# Patient Record
Sex: FEMALE | Race: WHITE | Employment: OTHER | URBAN - METROPOLITAN AREA
[De-identification: names, ages, dates, MRNs, and addresses within clinical notes are randomized per-mention and may not be internally consistent; named-entity substitution may affect disease eponyms.]

---

## 2017-01-01 ENCOUNTER — HOSPITAL ENCOUNTER (OUTPATIENT)
Dept: OTHER | Age: 72
Discharge: HOME | End: 2017-01-31
Attending: FAMILY MEDICINE | Admitting: FAMILY MEDICINE

## 2017-01-02 LAB
POC INR: 3.2 INDEX
PROTHROMBIN TIME, POC: 38 SECONDS (ref 10–14.3)

## 2017-01-16 LAB
POC INR: 2.6 INDEX
PROTHROMBIN TIME, POC: 31.5 SECONDS (ref 10–14.3)

## 2017-01-26 ENCOUNTER — TELEPHONE (OUTPATIENT)
Dept: CARDIOLOGY CLINIC | Age: 72
End: 2017-01-26

## 2017-02-01 ENCOUNTER — HOSPITAL ENCOUNTER (OUTPATIENT)
Dept: OTHER | Age: 72
Discharge: HOME | End: 2017-02-28
Attending: FAMILY MEDICINE | Admitting: FAMILY MEDICINE

## 2017-02-02 ENCOUNTER — OFFICE VISIT (OUTPATIENT)
Dept: CARDIOLOGY CLINIC | Age: 72
End: 2017-02-02

## 2017-02-02 VITALS
HEART RATE: 108 BPM | BODY MASS INDEX: 29.09 KG/M2 | WEIGHT: 181 LBS | HEIGHT: 66 IN | SYSTOLIC BLOOD PRESSURE: 118 MMHG | DIASTOLIC BLOOD PRESSURE: 78 MMHG

## 2017-02-02 DIAGNOSIS — I48.0 PAF (PAROXYSMAL ATRIAL FIBRILLATION) (HCC): Primary | ICD-10-CM

## 2017-02-02 PROCEDURE — 99214 OFFICE O/P EST MOD 30 MIN: CPT | Performed by: INTERNAL MEDICINE

## 2017-02-02 PROCEDURE — 93000 ELECTROCARDIOGRAM COMPLETE: CPT | Performed by: INTERNAL MEDICINE

## 2017-02-07 ENCOUNTER — PROCEDURE VISIT (OUTPATIENT)
Dept: CARDIOLOGY CLINIC | Age: 72
End: 2017-02-07

## 2017-02-07 DIAGNOSIS — R07.89 OTHER CHEST PAIN: Primary | ICD-10-CM

## 2017-02-07 DIAGNOSIS — I48.0 PAF (PAROXYSMAL ATRIAL FIBRILLATION) (HCC): ICD-10-CM

## 2017-02-07 DIAGNOSIS — I48.0 PAF (PAROXYSMAL ATRIAL FIBRILLATION) (HCC): Primary | ICD-10-CM

## 2017-02-07 LAB
LV EF: 58 %
LVEF MODALITY: NORMAL

## 2017-02-07 PROCEDURE — 93018 CV STRESS TEST I&R ONLY: CPT | Performed by: INTERNAL MEDICINE

## 2017-02-07 PROCEDURE — 93016 CV STRESS TEST SUPVJ ONLY: CPT | Performed by: INTERNAL MEDICINE

## 2017-02-07 PROCEDURE — A9500 TC99M SESTAMIBI: HCPCS | Performed by: INTERNAL MEDICINE

## 2017-02-07 PROCEDURE — 78452 HT MUSCLE IMAGE SPECT MULT: CPT | Performed by: INTERNAL MEDICINE

## 2017-02-07 PROCEDURE — 93306 TTE W/DOPPLER COMPLETE: CPT | Performed by: INTERNAL MEDICINE

## 2017-02-07 PROCEDURE — 93017 CV STRESS TEST TRACING ONLY: CPT | Performed by: INTERNAL MEDICINE

## 2017-02-09 ENCOUNTER — TELEPHONE (OUTPATIENT)
Dept: CARDIOLOGY CLINIC | Age: 72
End: 2017-02-09

## 2017-02-09 DIAGNOSIS — M54.30 SCIATICA, UNSPECIFIED LATERALITY: ICD-10-CM

## 2017-02-10 RX ORDER — GABAPENTIN 300 MG/1
CAPSULE ORAL
Qty: 90 CAPSULE | Refills: 0 | Status: SHIPPED | OUTPATIENT
Start: 2017-02-10 | End: 2017-03-13 | Stop reason: SDUPTHER

## 2017-02-16 LAB
POC INR: 2.2 INDEX
PROTHROMBIN TIME, POC: 27 SECONDS (ref 10–14.3)

## 2017-02-20 ENCOUNTER — PROCEDURE VISIT (OUTPATIENT)
Dept: CARDIOLOGY CLINIC | Age: 72
End: 2017-02-20

## 2017-02-20 DIAGNOSIS — Z95.0 CARDIAC PACEMAKER IN SITU: Primary | ICD-10-CM

## 2017-02-20 PROCEDURE — 93296 REM INTERROG EVL PM/IDS: CPT | Performed by: INTERNAL MEDICINE

## 2017-02-20 PROCEDURE — 93294 REM INTERROG EVL PM/LDLS PM: CPT | Performed by: INTERNAL MEDICINE

## 2017-03-01 ENCOUNTER — HOSPITAL ENCOUNTER (OUTPATIENT)
Dept: OTHER | Age: 72
Discharge: HOME | End: 2017-03-31
Attending: FAMILY MEDICINE | Admitting: FAMILY MEDICINE

## 2017-03-03 ENCOUNTER — OFFICE VISIT (OUTPATIENT)
Dept: CARDIOLOGY CLINIC | Age: 72
End: 2017-03-03

## 2017-03-03 VITALS
HEIGHT: 66 IN | WEIGHT: 183 LBS | SYSTOLIC BLOOD PRESSURE: 106 MMHG | HEART RATE: 88 BPM | BODY MASS INDEX: 29.41 KG/M2 | DIASTOLIC BLOOD PRESSURE: 80 MMHG

## 2017-03-03 DIAGNOSIS — I48.0 PAF (PAROXYSMAL ATRIAL FIBRILLATION) (HCC): Primary | ICD-10-CM

## 2017-03-03 PROCEDURE — 99214 OFFICE O/P EST MOD 30 MIN: CPT | Performed by: INTERNAL MEDICINE

## 2017-03-03 RX ORDER — WARFARIN SODIUM 3 MG/1
TABLET ORAL
Qty: 90 TABLET | Refills: 3 | Status: SHIPPED | OUTPATIENT
Start: 2017-03-03 | End: 2017-09-18 | Stop reason: SDUPTHER

## 2017-03-03 RX ORDER — METOPROLOL SUCCINATE 50 MG/1
75 TABLET, EXTENDED RELEASE ORAL DAILY
Qty: 90 TABLET | Refills: 3 | Status: SHIPPED | OUTPATIENT
Start: 2017-03-03 | End: 2017-03-13 | Stop reason: SDUPTHER

## 2017-03-03 RX ORDER — QUINAPRIL 10 MG/1
TABLET ORAL
Qty: 90 TABLET | Refills: 3 | Status: SHIPPED | OUTPATIENT
Start: 2017-03-03 | End: 2017-07-11 | Stop reason: SDUPTHER

## 2017-03-13 ENCOUNTER — OFFICE VISIT (OUTPATIENT)
Dept: FAMILY MEDICINE CLINIC | Age: 72
End: 2017-03-13

## 2017-03-13 VITALS
HEIGHT: 66 IN | OXYGEN SATURATION: 98 % | DIASTOLIC BLOOD PRESSURE: 70 MMHG | HEART RATE: 91 BPM | RESPIRATION RATE: 18 BRPM | BODY MASS INDEX: 28.87 KG/M2 | WEIGHT: 179.6 LBS | SYSTOLIC BLOOD PRESSURE: 126 MMHG

## 2017-03-13 DIAGNOSIS — E03.9 ACQUIRED HYPOTHYROIDISM: ICD-10-CM

## 2017-03-13 DIAGNOSIS — I48.0 PAF (PAROXYSMAL ATRIAL FIBRILLATION) (HCC): ICD-10-CM

## 2017-03-13 DIAGNOSIS — E78.2 MIXED HYPERLIPIDEMIA: ICD-10-CM

## 2017-03-13 DIAGNOSIS — M54.30 SCIATICA, UNSPECIFIED LATERALITY: Primary | ICD-10-CM

## 2017-03-13 PROCEDURE — 99214 OFFICE O/P EST MOD 30 MIN: CPT | Performed by: FAMILY MEDICINE

## 2017-03-13 RX ORDER — SIMVASTATIN 20 MG
20 TABLET ORAL NIGHTLY
Qty: 90 TABLET | Refills: 3 | Status: SHIPPED | OUTPATIENT
Start: 2017-03-13 | End: 2018-03-26 | Stop reason: SDUPTHER

## 2017-03-13 RX ORDER — LEVOTHYROXINE SODIUM 0.05 MG/1
50 TABLET ORAL DAILY
Qty: 90 TABLET | Refills: 3 | Status: SHIPPED | OUTPATIENT
Start: 2017-03-13 | End: 2018-03-08 | Stop reason: SDUPTHER

## 2017-03-13 RX ORDER — METOPROLOL SUCCINATE 50 MG/1
75 TABLET, EXTENDED RELEASE ORAL DAILY
Qty: 135 TABLET | Refills: 3 | Status: SHIPPED | OUTPATIENT
Start: 2017-03-13 | End: 2017-12-12 | Stop reason: SDUPTHER

## 2017-03-13 RX ORDER — GABAPENTIN 300 MG/1
CAPSULE ORAL
Qty: 90 CAPSULE | Refills: 3 | Status: SHIPPED | OUTPATIENT
Start: 2017-03-13 | End: 2017-05-08 | Stop reason: SDUPTHER

## 2017-03-20 LAB
POC INR: 2 INDEX
PROTHROMBIN TIME, POC: 24 SECONDS (ref 10–14.3)

## 2017-03-22 ENCOUNTER — HOSPITAL ENCOUNTER (OUTPATIENT)
Dept: GENERAL RADIOLOGY | Age: 72
Discharge: HOME | End: 2017-03-22
Attending: INTERNAL MEDICINE | Admitting: INTERNAL MEDICINE

## 2017-03-22 DIAGNOSIS — R06.02 BREATH SHORTNESS: ICD-10-CM

## 2017-04-01 ENCOUNTER — HOSPITAL ENCOUNTER (OUTPATIENT)
Dept: OTHER | Age: 72
Discharge: HOME | End: 2017-04-30
Attending: FAMILY MEDICINE | Admitting: FAMILY MEDICINE

## 2017-04-17 LAB
POC INR: 2.6 INDEX
PROTHROMBIN TIME, POC: 31.1 SECONDS (ref 10–14.3)

## 2017-04-29 ENCOUNTER — HOSPITAL ENCOUNTER (OUTPATIENT)
Dept: SLEEP CENTER | Age: 72
Discharge: HOME | End: 2017-04-29
Attending: INTERNAL MEDICINE | Admitting: INTERNAL MEDICINE

## 2017-05-01 ENCOUNTER — HOSPITAL ENCOUNTER (OUTPATIENT)
Dept: OTHER | Age: 72
Discharge: HOME | End: 2017-05-31
Attending: FAMILY MEDICINE | Admitting: FAMILY MEDICINE

## 2017-05-08 DIAGNOSIS — M54.30 SCIATICA, UNSPECIFIED LATERALITY: ICD-10-CM

## 2017-05-09 RX ORDER — GABAPENTIN 300 MG/1
CAPSULE ORAL
Qty: 90 CAPSULE | Refills: 0 | Status: SHIPPED | OUTPATIENT
Start: 2017-05-09 | End: 2017-07-11 | Stop reason: SDUPTHER

## 2017-05-15 LAB
POC INR: 2.8 INDEX
PROTHROMBIN TIME, POC: 34 SECONDS (ref 10–14.3)

## 2017-05-22 ENCOUNTER — PROCEDURE VISIT (OUTPATIENT)
Dept: CARDIOLOGY CLINIC | Age: 72
End: 2017-05-22

## 2017-05-22 ENCOUNTER — TELEPHONE (OUTPATIENT)
Dept: CARDIOLOGY CLINIC | Age: 72
End: 2017-05-22

## 2017-05-22 DIAGNOSIS — Z95.0 CARDIAC PACEMAKER IN SITU: Primary | ICD-10-CM

## 2017-05-22 PROCEDURE — 93296 REM INTERROG EVL PM/IDS: CPT | Performed by: INTERNAL MEDICINE

## 2017-05-22 PROCEDURE — 93294 REM INTERROG EVL PM/LDLS PM: CPT | Performed by: INTERNAL MEDICINE

## 2017-06-01 ENCOUNTER — HOSPITAL ENCOUNTER (OUTPATIENT)
Dept: OTHER | Age: 72
Discharge: HOME | End: 2017-06-30
Attending: FAMILY MEDICINE | Admitting: FAMILY MEDICINE

## 2017-06-12 LAB
POC INR: 2.5 INDEX
PROTHROMBIN TIME, POC: 30 SECONDS (ref 10–14.3)

## 2017-07-01 ENCOUNTER — HOSPITAL ENCOUNTER (OUTPATIENT)
Dept: OTHER | Age: 72
Discharge: HOME | End: 2017-07-31
Attending: FAMILY MEDICINE | Admitting: FAMILY MEDICINE

## 2017-07-10 LAB
POC INR: 2.8 INDEX
PROTHROMBIN TIME, POC: 33.1 SECONDS (ref 10–14.3)

## 2017-07-11 DIAGNOSIS — M54.30 SCIATICA, UNSPECIFIED LATERALITY: ICD-10-CM

## 2017-07-12 RX ORDER — QUINAPRIL 10 MG/1
TABLET ORAL
Qty: 90 TABLET | Refills: 3 | Status: SHIPPED | OUTPATIENT
Start: 2017-07-12 | End: 2018-04-09 | Stop reason: SDUPTHER

## 2017-07-14 RX ORDER — GABAPENTIN 300 MG/1
CAPSULE ORAL
Qty: 90 CAPSULE | Refills: 0 | Status: SHIPPED | OUTPATIENT
Start: 2017-07-14 | End: 2017-08-07 | Stop reason: SDUPTHER

## 2017-08-01 ENCOUNTER — HOSPITAL ENCOUNTER (OUTPATIENT)
Dept: OTHER | Age: 72
Discharge: HOME | End: 2017-08-31
Attending: FAMILY MEDICINE | Admitting: FAMILY MEDICINE

## 2017-08-07 DIAGNOSIS — M54.30 SCIATICA, UNSPECIFIED LATERALITY: ICD-10-CM

## 2017-08-07 LAB
POC INR: 2.2 INDEX
PROTHROMBIN TIME, POC: 26.7 SECONDS (ref 10–14.3)

## 2017-08-09 RX ORDER — GABAPENTIN 300 MG/1
CAPSULE ORAL
Qty: 90 CAPSULE | Refills: 0 | Status: SHIPPED | OUTPATIENT
Start: 2017-08-09 | End: 2017-09-18 | Stop reason: SDUPTHER

## 2017-08-28 ENCOUNTER — PROCEDURE VISIT (OUTPATIENT)
Dept: CARDIOLOGY CLINIC | Age: 72
End: 2017-08-28

## 2017-08-28 DIAGNOSIS — Z95.0 CARDIAC PACEMAKER IN SITU: Primary | ICD-10-CM

## 2017-08-28 PROCEDURE — 93296 REM INTERROG EVL PM/IDS: CPT | Performed by: INTERNAL MEDICINE

## 2017-08-28 PROCEDURE — 93294 REM INTERROG EVL PM/LDLS PM: CPT | Performed by: INTERNAL MEDICINE

## 2017-09-01 ENCOUNTER — HOSPITAL ENCOUNTER (OUTPATIENT)
Dept: OTHER | Age: 72
Discharge: HOME | End: 2017-09-30
Attending: FAMILY MEDICINE | Admitting: FAMILY MEDICINE

## 2017-09-11 ENCOUNTER — OFFICE VISIT (OUTPATIENT)
Dept: CARDIOLOGY CLINIC | Age: 72
End: 2017-09-11

## 2017-09-11 VITALS
SYSTOLIC BLOOD PRESSURE: 104 MMHG | BODY MASS INDEX: 28.45 KG/M2 | HEIGHT: 66 IN | DIASTOLIC BLOOD PRESSURE: 70 MMHG | WEIGHT: 177 LBS | HEART RATE: 80 BPM

## 2017-09-11 DIAGNOSIS — Z95.0 CARDIAC PACEMAKER IN SITU: Primary | ICD-10-CM

## 2017-09-11 DIAGNOSIS — I48.0 PAF (PAROXYSMAL ATRIAL FIBRILLATION) (HCC): ICD-10-CM

## 2017-09-11 LAB
POC INR: 2.4 INDEX
PROTHROMBIN TIME, POC: 28.9 SECONDS (ref 10–14.3)

## 2017-09-11 PROCEDURE — 99213 OFFICE O/P EST LOW 20 MIN: CPT | Performed by: INTERNAL MEDICINE

## 2017-09-11 RX ORDER — OYSTER SHELL CALCIUM WITH VITAMIN D 500; 200 MG/1; [IU]/1
1 TABLET, FILM COATED ORAL DAILY
COMMUNITY
End: 2019-07-26

## 2017-09-13 RX ORDER — WARFARIN SODIUM 3 MG/1
TABLET ORAL
Qty: 90 TABLET | Refills: 3 | OUTPATIENT
Start: 2017-09-13

## 2017-09-13 RX ORDER — DILTIAZEM HYDROCHLORIDE 180 MG/1
180 CAPSULE, COATED, EXTENDED RELEASE ORAL DAILY
Qty: 90 CAPSULE | Refills: 3 | Status: SHIPPED | OUTPATIENT
Start: 2017-09-13 | End: 2018-07-24 | Stop reason: SDUPTHER

## 2017-09-14 ENCOUNTER — TELEPHONE (OUTPATIENT)
Dept: FAMILY MEDICINE CLINIC | Age: 72
End: 2017-09-14

## 2017-09-14 DIAGNOSIS — E03.9 ACQUIRED HYPOTHYROIDISM: Primary | ICD-10-CM

## 2017-09-16 ENCOUNTER — HOSPITAL ENCOUNTER (OUTPATIENT)
Dept: LAB | Age: 72
Discharge: HOME | End: 2017-09-16
Attending: FAMILY MEDICINE | Admitting: FAMILY MEDICINE

## 2017-09-16 LAB
ALBUMIN SERPL-MCNC: 4.2 GM/DL (ref 3.4–5)
ALP BLD-CCNC: 59 IU/L (ref 40–128)
ALT SERPL-CCNC: 17 U/L (ref 10–40)
ANION GAP SERPL CALCULATED.3IONS-SCNC: 11 MMOL/L (ref 4–16)
AST SERPL-CCNC: 22 IU/L (ref 15–37)
BILIRUB SERPL-MCNC: 1.3 MG/DL (ref 0–1)
BUN BLDV-MCNC: 17 MG/DL (ref 6–23)
CALCIUM SERPL-MCNC: 9.2 MG/DL (ref 8.3–10.6)
CHLORIDE BLD-SCNC: 105 MMOL/L (ref 99–110)
CHOLESTEROL: 168 MG/DL
CO2: 27 MMOL/L (ref 21–32)
CREAT SERPL-MCNC: 1 MG/DL (ref 0.6–1.1)
GFR AFRICAN AMERICAN: >60 ML/MIN/1.73M2
GFR NON-AFRICAN AMERICAN: 54 ML/MIN/1.73M2
GLUCOSE FASTING: 91 MG/DL (ref 70–99)
HCT VFR BLD CALC: 47 % (ref 37–47)
HDLC SERPL-MCNC: 50 MG/DL
HEMOGLOBIN: 15.6 GM/DL (ref 12.5–16)
LDL CHOLESTEROL CALCULATED: 94 MG/DL
MCH RBC QN AUTO: 29.9 PG (ref 27–31)
MCHC RBC AUTO-ENTMCNC: 33.2 % (ref 32–36)
MCV RBC AUTO: 90 FL (ref 78–100)
PDW BLD-RTO: 13.1 % (ref 11.7–14.9)
PLATELET # BLD: 137 K/CU MM (ref 140–440)
PMV BLD AUTO: 10.6 FL (ref 7.5–11.1)
POTASSIUM SERPL-SCNC: 4.4 MMOL/L (ref 3.5–5.1)
RBC # BLD: 5.22 M/CU MM (ref 4.2–5.4)
SODIUM BLD-SCNC: 143 MMOL/L (ref 135–145)
TOTAL PROTEIN: 7 GM/DL (ref 6.4–8.2)
TRIGL SERPL-MCNC: 119 MG/DL
TSH HIGH SENSITIVITY: 2.88 UIU/ML (ref 0.27–4.2)
WBC # BLD: 5.8 K/CU MM (ref 4–10.5)

## 2017-09-18 ENCOUNTER — OFFICE VISIT (OUTPATIENT)
Dept: FAMILY MEDICINE CLINIC | Age: 72
End: 2017-09-18

## 2017-09-18 VITALS
DIASTOLIC BLOOD PRESSURE: 76 MMHG | WEIGHT: 175.8 LBS | HEIGHT: 66 IN | RESPIRATION RATE: 16 BRPM | BODY MASS INDEX: 28.25 KG/M2 | OXYGEN SATURATION: 98 % | HEART RATE: 84 BPM | SYSTOLIC BLOOD PRESSURE: 132 MMHG

## 2017-09-18 DIAGNOSIS — Z79.01 LONG TERM CURRENT USE OF ANTICOAGULANT: ICD-10-CM

## 2017-09-18 DIAGNOSIS — I48.0 PAF (PAROXYSMAL ATRIAL FIBRILLATION) (HCC): Primary | ICD-10-CM

## 2017-09-18 DIAGNOSIS — E03.9 ACQUIRED HYPOTHYROIDISM: ICD-10-CM

## 2017-09-18 DIAGNOSIS — Z23 NEEDS FLU SHOT: ICD-10-CM

## 2017-09-18 DIAGNOSIS — M54.30 SCIATICA, UNSPECIFIED LATERALITY: ICD-10-CM

## 2017-09-18 PROCEDURE — 90662 IIV NO PRSV INCREASED AG IM: CPT | Performed by: FAMILY MEDICINE

## 2017-09-18 PROCEDURE — G0008 ADMIN INFLUENZA VIRUS VAC: HCPCS | Performed by: FAMILY MEDICINE

## 2017-09-18 PROCEDURE — 99214 OFFICE O/P EST MOD 30 MIN: CPT | Performed by: FAMILY MEDICINE

## 2017-09-18 PROCEDURE — G8510 SCR DEP NEG, NO PLAN REQD: HCPCS | Performed by: FAMILY MEDICINE

## 2017-09-18 PROCEDURE — 3288F FALL RISK ASSESSMENT DOCD: CPT | Performed by: FAMILY MEDICINE

## 2017-09-18 RX ORDER — WARFARIN SODIUM 3 MG/1
TABLET ORAL
Qty: 90 TABLET | Refills: 3 | Status: SHIPPED | OUTPATIENT
Start: 2017-09-18 | End: 2018-09-11 | Stop reason: SDUPTHER

## 2017-09-18 RX ORDER — GABAPENTIN 300 MG/1
CAPSULE ORAL
Qty: 90 CAPSULE | Refills: 3 | Status: SHIPPED | OUTPATIENT
Start: 2017-09-18 | End: 2017-11-02 | Stop reason: SDUPTHER

## 2017-09-18 ASSESSMENT — PATIENT HEALTH QUESTIONNAIRE - PHQ9
1. LITTLE INTEREST OR PLEASURE IN DOING THINGS: 0
2. FEELING DOWN, DEPRESSED OR HOPELESS: 0
SUM OF ALL RESPONSES TO PHQ9 QUESTIONS 1 & 2: 0
SUM OF ALL RESPONSES TO PHQ QUESTIONS 1-9: 0

## 2017-10-01 ENCOUNTER — HOSPITAL ENCOUNTER (OUTPATIENT)
Dept: OTHER | Age: 72
Discharge: HOME | End: 2017-10-31
Attending: FAMILY MEDICINE | Admitting: FAMILY MEDICINE

## 2017-10-09 LAB
POC INR: 2.6 INDEX
PROTHROMBIN TIME, POC: 31.5 SECONDS (ref 10–14.3)

## 2017-10-26 ENCOUNTER — HOSPITAL ENCOUNTER (OUTPATIENT)
Dept: WOMENS IMAGING | Age: 72
Discharge: HOME | End: 2017-10-26
Attending: OBSTETRICS & GYNECOLOGY | Admitting: OBSTETRICS & GYNECOLOGY

## 2017-10-26 DIAGNOSIS — Z12.39 BREAST CANCER SCREENING: ICD-10-CM

## 2017-11-01 ENCOUNTER — HOSPITAL ENCOUNTER (OUTPATIENT)
Dept: OTHER | Age: 72
Discharge: HOME | End: 2017-11-30
Attending: FAMILY MEDICINE | Admitting: FAMILY MEDICINE

## 2017-11-02 DIAGNOSIS — M54.30 SCIATICA, UNSPECIFIED LATERALITY: ICD-10-CM

## 2017-11-03 RX ORDER — GABAPENTIN 300 MG/1
CAPSULE ORAL
Qty: 90 CAPSULE | Refills: 1 | Status: SHIPPED | OUTPATIENT
Start: 2017-11-03 | End: 2018-03-26 | Stop reason: SDUPTHER

## 2017-11-06 LAB
POC INR: 2.2 INDEX
PROTHROMBIN TIME, POC: 26.9 SECONDS (ref 10–14.3)

## 2017-12-01 ENCOUNTER — HOSPITAL ENCOUNTER (OUTPATIENT)
Dept: OTHER | Age: 72
Discharge: HOME | End: 2017-12-31
Attending: FAMILY MEDICINE | Admitting: FAMILY MEDICINE

## 2017-12-04 LAB
POC INR: 2.6 INDEX
PROTHROMBIN TIME, POC: 30.9 SECONDS (ref 10–14.3)

## 2017-12-05 ENCOUNTER — PROCEDURE VISIT (OUTPATIENT)
Dept: CARDIOLOGY CLINIC | Age: 72
End: 2017-12-05

## 2017-12-05 DIAGNOSIS — Z95.0 CARDIAC PACEMAKER IN SITU: Primary | ICD-10-CM

## 2017-12-05 PROCEDURE — 93296 REM INTERROG EVL PM/IDS: CPT | Performed by: INTERNAL MEDICINE

## 2017-12-05 PROCEDURE — 93294 REM INTERROG EVL PM/LDLS PM: CPT | Performed by: INTERNAL MEDICINE

## 2017-12-12 DIAGNOSIS — I48.0 PAF (PAROXYSMAL ATRIAL FIBRILLATION) (HCC): ICD-10-CM

## 2017-12-12 RX ORDER — METOPROLOL SUCCINATE 50 MG/1
75 TABLET, EXTENDED RELEASE ORAL DAILY
Qty: 135 TABLET | Refills: 3 | Status: SHIPPED | OUTPATIENT
Start: 2017-12-12 | End: 2018-12-10 | Stop reason: SDUPTHER

## 2018-01-01 ENCOUNTER — HOSPITAL ENCOUNTER (OUTPATIENT)
Dept: OTHER | Age: 73
Discharge: HOME | End: 2018-01-31
Attending: FAMILY MEDICINE | Admitting: FAMILY MEDICINE

## 2018-01-08 LAB
POC INR: 2.7 INDEX
PROTHROMBIN TIME, POC: 32.1 SECONDS (ref 10–14.3)

## 2018-02-01 ENCOUNTER — HOSPITAL ENCOUNTER (OUTPATIENT)
Dept: OTHER | Age: 73
Discharge: HOME | End: 2018-02-28
Attending: FAMILY MEDICINE | Admitting: FAMILY MEDICINE

## 2018-02-05 LAB
POC INR: 2.8 INDEX
PROTHROMBIN TIME, POC: 33.3 SECONDS (ref 10–14.3)

## 2018-02-17 ENCOUNTER — HOSPITAL ENCOUNTER (OUTPATIENT)
Dept: GENERAL RADIOLOGY | Age: 73
Discharge: HOME | End: 2018-02-17
Admitting: INTERNAL MEDICINE

## 2018-02-17 ENCOUNTER — HOSPITAL ENCOUNTER (OUTPATIENT)
Dept: GENERAL RADIOLOGY | Age: 73
Discharge: HOME | End: 2018-02-17
Attending: INTERNAL MEDICINE | Admitting: INTERNAL MEDICINE

## 2018-02-17 DIAGNOSIS — J43.9 PULMONARY EMPHYSEMA, UNSPECIFIED EMPHYSEMA TYPE (HCC): ICD-10-CM

## 2018-03-01 ENCOUNTER — HOSPITAL ENCOUNTER (OUTPATIENT)
Dept: OTHER | Age: 73
Discharge: HOME | End: 2018-03-31
Attending: FAMILY MEDICINE | Admitting: FAMILY MEDICINE

## 2018-03-05 LAB
POC INR: 2.5 INDEX
PROTHROMBIN TIME, POC: 30.2 SECONDS (ref 10–14.3)

## 2018-03-12 ENCOUNTER — PROCEDURE VISIT (OUTPATIENT)
Dept: CARDIOLOGY CLINIC | Age: 73
End: 2018-03-12

## 2018-03-12 DIAGNOSIS — Z95.0 CARDIAC PACEMAKER IN SITU: Primary | ICD-10-CM

## 2018-03-12 PROCEDURE — 93296 REM INTERROG EVL PM/IDS: CPT | Performed by: INTERNAL MEDICINE

## 2018-03-12 PROCEDURE — 93294 REM INTERROG EVL PM/LDLS PM: CPT | Performed by: INTERNAL MEDICINE

## 2018-03-27 ENCOUNTER — OFFICE VISIT (OUTPATIENT)
Dept: FAMILY MEDICINE CLINIC | Age: 73
End: 2018-03-27

## 2018-03-27 VITALS
BODY MASS INDEX: 28.44 KG/M2 | OXYGEN SATURATION: 96 % | SYSTOLIC BLOOD PRESSURE: 100 MMHG | HEART RATE: 99 BPM | DIASTOLIC BLOOD PRESSURE: 82 MMHG | WEIGHT: 176.2 LBS

## 2018-03-27 DIAGNOSIS — R51.9 SINUS HEADACHE: Primary | ICD-10-CM

## 2018-03-27 DIAGNOSIS — M54.30 SCIATICA, UNSPECIFIED LATERALITY: ICD-10-CM

## 2018-03-27 DIAGNOSIS — H61.23 HEARING LOSS OF BOTH EARS DUE TO CERUMEN IMPACTION: ICD-10-CM

## 2018-03-27 DIAGNOSIS — E03.9 ACQUIRED HYPOTHYROIDISM: ICD-10-CM

## 2018-03-27 DIAGNOSIS — E78.2 MIXED HYPERLIPIDEMIA: ICD-10-CM

## 2018-03-27 PROCEDURE — 3014F SCREEN MAMMO DOC REV: CPT | Performed by: FAMILY MEDICINE

## 2018-03-27 PROCEDURE — G8427 DOCREV CUR MEDS BY ELIG CLIN: HCPCS | Performed by: FAMILY MEDICINE

## 2018-03-27 PROCEDURE — 1123F ACP DISCUSS/DSCN MKR DOCD: CPT | Performed by: FAMILY MEDICINE

## 2018-03-27 PROCEDURE — 1036F TOBACCO NON-USER: CPT | Performed by: FAMILY MEDICINE

## 2018-03-27 PROCEDURE — 1090F PRES/ABSN URINE INCON ASSESS: CPT | Performed by: FAMILY MEDICINE

## 2018-03-27 PROCEDURE — 99214 OFFICE O/P EST MOD 30 MIN: CPT | Performed by: FAMILY MEDICINE

## 2018-03-27 PROCEDURE — G8419 CALC BMI OUT NRM PARAM NOF/U: HCPCS | Performed by: FAMILY MEDICINE

## 2018-03-27 PROCEDURE — G8482 FLU IMMUNIZE ORDER/ADMIN: HCPCS | Performed by: FAMILY MEDICINE

## 2018-03-27 PROCEDURE — 4040F PNEUMOC VAC/ADMIN/RCVD: CPT | Performed by: FAMILY MEDICINE

## 2018-03-27 PROCEDURE — 3017F COLORECTAL CA SCREEN DOC REV: CPT | Performed by: FAMILY MEDICINE

## 2018-03-27 PROCEDURE — G8399 PT W/DXA RESULTS DOCUMENT: HCPCS | Performed by: FAMILY MEDICINE

## 2018-03-27 RX ORDER — GABAPENTIN 300 MG/1
CAPSULE ORAL
Qty: 90 CAPSULE | Refills: 3 | Status: SHIPPED | OUTPATIENT
Start: 2018-03-27 | End: 2019-03-26 | Stop reason: SDUPTHER

## 2018-03-27 RX ORDER — LEVOTHYROXINE SODIUM 0.05 MG/1
50 TABLET ORAL DAILY
Qty: 90 TABLET | Refills: 3 | Status: SHIPPED | OUTPATIENT
Start: 2018-03-27 | End: 2018-05-01 | Stop reason: SDUPTHER

## 2018-03-27 RX ORDER — FLUTICASONE PROPIONATE 50 MCG
2 SPRAY, SUSPENSION (ML) NASAL DAILY PRN
Qty: 1 BOTTLE | Refills: 11 | Status: SHIPPED | OUTPATIENT
Start: 2018-03-27 | End: 2018-05-01 | Stop reason: SDUPTHER

## 2018-03-27 RX ORDER — FEXOFENADINE HCL 180 MG/1
180 TABLET ORAL DAILY
COMMUNITY
Start: 2018-03-27 | End: 2018-07-24

## 2018-03-27 RX ORDER — SIMVASTATIN 20 MG
20 TABLET ORAL NIGHTLY
Qty: 90 TABLET | Refills: 3 | Status: SHIPPED | OUTPATIENT
Start: 2018-03-27 | End: 2018-07-24

## 2018-03-27 NOTE — PATIENT INSTRUCTIONS
your healthcare professional. [place] [address] any warranty or liability for your use of this information. Patient Education          carbamide peroxide (otic)  Pronunciation:  CATALINA ba mide per [place] tik  Brand:  Auraphene-B, Debrox, Ear Wax, Ear Wax Removal, Mollifene, Murine Ear Drops  What is the most important information I should know about carbamide peroxide? You should not use this medicine if you have a hole in your ear drum (ruptured ear drum), or if you have any signs of ear infection or injury, such as pain, warmth, swelling, drainage, or bleeding. What is carbamide peroxide? Carbamide peroxide otic (for the ears) is used to soften and loosen ear wax, making it easier to remove. Carbamide peroxide may also be used for purposes not listed in this medication guide. What should I discuss with my healthcare provider before using carbamide peroxide? You should not use carbamide peroxide otic if you are allergic to it, or if you have a hole in your ear drum (ruptured ear drum). Ask a doctor or pharmacist if it is safe for you to use this medicine if you have other medical conditions, especially:  · recent ear surgery or injury;  · ear pain, itching, or other irritation;  · drainage, discharge, or bleeding from the ear; or  · warmth or swelling around the ear. Carbamide peroxide otic should not be used on a child younger than 15years old. How should I use carbamide peroxide? Use exactly as directed on the label, or as prescribed by your doctor. Do not use in larger or smaller amounts or for longer than recommended. Carbamide peroxide otic comes with patient instructions for safe and effective use. Follow these directions carefully. Ask your doctor or pharmacist if you have any questions. Wash your hands before and after using this medicine. To use the ear drops:  · Lie down or tilt your head with your ear facing upward.  Open the ear canal by gently pulling your ear back, or pulling downward on the earlobe when giving this medicine to a child. · Hold the dropper upside down over your ear and drop the correct number of drops into the ear. · You may hear a bubbling sound inside your ear. This is caused by the foaming action of carbamide peroxide, which helps break up the wax inside your ear. · Stay lying down or with your head tilted for at least 5 minutes. You may use a small piece of cotton to plug the ear and keep the medicine from draining out. Follow your doctor's instructions about the use of cotton. · Do not touch the dropper tip or place it directly in your ear. It may become contaminated. Wipe the tip with a clean tissue but do not wash with water or soap. Carbamide peroxide may be packaged with a bulb syringe that is used to flush out your ear with water. To use the bulb syringe:  · Fill the syringe with warm water that is body temperature (no warmer than 98 degrees F). Do not use hot or cold water. · Hold your head sideways with your ear over a sink or bowl. Gently pull your ear back to open the ear canal. Place the tip of the bulb syringe at the opening of your ear canal. Do not insert the tip into your ear. · Squeeze the bulb syringe gently to release the water into your ear. Do not squirt the water with any force, or you could damage your ear drum. · Remove the syringe and allow the water to drain from your ear into the sink or bowl. Do not use carbamide peroxide for longer than 4 days in a row. Call your doctor if you still have excessive earwax after using this medicine, or if your symptoms get worse. Clean the bulb syringe by filling it with plain water and emptying it several times. Do not use soap or other cleaning chemicals. Allow the syringe to air dry. Keep the medicine bottle tightly closed and store it in the outer carton at room temperature, away from moisture and heat. What happens if I miss a dose?   Since carbamide peroxide otic is used when

## 2018-03-29 NOTE — PROGRESS NOTES
(DEXILANT) 60 MG CPDR Take 1 tablet by mouth daily. Physical Exam   Nursing note reviewed  /82 (Site: Left Arm, Position: Sitting, Cuff Size: Medium Adult)   Pulse 99   Wt 176 lb 3.2 oz (79.9 kg)   SpO2 96%   BMI 28.44 kg/m²   BP Readings from Last 3 Encounters:   03/27/18 100/82   02/15/18 90/76   09/18/17 132/76     Wt Readings from Last 3 Encounters:   03/28/18 177 lb (80.3 kg)   03/27/18 176 lb 3.2 oz (79.9 kg)   02/15/18 173 lb 8 oz (78.7 kg)     Body mass index is 28.44 kg/m². No results found for this visit on 03/27/18. Constitutional: She is oriented to person, place, and time. She appears well-developed and well-nourished. No distress. HENT: Head: Normocephalic and atraumatic. Right Ear: External ear normal. Left Ear: External ear normal. Nose: Nose normal. Mouth/Throat: Oropharynx is clear and moist.   Eyes: Conjunctivae, EOM and lids are normal. Pupils are equal, round, and reactive to light. Bilateral cerumen impaction with thickened dry wax. Neck: Trachea normal. Neck supple. Carotid bruit is not present. No mass and no thyromegaly present. Cardiovascular: Normal rate, regular rhythm today. S1 normal, S2 normal and normal heart sounds. No murmur heard. pacer palpated in left chest.   Pulmonary/Chest: Breath sounds normal. No accessory muscle usage. No respiratory distress. She  has no decreased breath sounds. She  has no wheezes. Abdominal: Soft. Normal appearance and bowel sounds are normal.  No tenderness. She has no CVA tenderness. Neurological: She  is alert and oriented to person, place, and time. Skin: She  is not diaphoretic. Psychiatric: She  has a normal mood and affect. Musculoskeletal:  Walks without antalgic gait. 1. Sinus headache  fluticasone (FLONASE) 50 MCG/ACT nasal spray    fexofenadine (ALLEGRA ALLERGY) 180 MG tablet   2. Acquired hypothyroidism  levothyroxine (SYNTHROID) 50 MCG tablet   3.  Sciatica, unspecified laterality  gabapentin

## 2018-04-01 ENCOUNTER — HOSPITAL ENCOUNTER (OUTPATIENT)
Dept: OTHER | Age: 73
Discharge: HOME | End: 2018-04-30
Attending: FAMILY MEDICINE | Admitting: FAMILY MEDICINE

## 2018-04-02 DIAGNOSIS — E78.2 MIXED HYPERLIPIDEMIA: ICD-10-CM

## 2018-04-02 RX ORDER — SIMVASTATIN 20 MG
20 TABLET ORAL NIGHTLY
Qty: 90 TABLET | Refills: 2 | Status: SHIPPED | OUTPATIENT
Start: 2018-04-02 | End: 2018-05-01 | Stop reason: SDUPTHER

## 2018-04-05 LAB
POC INR: 2.9 INDEX
PROTHROMBIN TIME, POC: 35 SECONDS (ref 10–14.3)

## 2018-04-09 RX ORDER — QUINAPRIL 10 MG/1
TABLET ORAL
Qty: 90 TABLET | Refills: 3 | Status: SHIPPED | OUTPATIENT
Start: 2018-04-09 | End: 2019-03-26 | Stop reason: SDUPTHER

## 2018-05-01 ENCOUNTER — OFFICE VISIT (OUTPATIENT)
Dept: FAMILY MEDICINE CLINIC | Age: 73
End: 2018-05-01

## 2018-05-01 ENCOUNTER — HOSPITAL ENCOUNTER (OUTPATIENT)
Dept: OTHER | Age: 73
Discharge: HOME | End: 2018-05-31
Attending: FAMILY MEDICINE | Admitting: FAMILY MEDICINE

## 2018-05-01 VITALS
HEART RATE: 71 BPM | RESPIRATION RATE: 14 BRPM | BODY MASS INDEX: 27.12 KG/M2 | HEIGHT: 67 IN | DIASTOLIC BLOOD PRESSURE: 68 MMHG | OXYGEN SATURATION: 99 % | WEIGHT: 172.8 LBS | SYSTOLIC BLOOD PRESSURE: 116 MMHG

## 2018-05-01 DIAGNOSIS — E03.9 ACQUIRED HYPOTHYROIDISM: Primary | ICD-10-CM

## 2018-05-01 DIAGNOSIS — H61.23 HEARING LOSS OF BOTH EARS DUE TO CERUMEN IMPACTION: ICD-10-CM

## 2018-05-01 PROCEDURE — G8417 CALC BMI ABV UP PARAM F/U: HCPCS | Performed by: FAMILY MEDICINE

## 2018-05-01 PROCEDURE — 4040F PNEUMOC VAC/ADMIN/RCVD: CPT | Performed by: FAMILY MEDICINE

## 2018-05-01 PROCEDURE — 69210 REMOVE IMPACTED EAR WAX UNI: CPT | Performed by: FAMILY MEDICINE

## 2018-05-01 PROCEDURE — 1036F TOBACCO NON-USER: CPT | Performed by: FAMILY MEDICINE

## 2018-05-01 PROCEDURE — 3017F COLORECTAL CA SCREEN DOC REV: CPT | Performed by: FAMILY MEDICINE

## 2018-05-01 PROCEDURE — G8427 DOCREV CUR MEDS BY ELIG CLIN: HCPCS | Performed by: FAMILY MEDICINE

## 2018-05-01 PROCEDURE — 1090F PRES/ABSN URINE INCON ASSESS: CPT | Performed by: FAMILY MEDICINE

## 2018-05-01 PROCEDURE — 1123F ACP DISCUSS/DSCN MKR DOCD: CPT | Performed by: FAMILY MEDICINE

## 2018-05-01 PROCEDURE — 99213 OFFICE O/P EST LOW 20 MIN: CPT | Performed by: FAMILY MEDICINE

## 2018-05-01 PROCEDURE — G8399 PT W/DXA RESULTS DOCUMENT: HCPCS | Performed by: FAMILY MEDICINE

## 2018-05-01 RX ORDER — LEVOTHYROXINE SODIUM 0.05 MG/1
50 TABLET ORAL DAILY
Qty: 90 TABLET | Refills: 3 | Status: SHIPPED | OUTPATIENT
Start: 2018-05-01 | End: 2019-03-26 | Stop reason: SDUPTHER

## 2018-05-03 LAB
POC INR: 2.7 INDEX
PROTHROMBIN TIME, POC: 32.7 SECONDS (ref 10–14.3)

## 2018-05-31 LAB
POC INR: 3 INDEX
PROTHROMBIN TIME, POC: 36.1 SECONDS (ref 10–14.3)

## 2018-06-01 ENCOUNTER — HOSPITAL ENCOUNTER (OUTPATIENT)
Dept: OTHER | Age: 73
Discharge: HOME | End: 2018-06-30
Attending: FAMILY MEDICINE | Admitting: FAMILY MEDICINE

## 2018-06-22 ENCOUNTER — PROCEDURE VISIT (OUTPATIENT)
Dept: CARDIOLOGY CLINIC | Age: 73
End: 2018-06-22

## 2018-06-22 DIAGNOSIS — Z95.0 CARDIAC PACEMAKER IN SITU: Primary | ICD-10-CM

## 2018-06-22 PROCEDURE — 93294 REM INTERROG EVL PM/LDLS PM: CPT | Performed by: INTERNAL MEDICINE

## 2018-06-22 PROCEDURE — 93296 REM INTERROG EVL PM/IDS: CPT | Performed by: INTERNAL MEDICINE

## 2018-06-28 LAB
POC INR: 2.1 INDEX
PROTHROMBIN TIME, POC: 24.7 SECONDS (ref 10–14.3)

## 2018-07-01 ENCOUNTER — HOSPITAL ENCOUNTER (OUTPATIENT)
Dept: OTHER | Age: 73
Discharge: HOME | End: 2018-07-31
Attending: FAMILY MEDICINE | Admitting: FAMILY MEDICINE

## 2018-07-24 ENCOUNTER — OFFICE VISIT (OUTPATIENT)
Dept: CARDIOLOGY CLINIC | Age: 73
End: 2018-07-24

## 2018-07-24 VITALS
HEIGHT: 67 IN | DIASTOLIC BLOOD PRESSURE: 70 MMHG | HEART RATE: 80 BPM | SYSTOLIC BLOOD PRESSURE: 112 MMHG | BODY MASS INDEX: 26.37 KG/M2 | WEIGHT: 168 LBS

## 2018-07-24 DIAGNOSIS — I48.0 PAF (PAROXYSMAL ATRIAL FIBRILLATION) (HCC): Primary | ICD-10-CM

## 2018-07-24 PROCEDURE — 1123F ACP DISCUSS/DSCN MKR DOCD: CPT | Performed by: INTERNAL MEDICINE

## 2018-07-24 PROCEDURE — G8399 PT W/DXA RESULTS DOCUMENT: HCPCS | Performed by: INTERNAL MEDICINE

## 2018-07-24 PROCEDURE — 3017F COLORECTAL CA SCREEN DOC REV: CPT | Performed by: INTERNAL MEDICINE

## 2018-07-24 PROCEDURE — G8417 CALC BMI ABV UP PARAM F/U: HCPCS | Performed by: INTERNAL MEDICINE

## 2018-07-24 PROCEDURE — 1101F PT FALLS ASSESS-DOCD LE1/YR: CPT | Performed by: INTERNAL MEDICINE

## 2018-07-24 PROCEDURE — 99214 OFFICE O/P EST MOD 30 MIN: CPT | Performed by: INTERNAL MEDICINE

## 2018-07-24 PROCEDURE — G8427 DOCREV CUR MEDS BY ELIG CLIN: HCPCS | Performed by: INTERNAL MEDICINE

## 2018-07-24 PROCEDURE — 1036F TOBACCO NON-USER: CPT | Performed by: INTERNAL MEDICINE

## 2018-07-24 PROCEDURE — 4040F PNEUMOC VAC/ADMIN/RCVD: CPT | Performed by: INTERNAL MEDICINE

## 2018-07-24 PROCEDURE — 1090F PRES/ABSN URINE INCON ASSESS: CPT | Performed by: INTERNAL MEDICINE

## 2018-07-24 RX ORDER — ATORVASTATIN CALCIUM 10 MG/1
10 TABLET, FILM COATED ORAL DAILY
Qty: 90 TABLET | Refills: 3 | Status: SHIPPED | OUTPATIENT
Start: 2018-07-24 | End: 2019-07-08 | Stop reason: SDUPTHER

## 2018-07-24 RX ORDER — DILTIAZEM HYDROCHLORIDE 180 MG/1
180 CAPSULE, COATED, EXTENDED RELEASE ORAL DAILY
Qty: 90 CAPSULE | Refills: 3 | Status: SHIPPED | OUTPATIENT
Start: 2018-07-24 | End: 2019-09-18 | Stop reason: SDUPTHER

## 2018-07-24 NOTE — PROGRESS NOTES
CARDIOLOGY NOTE      7/24/2018    RE: Daylin Miguel  (1945)                               TO:  Dr. Alejandrina Oliver MD      Thank you for involving me in taking care of your  patient Daylin Miguel, who is a  75y.o. year old      female with past medical  history of  HTN, Hyperlipidimea, PPM, A Fib  is  seen today Patient  during this  visit  Still has SOB, seeing Dr Yobany Malone. Vitals:    07/24/18 1111   BP: 112/70   Pulse: 80       Current Outpatient Prescriptions   Medication Sig Dispense Refill    Fexofenadine HCl (ALLEGRA ALLERGY PO) Take by mouth      levothyroxine (SYNTHROID) 50 MCG tablet Take 1 tablet by mouth daily 90 tablet 3    quinapril (ACCUPRIL) 10 MG tablet TAKE 1 TABLET BY MOUTH ONCE DAILY AT NIGHT 90 tablet 3    gabapentin (NEURONTIN) 300 MG capsule TAKE ONE CAPSULE BY MOUTH AT BEDTIME. 90 capsule 3    simvastatin (ZOCOR) 20 MG tablet Take 1 tablet by mouth nightly 90 tablet 3    albuterol sulfate  (90 Base) MCG/ACT inhaler Inhale 2 puffs into the lungs every 6 hours as needed for Wheezing 1 Inhaler 5    metoprolol succinate (TOPROL XL) 50 MG extended release tablet Take 1.5 tablets by mouth daily 135 tablet 3    warfarin (COUMADIN) 3 MG tablet TAKE 1 TABLET BY MOUTH DAILY. OR AS DIRECTED BY PHYSICIAN. 90 tablet 3    diltiazem (CARDIZEM CD) 180 MG extended release capsule Take 1 capsule by mouth daily 90 capsule 3    calcium-vitamin D (OSCAL-500) 500-200 MG-UNIT per tablet Take 1 tablet by mouth daily      Spacer/Aero-Holding Chambers (AEROCHAMBER) MISC Inhale 1 Device into the lungs every 6 hours 1 each 0    ranitidine (ZANTAC) 150 MG tablet Take 1 tablet by mouth nightly 90 tablet 1    ferrous sulfate 325 (65 FE) MG tablet Take 65 mg by mouth daily (with breakfast)       Dexlansoprazole (DEXILANT) 60 MG CPDR Take 1 tablet by mouth daily. No current facility-administered medications for this visit.       Allergies: Latex; Darvon [propoxyphene hcl]; Demerol; Dilaudid [hydromorphone hcl]; Valium; Celecoxib; Norco [hydrocodone-acetaminophen]; Norvasc [amlodipine]; Oxycodone; Tape [adhesive tape]; Vasotec [enalapril]; and Diazepam  Past Medical History:   Diagnosis Date    Abnormal echocardiogram     EF 60%, mild aortic indsufficiency, mild pulmonary hypertension    Anemia     Aortic insufficiency     mild per echo 8/24/2010    Atrial fibrillation (HCC)     failed propafenone, Multaq and flecainide - 7/2011 plan for AFib ablation - [facility]- Dr Gilma Davila Atrial flutter ([facility] [address]     Bursitis, trochanteric 8/04    s/p injection    Cerumen impaction 4/24/2012    Diverticulosis 2015    Dr. Irene BANKS scope    Diverticulosis of colon 1/2010    Colonoscopy-Dr Reece ACUÑA (degenerative joint disease), cervical     cervical, generalized disc buldge   MRI 3/02    DVT (deep venous thrombosis) ([facility] [address]     Dyslipidemia 2002    Environmental allergies     Family history of colon cancer     mother    Gastric polyp     8/2006, 11/2009 benign- Dr Migel Orosco Gastritis 4/2008    mild superficial per Dr Irene Damon    GERD (gastroesophageal reflux disease) 8/2006    Dr Migel Orosco H/O cardiovascular stress test 07/13, 4/3/13    07/13 EF58%, No scintigraphic evidence of inducible myocardial ischemia 04/13Normal study. No wall motion abnormality seen. EF 65%    H/O echocardiogram 7/18/13 7/13-EF 50-55% Mild AR.      H/O exercise stress test 02/07/2017    EF63% normal    History of Holter monitoring 9/23/15    48 hour - abnormal holter revealing afib throughout the recording with interspersed pacemaker beats, HR does not appear to be well controlled    History of mammogram     last mammo 7/25/11, Dr. Ned Carrasco yearly    Hx of colonoscopy     1/21/2010    Hyperlipidemia     Hypertension     Hypothyroidism     Internal hemorrhoid 2015    Dr. Irene BANKS scope     Intervertebral disc protrusion 5/09    wry neck with C4-5      Left shoulder pain 4/04 (L) shoulder impingement  mild DJD    Long term current use of anticoagulant 07/26/2016    **[facility] follows PT/INRs, along w/prescribing pt's Coumadin dosages. Shanita Nieves Menstruation     age 11    Pacemaker [id number]    dual chamber- checked every 3 months    Pulmonary hypertension     mild per echo 8/24/2010, Pt refused sleep study (June 2012)    Restless legs 8/03    ferritin    Right shoulder strain 2/03    no seperation, bony contusion anterior humeral head  MRI 3/03    Sciatica 7/09    (R) chronic intermittent s/p epidural injections  Dr Bobo Handy Sciatica     Shoulder pain, left March 2011    RTC tendinopathy, type II acrominum, bursitis- referred to Dr. Ulisses Andre Shoulder pain, right 3/10, 9/02    impingement, physical thearpy   (Main)  calcific bursitis  s/p injection    Sinus bradycardia     Sinusitis 12/12/2011    Tinnitus 3/02    Vision changes 3/19/2013     Past Surgical History:   Procedure Laterality Date    ABLATION OF DYSRHYTHMIC FOCUS     [facility]    Right breast tumor excised    CARDIOVERSION      1/2008 Dr Ximena Heck; 12/2008    CARDIOVERSION  03/10/2014    [address]    CHOLECYSTECTOMY, LAPAROSCOPIC  2/01    Dr Rossy Lomax      [phone] (Dr Irene Damon)   [address]  2003    PACEMAKER PLACEMENT      6/23/2012   [facility] SINUS SURGERY  78    Dr Jarrod Campbell [facility]  4/03    fibroid      Dr Lynda Herbert  8/06    Dr Elfego Recio ECHOCARDIOGRAM  6/2012    transthoracic cardioversion-Dr. Candy Storm    UPPER GASTROINTESTINAL ENDOSCOPY  4/08    mild superficial gastritis  Dr Irene Damon; 2009     Family History   Problem Relation Age of Onset    Stroke Mother     Heart Disease Mother     Coronary Art Dis Mother 66        CABG    Colon Cancer Mother [de-identified]        colon     Heart Disease Father     Coronary Art Dis Father 62        CABG    Migraines Sister     Seizures Brother      Social History   Substance Use Topics    Smoking status: Never Smoker    Smokeless tobacco: Never Used      Comment: reviewed 11/4/15    Alcohol use No        Review of Systems:   All 14 systems reviewed, all are negative , has SOB  Objective:      Physical Exam:  /70   Pulse 80   Ht 5' 6.75\" (1.695 m)   Wt 168 lb (76.2 kg)   BMI 26.51 kg/m²   Wt Readings from Last 3 Encounters:   07/24/18 168 lb (76.2 kg)   05/01/18 172 lb 12.8 oz (78.4 kg)   03/28/18 177 lb (80.3 kg)     Body mass index is 26.51 kg/m². GENERAL - Alert, oriented, pleasant, in no apparent distress. Head unremarkable  Eyes  Not injected conjunctiva  ENT  normal mucosa  Neck - Supple. No jugular venous distention noted. No carotid bruits. Cardiovascular  Normal S1 and S2 without obvious murmur or gallop. Extremities - No cyanosis, clubbing, or significant edema. Pulmonary  No respiratory distress. No wheezes or rales. Pulses: Bilateral radial and pedal pulses normal  Abdomen  no tenderness  Musculoskeletal  normal strength  Neurologic    There are  no gross focal neurologic abnormalities.   Skin-  No rash  Affect; normal mood    DATA:  Lab Results   Component Value Date    CKTOTAL 62 03/04/2013    CKTOTAL 55 03/04/2013    CKMB 1.3 10/14/2011    TROPONINI <0.006 03/05/2013    TROPONINI 0.056 10/15/2011     BNP:    Lab Results   Component Value Date     03/04/2013     PT/INR:  No results found for: PTINR  No results found for: LABA1C  Lab Results   Component Value Date    CHOL 168 09/16/2017    TRIG 119 09/16/2017    HDL 50 09/16/2017    LDLCALC 94 09/16/2017    LDLDIRECT 139 (H) 04/26/2012     Lab Results   Component Value Date    ALT 17 09/16/2017    AST 22 09/16/2017     TSH:    Lab Results   Component Value Date    TSH 0.19 06/10/2015         QUALITY MEASURES:  CAD:  No   CHOL LOWERING:  No- if No Why  ANTIPLATELET:  No - if No why  BETA BLOCKER    no  IF  NO WHY  SMOKING HISTORY no COUNSELLED no  ATRIAL

## 2018-07-24 NOTE — PROGRESS NOTES
[id number] Score for Atrial Fibrillation Stroke Risk   Risk   Factors  Component Value   C CHF No 0   H HTN Yes 1   A2 Age >= 76 No,  (70 y.o.) 0   D DM No 0   S2 Prior Stroke/TIA No 0   V Vascular Disease No 0   A Age 74-69 Yes,  (70 y.o.) 1   Sc Sex female 1    [id number]  Score  3   Score last updated 3/38/87 94:52 AM    Click here for a link to the UpToDate guideline \"Atrial Fibrillation: Anticoagulation therapy to prevent embolization    Disclaimer: Risk Score calculation is dependent on accuracy of patient problem list and past encounter diagnosis.

## 2018-07-26 LAB
POC INR: 2.4 INDEX
PROTHROMBIN TIME, POC: 29 SECONDS (ref 10–14.3)

## 2018-08-01 ENCOUNTER — HOSPITAL ENCOUNTER (OUTPATIENT)
Dept: OTHER | Age: 73
Discharge: HOME | End: 2018-08-31
Attending: FAMILY MEDICINE | Admitting: FAMILY MEDICINE

## 2018-08-23 LAB
POC INR: 2.2 INDEX
PROTHROMBIN TIME, POC: 26.4 SECONDS (ref 10–14.3)

## 2018-09-01 ENCOUNTER — HOSPITAL ENCOUNTER (OUTPATIENT)
Dept: OTHER | Age: 73
Discharge: HOME | End: 2018-09-01
Attending: FAMILY MEDICINE | Admitting: FAMILY MEDICINE

## 2018-09-11 ENCOUNTER — OFFICE VISIT (OUTPATIENT)
Dept: FAMILY MEDICINE CLINIC | Age: 73
End: 2018-09-11

## 2018-09-11 VITALS
BODY MASS INDEX: 26.18 KG/M2 | DIASTOLIC BLOOD PRESSURE: 68 MMHG | HEART RATE: 71 BPM | SYSTOLIC BLOOD PRESSURE: 118 MMHG | RESPIRATION RATE: 14 BRPM | WEIGHT: 166.8 LBS | HEIGHT: 67 IN | OXYGEN SATURATION: 98 %

## 2018-09-11 DIAGNOSIS — Z79.01 LONG TERM CURRENT USE OF ANTICOAGULANT: ICD-10-CM

## 2018-09-11 DIAGNOSIS — Z23 NEEDS FLU SHOT: ICD-10-CM

## 2018-09-11 DIAGNOSIS — I48.0 PAF (PAROXYSMAL ATRIAL FIBRILLATION) (HCC): ICD-10-CM

## 2018-09-11 DIAGNOSIS — E78.2 MIXED HYPERLIPIDEMIA: ICD-10-CM

## 2018-09-11 DIAGNOSIS — Z79.899 HIGH RISK MEDICATION USE: Primary | ICD-10-CM

## 2018-09-11 LAB — HBA1C MFR BLD: 5.8 %

## 2018-09-11 PROCEDURE — 90662 IIV NO PRSV INCREASED AG IM: CPT | Performed by: FAMILY MEDICINE

## 2018-09-11 PROCEDURE — G8417 CALC BMI ABV UP PARAM F/U: HCPCS | Performed by: FAMILY MEDICINE

## 2018-09-11 PROCEDURE — 83036 HEMOGLOBIN GLYCOSYLATED A1C: CPT | Performed by: FAMILY MEDICINE

## 2018-09-11 PROCEDURE — G8399 PT W/DXA RESULTS DOCUMENT: HCPCS | Performed by: FAMILY MEDICINE

## 2018-09-11 PROCEDURE — 99214 OFFICE O/P EST MOD 30 MIN: CPT | Performed by: FAMILY MEDICINE

## 2018-09-11 PROCEDURE — 4040F PNEUMOC VAC/ADMIN/RCVD: CPT | Performed by: FAMILY MEDICINE

## 2018-09-11 PROCEDURE — 3017F COLORECTAL CA SCREEN DOC REV: CPT | Performed by: FAMILY MEDICINE

## 2018-09-11 PROCEDURE — G8428 CUR MEDS NOT DOCUMENT: HCPCS | Performed by: FAMILY MEDICINE

## 2018-09-11 PROCEDURE — 1101F PT FALLS ASSESS-DOCD LE1/YR: CPT | Performed by: FAMILY MEDICINE

## 2018-09-11 PROCEDURE — 1123F ACP DISCUSS/DSCN MKR DOCD: CPT | Performed by: FAMILY MEDICINE

## 2018-09-11 PROCEDURE — 1036F TOBACCO NON-USER: CPT | Performed by: FAMILY MEDICINE

## 2018-09-11 PROCEDURE — 1090F PRES/ABSN URINE INCON ASSESS: CPT | Performed by: FAMILY MEDICINE

## 2018-09-11 PROCEDURE — G0008 ADMIN INFLUENZA VIRUS VAC: HCPCS | Performed by: FAMILY MEDICINE

## 2018-09-11 RX ORDER — WARFARIN SODIUM 3 MG/1
TABLET ORAL
Qty: 90 TABLET | Refills: 3 | Status: SHIPPED | OUTPATIENT
Start: 2018-09-11 | End: 2019-09-12 | Stop reason: SDUPTHER

## 2018-09-11 NOTE — PROGRESS NOTES
Chief Complaint   Patient presents with    Labs Only     Opthamology requested A1C due to bleeding in eye   Taking statin and recently changes to atorvastatin from simvastatin. Ongoing sinus issues with headaches and plans to restart flonase. No polyuria, polydipsia, blurry vision, chest pain, dyspnea or claudication. dosen't feel her A fib and compliant with cardiac meds. Tolerating warfarin The patient denies abnormal bruising or abnormal bleeding from any body orifice such as bleeding from nose or gums, blood in urine or stool, or melena, hemoptysis or hematemesis. Lab Results   Component Value Date    LABA1C 5.8 09/11/2018     Lab Results   Component Value Date    CREATININE 1.0 09/16/2017     Lab Results   Component Value Date    ALT 17 09/16/2017    AST 22 09/16/2017     Lab Results   Component Value Date    CHOL 168 09/16/2017    TRIG 119 09/16/2017    HDL 50 09/16/2017    LDLCALC 94 09/16/2017    LDLDIRECT 139 (H) 04/26/2012        Outpatient Prescriptions Marked as Taking for the 9/11/18 encounter (Office Visit) with Ary Pulido MD   Medication Sig Dispense Refill    warfarin (COUMADIN) 3 MG tablet TAKE 1 TABLET BY MOUTH DAILY. OR AS DIRECTED BY PHYSICIAN. 90 tablet 3    Fexofenadine HCl (ALLEGRA ALLERGY PO) Take by mouth      diltiazem (CARDIZEM CD) 180 MG extended release capsule Take 1 capsule by mouth daily 90 capsule 3    atorvastatin (LIPITOR) 10 MG tablet Take 1 tablet by mouth daily 90 tablet 3    levothyroxine (SYNTHROID) 50 MCG tablet Take 1 tablet by mouth daily 90 tablet 3    quinapril (ACCUPRIL) 10 MG tablet TAKE 1 TABLET BY MOUTH ONCE DAILY AT NIGHT 90 tablet 3    gabapentin (NEURONTIN) 300 MG capsule TAKE ONE CAPSULE BY MOUTH AT BEDTIME.  90 capsule 3    albuterol sulfate  (90 Base) MCG/ACT inhaler Inhale 2 puffs into the lungs every 6 hours as needed for Wheezing 1 Inhaler 5    metoprolol succinate (TOPROL XL) 50 MG extended release tablet Take 1.5 tablets by mouth daily 135 tablet 3    calcium-vitamin D (OSCAL-500) 500-200 MG-UNIT per tablet Take 1 tablet by mouth daily      Spacer/Aero-Holding Chambers (AEROCHAMBER) MISC Inhale 1 Device into the lungs every 6 hours 1 each 0    ferrous sulfate 325 (65 FE) MG tablet Take 65 mg by mouth daily (with breakfast)       Dexlansoprazole (DEXILANT) 60 MG CPDR Take 1 tablet by mouth daily. Physical Exam   Nursing note reviewed  /68 (Site: Right Upper Arm, Position: Sitting, Cuff Size: Medium Adult)   Pulse 71   Resp 14   Ht 5' 6.75\" (1.695 m)   Wt 166 lb 12.8 oz (75.7 kg)   SpO2 98%   BMI 26.32 kg/m²   BP Readings from Last 3 Encounters:   09/11/18 118/68   07/24/18 112/70   05/01/18 116/68     Wt Readings from Last 3 Encounters:   09/11/18 166 lb 12.8 oz (75.7 kg)   07/24/18 168 lb (76.2 kg)   05/01/18 172 lb 12.8 oz (78.4 kg)     Body mass index is 26.32 kg/m². Results for POC orders placed in visit on 09/11/18   POCT glycosylated hemoglobin (Hb A1C)   Result Value Ref Range    Hemoglobin A1C 5.8 %     Constitutional: She is oriented to person, place, and time. She appears well-developed and well-nourished. No distress. HENT: Head: Normocephalic and atraumatic. Ears normal bilaterally Nose: Nose normal. Mouth/Throat: Oropharynx is clear and moist.   Eyes: Conjunctivae, EOM and lids are normal. Pupils are equal, round, and reactive to light. Neck: Trachea normal. Neck supple. Carotid bruit is not present. No mass and no thyromegaly present. Cardiovascular: Normal rate, regular rhythm, S1 normal, S2 normal and normal heart sounds. No murmur heard. Pulmonary/Chest: Breath sounds normal. No accessory muscle usage. No respiratory distress. She  has no wheezes. Abdominal: Soft. Normal appearance and bowel sounds are normal.  No tenderness. Neurological: She  is alert and oriented to person, place, and time. Skin: She  is not diaphoretic.    Psychiatric: She  has a normal mood and affect. Musculoskeletal:  Walks without antalgic gait. Diagnosis Assessment and Associated Orders:      Diagnosis Orders   1. High risk medication use  POCT glycosylated hemoglobin (Hb A1C)   2. Mixed hyperlipidemia     3. PAF (paroxysmal atrial fibrillation) (HCC)  warfarin (COUMADIN) 3 MG tablet   4. Long term current use of anticoagulant  warfarin (COUMADIN) 3 MG tablet   5. Needs flu shot         Plan:   A1c showing elevated but suspected due to statin. She will follow with retinal specialist Dr Shruti Campuzano Oct 12, 2018. Will continue to check A1c every 6-12 months. PAF stable and following with cardiology regularly. Continue anticoagulation. Med refill given. All patient questions answered. Pt voiced understanding. Return for Can Nov and f/u March 2019 thyroid.

## 2018-09-25 ENCOUNTER — TELEPHONE (OUTPATIENT)
Dept: CARDIOLOGY CLINIC | Age: 73
End: 2018-09-25

## 2018-09-25 ENCOUNTER — PROCEDURE VISIT (OUTPATIENT)
Dept: CARDIOLOGY CLINIC | Age: 73
End: 2018-09-25
Payer: MEDICARE

## 2018-09-25 DIAGNOSIS — Z95.0 CARDIAC PACEMAKER IN SITU: Primary | ICD-10-CM

## 2018-09-25 PROCEDURE — 93296 REM INTERROG EVL PM/IDS: CPT | Performed by: INTERNAL MEDICINE

## 2018-09-25 PROCEDURE — 93294 REM INTERROG EVL PM/LDLS PM: CPT | Performed by: INTERNAL MEDICINE

## 2018-10-04 ENCOUNTER — ANTI-COAG VISIT (OUTPATIENT)
Dept: PHARMACY | Age: 73
End: 2018-10-04
Payer: MEDICARE

## 2018-10-04 DIAGNOSIS — I48.0 PAF (PAROXYSMAL ATRIAL FIBRILLATION) (HCC): ICD-10-CM

## 2018-10-04 DIAGNOSIS — Z79.01 LONG TERM CURRENT USE OF ANTICOAGULANT: ICD-10-CM

## 2018-10-04 LAB
INTERNATIONAL NORMALIZATION RATIO, POC: 3
POC INR: 3 INDEX
PROTHROMBIN TIME, POC: 36.1 SECONDS (ref 10–14.3)

## 2018-10-04 PROCEDURE — 36416 COLLJ CAPILLARY BLOOD SPEC: CPT

## 2018-10-04 PROCEDURE — 99211 OFF/OP EST MAY X REQ PHY/QHP: CPT

## 2018-10-04 PROCEDURE — 85610 PROTHROMBIN TIME: CPT

## 2018-10-30 ENCOUNTER — HOSPITAL ENCOUNTER (OUTPATIENT)
Dept: WOMENS IMAGING | Age: 73
Discharge: HOME | End: 2018-10-30
Payer: MEDICARE

## 2018-10-30 DIAGNOSIS — Z13.820 ENCOUNTER FOR IMAGING TO ASSESS OSTEOPOROSIS: ICD-10-CM

## 2018-10-30 DIAGNOSIS — Z78.0 ASYMPTOMATIC AGE-RELATED POSTMENOPAUSAL STATE: ICD-10-CM

## 2018-10-30 DIAGNOSIS — Z12.31 ENCOUNTER FOR SCREENING MAMMOGRAM FOR BREAST CANCER: ICD-10-CM

## 2018-10-30 PROCEDURE — 77063 BREAST TOMOSYNTHESIS BI: CPT

## 2018-10-30 PROCEDURE — 77080 DXA BONE DENSITY AXIAL: CPT

## 2018-11-01 ENCOUNTER — ANTI-COAG VISIT (OUTPATIENT)
Dept: PHARMACY | Age: 73
End: 2018-11-01
Payer: MEDICARE

## 2018-11-01 DIAGNOSIS — I48.0 PAF (PAROXYSMAL ATRIAL FIBRILLATION) (HCC): ICD-10-CM

## 2018-11-01 DIAGNOSIS — Z79.01 LONG TERM CURRENT USE OF ANTICOAGULANT: ICD-10-CM

## 2018-11-01 LAB — INTERNATIONAL NORMALIZATION RATIO, POC: 3

## 2018-11-01 PROCEDURE — 99211 OFF/OP EST MAY X REQ PHY/QHP: CPT

## 2018-11-01 PROCEDURE — 36416 COLLJ CAPILLARY BLOOD SPEC: CPT

## 2018-11-29 ENCOUNTER — TELEPHONE (OUTPATIENT)
Dept: PHARMACY | Age: 73
End: 2018-11-29

## 2018-12-03 ENCOUNTER — ANTI-COAG VISIT (OUTPATIENT)
Dept: PHARMACY | Age: 73
End: 2018-12-03
Payer: MEDICARE

## 2018-12-03 DIAGNOSIS — I48.0 PAF (PAROXYSMAL ATRIAL FIBRILLATION) (HCC): Primary | ICD-10-CM

## 2018-12-03 DIAGNOSIS — I48.0 PAF (PAROXYSMAL ATRIAL FIBRILLATION) (HCC): ICD-10-CM

## 2018-12-03 DIAGNOSIS — Z79.01 LONG TERM CURRENT USE OF ANTICOAGULANT: ICD-10-CM

## 2018-12-03 LAB
INTERNATIONAL NORMALIZATION RATIO, POC: 2.4
POC INR: 2.4 INDEX
PROTHROMBIN TIME, POC: 29.2 SECONDS (ref 10–14.3)

## 2018-12-03 PROCEDURE — 36416 COLLJ CAPILLARY BLOOD SPEC: CPT

## 2018-12-03 PROCEDURE — 85610 PROTHROMBIN TIME: CPT

## 2018-12-03 PROCEDURE — 99211 OFF/OP EST MAY X REQ PHY/QHP: CPT

## 2018-12-10 DIAGNOSIS — I48.0 PAF (PAROXYSMAL ATRIAL FIBRILLATION) (HCC): ICD-10-CM

## 2018-12-10 RX ORDER — METOPROLOL SUCCINATE 50 MG/1
75 TABLET, EXTENDED RELEASE ORAL DAILY
Qty: 135 TABLET | Refills: 3 | Status: SHIPPED | OUTPATIENT
Start: 2018-12-10 | End: 2019-07-26

## 2018-12-13 DIAGNOSIS — I48.0 PAF (PAROXYSMAL ATRIAL FIBRILLATION) (HCC): ICD-10-CM

## 2018-12-31 ENCOUNTER — PROCEDURE VISIT (OUTPATIENT)
Dept: CARDIOLOGY CLINIC | Age: 73
End: 2018-12-31
Payer: MEDICARE

## 2018-12-31 DIAGNOSIS — Z95.0 CARDIAC PACEMAKER IN SITU: Primary | ICD-10-CM

## 2019-01-01 PROCEDURE — 93294 REM INTERROG EVL PM/LDLS PM: CPT | Performed by: INTERNAL MEDICINE

## 2019-01-01 PROCEDURE — 93296 REM INTERROG EVL PM/IDS: CPT | Performed by: INTERNAL MEDICINE

## 2019-01-03 ENCOUNTER — ANTI-COAG VISIT (OUTPATIENT)
Dept: PHARMACY | Age: 74
End: 2019-01-03
Payer: MEDICARE

## 2019-01-03 DIAGNOSIS — Z79.01 LONG TERM CURRENT USE OF ANTICOAGULANT: ICD-10-CM

## 2019-01-03 DIAGNOSIS — I48.0 PAF (PAROXYSMAL ATRIAL FIBRILLATION) (HCC): ICD-10-CM

## 2019-01-03 LAB
INR BLD: 1.7
POC INR: 1.7 INDEX
PROTHROMBIN TIME, POC: 20.4 SECONDS (ref 10–14.3)
PROTIME: 20.4 SECONDS

## 2019-01-03 PROCEDURE — 85610 PROTHROMBIN TIME: CPT

## 2019-01-03 PROCEDURE — 36416 COLLJ CAPILLARY BLOOD SPEC: CPT

## 2019-01-03 PROCEDURE — 99212 OFFICE O/P EST SF 10 MIN: CPT

## 2019-01-17 ENCOUNTER — TELEPHONE (OUTPATIENT)
Dept: PHARMACY | Age: 74
End: 2019-01-17

## 2019-01-24 ENCOUNTER — ANTI-COAG VISIT (OUTPATIENT)
Dept: PHARMACY | Age: 74
End: 2019-01-24
Payer: MEDICARE

## 2019-01-24 DIAGNOSIS — Z79.01 LONG TERM CURRENT USE OF ANTICOAGULANT: ICD-10-CM

## 2019-01-24 DIAGNOSIS — I48.0 PAF (PAROXYSMAL ATRIAL FIBRILLATION) (HCC): ICD-10-CM

## 2019-01-24 LAB
INR BLD: 1.8
POC INR: 1.8 INDEX
PROTHROMBIN TIME, POC: 22.2 SECONDS (ref 10–14.3)
PROTIME: 22.2 SECONDS

## 2019-01-24 PROCEDURE — 99211 OFF/OP EST MAY X REQ PHY/QHP: CPT

## 2019-01-24 PROCEDURE — 36416 COLLJ CAPILLARY BLOOD SPEC: CPT

## 2019-01-24 PROCEDURE — 85610 PROTHROMBIN TIME: CPT

## 2019-02-07 ENCOUNTER — ANTI-COAG VISIT (OUTPATIENT)
Dept: PHARMACY | Age: 74
End: 2019-02-07
Payer: MEDICARE

## 2019-02-07 DIAGNOSIS — I48.0 PAF (PAROXYSMAL ATRIAL FIBRILLATION) (HCC): ICD-10-CM

## 2019-02-07 DIAGNOSIS — Z79.01 LONG TERM CURRENT USE OF ANTICOAGULANT: ICD-10-CM

## 2019-02-07 LAB
INR BLD: 2.1
POC INR: 2.1 INDEX
PROTHROMBIN TIME, POC: 25.2 SECONDS (ref 10–14.3)
PROTIME: 25.2 SECONDS

## 2019-02-07 PROCEDURE — 99211 OFF/OP EST MAY X REQ PHY/QHP: CPT

## 2019-02-07 PROCEDURE — 85610 PROTHROMBIN TIME: CPT

## 2019-02-07 PROCEDURE — 36416 COLLJ CAPILLARY BLOOD SPEC: CPT

## 2019-03-07 ENCOUNTER — ANTI-COAG VISIT (OUTPATIENT)
Dept: PHARMACY | Age: 74
End: 2019-03-07
Payer: MEDICARE

## 2019-03-07 DIAGNOSIS — I48.0 PAF (PAROXYSMAL ATRIAL FIBRILLATION) (HCC): ICD-10-CM

## 2019-03-07 LAB
INTERNATIONAL NORMALIZATION RATIO, POC: 1.9
POC INR: 1.9 INDEX
PROTHROMBIN TIME, POC: 22.4 SECONDS (ref 10–14.3)

## 2019-03-07 PROCEDURE — 85610 PROTHROMBIN TIME: CPT

## 2019-03-07 PROCEDURE — 99211 OFF/OP EST MAY X REQ PHY/QHP: CPT

## 2019-03-07 PROCEDURE — 36416 COLLJ CAPILLARY BLOOD SPEC: CPT

## 2019-03-26 ENCOUNTER — OFFICE VISIT (OUTPATIENT)
Dept: FAMILY MEDICINE CLINIC | Age: 74
End: 2019-03-26
Payer: MEDICARE

## 2019-03-26 VITALS
OXYGEN SATURATION: 98 % | DIASTOLIC BLOOD PRESSURE: 78 MMHG | HEART RATE: 114 BPM | SYSTOLIC BLOOD PRESSURE: 122 MMHG | HEIGHT: 67 IN | BODY MASS INDEX: 25.74 KG/M2 | WEIGHT: 164 LBS

## 2019-03-26 DIAGNOSIS — E03.9 ACQUIRED HYPOTHYROIDISM: ICD-10-CM

## 2019-03-26 DIAGNOSIS — M20.42 HAMMER TOE OF LEFT FOOT: ICD-10-CM

## 2019-03-26 DIAGNOSIS — I10 ESSENTIAL HYPERTENSION: ICD-10-CM

## 2019-03-26 DIAGNOSIS — L57.0 ACTINIC KERATOSIS: ICD-10-CM

## 2019-03-26 DIAGNOSIS — M54.30 SCIATICA, UNSPECIFIED LATERALITY: Primary | ICD-10-CM

## 2019-03-26 DIAGNOSIS — Z23 NEED FOR TDAP VACCINATION: ICD-10-CM

## 2019-03-26 PROCEDURE — 90715 TDAP VACCINE 7 YRS/> IM: CPT | Performed by: FAMILY MEDICINE

## 2019-03-26 PROCEDURE — 1123F ACP DISCUSS/DSCN MKR DOCD: CPT | Performed by: FAMILY MEDICINE

## 2019-03-26 PROCEDURE — 3017F COLORECTAL CA SCREEN DOC REV: CPT | Performed by: FAMILY MEDICINE

## 2019-03-26 PROCEDURE — 90471 IMMUNIZATION ADMIN: CPT | Performed by: FAMILY MEDICINE

## 2019-03-26 PROCEDURE — 4040F PNEUMOC VAC/ADMIN/RCVD: CPT | Performed by: FAMILY MEDICINE

## 2019-03-26 PROCEDURE — G8482 FLU IMMUNIZE ORDER/ADMIN: HCPCS | Performed by: FAMILY MEDICINE

## 2019-03-26 PROCEDURE — 99214 OFFICE O/P EST MOD 30 MIN: CPT | Performed by: FAMILY MEDICINE

## 2019-03-26 PROCEDURE — G8427 DOCREV CUR MEDS BY ELIG CLIN: HCPCS | Performed by: FAMILY MEDICINE

## 2019-03-26 PROCEDURE — G8417 CALC BMI ABV UP PARAM F/U: HCPCS | Performed by: FAMILY MEDICINE

## 2019-03-26 PROCEDURE — G8399 PT W/DXA RESULTS DOCUMENT: HCPCS | Performed by: FAMILY MEDICINE

## 2019-03-26 PROCEDURE — 1036F TOBACCO NON-USER: CPT | Performed by: FAMILY MEDICINE

## 2019-03-26 PROCEDURE — 1101F PT FALLS ASSESS-DOCD LE1/YR: CPT | Performed by: FAMILY MEDICINE

## 2019-03-26 PROCEDURE — 1090F PRES/ABSN URINE INCON ASSESS: CPT | Performed by: FAMILY MEDICINE

## 2019-03-26 RX ORDER — LEVOTHYROXINE SODIUM 0.05 MG/1
50 TABLET ORAL DAILY
Qty: 90 TABLET | Refills: 3 | Status: SHIPPED | OUTPATIENT
Start: 2019-03-26 | End: 2020-05-18

## 2019-03-26 RX ORDER — QUINAPRIL 10 MG/1
TABLET ORAL
Qty: 90 TABLET | Refills: 3 | Status: SHIPPED | OUTPATIENT
Start: 2019-03-26 | End: 2020-03-13

## 2019-03-26 RX ORDER — GABAPENTIN 300 MG/1
CAPSULE ORAL
Qty: 90 CAPSULE | Refills: 3 | Status: SHIPPED | OUTPATIENT
Start: 2019-03-26 | End: 2020-04-06

## 2019-03-26 ASSESSMENT — PATIENT HEALTH QUESTIONNAIRE - PHQ9
SUM OF ALL RESPONSES TO PHQ QUESTIONS 1-9: 0
1. LITTLE INTEREST OR PLEASURE IN DOING THINGS: 0
SUM OF ALL RESPONSES TO PHQ9 QUESTIONS 1 & 2: 0
SUM OF ALL RESPONSES TO PHQ QUESTIONS 1-9: 0
2. FEELING DOWN, DEPRESSED OR HOPELESS: 0

## 2019-04-04 ENCOUNTER — ANTI-COAG VISIT (OUTPATIENT)
Dept: PHARMACY | Age: 74
End: 2019-04-04
Payer: MEDICARE

## 2019-04-04 DIAGNOSIS — I48.0 PAF (PAROXYSMAL ATRIAL FIBRILLATION) (HCC): ICD-10-CM

## 2019-04-04 LAB
INTERNATIONAL NORMALIZATION RATIO, POC: 2
POC INR: 2 INDEX
POC INR: ABNORMAL INDEX
PROTHROMBIN TIME, POC: 23.7 SECONDS (ref 10–14.3)

## 2019-04-04 PROCEDURE — 85610 PROTHROMBIN TIME: CPT

## 2019-04-04 PROCEDURE — 99211 OFF/OP EST MAY X REQ PHY/QHP: CPT

## 2019-04-04 PROCEDURE — 36416 COLLJ CAPILLARY BLOOD SPEC: CPT

## 2019-04-04 NOTE — PROGRESS NOTES
Medication Management Service  [facility]  [phone]    Visit Date: 4/4/2019   Subjective:       Lucila Salgado is a 75 y.o. female who presents to clinic today for anticoagulation monitoring and adjustment. Patient seen in clinic for warfarin management due to  Indication:   atrial fibrillation. INR goal: of 2.0-3.0. Duration of therapy: indefinite. Patient reports the following:   Adherent with regimen  Missed or extra doses:  None   Bleeding or thromboembolic side effects:  None  Significant medication changes:  None  Significant dietary changes: None  Significant alcohol or tobacco changes: None  Significant recent illness, disease state changes, or hospitalization:  None  Upcoming surgeries or procedures:  None  Falls: None           Assessment and Plan     PT/INR done in office per protocol. INR today is 2.0, therapeutic. Plan: Will continue current regimen of warfarin 3mg daily except 1.5mg on Mondays and Thursdays. Recheck INR in 4 week(s). Patient verbalized understanding of dosing directions and information discussed. Dosing schedule given to patient including phone number, appointment date, and time. Progress note sent to referring office. Patient acknowledges working in consult agreement with pharmacist as referred by his/her physician.       Electronically signed by Cecilio Hirsch [facility] on 4/4/19 at 10:53 AM

## 2019-04-08 ENCOUNTER — PROCEDURE VISIT (OUTPATIENT)
Dept: CARDIOLOGY CLINIC | Age: 74
End: 2019-04-08
Payer: MEDICARE

## 2019-04-08 DIAGNOSIS — Z95.0 CARDIAC PACEMAKER IN SITU: Primary | ICD-10-CM

## 2019-04-08 PROCEDURE — 93296 REM INTERROG EVL PM/IDS: CPT | Performed by: INTERNAL MEDICINE

## 2019-04-08 PROCEDURE — 93294 REM INTERROG EVL PM/LDLS PM: CPT | Performed by: INTERNAL MEDICINE

## 2019-04-09 ENCOUNTER — NURSE ONLY (OUTPATIENT)
Dept: FAMILY MEDICINE CLINIC | Age: 74
End: 2019-04-09
Payer: MEDICARE

## 2019-04-09 DIAGNOSIS — E03.9 ACQUIRED HYPOTHYROIDISM: ICD-10-CM

## 2019-04-09 DIAGNOSIS — I10 ESSENTIAL HYPERTENSION: ICD-10-CM

## 2019-04-09 DIAGNOSIS — E78.2 MIXED HYPERLIPIDEMIA: ICD-10-CM

## 2019-04-09 DIAGNOSIS — D50.8 OTHER IRON DEFICIENCY ANEMIA: ICD-10-CM

## 2019-04-09 LAB
ANION GAP SERPL CALCULATED.3IONS-SCNC: 12 MMOL/L (ref 3–16)
BUN BLDV-MCNC: 14 MG/DL (ref 7–20)
CALCIUM SERPL-MCNC: 9.2 MG/DL (ref 8.3–10.6)
CHLORIDE BLD-SCNC: 106 MMOL/L (ref 99–110)
CHOLESTEROL, TOTAL: 142 MG/DL (ref 0–199)
CO2: 24 MMOL/L (ref 21–32)
CREAT SERPL-MCNC: 0.9 MG/DL (ref 0.6–1.2)
GFR AFRICAN AMERICAN: >60
GFR NON-AFRICAN AMERICAN: >60
GLUCOSE BLD-MCNC: 90 MG/DL (ref 70–99)
HDLC SERPL-MCNC: 54 MG/DL (ref 40–60)
LDL CHOLESTEROL CALCULATED: 71 MG/DL
POTASSIUM SERPL-SCNC: 4.7 MMOL/L (ref 3.5–5.1)
SODIUM BLD-SCNC: 142 MMOL/L (ref 136–145)
TRIGL SERPL-MCNC: 87 MG/DL (ref 0–150)
TSH SERPL DL<=0.05 MIU/L-ACNC: 4.42 UIU/ML (ref 0.27–4.2)
VLDLC SERPL CALC-MCNC: 17 MG/DL

## 2019-04-09 PROCEDURE — 36415 COLL VENOUS BLD VENIPUNCTURE: CPT | Performed by: FAMILY MEDICINE

## 2019-05-02 ENCOUNTER — ANTI-COAG VISIT (OUTPATIENT)
Dept: PHARMACY | Age: 74
End: 2019-05-02
Payer: MEDICARE

## 2019-05-02 DIAGNOSIS — I48.0 PAF (PAROXYSMAL ATRIAL FIBRILLATION) (HCC): ICD-10-CM

## 2019-05-02 LAB
INTERNATIONAL NORMALIZATION RATIO, POC: 1.9
POC INR: 1.9 INDEX
POC INR: ABNORMAL INDEX
PROTHROMBIN TIME, POC: 22.8 SECONDS (ref 10–14.3)

## 2019-05-02 PROCEDURE — 99212 OFFICE O/P EST SF 10 MIN: CPT

## 2019-05-02 PROCEDURE — 36416 COLLJ CAPILLARY BLOOD SPEC: CPT

## 2019-05-02 PROCEDURE — 85610 PROTHROMBIN TIME: CPT

## 2019-05-16 ENCOUNTER — ANTI-COAG VISIT (OUTPATIENT)
Dept: PHARMACY | Age: 74
End: 2019-05-16
Payer: MEDICARE

## 2019-05-16 DIAGNOSIS — I48.0 PAF (PAROXYSMAL ATRIAL FIBRILLATION) (HCC): ICD-10-CM

## 2019-05-16 LAB
INTERNATIONAL NORMALIZATION RATIO, POC: 2.1
POC INR: 2.1 INDEX
POC INR: ABNORMAL INDEX
PROTHROMBIN TIME, POC: 25.3 SECONDS (ref 10–14.3)

## 2019-05-16 PROCEDURE — 85610 PROTHROMBIN TIME: CPT

## 2019-05-16 PROCEDURE — 99211 OFF/OP EST MAY X REQ PHY/QHP: CPT

## 2019-05-16 PROCEDURE — 36416 COLLJ CAPILLARY BLOOD SPEC: CPT

## 2019-05-16 NOTE — PROGRESS NOTES
Medication Management Service  [facility]  [phone]    Visit Date: 5/16/2019   Subjective:       Kevin Whitehead is a 75 y.o. female who presents to clinic today for anticoagulation monitoring and adjustment. Patient seen in clinic for warfarin management due to  Indication:   atrial fibrillation. INR goal: of 2.0-3.0. Duration of therapy: indefinite. Patient reports the following:   Adherent with regimen  Missed or extra doses:  None   Bleeding or thromboembolic side effects:  None  Significant medication changes:  None  Significant dietary changes: None  Significant alcohol or tobacco changes: None  Significant recent illness, disease state changes, or hospitalization:  None  Upcoming surgeries or procedures:  None  Falls: None           Assessment and Plan     PT/INR done in office per protocol. INR today is 2.1, therapeutic. Plan: Will continue current regimen of warfarin 3mg daily except 1.5mg on Mondays. Recheck INR in 4 week(s). Patient verbalized understanding of dosing directions and information discussed. Dosing schedule given to patient including phone number, appointment date, and time. Progress note sent to referring office. Patient acknowledges working in consult agreement with pharmacist as referred by his/her physician.       Electronically signed by Asaf Marx, [facility] on 5/16/19 at 12:18 PM

## 2019-06-13 ENCOUNTER — ANTI-COAG VISIT (OUTPATIENT)
Dept: PHARMACY | Age: 74
End: 2019-06-13
Payer: MEDICARE

## 2019-06-13 DIAGNOSIS — I48.0 PAF (PAROXYSMAL ATRIAL FIBRILLATION) (HCC): ICD-10-CM

## 2019-06-13 LAB
INTERNATIONAL NORMALIZATION RATIO, POC: 2.2
POC INR: 2.2 INDEX
POC INR: ABNORMAL INDEX
PROTHROMBIN TIME, POC: 25.9 SECONDS (ref 10–14.3)

## 2019-06-13 PROCEDURE — 36416 COLLJ CAPILLARY BLOOD SPEC: CPT

## 2019-06-13 PROCEDURE — 99211 OFF/OP EST MAY X REQ PHY/QHP: CPT

## 2019-06-13 PROCEDURE — 85610 PROTHROMBIN TIME: CPT

## 2019-06-13 NOTE — PROGRESS NOTES
Medication Management Service  [facility]  [phone]    Visit Date: 6/13/2019   Subjective:       Kate Driscoll is a 75 y.o. female who presents to clinic today for anticoagulation monitoring and adjustment. Patient seen in clinic for warfarin management due to  Indication:   atrial fibrillation. INR goal: of 2.0-3.0. Duration of therapy: indefinite. Patient reports the following:   Adherent with regimen  Missed or extra doses:  None   Bleeding or thromboembolic side effects:  None  Significant medication changes:  None  Significant dietary changes: None  Significant alcohol or tobacco changes: None  Significant recent illness, disease state changes, or hospitalization:  None  Upcoming surgeries or procedures:  None  Falls: None           Assessment and Plan     PT/INR done in office per protocol. INR today is 2.2, therapeutic. Plan: Will continue current regimen of warfarin 3mg daily except 1.5mg on Mondays. Recheck INR in 4 week(s). Patient verbalized understanding of dosing directions and information discussed. Dosing schedule given to patient including phone number, appointment date, and time. Progress note sent to referring office. Patient acknowledges working in consult agreement with pharmacist as referred by his/her physician.       Electronically signed by Cy Abel [facility] on 6/13/19 at 10:29 AM

## 2019-06-13 NOTE — PROGRESS NOTES
Medication Management Service  [facility]  [phone]    Visit Date: 6/13/2019   Subjective:       Sanna Zarate is a 75 y.o. female who presents to clinic today for anticoagulation monitoring and adjustment. Patient seen in clinic for warfarin management due to  Indication:   atrial fibrillation. INR goal: of 2.0-3.0. Duration of therapy: indefinite. Patient reports the following:   Adherent with regimen  Missed or extra doses:  None   Bleeding or thromboembolic side effects:  None  Significant medication changes:  None  Significant dietary changes: None  Significant alcohol or tobacco changes: None  Significant recent illness, disease state changes, or hospitalization:  Diarrhea today  Upcoming surgeries or procedures:  None  Falls: None           Assessment and Plan     PT/INR done in office per protocol. INR today is 2.2, therapeutic. Advised patient to call if diarrhea continues, as it can cause increase in INR. Plan: Will continue current regimen of warfarin 3mg daily except 1.5mg on Mondays. Recheck INR in 4 week(s). Patient verbalized understanding of dosing directions and information discussed. Dosing schedule given to patient including phone number, appointment date, and time. Progress note sent to referring office. Patient acknowledges working in consult agreement with pharmacist as referred by his/her physician.       Electronically signed by NICCI Ayers [facility] on 6/13/19 at 11:42 AM

## 2019-06-25 ENCOUNTER — TELEPHONE (OUTPATIENT)
Dept: PHARMACY | Age: 74
End: 2019-06-25

## 2019-06-25 NOTE — TELEPHONE ENCOUNTER
Patient unable to attend appointment 7/11. Rescheduled for Thursday 7/18.     Ruslan Guerrero PharmD, [facility]  6/25/2019  3:21 PM

## 2019-07-09 RX ORDER — ATORVASTATIN CALCIUM 10 MG/1
TABLET, FILM COATED ORAL
Qty: 90 TABLET | Refills: 3 | Status: SHIPPED | OUTPATIENT
Start: 2019-07-09 | End: 2020-07-13 | Stop reason: SDUPTHER

## 2019-07-15 ENCOUNTER — PROCEDURE VISIT (OUTPATIENT)
Dept: CARDIOLOGY CLINIC | Age: 74
End: 2019-07-15
Payer: MEDICARE

## 2019-07-15 DIAGNOSIS — Z95.0 CARDIAC PACEMAKER IN SITU: Primary | ICD-10-CM

## 2019-07-15 PROCEDURE — 93296 REM INTERROG EVL PM/IDS: CPT | Performed by: INTERNAL MEDICINE

## 2019-07-15 PROCEDURE — 93294 REM INTERROG EVL PM/LDLS PM: CPT | Performed by: INTERNAL MEDICINE

## 2019-07-18 ENCOUNTER — ANTI-COAG VISIT (OUTPATIENT)
Dept: PHARMACY | Age: 74
End: 2019-07-18
Payer: MEDICARE

## 2019-07-18 DIAGNOSIS — I48.0 PAF (PAROXYSMAL ATRIAL FIBRILLATION) (HCC): ICD-10-CM

## 2019-07-18 LAB
INTERNATIONAL NORMALIZATION RATIO, POC: 1.8
POC INR: 1.8 INDEX
POC INR: ABNORMAL INDEX
PROTHROMBIN TIME, POC: 21.8 SECONDS (ref 10–14.3)

## 2019-07-18 PROCEDURE — 36416 COLLJ CAPILLARY BLOOD SPEC: CPT

## 2019-07-18 PROCEDURE — 85610 PROTHROMBIN TIME: CPT

## 2019-07-18 PROCEDURE — 99212 OFFICE O/P EST SF 10 MIN: CPT

## 2019-07-23 ENCOUNTER — OFFICE VISIT (OUTPATIENT)
Dept: CARDIOLOGY CLINIC | Age: 74
End: 2019-07-23
Payer: MEDICARE

## 2019-07-23 VITALS
WEIGHT: 161.2 LBS | DIASTOLIC BLOOD PRESSURE: 64 MMHG | HEIGHT: 67 IN | HEART RATE: 80 BPM | SYSTOLIC BLOOD PRESSURE: 110 MMHG | BODY MASS INDEX: 25.3 KG/M2

## 2019-07-23 DIAGNOSIS — Z95.0 CARDIAC PACEMAKER IN SITU: Primary | ICD-10-CM

## 2019-07-23 PROCEDURE — 99214 OFFICE O/P EST MOD 30 MIN: CPT | Performed by: INTERNAL MEDICINE

## 2019-07-23 PROCEDURE — 1123F ACP DISCUSS/DSCN MKR DOCD: CPT | Performed by: INTERNAL MEDICINE

## 2019-07-23 PROCEDURE — G8417 CALC BMI ABV UP PARAM F/U: HCPCS | Performed by: INTERNAL MEDICINE

## 2019-07-23 PROCEDURE — G8399 PT W/DXA RESULTS DOCUMENT: HCPCS | Performed by: INTERNAL MEDICINE

## 2019-07-23 PROCEDURE — 1090F PRES/ABSN URINE INCON ASSESS: CPT | Performed by: INTERNAL MEDICINE

## 2019-07-23 PROCEDURE — 3017F COLORECTAL CA SCREEN DOC REV: CPT | Performed by: INTERNAL MEDICINE

## 2019-07-23 PROCEDURE — G8427 DOCREV CUR MEDS BY ELIG CLIN: HCPCS | Performed by: INTERNAL MEDICINE

## 2019-07-23 PROCEDURE — 1036F TOBACCO NON-USER: CPT | Performed by: INTERNAL MEDICINE

## 2019-07-23 PROCEDURE — 4040F PNEUMOC VAC/ADMIN/RCVD: CPT | Performed by: INTERNAL MEDICINE

## 2019-07-23 NOTE — PROGRESS NOTES
muscular weakness, negative for pain in arm and leg or swelling in foot and leg  Neurological: Negative for dizziness, headaches, memory loss, numbness/tingling, visual changes, syncope  Dermatological: Negative for rash    Objective:  /64 (Site: Left Upper Arm, Position: Sitting, Cuff Size: Medium Adult)   Pulse 80   Ht 5' 6.75\" (1.695 m)   Wt 161 lb 3.2 oz (73.1 kg)   BMI 25.44 kg/m²   Wt Readings from Last 3 Encounters:   07/23/19 161 lb 3.2 oz (73.1 kg)   06/27/19 160 lb 1.6 oz (72.6 kg)   03/26/19 164 lb (74.4 kg)     Body mass index is 25.44 kg/m². GENERAL - Alert, oriented, pleasant, in no apparent distress. EYES: No jaundice, no conjunctival pallor. SKIN: It is warm & dry. No rashes. No Echhymosis    HEENT - No clinically significant abnormalities seen. Neck - Supple. No jugular venous distention noted. No carotid bruits. Cardiovascular - Normal S1 and S2 without obvious murmur or gallop. Extremities - No cyanosis, clubbing, or significant edema. Pulmonary - No respiratory distress. No wheezes or rales. Abdomen - No masses, tenderness, or organomegaly. Musculoskeletal - No significant edema. No joint deformities. No muscle wasting. Neurologic - Cranial nerves II through XII are grossly intact. There were no gross focal neurologic abnormalities.     Lab Review   Lab Results   Component Value Date    CKTOTAL 62 03/04/2013    CKTOTAL 55 03/04/2013    CKMB 1.3 10/14/2011     BNP:    Lab Results   Component Value Date     03/04/2013     PT/INR:    Lab Results   Component Value Date    INR 1.8 07/18/2019     Lab Results   Component Value Date    LABA1C 5.8 09/11/2018     Lab Results   Component Value Date    WBC 5.8 09/16/2017    HCT 47.0 09/16/2017    MCV 90.0 09/16/2017     (L) 09/16/2017     Lab Results   Component Value Date    CHOL 142 04/09/2019    TRIG 87 04/09/2019    HDL 54 04/09/2019    LDLCALC 71 04/09/2019    LDLDIRECT 139 (H) 04/26/2012     Lab Results

## 2019-07-26 ENCOUNTER — OFFICE VISIT (OUTPATIENT)
Dept: FAMILY MEDICINE CLINIC | Age: 74
End: 2019-07-26
Payer: MEDICARE

## 2019-07-26 VITALS
TEMPERATURE: 97.9 F | BODY MASS INDEX: 25.12 KG/M2 | DIASTOLIC BLOOD PRESSURE: 68 MMHG | WEIGHT: 159.2 LBS | OXYGEN SATURATION: 98 % | SYSTOLIC BLOOD PRESSURE: 112 MMHG | HEART RATE: 115 BPM

## 2019-07-26 DIAGNOSIS — L30.9 DERMATITIS: Primary | ICD-10-CM

## 2019-07-26 PROCEDURE — G8417 CALC BMI ABV UP PARAM F/U: HCPCS | Performed by: NURSE PRACTITIONER

## 2019-07-26 PROCEDURE — 1090F PRES/ABSN URINE INCON ASSESS: CPT | Performed by: NURSE PRACTITIONER

## 2019-07-26 PROCEDURE — G8427 DOCREV CUR MEDS BY ELIG CLIN: HCPCS | Performed by: NURSE PRACTITIONER

## 2019-07-26 PROCEDURE — 99213 OFFICE O/P EST LOW 20 MIN: CPT | Performed by: NURSE PRACTITIONER

## 2019-07-26 PROCEDURE — 1123F ACP DISCUSS/DSCN MKR DOCD: CPT | Performed by: NURSE PRACTITIONER

## 2019-07-26 PROCEDURE — G8399 PT W/DXA RESULTS DOCUMENT: HCPCS | Performed by: NURSE PRACTITIONER

## 2019-07-26 PROCEDURE — 4040F PNEUMOC VAC/ADMIN/RCVD: CPT | Performed by: NURSE PRACTITIONER

## 2019-07-26 PROCEDURE — 1036F TOBACCO NON-USER: CPT | Performed by: NURSE PRACTITIONER

## 2019-07-26 PROCEDURE — 3017F COLORECTAL CA SCREEN DOC REV: CPT | Performed by: NURSE PRACTITIONER

## 2019-07-26 RX ORDER — METOPROLOL TARTRATE 75 MG/1
TABLET, FILM COATED ORAL
COMMUNITY
End: 2019-12-09 | Stop reason: CLARIF

## 2019-07-26 RX ORDER — METHYLPREDNISOLONE 4 MG/1
TABLET ORAL
Qty: 1 KIT | Refills: 0 | Status: SHIPPED | OUTPATIENT
Start: 2019-07-26 | End: 2019-08-01

## 2019-07-26 ASSESSMENT — ENCOUNTER SYMPTOMS
SORE THROAT: 0
RHINORRHEA: 0
DIARRHEA: 0
NAUSEA: 0
VOMITING: 0
NAIL CHANGES: 0
RESPIRATORY NEGATIVE: 1
COUGH: 0
SHORTNESS OF BREATH: 0
EYE PAIN: 0

## 2019-07-26 NOTE — PROGRESS NOTES
by mouth. 1 kit 0    atorvastatin (LIPITOR) 10 MG tablet TAKE 1 TABLET BY MOUTH EVERY DAY 90 tablet 3    quinapril (ACCUPRIL) 10 MG tablet TAKE 1 TABLET BY MOUTH ONCE DAILY AT NIGHT 90 tablet 3    gabapentin (NEURONTIN) 300 MG capsule TAKE ONE CAPSULE BY MOUTH AT BEDTIME 90 capsule 3    levothyroxine (SYNTHROID) 50 MCG tablet Take 1 tablet by mouth daily 90 tablet 3    warfarin (COUMADIN) 3 MG tablet TAKE 1 TABLET BY MOUTH DAILY. OR AS DIRECTED BY PHYSICIAN. 90 tablet 3    Fexofenadine HCl (ALLEGRA ALLERGY PO) Take by mouth      ranitidine (ZANTAC) 150 MG tablet Take 1 tablet by mouth nightly 90 tablet 1    ferrous sulfate 325 (65 FE) MG tablet Take 65 mg by mouth daily (with breakfast)       Dexlansoprazole (DEXILANT) 60 MG CPDR Take 1 tablet by mouth daily.  diltiazem (CARDIZEM CD) 180 MG extended release capsule Take 1 capsule by mouth daily 90 capsule 3     No current facility-administered medications for this visit. Past medical, family,surgical history reviewed today. Objective:  /68   Pulse 115   Temp 97.9 °F (36.6 °C) (Oral)   Wt 159 lb 3.2 oz (72.2 kg)   SpO2 98%   BMI 25.12 kg/m²   BP Readings from Last 3 Encounters:   07/26/19 112/68   07/23/19 110/64   06/27/19 112/80     Wt Readings from Last 3 Encounters:   07/26/19 159 lb 3.2 oz (72.2 kg)   07/23/19 161 lb 3.2 oz (73.1 kg)   06/27/19 160 lb 1.6 oz (72.6 kg)         Physical Exam   Constitutional: She is oriented to person, place, and time. She appears well-nourished. HENT:   Head: Normocephalic. Neck: Neck supple. Cardiovascular: Normal rate, regular rhythm and normal heart sounds. Pulmonary/Chest: Effort normal and breath sounds normal.   Neurological: She is alert and oriented to person, place, and time. Skin: Skin is warm and dry. Rash noted. Rash is papular. Psychiatric: She has a normal mood and affect.  Her behavior is normal.       Lab Results   Component Value Date    WBC 5.8 09/16/2017    HGB

## 2019-08-01 ENCOUNTER — ANTI-COAG VISIT (OUTPATIENT)
Dept: PHARMACY | Age: 74
End: 2019-08-01
Payer: MEDICARE

## 2019-08-01 DIAGNOSIS — I48.0 PAF (PAROXYSMAL ATRIAL FIBRILLATION) (HCC): ICD-10-CM

## 2019-08-01 LAB
INTERNATIONAL NORMALIZATION RATIO, POC: 3.4
POC INR: 3.4 INDEX
POC INR: ABNORMAL INDEX
PROTHROMBIN TIME, POC: 41 SECONDS (ref 10–14.3)

## 2019-08-01 PROCEDURE — 85610 PROTHROMBIN TIME: CPT

## 2019-08-01 PROCEDURE — 36416 COLLJ CAPILLARY BLOOD SPEC: CPT

## 2019-08-01 PROCEDURE — 99212 OFFICE O/P EST SF 10 MIN: CPT

## 2019-08-01 NOTE — PROGRESS NOTES
Medication Management Service  [facility]  [phone]    Visit Date: 8/1/2019   Subjective:       Deja Baez is a 75 y.o. female who presents to clinic today for anticoagulation monitoring and adjustment. Patient seen in clinic for warfarin management due to  Indication:   atrial fibrillation. INR goal: of 2.0-3.0. Duration of therapy: indefinite. Patient reports the following:   Adherent with regimen  Missed or extra doses:  None   Bleeding or thromboembolic side effects:  None  Significant medication changes:  Medrol Dose pack ended yesterday  Significant dietary changes: None  Significant alcohol or tobacco changes: None  Significant recent illness, disease state changes, or hospitalization:  None  Upcoming surgeries or procedures:  None  Falls: None           Assessment and Plan     PT/INR done in office per protocol. INR today is 3.4, supratherapeutic. Patient finished Medrol Dose Pack yesterday, which can increase INR. Patient's dose was increased at last appointment due to patient wanting to increase broccoli intake. Patient reports rash/itching is not resolved. Advised patient to call this clinic if she starts any new medications before next appointment here. Patient reports she is overwhelmed easily and struggled to decide on appointment time. Plan:  Take warfarin 1.5mg today then will continue current regimen of warfarin 3mg daily. Recheck INR in 1 week. Patient verbalized understanding of dosing directions and information discussed. Dosing schedule given to patient including phone number, appointment date, and time. Progress note sent to referring office. Patient acknowledges working in consult agreement with pharmacist as referred by his/her physician.       Electronically signed by Juju Doan, [facility] on 8/1/19 at 10:41 AM

## 2019-08-08 ENCOUNTER — ANTI-COAG VISIT (OUTPATIENT)
Dept: PHARMACY | Age: 74
End: 2019-08-08
Payer: MEDICARE

## 2019-08-08 DIAGNOSIS — I48.0 PAF (PAROXYSMAL ATRIAL FIBRILLATION) (HCC): ICD-10-CM

## 2019-08-08 LAB
INTERNATIONAL NORMALIZATION RATIO, POC: 2.8
POC INR: 2.8 INDEX
POC INR: ABNORMAL INDEX
PROTHROMBIN TIME, POC: 34.1 SECONDS (ref 10–14.3)

## 2019-08-08 PROCEDURE — 36416 COLLJ CAPILLARY BLOOD SPEC: CPT

## 2019-08-08 PROCEDURE — 99211 OFF/OP EST MAY X REQ PHY/QHP: CPT

## 2019-08-08 PROCEDURE — 85610 PROTHROMBIN TIME: CPT

## 2019-09-05 ENCOUNTER — ANTI-COAG VISIT (OUTPATIENT)
Dept: PHARMACY | Age: 74
End: 2019-09-05
Payer: MEDICARE

## 2019-09-05 DIAGNOSIS — I48.0 PAF (PAROXYSMAL ATRIAL FIBRILLATION) (HCC): ICD-10-CM

## 2019-09-05 LAB
INTERNATIONAL NORMALIZATION RATIO, POC: 2.5
POC INR: 2.5 INDEX
POC INR: ABNORMAL INDEX
PROTHROMBIN TIME, POC: 30.3 SECONDS (ref 10–14.3)

## 2019-09-05 PROCEDURE — 99211 OFF/OP EST MAY X REQ PHY/QHP: CPT

## 2019-09-05 PROCEDURE — 36416 COLLJ CAPILLARY BLOOD SPEC: CPT

## 2019-09-05 PROCEDURE — 85610 PROTHROMBIN TIME: CPT

## 2019-09-05 NOTE — PROGRESS NOTES
Medication Management Service  [facility]  [phone]    Visit Date: 9/5/2019   Subjective:       Deja Baez is a 75 y.o. female who presents to clinic today for anticoagulation monitoring and adjustment. Patient seen in clinic for warfarin management due to  Indication:   atrial fibrillation. INR goal: of 2.0-3.0. Duration of therapy: indefinite. Patient reports the following:   Adherent with regimen  Missed or extra doses:  None   Bleeding or thromboembolic side effects:  None  Significant medication changes:  None  Significant dietary changes: slightly smaller portion of broccoli this week  Significant alcohol or tobacco changes: None  Significant recent illness, disease state changes, or hospitalization:  None  Upcoming surgeries or procedures:  None  Falls: None           Assessment and Plan     PT/INR done in office per protocol. INR today is 2.5, therapeutic. Plan: Will continue current regimen of warfarin 3mg daily. Recheck INR in 4 week(s). Patient verbalized understanding of dosing directions and information discussed. Dosing schedule given to patient including phone number, appointment date, and time. Progress note sent to referring office. Patient acknowledges working in consult agreement with pharmacist as referred by his/her physician.       Electronically signed by Juju Doan, [address] [place] [address] on 9/5/19 at 10:08 AM

## 2019-09-12 DIAGNOSIS — I48.0 PAF (PAROXYSMAL ATRIAL FIBRILLATION) (HCC): ICD-10-CM

## 2019-09-12 DIAGNOSIS — Z79.01 LONG TERM CURRENT USE OF ANTICOAGULANT: ICD-10-CM

## 2019-09-13 RX ORDER — WARFARIN SODIUM 3 MG/1
TABLET ORAL
Qty: 90 TABLET | Refills: 3 | Status: SHIPPED | OUTPATIENT
Start: 2019-09-13 | End: 2020-08-27 | Stop reason: SDUPTHER

## 2019-09-18 RX ORDER — DILTIAZEM HYDROCHLORIDE 180 MG/1
180 CAPSULE, COATED, EXTENDED RELEASE ORAL DAILY
Qty: 90 CAPSULE | Refills: 3 | Status: SHIPPED | OUTPATIENT
Start: 2019-09-18 | End: 2020-09-22 | Stop reason: SDUPTHER

## 2019-10-01 ENCOUNTER — OFFICE VISIT (OUTPATIENT)
Dept: FAMILY MEDICINE CLINIC | Age: 74
End: 2019-10-01
Payer: MEDICARE

## 2019-10-01 VITALS
HEIGHT: 67 IN | RESPIRATION RATE: 12 BRPM | WEIGHT: 158.6 LBS | HEART RATE: 82 BPM | BODY MASS INDEX: 24.89 KG/M2 | OXYGEN SATURATION: 99 % | DIASTOLIC BLOOD PRESSURE: 70 MMHG | SYSTOLIC BLOOD PRESSURE: 124 MMHG

## 2019-10-01 DIAGNOSIS — Z00.00 ROUTINE GENERAL MEDICAL EXAMINATION AT A HEALTH CARE FACILITY: Primary | ICD-10-CM

## 2019-10-01 DIAGNOSIS — Z23 NEEDS FLU SHOT: ICD-10-CM

## 2019-10-01 DIAGNOSIS — D50.8 OTHER IRON DEFICIENCY ANEMIA: ICD-10-CM

## 2019-10-01 DIAGNOSIS — Z23 NEED FOR SHINGLES VACCINE: ICD-10-CM

## 2019-10-01 PROCEDURE — G0008 ADMIN INFLUENZA VIRUS VAC: HCPCS | Performed by: FAMILY MEDICINE

## 2019-10-01 PROCEDURE — G8482 FLU IMMUNIZE ORDER/ADMIN: HCPCS | Performed by: FAMILY MEDICINE

## 2019-10-01 PROCEDURE — G0438 PPPS, INITIAL VISIT: HCPCS | Performed by: FAMILY MEDICINE

## 2019-10-01 PROCEDURE — 4040F PNEUMOC VAC/ADMIN/RCVD: CPT | Performed by: FAMILY MEDICINE

## 2019-10-01 PROCEDURE — 90653 IIV ADJUVANT VACCINE IM: CPT | Performed by: FAMILY MEDICINE

## 2019-10-01 PROCEDURE — 3017F COLORECTAL CA SCREEN DOC REV: CPT | Performed by: FAMILY MEDICINE

## 2019-10-01 PROCEDURE — 1123F ACP DISCUSS/DSCN MKR DOCD: CPT | Performed by: FAMILY MEDICINE

## 2019-10-01 ASSESSMENT — PATIENT HEALTH QUESTIONNAIRE - PHQ9: SUM OF ALL RESPONSES TO PHQ QUESTIONS 1-9: 2

## 2019-10-01 ASSESSMENT — LIFESTYLE VARIABLES: HOW OFTEN DO YOU HAVE A DRINK CONTAINING ALCOHOL: 0

## 2019-10-03 ENCOUNTER — ANTI-COAG VISIT (OUTPATIENT)
Dept: PHARMACY | Age: 74
End: 2019-10-03
Payer: MEDICARE

## 2019-10-03 DIAGNOSIS — I48.0 PAF (PAROXYSMAL ATRIAL FIBRILLATION) (HCC): ICD-10-CM

## 2019-10-03 LAB
INTERNATIONAL NORMALIZATION RATIO, POC: 2.2
POC INR: 2.2 INDEX
POC INR: ABNORMAL INDEX
PROTHROMBIN TIME, POC: 26.3 SECONDS (ref 10–14.3)

## 2019-10-03 PROCEDURE — 36416 COLLJ CAPILLARY BLOOD SPEC: CPT

## 2019-10-03 PROCEDURE — 99211 OFF/OP EST MAY X REQ PHY/QHP: CPT

## 2019-10-03 PROCEDURE — 85610 PROTHROMBIN TIME: CPT

## 2019-10-21 ENCOUNTER — PROCEDURE VISIT (OUTPATIENT)
Dept: CARDIOLOGY CLINIC | Age: 74
End: 2019-10-21
Payer: MEDICARE

## 2019-10-21 ENCOUNTER — TELEPHONE (OUTPATIENT)
Dept: CARDIOLOGY CLINIC | Age: 74
End: 2019-10-21

## 2019-10-21 DIAGNOSIS — Z95.0 CARDIAC PACEMAKER IN SITU: Primary | ICD-10-CM

## 2019-10-21 PROCEDURE — 93294 REM INTERROG EVL PM/LDLS PM: CPT | Performed by: INTERNAL MEDICINE

## 2019-10-21 PROCEDURE — 93296 REM INTERROG EVL PM/IDS: CPT | Performed by: INTERNAL MEDICINE

## 2019-10-31 ENCOUNTER — ANTI-COAG VISIT (OUTPATIENT)
Dept: PHARMACY | Age: 74
End: 2019-10-31
Payer: MEDICARE

## 2019-10-31 DIAGNOSIS — I48.0 PAF (PAROXYSMAL ATRIAL FIBRILLATION) (HCC): ICD-10-CM

## 2019-10-31 LAB
INTERNATIONAL NORMALIZATION RATIO, POC: 2.4
POC INR: 2.4 INDEX
POC INR: ABNORMAL INDEX
PROTHROMBIN TIME, POC: 28.3 SECONDS (ref 10–14.3)

## 2019-10-31 PROCEDURE — 85610 PROTHROMBIN TIME: CPT

## 2019-10-31 PROCEDURE — 36416 COLLJ CAPILLARY BLOOD SPEC: CPT

## 2019-10-31 PROCEDURE — 99211 OFF/OP EST MAY X REQ PHY/QHP: CPT

## 2019-11-29 ENCOUNTER — HOSPITAL ENCOUNTER (OUTPATIENT)
Dept: WOMENS IMAGING | Age: 74
Discharge: HOME | End: 2019-11-29
Payer: MEDICARE

## 2019-11-29 DIAGNOSIS — Z12.31 VISIT FOR SCREENING MAMMOGRAM: ICD-10-CM

## 2019-11-29 PROCEDURE — 77063 BREAST TOMOSYNTHESIS BI: CPT

## 2019-12-05 ENCOUNTER — ANTI-COAG VISIT (OUTPATIENT)
Dept: PHARMACY | Age: 74
End: 2019-12-05
Payer: MEDICARE

## 2019-12-05 DIAGNOSIS — I48.0 PAF (PAROXYSMAL ATRIAL FIBRILLATION) (HCC): ICD-10-CM

## 2019-12-05 LAB
INTERNATIONAL NORMALIZATION RATIO, POC: 2.4
POC INR: 2.4 INDEX
POC INR: ABNORMAL INDEX
PROTHROMBIN TIME, POC: 28.6 SECONDS (ref 10–14.3)

## 2019-12-05 PROCEDURE — 36416 COLLJ CAPILLARY BLOOD SPEC: CPT

## 2019-12-05 PROCEDURE — 99211 OFF/OP EST MAY X REQ PHY/QHP: CPT

## 2019-12-05 PROCEDURE — 85610 PROTHROMBIN TIME: CPT

## 2019-12-07 DIAGNOSIS — I48.0 PAF (PAROXYSMAL ATRIAL FIBRILLATION) (HCC): ICD-10-CM

## 2019-12-09 RX ORDER — METOPROLOL SUCCINATE 50 MG/1
75 TABLET, EXTENDED RELEASE ORAL DAILY
COMMUNITY
End: 2020-07-30

## 2019-12-10 RX ORDER — METOPROLOL SUCCINATE 50 MG/1
75 TABLET, EXTENDED RELEASE ORAL DAILY
Qty: 135 TABLET | Refills: 3 | Status: SHIPPED | OUTPATIENT
Start: 2019-12-10 | End: 2020-12-08 | Stop reason: SDUPTHER

## 2020-01-16 ENCOUNTER — ANTI-COAG VISIT (OUTPATIENT)
Dept: PHARMACY | Age: 75
End: 2020-01-16
Payer: MEDICARE

## 2020-01-16 LAB
INTERNATIONAL NORMALIZATION RATIO, POC: 2.6
POC INR: 2.6 INDEX
POC INR: ABNORMAL INDEX
PROTHROMBIN TIME, POC: 31.1 SECONDS (ref 10–14.3)

## 2020-01-16 PROCEDURE — 85610 PROTHROMBIN TIME: CPT

## 2020-01-16 PROCEDURE — 99211 OFF/OP EST MAY X REQ PHY/QHP: CPT

## 2020-01-16 PROCEDURE — 36416 COLLJ CAPILLARY BLOOD SPEC: CPT

## 2020-01-16 NOTE — PROGRESS NOTES
Medication Management Service  [facility]  [phone]    Visit Date: 1/16/2020   Subjective:       Shell Quintero is a 75 y.o. female who presents to clinic today for anticoagulation monitoring and adjustment. Patient seen in clinic for warfarin management due to  Indication:   atrial fibrillation. INR goal: of 2.0-3.0. Duration of therapy: indefinite. Patient reports the following:   Adherent with regimen  Missed or extra doses:  None   Bleeding or thromboembolic side effects:  None  Significant medication changes:  None  Significant dietary changes: None  Significant alcohol or tobacco changes: None  Significant recent illness, disease state changes, or hospitalization:  None  Upcoming surgeries or procedures:  None  Falls: None           Assessment and Plan     PT/INR done in office per protocol. INR today is 2.6, therapeutic. Plan: Will continue current regimen of warfarin 3mg daily. Recheck INR in 6 week(s). Patient verbalized understanding of dosing directions and information discussed. Dosing schedule given to patient including phone number, appointment date, and time. Progress note sent to referring office. Patient acknowledges working in consult agreement with pharmacist as referred by his/her physician.       Electronically signed by Jorgito Fisher, [facility] on 1/16/20 at 10:48 AM

## 2020-01-25 ENCOUNTER — HOSPITAL ENCOUNTER (OUTPATIENT)
Age: 75
Discharge: HOME | End: 2020-01-25
Payer: MEDICARE

## 2020-01-25 LAB
FERRITIN: 159 NG/ML (ref 15–150)
HCT VFR BLD CALC: 46 % (ref 37–47)
HEMOGLOBIN: 14.4 GM/DL (ref 12.5–16)
IRON: 87 UG/DL (ref 37–145)
PCT TRANSFERRIN: 27 % (ref 10–44)
TOTAL IRON BINDING CAPACITY: 323 UG/DL (ref 250–450)
UNSATURATED IRON BINDING CAPACITY: 236 UG/DL (ref 110–370)

## 2020-01-25 PROCEDURE — 83550 IRON BINDING TEST: CPT

## 2020-01-25 PROCEDURE — 83540 ASSAY OF IRON: CPT

## 2020-01-25 PROCEDURE — 85018 HEMOGLOBIN: CPT

## 2020-01-25 PROCEDURE — 85014 HEMATOCRIT: CPT

## 2020-01-25 PROCEDURE — 82728 ASSAY OF FERRITIN: CPT

## 2020-01-25 PROCEDURE — 36415 COLL VENOUS BLD VENIPUNCTURE: CPT

## 2020-01-28 ENCOUNTER — TELEPHONE (OUTPATIENT)
Dept: CARDIOLOGY CLINIC | Age: 75
End: 2020-01-28

## 2020-01-29 ENCOUNTER — PROCEDURE VISIT (OUTPATIENT)
Dept: CARDIOLOGY CLINIC | Age: 75
End: 2020-01-29
Payer: MEDICARE

## 2020-01-29 PROCEDURE — 93296 REM INTERROG EVL PM/IDS: CPT | Performed by: INTERNAL MEDICINE

## 2020-01-29 PROCEDURE — 93294 REM INTERROG EVL PM/LDLS PM: CPT | Performed by: INTERNAL MEDICINE

## 2020-02-20 ENCOUNTER — TELEPHONE (OUTPATIENT)
Dept: PHARMACY | Age: 75
End: 2020-02-20

## 2020-02-20 NOTE — TELEPHONE ENCOUNTER
Patient left VM to cancel appointment today. Returned call and confirmed appointment is next Thursday 2/27.

## 2020-03-12 ENCOUNTER — ANTI-COAG VISIT (OUTPATIENT)
Dept: PHARMACY | Age: 75
End: 2020-03-12
Payer: MEDICARE

## 2020-03-12 LAB
INTERNATIONAL NORMALIZATION RATIO, POC: 2.2
POC INR: 2.2 INDEX
POC INR: ABNORMAL INDEX
PROTHROMBIN TIME, POC: 26.9 SECONDS (ref 10–14.3)

## 2020-03-12 PROCEDURE — 85610 PROTHROMBIN TIME: CPT

## 2020-03-12 PROCEDURE — 36416 COLLJ CAPILLARY BLOOD SPEC: CPT

## 2020-03-12 PROCEDURE — 99211 OFF/OP EST MAY X REQ PHY/QHP: CPT

## 2020-03-12 NOTE — PROGRESS NOTES
Medication Management Service  [facility]  [phone]    Visit Date: 3/12/2020   Subjective:       Claudetta Feather is a 75 y.o. female who presents to clinic today for anticoagulation monitoring and adjustment. Patient seen in clinic for warfarin management due to  Indication:   atrial fibrillation. INR goal: of 2.0-3.0. Duration of therapy: indefinite. Patient reports the following:   Adherent with regimen  Missed or extra doses:  None   Bleeding or thromboembolic side effects:  None  Significant medication changes:  None  Significant dietary changes: None  Significant alcohol or tobacco changes: None  Significant recent illness, disease state changes, or hospitalization:  None  Upcoming surgeries or procedures:  None  Falls: None           Assessment and Plan     PT/INR done in office per protocol. INR today is 2.2, therapeutic. Plan: Will continue current regimen of warfarin 3mg daily. Recheck INR in 6 week(s). Patient verbalized understanding of dosing directions and information discussed. Dosing schedule given to patient including phone number, appointment date, and time. Progress note sent to referring office. Patient acknowledges working in consult agreement with pharmacist as referred by his/her physician.       Electronically signed by Blanco Armendariz [facility]8 [facility] on 3/12/20 at 10:21 AM MEKA

## 2020-03-13 RX ORDER — QUINAPRIL 10 MG/1
TABLET ORAL
Qty: 90 TABLET | Refills: 3 | Status: SHIPPED | OUTPATIENT
Start: 2020-03-13 | End: 2021-01-15

## 2020-03-19 ENCOUNTER — TELEPHONE (OUTPATIENT)
Dept: PHARMACY | Age: 75
End: 2020-03-19

## 2020-04-06 RX ORDER — GABAPENTIN 300 MG/1
CAPSULE ORAL
Qty: 90 CAPSULE | Refills: 3 | Status: SHIPPED | OUTPATIENT
Start: 2020-04-06 | End: 2020-08-18 | Stop reason: ALTCHOICE

## 2020-04-08 NOTE — PROGRESS NOTES
Standing order for PT/INR has been placed in preparation to transition to remote INR monitoring given efforts to reduce the spread of COVID-19.     CLINICAL PHARMACY CONSULT: MED RECONCILIATION/REVIEW ADDENDUM    For Pharmacy Admin Tracking Only    PHSO: No  Total # of Interventions Recommended: 0  - Maintenance Safety Lab Monitoring #: 1  Total Interventions Accepted: 0  Time Spent (min): 5    Liana Burnett, PharmD

## 2020-04-20 ENCOUNTER — TELEPHONE (OUTPATIENT)
Dept: PHARMACY | Age: 75
End: 2020-04-20

## 2020-05-05 ENCOUNTER — PROCEDURE VISIT (OUTPATIENT)
Dept: CARDIOLOGY CLINIC | Age: 75
End: 2020-05-05
Payer: MEDICARE

## 2020-05-05 PROCEDURE — 93296 REM INTERROG EVL PM/IDS: CPT | Performed by: INTERNAL MEDICINE

## 2020-05-05 PROCEDURE — 93294 REM INTERROG EVL PM/LDLS PM: CPT | Performed by: INTERNAL MEDICINE

## 2020-05-18 RX ORDER — LEVOTHYROXINE SODIUM 0.05 MG/1
TABLET ORAL
Qty: 90 TABLET | Refills: 3 | Status: SHIPPED | OUTPATIENT
Start: 2020-05-18 | End: 2021-01-14 | Stop reason: SDUPTHER

## 2020-06-03 ENCOUNTER — TELEPHONE (OUTPATIENT)
Dept: PHARMACY | Age: 75
End: 2020-06-03

## 2020-06-03 NOTE — TELEPHONE ENCOUNTER
Called to discuss change to lab draw. Advised patient her sister should not wait inside with her. Advised that wait will be longer than prior appointments with me. Recent Travel Screening and Travel History documentation:     Travel Screening       Question Response     Do you have any of the following symptoms? None of these     In the last month, have you been in contact with someone who was confirmed or suspected to have Coronavirus / COVID-19? No / Unsure     Have you traveled internationally in the last month? No      Travel History   Travel since 05/03/20     No documented travel since 05/03/20         CLINICAL PHARMACY CONSULT: MED RECONCILIATION/REVIEW ADDENDUM    For Pharmacy Admin Tracking Only    PHSO: No  Total # of Interventions Recommended: 0      Recommended intervention potential cost savings:   Accepted intervention potential cost savings:    Total Interventions Accepted: 0  Time Spent (min): 109 [facility] Masood, PharmD  [address]

## 2020-06-04 ENCOUNTER — ANTI-COAG VISIT (OUTPATIENT)
Dept: PHARMACY | Age: 75
End: 2020-06-04
Payer: MEDICARE

## 2020-06-04 ENCOUNTER — HOSPITAL ENCOUNTER (OUTPATIENT)
Age: 75
Discharge: HOME | End: 2020-06-04
Payer: MEDICARE

## 2020-06-04 LAB
INR BLD: 2.09 INDEX
PROTHROMBIN TIME: 25.5 SECONDS (ref 11.7–14.5)

## 2020-06-04 PROCEDURE — 85610 PROTHROMBIN TIME: CPT

## 2020-06-04 PROCEDURE — 99211 OFF/OP EST MAY X REQ PHY/QHP: CPT

## 2020-06-04 PROCEDURE — 36415 COLL VENOUS BLD VENIPUNCTURE: CPT

## 2020-06-27 ENCOUNTER — HOSPITAL ENCOUNTER (OUTPATIENT)
Age: 75
Discharge: HOME | End: 2020-06-27
Payer: MEDICARE

## 2020-06-27 ENCOUNTER — HOSPITAL ENCOUNTER (OUTPATIENT)
Dept: GENERAL RADIOLOGY | Age: 75
Discharge: HOME | End: 2020-06-27
Payer: MEDICARE

## 2020-06-27 PROCEDURE — 71046 X-RAY EXAM CHEST 2 VIEWS: CPT

## 2020-07-14 RX ORDER — ATORVASTATIN CALCIUM 10 MG/1
TABLET, FILM COATED ORAL
Qty: 90 TABLET | Refills: 3 | Status: SHIPPED | OUTPATIENT
Start: 2020-07-14 | End: 2021-07-07 | Stop reason: SDUPTHER

## 2020-07-30 ENCOUNTER — OFFICE VISIT (OUTPATIENT)
Dept: CARDIOLOGY CLINIC | Age: 75
End: 2020-07-30
Payer: MEDICARE

## 2020-07-30 VITALS
HEIGHT: 67 IN | WEIGHT: 160.6 LBS | HEART RATE: 68 BPM | DIASTOLIC BLOOD PRESSURE: 70 MMHG | SYSTOLIC BLOOD PRESSURE: 110 MMHG | BODY MASS INDEX: 25.21 KG/M2

## 2020-07-30 PROCEDURE — G8417 CALC BMI ABV UP PARAM F/U: HCPCS | Performed by: NURSE PRACTITIONER

## 2020-07-30 PROCEDURE — G8399 PT W/DXA RESULTS DOCUMENT: HCPCS | Performed by: NURSE PRACTITIONER

## 2020-07-30 PROCEDURE — 99214 OFFICE O/P EST MOD 30 MIN: CPT | Performed by: NURSE PRACTITIONER

## 2020-07-30 PROCEDURE — 1036F TOBACCO NON-USER: CPT | Performed by: NURSE PRACTITIONER

## 2020-07-30 PROCEDURE — G8428 CUR MEDS NOT DOCUMENT: HCPCS | Performed by: NURSE PRACTITIONER

## 2020-07-30 PROCEDURE — 1123F ACP DISCUSS/DSCN MKR DOCD: CPT | Performed by: NURSE PRACTITIONER

## 2020-07-30 PROCEDURE — 1090F PRES/ABSN URINE INCON ASSESS: CPT | Performed by: NURSE PRACTITIONER

## 2020-07-30 PROCEDURE — 4040F PNEUMOC VAC/ADMIN/RCVD: CPT | Performed by: NURSE PRACTITIONER

## 2020-07-30 ASSESSMENT — ENCOUNTER SYMPTOMS
VOMITING: 0
BLOOD IN STOOL: 0
SINUS PRESSURE: 0
COLOR CHANGE: 0
CHEST TIGHTNESS: 0
ABDOMINAL PAIN: 0
EYE REDNESS: 0
ABDOMINAL DISTENTION: 0
SINUS PAIN: 0
BACK PAIN: 0
CONSTIPATION: 0
DIARRHEA: 0
SHORTNESS OF BREATH: 0

## 2020-07-30 NOTE — PROGRESS NOTES
CARDIOLOGY  NOTE      7/30/2020    RE: Carmen Cope  (1945)                               TO:  Dr. Gertrude Dumont MD  The primary cardiologist is Dr Amy Soliman    CC:  Denies the following  Chest Pain  Palpitations  Shortness of Breath  Edema  Dizziness  Syncope    Here for follow-up on chronic atrial fibrillation, hypertension, hyperlipidemia, status post pacemaker    HPI: Thank you for involving me in taking care of your patient Carmen Cope, who is a  67y.o. year old female with a history of chronic atrial fibrillation, hypertension, hyperlipidemia, status post pacemaker. She is seen today for a follow up on chronic atrial fibrillation, hypertension, hyperlipidemia, status post pacemaker. She during this visit denies chest pain, palpitations, shortness of breath, edema, dizziness or syncope. She reports she has been feeling relatively well since her last office visit. She does follow with Dr. Jg Leach on a regular basis. She does complain today of some left righted neck pain. She states it is hard for her to turn her head to the left side without having stiffness and pain.     Vitals:    07/30/20 1505   BP: 110/70   Pulse: 68       Current Outpatient Medications   Medication Sig Dispense Refill    atorvastatin (LIPITOR) 10 MG tablet TAKE 1 TABLET BY MOUTH EVERY DAY 90 tablet 3    Spacer/Aero-Holding Chambers ABIMAEL 1 Device by Does not apply route daily 1 Device 0    albuterol sulfate HFA (PROVENTIL HFA) 108 (90 Base) MCG/ACT inhaler Inhale 2 puffs into the lungs every 6 hours as needed for Wheezing 1 Inhaler 3    levothyroxine (SYNTHROID) 50 MCG tablet TAKE 1 TABLET BY MOUTH EVERY DAY 90 tablet 3    gabapentin (NEURONTIN) 300 MG capsule TAKE 1 CAPSULE BY MOUTH EVERYDAY AT BEDTIME 90 capsule 3    quinapril (ACCUPRIL) 10 MG tablet TAKE 1 TABLET BY MOUTH ONCE DAILY AT NIGHT 90 tablet 3    metoprolol succinate (TOPROL XL) 50 MG extended release tablet Take 1.5 tablets by mouth daily 135 tablet 3    diltiazem (CARDIZEM CD) 180 MG extended release capsule Take 1 capsule by mouth daily 90 capsule 3    warfarin (COUMADIN) 3 MG tablet TAKE 1 TABLET BY MOUTH DAILY. OR AS DIRECTED BY PHYSICIAN. 90 tablet 3    Fexofenadine HCl (ALLEGRA ALLERGY PO) Take by mouth      Dexlansoprazole (DEXILANT) 60 MG CPDR Take 1 tablet by mouth daily. No current facility-administered medications for this visit. Allergies: Latex; Darvon [propoxyphene hcl]; Demerol; Dilaudid [hydromorphone hcl]; Valium; Celecoxib; Norco [hydrocodone-acetaminophen]; Norvasc [amlodipine]; Oxycodone; Tape [adhesive tape]; Vasotec [enalapril]; and Diazepam  Past Medical History:   Diagnosis Date    Abnormal echocardiogram     EF 60%, mild aortic indsufficiency, mild pulmonary hypertension    Anemia     Aortic insufficiency     mild per echo 8/24/2010    Atrial fibrillation (HCC)     failed propafenone, Multaq and flecainide - 7/2011 plan for AFib ablation - [facility]- Dr Benson Sera Atrial flutter ([facility] [address]     Bursitis, trochanteric 8/04    s/p injection    Cerumen impaction 4/24/2012    Diverticulosis 2015    Dr. Carline Arreguin C scope    Diverticulosis of colon 1/2010    Colonoscopy-Dr Reece ACUÑA (degenerative joint disease), cervical     cervical, generalized disc buldge   MRI 3/02    DVT (deep venous thrombosis) ([facility] [address]     Dyslipidemia 2002    Environmental allergies     Family history of colon cancer     mother    Gastric polyp     8/2006, 11/2009 benign- Dr Alanna Rivers Gastritis 4/2008    mild superficial per Dr Carline Arreguin    GERD (gastroesophageal reflux disease) 8/2006    Dr Alanna Rivers H/O cardiovascular stress test 07/13, 4/3/13    07/13 EF58%, No scintigraphic evidence of inducible myocardial ischemia 04/13Normal study. No wall motion abnormality seen. EF 65%    H/O echocardiogram 7/18/13 7/13-EF 50-55% Mild AR.      H/O exercise stress test 02/07/2017 diaphoretic. HENT:      Head: Normocephalic. Right Ear: External ear normal.      Left Ear: External ear normal.      Nose: No congestion or rhinorrhea. Mouth/Throat:      Mouth: Mucous membranes are moist.      Pharynx: Oropharynx is clear. No oropharyngeal exudate or posterior oropharyngeal erythema. Eyes:      General:         Right eye: No discharge. Left eye: No discharge. Conjunctiva/sclera: Conjunctivae normal.      Pupils: Pupils are equal, round, and reactive to light. Neck:      Musculoskeletal: Neck supple. No muscular tenderness. Vascular: No carotid bruit. Cardiovascular:      Rate and Rhythm: Normal rate and regular rhythm. Pulses: Normal pulses. Heart sounds: Normal heart sounds. No murmur. No friction rub. No gallop. Pulmonary:      Effort: Pulmonary effort is normal.      Breath sounds: Normal breath sounds. No stridor. No wheezing, rhonchi or rales. Abdominal:      General: Abdomen is flat. Bowel sounds are normal. There is no distension. Palpations: Abdomen is soft. Tenderness: There is no abdominal tenderness. Musculoskeletal:      Right lower leg: No edema. Left lower leg: No edema. Skin:     General: Skin is warm and dry. Capillary Refill: Capillary refill takes less than 2 seconds. Neurological:      Mental Status: She is alert and oriented to person, place, and time. Psychiatric:         Mood and Affect: Mood normal.         Behavior: Behavior normal.         Thought Content:  Thought content normal.         Judgment: Judgment normal.         DATA:  Lab Results   Component Value Date    CKTOTAL 62 03/04/2013    CKTOTAL 55 03/04/2013    CKMB 1.3 10/14/2011    TROPONINI <0.006 03/05/2013    TROPONINI 0.056 10/15/2011     BNP:    Lab Results   Component Value Date     03/04/2013       Lab Results   Component Value Date    LABA1C 5.8 09/11/2018     Lab Results   Component Value Date    CHOL 142 04/09/2019    TRIG 87 04/09/2019    HDL 54 04/09/2019    LDLCALC 71 04/09/2019    LDLDIRECT 139 (H) 04/26/2012     Lab Results   Component Value Date    ALT 17 09/16/2017    AST 22 09/16/2017     TSH:    Lab Results   Component Value Date    TSH 4.42 04/09/2019         Assessment/ Plan:    Patient seen, interviewed and examined. Testing was reviewed. chronic atrial fibrillation  Pacemaker interrogate  Atrial fibrillation noted  Rate is controlled  Continue Coumadin    Hypertension  Vitals:    07/30/20 1505   BP: 110/70   Pulse: 68   Blood pressure and heart rate are stable  Continue quinapril 10 mg daily, Toprol XL 75 mg daily and Cardizem  mg daily    hyperlipidemia   Patient to have labs drawn as no recent lipid panel on file  Continue Lipitor 10 mg daily    status post pacemaker  Pacemaker interrogated  Chronic A. Fib  Battery status 6.5 years  Thresholds within normal limits  Patient to continue submissions of pacemaker every 3 months    Patient to follow-up with Dr. Dillan Sellers in 1 year or sooner if needed    Patient is encouraged to exercise even a brisk walk for 30 minutes at least 3 to 4 times a week. Lifestyle and risk factor modificatons discussed. Various goals are discussed and questions answered. Continue current medications. Appropriate prescriptions are addressed. Questions answered and patient verbalizes understanding. Call for any problems, questions, or concerns.

## 2020-07-30 NOTE — LETTER
Desi Lynn  1945  [id number]    Have you had any Chest Pain - No      Have you had any Shortness of Breath - Yes  If Yes - When on exertion    Have you had any dizziness - No      Have you had any palpitations - Yes  If Yes DO EKG - Do you feel your heart racing  How long does it last - minutes     Is the patient on any of the following medications -   If Yes DO EKG    Do you have any edema - no    Do you have a surgery or procedure scheduled in the near future - No      Ask patient [facility] are not already [facility]    Check to see if we have an E-MAIL on file for the patient    Check medication list thoroughly!!!  BE SURE TO ASK PATIENT IF THEY NEED MEDICATION REFILLS

## 2020-08-01 ENCOUNTER — HOSPITAL ENCOUNTER (OUTPATIENT)
Age: 75
Discharge: HOME | End: 2020-08-01
Payer: MEDICARE

## 2020-08-01 LAB
CHOLESTEROL, FASTING: 130 MG/DL
HDLC SERPL-MCNC: 54 MG/DL
LDL CHOLESTEROL DIRECT: 72 MG/DL
TRIGLYCERIDE, FASTING: 63 MG/DL

## 2020-08-01 PROCEDURE — 36415 COLL VENOUS BLD VENIPUNCTURE: CPT

## 2020-08-01 PROCEDURE — 80061 LIPID PANEL: CPT

## 2020-08-11 ENCOUNTER — TELEPHONE (OUTPATIENT)
Dept: FAMILY MEDICINE CLINIC | Age: 75
End: 2020-08-11

## 2020-08-17 ENCOUNTER — TELEPHONE (OUTPATIENT)
Dept: CARDIOLOGY CLINIC | Age: 75
End: 2020-08-17

## 2020-08-17 ENCOUNTER — PROCEDURE VISIT (OUTPATIENT)
Dept: CARDIOLOGY CLINIC | Age: 75
End: 2020-08-17
Payer: MEDICARE

## 2020-08-17 PROCEDURE — 93296 REM INTERROG EVL PM/IDS: CPT | Performed by: INTERNAL MEDICINE

## 2020-08-17 PROCEDURE — 93294 REM INTERROG EVL PM/LDLS PM: CPT | Performed by: INTERNAL MEDICINE

## 2020-08-17 NOTE — LETTER
[facility]  [address] [id number]  Phone: [phone]  Fax: [phone]            August 17, 2020    Emma Smith  [address] [id number]      Dear Jaclyn Campoverde: This is your CARELINK schedule. Please sandra your calendar with these dates. You can do your checks anytime during the scheduled day. Since we do not do reminder calls, it will be your responsibility to perform the checks on the day it is scheduled. If you have any questions or concerns, please call and ask for Kareem Casas at [phone].

## 2020-08-18 ENCOUNTER — VIRTUAL VISIT (OUTPATIENT)
Dept: FAMILY MEDICINE CLINIC | Age: 75
End: 2020-08-18
Payer: MEDICARE

## 2020-08-18 PROCEDURE — 99441 PR PHYS/QHP TELEPHONE EVALUATION 5-10 MIN: CPT | Performed by: FAMILY MEDICINE

## 2020-08-18 RX ORDER — GABAPENTIN 100 MG/1
CAPSULE ORAL
Qty: 42 CAPSULE | Refills: 0 | Status: SHIPPED | OUTPATIENT
Start: 2020-08-18 | End: 2020-09-22 | Stop reason: ALTCHOICE

## 2020-08-18 NOTE — PROGRESS NOTES
Christiano Escobar is a 67 y.o. female evaluated via telephone on 8/18/2020. Consent:  She and/or health care decision maker is aware that that she may receive a bill for this telephone service, depending on her insurance coverage, and has provided verbal consent to proceed: Yes      Documentation:  I communicated with the patient and/or health care decision maker about     1. Patient report neck pain on left side stiffness and pain with moving in  the left side. Doesn't radiate past the left shoulder but pain mostly on left shoulder. Patient reports sleeping okay. + pacemaker on that left side anterior chest. No skin changes at pacer site or swelling to skin. Pain only with movement. Patient on gabapentin 300mg     The patient denies any symptoms of neurological impairment or TIA's; no amaurosis, diplopia, dysphasia, or unilateral disturbance of motor or sensory function. No loss of balance or vertigo. Patient denies any exertional chest pain, dyspnea, palpitations, syncope, orthopnea, edema or paroxysmal nocturnal dyspnea. Details of this discussion including any medical advice provided:   1. Neck pain- suspected MSK origin, OTC muscle cream,   2. Gabapentin wean discussed with patient. All questions answered  Return for Keep upcoming appointment in this office. I affirm this is a Patient Initiated Episode with a Patient who has not had a related appointment within my department in the past 7 days or scheduled within the next 24 hours.     Patient identification was verified at the start of the visit: Yes    Total Time: minutes: 5-10 minutes    Note: not billable if this call serves to triage the patient into an appointment for the relevant concern      Indra Hernandez

## 2020-08-27 ENCOUNTER — TELEPHONE (OUTPATIENT)
Dept: PHARMACY | Age: 75
End: 2020-08-27

## 2020-08-27 ENCOUNTER — ANTI-COAG VISIT (OUTPATIENT)
Dept: PHARMACY | Age: 75
End: 2020-08-27
Payer: MEDICARE

## 2020-08-27 VITALS — TEMPERATURE: 97.1 F

## 2020-08-27 LAB
INTERNATIONAL NORMALIZATION RATIO, POC: 2.5
POC INR: 2.5 INDEX
PROTHROMBIN TIME, POC: 29.4 SECONDS (ref 10–14.3)

## 2020-08-27 PROCEDURE — 36416 COLLJ CAPILLARY BLOOD SPEC: CPT

## 2020-08-27 PROCEDURE — 99212 OFFICE O/P EST SF 10 MIN: CPT

## 2020-08-27 PROCEDURE — 85610 PROTHROMBIN TIME: CPT

## 2020-08-27 RX ORDER — WARFARIN SODIUM 3 MG/1
3 TABLET ORAL DAILY
Qty: 90 TABLET | Refills: 3 | Status: SHIPPED | OUTPATIENT
Start: 2020-08-27 | End: 2020-11-12 | Stop reason: DRUGHIGH

## 2020-08-27 NOTE — PROGRESS NOTES
Medication Management Service  [facility]  [phone]    Visit Date: 8/27/2020   Subjective:       Herman Norwood is a 67 y.o. female who presents to clinic today for anticoagulation monitoring and adjustment. Patient seen in clinic for warfarin management due to  Indication:   atrial fibrillation. INR goal: of 2.0-3.0. Duration of therapy: indefinite. Patient reports the following:   Adherent with regimen  Missed or extra doses:  None   Bleeding or thromboembolic side effects:  None  Significant medication changes:  None  Significant dietary changes: None  Significant alcohol or tobacco changes: None  Significant recent illness, disease state changes, or hospitalization:  None  Upcoming surgeries or procedures:  None  Falls: None           Assessment and Plan     PT/INR done in office per protocol. INR today is 2.5, therapeutic. Refill requested    Plan: Will continue current regimen of warfarin 3mg daily. ERx sent to [facility].   Recheck INR in 9 week(s). Patient verbalized understanding of dosing directions and information discussed. Dosing schedule given to patient including phone number, appointment date, and time. Progress note sent to referring office. Patient acknowledges working in consult agreement with pharmacist as referred by his/her physician.       Electronically signed by Carlito Colin, [facility] on 8/27/20 at 10:36 AM EDT  CLINICAL PHARMACY CONSULT: MED RECONCILIATION/REVIEW Rodrigo  22. Tracking Only    PHSO: No  Total # of Interventions Recommended: 0  - Refills Provided #: 1  - Maintenance Safety Lab Monitoring #: 1     Total Interventions Accepted: 0  Time Spent (min): 2021 [place], [place]

## 2020-09-02 ENCOUNTER — OFFICE VISIT (OUTPATIENT)
Dept: FAMILY MEDICINE CLINIC | Age: 75
End: 2020-09-02
Payer: MEDICARE

## 2020-09-02 VITALS
HEART RATE: 77 BPM | OXYGEN SATURATION: 97 % | WEIGHT: 157.6 LBS | BODY MASS INDEX: 24.74 KG/M2 | SYSTOLIC BLOOD PRESSURE: 126 MMHG | TEMPERATURE: 97.5 F | DIASTOLIC BLOOD PRESSURE: 78 MMHG | HEIGHT: 67 IN

## 2020-09-02 PROCEDURE — 1090F PRES/ABSN URINE INCON ASSESS: CPT | Performed by: NURSE PRACTITIONER

## 2020-09-02 PROCEDURE — 99214 OFFICE O/P EST MOD 30 MIN: CPT | Performed by: NURSE PRACTITIONER

## 2020-09-02 PROCEDURE — G8427 DOCREV CUR MEDS BY ELIG CLIN: HCPCS | Performed by: NURSE PRACTITIONER

## 2020-09-02 PROCEDURE — G8399 PT W/DXA RESULTS DOCUMENT: HCPCS | Performed by: NURSE PRACTITIONER

## 2020-09-02 PROCEDURE — G8420 CALC BMI NORM PARAMETERS: HCPCS | Performed by: NURSE PRACTITIONER

## 2020-09-02 PROCEDURE — 4040F PNEUMOC VAC/ADMIN/RCVD: CPT | Performed by: NURSE PRACTITIONER

## 2020-09-02 PROCEDURE — 1036F TOBACCO NON-USER: CPT | Performed by: NURSE PRACTITIONER

## 2020-09-02 PROCEDURE — 1123F ACP DISCUSS/DSCN MKR DOCD: CPT | Performed by: NURSE PRACTITIONER

## 2020-09-02 RX ORDER — MENTHOL 40 MG/ML
1 GEL TOPICAL 4 TIMES DAILY PRN
Qty: 1 TUBE | Refills: 3 | Status: CANCELLED | OUTPATIENT
Start: 2020-09-02

## 2020-09-02 RX ORDER — MENTHOL 40 MG/ML
1 GEL TOPICAL 4 TIMES DAILY PRN
Qty: 1 TUBE | Refills: 0 | Status: SHIPPED | OUTPATIENT
Start: 2020-09-02 | End: 2021-04-15

## 2020-09-02 RX ORDER — BACLOFEN 10 MG/1
10 TABLET ORAL NIGHTLY PRN
Qty: 10 TABLET | Refills: 0 | Status: SHIPPED | OUTPATIENT
Start: 2020-09-02 | End: 2020-09-12

## 2020-09-02 RX ORDER — GABAPENTIN 100 MG/1
CAPSULE ORAL
Qty: 42 CAPSULE | Refills: 0 | OUTPATIENT
Start: 2020-09-02 | End: 2020-09-30

## 2020-09-02 ASSESSMENT — PATIENT HEALTH QUESTIONNAIRE - PHQ9
1. LITTLE INTEREST OR PLEASURE IN DOING THINGS: 0
SUM OF ALL RESPONSES TO PHQ QUESTIONS 1-9: 0
SUM OF ALL RESPONSES TO PHQ9 QUESTIONS 1 & 2: 0
SUM OF ALL RESPONSES TO PHQ QUESTIONS 1-9: 0
2. FEELING DOWN, DEPRESSED OR HOPELESS: 0

## 2020-09-02 ASSESSMENT — ENCOUNTER SYMPTOMS
VOMITING: 0
BACK PAIN: 1
NAUSEA: 0
EYES NEGATIVE: 1

## 2020-09-02 NOTE — PROGRESS NOTES
2020     Jessica Villegas (:  1945) is a 67 y.o. female, here for evaluation of the following medical concerns:    Presents with complaint of left shoulder muscular pain. She has not tried anything to help. Does not have limited range of motion or numbness, tingling. No other symptoms other than aching left shoulder/ neck  Symptoms have been present for 2 weeks, unchanged. States she thinks she tweaked it doing laundry. Spoke to Dr. Bety Mayberry about it 2 weeks ago at her video visit and was supposed to start using analgesic cream over-the-counter, but never did. Review of Systems   Constitutional: Negative for activity change, appetite change, chills, diaphoresis, fatigue, fever and unexpected weight change. Eyes: Negative. Negative for visual disturbance. Cardiovascular: Negative for chest pain and palpitations. Gastrointestinal: Negative for nausea and vomiting. Endocrine: Negative. Genitourinary: Negative for decreased urine volume. Musculoskeletal: Positive for arthralgias and back pain. Negative for gait problem. Left shoulder -neck pain   Skin: Negative. Neurological: Negative for dizziness, weakness, light-headedness, numbness and headaches. Psychiatric/Behavioral: Negative for dysphoric mood, sleep disturbance and suicidal ideas. The patient is not nervous/anxious. Prior to Visit Medications    Medication Sig Taking? Authorizing Provider   warfarin (COUMADIN) 3 MG tablet Take 1 tablet by mouth daily Yes Gabino Kimball MD   gabapentin (NEURONTIN) 100 MG capsule Take 2 capsules by mouth nightly for 14 days, THEN 1 capsule nightly for 14 days. Then stop the medication after weaning off.  Yes Gabino Kimball MD   atorvastatin (LIPITOR) 10 MG tablet TAKE 1 TABLET BY MOUTH EVERY DAY Yes Jovany Avila MD   Spacer/Aero-Holding Chambers ABIMAEL 1 Device by Does not apply route daily Yes Elizabeth Byrd MD   albuterol sulfate HFA (PROVENTIL HFA) 108 (90 Base) MCG/ACT inhaler Inhale 2 puffs into the lungs every 6 hours as needed for Wheezing Yes Jazmin East MD   levothyroxine (SYNTHROID) 50 MCG tablet TAKE 1 TABLET BY MOUTH EVERY DAY Yes Albino Alicea MD   quinapril (ACCUPRIL) 10 MG tablet TAKE 1 TABLET BY MOUTH ONCE DAILY AT NIGHT Yes Albino Alicea MD   metoprolol succinate (TOPROL XL) 50 MG extended release tablet Take 1.5 tablets by mouth daily Yes Anum Roy MD   diltiazem (CARDIZEM CD) 180 MG extended release capsule Take 1 capsule by mouth daily Yes Anum Roy MD   Fexofenadine HCl (ALLEGRA ALLERGY PO) Take by mouth Yes Historical Provider, MD   Dexlansoprazole (DEXILANT) 60 MG CPDR Take 1 tablet by mouth daily. Yes Historical Provider, MD        Social History     Tobacco Use    Smoking status: Never Smoker    Smokeless tobacco: Never Used    Tobacco comment: reviewed 11/4/15   Substance Use Topics    Alcohol use: No        Vitals:    09/02/20 1417   BP: 126/78   Site: Right Upper Arm   Position: Sitting   Cuff Size: Medium Adult   Pulse: 77   Temp: 97.5 °F (36.4 °C)   SpO2: 97%   Weight: 157 lb 9.6 oz (71.5 kg)   Height: 5' 7\" (1.702 m)     Estimated body mass index is 24.68 kg/m² as calculated from the following:    Height as of this encounter: 5' 7\" (1.702 m). Weight as of this encounter: 157 lb 9.6 oz (71.5 kg). Physical Exam  Vitals signs reviewed. Constitutional:       General: She is not in acute distress. Appearance: Normal appearance. She is normal weight. She is not ill-appearing, toxic-appearing or diaphoretic. HENT:      Head: Normocephalic and atraumatic. Nose: Nose normal.   Eyes:      Extraocular Movements: Extraocular movements intact. Pupils: Pupils are equal, round, and reactive to light. Neck:      Musculoskeletal: Normal range of motion. Pulmonary:      Effort: Pulmonary effort is normal.   Musculoskeletal: Normal range of motion. General: Tenderness (Tenderness with pressure applied to left shoulder) present. No swelling or signs of injury. Left shoulder: She exhibits tenderness. She exhibits normal range of motion, no bony tenderness, no swelling, no effusion, no crepitus, no deformity and normal strength. Arms:    Skin:     Coloration: Skin is not pale. Neurological:      General: No focal deficit present. Mental Status: She is alert and oriented to person, place, and time. Mental status is at baseline. Psychiatric:         Mood and Affect: Mood normal.         Behavior: Behavior normal.         Thought Content: Thought content normal.         Judgment: Judgment normal.         ASSESSMENT/PLAN:  1. Muscle spasm of left shoulder area  Advised heat for 20 minutes 4 times per day as needed. Range of motion exercises and stretching after using heat. Tylenol or ibuprofen for pain as needed. Patient will stop gabapentin for good since she has been weaning down. Start baclofen as needed for muscle spasm. Advised of drowsiness effects. Advised to take precaution for falls. Ordered over-the-counter pain cream, Biofreeze for patient to use as needed. Follow-up if not resolved. Present to the ER for any emergent or acute symptoms not managed at home or in office. An electronic signature was used to authenticate this note.     --ANTHONY Browne NP on 9/2/2020 at 4:15 PM

## 2020-09-02 NOTE — Clinical Note
Will you please call the patient and let her know Dr. Timoteo Jiang said it is okay to stop gabapentin now. I am going to order a muscle relaxer called baclofen for her to take at bedtime as needed for her muscle spasms. I also ordered the Biofreeze gel that she can put on as needed.

## 2020-09-02 NOTE — TELEPHONE ENCOUNTER
----- Message from ANTHONY Babcock NP sent at 9/2/2020  4:42 PM EDT -----  Will you please call the patient and let her know Dr. Jg Leach said it is okay to stop gabapentin now. I am going to order a muscle relaxer called baclofen for her to take at bedtime as needed for her muscle spasms. I also ordered the Biofreeze gel that she can put on as needed.

## 2020-09-02 NOTE — PATIENT INSTRUCTIONS
Use heat to painful area 4 times per day. 20 minutes each. Make sure there is a barrier/ cloth between skin and heat  Stretch neck and arms after using heat. Apply cream as directed for pain/ muscle tension. If not improved after four days, call the office.

## 2020-09-22 ENCOUNTER — VIRTUAL VISIT (OUTPATIENT)
Dept: FAMILY MEDICINE CLINIC | Age: 75
End: 2020-09-22
Payer: MEDICARE

## 2020-09-22 PROCEDURE — 99442 PR PHYS/QHP TELEPHONE EVALUATION 11-20 MIN: CPT | Performed by: FAMILY MEDICINE

## 2020-09-22 RX ORDER — DILTIAZEM HYDROCHLORIDE 180 MG/1
180 CAPSULE, COATED, EXTENDED RELEASE ORAL DAILY
Qty: 90 CAPSULE | Refills: 3 | Status: SHIPPED | OUTPATIENT
Start: 2020-09-22 | End: 2021-09-21

## 2020-09-22 NOTE — PROGRESS NOTES
James Reyes is a 67 y.o. female evaluated via telephone on 9/22/2020. Consent:  She and/or health care decision maker is aware that that she may receive a bill for this telephone service, depending on her insurance coverage, and has provided verbal consent to proceed: Yes      Documentation:  I communicated with the patient and/or health care decision maker about     1. Feeling shaky and having insomnia. Tried to wean off gabapentin   Has been off the gabapentin for the last 7 days ago. Has worsening insomnia and having to get up in the middle of night as she has difficulty \"turning her mind off\"  Denies any suicidial or homicidal ideation or hallucinations. Denies any worsening palpitations, Denies any fever, chills, unintentional weight loss or night sweats. No diarrhea. Compliant with thyroid medication      Had also taken baclofen for muscle spasms. Filled on 9/2/2020 9/20 : /83, HR99  Still taking her other home meds      Details of this discussion including any medical advice provided:     1. Stay off gabapentin for now. No longer taking baclofen  2. bp stable as reported from home  3. Melatonin 1-2mg nightly for next 2 weeks and keep tabs on home bp. Diagnosis Orders   1. Jittery feeling     2. PAF (paroxysmal atrial fibrillation) ([facility] [address]     3. Insomnia, unspecified type           I affirm this is a Patient Initiated Episode with a Patient who has not had a related appointment within my department in the past 7 days or scheduled within the next 24 hours.     Patient identification was verified at the start of the visit: Yes    Total Time: minutes: 11-20 minutes    Note: not billable if this call serves to triage the patient into an appointment for the relevant concern      Agustina Boyer

## 2020-09-24 ENCOUNTER — HOSPITAL ENCOUNTER (EMERGENCY)
Age: 75
Discharge: HOME | End: 2020-09-24
Attending: EMERGENCY MEDICINE
Payer: MEDICARE

## 2020-09-24 ENCOUNTER — APPOINTMENT (OUTPATIENT)
Dept: CT IMAGING | Age: 75
End: 2020-09-24
Payer: MEDICARE

## 2020-09-24 VITALS
SYSTOLIC BLOOD PRESSURE: 132 MMHG | OXYGEN SATURATION: 98 % | HEART RATE: 92 BPM | TEMPERATURE: 98.4 F | BODY MASS INDEX: 24.33 KG/M2 | RESPIRATION RATE: 16 BRPM | DIASTOLIC BLOOD PRESSURE: 87 MMHG | HEIGHT: 67 IN | WEIGHT: 155 LBS

## 2020-09-24 LAB
ANION GAP SERPL CALCULATED.3IONS-SCNC: 13 MMOL/L (ref 4–16)
BACTERIA: NEGATIVE /HPF
BASOPHILS ABSOLUTE: 0 K/CU MM
BASOPHILS RELATIVE PERCENT: 0.5 % (ref 0–1)
BILIRUBIN URINE: NEGATIVE MG/DL
BLOOD, URINE: NEGATIVE
BUN BLDV-MCNC: 18 MG/DL (ref 6–23)
CALCIUM SERPL-MCNC: 9.4 MG/DL (ref 8.3–10.6)
CHLORIDE BLD-SCNC: 101 MMOL/L (ref 99–110)
CLARITY: CLEAR
CO2: 24 MMOL/L (ref 21–32)
COLOR: ABNORMAL
CREAT SERPL-MCNC: 1 MG/DL (ref 0.6–1.1)
DIFFERENTIAL TYPE: ABNORMAL
EOSINOPHILS ABSOLUTE: 0.1 K/CU MM
EOSINOPHILS RELATIVE PERCENT: 1.1 % (ref 0–3)
GFR AFRICAN AMERICAN: >60 ML/MIN/1.73M2
GFR NON-AFRICAN AMERICAN: 54 ML/MIN/1.73M2
GLUCOSE BLD-MCNC: 94 MG/DL (ref 70–99)
GLUCOSE, URINE: NEGATIVE MG/DL
HCT VFR BLD CALC: 45.5 % (ref 37–47)
HEMOGLOBIN: 15.2 GM/DL (ref 12.5–16)
IMMATURE NEUTROPHIL %: 0.2 % (ref 0–0.43)
INR BLD: 2.85 INDEX
KETONES, URINE: NEGATIVE MG/DL
LEUKOCYTE ESTERASE, URINE: ABNORMAL
LYMPHOCYTES ABSOLUTE: 2.5 K/CU MM
LYMPHOCYTES RELATIVE PERCENT: 40.4 % (ref 24–44)
MCH RBC QN AUTO: 30 PG (ref 27–31)
MCHC RBC AUTO-ENTMCNC: 33.4 % (ref 32–36)
MCV RBC AUTO: 89.7 FL (ref 78–100)
MONOCYTES ABSOLUTE: 0.7 K/CU MM
MONOCYTES RELATIVE PERCENT: 11.7 % (ref 0–4)
MUCUS: ABNORMAL HPF
NITRITE URINE, QUANTITATIVE: NEGATIVE
NUCLEATED RBC %: 0 %
PDW BLD-RTO: 13.3 % (ref 11.7–14.9)
PH, URINE: 6 (ref 5–8)
PLATELET # BLD: 135 K/CU MM (ref 140–440)
PMV BLD AUTO: 10.8 FL (ref 7.5–11.1)
POTASSIUM SERPL-SCNC: 3.6 MMOL/L (ref 3.5–5.1)
PROTEIN UA: NEGATIVE MG/DL
PROTHROMBIN TIME: 34.9 SECONDS (ref 11.7–14.5)
RBC # BLD: 5.07 M/CU MM (ref 4.2–5.4)
RBC URINE: <1 /HPF (ref 0–6)
RENAL EPITHELIAL, UA: <1 /HPF
SEGMENTED NEUTROPHILS ABSOLUTE COUNT: 2.9 K/CU MM
SEGMENTED NEUTROPHILS RELATIVE PERCENT: 46.1 % (ref 36–66)
SODIUM BLD-SCNC: 138 MMOL/L (ref 135–145)
SPECIFIC GRAVITY UA: 1 (ref 1–1.03)
SQUAMOUS EPITHELIAL: 1 /HPF
TOTAL IMMATURE NEUTOROPHIL: 0.01 K/CU MM
TOTAL NUCLEATED RBC: 0 K/CU MM
TOTAL RETICULOCYTE COUNT: 0.07 K/CU MM
TRICHOMONAS: ABNORMAL /HPF
UROBILINOGEN, URINE: NORMAL MG/DL (ref 0.2–1)
WBC # BLD: 6.2 K/CU MM (ref 4–10.5)
WBC UA: 9 /HPF (ref 0–5)

## 2020-09-24 PROCEDURE — 85610 PROTHROMBIN TIME: CPT

## 2020-09-24 PROCEDURE — 99284 EMERGENCY DEPT VISIT MOD MDM: CPT

## 2020-09-24 PROCEDURE — 85025 COMPLETE CBC W/AUTO DIFF WBC: CPT

## 2020-09-24 PROCEDURE — 72125 CT NECK SPINE W/O DYE: CPT

## 2020-09-24 PROCEDURE — 70450 CT HEAD/BRAIN W/O DYE: CPT

## 2020-09-24 PROCEDURE — 81001 URINALYSIS AUTO W/SCOPE: CPT

## 2020-09-24 PROCEDURE — 80048 BASIC METABOLIC PNL TOTAL CA: CPT

## 2020-09-24 RX ORDER — ACETAMINOPHEN 325 MG/1
650 TABLET ORAL ONCE
Status: DISCONTINUED | OUTPATIENT
Start: 2020-09-24 | End: 2020-09-24 | Stop reason: HOSPADM

## 2020-09-24 ASSESSMENT — PAIN SCALES - GENERAL: PAINLEVEL_OUTOF10: 0

## 2020-09-24 NOTE — ED NOTES
Bed: ED-15  Expected date:   Expected time:   Means of arrival:   Comments:  Left [address], RN  09/24/20 9736

## 2020-09-24 NOTE — ED PROVIDER NOTES
Emergency Department Encounter    Patient: Lucila Hickman  MRN: [id number]  : 1945  Date of Evaluation: 2020  ED Provider:  Kaci Reed    Triage Chief Complaint:   Fall and Head Injury    Lime:  Lucila Hickman is a 67 y.o. female that presents after fall, head injury. She was trying to take a picture of some flowers outside, was walking backward and stepped off a cement step. Jamir Barer back and struck her head, was having difficulty getting up and calling for help, was able to get her knees underneath her and walk back in the house. Did not pass out. She is on blood thinners, Coumadin. Has a headache at the back of her head, otherwise no pain. No neck pain. Able to move her neck without any pain at all, no back pain. No hip pain. No difficulty breathing. No abdominal pain. No vision changes. No weakness or numbness. Can move all extremities. Walked inside and got her , they came straight here.     ROS - see HPI, below listed is current ROS at time of my eval:  10 systems reviewed and negative except as above    Past Medical History:   Diagnosis Date    Abnormal echocardiogram     EF 60%, mild aortic indsufficiency, mild pulmonary hypertension    Anemia     Aortic insufficiency     mild per echo 2010    Atrial fibrillation (HCC)     failed propafenone, Multaq and flecainide - 2011 plan for AFib ablation - [facility]- Dr Frank Prasad Atrial flutter ([facility] [address]     Bursitis, trochanteric     s/p injection    Cerumen impaction 2012    Diverticulosis     Dr. Sheng BANKS scope    Diverticulosis of colon 2010    Colonoscopy-Dr Reece ACUÑA (degenerative joint disease), cervical     cervical, generalized disc buldge   MRI 3/02    DVT (deep venous thrombosis) ([facility] [address]     Dyslipidemia     Environmental allergies     Family history of colon cancer     mother    Gastric polyp     2006, 2009 benign- Dr Lore Ragsdale Gastritis 2008    mild superficial per  Reece    GERD (gastroesophageal reflux disease) 8/2006    Dr Arelis Lucero H/O cardiovascular stress test 07/13, 4/3/13    07/13 EF58%, No scintigraphic evidence of inducible myocardial ischemia 04/13Normal study. No wall motion abnormality seen. EF 65%    H/O echocardiogram 7/18/13 7/13-EF 50-55% Mild AR.  H/O exercise stress test 02/07/2017    EF63% normal    History of Holter monitoring 9/23/15    48 hour - abnormal holter revealing afib throughout the recording with interspersed pacemaker beats, HR does not appear to be well controlled    History of mammogram     last mammo 7/25/11, Dr. Gomez Gonsales yearly    Hx of colonoscopy     1/21/2010    Hyperlipidemia     Hypertension     Hypothyroidism     Internal hemorrhoid 2015    Dr. Tre BANKS scope     Intervertebral disc protrusion 5/09    wry neck with C4-5      Left shoulder pain 4/04    (L) shoulder impingement  mild DJD    Long term current use of anticoagulant 07/26/2016    **Coumadin Clinic follows PT/INRs, along w/prescribing pt's Coumadin dosages. Electa Puls Menstruation     age 11    Pacemaker 343406    dual chamber- checked every 3 months    Pulmonary hypertension ([address]     mild per echo 8/24/2010, Pt refused sleep study (June 2012)    Restless legs 8/03    ferritin    Right shoulder strain 2/03    no seperation, bony contusion anterior humeral head  MRI 3/03    Sciatica 7/09    (R) chronic intermittent s/p epidural injections  Dr Butch Mccauley Sciatica     Shoulder pain, left March 2011    RTC tendinopathy, type II acrominum, bursitis- referred to Dr. Mac Morel Shoulder pain, right 3/10, 9/02    impingement, physical thearpy   (Main)  calcific bursitis  s/p injection    Sinus bradycardia     Sinusitis 12/12/2011    Tinnitus 3/02    Vision changes 3/19/2013     Past Surgical History:   Procedure Laterality Date    ABLATION OF DYSRHYTHMIC FOCUS      BREAST SURGERY  1985    Right breast tumor excised    CARDIOVERSION      1/2008 Dr Deatrice Oppenheim; 12/2008    CARD[facility]10/2014    [facility]    CHOLECYSTECTOMY, LAPAROSCOPIC  2/01    Dr Trever Garces    COLONOSCOPY      [id number] (Dr Schuster Prim)   [address]    HYSTERECTOMY  2003    PACEMAKER PLACEMENT      6/23/2012   [place] SINUS SURGERY  77    Dr Nails Delay NANCY [facility]  4/03    fibroid      Dr Lilo Jameson  8/06    Dr Radha Pulido ECHOCARDIOGRAM  6/2012    transthoracic cardioversion-Dr. Madison Gonzalez    UPPER GASTROINTESTINAL ENDOSCOPY  4/08    mild superficial gastritis  Dr Mariely Slater; 2009     Family History   Problem Relation Age of Onset    Stroke Mother     Heart Disease Mother     Coronary Art Dis Mother 66        CABG    Colon Cancer Mother [de-identified]        colon     Heart Disease Father     Coronary Art Dis Father 62        CABG    Migraines Sister     Seizures Brother      Social History     Socioeconomic History    Marital status:      Spouse name: Not on file    Number of children: Not on file    Years of education: Not on file    Highest education level: Not on file   Occupational History    Not on file   Social Needs    Financial resource strain: Not on file    Food insecurity     Worry: Not on file     Inability: Not on file    Transportation needs     Medical: Not on file     Non-medical: Not on file   Tobacco Use    Smoking status: Never Smoker    Smokeless tobacco: Never Used    Tobacco comment: reviewed 11/4/15   Substance and Sexual Activity    Alcohol use: No    Drug use: No    Sexual activity: Yes     Partners: Male     Comment:    Lifestyle    Physical activity     Days per week: Not on file     Minutes per session: Not on file    Stress: Not on file   Relationships    Social connections     Talks on phone: Not on file     Gets together: Not on file     Attends Worship service: Not on file     Active member of club or organization: Not on file     Attends meetings of clubs or organizations: Not on file     Relationship status: Not on file    Intimate partner violence     Fear of current or ex partner: Not on file     Emotionally abused: Not on file     Physically abused: Not on file     Forced sexual activity: Not on file   Other Topics Concern    Not on file   Social History Narrative    Not on file     Current Facility-Administered Medications   Medication Dose Route Frequency Provider Last Rate Last Dose    acetaminophen (TYLENOL) tablet 650 mg  650 mg Oral Once Efra Snell MD   Stopped at 09/24/20 7759     Current Outpatient Medications   Medication Sig Dispense Refill    dilTIAZem (CARDIZEM CD) 180 MG extended release capsule Take 1 capsule by mouth daily 90 capsule 3    Menthol, Topical Analgesic, (BIOFREEZE ROLL-ON) 4 % GEL Apply 1 Dose topically 4 times daily as needed (muscle pain) 1 Tube 0    warfarin (COUMADIN) 3 MG tablet Take 1 tablet by mouth daily 90 tablet 3    atorvastatin (LIPITOR) 10 MG tablet TAKE 1 TABLET BY MOUTH EVERY DAY 90 tablet 3    Spacer/Aero-Holding Chambers ABIMAEL 1 Device by Does not apply route daily 1 Device 0    albuterol sulfate HFA (PROVENTIL HFA) 108 (90 Base) MCG/ACT inhaler Inhale 2 puffs into the lungs every 6 hours as needed for Wheezing 1 Inhaler 3    levothyroxine (SYNTHROID) 50 MCG tablet TAKE 1 TABLET BY MOUTH EVERY DAY 90 tablet 3    quinapril (ACCUPRIL) 10 MG tablet TAKE 1 TABLET BY MOUTH ONCE DAILY AT NIGHT 90 tablet 3    metoprolol succinate (TOPROL XL) 50 MG extended release tablet Take 1.5 tablets by mouth daily 135 tablet 3    Fexofenadine HCl (ALLEGRA ALLERGY PO) Take by mouth      Dexlansoprazole (DEXILANT) 60 MG CPDR Take 1 tablet by mouth daily.        Allergies   Allergen Reactions    Latex Hives    Darvon [Propoxyphene Hcl] Shortness Of Breath    Demerol Rash     Breathing problems    Dilaudid [Hydromorphone Hcl] Anaphylaxis    Valium Rash     Breathing problems    Celecoxib Diarrhea    Norco [Hydrocodone-Acetaminophen] 89.7 78 - 100 FL    MCH 30.0 27 - 31 PG    MCHC 33.4 32.0 - 36.0 %    RDW 13.3 11.7 - 14.9 %    Platelets 773 (L) 073 - 440 K/CU MM    MPV 10.8 7.5 - 11.1 FL    Differential Type AUTOMATED DIFFERENTIAL     Segs Relative 46.1 36 - 66 %    Lymphocytes % 40.4 24 - 44 %    Monocytes % 11.7 (H) 0 - 4 %    Eosinophils % 1.1 0 - 3 %    Basophils % 0.5 0 - 1 %    Segs Absolute 2.9 K/CU MM    Lymphocytes Absolute 2.5 K/CU MM    Monocytes Absolute 0.7 K/CU MM    Eosinophils Absolute 0.1 K/CU MM    Basophils Absolute 0.0 K/CU MM    Nucleated RBC % 0.0 %    Total Nucleated RBC 0.0 K/CU MM    TRC 0.0674 K/CU MM    Total Immature Neutrophil 0.01 K/CU MM    Immature Neutrophil % 0.2 0 - 0.43 %   BMP   Result Value Ref Range    Sodium 138 135 - 145 MMOL/L    Potassium 3.6 3.5 - 5.1 MMOL/L    Chloride 101 99 - 110 mMol/L    CO2 24 21 - 32 MMOL/L    Anion Gap 13 4 - 16    BUN 18 6 - 23 MG/DL    CREATININE 1.0 0.6 - 1.1 MG/DL    Glucose 94 70 - 99 MG/DL    Calcium 9.4 8.3 - 10.6 MG/DL    GFR Non- 54 (L) >60 mL/min/1.73m2    GFR African American >60 >60 mL/min/1.73m2   Urinalysis   Result Value Ref Range    Color, UA STRAW (A) YELLOW    Clarity, UA CLEAR CLEAR    Glucose, Urine NEGATIVE NEGATIVE MG/DL    Bilirubin Urine NEGATIVE NEGATIVE MG/DL    Ketones, Urine NEGATIVE NEGATIVE MG/DL    Specific Gravity, UA 1.005 1.001 - 1.035    Blood, Urine NEGATIVE NEGATIVE    pH, Urine 6.0 5.0 - 8.0    Protein, UA NEGATIVE NEGATIVE MG/DL    Urobilinogen, Urine NORMAL 0.2 - 1.0 MG/DL    Nitrite Urine, Quantitative NEGATIVE NEGATIVE    Leukocyte Esterase, Urine MODERATE (A) NEGATIVE    RBC, UA <1 0 - 6 /HPF    WBC, UA 9 (H) 0 - 5 /HPF    Bacteria, UA NEGATIVE NEGATIVE /HPF    Squam Epithel, UA 1 /HPF    Renal Epithelial, UA <1 /HPF    Mucus, UA RARE (A) NEGATIVE HPF    Trichomonas, UA NONE SEEN NONE SEEN /HPF      Radiographs (if obtained):  Radiologist's Report Reviewed:  No results found.     EKG (if obtained): (All EKG's are interpreted by myself in the absence of a cardiologist)      MDM:  65-year-old female presents with concern for fall, struck the back of her head, she is on blood thinners, is in no acute distress and neuro exam is intact. She went for head CT and cervical spine CT which are negative. I did check her INR which is 2.8. White blood cell count is normal, urinalysis negative for bacteria. BMP is unremarkable. She and  are comfortable going home and would like to go home at this time. Given return precautions regarding head injury. She was able to ambulate to the bathroom without issue, did not feel dizzy or lightheaded. She will be discharged in stable condition    Clinical Impression:  1. Fall, initial encounter    2. Closed head injury, initial encounter      Disposition referral (if applicable):  Reva Rosado MD  [address] [facility]  [phone]          Disposition medications (if applicable):  New Prescriptions    No medications on file     ED Provider Disposition Time  DISPOSITION Decision To Discharge 09/24/2020 07:56:36 PM      Comment: Please note this report has been produced using speech recognition software and may contain errors related to that system including errors in grammar, punctuation, and spelling, as well as words and phrases that may be inappropriate. Efforts were made to edit the dictations.         Hosea Leiva MD  09/24/20 1958

## 2020-09-24 NOTE — ED NOTES
Ambulated to bathroom independently, steady gait. Urine specimen collected and sent.      Baljeet Carrillo RN  09/24/20 0960

## 2020-09-24 NOTE — ED NOTES
Pt encouraged to give urine sample. States unable to at this time. Pt states cold, denies pain. Given warm blankets.        Kallie Lea RN  09/24/20 9673

## 2020-10-06 ENCOUNTER — VIRTUAL VISIT (OUTPATIENT)
Dept: FAMILY MEDICINE CLINIC | Age: 75
End: 2020-10-06
Payer: MEDICARE

## 2020-10-06 PROCEDURE — 99442 PR PHYS/QHP TELEPHONE EVALUATION 11-20 MIN: CPT | Performed by: FAMILY MEDICINE

## 2020-10-06 NOTE — PROGRESS NOTES
Kody Hand is a 67 y.o. female evaluated via telephone on 10/6/2020. Consent:  She and/or health care decision maker is aware that that she may receive a bill for this telephone service, depending on her insurance coverage, and has provided verbal consent to proceed: Yes      Documentation:  I communicated with the patient and/or health care decision maker about     Coral Quintana 2 weeks ago with [facility] without LOC. Went to ER Ct head unremarkable. 1. Right leg pain between knee and thigh along quadriceps. No erythema or bruising but has mild swelling. Tylenol did help sleep due to pain rolling on that side while on bed. Reports some broken blood vessles that is mild TTP      2. The patient denies any symptoms of neurological impairment or TIA's; no amaurosis, diplopia, dysphasia, or unilateral disturbance of motor or sensory function. No loss of balance or vertigo. 3. Melatonin 6mg has helped for insomnia    4.  bp. 97/74 and  this morning        Details of this discussion including any medical advice provided:   Diagnosis Orders   1. Right leg pain     2. Low blood pressure reading         1. Patient to stop temporarily quinapril and only take if blood pressure 110-120/70.     2.  Patient to use warm compress on right leg or cool compress with rest  As needed for right leg. Pt to notify if worsen. I affirm this is a Patient Initiated Episode with a Patient who has not had a related appointment within my department in the past 7 days or scheduled within the next 24 hours.     Patient identification was verified at the start of the visit: Yes    Total Time: minutes: 11-20 minutes    Note: not billable if this call serves to triage the patient into an appointment for the relevant concern      [facility]

## 2020-10-29 ENCOUNTER — ANTI-COAG VISIT (OUTPATIENT)
Dept: PHARMACY | Age: 75
End: 2020-10-29
Payer: MEDICARE

## 2020-10-29 VITALS — TEMPERATURE: 96.6 F

## 2020-10-29 LAB
INTERNATIONAL NORMALIZATION RATIO, POC: 3.6
POC INR: 3.6 INDEX
PROTHROMBIN TIME, POC: 43.1 SECONDS (ref 10–14.3)

## 2020-10-29 PROCEDURE — 85610 PROTHROMBIN TIME: CPT

## 2020-10-29 PROCEDURE — 99212 OFFICE O/P EST SF 10 MIN: CPT

## 2020-10-29 PROCEDURE — 36416 COLLJ CAPILLARY BLOOD SPEC: CPT

## 2020-10-29 RX ORDER — LANOLIN ALCOHOL/MO/W.PET/CERES
6 CREAM (GRAM) TOPICAL NIGHTLY PRN
COMMUNITY
End: 2021-04-15

## 2020-10-29 NOTE — PROGRESS NOTES
Medication Management Service  [facility]  [phone]    Visit Date: 10/29/2020   Subjective:       Jefferson Rodrigues is a 67 y.o. female who presents to clinic today for anticoagulation monitoring and adjustment. Patient seen in clinic for warfarin management due to  Indication:   atrial fibrillation. INR goal: of 2.0-3.0. Duration of therapy: indefinite. Patient reports the following:   Adherent with regimen  Missed or extra doses:  None   Bleeding or thromboembolic side effects:  None  Significant medication changes:  melatonin  Significant dietary changes: eating less overall  Significant alcohol or tobacco changes: None  Significant recent illness, disease state changes, or hospitalization:  ER for fall 9/24  Upcoming surgeries or procedures:  None  Falls: 9/24 and went to ER           Assessment and Plan     PT/INR done in office per protocol. INR today is 3.6, supratherapeutic. Patient not walking as much, eating less, and is taking melatonin now, which can increase INR. Patient more confused today than at previous visits. Patient reports she often has trouble finishing her sentences because she can't remember what she was going to say. Advised patient to talk to Dr. Susan Rogers about this. Also offered that patient's daughter may come with her to next appointment here. Plan:  Decrease weekly dose by ~7% to warfarin 3mg daily except 1.5mg on Thursdays. Recheck INR in 2 week(s). Patient verbalized understanding of dosing directions and information discussed. Dosing schedule given to patient including phone number, appointment date, and time. Progress note sent to referring office. Patient acknowledges working in consult agreement with pharmacist as referred by his/her physician.       Electronically signed by Jenae Orr, [facility] on 10/29/20 at 10:22 AM EDT    CLINICAL PHARMACY CONSULT: MED RECONCILIATION/REVIEW [id number] Only    PHSO: No  Total # of Interventions Recommended: 1  - Decreased Dose #: 1  - Maintenance Safety Lab Monitoring #: 1    Total Interventions Accepted: 1  Time Spent (min): 30    Edwina GannD

## 2020-11-12 ENCOUNTER — ANTI-COAG VISIT (OUTPATIENT)
Dept: PHARMACY | Age: 75
End: 2020-11-12
Payer: MEDICARE

## 2020-11-12 VITALS — TEMPERATURE: 96.7 F

## 2020-11-12 LAB
INTERNATIONAL NORMALIZATION RATIO, POC: 2.1
POC INR: 2.1 INDEX
PROTHROMBIN TIME, POC: 25.4 SECONDS (ref 10–14.3)

## 2020-11-12 PROCEDURE — 36416 COLLJ CAPILLARY BLOOD SPEC: CPT

## 2020-11-12 PROCEDURE — 99211 OFF/OP EST MAY X REQ PHY/QHP: CPT

## 2020-11-12 PROCEDURE — 85610 PROTHROMBIN TIME: CPT

## 2020-11-12 RX ORDER — WARFARIN SODIUM 3 MG/1
3 TABLET ORAL DAILY
Qty: 90 TABLET | Refills: 3 | Status: SHIPPED
Start: 2020-11-12 | End: 2021-01-15 | Stop reason: SDUPTHER

## 2020-11-12 NOTE — PATIENT INSTRUCTIONS
1. Call when arrive at next visit [phone]  2.  Call with any medication changes or if walking increases

## 2020-11-12 NOTE — PROGRESS NOTES
Medication Management Service  [facility]  [phone]    Visit Date: 11/12/2020   Subjective:       Yesenia Valdez is a 67 y.o. female who presents to clinic today for anticoagulation monitoring and adjustment. Patient seen in clinic for warfarin management due to  Indication:   atrial fibrillation. INR goal: of 2.0-3.0. Duration of therapy: indefinite. Patient reports the following:   Adherent with regimen  Missed or extra doses:  None   Bleeding or thromboembolic side effects:  None  Significant medication changes:  None  Significant dietary changes: None  Significant alcohol or tobacco changes: None  Significant recent illness, disease state changes, or hospitalization:  None  Upcoming surgeries or procedures:  None  Falls: None           Assessment and Plan     PT/INR done in office per protocol. INR today is 2.1, therapeutic. Plan: Will continue current regimen of warfarin 3mg daily except 1.5mg on Thursdays. Recheck INR in 4 week(s). Patient verbalized understanding of dosing directions and information discussed. Dosing schedule given to patient including phone number, appointment date, and time. Progress note sent to referring office. Patient acknowledges working in consult agreement with pharmacist as referred by his/her physician.       Electronically signed by Eliud Madera, [address] [place] [address] on 11/12/20 at 12:19 PM EST    CLINICAL PHARMACY CONSULT: [facility] Only    PHSO: No  Total # of Interventions Recommended: 0    - Maintenance Safety Lab Monitoring #: 1     Total Interventions Accepted: 0  Time Spent (min): 15    Eliud Madera, PharmD

## 2020-11-23 ENCOUNTER — PROCEDURE VISIT (OUTPATIENT)
Dept: CARDIOLOGY CLINIC | Age: 75
End: 2020-11-23
Payer: MEDICARE

## 2020-11-23 PROCEDURE — 93296 REM INTERROG EVL PM/IDS: CPT | Performed by: INTERNAL MEDICINE

## 2020-11-23 PROCEDURE — 93294 REM INTERROG EVL PM/LDLS PM: CPT | Performed by: INTERNAL MEDICINE

## 2020-11-30 ENCOUNTER — TELEPHONE (OUTPATIENT)
Dept: PHARMACY | Age: 75
End: 2020-11-30

## 2020-11-30 RX ORDER — VITAMIN B COMPLEX
1000 TABLET ORAL DAILY
COMMUNITY

## 2020-11-30 RX ORDER — IBUPROFEN 200 MG
2 CAPSULE ORAL DAILY
COMMUNITY
End: 2021-04-15

## 2020-11-30 NOTE — TELEPHONE ENCOUNTER
Patient left voicemail that she is going to start Calcium 1000 mg daily and vitamin D 1000units daily. Returned call and advised both are ok to take with warfarin.    CLINICAL PHARMACY CONSULT: MED RECONCILIATION/REVIEW ADDENDUM    For Pharmacy Admin Tracking Only    PHSO: No  Total # of Interventions Recommended: 0    Total Interventions Accepted: 0  Time Spent (min): 5    [employer], PharmD

## 2020-12-04 ENCOUNTER — TELEPHONE (OUTPATIENT)
Dept: FAMILY MEDICINE CLINIC | Age: 75
End: 2020-12-04

## 2020-12-08 RX ORDER — METOPROLOL SUCCINATE 50 MG/1
75 TABLET, EXTENDED RELEASE ORAL DAILY
Qty: 135 TABLET | Refills: 3 | Status: SHIPPED | OUTPATIENT
Start: 2020-12-08 | End: 2021-08-10 | Stop reason: SDUPTHER

## 2020-12-10 ENCOUNTER — ANTI-COAG VISIT (OUTPATIENT)
Dept: PHARMACY | Age: 75
End: 2020-12-10
Payer: MEDICARE

## 2020-12-10 VITALS — TEMPERATURE: 96.8 F

## 2020-12-10 LAB
INTERNATIONAL NORMALIZATION RATIO, POC: 2.5
POC INR: 2.5 INDEX
PROTHROMBIN TIME, POC: 29.6 SECONDS (ref 10–14.3)

## 2020-12-10 PROCEDURE — 85610 PROTHROMBIN TIME: CPT

## 2020-12-10 PROCEDURE — 99211 OFF/OP EST MAY X REQ PHY/QHP: CPT

## 2020-12-10 PROCEDURE — 36416 COLLJ CAPILLARY BLOOD SPEC: CPT

## 2020-12-10 NOTE — PROGRESS NOTES
Medication Management Service  [facility]  [phone]    Visit Date: 12/10/2020   Subjective:       Smitha Shen is a 67 y.o. female who presents to clinic today for anticoagulation monitoring and adjustment. Patient seen in clinic for warfarin management due to  Indication:   atrial fibrillation. INR goal: of 2.0-3.0. Duration of therapy: indefinite. Patient reports the following:   Adherent with regimen  Missed or extra doses:  None   Bleeding or thromboembolic side effects:  None  Significant medication changes:  None  Significant dietary changes: None  Significant alcohol or tobacco changes: None  Significant recent illness, disease state changes, or hospitalization:  None  Upcoming surgeries or procedures:  None  Falls: None           Assessment and Plan     PT/INR done in office per protocol. INR today is 2.5, therapeutic. Plan: Will continue current regimen of warfarin 3mg daily except 1.5mg on thursdays. Recheck INR in 4 week(s). Patient verbalized understanding of dosing directions and information discussed. Dosing schedule given to patient including phone number, appointment date, and time. Progress note sent to referring office. Patient acknowledges working in consult agreement with pharmacist as referred by his/her physician.       Electronically signed by Benny Browning, [address] [place] [address] on 12/10/20 at 9:32 AM EST    CLINICAL PHARMACY CONSULT: MED RECONCILIATION/REVIEW Rodrigo  22. Tracking Only    PHSO: No  Total # of Interventions Recommended: 0    - Maintenance Safety Lab Monitoring #: 1     Total Interventions Accepted: 0  Time Spent (min): 15    Benny Browning PharmD

## 2021-01-07 ENCOUNTER — ANTI-COAG VISIT (OUTPATIENT)
Dept: PHARMACY | Age: 76
End: 2021-01-07
Payer: MEDICARE

## 2021-01-07 VITALS — TEMPERATURE: 96.8 F

## 2021-01-07 DIAGNOSIS — I48.0 PAF (PAROXYSMAL ATRIAL FIBRILLATION) (HCC): ICD-10-CM

## 2021-01-07 LAB
INTERNATIONAL NORMALIZATION RATIO, POC: 2.6
POC INR: 2.6 INDEX
PROTHROMBIN TIME, POC: 31.2 SECONDS (ref 10–14.3)

## 2021-01-07 PROCEDURE — 85610 PROTHROMBIN TIME: CPT

## 2021-01-07 PROCEDURE — 99211 OFF/OP EST MAY X REQ PHY/QHP: CPT

## 2021-01-07 PROCEDURE — 36416 COLLJ CAPILLARY BLOOD SPEC: CPT

## 2021-01-07 NOTE — PROGRESS NOTES
Medication Management Service  [facility]  [phone]    Visit Date: 1/7/2021   Subjective:       Brain Cowart is a 67 y.o. female who presents to clinic today for anticoagulation monitoring and adjustment. Patient seen in clinic for warfarin management due to  Indication:   atrial fibrillation. INR goal: of 2.0-3.0. Duration of therapy: indefinite. Patient reports the following:   Adherent with regimen  Missed or extra doses:  None   Bleeding or thromboembolic side effects:  None  Significant medication changes:  None  Significant dietary changes: None  Significant alcohol or tobacco changes: None  Significant recent illness, disease state changes, or hospitalization:  None  Upcoming surgeries or procedures:  None  Falls: None           Assessment and Plan     PT/INR done in office per protocol. INR today is 2.6, therapeutic. Plan: Will continue current regimen of warfarin   3mg daily except 1.5mg on Thursdays. Recheck INR in 4 week(s). Patient verbalized understanding of dosing directions and information discussed. Dosing schedule given to patient including phone number, appointment date, and time. Progress note sent to referring office. Patient acknowledges working in consult agreement with pharmacist as referred by his/her physician.       Electronically signed by [facility] on 1/7/21 at 10:22 AM EST    CLINICAL PHARMACY CONSULT: MED RECONCILIATION/REVIEW Rodrigo  22. Tracking Only    PHSO: No  Total # of Interventions Recommended: 0    - Maintenance Safety Lab Monitoring #: 1    Total Interventions Accepted: 0  Time Spent (min): Jeyson Concepcion, PharmD

## 2021-02-04 ENCOUNTER — HOSPITAL ENCOUNTER (OUTPATIENT)
Dept: WOMENS IMAGING | Age: 76
Discharge: HOME | End: 2021-02-04
Payer: MEDICARE

## 2021-02-04 ENCOUNTER — ANTI-COAG VISIT (OUTPATIENT)
Dept: PHARMACY | Age: 76
End: 2021-02-04
Payer: MEDICARE

## 2021-02-04 VITALS — TEMPERATURE: 96.5 F

## 2021-02-04 DIAGNOSIS — Z13.820 SCREENING FOR OSTEOPOROSIS: ICD-10-CM

## 2021-02-04 DIAGNOSIS — I48.0 PAF (PAROXYSMAL ATRIAL FIBRILLATION) (HCC): ICD-10-CM

## 2021-02-04 DIAGNOSIS — Z12.31 OTHER SCREENING MAMMOGRAM: ICD-10-CM

## 2021-02-04 DIAGNOSIS — Z78.0 MENOPAUSE: ICD-10-CM

## 2021-02-04 LAB
INTERNATIONAL NORMALIZATION RATIO, POC: 2.8
POC INR: 2.8 INDEX
PROTHROMBIN TIME, POC: 33.7 SECONDS (ref 10–14.3)

## 2021-02-04 PROCEDURE — 36416 COLLJ CAPILLARY BLOOD SPEC: CPT

## 2021-02-04 PROCEDURE — 77063 BREAST TOMOSYNTHESIS BI: CPT

## 2021-02-04 PROCEDURE — 77080 DXA BONE DENSITY AXIAL: CPT

## 2021-02-04 PROCEDURE — 85610 PROTHROMBIN TIME: CPT

## 2021-02-04 PROCEDURE — 99211 OFF/OP EST MAY X REQ PHY/QHP: CPT

## 2021-02-09 ENCOUNTER — APPOINTMENT (OUTPATIENT)
Dept: CT IMAGING | Age: 76
End: 2021-02-09
Payer: MEDICARE

## 2021-02-09 ENCOUNTER — HOSPITAL ENCOUNTER (EMERGENCY)
Age: 76
Discharge: HOME | End: 2021-02-09
Attending: EMERGENCY MEDICINE
Payer: MEDICARE

## 2021-02-09 ENCOUNTER — APPOINTMENT (OUTPATIENT)
Dept: GENERAL RADIOLOGY | Age: 76
End: 2021-02-09
Payer: MEDICARE

## 2021-02-09 VITALS
DIASTOLIC BLOOD PRESSURE: 76 MMHG | OXYGEN SATURATION: 98 % | TEMPERATURE: 98.7 F | RESPIRATION RATE: 20 BRPM | HEART RATE: 18 BPM | SYSTOLIC BLOOD PRESSURE: 114 MMHG

## 2021-02-09 DIAGNOSIS — I48.20 CHRONIC ATRIAL FIBRILLATION (HCC): ICD-10-CM

## 2021-02-09 DIAGNOSIS — J38.7 VALLECULAR CYST: Primary | ICD-10-CM

## 2021-02-09 LAB
ALBUMIN SERPL-MCNC: 3.8 GM/DL (ref 3.4–5)
ALP BLD-CCNC: 45 IU/L (ref 40–129)
ALT SERPL-CCNC: 17 U/L (ref 10–40)
ANION GAP SERPL CALCULATED.3IONS-SCNC: 11 MMOL/L (ref 4–16)
AST SERPL-CCNC: 22 IU/L (ref 15–37)
BASOPHILS ABSOLUTE: 0 K/CU MM
BASOPHILS RELATIVE PERCENT: 0.5 % (ref 0–1)
BILIRUB SERPL-MCNC: 1.7 MG/DL (ref 0–1)
BUN BLDV-MCNC: 19 MG/DL (ref 6–23)
CALCIUM SERPL-MCNC: 8.9 MG/DL (ref 8.3–10.6)
CHLORIDE BLD-SCNC: 100 MMOL/L (ref 99–110)
CO2: 24 MMOL/L (ref 21–32)
CREAT SERPL-MCNC: 1 MG/DL (ref 0.6–1.1)
DIFFERENTIAL TYPE: ABNORMAL
EOSINOPHILS ABSOLUTE: 0.1 K/CU MM
EOSINOPHILS RELATIVE PERCENT: 1.1 % (ref 0–3)
GFR AFRICAN AMERICAN: >60 ML/MIN/1.73M2
GFR NON-AFRICAN AMERICAN: 54 ML/MIN/1.73M2
GLUCOSE BLD-MCNC: 85 MG/DL (ref 70–99)
HCT VFR BLD CALC: 46.4 % (ref 37–47)
HEMOGLOBIN: 15.1 GM/DL (ref 12.5–16)
IMMATURE NEUTROPHIL %: 0.2 % (ref 0–0.43)
LYMPHOCYTES ABSOLUTE: 2.7 K/CU MM
LYMPHOCYTES RELATIVE PERCENT: 42.6 % (ref 24–44)
MAGNESIUM: 2 MG/DL (ref 1.8–2.4)
MCH RBC QN AUTO: 29.5 PG (ref 27–31)
MCHC RBC AUTO-ENTMCNC: 32.5 % (ref 32–36)
MCV RBC AUTO: 90.6 FL (ref 78–100)
MONOCYTES ABSOLUTE: 0.7 K/CU MM
MONOCYTES RELATIVE PERCENT: 11.6 % (ref 0–4)
NUCLEATED RBC %: 0 %
PDW BLD-RTO: 13.7 % (ref 11.7–14.9)
PLATELET # BLD: 122 K/CU MM (ref 140–440)
PMV BLD AUTO: 11.3 FL (ref 7.5–11.1)
POTASSIUM SERPL-SCNC: 4 MMOL/L (ref 3.5–5.1)
RBC # BLD: 5.12 M/CU MM (ref 4.2–5.4)
SEGMENTED NEUTROPHILS ABSOLUTE COUNT: 2.7 K/CU MM
SEGMENTED NEUTROPHILS RELATIVE PERCENT: 44 % (ref 36–66)
SODIUM BLD-SCNC: 135 MMOL/L (ref 135–145)
TOTAL IMMATURE NEUTOROPHIL: 0.01 K/CU MM
TOTAL NUCLEATED RBC: 0 K/CU MM
TOTAL PROTEIN: 6.9 GM/DL (ref 6.4–8.2)
TROPONIN T: <0.01 NG/ML
WBC # BLD: 6.2 K/CU MM (ref 4–10.5)

## 2021-02-09 PROCEDURE — 2500000003 HC RX 250 WO HCPCS: Performed by: EMERGENCY MEDICINE

## 2021-02-09 PROCEDURE — 70360 X-RAY EXAM OF NECK: CPT

## 2021-02-09 PROCEDURE — 85025 COMPLETE CBC W/AUTO DIFF WBC: CPT

## 2021-02-09 PROCEDURE — 80053 COMPREHEN METABOLIC PANEL: CPT

## 2021-02-09 PROCEDURE — 84484 ASSAY OF TROPONIN QUANT: CPT

## 2021-02-09 PROCEDURE — 96374 THER/PROPH/DIAG INJ IV PUSH: CPT

## 2021-02-09 PROCEDURE — 83735 ASSAY OF MAGNESIUM: CPT

## 2021-02-09 PROCEDURE — 93005 ELECTROCARDIOGRAM TRACING: CPT | Performed by: EMERGENCY MEDICINE

## 2021-02-09 PROCEDURE — 99284 EMERGENCY DEPT VISIT MOD MDM: CPT

## 2021-02-09 PROCEDURE — 6360000004 HC RX CONTRAST MEDICATION: Performed by: EMERGENCY MEDICINE

## 2021-02-09 PROCEDURE — 70491 CT SOFT TISSUE NECK W/DYE: CPT

## 2021-02-09 RX ORDER — DILTIAZEM HYDROCHLORIDE 5 MG/ML
10 INJECTION INTRAVENOUS ONCE
Status: COMPLETED | OUTPATIENT
Start: 2021-02-09 | End: 2021-02-09

## 2021-02-09 RX ADMIN — IOPAMIDOL 75 ML: 755 INJECTION, SOLUTION INTRAVENOUS at 20:47

## 2021-02-09 RX ADMIN — DILTIAZEM HYDROCHLORIDE 10 MG: 5 INJECTION INTRAVENOUS at 19:51

## 2021-02-09 NOTE — ED PROVIDER NOTES
As physician-in-triage, I performed a telehealth medical screening history and exam on this patient. HISTORY OF PRESENT ILLNESS  Alvy Gilford is a 67 y.o. female presents with feeling like there is something stuck in her throat for the last 3 days. She states it \"will not go down. \"  She has been swallowing and eating just fine. She denies any pain. No shortness of breath or difficulty breathing. She is never had this previously. PHYSICAL EXAM  BP (!) 130/92   Pulse 115   Temp 98.7 °F (37.1 °C) (Oral)   Resp 20   SpO2 98%     On exam, the patient appears in no acute distress. Speech is clear. Breathing is unlabored. Moves all extremities    Comment: Please note this report has been produced using speech recognition software and may contain errors related to that system including errors in grammar, punctuation, and spelling, as well as words and phrases that may be inappropriate. If there are any questions or concerns please feel free to contact the dictating provider for clarification.        Margaret Churchill MD  02/09/21 0055

## 2021-02-09 NOTE — ED TRIAGE NOTES
Pt to ER for possible food bolus in throat x3 days. Denies pain, no issues breathing or swallowing.      Lashawn Null, [phone]

## 2021-02-10 NOTE — ED PROVIDER NOTES
EMERGENCY DEPARTMENT ENCOUNTER    Patient: Jerrica Walker  MRN: [id number]  : 1945  Date of Evaluation: 2021  ED Provider:  Jenise Coyle    CHIEF COMPLAINT  Chief Complaint   Patient presents with    Other     possible food bolus x3 days       HPI  Jerrica Walker is a 67 y.o. female who presents with a feeling as if something is stuck in the back of her throat for the last 3 days. She is not aware of actually getting any food lodged back there but continues to have this sensation. Is mild to moderate in severity. She feels it more when she is laying flat. She still has been able to eat and drink without difficulty. Denies any difficulty breathing or shortness of breath. Denies chest pain. She denies any actual pain in her throat or neck. Denies any other associated symptoms or complaints or concerns.       REVIEW OF SYSTEMS    Constitutional: negative for fever, chills  Neurological: negative for HA, focal weakness, loss of sensation  Ophthalmic: negative for vision change  ENT: negative for congestion, rhinorrhea  Cardiovascular: negative for chest pain  Respiratory: negative for SOB, cough  GI: negative for abdominal pain, nausea, vomiting, diarrhea, constipation  : negative for dysuria, hematuria  Musculoskeletal: negative for myalgias, decreased ROM, joint swelling  Dermatological: negative for rash, wounds  Heme: Negative for bleeding, bruising      PAST MEDICAL HISTORY  Past Medical History:   Diagnosis Date    Abnormal echocardiogram     EF 60%, mild aortic indsufficiency, mild pulmonary hypertension    Anemia     Aortic insufficiency     mild per echo 2010    Atrial fibrillation (HCC)     failed propafenone, Multaq and flecainide - 2011 plan for AFib ablation - [facility]- Dr Rachell Vargas Atrial flutter [facility])     Bursitis, trochanteric     s/p injection    Cerumen impaction 2012    Diverticulosis     Dr. Andrés Amor C scope    Diverticulosis of colon 1/2010    Colonoscopy-Dr Newell    DJD (degenerative joint disease), cervical     cervical, generalized disc buldge   MRI 3/02    DVT (deep venous thrombosis) ([address]     Dyslipidemia 2002    Environmental allergies     Family history of colon cancer     mother    Gastric polyp     8/2006, 11/2009 benign- Dr Jo-Ann Bhatt Gastritis 4/2008    mild superficial per Dr Tano Hill    GERD (gastroesophageal reflux disease) 8/2006    Dr Jo-Ann Bhatt H/O cardiovascular stress test 07/13, 4/3/13    07/13 EF58%, No scintigraphic evidence of inducible myocardial ischemia 04/13Normal study. No wall motion abnormality seen. EF 65%    H/O echocardiogram 7/18/13 7/13-EF 50-55% Mild AR.  H/O exercise stress test 02/07/2017    EF63% normal    History of Holter monitoring 9/23/15    48 hour - abnormal holter revealing afib throughout the recording with interspersed pacemaker beats, HR does not appear to be well controlled    History of mammogram     last mammo 7/25/11, Dr. Snell Drain yearly    Hx of colonoscopy     1/21/2010    Hyperlipidemia     Hypertension     Hypothyroidism     Internal hemorrhoid 2015    Dr. Tano Hill C scope     Intervertebral disc protrusion 5/09    wry neck with C4-5      Left shoulder pain 4/04    (L) shoulder impingement  mild DJD    Long term current use of anticoagulant 07/26/2016    **Coumadin Clinic follows PT/INRs, along w/prescribing pt's Coumadin dosages. Jazzmine Obmargarito Menstruation     age 11    Pacemaker 148646    dual chamber- checked every 3 months    Pulmonary hypertension ([address]     mild per echo 8/24/2010, Pt refused sleep study (June 2012)    Restless legs 8/03    ferritin    Right shoulder strain 2/03    no seperation, bony contusion anterior humeral head  MRI 3/03    Sciatica 7/09    (R) chronic intermittent s/p epidural injections  Dr Sirisha Casanova Sciatica     Shoulder pain, left March 2011    RTC tendinopathy, type II acrominum, bursitis- referred to Dr. Keith Crespo Shoulder pain, right 3/10, 9/02 impingement, physical thearpy   (Main)  calcific bursitis  s/p injection    Sinus bradycardia     Sinusitis 12/12/2011    Tinnitus 3/02    Vision changes 3/19/2013       CURRENT MEDICATIONS  [unfilled]    ALLERGIES  Allergies   Allergen Reactions    Latex Hives    Darvon [Propoxyphene Hcl] Shortness Of Breath    Demerol Rash     Breathing problems    Dilaudid [Hydromorphone Hcl] Anaphylaxis    Valium Rash     Breathing problems    Celecoxib Diarrhea    Norco [Hydrocodone-Acetaminophen] Diarrhea, Nausea Only and Other (See Comments)     A-Fib    Norvasc [Amlodipine] Other (See Comments)     HA, dizziness    Oxycodone Itching    Tape Worthy Caputo Tape] Other (See Comments)     BLISTER    Vasotec [Enalapril] Other (See Comments)     Nausea, vomiting    Diazepam Rash       SURGICAL HISTORY  Past Surgical History:   Procedure Laterality Date    ABLATION OF DYSRHYTHMIC FOCUS      BREAST BIOPSY      BREAST SURGERY  1985    Right breast tumor excised    CARDIOVERSION      1/2008 Dr Mario Alberto Stanton; 12/2008    CARD[facility]  [facility]/10/2014    [facility]    CHOLECYSTECTOMY, LAPAROSCOPIC  2/01    Dr Parish Valentino      [phone] (Dr Juan Antonio Viramontes)   [id number]  [address] [place] [address]      6/23/2012   Dewey Gracia SINUS SURGERY  78    Dr Satish Shine  4/03    fibroid      Dr Luis Enrique Jeffery  8/06    Dr Rodrigo Bergman ECHOCARDIOGRAM  6/2012    transthoracic cardioversion-Dr. Brenner Ends    UPPER GASTROINTESTINAL ENDOSCOPY  4/08    mild superficial gastritis  Dr Juan Antonio Viramontes; 2009       FAMILY HISTORY  Family History   Problem Relation Age of Onset   Dewey Fulton Stroke Mother     Heart Disease Mother     Coronary Art Dis Mother 66        CABG    Colon Cancer Mother [de-identified]        colon     Heart Disease Father     Coronary Art Dis Father 62        CABG    Migraines Sister     Seizures Brother     Breast Cancer Maternal Cousin         under 48 SOCIAL HISTORY  Social History     Socioeconomic History    Marital status:      Spouse name: Not on file    Number of children: Not on file    Years of education: Not on file    Highest education level: Not on file   Occupational History    Not on file   Social Needs    Financial resource strain: Not on file    Food insecurity     Worry: Not on file     Inability: Not on file    Transportation needs     Medical: Not on file     Non-medical: Not on file   Tobacco Use    Smoking status: Never Smoker    Smokeless tobacco: Never Used    Tobacco comment: reviewed 11/4/15   Substance and Sexual Activity    Alcohol use: No    Drug use: No    Sexual activity: Yes     Partners: Male     Comment:    Lifestyle    Physical activity     Days per week: Not on file     Minutes per session: Not on file    Stress: Not on file   Relationships    Social connections     Talks on phone: Not on file     Gets together: Not on file     Attends Church service: Not on file     Active member of club or organization: Not on file     Attends meetings of clubs or organizations: Not on file     Relationship status: Not on file    Intimate partner violence     Fear of current or ex partner: Not on file     Emotionally abused: Not on file     Physically abused: Not on file     Forced sexual activity: Not on file   Other Topics Concern    Not on file   Social History Narrative    Not on file         **Past medical, family and social histories, and nursing notes reviewed and verified by me**      PHYSICAL EXAM  VITAL SIGNS:   ED Triage Vitals [02/09/21 1720]   Enc Vitals Group      BP (!) 130/92      Pulse 115      Resp 20      Temp 98.7 °F (37.1 °C)      Temp Source Oral      SpO2 98 %      Weight       Height       Head Circumference       Peak Flow       Pain Score       Pain Loc       Pain Edu? Excl. in [address]? Vitals during ED course were reviewed and are as charted.     Constitutional: Minimal distress, Non-toxic appearance  Eyes: Conjunctiva normal, No discharge  HENT: Normocephalic, Atraumatic, bilateral external ears normal, posterior oropharynx is nonerythematous and without exudate, uvula is midline, no trismus, no \"hot potato voice\" or dysphonia, oropharynx moist  Neck: Supple, no stridor, no grossly visible or palpable masses  Cardiovascular: Tachycardic with irregularly irregular rhythm, No murmurs, No rubs, No gallops  Pulmonary/Chest: Normal breath sounds, No respiratory distress or accessory muscle use, No wheezing, crackles or rhonchi.   Abdomen: Soft, nondistended and nonrigid, No tenderness or peritoneal signs, No masses  Extremities: No gross deformities, no edema, no tenderness  Skin: Warm, Dry, No erythema, No rash, No cyanosis, No mottling  Lymphatic: No lymphadenopathy in the following location(s): cervical  Psychiatric: Alert and oriented x3, Affect normal          EKG Interpretation    Interpreted by emergency department physician    Rhythm: atrial fibrillation - rapid  Rate: 110-120  Axis: normal  Ectopy: none  Conduction: normal  ST Segments: normal  T Waves: non specific changes  Q Waves: none    Clinical Impression: Atrial fibrillation with rapid ventricular response with nonspecific T wave abnormalities          RADIOLOGY/PROCEDURES/LABS/MEDICATIONS ADMINISTERED:    I have reviewed and interpreted all of the currently available lab results from this visit (if applicable):  Results for orders placed or performed during the hospital encounter of 02/09/21   CBC Auto Differential   Result Value Ref Range    WBC 6.2 4.0 - 10.5 K/CU MM    RBC 5.12 4.2 - 5.4 M/CU MM    Hemoglobin 15.1 12.5 - 16.0 GM/DL    Hematocrit 46.4 37 - 47 %    MCV 90.6 78 - 100 FL    MCH 29.5 27 - 31 PG    MCHC 32.5 32.0 - 36.0 %    RDW 13.7 11.7 - 14.9 %    Platelets 433 (L) 879 - 440 K/CU MM    MPV 11.3 (H) 7.5 - 11.1 FL    Differential Type AUTOMATED DIFFERENTIAL     Segs Relative 44.0 36 - 66 %    Lymphocytes MAGNESIUM   TROPONIN         IMAGING STUDIES ORDERED:  XR NECK SOFT TISSUE  CT SOFT TISSUE NECK W CONTRAST    I have personally viewed the imaging studies. The radiologist interpretation is:   CT SOFT TISSUE NECK W CONTRAST   Preliminary Result   10 mm right vallecular/base of tongue cyst.         XR NECK SOFT TISSUE   Final Result   No acute process identified. No radiopaque foreign body. 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 Is rather small. Low suspicion for abscess. She is not having any pain associated with this. She has still been able to eat and drink without any difficulty. She is also been noted to have slightly elevated heart rate associated with chronic atrial fibrillation. She is on Cardizem for this I did give her a small dose which did improve her heart rate. I will have her follow-up with ENT and cardiology. Additional workup and treatment in the ED as documented above. Patient reassured and will be discharged home. I have explained to the patient in appropriate terminology our work-up in the ED and their diagnosis. I have also given anticipatory guidance and expectant management of their condition as an outpatient as per my custom. The patient was given clear discharge and follow-up instructions including return to the ER immediately for worsening concerns. The patient has been advised to follow-up with their primary care physician and/or referred physician in the next two to three days or sooner if worsening and to return to the ER immediately as above with any concerns.  I provided the patient counseling with regard to my customary list of strict return precautions as well as return precautions specific to the cause for today's emergency department visit. The patient will return under these provided conditions, but should also return for new concerns or further worsening. Pt and/or family understand and agree with plan. Clinical Impression:  1. Vallecular cyst    2. Chronic atrial fibrillation ([address]        Disposition referral (if applicable):  Susan Friedman MD  [address]  [phone]    Schedule an appointment as soon as possible for a visit   For ENT follow-up    [facility] Emergency Department  [facility] [id number]  [phone]    If symptoms worsen    Lou Orozco MD  [address] [address]  [phone]    Schedule an appointment as soon as possible for a visit         Disposition medications (if applicable):  New Prescriptions    No medications on file       ED Provider Disposition Time  DISPOSITION            Electronically signed by: Darylene Massa, M.D., 2/9/2021 9:21 PM      This dictation was created with voice recognition software. While attempts have been made to review the dictation as it is transcribed, on occasion the spoken word can be misinterpreted by the technology leading to omissions or inappropriate words, phrases or sentences.       Elsy Lanier MD  02/09/21 2121

## 2021-02-12 LAB
EKG ATRIAL RATE: 129 BPM
EKG DIAGNOSIS: NORMAL
EKG Q-T INTERVAL: 332 MS
EKG QRS DURATION: 78 MS
EKG QTC CALCULATION (BAZETT): 449 MS
EKG R AXIS: 42 DEGREES
EKG T AXIS: -65 DEGREES
EKG VENTRICULAR RATE: 110 BPM

## 2021-02-22 ENCOUNTER — TELEPHONE (OUTPATIENT)
Dept: PHARMACY | Age: 76
End: 2021-02-22

## 2021-02-22 DIAGNOSIS — I48.0 PAF (PAROXYSMAL ATRIAL FIBRILLATION) (HCC): ICD-10-CM

## 2021-02-22 NOTE — TELEPHONE ENCOUNTER
Patient left voicemail to call her about upcoming surgery. Returned call. Patient to have biopsy of tongue at [facility] tomorrow with ENT Dr. Yecenia Ragsdale. She has been off warfarin since 2/17. Patient questions effects of stopping warfarin and wants to know when to restart. Advised patient cardiac clearance is on file from her cardiologist's office to stop warfarin. Advised to restart warfarin at the direction of the physician performing the procedure. Advised to take 3mg daily once restarting. Rescheduled next appointment for 3/4.      CLINICAL PHARMACY CONSULT: MED RECONCILIATION/REVIEW ADDENDUM    For Pharmacy Admin Tracking Only    PHSO: No  Total # of Interventions Recommended: 0    Total Interventions Accepted: 0  Time Spent (min): 15    Hannah Morris PharmD

## 2021-03-01 ENCOUNTER — PROCEDURE VISIT (OUTPATIENT)
Dept: CARDIOLOGY CLINIC | Age: 76
End: 2021-03-01
Payer: MEDICARE

## 2021-03-01 DIAGNOSIS — Z95.0 CARDIAC PACEMAKER IN SITU: Primary | ICD-10-CM

## 2021-03-01 PROCEDURE — 93294 REM INTERROG EVL PM/LDLS PM: CPT | Performed by: INTERNAL MEDICINE

## 2021-03-01 PROCEDURE — 93296 REM INTERROG EVL PM/IDS: CPT | Performed by: INTERNAL MEDICINE

## 2021-03-04 ENCOUNTER — ANTI-COAG VISIT (OUTPATIENT)
Dept: PHARMACY | Age: 76
End: 2021-03-04
Payer: MEDICARE

## 2021-03-04 VITALS — TEMPERATURE: 96.4 F

## 2021-03-04 DIAGNOSIS — I48.0 PAF (PAROXYSMAL ATRIAL FIBRILLATION) (HCC): ICD-10-CM

## 2021-03-04 LAB
INTERNATIONAL NORMALIZATION RATIO, POC: 1.6
POC INR: 1.6 INDEX
PROTHROMBIN TIME, POC: 18.7 SECONDS (ref 10–14.3)

## 2021-03-04 PROCEDURE — 36416 COLLJ CAPILLARY BLOOD SPEC: CPT

## 2021-03-04 PROCEDURE — 99212 OFFICE O/P EST SF 10 MIN: CPT

## 2021-03-04 PROCEDURE — 85610 PROTHROMBIN TIME: CPT

## 2021-03-04 NOTE — PROGRESS NOTES
Medication Management Service  [facility]  [phone]    Visit Date: 3/4/2021   Subjective:       Carline Gaspar is a 67 y.o. female who presents to clinic today for anticoagulation monitoring and adjustment. Patient seen in clinic for warfarin management due to  Indication:   atrial fibrillation. INR goal: of 2.0-3.0. Duration of therapy: indefinite. Patient reports the following:   Adherent with regimen  Missed or extra doses:  Helld 2/18-2/24 for procedure on tongue  Bleeding or thromboembolic side effects:  None  Significant medication changes:  None  Significant dietary changes: None  Significant alcohol or tobacco changes: None  Significant recent illness, disease state changes, or hospitalization:  Surgery on tongue cyst  Upcoming surgeries or procedures:  None  Falls: None           Assessment and Plan     PT/INR done in office per protocol. INR today is 1.6, subtherapeutic, due to held doses for procedure 2/23. Restarted 2/25. Plan:  Take warfarin 3mg today then will continue current regimen of warfarin 3mg daily except 1.5mg on Thursdays. Recheck INR in 2 week(s). Patient verbalized understanding of dosing directions and information discussed. Dosing schedule given to patient including phone number, appointment date, and time. Progress note sent to referring office. Patient acknowledges working in consult agreement with pharmacist as referred by his/her physician.       Electronically signed by Martha Guallpa, [address] [place] [address] on 3/4/21 at 10:39 AM EST  CLINICAL PHARMACY CONSULT: MED RECONCILIATION/REVIEW ADDENDUM    For Pharmacy Admin Tracking Only    PHSO: No  Total # of Interventions Recommended: 1  - Increased Dose #: 1  - Maintenance Safety Lab Monitoring #: 1  Total Interventions Accepted: 1  Time Spent (min): 15    Martha Guallpa, PharmD

## 2021-03-11 ENCOUNTER — TELEPHONE (OUTPATIENT)
Dept: PHARMACY | Age: 76
End: 2021-03-11

## 2021-03-11 NOTE — TELEPHONE ENCOUNTER
Rescheduled for later in the day 3/18.      CLINICAL PHARMACY CONSULT: MED RECONCILIATION/REVIEW ADDENDUM    For Pharmacy Admin Tracking Only    PHSO: No  Total # of Interventions Recommended: 0    Total Interventions Accepted: 0  Time Spent (min): 5    Edwina LeeD

## 2021-03-18 ENCOUNTER — ANTI-COAG VISIT (OUTPATIENT)
Dept: PHARMACY | Age: 76
End: 2021-03-18
Payer: MEDICARE

## 2021-03-18 DIAGNOSIS — I48.0 PAF (PAROXYSMAL ATRIAL FIBRILLATION) (HCC): ICD-10-CM

## 2021-03-18 PROCEDURE — 36416 COLLJ CAPILLARY BLOOD SPEC: CPT

## 2021-03-18 PROCEDURE — 99211 OFF/OP EST MAY X REQ PHY/QHP: CPT

## 2021-03-18 PROCEDURE — 85610 PROTHROMBIN TIME: CPT

## 2021-03-18 NOTE — PROGRESS NOTES
Medication Management Service  [facility]  [phone]    Visit Date: 3/18/2021   Subjective:       Aquilino Arenas is a 67 y.o. female who presents to clinic today for anticoagulation monitoring and adjustment. Patient seen in clinic for warfarin management due to  Indication:   atrial fibrillation. INR goal: of 2.0-3.0. Duration of therapy: indefinite. Patient reports the following:   Adherent with regimen  Missed or extra doses:  None   Bleeding or thromboembolic side effects:  None  Significant medication changes:  None  Significant dietary changes: None  Significant alcohol or tobacco changes: None  Significant recent illness, disease state changes, or hospitalization:  None  Upcoming surgeries or procedures:  None  Falls: None           Assessment and Plan     PT/INR done in office per protocol. INR today is 2.5, therapeutic. Plan: Will continue current regimen of warfarin 3mg daily except 1.5mg on Thursdays . Recheck INR in 4 week(s). Patient verbalized understanding of dosing directions and information discussed. Dosing schedule given to patient including phone number, appointment date, and time. Progress note sent to referring office. Patient acknowledges working in consult agreement with pharmacist as referred by his/her physician.       Electronically signed by Hernan Medeiros [facility] on 3/18/21 at 3:05 PM EDT    CLINICAL PHARMACY CONSULT: MED RECONCILIATION/REVIEW Rodrigo  22. Tracking Only    PHSO: No  Total # of Interventions Recommended: 0    - Maintenance Safety Lab Monitoring #: 1    Total Interventions Accepted: 0  Time Spent (min): Rao Magana PharmD Yes should be 200 mcg x4 tabs (for a total of 800 mcg) placed vaginally.  THanks for finding this error.    Esther Swanson MD, MPH  [facility]

## 2021-04-15 ENCOUNTER — OFFICE VISIT (OUTPATIENT)
Dept: FAMILY MEDICINE CLINIC | Age: 76
End: 2021-04-15
Payer: MEDICARE

## 2021-04-15 ENCOUNTER — ANTI-COAG VISIT (OUTPATIENT)
Dept: PHARMACY | Age: 76
End: 2021-04-15
Payer: MEDICARE

## 2021-04-15 VITALS
WEIGHT: 145.2 LBS | TEMPERATURE: 96.7 F | HEIGHT: 67 IN | DIASTOLIC BLOOD PRESSURE: 68 MMHG | HEART RATE: 91 BPM | OXYGEN SATURATION: 97 % | BODY MASS INDEX: 22.79 KG/M2 | SYSTOLIC BLOOD PRESSURE: 106 MMHG

## 2021-04-15 DIAGNOSIS — K21.9 GASTROESOPHAGEAL REFLUX DISEASE WITHOUT ESOPHAGITIS: ICD-10-CM

## 2021-04-15 DIAGNOSIS — Z79.01 LONG TERM CURRENT USE OF ANTICOAGULANT: ICD-10-CM

## 2021-04-15 DIAGNOSIS — E03.9 ACQUIRED HYPOTHYROIDISM: ICD-10-CM

## 2021-04-15 DIAGNOSIS — Z95.0 PACEMAKER: ICD-10-CM

## 2021-04-15 DIAGNOSIS — Z13.220 SCREENING FOR LIPID DISORDERS: ICD-10-CM

## 2021-04-15 DIAGNOSIS — I10 ESSENTIAL HYPERTENSION: ICD-10-CM

## 2021-04-15 DIAGNOSIS — E78.2 MIXED HYPERLIPIDEMIA: ICD-10-CM

## 2021-04-15 DIAGNOSIS — I10 ESSENTIAL HYPERTENSION: Primary | ICD-10-CM

## 2021-04-15 DIAGNOSIS — I48.0 PAF (PAROXYSMAL ATRIAL FIBRILLATION) (HCC): ICD-10-CM

## 2021-04-15 DIAGNOSIS — J30.1 SEASONAL ALLERGIC RHINITIS DUE TO POLLEN: ICD-10-CM

## 2021-04-15 LAB
A/G RATIO: 2 (ref 1.1–2.2)
ALBUMIN SERPL-MCNC: 4.5 G/DL (ref 3.4–5)
ALP BLD-CCNC: 55 U/L (ref 40–129)
ALT SERPL-CCNC: 18 U/L (ref 10–40)
ANION GAP SERPL CALCULATED.3IONS-SCNC: 14 MMOL/L (ref 3–16)
AST SERPL-CCNC: 25 U/L (ref 15–37)
BILIRUB SERPL-MCNC: 1.9 MG/DL (ref 0–1)
BUN BLDV-MCNC: 16 MG/DL (ref 7–20)
CALCIUM SERPL-MCNC: 9.2 MG/DL (ref 8.3–10.6)
CHLORIDE BLD-SCNC: 101 MMOL/L (ref 99–110)
CHOLESTEROL, TOTAL: 154 MG/DL (ref 0–199)
CO2: 25 MMOL/L (ref 21–32)
CREAT SERPL-MCNC: 0.8 MG/DL (ref 0.6–1.2)
GFR AFRICAN AMERICAN: >60
GFR NON-AFRICAN AMERICAN: >60
GLOBULIN: 2.3 G/DL
GLUCOSE BLD-MCNC: 77 MG/DL (ref 70–99)
HDLC SERPL-MCNC: 54 MG/DL (ref 40–60)
INTERNATIONAL NORMALIZATION RATIO, POC: 2.5
LDL CHOLESTEROL CALCULATED: 83 MG/DL
POC INR: 2.5 INDEX
POTASSIUM SERPL-SCNC: 4.6 MMOL/L (ref 3.5–5.1)
PROTHROMBIN TIME, POC: 29.8 SECONDS (ref 10–14.3)
SODIUM BLD-SCNC: 140 MMOL/L (ref 136–145)
TOTAL PROTEIN: 6.8 G/DL (ref 6.4–8.2)
TRIGL SERPL-MCNC: 84 MG/DL (ref 0–150)
TSH REFLEX: 3.49 UIU/ML (ref 0.27–4.2)
VLDLC SERPL CALC-MCNC: 17 MG/DL

## 2021-04-15 PROCEDURE — G8420 CALC BMI NORM PARAMETERS: HCPCS | Performed by: NURSE PRACTITIONER

## 2021-04-15 PROCEDURE — 4040F PNEUMOC VAC/ADMIN/RCVD: CPT | Performed by: NURSE PRACTITIONER

## 2021-04-15 PROCEDURE — 85610 PROTHROMBIN TIME: CPT

## 2021-04-15 PROCEDURE — 1090F PRES/ABSN URINE INCON ASSESS: CPT | Performed by: NURSE PRACTITIONER

## 2021-04-15 PROCEDURE — 1036F TOBACCO NON-USER: CPT | Performed by: NURSE PRACTITIONER

## 2021-04-15 PROCEDURE — G8399 PT W/DXA RESULTS DOCUMENT: HCPCS | Performed by: NURSE PRACTITIONER

## 2021-04-15 PROCEDURE — 99214 OFFICE O/P EST MOD 30 MIN: CPT | Performed by: NURSE PRACTITIONER

## 2021-04-15 PROCEDURE — 36416 COLLJ CAPILLARY BLOOD SPEC: CPT

## 2021-04-15 PROCEDURE — G8427 DOCREV CUR MEDS BY ELIG CLIN: HCPCS | Performed by: NURSE PRACTITIONER

## 2021-04-15 PROCEDURE — 1123F ACP DISCUSS/DSCN MKR DOCD: CPT | Performed by: NURSE PRACTITIONER

## 2021-04-15 PROCEDURE — 99211 OFF/OP EST MAY X REQ PHY/QHP: CPT

## 2021-04-15 ASSESSMENT — ENCOUNTER SYMPTOMS
COUGH: 0
BACK PAIN: 0
ABDOMINAL PAIN: 0
BLOOD IN STOOL: 0
VOMITING: 0
NAUSEA: 0
CONSTIPATION: 0
SHORTNESS OF BREATH: 0
DIARRHEA: 0

## 2021-04-15 ASSESSMENT — PATIENT HEALTH QUESTIONNAIRE - PHQ9
SUM OF ALL RESPONSES TO PHQ QUESTIONS 1-9: 0
SUM OF ALL RESPONSES TO PHQ QUESTIONS 1-9: 0

## 2021-04-15 NOTE — PROGRESS NOTES
Medication Management Service  [facility]  [phone]    Visit Date: 4/15/2021   Subjective:       Beto Subramanian is a 67 y.o. female who presents to clinic today for anticoagulation monitoring and adjustment. Patient seen in clinic for warfarin management due to  Indication:   atrial fibrillation. INR goal: of 2.0-3.0. Duration of therapy: indefinite. Patient reports the following:   Adherent with regimen  Missed or extra doses:  None   Bleeding or thromboembolic side effects:  None  Significant medication changes:  None  Significant dietary changes: None  Significant alcohol or tobacco changes: None  Significant recent illness, disease state changes, or hospitalization:  None  Upcoming surgeries or procedures:  None  Falls: None           Assessment and Plan     PT/INR done in office per protocol. INR today is 2.5, therapeutic. Plan: Will continue current regimen of warfarin 3mg daily except 1.5mg on Thursdays. Recheck INR in 4 week(s). Patient verbalized understanding of dosing directions and information discussed. Dosing schedule given to patient including phone number, appointment date, and time. Progress note sent to referring office. Patient acknowledges working in consult agreement with pharmacist as referred by his/her physician.       Electronically signed by NICCI Goldman [facility] on 4/15/21 at 10:34 AM EDT    CLINICAL PHARMACY CONSULT: MED RECONCILIATION/REVIEW Rodrigo  22. Tracking Only    PHSO: No  Total # of Interventions Recommended: 0    - Maintenance Safety Lab Monitoring #: 1  Total Interventions Accepted: 0  Time Spent (min): 15    Priscilla Phillips PharmD

## 2021-04-15 NOTE — PROGRESS NOTES
Zulema Lambert MD   Vitamin D (CHOLECALCIFEROL) 25 MCG (1000 UT) TABS tablet Take 1,000 Units by mouth daily Yes Historical Provider, MD   dilTIAZem (CARDIZEM CD) 180 MG extended release capsule Take 1 capsule by mouth daily Yes Fransisco Ames MD   atorvastatin (LIPITOR) 10 MG tablet TAKE 1 TABLET BY MOUTH EVERY DAY Yes Zulema Lambert MD   Fexofenadine HCl (ALLEGRA ALLERGY PO) Take by mouth Yes Historical Provider, MD   Dexlansoprazole (DEXILANT) 60 MG CPDR Take 1 tablet by mouth daily. Yes Historical Provider, MD        Social History     Tobacco Use    Smoking status: Never Smoker    Smokeless tobacco: Never Used    Tobacco comment: reviewed 11/4/15   Substance Use Topics    Alcohol use: No        Vitals:    04/15/21 1112   BP: 106/68   Site: Left Upper Arm   Position: Sitting   Cuff Size: Medium Adult   Pulse: 91   Temp: 96.7 °F (35.9 °C)   SpO2: 97%   Weight: 145 lb 3.2 oz (65.9 kg)   Height: 5' 7\" (1.702 m)     Estimated body mass index is 22.74 kg/m² as calculated from the following:    Height as of this encounter: 5' 7\" (1.702 m). Weight as of this encounter: 145 lb 3.2 oz (65.9 kg). Physical Exam  Vitals signs reviewed. Constitutional:       General: She is not in acute distress. Appearance: Normal appearance. She is normal weight. She is not ill-appearing, toxic-appearing or diaphoretic. HENT:      Head: Normocephalic and atraumatic. Nose: Nose normal.   Eyes:      Extraocular Movements: Extraocular movements intact. Pupils: Pupils are equal, round, and reactive to light. Neck:      Musculoskeletal: Normal range of motion and neck supple. Cardiovascular:      Rate and Rhythm: Normal rate. Heart sounds: Normal heart sounds. Pulmonary:      Effort: Pulmonary effort is normal.      Breath sounds: Normal breath sounds. Abdominal:      General: Bowel sounds are normal. There is no distension. Palpations: Abdomen is soft. There is no mass.       Tenderness: There is no abdominal tenderness. Hernia: No hernia is present. Musculoskeletal: Normal range of motion. Skin:     General: Skin is warm and dry. Neurological:      General: No focal deficit present. Mental Status: She is alert and oriented to person, place, and time. Mental status is at baseline. Psychiatric:         Mood and Affect: Mood normal.         Behavior: Behavior normal.         Thought Content: Thought content normal.         Judgment: Judgment normal.         ASSESSMENT/PLAN:  1. Essential hypertension  Controlled without medication. Stopped ace due to hypotension  - Comprehensive Metabolic Panel; Future    2. Mixed hyperlipidemia  Cont. Statin     3. Acquired hypothyroidism  Synthroid 50mcg daily   - TSH with Reflex; Future    4. PAF (paroxysmal atrial fibrillation) (HCC)  Coumadin - pacer - follow with cardiology and coumadin clinic   cardize    5. Long term current use of anticoagulant    6. Gastroesophageal reflux disease without esophagitis  ppi daily controlled     7. Pacemaker dual chamber    8. Seasonal allergic rhinitis due to pollen  Controlled. allegra      Fasting labs     Present to the ER for any emergent or acute symptoms not managed at home or in office. An electronic signature was used to authenticate this note.     --ANTHONY Nina - NP on 4/20/2021 at 1:30 PM

## 2021-05-13 ENCOUNTER — ANTI-COAG VISIT (OUTPATIENT)
Dept: PHARMACY | Age: 76
End: 2021-05-13
Payer: MEDICARE

## 2021-05-13 DIAGNOSIS — I48.0 PAF (PAROXYSMAL ATRIAL FIBRILLATION) (HCC): ICD-10-CM

## 2021-05-13 LAB
INTERNATIONAL NORMALIZATION RATIO, POC: 2.4
POC INR: 2.4 INDEX
PROTHROMBIN TIME, POC: 28.5 SECONDS (ref 10–14.3)

## 2021-05-13 PROCEDURE — 99211 OFF/OP EST MAY X REQ PHY/QHP: CPT

## 2021-05-13 PROCEDURE — 36416 COLLJ CAPILLARY BLOOD SPEC: CPT

## 2021-05-13 PROCEDURE — 85610 PROTHROMBIN TIME: CPT

## 2021-06-08 ENCOUNTER — PROCEDURE VISIT (OUTPATIENT)
Dept: CARDIOLOGY CLINIC | Age: 76
End: 2021-06-08
Payer: MEDICARE

## 2021-06-08 DIAGNOSIS — Z95.0 CARDIAC PACEMAKER IN SITU: Primary | ICD-10-CM

## 2021-06-08 PROCEDURE — 93296 REM INTERROG EVL PM/IDS: CPT | Performed by: INTERNAL MEDICINE

## 2021-06-08 PROCEDURE — 93294 REM INTERROG EVL PM/LDLS PM: CPT | Performed by: INTERNAL MEDICINE

## 2021-06-10 ENCOUNTER — ANTI-COAG VISIT (OUTPATIENT)
Dept: PHARMACY | Age: 76
End: 2021-06-10
Payer: MEDICARE

## 2021-06-10 DIAGNOSIS — I48.0 PAF (PAROXYSMAL ATRIAL FIBRILLATION) (HCC): Primary | ICD-10-CM

## 2021-06-10 LAB
INTERNATIONAL NORMALIZATION RATIO, POC: 2.3
POC INR: 2.3 INDEX
PROTHROMBIN TIME, POC: 27.5 SECONDS (ref 10–14.3)

## 2021-06-10 PROCEDURE — 36416 COLLJ CAPILLARY BLOOD SPEC: CPT

## 2021-06-10 PROCEDURE — 99211 OFF/OP EST MAY X REQ PHY/QHP: CPT

## 2021-06-10 PROCEDURE — 85610 PROTHROMBIN TIME: CPT

## 2021-06-10 NOTE — PROGRESS NOTES
Medication Management Service  [facility]  [phone]    Visit Date: 6/10/2021   Subjective:       Cheri Cohen is a 67 y.o. female who presents to clinic today for anticoagulation monitoring and adjustment. Patient seen in clinic for warfarin management due to  Indication:   atrial fibrillation. INR goal: of 2.0-3.0. Duration of therapy: indefinite. Patient reports the following:   Adherent with regimen  Missed or extra doses:  None   Bleeding or thromboembolic side effects:  None  Significant medication changes:  None  Significant dietary changes: None  Significant alcohol or tobacco changes: None  Significant recent illness, disease state changes, or hospitalization:  None  Upcoming surgeries or procedures:  None  Falls: None           Assessment and Plan     PT/INR done in office per protocol. INR today is 2.3, therapeutic. Plan: Will continue current regimen of warfarin 3mg daily except 1.5mg on Thursdays. Recheck INR in 4 week(s). Patient verbalized understanding of dosing directions and information discussed. Dosing schedule given to patient including phone number, appointment date, and time. Progress note sent to referring office. Patient acknowledges working in consult agreement with pharmacist as referred by his/her physician.       Electronically signed by NICCI Pelaez [facility] on 6/10/21 at 10:24 AM EDT    For Pharmacy Admin Tracking Only     Intervention Detail:    Total # of Interventions Recommended:    Total # of Interventions Accepted:    Time Spent (min): 15

## 2021-07-07 RX ORDER — ATORVASTATIN CALCIUM 10 MG/1
TABLET, FILM COATED ORAL
Qty: 30 TABLET | Refills: 5 | Status: SHIPPED | OUTPATIENT
Start: 2021-07-07 | End: 2021-10-19 | Stop reason: SDUPTHER

## 2021-07-07 NOTE — TELEPHONE ENCOUNTER
Patient scheduled to see Dr. Erich Lantigua 8/10. Patient last seen by Marisol Sauer 1 year ago.  Rx routed to [facility] for approval.

## 2021-07-08 ENCOUNTER — ANTI-COAG VISIT (OUTPATIENT)
Dept: PHARMACY | Age: 76
End: 2021-07-08
Payer: MEDICARE

## 2021-07-08 DIAGNOSIS — I48.0 PAF (PAROXYSMAL ATRIAL FIBRILLATION) (HCC): Primary | ICD-10-CM

## 2021-07-08 LAB
INTERNATIONAL NORMALIZATION RATIO, POC: 1.8
POC INR: 1.8 INDEX
PROTHROMBIN TIME, POC: 22 SECONDS (ref 10–14.3)

## 2021-07-08 PROCEDURE — 99212 OFFICE O/P EST SF 10 MIN: CPT

## 2021-07-08 PROCEDURE — 85610 PROTHROMBIN TIME: CPT

## 2021-07-08 PROCEDURE — 36416 COLLJ CAPILLARY BLOOD SPEC: CPT

## 2021-07-08 NOTE — PROGRESS NOTES
Medication Management Service  [facility]  [phone]    Visit Date: 7/8/2021   Subjective:       Izzy Phoenix is a 67 y.o. female who presents to clinic today for anticoagulation monitoring and adjustment. Patient seen in clinic for warfarin management due to  Indication:   atrial fibrillation. INR goal: of 2.0-3.0. Duration of therapy: indefinite. Patient reports the following:   Adherent with regimen  Missed or extra doses:  None   Bleeding or thromboembolic side effects:  None  Significant medication changes:  None  Significant dietary changes: None  Significant alcohol or tobacco changes: None  Significant recent illness, disease state changes, or hospitalization:  None  Upcoming surgeries or procedures:  None  Falls: None           Assessment and Plan     PT/INR done in office per protocol. INR today is 1.8, subtherapeutic, possibly due to increased vitamin K (cabbage). Plan: Take warfarin 3mg today then will continue current regimen of warfarin 3mg daily except 1.5mg on Thursdays. Recheck INR in 3 week(s). Patient verbalized understanding of dosing directions and information discussed. Dosing schedule given to patient including phone number, appointment date, and time. Progress note sent to referring office. Patient acknowledges working in consult agreement with pharmacist as referred by his/her physician.       Electronically signed by Renny Perez, [address] [place] [address] on 7/8/21 at 10:50 AM EDT    For Pharmacy Admin Tracking Only     Intervention Detail: Dose Adjustment: 1, reason: Therapy Optimization   Total # of Interventions Recommended: 1   Total # of Interventions Accepted: 1   Time Spent (min): 15

## 2021-07-29 ENCOUNTER — ANTI-COAG VISIT (OUTPATIENT)
Dept: PHARMACY | Age: 76
End: 2021-07-29
Payer: MEDICARE

## 2021-07-29 DIAGNOSIS — I48.0 PAF (PAROXYSMAL ATRIAL FIBRILLATION) (HCC): Primary | ICD-10-CM

## 2021-07-29 PROCEDURE — 85610 PROTHROMBIN TIME: CPT

## 2021-07-29 PROCEDURE — 36416 COLLJ CAPILLARY BLOOD SPEC: CPT

## 2021-07-29 PROCEDURE — 99211 OFF/OP EST MAY X REQ PHY/QHP: CPT

## 2021-07-29 NOTE — PROGRESS NOTES
Medication Management Service  [facility]  [phone]    Visit Date: 7/29/2021   Subjective:       Izzy Gallo is a 67 y.o. female who presents to clinic today for anticoagulation monitoring and adjustment. Patient seen in clinic for warfarin management due to  Indication:   atrial fibrillation. INR goal: of 2.0-3.0. Duration of therapy: indefinite. Patient reports the following:   Adherent with regimen  Missed or extra doses:  None   Bleeding or thromboembolic side effects:  None  Significant medication changes:  None  Significant dietary changes: None  Significant alcohol or tobacco changes: None  Significant recent illness, disease state changes, or hospitalization:  None  Upcoming surgeries or procedures:  None  Falls: None           Assessment and Plan     PT/INR done in office per protocol. INR today is 2.8, therapeutic. Plan: Will continue current regimen of warfarin 3mg daily except 1.5mg on Thursdays. Recheck INR in 4 week(s). Patient verbalized understanding of dosing directions and information discussed. Dosing schedule given to patient including phone number, appointment date, and time. Progress note sent to referring office. Patient acknowledges working in consult agreement with pharmacist as referred by his/her physician.       Electronically signed by Yasmin Bowman [address] [place] [address] on 7/29/21 at 11:16 AM EDT    For Pharmacy Admin Tracking Only     Intervention Detail:    Total # of Interventions Recommended:    Total # of Interventions Accepted:    Time Spent (min): 15

## 2021-08-10 ENCOUNTER — OFFICE VISIT (OUTPATIENT)
Dept: CARDIOLOGY CLINIC | Age: 76
End: 2021-08-10
Payer: MEDICARE

## 2021-08-10 VITALS
WEIGHT: 140.4 LBS | HEART RATE: 83 BPM | BODY MASS INDEX: 22.03 KG/M2 | HEIGHT: 67 IN | SYSTOLIC BLOOD PRESSURE: 110 MMHG | DIASTOLIC BLOOD PRESSURE: 72 MMHG

## 2021-08-10 DIAGNOSIS — Z95.0 CARDIAC PACEMAKER IN SITU: Primary | ICD-10-CM

## 2021-08-10 DIAGNOSIS — I48.91 ATRIAL FIBRILLATION, UNSPECIFIED TYPE (HCC): ICD-10-CM

## 2021-08-10 DIAGNOSIS — I48.0 PAF (PAROXYSMAL ATRIAL FIBRILLATION) (HCC): ICD-10-CM

## 2021-08-10 DIAGNOSIS — M79.89 SWELLING OF EXTREMITY: ICD-10-CM

## 2021-08-10 PROCEDURE — 1036F TOBACCO NON-USER: CPT | Performed by: INTERNAL MEDICINE

## 2021-08-10 PROCEDURE — 1123F ACP DISCUSS/DSCN MKR DOCD: CPT | Performed by: INTERNAL MEDICINE

## 2021-08-10 PROCEDURE — 4040F PNEUMOC VAC/ADMIN/RCVD: CPT | Performed by: INTERNAL MEDICINE

## 2021-08-10 PROCEDURE — G8420 CALC BMI NORM PARAMETERS: HCPCS | Performed by: INTERNAL MEDICINE

## 2021-08-10 PROCEDURE — 99214 OFFICE O/P EST MOD 30 MIN: CPT | Performed by: INTERNAL MEDICINE

## 2021-08-10 PROCEDURE — G8427 DOCREV CUR MEDS BY ELIG CLIN: HCPCS | Performed by: INTERNAL MEDICINE

## 2021-08-10 PROCEDURE — 1090F PRES/ABSN URINE INCON ASSESS: CPT | Performed by: INTERNAL MEDICINE

## 2021-08-10 PROCEDURE — G8399 PT W/DXA RESULTS DOCUMENT: HCPCS | Performed by: INTERNAL MEDICINE

## 2021-08-10 RX ORDER — METOPROLOL SUCCINATE 50 MG/1
75 TABLET, EXTENDED RELEASE ORAL DAILY
Qty: 135 TABLET | Refills: 3 | Status: SHIPPED | OUTPATIENT
Start: 2021-08-10 | End: 2021-11-04

## 2021-08-10 NOTE — PROGRESS NOTES
History     Chief Complaint:  Vaginal Bleeding      HPI   The patient's significant other served as an  at the bedside as the patient's primary language is not English     Alexsandra Westbrook is a pregnant 25 year old female (A1) unknown GA with positive home pregnancy test on  who presents to the emergency department for evaluation of vaginal bleeding. The patient states that she began to have spotty, red vaginal bleeding last night and this morning with perhaps some tissue passage, prompting her ED visit. She denies any abdominal pain or cramping. The patient is unsure when her last menstrual period was as her cycle is irregular with her history of PCOS. Of note, the patient's first pregnancy was a miscarriage at approximately 8 weeks and she currently has a healthy 15 month ago child from her second pregnancy.     Allergies:  No Known Allergies     Medications:    Ortho Evra  Prenatal Vitamins     Past Medical History:    PCOS    Past Surgical History:    D and C    Family History:    Thyroid disease    Social History:  Presents with her significant other.   Negative for tobacco use.  Negative for alcohol use.  OB: Dr. Corona at [facility].  Marital Status:  Single [1]    Review of Systems   Gastrointestinal: Negative for abdominal pain.   Genitourinary: Positive for vaginal bleeding.   All other systems reviewed and are negative.    Physical Exam   First Vitals:  BP: 122/88  Pulse: 95  Temp: 98.3  F (36.8  C)  Resp: 16  SpO2: 100 %      Physical Exam   HENT:   Right Ear: External ear normal.   Left Ear: External ear normal.   Nose: Nose normal.   Eyes: Right eye exhibits no discharge. Left eye exhibits no discharge. No scleral icterus.   Cardiovascular: Intact distal pulses.    Pulmonary/Chest: Effort normal. No respiratory distress.   Speaking in full sentences   Abdominal: She exhibits no distension. There is no tenderness.   Musculoskeletal:   No peripheral edema    Neurological:   Alert    No  CARDIOLOGY NOTE      8/10/2021    RE: Smitha Shen  (1945)                               TO:  Dr. Marilu Zuluaga MD            Yosef Greenwood is a 67 y.o. female who was seen today for management of  A fib                                    HPI:   Patient is here for    - Atrial fibrillation, pt is  compliant with meds. Patient does not have symptoms from atrial fibrillation  - Hypertension,is  well controlled, pt is  compliant with medicines  - Hyperlipidimea, lipids are in acceptable range.  Pt  is  compliant with medicines  - ppm  Compliant with carelink                The patient does not have cardiac complaints    Smitha Shen has the following history recorded in care path:  Patient Active Problem List    Diagnosis Date Noted    Essential hypertension 12/18/2014    Long term current use of anticoagulant     Acquired hypothyroidism 04/24/2012    PAF ([facility]oxysmal atrial fibrillation) ([facility] 01/27/2011    Gastroesophageal reflux disease without esophagitis 09/08/2015    Anemia 12/22/2014    Gout 09/23/2013    Pacemaker dual chamber 10/24/2012    Sciatica     Mixed hyperlipidemia 10/24/2011    Hip bursitis 04/14/2016    Allergic rhinitis 10/24/2011    Hearing loss of both ears due to cerumen impaction 03/27/2018     Current Outpatient Medications   Medication Sig Dispense Refill    metoprolol succinate (TOPROL XL) 50 MG extended release tablet Take 1.5 tablets by mouth daily 135 tablet 3    atorvastatin (LIPITOR) 10 MG tablet TAKE 1 TABLET BY MOUTH EVERY DAY 30 tablet 5    warfarin (COUMADIN) 3 MG tablet Take 1 tablet by mouth daily Except take 1/2 tablet (1.5mg) on Thursdays 90 tablet 3    levothyroxine (SYNTHROID) 50 MCG tablet Take 1 tablet by mouth every morning (before breakfast) 90 tablet 3    Vitamin D (CHOLECALCIFEROL) 25 MCG (1000 UT) TABS tablet Take 1,000 Units by mouth daily      dilTIAZem (CARDIZEM CD) 180 MG extended release capsule Take 1 capsule by mouth daily 90 capsule 3    Fexofenadine HCl (ALLEGRA ALLERGY PO) Take by mouth      Dexlansoprazole (DEXILANT) 60 MG CPDR Take 1 tablet by mouth daily. No current facility-administered medications for this visit. Allergies: Latex, Darvon [propoxyphene hcl], Demerol, Dilaudid [hydromorphone hcl], Valium, Celecoxib, Norco [hydrocodone-acetaminophen], Norvasc [amlodipine], Oxycodone, Tape [adhesive tape], Vasotec [enalapril], and Diazepam  Past Medical History:   Diagnosis Date    Abnormal echocardiogram     EF 60%, mild aortic indsufficiency, mild pulmonary hypertension    Anemia     Aortic insufficiency     mild per echo 8/24/2010    Atrial fibrillation ([facility]     failed propafenone, Multaq and flecainide - 7/2011 plan for AFib ablation - [facility]- Dr Renetta Humphrey Atrial flutter ([facility]     Bursitis, trochanteric 8/04    s/p injection    Cerumen impaction 4/24/2012    Diverticulosis 2015    Dr. Davie Spain C scope    Diverticulosis of colon 1/2010    Colonoscopy-Dr Reece ACUÑA (degenerative joint disease), cervical     cervical, generalized disc buldge   MRI 3/02    DVT (deep venous thrombosis) ([facility]     Dyslipidemia 2002    Environmental allergies     Family history of colon cancer     mother    Gastric polyp     8/2006, 11/2009 benign- Dr Joy Gonzalez Gastritis 4/2008    mild superficial per Dr Davie Spain    GERD (gastroesophageal reflux disease) 8/2006    Dr Joy Gonzalez H/O cardiovascular stress test 07/13, 4/3/13    07/13 EF58%, No scintigraphic evidence of inducible myocardial ischemia 04/13Normal study. No wall motion abnormality seen. EF 65%    H/O echocardiogram 7/18/13 7/13-EF 50-55% Mild AR.      H/O exercise stress test 02/07/2017    EF63% normal    History of Holter monitoring 9/23/15    48 hour - abnormal holter revealing afib throughout the recording with interspersed pacemaker beats, HR does not appear to be well controlled    History of mammogram     last mammo gross motor or sensory deficits   Skin: Skin is warm.   Psychiatric: She has a normal mood and affect. Judgment normal.   Nursing note and vitals reviewed.        Emergency Department Course   Imaging:  Radiographic findings were communicated with the patient who voiced understanding of the findings.    OB US 1st trimester with transvaginal:  4 mm cystic structure in the endometrium, which, if representative of  a gestational sac, which corresponds to a gestational age of  approximately 5 weeks. Recommend clinical correlation and follow-up  imaging is warranted. As per radiology.     Laboratory:  CBC: WBC: 5.0, HGB: 13.3, PLT: 365  HCG quantitative pregnancy: 8     Surgical Pathology Exam: pending    Emergency Department Course:  Nursing notes and vitals reviewed. 1039  I performed an exam of the patient as documented above.     IV inserted. Medicine administered as documented above. Blood drawn. This was sent to the lab for further testing, results above.    The patient provided a urine sample here in the emergency department. This was sent for laboratory testing, findings above.     The patient was sent for an OB US while in the emergency department, findings above.     1220 I rechecked the patient and discussed the results of her workup thus far.     Findings and plan explained to the Patient. Patient discharged home with instructions regarding supportive care, medications, and reasons to return. The importance of close follow-up was reviewed.     I personally reviewed the laboratory results with the patient and answered all related questions prior to discharge.   Impression & Plan    Medical Decision Making:  Alexsandra Westbrook is a 25 year old female  currently here with positive pregnancy and vaginal bleeding. No significant abdominal pain or signs of secondary blood loss. Due to PCOS, irregular periods and  unknown how many weeks pregnant she may be. Will do an US, beta kendra, CBC. She has previous documentation  7/25/11, Dr. Caity Pat yearly    Hx of colonoscopy     1/21/2010    Hyperlipidemia     Hypertension     Hypothyroidism     Internal hemorrhoid 2015    Dr. Andrade Kim C scope     Intervertebral disc protrusion 5/09    wry neck with C4-5      Left shoulder pain 4/04    (L) shoulder impingement  mild DJD    Long term current use of anticoagulant 07/26/2016    **Coumadin Clinic follows PT/INRs, along w/prescribing pt's Coumadin dosages. Glenice Mood Menstruation     age 11    Pacemaker 194570    dual chamber- checked every 3 months    Pulmonary hypertension ([address]     mild per echo 8/24/2010, Pt refused sleep study (June 2012)    Restless legs 8/03    ferritin    Right shoulder strain 2/03    no seperation, bony contusion anterior humeral head  MRI 3/03    Sciatica 7/09    (R) chronic intermittent s/p epidural injections  Dr Angie Ibarramp Sciatica     Shoulder pain, left March 2011    RTC tendinopathy, type II acrominum, bursitis- referred to Dr. Ke Barreto Shoulder pain, right 3/10, 9/02    impingement, physical thearpy   (Main)  calcific bursitis  s/p injection    Sinus bradycardia     Sinusitis 12/12/2011    Tinnitus 3/02    Vision changes 3/19/2013     Past Surgical History:   Procedure Laterality Date    ABLATION OF DYSRHYTHMIC FOCUS      BREAST BIOPSY      BREAST SURGERY  1985    Right breast tumor excised    CARDIOVERSION      1/2008 Dr Twin Davis; 12/2008    CARDIOVERSION  03/10/2014    [address]    CHOLECYSTECTOMY, LAPAROSCOPIC  2/01    Dr Lexa Mcneil      [phone] (Dr Andrade Kim)   [address] HYSTERECTOMY  2003   [facility]      6/23/2012   [place] SINUS SURGERY  77    Dr Efrain George  4/03    fibroid      Dr Enrrique Hayes  8/06    Dr Laurel Lakhani ECHOCARDIOGRAM  6/2012    transthoracic cardioversion-Dr. Radha Apodaca    UPPER GASTROINTESTINAL ENDOSCOPY  4/08    mild superficial gastritis  Dr Andrade Kim; 2009      As reviewed noting Rh positive, so no indication for Rhogam. Differential is certain miscarriage verus ectopic pregnancy.     US shows possible endometrial cyst versus very early gestational sac. No other fetal parts seen. HCG very low at 8. Hemoglobin stable. In her urine specimen, there was a small amount of tissue that we will sent to the lab to see if this is product. She was discharged to home. They understand that this is threatened miscarriage versus ectopic pregnancy and that we will need time and repeat US and HCG to determine the ultimate fate of this pregnancy. They are comfortable and in agreement with that plan and were discharged to home.     Diagnosis:    ICD-10-CM   1. Threatened miscarriage in early pregnancy O20.0       Disposition:  discharged to home    IAleida, am serving as a scribe on 8/12/2017 at 10:39 AM to personally document services performed by Charly Dillard MD based on my observations and the provider's statements to me.     Aleida Mcarthur  8/12/2017   [facility] EMERGENCY DEPARTMENT       Charly Dillard MD  08/12/17 [id number]     Family History   Problem Relation Age of Onset   Adwoa Newman Stroke Mother     Heart Disease Mother     Coronary Art Dis Mother 66        CABG    Colon Cancer Mother [de-identified]        colon     Heart Disease Father     Coronary Art Dis Father 62        CABG    Migraines Sister     Seizures Brother     Breast Cancer Maternal Cousin         under 48     Social History     Tobacco Use    Smoking status: Never Smoker    Smokeless tobacco: Never Used    Tobacco comment: reviewed 11/4/15   Substance Use Topics    Alcohol use: No      Review of Systems:    Constitutional: Negative for diaphoresis and fatigue  Psychological:Negative for anxiety or depression  HENT: Negative for headaches, nasal congestion, sinus pain or vertigo  Eyes: Negative for visual disturbance. Endocrine: Negative for polydipsia/polyuria  Respiratory: Negative for shortness of breath  Cardiovascular: Negative for chest pain, dyspnea on exertion, claudication, edema, irregular heartbeat, murmur, palpitations or shortness of breath  Gastrointestinal: Negative for abdominal pain or heartburn  Genito-Urinary: Negative for urinary frequency/urgency  Musculoskeletal: Negative for muscle pain, muscular weakness, negative for pain in arm and leg or swelling in foot and leg  Neurological: Negative for dizziness, headaches, memory loss, numbness/tingling, visual changes, syncope  Dermatological: Negative for rash    Objective:  /72   Pulse 83   Ht 5' 7\" (1.702 m)   Wt 140 lb 6.4 oz (63.7 kg)   BMI 21.99 kg/m²   Wt Readings from Last 3 Encounters:   08/10/21 140 lb 6.4 oz (63.7 kg)   06/29/21 143 lb (64.9 kg)   04/15/21 145 lb 3.2 oz (65.9 kg)     Body mass index is 21.99 kg/m². GENERAL - Alert, oriented, pleasant, in no apparent distress. EYES: No jaundice, no conjunctival pallor. SKIN: It is warm & dry. No rashes. No Echhymosis    HEENT - No clinically significant abnormalities seen. Neck - Supple. No jugular venous distention noted.  No carotid hyperlipidemia E78.2    Allergic rhinitis J30.9    Acquired hypothyroidism E03.9    Long term current use of anticoagulant Z79.01    Pacemaker dual chamber Z95.0    Sciatica M54.30    Gout M10.9    Essential hypertension I10    Anemia D64.9    Gastroesophageal reflux disease without esophagitis K21.9    Hip bursitis M70.70    Hearing loss of both ears due to cerumen impaction H61.23       Assessment & Plan:    -  Hypertension: Patients blood pressure is normal. Patient is advised about low sodium diet. Present medical regimen will not be changed. on cardizem         - Atrial fibrillation, pt is  compliant with meds. Patient does not have symptoms from atrial fibrillation     On coumadin        -  LIPID MANAGEMENT:  Available lipid  lab data reviewed  and patient was given dietary advice. NCEP- ATP III guidelines reviewed with patient.     -   Changes  in medicines made: No     On lipitor                    - swelling of ext: v doppler       - ppm  On carelink, has pain at PPm site        Nate Carvalho MA  [facility]

## 2021-08-10 NOTE — LETTER
Linda Sahu  1945  [id number]    Have you had any Chest Pain that is not new? - No     DO EKG IF: Patient has a Heart Rate above 100 or below 40     CAD (Coronary Artery Disease) patient should have one on file every 6 months        Have you had any Shortness of Breath - Yes  If Yes - When on exertion    Have you had any dizziness - No       Sitting wait 5 minutes do supine (laying down) wait 5 minutes then do standing - log each in \"vitals\" area in Epic   Be sure to ask what symptoms they are having if they get dizzy while completing ortho stats such as: room spinning, nausea, etc.    Have you had any palpitations that are not new? - No      Do you have any edema - swelling in No            When did you have your last labs drawn       Where did you have them done   What doctor ordered     If we do not have these labs you are retrieve these labs for these providers!     Do you have a surgery or procedure scheduled in the near future - No

## 2021-08-10 NOTE — PATIENT INSTRUCTIONS
**It is YOUR responsibilty to bring medication bottles and/or updated medication list to [address] [place] [address]. This will allow us to better serve you and all your healthcare needs**    Please be informed that if you contact our office outside of normal business hours the physician on call cannot help with any scheduling or rescheduling issues, procedure instruction questions or any type of medication issue. We advise you for any urgent/emergency that you go to the nearest emergency room!     PLEASE CALL OUR OFFICE DURING NORMAL BUSINESS HOURS    Monday - Friday   8 am to 5 pm    [place]: [place] [address]: [phone]    [place]:  [phone]

## 2021-08-26 ENCOUNTER — ANTI-COAG VISIT (OUTPATIENT)
Dept: PHARMACY | Age: 76
End: 2021-08-26
Payer: MEDICARE

## 2021-08-26 DIAGNOSIS — I48.0 PAF (PAROXYSMAL ATRIAL FIBRILLATION) (HCC): Primary | ICD-10-CM

## 2021-08-26 LAB
INTERNATIONAL NORMALIZATION RATIO, POC: 2.7
POC INR: 2.7 INDEX
PROTHROMBIN TIME, POC: 32.2 SECONDS (ref 10–14.3)

## 2021-08-26 PROCEDURE — 85610 PROTHROMBIN TIME: CPT

## 2021-08-26 PROCEDURE — 99211 OFF/OP EST MAY X REQ PHY/QHP: CPT

## 2021-08-26 PROCEDURE — 36416 COLLJ CAPILLARY BLOOD SPEC: CPT

## 2021-08-26 NOTE — PROGRESS NOTES
Medication Management Service  [facility]  [phone]    Visit Date: 8/26/2021   Subjective:       Do Jacobs is a 67 y.o. female who presents to clinic today for anticoagulation monitoring and adjustment. Patient seen in clinic for warfarin management due to  Indication:   atrial fibrillation. INR goal: of 2.0-3.0. Duration of therapy: indefinite. Patient reports the following:   Adherent with regimen  Missed or extra doses:  None   Bleeding or thromboembolic side effects:  None  Significant medication changes:  None  Significant dietary changes: None  Significant alcohol or tobacco changes: None  Significant recent illness, disease state changes, or hospitalization:  None  Upcoming surgeries or procedures:  None  Falls: None           Assessment and Plan     PT/INR done in office per protocol. INR today is 2.7, therapeutic. Plan: Will continue current regimen of warfarin 3mg daily except 1.5mg on Thursdays. Recheck INR in 4 week(s). Patient verbalized understanding of dosing directions and information discussed. Dosing schedule given to patient including phone number, appointment date, and time. Progress note sent to referring office. Patient acknowledges working in consult agreement with pharmacist as referred by his/her physician.       Electronically signed by Gamaliel Jain [facility] on 8/26/21 at 9:32 AM EDT    For Pharmacy Admin Tracking Only     Intervention Detail:    Total # of Interventions Recommended:    Total # of Interventions Accepted:    Time Spent (min): 15

## 2021-08-31 ENCOUNTER — TELEPHONE (OUTPATIENT)
Dept: CARDIOLOGY CLINIC | Age: 76
End: 2021-08-31

## 2021-08-31 ENCOUNTER — PROCEDURE VISIT (OUTPATIENT)
Dept: CARDIOLOGY CLINIC | Age: 76
End: 2021-08-31
Payer: MEDICARE

## 2021-08-31 DIAGNOSIS — M79.89 SWELLING OF EXTREMITY: ICD-10-CM

## 2021-08-31 PROCEDURE — 93970 EXTREMITY STUDY: CPT | Performed by: INTERNAL MEDICINE

## 2021-09-01 ENCOUNTER — TELEPHONE (OUTPATIENT)
Dept: CARDIOLOGY CLINIC | Age: 76
End: 2021-09-01

## 2021-09-01 NOTE — TELEPHONE ENCOUNTER
Pt would like a call back, on results of test. States she did not quite understand it. Please advise.

## 2021-09-14 ENCOUNTER — PROCEDURE VISIT (OUTPATIENT)
Dept: CARDIOLOGY CLINIC | Age: 76
End: 2021-09-14

## 2021-09-14 ENCOUNTER — PROCEDURE VISIT (OUTPATIENT)
Dept: CARDIOLOGY CLINIC | Age: 76
End: 2021-09-14
Payer: MEDICARE

## 2021-09-14 DIAGNOSIS — Z95.0 CARDIAC PACEMAKER IN SITU: Primary | ICD-10-CM

## 2021-09-14 DIAGNOSIS — I10 ESSENTIAL HYPERTENSION: Primary | ICD-10-CM

## 2021-09-14 PROCEDURE — 93296 REM INTERROG EVL PM/IDS: CPT | Performed by: INTERNAL MEDICINE

## 2021-09-14 PROCEDURE — 93294 REM INTERROG EVL PM/LDLS PM: CPT | Performed by: INTERNAL MEDICINE

## 2021-09-21 RX ORDER — DILTIAZEM HYDROCHLORIDE 180 MG/1
CAPSULE, COATED, EXTENDED RELEASE ORAL
Qty: 90 CAPSULE | Refills: 3 | Status: SHIPPED | OUTPATIENT
Start: 2021-09-21 | End: 2022-03-19 | Stop reason: SDUPTHER

## 2021-09-23 ENCOUNTER — ANTI-COAG VISIT (OUTPATIENT)
Dept: PHARMACY | Age: 76
End: 2021-09-23
Payer: MEDICARE

## 2021-09-23 DIAGNOSIS — I48.0 PAF (PAROXYSMAL ATRIAL FIBRILLATION) (HCC): Primary | ICD-10-CM

## 2021-09-23 LAB
INTERNATIONAL NORMALIZATION RATIO, POC: 2.2
POC INR: 2.2 INDEX
PROTHROMBIN TIME, POC: 26.6 SECONDS (ref 10–14.3)

## 2021-09-23 PROCEDURE — 99211 OFF/OP EST MAY X REQ PHY/QHP: CPT

## 2021-09-23 PROCEDURE — 36416 COLLJ CAPILLARY BLOOD SPEC: CPT

## 2021-09-23 PROCEDURE — 85610 PROTHROMBIN TIME: CPT

## 2021-10-19 ENCOUNTER — OFFICE VISIT (OUTPATIENT)
Dept: FAMILY MEDICINE CLINIC | Age: 76
End: 2021-10-19
Payer: MEDICARE

## 2021-10-19 VITALS
HEIGHT: 67 IN | DIASTOLIC BLOOD PRESSURE: 64 MMHG | WEIGHT: 140.6 LBS | OXYGEN SATURATION: 97 % | HEART RATE: 91 BPM | SYSTOLIC BLOOD PRESSURE: 112 MMHG | BODY MASS INDEX: 22.07 KG/M2

## 2021-10-19 DIAGNOSIS — Z79.899 HIGH RISK MEDICATION USE: ICD-10-CM

## 2021-10-19 DIAGNOSIS — E55.9 VITAMIN D DEFICIENCY: ICD-10-CM

## 2021-10-19 DIAGNOSIS — E03.9 ACQUIRED HYPOTHYROIDISM: ICD-10-CM

## 2021-10-19 DIAGNOSIS — R14.3 EXCESSIVE FLATUS: ICD-10-CM

## 2021-10-19 DIAGNOSIS — E78.2 MIXED HYPERLIPIDEMIA: ICD-10-CM

## 2021-10-19 DIAGNOSIS — Z23 NEEDS FLU SHOT: ICD-10-CM

## 2021-10-19 DIAGNOSIS — G31.84 MILD COGNITIVE IMPAIRMENT WITH MEMORY LOSS: Primary | ICD-10-CM

## 2021-10-19 LAB
A/G RATIO: 1.7 (ref 1.1–2.2)
ALBUMIN SERPL-MCNC: 4.6 G/DL (ref 3.4–5)
ALP BLD-CCNC: 61 U/L (ref 40–129)
ALT SERPL-CCNC: 23 U/L (ref 10–40)
ANION GAP SERPL CALCULATED.3IONS-SCNC: 13 MMOL/L (ref 3–16)
AST SERPL-CCNC: 25 U/L (ref 15–37)
BILIRUB SERPL-MCNC: 1.6 MG/DL (ref 0–1)
BUN BLDV-MCNC: 16 MG/DL (ref 7–20)
CALCIUM SERPL-MCNC: 9.8 MG/DL (ref 8.3–10.6)
CHLORIDE BLD-SCNC: 103 MMOL/L (ref 99–110)
CO2: 25 MMOL/L (ref 21–32)
CREAT SERPL-MCNC: 0.8 MG/DL (ref 0.6–1.2)
GFR AFRICAN AMERICAN: >60
GFR NON-AFRICAN AMERICAN: >60
GLOBULIN: 2.7 G/DL
GLUCOSE BLD-MCNC: 84 MG/DL (ref 70–99)
HCT VFR BLD CALC: 46.5 % (ref 36–48)
HEMOGLOBIN: 15.6 G/DL (ref 12–16)
MCH RBC QN AUTO: 29.7 PG (ref 26–34)
MCHC RBC AUTO-ENTMCNC: 33.5 G/DL (ref 31–36)
MCV RBC AUTO: 88.8 FL (ref 80–100)
PDW BLD-RTO: 14.5 % (ref 12.4–15.4)
PLATELET # BLD: 130 K/UL (ref 135–450)
PMV BLD AUTO: 10.4 FL (ref 5–10.5)
POTASSIUM SERPL-SCNC: 4.9 MMOL/L (ref 3.5–5.1)
RBC # BLD: 5.24 M/UL (ref 4–5.2)
SODIUM BLD-SCNC: 141 MMOL/L (ref 136–145)
TOTAL PROTEIN: 7.3 G/DL (ref 6.4–8.2)
TSH SERPL DL<=0.05 MIU/L-ACNC: 3.76 UIU/ML (ref 0.27–4.2)
WBC # BLD: 5.8 K/UL (ref 4–11)

## 2021-10-19 PROCEDURE — 1090F PRES/ABSN URINE INCON ASSESS: CPT | Performed by: FAMILY MEDICINE

## 2021-10-19 PROCEDURE — G0008 ADMIN INFLUENZA VIRUS VAC: HCPCS | Performed by: FAMILY MEDICINE

## 2021-10-19 PROCEDURE — G8427 DOCREV CUR MEDS BY ELIG CLIN: HCPCS | Performed by: FAMILY MEDICINE

## 2021-10-19 PROCEDURE — 90694 VACC AIIV4 NO PRSRV 0.5ML IM: CPT | Performed by: FAMILY MEDICINE

## 2021-10-19 PROCEDURE — 1123F ACP DISCUSS/DSCN MKR DOCD: CPT | Performed by: FAMILY MEDICINE

## 2021-10-19 PROCEDURE — G8399 PT W/DXA RESULTS DOCUMENT: HCPCS | Performed by: FAMILY MEDICINE

## 2021-10-19 PROCEDURE — 99213 OFFICE O/P EST LOW 20 MIN: CPT | Performed by: FAMILY MEDICINE

## 2021-10-19 PROCEDURE — 1036F TOBACCO NON-USER: CPT | Performed by: FAMILY MEDICINE

## 2021-10-19 PROCEDURE — G8484 FLU IMMUNIZE NO ADMIN: HCPCS | Performed by: FAMILY MEDICINE

## 2021-10-19 PROCEDURE — G8420 CALC BMI NORM PARAMETERS: HCPCS | Performed by: FAMILY MEDICINE

## 2021-10-19 PROCEDURE — 4040F PNEUMOC VAC/ADMIN/RCVD: CPT | Performed by: FAMILY MEDICINE

## 2021-10-19 RX ORDER — ATORVASTATIN CALCIUM 10 MG/1
TABLET, FILM COATED ORAL
Qty: 90 TABLET | Refills: 3 | Status: ON HOLD | OUTPATIENT
Start: 2021-10-19 | End: 2022-05-10 | Stop reason: SDUPTHER

## 2021-10-19 NOTE — Clinical Note
[facility]  [address] [facility],[address] [facility]  Phone: [phone]  Fax: [phone]    Charlene Beckford MD        October 19, 2021     Patient: Sergio Triana   YOB: 1945   Date of Visit: 10/19/2021       To Whom It May Concern: It is my medical opinion that Sergio Triana {Work release (duty restriction):11499}. If you have any questions or concerns, please don't hesitate to call.     Sincerely,        Charlene Beckford MD

## 2021-10-19 NOTE — PATIENT INSTRUCTIONS
Patient Education        Influenza (Flu) Vaccine (Inactivated or Recombinant): What You Need to Know  Why get vaccinated? Influenza vaccine can prevent influenza (flu). Flu is a contagious disease that spreads around the [facility] every year, usually between October and May. Anyone can get the flu, but it is more dangerous for some people. Infants and young children, people 72years of age and older, pregnant women, and people with certain health conditions or a weakened immune system are at greatest risk of flu complications. Pneumonia, bronchitis, sinus infections and ear infections are examples of flu-related complications. If you have a medical condition, such as heart disease, cancer or diabetes, flu can make it worse. Flu can cause fever and chills, sore throat, muscle aches, fatigue, cough, headache, and runny or stuffy nose. Some people may have vomiting and diarrhea, though this is more common in children than adults. Each year, thousands of people in the [facility] die from flu, and many more are hospitalized. Flu vaccine prevents millions of illnesses and flu-related visits to the doctor each year. Influenza vaccine  CDC recommends everyone 10months of age and older get vaccinated every flu season. Children 6 months through 6years of age may need 2 doses during a single flu season. Everyone else needs only 1 dose each flu season. It takes about 2 weeks for protection to develop after vaccination. There are many flu viruses, and they are always changing. Each year a new flu vaccine is made to protect against three or four viruses that are likely to cause disease in the upcoming flu season. Even when the vaccine doesn't exactly match these viruses, it may still provide some protection. Influenza vaccine does not cause flu. Influenza vaccine may be given at the same time as other vaccines.   Talk with your health care provider  Tell your vaccine provider if the person getting the vaccine:  · Has had an allergic reaction after a previous dose of influenza vaccine, or has any severe, life-threatening allergies. · Has ever had Guillain-Barré Syndrome (also called GBS). In some cases, your health care provider may decide to postpone influenza vaccination to a future visit. People with minor illnesses, such as a cold, may be vaccinated. People who are moderately or severely ill should usually wait until they recover before getting influenza vaccine. Your health care provider can give you more information. Risks of a vaccine reaction  · Soreness, redness, and swelling where shot is given, fever, muscle aches, and headache can happen after influenza vaccine. · There may be a very small increased risk of Guillain-Barré Syndrome (GBS) after inactivated influenza vaccine (the flu shot). Mere Josefina children who get the flu shot along with pneumococcal vaccine (PCV13), and/or DTaP vaccine at the same time might be slightly more likely to have a seizure caused by fever. Tell your health care provider if a child who is getting flu vaccine has ever had a seizure. People sometimes faint after medical procedures, including vaccination. Tell your provider if you feel dizzy or have vision changes or ringing in the ears. As with any medicine, there is a very remote chance of a vaccine causing a severe allergic reaction, other serious injury, or death. What if there is a serious problem? An allergic reaction could occur after the vaccinated person leaves the clinic. If you see signs of a severe allergic reaction (hives, swelling of the face and throat, difficulty breathing, a fast heartbeat, dizziness, or weakness), call 9-1-1 and get the person to the nearest hospital.  For other signs that concern you, call your health care provider. Adverse reactions should be reported to the Vaccine Adverse Event Reporting System (VAERS).  Your health care provider will usually file this report, or you can do it yourself. Visit the VAERS website at www.vaers. hhs.gov or call 5-429.851.3552. VAERS is only for reporting reactions, and VAERS staff do not give medical advice. The National Vaccine Injury Compensation Program  The National Vaccine Injury Compensation Program (VICP) is a federal program that was created to compensate people who may have been injured by certain vaccines. Visit the VICP website at www.[facility].gov/vaccinecompensation or call 3-779.836.8125 to learn about the program and about filing a claim. There is a time limit to file a claim for compensation. How can I learn more? · Ask your healthcare provider. · Call your local or state health department. · Contact the Centers for Disease Control and Prevention (CDC):  ? Call 8-923.789.6580 (1-800-CDC-INFO) or  ? Visit CDC's website at www.cdc.gov/flu  Vaccine Information Statement (Interim)  Inactivated Influenza Vaccine  8/15/2019  [address] [id number]  Department of Health and Human Services  Centers for Disease Control and Prevention  Many Vaccine Information Statements are available in [place] and other languages. See www.immunize.org/vis. Muchas hojas de información sobre vacunas están disponibles en español y en otros idiomas. Visite www.immunize.org/vis. Care instructions adapted under license by [facility]. If you have questions about a medical condition or this instruction, always ask your healthcare professional. [place] [address] any warranty or liability for your use of this information.

## 2021-10-19 NOTE — PROGRESS NOTES
Plan:       1. Mild cognitive impairment with memory loss  -     [facility]FL - Shawn Rowland, CNP, Neurology, [place]  2. Acquired hypothyroidism  -     Comprehensive Metabolic Panel; Future  -     CBC; Future  -     VITAMIN B12 & FOLATE; Future  -     TSH without Reflex; Future  3. High risk medication use  -     VITAMIN B12 & FOLATE; Future  4. Vitamin D deficiency  -     Vitamin D 25 Hydroxy; Future  5. Mixed hyperlipidemia  -     atorvastatin (LIPITOR) 10 MG tablet; TAKE 1 TABLET BY MOUTH EVERY DAY, Disp-90 tablet, R-3Normal  6. Needs flu shot  -     INFLUENZA, QUADV, ADJUVANTED, 65 YRS =, IM, PF, PREFILL SYR, 0.5ML (FLUAD)  7. Excessive flatus  discussed low residual diet and avoid dairy (patient drinking chocolate milk, eating ice cream daily) consider imaging or referral if worsens          All patient questions answered. Pt voiced understanding. Return in about 6 months (around 4/19/2022) for Medicare Wellness. -----------------------------------------------------------------------------------------------            Chief Complaint   Patient presents with    6 Month Follow-Up    Memory Loss      notices that it is getting worse    Gas     having a lot        Patient and Other: and  complaints of patient's change in cognitive issues issues to include: changes in short term memory and repetition of questions, driving. Patient was very frustrated weeks ago when she tried to start the car and didn't even know the steps to do it. Patient denies any wandering, medication errors, paranoia, suspiciousness, hallucinations, delusional thinking and agitation. Previous work up to date: none. Previous treatments tried: none,.      Current or history of mood disorder?: no  Family history of Alzhiemers or other dementia?: no    Last MRI brain without contrast: n/a    Cognitive Domain Questions:    Complex Attention:  Normal, routine tasks take longer: yes  Difficulty in completing tasks when multiple stimuli are present : yes  Difficulty in maintaining information while completing task : yes  Work requires more overview/rechecking than before: yes    Executive Function:  Difficulty in completing previously familiar multistep tasks, likepreparing a meal: yes     No longer wanting to participate in activities of the home: no   Difficulty in completing activities or tasks because of easy distractibility: no  Social outings become more taxing and less enjoyable: no    Language:  Difficulty finding the correct words: yes   Using general pronouns regularly instead of names: yes   Mispronunciation of words: no  Problems with understanding verbal and written communication: no    Learning and Memory:   Forgetting to buy items or buying the same items multiple times at the store: no  Repetition in conversations: yes   Difficulty in recalling recent events: yes  Relying on lists of tasks to complete; forgetting to pay bills: yes    Perceptual-Motor:   Difficulty in using familiar technology, tools, or kitchen appliances: yes   Getting lost in familiar environments: no    Social Cognition:  Apathy: no  Increase in inappropriate behaviors: no  Loss of empathy: no  Impaired judgment: no     ROS: Pertinent items are noted in HPI. All other ROS negative     reports that she has never smoked. She has never used smokeless tobacco.      Physical Exam   Nursing note reviewed  /64 (Site: Left Upper Arm, Position: Sitting, Cuff Size: Medium Adult)   Pulse 91   Ht 5' 7\" (1.702 m)   Wt 140 lb 9.6 oz (63.8 kg)   SpO2 97%   BMI 22.02 kg/m²   BP Readings from Last 3 Encounters:   10/19/21 112/64   08/10/21 110/72   04/15/21 106/68     Wt Readings from Last 3 Encounters:   10/19/21 140 lb 9.6 oz (63.8 kg)   08/10/21 140 lb 6.4 oz (63.7 kg)   06/29/21 143 lb (64.9 kg)     No results found for this visit on 10/19/21.     General appearance: cooperative, here with   Neck: supple, symmetrical, trachea midline  Lungs: clear to auscultation bilaterally  Heart: regular rate and rhythm, S1, S2 normal,  Abdomen:  bowel sounds normal and soft, non-tender  MSK no LE edema  Skin: Skin color, texture, turgor normal. No rashes or lesions  Neurologic: Grossly normal   Psych: Alert and oriented, appropriate affect.  Crying when discussing her frustrations with memory loss but consolable    Assessment and Plan: See above

## 2021-10-20 LAB
FOLATE: 14.08 NG/ML (ref 4.78–24.2)
VITAMIN B-12: 471 PG/ML (ref 211–911)
VITAMIN D 25-HYDROXY: 28.1 NG/ML

## 2021-10-21 ENCOUNTER — ANTI-COAG VISIT (OUTPATIENT)
Dept: PHARMACY | Age: 76
End: 2021-10-21
Payer: MEDICARE

## 2021-10-21 DIAGNOSIS — I48.0 PAF (PAROXYSMAL ATRIAL FIBRILLATION) (HCC): Primary | ICD-10-CM

## 2021-10-21 LAB
INTERNATIONAL NORMALIZATION RATIO, POC: 2.7
POC INR: 2.7 INDEX
PROTHROMBIN TIME, POC: 32.3 SECONDS (ref 10–14.3)

## 2021-10-21 PROCEDURE — 36416 COLLJ CAPILLARY BLOOD SPEC: CPT

## 2021-10-21 PROCEDURE — 99211 OFF/OP EST MAY X REQ PHY/QHP: CPT

## 2021-10-21 PROCEDURE — 85610 PROTHROMBIN TIME: CPT

## 2021-10-26 ENCOUNTER — TELEPHONE (OUTPATIENT)
Dept: FAMILY MEDICINE CLINIC | Age: 76
End: 2021-10-26

## 2021-10-27 ENCOUNTER — TELEPHONE (OUTPATIENT)
Dept: FAMILY MEDICINE CLINIC | Age: 76
End: 2021-10-27

## 2021-10-29 NOTE — TELEPHONE ENCOUNTER
I phoned patient at listed number    No Answer.  Left message that there was nothing urgent to be done from her lab results at this time but I'll try an call her again after the weekend

## 2021-11-02 DIAGNOSIS — I48.0 PAF (PAROXYSMAL ATRIAL FIBRILLATION) (HCC): ICD-10-CM

## 2021-11-04 RX ORDER — METOPROLOL SUCCINATE 50 MG/1
TABLET, EXTENDED RELEASE ORAL
Qty: 135 TABLET | Refills: 3 | Status: SHIPPED | OUTPATIENT
Start: 2021-11-04 | End: 2021-11-29 | Stop reason: SDUPTHER

## 2021-11-12 ENCOUNTER — TELEPHONE (OUTPATIENT)
Dept: FAMILY MEDICINE CLINIC | Age: 76
End: 2021-11-12

## 2021-11-12 DIAGNOSIS — Z79.01 LONG TERM CURRENT USE OF ANTICOAGULANT: Primary | ICD-10-CM

## 2021-11-12 DIAGNOSIS — I48.0 PAF (PAROXYSMAL ATRIAL FIBRILLATION) (HCC): ICD-10-CM

## 2021-11-12 NOTE — TELEPHONE ENCOUNTER
Patient referral to [facility]madin Clinic is about to . Coumadin Clinic requesting a new order be put in.  [id number] - [place]

## 2021-11-18 ENCOUNTER — ANTI-COAG VISIT (OUTPATIENT)
Dept: PHARMACY | Age: 76
End: 2021-11-18
Payer: MEDICARE

## 2021-11-18 DIAGNOSIS — I48.0 PAF (PAROXYSMAL ATRIAL FIBRILLATION) (HCC): Primary | ICD-10-CM

## 2021-11-18 LAB
INTERNATIONAL NORMALIZATION RATIO, POC: 2.6
POC INR: 2.6 INDEX
PROTHROMBIN TIME, POC: 31.4 SECONDS (ref 10–14.3)

## 2021-11-18 PROCEDURE — 99211 OFF/OP EST MAY X REQ PHY/QHP: CPT

## 2021-11-18 PROCEDURE — 85610 PROTHROMBIN TIME: CPT

## 2021-11-18 PROCEDURE — 36416 COLLJ CAPILLARY BLOOD SPEC: CPT

## 2021-11-29 DIAGNOSIS — I48.0 PAF (PAROXYSMAL ATRIAL FIBRILLATION) (HCC): ICD-10-CM

## 2021-11-29 RX ORDER — METOPROLOL SUCCINATE 50 MG/1
TABLET, EXTENDED RELEASE ORAL
Qty: 135 TABLET | Refills: 3 | Status: ON HOLD | OUTPATIENT
Start: 2021-11-29 | End: 2022-05-01 | Stop reason: HOSPADM

## 2021-12-16 ENCOUNTER — ANTI-COAG VISIT (OUTPATIENT)
Dept: PHARMACY | Age: 76
End: 2021-12-16
Payer: MEDICARE

## 2021-12-16 DIAGNOSIS — Z79.01 LONG TERM CURRENT USE OF ANTICOAGULANT: ICD-10-CM

## 2021-12-16 DIAGNOSIS — I48.0 PAF (PAROXYSMAL ATRIAL FIBRILLATION) (HCC): Primary | ICD-10-CM

## 2021-12-16 LAB
INTERNATIONAL NORMALIZATION RATIO, POC: 2.6
POC INR: 2.6 INDEX
PROTHROMBIN TIME, POC: 30.6 SECONDS (ref 10–14.3)

## 2021-12-16 PROCEDURE — 36416 COLLJ CAPILLARY BLOOD SPEC: CPT

## 2021-12-16 PROCEDURE — 99212 OFFICE O/P EST SF 10 MIN: CPT

## 2021-12-16 PROCEDURE — 85610 PROTHROMBIN TIME: CPT

## 2021-12-16 RX ORDER — WARFARIN SODIUM 3 MG/1
3 TABLET ORAL DAILY
Qty: 90 TABLET | Refills: 3 | Status: SHIPPED | OUTPATIENT
Start: 2021-12-16 | End: 2022-03-21

## 2021-12-16 NOTE — PROGRESS NOTES
Medication Management Service  [facility]  [phone]    Visit Date: 12/16/2021   Subjective:       Waqar Cabrera is a 67 y.o. female who presents to clinic today for anticoagulation monitoring and adjustment. Patient seen in clinic for warfarin management due to  Indication:   atrial fibrillation. INR goal: of 2.0-3.0. Duration of therapy: indefinite. Patient reports the following:   Adherent with regimen  Missed or extra doses:  None   Bleeding or thromboembolic side effects:  None  Significant medication changes:  None  Significant dietary changes: None  Significant alcohol or tobacco changes: None  Significant recent illness, disease state changes, or hospitalization:  None  Upcoming surgeries or procedures:  None  Falls: None           Assessment and Plan     PT/INR done in office per protocol. INR today is 2.6, therapeutic. Plan: Will continue current regimen of warfarin 3mg daily except 1.5mg on Thursdays. Recheck INR in 4 week(s). Patient verbalized understanding of dosing directions and information discussed. Dosing schedule given to patient including phone number, appointment date, and time. Progress note sent to referring office. Patient acknowledges working in consult agreement with pharmacist as referred by his/her physician.       Electronically signed by Idalia Klinefelter, [facility] on 12/16/21 at 10:21 AM EST    For Pharmacy Admin Tracking Only     Intervention Detail: Refill(s) Provided   Total # of Interventions Recommended: 1   Total # of Interventions Accepted: 1   Time Spent (min): 15

## 2021-12-20 ENCOUNTER — PROCEDURE VISIT (OUTPATIENT)
Dept: CARDIOLOGY CLINIC | Age: 76
End: 2021-12-20
Payer: MEDICARE

## 2021-12-20 ENCOUNTER — TELEPHONE (OUTPATIENT)
Dept: CARDIOLOGY CLINIC | Age: 76
End: 2021-12-20

## 2021-12-20 DIAGNOSIS — Z95.0 CARDIAC PACEMAKER IN SITU: Primary | ICD-10-CM

## 2021-12-20 PROCEDURE — 93296 REM INTERROG EVL PM/IDS: CPT | Performed by: INTERNAL MEDICINE

## 2021-12-20 PROCEDURE — 93294 REM INTERROG EVL PM/LDLS PM: CPT | Performed by: INTERNAL MEDICINE

## 2021-12-20 NOTE — LETTER
[address]  [address] [id number]  Phone: [phone]  Fax: [phone]            December 20, 2021    Vasyl Kay  [address] [place] [id number]      Dear Hector Meckel: This is your CARELINK schedule. Please sandra your calendar with these dates. You can do your checks anytime during the scheduled day. Since we do not do reminder calls, it will be your responsibility to perform the checks on the day it is scheduled. If you have any questions or concerns, please call and ask for Michelle Menendez at [phone].

## 2022-01-04 ENCOUNTER — OFFICE VISIT (OUTPATIENT)
Dept: NEUROLOGY | Age: 77
End: 2022-01-04
Payer: MEDICARE

## 2022-01-04 VITALS
DIASTOLIC BLOOD PRESSURE: 70 MMHG | BODY MASS INDEX: 22.07 KG/M2 | HEIGHT: 67 IN | HEART RATE: 82 BPM | OXYGEN SATURATION: 98 % | WEIGHT: 140.6 LBS | SYSTOLIC BLOOD PRESSURE: 124 MMHG

## 2022-01-04 DIAGNOSIS — R48.2 APRAXIA: Primary | ICD-10-CM

## 2022-01-04 DIAGNOSIS — R47.02 EXPRESSIVE DYSPHASIA: ICD-10-CM

## 2022-01-04 PROCEDURE — G8399 PT W/DXA RESULTS DOCUMENT: HCPCS | Performed by: PSYCHIATRY & NEUROLOGY

## 2022-01-04 PROCEDURE — G8484 FLU IMMUNIZE NO ADMIN: HCPCS | Performed by: PSYCHIATRY & NEUROLOGY

## 2022-01-04 PROCEDURE — 1123F ACP DISCUSS/DSCN MKR DOCD: CPT | Performed by: PSYCHIATRY & NEUROLOGY

## 2022-01-04 PROCEDURE — G8427 DOCREV CUR MEDS BY ELIG CLIN: HCPCS | Performed by: PSYCHIATRY & NEUROLOGY

## 2022-01-04 PROCEDURE — 1036F TOBACCO NON-USER: CPT | Performed by: PSYCHIATRY & NEUROLOGY

## 2022-01-04 PROCEDURE — 99205 OFFICE O/P NEW HI 60 MIN: CPT | Performed by: PSYCHIATRY & NEUROLOGY

## 2022-01-04 PROCEDURE — 4040F PNEUMOC VAC/ADMIN/RCVD: CPT | Performed by: PSYCHIATRY & NEUROLOGY

## 2022-01-04 PROCEDURE — G8420 CALC BMI NORM PARAMETERS: HCPCS | Performed by: PSYCHIATRY & NEUROLOGY

## 2022-01-04 PROCEDURE — 1090F PRES/ABSN URINE INCON ASSESS: CPT | Performed by: PSYCHIATRY & NEUROLOGY

## 2022-01-04 NOTE — PROGRESS NOTES
1/4/22    Karthik Morales  1945    Chief Complaint   Patient presents with    Memory Loss     pt presents for issues with memory loss; pt states she struggles with short and long term memory; pt states she still remembers her children's names, forgets mid sentence       History of Present Illness    Karthik Morales presents neurologic consultation at our [facility] office accompanied by her  Sana Barboza for memory issues. She states she started noticing memory issues over the past several months. She primarily states she has difficulty finishing sentences. She may think of over but not be able to get out. This seems to be getting worse. If she gets more anxious he gets worse. She has a difficult time thinking how to complete tasks. She wanted to make a casserole the other day and walked in circles not being able to start the process. Eventually she did complete making the [place] but it did not turn out how he usually does. On one occasion they were sitting in the car in the garage and her  went into the house to get something. He told her to back the car out of the garage. She got into the  seat but could not figure out the process of backing the car out of the garage. She lives in a house with her . There are no other people in the household. They do not have any pets. She does things such as laundry and dishes but does not keep as rigid of a routine as her . She is started having difficulty managing her medications. She is having trouble organizing them now. Her  is always on finances. She enjoys working in the yard when it is nice. She enjoys walking twice a week with her sister. She enjoys playing games on the computer such as 5-roll which is a Yatzee type game and [employer]. Lately she has noticed some balance issues.   She is also noticed that she has to  her left leg to do things such as put on her pants and put on her (deep venous thrombosis) ([address]     Dyslipidemia 2002    Environmental allergies     Family history of colon cancer     mother    Gastric polyp     8/2006, 11/2009 benign- Dr Rajat Bass Gastritis 04/2008    mild superficial per Dr Ashutosh Henderson    GERD (gastroesophageal reflux disease) 08/2006    Dr Rajat Bass H/O cardiovascular stress test 07/13/2004 07/13 EF58%, No scintigraphic evidence of inducible myocardial ischemia 04/13Normal study. No wall motion abnormality seen. EF 65%    H/O echocardiogram 07/18/2013 7/13-EF 50-55% Mild AR.  H/O exercise stress test 02/07/2017    EF63% normal    History of Holter monitoring 09/23/2015    48 hour - abnormal holter revealing afib throughout the recording with interspersed pacemaker beats, HR does not appear to be well controlled    History of mammogram     last mammo 7/25/11, Dr. Hira Monteiro yearly    Hx of colonoscopy     1/21/2010    Hx of Doppler Venous ultrasound 08/31/2021    No DVT or SVT, No significant reflux in RLE, Significant reflux in Left CFV    Hyperlipidemia     Hypertension     Hypothyroidism     Internal hemorrhoid 2015    Dr. Ashutosh Henderson C scope     Intervertebral disc protrusion 05/2009    wry neck with C4-5      Left shoulder pain 04/2004    (L) shoulder impingement  mild DJD    Long term current use of anticoagulant 07/26/2016    **Coumadin Clinic follows PT/INRs, along w/prescribing pt's Coumadin dosages. **    Menstruation     age 14    Pacemaker 06/21/2012    dual chamber- checked every 3 months    Pulmonary hypertension ([facility]     mild per echo 8/24/2010, Pt refused sleep study (June 2012)    Restless legs 08/2003    ferritin    Right shoulder strain 02/2003    no seperation, bony contusion anterior humeral head  MRI 3/03    Sciatica 07/2009    (R) chronic intermittent s/p epidural injections  Dr Amna Carlson Sciatica     Shoulder pain, left 03/2011    RTC tendinopathy, type II acrominum, bursitis- referred to Dr. Teto Sarmiento Shoulder pain, right 03/10/2009    impingement, physical thearpy   (Main)  calcific bursitis  s/p injection    Sinus bradycardia     Sinusitis 12/12/2011    Tinnitus 03/2002    Vision changes 03/19/2013       Past Surgical History:   Procedure Laterality Date    ABLATION OF DYSRHYTHMIC FOCUS      BREAST BIOPSY      BREAST SURGERY  1985    Right breast tumor excised    CARDIOVERSION      1/2008 Dr Pawel Soto; 12/2008    CARDIOVERSION  03/10/2014    [address]    CHOLECYSTECTOMY, LAPAROSCOPIC  2/01    Dr Giuseppe Estes      [phone] (Dr Valeria Torres)   [address]  2003   [facility]      6/23/2012   [facility] SINUS SURGERY  77    Dr Martin Pro  4/03    fibroid      Dr Ann Mercado  8/06    Dr Lashae Kim ECHOCARDIOGRAM  6/2012    transthoracic cardioversion-Dr. Germain Rowe    UPPER GASTROINTESTINAL ENDOSCOPY  4/08    mild superficial gastritis  Dr Valeria Torres; 2009        Social History     Socioeconomic History    Marital status:      Spouse name: None    Number of children: None    Years of education: None    Highest education level: None   Occupational History    None   Tobacco Use    Smoking status: Never Smoker    Smokeless tobacco: Never Used    Tobacco comment: reviewed 11/4/15   Vaping Use    Vaping Use: Never used   Substance and Sexual Activity    Alcohol use: No    Drug use: No    Sexual activity: Yes     Partners: Male     Comment:    Other Topics Concern    None   Social History Narrative    None     Social Determinants of Health     Financial Resource Strain:     Difficulty of Paying Living Expenses: Not on file   Food Insecurity:     Worried About Running Out of Food in the Last Year: Not on file    Scottie of Food in the Last Year: Not on file   Transportation Needs:     Lack of Transportation (Medical): Not on file    Lack of Transportation (Non-Medical):  Not on file   Physical Activity:     Days of Exercise per Week: Not on file    Minutes of Exercise per Session: Not on file   Stress:     Feeling of Stress : Not on file   Social Connections:     Frequency of Communication with Friends and Family: Not on file    Frequency of Social Gatherings with Friends and Family: Not on file    Attends Episcopal Services: Not on file    Active Member of Clubs or Organizations: Not on file    Attends Club or Organization Meetings: Not on file    Marital Status: Not on file   Intimate Partner Violence:     Fear of Current or Ex-Partner: Not on file    Emotionally Abused: Not on file    Physically Abused: Not on file    Sexually Abused: Not on file   Housing Stability:     Unable to Pay for Housing in the Last Year: Not on file    Number of [place] in the Last Year: Not on file    Unstable Housing in the Last Year: Not on file       Family History   Problem Relation Age of Onset    Stroke Mother     Heart Disease Mother     Coronary Art Dis Mother 66        CABG    Colon Cancer Mother [de-identified]        colon     Heart Disease Father     Coronary Art Dis Father 62        CABG    Migraines Sister     Seizures Brother     Breast Cancer Maternal Cousin         under 50       Objective    Physical Exam:  Also present during visit: spouse Katiana Alexandre    Constitutional   Weight: well nourished  Heart/Vascular   Rate and Rhythm: irregularly irregular   Murmurs: none   Arterial Pulses:  no carotid bruits  Neck   Appearance/Palpation/Auscultation: supple  Mental Status   Orientation: oriented to person, oriented to place, oriented to problem and oriented to time   Mood/Affect: appropriate mood and appropriate affect   Memory/Other: remote memory intact, fund of knowledge intact, attention span normal, concentration normal and recent memory impaired     MMSE TOTAL = 24/30  Orientation = 10/10  Registration = 3/3  Naming = 2/2  Repetition = 1/1  Spelling \"WORLD\" backwards = 5/5  Following a three-step command = 1/3  Following a written command = 1/1  Copying two intersecting pentagons = 0/1  Writing a sentence = 1/1  Recall = 0/3    Language   Language: Repetition and naming intact with the exception of a \"pen cap\", she does have some proposition of speech issues during exam and will stop in the middle of a sentence not being able to get through her thoughts, (normal) language, no dysarthria and (normal) articulation  Cranial Nerves   CN II Right: visual fields appear intact   CN II Left: visual fields appear intact   CN III, IV, VI: EOM no nystagmus, normal pursuit and extraocular muscle strength normal   CN III: pupil normal size, pupil reactive to light and dark, pupil accomodates and no ptosis   CN IV: normal   CN VI: normal   CN V Right: normal sensation and muscles of mastication intact   CN V Left: normal sensation and muscles of mastication intact   CN VII Right: normal facial expression   CN VII Left: normal facial expression   CN VIII Right: hearing in tact to normal conversation   CN VIII Left: hearing in tact to normal conversation   CN IX,X: normal palatal movement   CN XI Right: normal sternocleidomastoid and normal trapezius   CN XI Left: normal sternocleidomastoid and normal trapezius   CN XII: no tremors of the tongue, no fasciculation of the tongue, tongue protrudes midline, normal power to left and normal power to right  Gait and Stance   Gait/Posture: station normal, ambulates independently, gait normal and Romberg's test normal  Motor/Coordination Exam   General: no bradykinesia, no tremors, no chorea, no athetosis, no myoclonus and no dyskinesia   Right Upper Extremity: normal motor strength, normal bulk and normal tone   Left Upper Extremity: normal motor strength, normal bulk and normal tone   Right Lower Extremity: normal motor strength, normal bulk and normal tone   Left Lower Extremity: normal motor strength, normal bulk and normal tone   Coordination: no drift, normal finger-to-nose and rapid alternating movements normal  Reflexes   Reflexes Right: DTRS are normal throughout   Reflexes Left: DTRS are normal throughout   Plantar Reflex Right: response downgoing   Plantar Reflex Left: response downgoing   Hoffmans Reflex Right: absent   Hoffmans Reflex Left: present  Sensory   Sensation: normal light touch, normal pinprick, normal temperature, normal position, normal DSS, no neglect and decreased vibration lowers Left leg     The patient has 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, with intact stereognosis evident in the right hand    Spine   Cervical Spine: no tenderness, no dystonia  and full ROM   Thoracic Spine: no spasms, no bony abnormalities, normal curvature, no tenderness and full ROM   Low Back: full ROM, no pain, no spasms and no bony abnormalities  Lungs   Auscultation: normal breath sounds  Skin   Inspection: no jaundice, no lesions, no rashes and no cyanosis      /70 (Site: Right Upper Arm, Position: Sitting, Cuff Size: Large Adult)   Pulse 82   Ht 5' 7\" (1.702 m)   Wt 140 lb 9.6 oz (63.8 kg)   SpO2 98%   BMI 22.02 kg/m²     Assessment and Plan     Diagnosis Orders   1. Apraxia  MRI BRAIN WO CONTRAST   2. Expressive dysphasia  MRI BRAIN WO CONTRAST       Genesis has an interesting pattern of memory dysfunction. In fact, I do not perceive on exam much in the way of a true \"memory\" dysfunction. She has a good fund of knowledge and good insight into her problem. She is able to describe in detail some of the issues she was having. She is having more of expressive speech deficit and an apraxia. She notes some weakness of her left leg (though objectively this was not seen on exam), she has a Adeola's reflex in her left hand, and astereognosis in her left hand leading me to believe she has had a right parietal lobe stroke and given her history of atrial fibrillation this certainly could be a plausible reason for her recent memory struggles.   I will obtain an MRI of the brain to evaluate this in detail. She does have a pacemaker but tells me it is MRI compatible. If this does not prove to be the case I can certainly order a CT scan alternatively. I did not discuss any medications to help sort on the progress of memory decline today such as donepezil but if her MRI of the brain does not demonstrate any evidence of stroke I do feel that would be a reasonable approach. We discussed nonpharmacologic interventions including staying active cognitively, socially, and physically to help slow down the progression of memory loss. Return in about 3 months (around 4/4/2022) for Follow-up me or [facility].     Mary Mauricio, DO

## 2022-01-10 ENCOUNTER — TELEPHONE (OUTPATIENT)
Dept: NEUROLOGY | Age: 77
End: 2022-01-10

## 2022-01-10 NOTE — TELEPHONE ENCOUNTER
Vidal Schultz from [facility] scheduling called and left a message stating they are unable to preform MRI Brain you ordered because her pacemaker is not compatible with their machine. Please advise.

## 2022-01-12 DIAGNOSIS — R47.02 EXPRESSIVE DYSPHASIA: ICD-10-CM

## 2022-01-12 DIAGNOSIS — R48.2 APRAXIA: Primary | ICD-10-CM

## 2022-01-13 ENCOUNTER — ANTI-COAG VISIT (OUTPATIENT)
Dept: PHARMACY | Age: 77
End: 2022-01-13
Payer: MEDICARE

## 2022-01-13 DIAGNOSIS — I48.0 PAF (PAROXYSMAL ATRIAL FIBRILLATION) (HCC): Primary | ICD-10-CM

## 2022-01-13 LAB
INTERNATIONAL NORMALIZATION RATIO, POC: 1.4
POC INR: 1.4 INDEX
PROTHROMBIN TIME, POC: 16.5 SECONDS (ref 10–14.3)

## 2022-01-13 PROCEDURE — 85610 PROTHROMBIN TIME: CPT

## 2022-01-13 PROCEDURE — 99212 OFFICE O/P EST SF 10 MIN: CPT

## 2022-01-13 PROCEDURE — 36416 COLLJ CAPILLARY BLOOD SPEC: CPT

## 2022-01-13 NOTE — PROGRESS NOTES
Medication Management Service  [facility]  [phone]    Visit Date: 1/13/2022   Subjective:       Felicia Donahue is a 67 y.o. female who presents to clinic today for anticoagulation monitoring and adjustment. Patient seen in clinic for warfarin management due to  Indication:   atrial fibrillation. INR goal: of 2.0-3.0. Duration of therapy: indefinite. Patient reports the following:   Adherent with regimen  Missed or extra doses:  None   Bleeding or thromboembolic side effects:  None  Significant medication changes:  None  Significant dietary changes: None  Significant alcohol or tobacco changes: None  Significant recent illness, disease state changes, or hospitalization:  None  Upcoming surgeries or procedures:  None  Falls: None           Assessment and Plan     PT/INR done in office per protocol. INR today is 1.4, subtherapeutic. Suspect missed dose(s). Patient presents with chart of medications that includes check marks for each dose taken of all medications. Patient presents with concern and overwhelm with taking all her medications . Called daughter after appointment and then spoke with patient and her  and recommended patient move warfarin to lunch time, as well as move other afternoon/evening medications to lunch time. Patient states they are often out to dinner and she isn't home at 5:00pm where she had warfarin scheduled. Plan: Take warfarin 3mg today then  will continue current regimen of warfarin 3mg daily except 1.5mg on Thursdays. Recheck INR in 1 week(s). Patient verbalized understanding of dosing directions and information discussed. Dosing schedule given to patient including phone number, appointment date, and time. Progress note sent to referring office. Patient acknowledges working in consult agreement with pharmacist as referred by his/her physician.       Electronically signed by Anastacia Sal, [facility] on 1/13/22 at 10:55 AM EST  For Pharmacy Admin Tracking Only     Intervention Detail: Adherence Monitorin and Dose Adjustment: 1, reason: Therapy Optimization   Total # of Interventions Recommended: 2   Total # of Interventions Accepted: 2   Time Spent (min): 30

## 2022-01-20 ENCOUNTER — ANTI-COAG VISIT (OUTPATIENT)
Dept: PHARMACY | Age: 77
End: 2022-01-20
Payer: MEDICARE

## 2022-01-20 DIAGNOSIS — I48.0 PAF (PAROXYSMAL ATRIAL FIBRILLATION) (HCC): Primary | ICD-10-CM

## 2022-01-20 LAB
INTERNATIONAL NORMALIZATION RATIO, POC: 2.7
POC INR: 2.7 INDEX
PROTHROMBIN TIME, POC: 31.8 SECONDS (ref 10–14.3)

## 2022-01-20 PROCEDURE — 85610 PROTHROMBIN TIME: CPT

## 2022-01-20 PROCEDURE — 36416 COLLJ CAPILLARY BLOOD SPEC: CPT

## 2022-01-20 PROCEDURE — 99211 OFF/OP EST MAY X REQ PHY/QHP: CPT

## 2022-01-20 NOTE — PROGRESS NOTES
Medication Management Service  [facility]  [phone]    Visit Date: 1/20/2022   Subjective:       Reba Nova is a 67 y.o. female who presents to clinic today for anticoagulation monitoring and adjustment. Patient seen in clinic for warfarin management due to  Indication:   atrial fibrillation. INR goal: of 2.0-3.0. Duration of therapy: indefinite. Patient reports the following:   Adherent with regimen  Missed or extra doses:  None   Bleeding or thromboembolic side effects:  None  Significant medication changes:  None  Significant dietary changes: None  Significant alcohol or tobacco changes: None  Significant recent illness, disease state changes, or hospitalization:  None  Upcoming surgeries or procedures:  None  Falls: None           Assessment and Plan     PT/INR done in office per protocol. INR today is 2.7, therapeutic. Patient may have taken warfarin twice on Tuesday. Will monitor closely. Plan: Will continue current regimen of warfarin 3mg daily except 1.5mg on Thursdays. Recheck INR in 2 week(s). Patient verbalized understanding of dosing directions and information discussed. Dosing schedule given to patient including phone number, appointment date, and time. Progress note sent to referring office. Patient acknowledges working in consult agreement with pharmacist as referred by his/her physician.       Electronically signed by Carly Hunter, [facility] on 1/20/22 at 9:35 AM EST    For Pharmacy Admin Tracking Only     Intervention Detail:    Total # of Interventions Recommended:    Total # of Interventions Accepted:    Time Spent (min): 15

## 2022-01-21 ENCOUNTER — HOSPITAL ENCOUNTER (OUTPATIENT)
Dept: CT IMAGING | Age: 77
Discharge: HOME | End: 2022-01-21
Payer: MEDICARE

## 2022-01-21 DIAGNOSIS — R47.02 EXPRESSIVE DYSPHASIA: ICD-10-CM

## 2022-01-21 DIAGNOSIS — R48.2 APRAXIA: ICD-10-CM

## 2022-01-21 PROCEDURE — 70450 CT HEAD/BRAIN W/O DYE: CPT

## 2022-01-25 ENCOUNTER — OFFICE VISIT (OUTPATIENT)
Dept: FAMILY MEDICINE CLINIC | Age: 77
End: 2022-01-25
Payer: MEDICARE

## 2022-01-25 VITALS
HEART RATE: 101 BPM | OXYGEN SATURATION: 96 % | BODY MASS INDEX: 21.53 KG/M2 | HEIGHT: 67 IN | WEIGHT: 137.2 LBS | DIASTOLIC BLOOD PRESSURE: 74 MMHG | SYSTOLIC BLOOD PRESSURE: 126 MMHG

## 2022-01-25 DIAGNOSIS — R41.3 MEMORY PROBLEM: Primary | ICD-10-CM

## 2022-01-25 DIAGNOSIS — I48.0 PAF (PAROXYSMAL ATRIAL FIBRILLATION) (HCC): ICD-10-CM

## 2022-01-25 DIAGNOSIS — E03.9 ACQUIRED HYPOTHYROIDISM: ICD-10-CM

## 2022-01-25 PROCEDURE — G8420 CALC BMI NORM PARAMETERS: HCPCS | Performed by: FAMILY MEDICINE

## 2022-01-25 PROCEDURE — 99213 OFFICE O/P EST LOW 20 MIN: CPT | Performed by: FAMILY MEDICINE

## 2022-01-25 PROCEDURE — G8427 DOCREV CUR MEDS BY ELIG CLIN: HCPCS | Performed by: FAMILY MEDICINE

## 2022-01-25 PROCEDURE — 1123F ACP DISCUSS/DSCN MKR DOCD: CPT | Performed by: FAMILY MEDICINE

## 2022-01-25 PROCEDURE — G8484 FLU IMMUNIZE NO ADMIN: HCPCS | Performed by: FAMILY MEDICINE

## 2022-01-25 PROCEDURE — 1090F PRES/ABSN URINE INCON ASSESS: CPT | Performed by: FAMILY MEDICINE

## 2022-01-25 PROCEDURE — G8399 PT W/DXA RESULTS DOCUMENT: HCPCS | Performed by: FAMILY MEDICINE

## 2022-01-25 PROCEDURE — 4040F PNEUMOC VAC/ADMIN/RCVD: CPT | Performed by: FAMILY MEDICINE

## 2022-01-25 PROCEDURE — 1036F TOBACCO NON-USER: CPT | Performed by: FAMILY MEDICINE

## 2022-01-25 RX ORDER — LEVOTHYROXINE SODIUM 0.05 MG/1
TABLET ORAL
Qty: 90 TABLET | Refills: 3 | Status: SHIPPED | OUTPATIENT
Start: 2022-01-25

## 2022-01-25 NOTE — PROGRESS NOTES
Plan:     1. Memory problem  2. PAF (paroxysmal atrial fibrillation) ([address]  Working with Dr. Gabriella Prescott with CT head showing microvascular changes. Working with daughter, coumadin clinica and medication regimen to be more simple due to patient forgetting meds.  working with patient on schedulign activites that she can enjoy       . All patient questions answered. Pt voiced understanding. Return in about 3 months (around 4/25/2022) for virtual telephone f/u update memory loss, .  -----------------------------------------------------------------------------------------------            Chief Complaint   Patient presents with    3 Month Follow-Up     seen neuro and they think she may of had stroke, [facility] for [facility]        Overall patient states she is frustrated. She does have trouble finishing sentences and is scared to carry on conversations with people other than her  and her daughter. So instead she chooses not to go outside of the house. She denies any delusions or hallucinations. She has short-term memory loss. She no longer drives that she is scared to. Her  does help her with her ADLs as well as her transportation. Her daughter is also helping her with her medications. She has a follow-up visit with neurology who recommended possible start of medication to help with memory changes. She follows regularly with cardiology for her PAF. She is on anticoagulation without any bleeding complications. ROS: Pertinent items are noted in HPI. All other ROS negative     reports that she has never smoked.  She has never used smokeless tobacco.      Physical Exam   Nursing note reviewed  /74 (Site: Left Upper Arm, Position: Sitting, Cuff Size: Medium Adult)   Pulse 101   Ht 5' 7\" (1.702 m)   Wt 137 lb 3.2 oz (62.2 kg)   SpO2 96%   BMI 21.49 kg/m²   BP Readings from Last 3 Encounters:   01/25/22 126/74   01/04/22 124/70   10/19/21 112/64     Wt Readings from Last 3 Encounters:   01/25/22 137 lb 3.2 oz (62.2 kg)   01/04/22 140 lb 9.6 oz (63.8 kg)   10/19/21 140 lb 9.6 oz (63.8 kg)     No results found for this visit on 01/25/22. General appearance: cooperative, here with   Neck: supple, symmetrical, trachea midline  Lungs: clear to auscultation bilaterally  Heart: regular rate and rhythm, S1, S2 normal,  Abdomen:  bowel sounds normal and soft, non-tender  MSK no LE edema  Skin: Skin color, texture, turgor normal. No rashes or lesions  Neurologic: Grossly normal   Psych: Alert and oriented, appropriate affect but soft volume voice.      Assessment and Plan: See above

## 2022-02-02 ENCOUNTER — TELEPHONE (OUTPATIENT)
Dept: PHARMACY | Age: 77
End: 2022-02-02

## 2022-02-02 NOTE — TELEPHONE ENCOUNTER
Patient's daughter [facility] left  requesting to reschedule. Returned call. Rescheduled for Thursday 2/10.     For Pharmacy Admin Tracking Only     Time Spent (min): 5

## 2022-02-07 ENCOUNTER — TELEPHONE (OUTPATIENT)
Dept: NEUROLOGY | Age: 77
End: 2022-02-07

## 2022-02-07 DIAGNOSIS — G31.84 MCI (MILD COGNITIVE IMPAIRMENT): Primary | ICD-10-CM

## 2022-02-07 RX ORDER — DONEPEZIL HYDROCHLORIDE 5 MG/1
TABLET, FILM COATED ORAL
Qty: 45 TABLET | Refills: 0 | Status: SHIPPED | OUTPATIENT
Start: 2022-02-07 | End: 2022-04-14

## 2022-02-07 RX ORDER — DONEPEZIL HYDROCHLORIDE 10 MG/1
10 TABLET, FILM COATED ORAL DAILY
Qty: 30 TABLET | Refills: 5 | Status: SHIPPED | OUTPATIENT
Start: 2022-02-07 | End: 2022-04-14

## 2022-02-07 NOTE — TELEPHONE ENCOUNTER
Spoke to Karthikeyan and let her know the results of CT. She is agreeable to try the donepezil. She would like it sent to [facility] on [address]. I told her to follow the directions on the bottle as she will being titrating up to 10 mg daily. Advised her to call us if she has any further questions or issues. She states verbal understanding.

## 2022-02-07 NOTE — TELEPHONE ENCOUNTER
----- Message from John Cortes DO sent at 2/4/2022  3:44 PM EST -----  Her CT of the head actually looks quite good. I did not see any evidence of an old stroke or other lesion which could be precipitating her speech and memory dysfunction or left leg weakness. Given the lack of radiographic abnormalities it would be beneficial for us to initiate statin to help sort on the progress of memory decline. See if she is agreeable to initiate donepezil to be titrated up to 10 mg daily.

## 2022-02-10 ENCOUNTER — ANTI-COAG VISIT (OUTPATIENT)
Dept: PHARMACY | Age: 77
End: 2022-02-10
Payer: MEDICARE

## 2022-02-10 DIAGNOSIS — I48.0 PAF (PAROXYSMAL ATRIAL FIBRILLATION) (HCC): Primary | ICD-10-CM

## 2022-02-10 LAB
INTERNATIONAL NORMALIZATION RATIO, POC: 2.4
POC INR: 2.4 INDEX
PROTHROMBIN TIME, POC: 28.9 SECONDS (ref 10–14.3)

## 2022-02-10 PROCEDURE — 99211 OFF/OP EST MAY X REQ PHY/QHP: CPT

## 2022-02-10 PROCEDURE — 85610 PROTHROMBIN TIME: CPT

## 2022-02-10 PROCEDURE — 36416 COLLJ CAPILLARY BLOOD SPEC: CPT

## 2022-02-10 NOTE — PROGRESS NOTES
Medication Management Service  [facility]  [phone]    Visit Date: 2/10/2022   Subjective:       Julianna Hough is a 67 y.o. female who presents to clinic today for anticoagulation monitoring and adjustment. Patient seen in clinic for warfarin management due to  Indication:   atrial fibrillation. INR goal: of 2.0-3.0. Duration of therapy: indefinite. Patient reports the following:   Adherent with regimen  Missed or extra doses:  None   Bleeding or thromboembolic side effects:  Nosebleed- stops quickly after wiping/blowing nose in AM  Significant medication changes:  Aricept  Significant dietary changes: None  Significant alcohol or tobacco changes: None  Significant recent illness, disease state changes, or hospitalization:  None  Upcoming surgeries or procedures:  None  Falls: fall on stairs           Assessment and Plan     PT/INR done in office per protocol. INR today is 2.4, therapeutic. Plan: Will continue current regimen of warfarin 3mg daily except 1.5mg on Thursdays. Recheck INR in 4 week(s). Patient verbalized understanding of dosing directions and information discussed. Dosing schedule given to patient including phone number, appointment date, and time. Progress note sent to referring office. Patient acknowledges working in consult agreement with pharmacist as referred by his/her physician.       Electronically signed by NICCI Looney [facility] on 2/10/22 at 9:01 AM EST    For Pharmacy Admin Tracking Only     Intervention Detail:    Total # of Interventions Recommended:    Total # of Interventions Accepted:    Time Spent (min): 15

## 2022-02-23 ENCOUNTER — TELEPHONE (OUTPATIENT)
Dept: NEUROLOGY | Age: 77
End: 2022-02-23

## 2022-03-02 DIAGNOSIS — G31.84 MCI (MILD COGNITIVE IMPAIRMENT): ICD-10-CM

## 2022-03-02 NOTE — TELEPHONE ENCOUNTER
Requested Prescriptions     Pending Prescriptions Disp Refills    donepezil (ARICEPT) 5 MG tablet [Pharmacy Med Name: DONEPEZIL HCL 5 MG TABLET] 45 tablet 0     Sig: TAKE 1/2 TABLET FOR 5 DAYS, THEN INCREASE TO 1 TABLET FOR 5 DAYS, THEN INCREASE TO 1 AND 1/2 TABLETS THEREAFTER

## 2022-03-03 RX ORDER — DONEPEZIL HYDROCHLORIDE 5 MG/1
TABLET, FILM COATED ORAL
Qty: 45 TABLET | Refills: 0 | OUTPATIENT
Start: 2022-03-03

## 2022-03-10 ENCOUNTER — ANTI-COAG VISIT (OUTPATIENT)
Dept: PHARMACY | Age: 77
End: 2022-03-10
Payer: MEDICARE

## 2022-03-10 ENCOUNTER — TELEPHONE (OUTPATIENT)
Dept: PHARMACY | Age: 77
End: 2022-03-10

## 2022-03-10 DIAGNOSIS — I48.0 PAF (PAROXYSMAL ATRIAL FIBRILLATION) (HCC): Primary | ICD-10-CM

## 2022-03-10 LAB
INTERNATIONAL NORMALIZATION RATIO, POC: 1.6
POC INR: 1.6 INDEX
PROTHROMBIN TIME, POC: 19.2 SECONDS (ref 10–14.3)

## 2022-03-10 PROCEDURE — 85610 PROTHROMBIN TIME: CPT

## 2022-03-10 PROCEDURE — 36416 COLLJ CAPILLARY BLOOD SPEC: CPT

## 2022-03-10 PROCEDURE — 99212 OFFICE O/P EST SF 10 MIN: CPT

## 2022-03-10 NOTE — PROGRESS NOTES
Medication Management Service  [facility]  [phone]    Visit Date: 3/10/2022   Subjective:       Damon Cleveland is a 67 y.o. female who presents to clinic today for anticoagulation monitoring and adjustment. Patient seen in clinic for warfarin management due to  Indication:   atrial fibrillation. INR goal: of 2.0-3.0. Duration of therapy: indefinite. Patient reports the following:   Adherent with regimen  Missed or extra doses:  None   Bleeding or thromboembolic side effects:  None  Significant medication changes:  None  Significant dietary changes: None  Significant alcohol or tobacco changes: None  Significant recent illness, disease state changes, or hospitalization:  None  Upcoming surgeries or procedures:  None  Falls: None           Assessment and Plan     PT/INR done in office per protocol. INR today is 1.6, subtherapeutic. Had green beans and broccoli yesterday- usually has just broccoli on Wednesdays. Reports diarrhea ongoing- advised to contact Dr. Gabriella Prescott for further evaluation as it could be from Aricept or something else. Plan: Increase weekly dose ~8% to warfarin 3mg daily. Recheck INR in 1.5 week(s). Patient verbalized understanding of dosing directions and information discussed. Dosing schedule given to patient including phone number, appointment date, and time. Progress note sent to referring office. Patient acknowledges working in consult agreement with pharmacist as referred by his/her physician.       Electronically signed by Tammy Jeronimo [address] [place] [address] on 3/10/22 at 10:35 AM EST    For Pharmacy Admin Tracking Only     Intervention Detail: Dose Adjustment: 1, reason: Therapy Optimization   Total # of Interventions Recommended: 1   Total # of Interventions Accepted: 1   Time Spent (min): 20

## 2022-03-10 NOTE — TELEPHONE ENCOUNTER
Did the onset of the diarrhea correlate with starting the donepezil or going up to the 10 mg dose of the donepezil? If so, then the symptoms, particularly the GI symptoms, could certainly be attributed to the donepezil. Have a stop the donepezil? How are her symptoms?

## 2022-03-10 NOTE — TELEPHONE ENCOUNTER
Patient's  Edmund Estrada left message 3/8/22 that patient had explosive diarrhea Monday, stomach cramps, and trouble urinating that they believe may be related to donepezil. Addressed at visit 3/10/22 and advised to contact Dr. Marcial Ramirez and/or PCP for further evaluation.      For Pharmacy Admin Tracking Only     Intervention Detail:    Total # of Interventions Recommended:    Total # of Interventions Accepted:    Time Spent (min): 5

## 2022-03-10 NOTE — TELEPHONE ENCOUNTER
Called and spoke to Stella Chan he states [place] is not up to the 10 mg dose until sometime next week. She is currently taking 1 1/2 tabs. She is still currently taking the medication. She did not have GI issues when she very first started it. Stella Chan states it has been the past week. Please advise.

## 2022-03-11 ENCOUNTER — TELEPHONE (OUTPATIENT)
Dept: NEUROLOGY | Age: 77
End: 2022-03-11

## 2022-03-11 NOTE — TELEPHONE ENCOUNTER
If she is still having symptoms I would advise they stop the donepezil to see if her symptoms improve. If symptoms are not persisting, I would maintain the donepezil to see if the GI issues could possibly have been due to something different and she may end up tolerating the donepezil.

## 2022-03-11 NOTE — TELEPHONE ENCOUNTER
Pt called and stated she is having stomach cramps and diarrhea and wanted to know if she can take OTC imodium.  Per Fran Curry pt was advised to take 1 dose of imodium and if she continues to have these symptoms pt should follow up with PCP

## 2022-03-21 ENCOUNTER — ANTI-COAG VISIT (OUTPATIENT)
Dept: PHARMACY | Age: 77
End: 2022-03-21
Payer: MEDICARE

## 2022-03-21 DIAGNOSIS — G31.84 MCI (MILD COGNITIVE IMPAIRMENT): ICD-10-CM

## 2022-03-21 DIAGNOSIS — I48.0 PAF (PAROXYSMAL ATRIAL FIBRILLATION) (HCC): Primary | ICD-10-CM

## 2022-03-21 PROCEDURE — 99211 OFF/OP EST MAY X REQ PHY/QHP: CPT

## 2022-03-21 PROCEDURE — 36416 COLLJ CAPILLARY BLOOD SPEC: CPT

## 2022-03-21 PROCEDURE — 85610 PROTHROMBIN TIME: CPT

## 2022-03-21 RX ORDER — WARFARIN SODIUM 3 MG/1
3 TABLET ORAL DAILY
COMMUNITY
End: 2022-10-20 | Stop reason: SDUPTHER

## 2022-03-21 RX ORDER — DILTIAZEM HYDROCHLORIDE 180 MG/1
180 CAPSULE, COATED, EXTENDED RELEASE ORAL DAILY
Qty: 90 CAPSULE | Refills: 3 | Status: ON HOLD | OUTPATIENT
Start: 2022-03-21 | End: 2022-05-01 | Stop reason: HOSPADM

## 2022-03-21 NOTE — PROGRESS NOTES
Medication Management Service  [facility]  [phone]    Visit Date: 3/21/2022   Subjective:       Kortney Stewart is a 67 y.o. female who presents to clinic today for anticoagulation monitoring and adjustment. Patient seen in clinic for warfarin management due to  Indication:   atrial fibrillation. INR goal: of 2.0-3.0. Duration of therapy: indefinite. Patient reports the following:   Adherent with regimen  Missed or extra doses:  None   Bleeding or thromboembolic side effects:  None  Significant medication changes: will be starting the 10mg of donepezil  Significant dietary changes: None  Significant alcohol or tobacco changes: None  Significant recent illness, disease state changes, or hospitalization:  Diarrhea resolved  Upcoming surgeries or procedures:  None  Falls: None           Assessment and Plan     PT/INR done in office per protocol. INR today is 2.1, therapeutic. Plan: Will continue current regimen of warfarin 3mg daily. Recheck INR in 3.5 week(s). Patient verbalized understanding of dosing directions and information discussed. Dosing schedule given to patient including phone number, appointment date, and time. Progress note sent to referring office. Patient acknowledges working in consult agreement with pharmacist as referred by his/her physician.       Electronically signed by Sg Lerner [address] [place] [address] on 3/21/22 at 11:19 AM EDT    For [address] [place] [address] Intervention Detail:    Total # of Interventions Recommended:    Total # of Interventions Accepted:    Time Spent (min): 15

## 2022-03-23 RX ORDER — DONEPEZIL HYDROCHLORIDE 5 MG/1
TABLET, FILM COATED ORAL
Qty: 45 TABLET | Refills: 0 | OUTPATIENT
Start: 2022-03-23

## 2022-03-23 NOTE — TELEPHONE ENCOUNTER
It appears pharmacy was trying to refill titration prescription. No need to send this pt will begin her 10 mg prescription now.

## 2022-03-23 NOTE — TELEPHONE ENCOUNTER
Was not she experiencing side effects of the donepezil? I would continue with this medication? And if so At 5 mg or 10 mg (what is the patient tolerating)?

## 2022-03-28 ENCOUNTER — PROCEDURE VISIT (OUTPATIENT)
Dept: CARDIOLOGY CLINIC | Age: 77
End: 2022-03-28
Payer: MEDICARE

## 2022-03-28 DIAGNOSIS — Z95.0 CARDIAC PACEMAKER IN SITU: Primary | ICD-10-CM

## 2022-03-28 PROCEDURE — 93294 REM INTERROG EVL PM/LDLS PM: CPT | Performed by: INTERNAL MEDICINE

## 2022-03-28 PROCEDURE — 93296 REM INTERROG EVL PM/IDS: CPT | Performed by: INTERNAL MEDICINE

## 2022-04-06 NOTE — PROGRESS NOTES
4/6/22    Maximino Boast  1945    Chief Complaint   Patient presents with    Dementia     pt's  states things have seemed to get better since starting the medication, pt's  states she did have GI issues at first, but once they took a break from it and then titrated up to 10 mg she was fine, pt states she did recently get lost in a restaurant but they both state for the most part things are better       History of Present Illness  Rianna Arizmendi is a 67 y.o. female presenting today for follow-up of: Memory loss. Patient seen in January 2022 by Dr. Pastora Baca. Patient reported difficulty finishing sentences, difficulty thinking now to complete tasks, having difficulty figuring out how to do things she normally has been able to do. This has been ongoing over the past several months. Feels issues are worsened when she is anxious. Patient also reported having difficulty managing her medications. She also reported having some balance issues, difficulty with left leg weakness. Patient did have positive Stock's reflex in left hand and astereognosis cause suspicion for right parietal lobe stroke. Patient does have history of A. fib. MRI of her brain was ordered. Patient was not able to have MRI due to incompatibility with pacemaker so head CT was done which did not show any evidence of CVA,  patient is on on statin and Coumadin. Since there was no evidence of CVA patient was initiated on donepezil. Patient did notify office she was experiencing stomach cramps and diarrhea. On 27 days then had diarrhea so stopped for 5 days then restarted on 10 mg dose and is fine. She is doing well, no further diarrhea. She does going to tell me that she has been having a lot of gas pains, this has been ongoing prior to visit and initiation of donepezil. She has followed up with GI specialist but tells me she has not experience much improvement. Patient remains on Coumadin and Cardizem for management of A. fib.        Current Outpatient Medications   Medication Sig Dispense Refill    dilTIAZem (CARDIZEM CD) 180 MG extended release capsule Take 1 capsule by mouth daily TAKE 1 CAPSULE BY MOUTH EVERY DAY 90 capsule 3    warfarin (COUMADIN) 3 MG tablet Take 3 mg by mouth daily      donepezil (ARICEPT) 5 MG tablet Take 1/2 tablet for 5 days, then increase to 1 tablet for 5 days, then increase to 1 and 1/2 tablets thereafter 45 tablet 0    donepezil (ARICEPT) 10 MG tablet Take 1 tablet by mouth daily NOTE TO PHARMACY: DO NOT FILL UNTIL 5 MG RX IS COMPLETE 30 tablet 5    levothyroxine (SYNTHROID) 50 MCG tablet TAKE 1 TABLET BY MOUTH EVERY DAY BEFORE BREAKFAST 90 tablet 3    metoprolol succinate (TOPROL XL) 50 MG extended release tablet TAKE 1 AND 1/2 TABLETS BY MOUTH EVERY  tablet 3    atorvastatin (LIPITOR) 10 MG tablet TAKE 1 TABLET BY MOUTH EVERY DAY 90 tablet 3    Vitamin D (CHOLECALCIFEROL) 25 MCG (1000 UT) TABS tablet Take 1,000 Units by mouth daily      Fexofenadine HCl (ALLEGRA ALLERGY PO) Take by mouth as needed       Dexlansoprazole (DEXILANT) 60 MG CPDR Take 1 tablet by mouth daily. No current facility-administered medications for this visit.        Physical Exam:    Constitutional              Weight: well nourished  Heart/Vascular              Rate and Rhythm: irregularly irregular              Murmurs: none              Arterial Pulses:  no carotid bruits  Neck              Appearance/Palpation/Auscultation: supple  Mental Status              Orientation: oriented to person, oriented to place, oriented to problem and oriented to time              Mood/Affect: appropriate mood and appropriate affect              Memory/Other: remote memory intact, fund of knowledge intact, attention span normal, concentration normal and recent memory impaired      MMSE TOTAL = 24/30  Orientation = 10/10  Registration = 3/3  Naming = 2/2  Repetition = 1/1  Spelling \"WORLD\" backwards = 5/5  Following a three-step command = 1/3  Following a written command = 1/1  Copying two intersecting pentagons = 0/1  Writing a sentence = 1/1  Recall = 0/3     Language              Language: Repetition and naming intact with the exception of a \"pen cap\", she does have some proposition of speech issues during exam and will stop in the middle of a sentence not being able to get through her thoughts, (normal) language, no dysarthria and (normal) articulation  Cranial Nerves              CN II Right: visual fields appear intact              CN II Left: visual fields appear intact              CN III, IV, VI: EOM no nystagmus, normal pursuit and extraocular muscle strength normal              CN III: pupil normal size, pupil reactive to light and dark, pupil accomodates and no ptosis              CN IV: normal              CN VI: normal              CN V Right: normal sensation and muscles of mastication intact              CN V Left: normal sensation and muscles of mastication intact              CN VII Right: normal facial expression              CN VII Left: normal facial expression              CN VIII Right: hearing in tact to normal conversation              CN VIII Left: hearing in tact to normal conversation              CN IX,X: normal palatal movement              CN XI Right: normal sternocleidomastoid and normal trapezius              CN XI Left: normal sternocleidomastoid and normal trapezius              CN XII: no tremors of the tongue, no fasciculation of the tongue, tongue protrudes midline, normal power to left and normal power to right  Gait and Stance              Gait/Posture: station normal, ambulates independently, gait normal and Romberg's test normal  Motor/Coordination Exam              General: no bradykinesia, no tremors, no chorea, no athetosis, no myoclonus and no dyskinesia              Right Upper Extremity: normal motor strength, normal bulk and normal tone              Left Upper Extremity: normal motor strength, normal bulk and normal tone              Right Lower Extremity: normal motor strength, normal bulk and normal tone              Left Lower Extremity: normal motor strength, normal bulk and normal tone              Coordination: no drift, normal finger-to-nose and rapid alternating movements normal  Reflexes              Reflexes Right: DTRS are normal throughout              Reflexes Left: DTRS are normal throughout              Plantar Reflex Right: response downgoing              Plantar Reflex Left: response downgoing              Hoffmans Reflex Right: absent              Hoffmans Reflex Left: present  Sensory              Sensation: normal light touch, normal pinprick, normal temperature, normal position, normal DSS, no neglect and decreased vibration lowers Left leg      The patient has 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 MCI (mild cognitive impairment)     2. Apraxia     3. Expressive dysphasia       I would really like to have a MRI to determine if Sebastian Castellanos has had a CVA or if this is strictly a cognitive process. I am going to have has been contacted [facility] to see if her pacemaker is compatible possibly with a facility that has a weaker magnet or if there are any other options for her to have an MRI.  will let me know what they find out. For now she will remain on donepezil. She is already on statin and Coumadin for secondary stroke prevention. I will plan on following up again in 3 months, sooner if the need arises. If resident is able to have MRI, I will be in touch with results.     Patient understands the importance of recognizing strokelike symptoms such as acute mental status change, slurred speech, facial droop, one-sided weakness differing from baseline. Patient knows to call 911 immediately in the event that they experience any of these symptoms. We discussed nonpharmacologic interventions including staying active cognitively, socially, and physically to help slow down the progression of memory loss. Medications prescribed for the patient were discussed in detail. This included a discussion of the potential risks versus potential benefits of the medications. The patient was given time to ask questions and these were answered to the best of my ability. The patient appeared to understand the information provided. Return in about 3 months (around 7/7/2022).     ANTHONY Tan NP

## 2022-04-07 ENCOUNTER — OFFICE VISIT (OUTPATIENT)
Dept: NEUROLOGY | Age: 77
End: 2022-04-07
Payer: MEDICARE

## 2022-04-07 VITALS
HEIGHT: 67 IN | BODY MASS INDEX: 21.53 KG/M2 | WEIGHT: 137.2 LBS | OXYGEN SATURATION: 98 % | HEART RATE: 81 BPM | DIASTOLIC BLOOD PRESSURE: 74 MMHG | SYSTOLIC BLOOD PRESSURE: 110 MMHG

## 2022-04-07 DIAGNOSIS — R48.2 APRAXIA: ICD-10-CM

## 2022-04-07 DIAGNOSIS — G31.84 MCI (MILD COGNITIVE IMPAIRMENT): Primary | ICD-10-CM

## 2022-04-07 DIAGNOSIS — R47.02 EXPRESSIVE DYSPHASIA: ICD-10-CM

## 2022-04-07 PROCEDURE — 4040F PNEUMOC VAC/ADMIN/RCVD: CPT | Performed by: NURSE PRACTITIONER

## 2022-04-07 PROCEDURE — 1090F PRES/ABSN URINE INCON ASSESS: CPT | Performed by: NURSE PRACTITIONER

## 2022-04-07 PROCEDURE — 99214 OFFICE O/P EST MOD 30 MIN: CPT | Performed by: NURSE PRACTITIONER

## 2022-04-07 PROCEDURE — G8399 PT W/DXA RESULTS DOCUMENT: HCPCS | Performed by: NURSE PRACTITIONER

## 2022-04-07 PROCEDURE — 1036F TOBACCO NON-USER: CPT | Performed by: NURSE PRACTITIONER

## 2022-04-07 PROCEDURE — 1123F ACP DISCUSS/DSCN MKR DOCD: CPT | Performed by: NURSE PRACTITIONER

## 2022-04-07 PROCEDURE — G8427 DOCREV CUR MEDS BY ELIG CLIN: HCPCS | Performed by: NURSE PRACTITIONER

## 2022-04-07 PROCEDURE — G8420 CALC BMI NORM PARAMETERS: HCPCS | Performed by: NURSE PRACTITIONER

## 2022-04-13 DIAGNOSIS — G31.84 MCI (MILD COGNITIVE IMPAIRMENT): ICD-10-CM

## 2022-04-13 NOTE — TELEPHONE ENCOUNTER
Requested Prescriptions     Pending Prescriptions Disp Refills    donepezil (ARICEPT) 10 MG tablet [Pharmacy Med Name: DONEPEZIL HCL 10 MG TABLET] 30 tablet 5     Sig: TAKE 1 TABLET BY MOUTH DAILY NOTE TO PHARMACY: DO NOT FILL UNTIL 5 MG RX IS COMPLETE

## 2022-04-14 ENCOUNTER — ANTI-COAG VISIT (OUTPATIENT)
Dept: PHARMACY | Age: 77
End: 2022-04-14
Payer: MEDICARE

## 2022-04-14 DIAGNOSIS — I48.0 PAF (PAROXYSMAL ATRIAL FIBRILLATION) (HCC): Primary | ICD-10-CM

## 2022-04-14 LAB
INTERNATIONAL NORMALIZATION RATIO, POC: 1.7
POC INR: 1.7 INDEX
PROTHROMBIN TIME, POC: 20.9 SECONDS (ref 10–14.3)

## 2022-04-14 PROCEDURE — 36416 COLLJ CAPILLARY BLOOD SPEC: CPT

## 2022-04-14 PROCEDURE — 85610 PROTHROMBIN TIME: CPT

## 2022-04-14 PROCEDURE — 99212 OFFICE O/P EST SF 10 MIN: CPT

## 2022-04-14 RX ORDER — DONEPEZIL HYDROCHLORIDE 10 MG/1
10 TABLET, FILM COATED ORAL DAILY
Qty: 30 TABLET | Refills: 5 | Status: SHIPPED | OUTPATIENT
Start: 2022-04-14 | End: 2022-09-29

## 2022-04-14 NOTE — PROGRESS NOTES
[facility]  [facility]  [phone]    Visit Date: 4/14/2022   Subjective:       Kaley Crane is a 67 y.o. female who presents to clinic today for anticoagulation monitoring and adjustment. Patient seen in clinic for warfarin management due to  Indication:   atrial fibrillation. INR goal: of 2.0-3.0. Duration of therapy: indefinite. Patient reports the following:   Adherent with regimen  Missed or extra doses:  None   Bleeding or thromboembolic side effects:  None  Significant medication changes:  None  Significant dietary changes: broccoli on Sunday instead of Wednesday this week  Significant alcohol or tobacco changes: None  Significant recent illness, disease state changes, or hospitalization:  None  Upcoming surgeries or procedures:  None  Falls: None           Assessment and Plan     PT/INR done in office per protocol. INR today is 1.7, subtherapeutic. Had broccoli on Sunday rather than the usual Wednesdays. Plan: Take warfarin 4.5mg today then   will continue current regimen of warfarin 3mg daily except 4.5mg on Thursdays. Recheck INR in 2 week(s). Patient verbalized understanding of dosing directions and information discussed. Dosing schedule given to patient including phone number, appointment date, and time. Progress note sent to referring office. Patient acknowledges working in consult agreement with pharmacist as referred by his/her physician.       Electronically signed by Julia Shaver [address] [place] [address] on 4/14/22 at 10:36 AM EDT    For Pharmacy Admin Tracking Only     Intervention Detail: Dose Adjustment: 1, reason: Therapy Optimization   Total # of Interventions Recommended: 1   Total # of Interventions Accepted: 1   Time Spent (min): 15

## 2022-04-22 DIAGNOSIS — G31.84 MCI (MILD COGNITIVE IMPAIRMENT): ICD-10-CM

## 2022-04-22 RX ORDER — DONEPEZIL HYDROCHLORIDE 5 MG/1
TABLET, FILM COATED ORAL
Qty: 45 TABLET | Refills: 0 | OUTPATIENT
Start: 2022-04-22

## 2022-04-28 ENCOUNTER — ANTI-COAG VISIT (OUTPATIENT)
Dept: PHARMACY | Age: 77
End: 2022-04-28
Payer: MEDICARE

## 2022-04-28 DIAGNOSIS — I48.0 PAF (PAROXYSMAL ATRIAL FIBRILLATION) (HCC): Primary | ICD-10-CM

## 2022-04-28 LAB
INTERNATIONAL NORMALIZATION RATIO, POC: 2
POC INR: 2 INDEX
PROTHROMBIN TIME, POC: 24.2 SECONDS (ref 10–14.3)

## 2022-04-28 PROCEDURE — 85610 PROTHROMBIN TIME: CPT

## 2022-04-28 PROCEDURE — 36416 COLLJ CAPILLARY BLOOD SPEC: CPT

## 2022-04-28 PROCEDURE — 99211 OFF/OP EST MAY X REQ PHY/QHP: CPT

## 2022-04-28 NOTE — PROGRESS NOTES
Medication Management Service  [facility]  [phone]    Visit Date: 4/28/2022   Subjective:       Jaime Zavaleta is a 67 y.o. female who presents to clinic today for anticoagulation monitoring and adjustment. Patient seen in clinic for warfarin management due to  Indication:   atrial fibrillation. INR goal: of 2.0-3.0. Duration of therapy: indefinite. Patient reports the following:   Adherent with regimen  Missed or extra doses:  None   Bleeding or thromboembolic side effects:  None  Significant medication changes:  None  Significant dietary changes: None  Significant alcohol or tobacco changes: None  Significant recent illness, disease state changes, or hospitalization:  None  Upcoming surgeries or procedures:  None  Falls: None           Assessment and Plan     PT/INR done in office per protocol. INR today is 2.0, therapeutic. Plan: Will continue current regimen of warfarin 3mg daily. Recheck INR in 4 week(s). Patient verbalized understanding of dosing directions and information discussed. Dosing schedule given to patient including phone number, appointment date, and time. Progress note sent to referring office. Patient acknowledges working in consult agreement with pharmacist as referred by his/her physician.       Electronically signed by Sb Pena [facility] on 4/28/22 at 11:15 AM EDT    For [address] [place] [address] Intervention Detail:    Total # of Interventions Recommended:    Total # of Interventions Accepted:    Time Spent (min): 15

## 2022-04-29 ENCOUNTER — APPOINTMENT (OUTPATIENT)
Dept: GENERAL RADIOLOGY | Age: 77
End: 2022-04-29
Payer: MEDICARE

## 2022-04-29 ENCOUNTER — HOSPITAL ENCOUNTER (OUTPATIENT)
Age: 77
Setting detail: OBSERVATION
Discharge: HOME | End: 2022-05-01
Attending: EMERGENCY MEDICINE | Admitting: INTERNAL MEDICINE
Payer: MEDICARE

## 2022-04-29 DIAGNOSIS — R07.9 CHEST PAIN, UNSPECIFIED TYPE: Primary | ICD-10-CM

## 2022-04-29 DIAGNOSIS — R06.02 SHORTNESS OF BREATH: ICD-10-CM

## 2022-04-29 LAB
ALBUMIN SERPL-MCNC: 3.8 GM/DL (ref 3.4–5)
ALP BLD-CCNC: 66 IU/L (ref 40–129)
ALT SERPL-CCNC: 24 U/L (ref 10–40)
ANION GAP SERPL CALCULATED.3IONS-SCNC: 10 MMOL/L (ref 4–16)
APTT: 32.6 SECONDS (ref 25.1–37.1)
AST SERPL-CCNC: 28 IU/L (ref 15–37)
BASOPHILS ABSOLUTE: 0 K/CU MM
BASOPHILS RELATIVE PERCENT: 0.5 % (ref 0–1)
BILIRUB SERPL-MCNC: 1.7 MG/DL (ref 0–1)
BUN BLDV-MCNC: 14 MG/DL (ref 6–23)
CALCIUM SERPL-MCNC: 9.1 MG/DL (ref 8.3–10.6)
CHLORIDE BLD-SCNC: 103 MMOL/L (ref 99–110)
CO2: 23 MMOL/L (ref 21–32)
CREAT SERPL-MCNC: 0.9 MG/DL (ref 0.6–1.1)
DIFFERENTIAL TYPE: ABNORMAL
EKG ATRIAL RATE: 67 BPM
EKG DIAGNOSIS: NORMAL
EKG Q-T INTERVAL: 334 MS
EKG QRS DURATION: 74 MS
EKG QTC CALCULATION (BAZETT): 443 MS
EKG R AXIS: 57 DEGREES
EKG T AXIS: -39 DEGREES
EKG VENTRICULAR RATE: 106 BPM
EOSINOPHILS ABSOLUTE: 0.1 K/CU MM
EOSINOPHILS RELATIVE PERCENT: 1.6 % (ref 0–3)
GFR AFRICAN AMERICAN: >60 ML/MIN/1.73M2
GFR NON-AFRICAN AMERICAN: >60 ML/MIN/1.73M2
GLUCOSE BLD-MCNC: 69 MG/DL (ref 70–99)
HCT VFR BLD CALC: 44.4 % (ref 37–47)
HEMOGLOBIN: 14.1 GM/DL (ref 12.5–16)
IMMATURE NEUTROPHIL %: 0.2 % (ref 0–0.43)
INR BLD: 1.97 INDEX
LV EF: 48 %
LVEF MODALITY: NORMAL
LYMPHOCYTES ABSOLUTE: 2 K/CU MM
LYMPHOCYTES RELATIVE PERCENT: 32.6 % (ref 24–44)
MAGNESIUM: 2 MG/DL (ref 1.8–2.4)
MAGNESIUM: 2 MG/DL (ref 1.8–2.4)
MCH RBC QN AUTO: 29.6 PG (ref 27–31)
MCHC RBC AUTO-ENTMCNC: 31.8 % (ref 32–36)
MCV RBC AUTO: 93.1 FL (ref 78–100)
MONOCYTES ABSOLUTE: 0.8 K/CU MM
MONOCYTES RELATIVE PERCENT: 13.6 % (ref 0–4)
NUCLEATED RBC %: 0 %
PDW BLD-RTO: 13.4 % (ref 11.7–14.9)
PLATELET # BLD: 117 K/CU MM (ref 140–440)
PMV BLD AUTO: 11 FL (ref 7.5–11.1)
POTASSIUM SERPL-SCNC: 3.7 MMOL/L (ref 3.5–5.1)
PRO-BNP: 1079 PG/ML
PROTHROMBIN TIME: 25.6 SECONDS (ref 11.7–14.5)
RBC # BLD: 4.77 M/CU MM (ref 4.2–5.4)
SEGMENTED NEUTROPHILS ABSOLUTE COUNT: 3.2 K/CU MM
SEGMENTED NEUTROPHILS RELATIVE PERCENT: 51.5 % (ref 36–66)
SODIUM BLD-SCNC: 136 MMOL/L (ref 135–145)
TOTAL IMMATURE NEUTOROPHIL: 0.01 K/CU MM
TOTAL NUCLEATED RBC: 0 K/CU MM
TOTAL PROTEIN: 6.9 GM/DL (ref 6.4–8.2)
TROPONIN T: <0.01 NG/ML
TSH HIGH SENSITIVITY: 2.83 UIU/ML (ref 0.27–4.2)
WBC # BLD: 6.1 K/CU MM (ref 4–10.5)

## 2022-04-29 PROCEDURE — 85025 COMPLETE CBC W/AUTO DIFF WBC: CPT

## 2022-04-29 PROCEDURE — G0378 HOSPITAL OBSERVATION PER HR: HCPCS

## 2022-04-29 PROCEDURE — 85730 THROMBOPLASTIN TIME PARTIAL: CPT

## 2022-04-29 PROCEDURE — 80053 COMPREHEN METABOLIC PANEL: CPT

## 2022-04-29 PROCEDURE — 6370000000 HC RX 637 (ALT 250 FOR IP)

## 2022-04-29 PROCEDURE — 99285 EMERGENCY DEPT VISIT HI MDM: CPT

## 2022-04-29 PROCEDURE — 71045 X-RAY EXAM CHEST 1 VIEW: CPT

## 2022-04-29 PROCEDURE — 83735 ASSAY OF MAGNESIUM: CPT

## 2022-04-29 PROCEDURE — 99215 OFFICE O/P EST HI 40 MIN: CPT | Performed by: INTERNAL MEDICINE

## 2022-04-29 PROCEDURE — 84443 ASSAY THYROID STIM HORMONE: CPT

## 2022-04-29 PROCEDURE — 93005 ELECTROCARDIOGRAM TRACING: CPT | Performed by: PHYSICIAN ASSISTANT

## 2022-04-29 PROCEDURE — 84484 ASSAY OF TROPONIN QUANT: CPT

## 2022-04-29 PROCEDURE — 36415 COLL VENOUS BLD VENIPUNCTURE: CPT

## 2022-04-29 PROCEDURE — 2580000003 HC RX 258: Performed by: NURSE PRACTITIONER

## 2022-04-29 PROCEDURE — 93306 TTE W/DOPPLER COMPLETE: CPT

## 2022-04-29 PROCEDURE — 85610 PROTHROMBIN TIME: CPT

## 2022-04-29 PROCEDURE — 93010 ELECTROCARDIOGRAM REPORT: CPT | Performed by: INTERNAL MEDICINE

## 2022-04-29 PROCEDURE — 83880 ASSAY OF NATRIURETIC PEPTIDE: CPT

## 2022-04-29 PROCEDURE — 6370000000 HC RX 637 (ALT 250 FOR IP): Performed by: PHYSICIAN ASSISTANT

## 2022-04-29 RX ORDER — METOPROLOL SUCCINATE 50 MG/1
50 TABLET, EXTENDED RELEASE ORAL 2 TIMES DAILY
Status: DISCONTINUED | OUTPATIENT
Start: 2022-04-30 | End: 2022-05-01 | Stop reason: HOSPADM

## 2022-04-29 RX ORDER — SODIUM CHLORIDE 9 MG/ML
25 INJECTION, SOLUTION INTRAVENOUS PRN
Status: DISCONTINUED | OUTPATIENT
Start: 2022-04-29 | End: 2022-05-01 | Stop reason: HOSPADM

## 2022-04-29 RX ORDER — SODIUM CHLORIDE 9 MG/ML
INJECTION, SOLUTION INTRAVENOUS CONTINUOUS
Status: DISCONTINUED | OUTPATIENT
Start: 2022-04-29 | End: 2022-05-01 | Stop reason: HOSPADM

## 2022-04-29 RX ORDER — 0.9 % SODIUM CHLORIDE 0.9 %
500 INTRAVENOUS SOLUTION INTRAVENOUS ONCE
Status: DISCONTINUED | OUTPATIENT
Start: 2022-04-29 | End: 2022-05-01 | Stop reason: HOSPADM

## 2022-04-29 RX ORDER — ASPIRIN 81 MG/1
324 TABLET, CHEWABLE ORAL ONCE
Status: COMPLETED | OUTPATIENT
Start: 2022-04-29 | End: 2022-04-29

## 2022-04-29 RX ORDER — ONDANSETRON 4 MG/1
4 TABLET, ORALLY DISINTEGRATING ORAL EVERY 8 HOURS PRN
Status: DISCONTINUED | OUTPATIENT
Start: 2022-04-29 | End: 2022-05-01 | Stop reason: HOSPADM

## 2022-04-29 RX ORDER — DILTIAZEM HYDROCHLORIDE 240 MG/1
240 CAPSULE, COATED, EXTENDED RELEASE ORAL DAILY
Status: DISCONTINUED | OUTPATIENT
Start: 2022-04-29 | End: 2022-05-01 | Stop reason: HOSPADM

## 2022-04-29 RX ORDER — SODIUM CHLORIDE 0.9 % (FLUSH) 0.9 %
5-40 SYRINGE (ML) INJECTION PRN
Status: DISCONTINUED | OUTPATIENT
Start: 2022-04-29 | End: 2022-05-01 | Stop reason: HOSPADM

## 2022-04-29 RX ORDER — NITROGLYCERIN 0.4 MG/1
0.4 TABLET SUBLINGUAL EVERY 5 MIN PRN
Status: DISCONTINUED | OUTPATIENT
Start: 2022-04-29 | End: 2022-05-01 | Stop reason: HOSPADM

## 2022-04-29 RX ORDER — METOPROLOL SUCCINATE 50 MG/1
50 TABLET, EXTENDED RELEASE ORAL DAILY
Status: DISCONTINUED | OUTPATIENT
Start: 2022-04-29 | End: 2022-04-29

## 2022-04-29 RX ORDER — SODIUM CHLORIDE 0.9 % (FLUSH) 0.9 %
5-40 SYRINGE (ML) INJECTION EVERY 12 HOURS SCHEDULED
Status: DISCONTINUED | OUTPATIENT
Start: 2022-04-29 | End: 2022-05-01 | Stop reason: HOSPADM

## 2022-04-29 RX ORDER — ONDANSETRON 2 MG/ML
4 INJECTION INTRAMUSCULAR; INTRAVENOUS EVERY 6 HOURS PRN
Status: DISCONTINUED | OUTPATIENT
Start: 2022-04-29 | End: 2022-05-01 | Stop reason: HOSPADM

## 2022-04-29 RX ADMIN — ASPIRIN 81 MG 324 MG: 81 TABLET ORAL at 15:04

## 2022-04-29 RX ADMIN — DILTIAZEM HYDROCHLORIDE 240 MG: 240 CAPSULE, COATED, EXTENDED RELEASE ORAL at 18:56

## 2022-04-29 RX ADMIN — SODIUM CHLORIDE: 9 INJECTION, SOLUTION INTRAVENOUS at 17:55

## 2022-04-29 ASSESSMENT — ENCOUNTER SYMPTOMS
BLURRED VISION: 0
SHORTNESS OF BREATH: 0
HEMOPTYSIS: 0
COUGH: 0
DOUBLE VISION: 0
SHORTNESS OF BREATH: 1
DIARRHEA: 0
ABDOMINAL PAIN: 0
HEARTBURN: 0
SINUS PAIN: 0
VOMITING: 0
CHEST TIGHTNESS: 0
NAUSEA: 0
EYE PAIN: 0
ORTHOPNEA: 0
SORE THROAT: 0
PHOTOPHOBIA: 0
BACK PAIN: 0
WHEEZING: 0

## 2022-04-29 ASSESSMENT — HEART SCORE
ECG: 1
ECG: 1

## 2022-04-29 ASSESSMENT — PAIN SCALES - GENERAL: PAINLEVEL_OUTOF10: 0

## 2022-04-29 NOTE — CONSULTS
CARDIOLOGY CONSULT NOTE   Reason for consultation:  chest pain / afib    Referring physician:  No admitting provider for patient encounter. Primary care physician: Aiyana Pereira MD      Dear  Dr. Gudelia Estrada admitting provider for patient encounter. Thanks for the consult. Chief Complaints :  Chief Complaint   Patient presents with    Palpitations    Chest Pain        History of present illness:Genesis is a 67 y. o.year old who presents with  Generalized fatigue tiredness heart palpitations chest pain mostly on the left side around her pacer site intermittently as spasms she also feels like she has less energy more tired and fatigued. She was started on Aricept as February due to cognitive issues.  believes that it is helpful. She has longstanding history of persistent permanent atrial fibrillation and a pacemaker s/p ablation currently on rate control strategy on Coumadin. She says about 4 days ago she was working in the garden and started noticing that she was getting winded tired shaking went into arrest and then realized she did not have energy to go back again. Today she was having some pain associated with the palpitation the left side of her chest around pacer site she was also feeling more tired and does not have energy energy hence came in for further evaluation. Reviewing her EKG she was  Permanent A. fib with heart rate up to 100s 120s as far back as 2017 pacemaker level evaluation shows persistent A. fib she has no known coronary artery disease.     Past medical history:    has a past medical history of Abnormal echocardiogram, Anemia, Aortic insufficiency, Atrial fibrillation ([address] Atrial flutter ([address] Bursitis, trochanteric, Cerumen impaction, Diverticulosis, Diverticulosis of colon, DJD (degenerative joint disease), cervical, DVT (deep venous thrombosis) ([address] Dyslipidemia, Environmental allergies, Family history of colon cancer, Gastric polyp, Gastritis, GERD (gastroesophageal reflux disease), H/O cardiovascular stress test, H/O echocardiogram, H/O exercise stress test, History of Holter monitoring, History of mammogram, Hx of colonoscopy, Hx of Doppler Venous ultrasound, Hyperlipidemia, Hypertension, Hypothyroidism, Internal hemorrhoid, Intervertebral disc protrusion, Left shoulder pain, Long term current use of anticoagulant, Menstruation, Pacemaker, Pulmonary hypertension ([address] Restless legs, Right shoulder strain, Sciatica, Sciatica, Shoulder pain, left, Shoulder pain, right, Sinus bradycardia, Sinusitis, Tinnitus, and Vision changes. Past surgical history:   has a past surgical history that includes Tonsillectomy (08/2006); Upper gastrointestinal endoscopy (04/2008); Cholecystectomy, laparoscopic (02/2001); sinus surgery (1979); Dilation and curettage of uterus (1988); jessica and bso (cervix removed) (04/2003); Breast surgery (1985); Hysterectomy (2003); Cardioversion; Colonoscopy; transthoracic echocardiogram (06/2012); pacemaker placement (06/21/2012); ablation of dysrhythmic focus; Cardioversion (03/10/2014); and Breast biopsy. Social History:   reports that she has never smoked. She has never used smokeless tobacco. She reports that she does not drink alcohol and does not use drugs.   Family history:   no family history of CAD, STROKE of DM at early age    Allergies   Allergen Reactions    Latex Hives    Darvon [Propoxyphene Hcl] Shortness Of Breath    Demerol Rash     Breathing problems    Dilaudid [Hydromorphone Hcl] Anaphylaxis    Valium Rash     Breathing problems    Celecoxib Diarrhea    Norco [Hydrocodone-Acetaminophen] Diarrhea, Nausea Only and Other (See Comments)     A-Fib    Norvasc [Amlodipine] Other (See Comments)     HA, dizziness    Oxycodone Itching    Tape [facility] Tape] Other (See Comments)     BLISTER    Vasotec [Enalapril] Other (See Comments)     Nausea, vomiting    Diazepam Rash       dilTIAZem (CARDIZEM CD) extended release capsule 240 mg, Daily  metoprolol succinate (TOPROL XL) extended release tablet 50 mg, Daily  0.9 % sodium chloride bolus, Once      Current Facility-Administered Medications   Medication Dose Route Frequency Provider Last Rate Last Admin    dilTIAZem (CARDIZEM CD) extended release capsule 240 mg  240 mg Oral Daily Barbara Sharma PA-C        metoprolol succinate (TOPROL XL) extended release tablet 50 mg  50 mg Oral Daily Barbara Sharma PA-C        0.9 % sodium chloride bolus  500 mL IntraVENous Once Becky JATIN Renee         Current Outpatient Medications   Medication Sig Dispense Refill    donepezil (ARICEPT) 10 MG tablet Take 1 tablet by mouth daily 30 tablet 5    dilTIAZem (CARDIZEM CD) 180 MG extended release capsule Take 1 capsule by mouth daily TAKE 1 CAPSULE BY MOUTH EVERY DAY 90 capsule 3    warfarin (COUMADIN) 3 MG tablet Take 3 mg by mouth daily      levothyroxine (SYNTHROID) 50 MCG tablet TAKE 1 TABLET BY MOUTH EVERY DAY BEFORE BREAKFAST 90 tablet 3    metoprolol succinate (TOPROL XL) 50 MG extended release tablet TAKE 1 AND 1/2 TABLETS BY MOUTH EVERY  tablet 3    atorvastatin (LIPITOR) 10 MG tablet TAKE 1 TABLET BY MOUTH EVERY DAY 90 tablet 3    Vitamin D (CHOLECALCIFEROL) 25 MCG (1000 UT) TABS tablet Take 1,000 Units by mouth daily      Fexofenadine HCl (ALLEGRA ALLERGY PO) Take by mouth as needed       Dexlansoprazole (DEXILANT) 60 MG CPDR Take 1 tablet by mouth daily.        Review of Systems:   · Constitutional: No Fever or Weight Loss   · Eyes: No Decreased Vision  · ENT: No Headaches, Hearing Loss or Vertigo  · Cardiovascular: As per HPI  · Respiratory: As per HPI  · Gastrointestinal: No abdominal pain, appetite loss, blood in stools, constipation, diarrhea or heartburn  · Genitourinary: No dysuria, trouble voiding, or hematuria  · Musculoskeletal:  No gait disturbance, weakness or joint complaints  · Integumentary: No rash or pruritis  · Neurological: No TIA or stroke symptoms  · Psychiatric: No anxiety or depression  · Endocrine: No malaise, fatigue or temperature intolerance  · Hematologic/Lymphatic: No bleeding problems, blood clots or swollen lymph nodes  · Allergic/Immunologic: No nasal congestion or hives  All systems negative except as marked. Physical Examination:    Vitals:    04/29/22 1020   BP: 107/71   Pulse: 85   Resp: 16   Temp: 98.1 °F (36.7 °C)   TempSrc: Oral   SpO2: 100%   Weight: 130 lb (59 kg)   Height: 5' 7\" (1.702 m)       General Appearance:  No distress, conversant    Constitutional:  Well developed, Well nourished, No acute distress, Non-toxic appearance. HENT:  Normocephalic, Atraumatic, Bilateral external ears normal, Oropharynx moist, No oral exudates, Nose normal. Neck- Normal range of motion, No tenderness, Supple, No stridor,no apical-carotid delay  Lymphatics : no palpable lymph nodes  Eyes:  PERRL, EOMI, Conjunctiva normal, No discharge. Respiratory:  Normal breath sounds, No respiratory distress, No wheezing, No chest tenderness. ,no use of accessory muscles, crackles Absent   Cardiovascular: (PMI) apex non displaced,no lifts no thrills, ankle swelling Absent  , 1+, s1 and s2 audible,Murmur. Absent , JVD not noted    Abdomen /GI:  Bowel sounds normal, Soft, No tenderness, No masses, No gross visceromegaly   :  No costovertebral angle tenderness   Musculoskeletal:  No edema, no tenderness, no deformities.  Back- no tenderness  Integument:  Well hydrated, no rash   Lymphatic:  No lymphadenopathy noted   Neurologic:  Alert & oriented x 3, CN 2-12 normal, normal motor function, normal sensory function, no focal deficits noted           Medical decision making and Data review:    Lab Review   Recent Labs     04/29/22  1040   WBC 6.1   HGB 14.1   HCT 44.4   *      Recent Labs     04/29/22  1040      K 3.7      CO2 23   BUN 14   CREATININE 0.9     Recent Labs     04/29/22  1040   AST 28   ALT 24   BILITOT 1.7* ALKPHOS 66     Recent Labs     04/29/22  1040   TROPONINT <0.010       No results for input(s): PROBNP in the last 72 hours. Lab Results   Component Value Date    INR 1.97 04/29/2022    PROTIME 25.6 (H) 04/29/2022       EKG: (reviewed by myself)    ECHO:(reviewed by myself)    Chest Xray:(reviewed by myself)  Echocardiogram complete 2D with doppler with color    Result Date: 4/29/2022  Transthoracic Echocardiography Report (TTE)  Demographics   Patient Name       Jojo Babin     Date of Study       04/29/2022   Date of Birth      1945         Gender              Female   Age                67 year(s)         Race                   Patient Number     [id number]         Room Number         [id number]   Visit Number       [id number]   Corporate ID       [id number]   Accession Number   [id number]         23 Jayshree Fuentes, RVT   Ordering Physician Tiffanie Lezama APRN-CNP           Physician           MD  Procedure Type of Study   TTE procedure:ECHOCARDIOGRAM COMPLETE 2D W DOPPLER W COLOR. Procedure Date Date: 04/29/2022 Start: 02:21 PM Study Location: Portable Technical Quality: Adequate visualization Indications:Ventricular tachycardia. Patient Status: Routine Height: 67 inches Weight: 130 pounds BSA: 1.68 m2 BMI: 20.36 kg/m2 HR: 106 bpm BP: 107/71 mmHg  Conclusions   Summary  Left ventricular systolic function is low normal.  Ejection fraction is visually estimated at 45-50%. Septal wall is hypokinetic  Grade II diastolic dysfunction. Moderately dilated left atrium. Moderate aortic regurgitation; PHT: 328 msec. Moderate tricuspid regurgitation; RVSP: 39 mmHg. Mild PHTN. No evidence of any pericardial effusion.    Signature   ------------------------------------------------------------------  Electronically signed by Yola Rasheed MD (Interpreting physician) on 04/29/2022 at 03:04 PM  ------------------------------------------------------------------   Findings   Left Ventricle  Left ventricular systolic function is low normal.  Ejection fraction is visually estimated at 45- 50%. Grade II diastolic dysfunction. septal wall is hypokinetic  Left ventricle size is normal.   Left Atrium  Moderately dilated left atrium. Right Atrium  Moderately dilated right atrium. Right Ventricle  PPM wiring visualized within the right ventricle. Aortic Valve  Moderate aortic regurgitation; PHT: 328 msec. Mitral Valve  Mild mitral regurgitation. Tricuspid Valve  Moderate tricuspid regurgitation; RVSP: 39 mmHg. Mild PHTN. Pulmonic Valve  The pulmonic valve was not well visualized. Pericardial Effusion  No evidence of any pericardial effusion. Pleural Effusion  No evidence of pleural effusion. Miscellaneous  Inferior vena cava is dilated, measuring at 2.2 cm, and does not collapse  with respiration.   M-Mode/2D Measurements & Calculations   LV Diastolic Dimension:  LV Systolic Dimension:  LA Dimension: 4 cmAO Root  3.9 cm                   2.9 cm                  Dimension: 3 cmLA Area:  LV FS:25.6 %             LV Volume Diastolic: 37 87.0 cm2  LV PW Diastolic: 7.32 cm ml  LV PW Systolic: 4.02 cm  LV Volume Systolic: 18  Septum Diastolic: 2.86   ml  cm                       LV EDV/LV EDV Index: 37 RV Diastolic Dimension:  Septum Systolic: 9.94 cm VS/24 M9LC ESV/LV ESV   3.51 cm  CO: 6.44 l/min           Index: 18 ml/11 m2  CI: 3.83 l/m*m2          EF Calculated (A4C):    LA/Aorta: 1.33                           51.4 %  LV Area Diastolic: 18.6  EF Calculated (2D):     LA volume/Index: 75 ml  cm2                      51.1 %                  /33H7  LV Area Systolic: 80.8  cm2                      LV Length: 6.92 cm                            LVOT: 2.2 cm  Doppler Measurements & Calculations   MV Peak E-Wave: 129 cm/s                         LVOT Peak Velocity: 91 cm/s  MV Peak A-Wave: 109 cm/s                         LVOT Mean Velocity: 61.2  MV E/A Ratio: 1.18                               cm/s  MV Peak Gradient: 6.66                           LVOT Peak Gradient: 3  mmHg                     LVOT VTI: 16 cm         mmHgLVOT Mean Gradient: 2                           AV P1/2t: 328 msec      mmHg  MV P1/2t: 35 msec        Estimated PASP: 39.4    Estimated RVSP: 39 mmHg  MVA by PHT:6.29 cm2      mmHg                    Estimated RAP:15 mmHg   MV E' Septal Velocity:  3.29 cm/s                                        TR Velocity:247 cm/s  MV E' Lateral Velocity:                          TR Gradient:24.4 mmHg  11.1 cm/s  MV E/E' septal: 39.21  MV E/E' lateral: 11.62      XR CHEST PORTABLE    Result Date: 4/29/2022  EXAMINATION: ONE XRAY VIEW OF THE CHEST 4/29/2022 10:46 am COMPARISON: None. HISTORY: ORDERING SYSTEM PROVIDED HISTORY: cp/palpitatons TECHNOLOGIST PROVIDED HISTORY: Reason for exam:->cp/palpitatons Reason for Exam: cp/palpitatons Additional signs and symptoms: cp/palpitatons Relevant Medical/Surgical History: cp/palpitatons FINDINGS: The lungs are clear, no effusion. No pneumothorax. Heart is normal size. Left pacemaker Mediastinal and hilar contours are within normal limits. Bony thorax no acute abnormality. 1. No acute cardiopulmonary abnormality. All labs, medications and tests reviewed by myself including data  from outside source , patient and available family . Continue all other medications of all above medical condition listed as is. Impression:  Principal Problem:    Chest pain  Active Problems:    PAF (paroxysmal atrial fibrillation) ([facility])    Long term current use of anticoagulant    Pacemaker dual chamber    S/P ablation of atrial fibrillation  Resolved Problems:    * No resolved hospital problems. *      Assessment: 67 y. o.year old with PMH of  has a past medical history of Abnormal echocardiogram, Anemia, Aortic insufficiency, Atrial fibrillation ([address] Atrial flutter ([address] Bursitis, trochanteric, Cerumen impaction, Diverticulosis, Diverticulosis of colon, DJD (degenerative joint disease), cervical, DVT (deep venous thrombosis) ([address] Dyslipidemia, Environmental allergies, Family history of colon cancer, Gastric polyp, Gastritis, GERD (gastroesophageal reflux disease), H/O cardiovascular stress test, H/O echocardiogram, H/O exercise stress test, History of Holter monitoring, History of mammogram, Hx of colonoscopy, Hx of Doppler Venous ultrasound, Hyperlipidemia, Hypertension, Hypothyroidism, Internal hemorrhoid, Intervertebral disc protrusion, Left shoulder pain, Long term current use of anticoagulant, Menstruation, Pacemaker, Pulmonary hypertension ([address] Restless legs, Right shoulder strain, Sciatica, Sciatica, Shoulder pain, left, Shoulder pain, right, Sinus bradycardia, Sinusitis, Tinnitus, and Vision changes. Plan and Recommendations:    Persistent A. fib with heart rate control strategy failed A. fib ablation and cardioversion increase Cardizem to 40 mg daily increase metoprolol 200 mg daily for rate control strategy depending on heart rate and blood pressure response continue Coumadin  Chest Pain: serial cardiac enzymes, EKG reviewed, further plan as per enzymes and clinical course  Can get outpatient stress test if serial enzymes are negative  Check TSH check magnesium, check BNP  Echo shows septal wall motion abnormality which could be pacer artifact? We will get stress test on Monday if she still admitted or will arrange to be outpatient  DVT prophylaxis if no contraindication  6. Dyslipidemia: continue statins   Discussed with  in detail        Thank you  much for consult and giving us the opportunity in contributing in the care of this patient. Please feel free to call me for any questions.        Laura Lee MD, 4/29/2022 4:12 PM

## 2022-04-29 NOTE — H&P
V2.0  History and Physical      Name:  Kristyn Banuelos /Age/Sex: 1945  (67 y.o. female)   MRN & CSN:  [id number] & [id number] Encounter Date/Time: 2022 4:11 PM EDT   Location:  [facility] PCP: Geraldo Daugherty MD       Hospital Day: 1    Assessment and Plan:   Kristyn Banuelos is a 67 y.o. female with a pmh of Afib s/p 2 ablations, Hypothyroidism, HTN, HPL, Pacemaker, Valve Disease and GERD  who presents with intermittent left upper Chest pain. Hospital Problems           Last Modified POA    * (Principal) Chest pain 2022 Yes    PAF (paroxysmal atrial fibrillation) ([address] 2022 Yes    Overview Addendum 2015  1:21 PM by Olive Storm MD     [facility]- repeat ablation planned 2014 stop amiodarone, continue coumadin, successful cardioversion  2015         Long term current use of anticoagulant 2022 Yes    Overview Signed 7/15/2013 10:37 AM by Olive Storm MD     Coumadin clinic         Pacemaker dual chamber 2022 Yes    S/P ablation of atrial fibrillation 2022 Yes          Chest pain r/o ACS  CXR No acute cardiopulmonary abnormality. EKG Atrial fibrillation with rapid ventricular response, Low voltage QRS, T wave abnormality, consider inferior ischemia. Abnormal ECG. When compared with ECG 22, No significant change was found. Initial troponin 0.010, trend  Tele monitoring  Cardiology consult  ASA/BB/Statin        Nitroglycerin sublingual PRN    2. Atrial fibrillation with RVR, 115 paroxysmal  Last echo on 2017 , repeat echo pending  Cardiology consult  TSH 2.830  We will give IV fluid bolus and see if we can correct if not start Cardizem gtt  Continue home medications    3.  Hypertension-   Monitor BP  Continue home medications    Disposition:   Current Living situation: Lives at home with her   Expected Disposition: Home  Estimated D/C:     Diet No diet orders on file   DVT Prophylaxis [] Lovenox, []  Heparin, [x] SCDs, [] Ambulation,  [] Eliquis, [] Xarelto   Code Status Prior   Surrogate Decision Maker/ POA Patient makes her own decisions     History from:     patient, spouse    History of Present Illness:     Chief Complaint: Chest pain    Mary Carmen Pope is a 67 y.o. female with pmh of Afib, Hypothyroidism, HTN, HPL, Valve Disease and GERD who presents to the ER for evaluation of intermittent chest pain that awoke her out of her sleep around 0300 today. She rates pain 8/10 which has since resolved. Patient describes pain as pressure. Admits to associated symptoms of fatigue and left big toe. Patient was given full strength ASA by ED provider. Patient denies fever, chills, abdominal pain, headaches, dysuria, hematuria, weakness. Patient denies history of DVT, recent travel, surgeries, or lower extremity swelling. Patient denies nicotine use. At this time, patient's chest pain has improved none. Patient was found to have Atrial Fibrillation with  in the ER. Cardiology was consulted. Patient's past medical, social, and family history have been reviewed. Patient case discussed with ED provider Aníbal Danielle     Review of Systems: Need 10 Elements   Review of Systems   Constitutional: Positive for malaise/fatigue. Negative for chills, diaphoresis and fever. HENT: Negative for ear pain, sinus pain and sore throat. Eyes: Negative for blurred vision, double vision, photophobia and pain. Respiratory: Negative for cough, hemoptysis, shortness of breath and wheezing. Cardiovascular: Positive for chest pain. Negative for palpitations, orthopnea and leg swelling. Gastrointestinal: Negative for abdominal pain, heartburn, nausea and vomiting. Endocrine: Negative for polydipsia. Genitourinary: Negative for dysuria, frequency, hematuria and urgency. Musculoskeletal: Negative for back pain, myalgias and neck pain. Skin: Negative for itching and rash.    Neurological: Negative for dizziness, tingling, focal weakness and headaches. Hematological: Does not bruise/bleed easily. Psychiatric/Behavioral: Negative for depression and substance abuse. The patient is not nervous/anxious. Objective:   No intake or output data in the 24 hours ending 04/29/22 1611   Vitals:   Vitals:    04/29/22 1020   BP: 107/71   Pulse: 85   Resp: 16   Temp: 98.1 °F (36.7 °C)   TempSrc: Oral   SpO2: 100%   Weight: 130 lb (59 kg)   Height: 5' 7\" (1.702 m)       Medications Prior to Admission     Prior to Admission medications    Medication Sig Start Date End Date Taking? Authorizing Provider   donepezil (ARICEPT) 10 MG tablet Take 1 tablet by mouth daily 4/14/22   Kayy Molina DO   dilTIAZem (CARDIZEM CD) 180 MG extended release capsule Take 1 capsule by mouth daily TAKE 1 CAPSULE BY MOUTH EVERY DAY 3/21/22   Walter Georges MD   warfarin (COUMADIN) 3 MG tablet Take 3 mg by mouth daily    Historical Provider, MD   levothyroxine (SYNTHROID) 50 MCG tablet TAKE 1 TABLET BY MOUTH EVERY DAY BEFORE BREAKFAST 1/25/22   Walter Georges MD   metoprolol succinate (TOPROL XL) 50 MG extended release tablet TAKE 1 AND 1/2 TABLETS BY MOUTH EVERY DAY 11/29/21   Walter Georges MD   atorvastatin (LIPITOR) 10 MG tablet TAKE 1 TABLET BY MOUTH EVERY DAY 10/19/21   Walter Georges MD   Vitamin D (CHOLECALCIFEROL) 25 MCG (1000 UT) TABS tablet Take 1,000 Units by mouth daily    Historical Provider, MD   Fexofenadine HCl (ALLEGRA ALLERGY PO) Take by mouth as needed     Historical Provider, MD   Dexlansoprazole (DEXILANT) 60 MG CPDR Take 1 tablet by mouth daily. 2/16/11   Historical Provider, MD       Physical Exam: Need 8 Elements   Physical Exam  Constitutional:       General: She is not in acute distress. Appearance: Normal appearance. She is not ill-appearing or diaphoretic. HENT:      Head: Normocephalic and atraumatic. Nose: Nose normal. No congestion or rhinorrhea. Mouth/Throat:      Mouth: Mucous membranes are moist.   Eyes:      General: No scleral icterus. Right eye: No discharge. Left eye: No discharge. Pupils: Pupils are equal, round, and reactive to light. Cardiovascular:      Rate and Rhythm: Tachycardia present. Rhythm irregular. Pulses: Normal pulses. Heart sounds: Normal heart sounds. Pulmonary:      Effort: Pulmonary effort is normal.      Breath sounds: Normal breath sounds. Abdominal:      General: Abdomen is flat. Bowel sounds are normal.      Palpations: Abdomen is soft. There is no mass. Musculoskeletal:         General: Tenderness present. Normal range of motion. Cervical back: Normal range of motion and neck supple. Right lower leg: No edema. Left lower leg: No edema. Skin:     General: Skin is warm and dry. Capillary Refill: Capillary refill takes less than 2 seconds. Comments: Brown discoloration BLE   Neurological:      General: No focal deficit present. Mental Status: She is alert and oriented to person, place, and time. Psychiatric:         Mood and Affect: Mood normal.         Behavior: Behavior normal.         Thought Content:  Thought content normal.         Judgment: Judgment normal.           Past Medical History:   PMHx   Past Medical History:   Diagnosis Date    Abnormal echocardiogram     EF 60%, mild aortic indsufficiency, mild pulmonary hypertension    Anemia     Aortic insufficiency     mild per echo 8/24/2010    Atrial fibrillation (HCC)     failed propafenone, Multaq and flecainide - 7/2011 plan for AFib ablation - [facility]- Dr Lemuel Guadarrama Atrial flutter [facility])     Bursitis, trochanteric 08/2004    s/p injection    Cerumen impaction 04/24/2012    Diverticulosis 2015    Dr. Rossy Chen C scope    Diverticulosis of colon 01/2010    Colonoscopy-Dr Reece ACUÑA (degenerative joint disease), cervical     cervical, generalized disc buldge   MRI 3/02    DVT (deep venous thrombosis) [address] [address] [address]     Dyslipidemia 2002    Environmental allergies     Family history of colon cancer     mother    Gastric polyp     8/2006, 11/2009 benign- Dr Dejuan Sepulveda Gastritis 04/2008    mild superficial per Dr Demetrius March    GERD (gastroesophageal reflux disease) 08/2006    Dr Dejuan Sepulveda H/O cardiovascular stress test 07/13/2004 07/13 EF58%, No scintigraphic evidence of inducible myocardial ischemia 04/13Normal study. No wall motion abnormality seen. EF 65%    H/O echocardiogram 07/18/2013 7/13-EF 50-55% Mild AR.  H/O exercise stress test 02/07/2017    EF63% normal    History of Holter monitoring 09/23/2015    48 hour - abnormal holter revealing afib throughout the recording with interspersed pacemaker beats, HR does not appear to be well controlled    History of mammogram     last mammo 7/25/11, Dr. Haris Archibald yearly    Hx of colonoscopy     1/21/2010    Hx of Doppler Venous ultrasound 08/31/2021    No DVT or SVT, No significant reflux in RLE, Significant reflux in Left CFV    Hyperlipidemia     Hypertension     Hypothyroidism     Internal hemorrhoid 2015    Dr. Demetrius Marhc C scope     Intervertebral disc protrusion 05/2009    wry neck with C4-5      Left shoulder pain 04/2004    (L) shoulder impingement  mild DJD    Long term current use of anticoagulant 07/26/2016    **Coumadin Clinic follows PT/INRs, along w/prescribing pt's Coumadin dosages. **    Menstruation     age 14    Pacemaker 06/21/2012    dual chamber- checked every 3 months    Pulmonary hypertension ([facility] [address] [address]     mild per echo 8/24/2010, Pt refused sleep study (June 2012)    Restless legs 08/2003    ferritin    Right shoulder strain 02/2003    no seperation, bony contusion anterior humeral head  MRI 3/03    Sciatica 07/2009    (R) chronic intermittent s/p epidural injections  Dr Cordelia Tran Sciatica     Shoulder pain, left 03/2011    RTC tendinopathy, type II acrominum, bursitis- referred to Dr. Chelo Howard Shoulder pain, right 03/10/2009    impingement, physical thearpy   (Main)  calcific bursitis  s/p injection    Sinus bradycardia     Sinusitis 12/12/2011    Tinnitus 03/2002    Vision changes 03/19/2013     PSHX:  has a past surgical history that includes Tonsillectomy (08/2006); Upper gastrointestinal endoscopy (04/2008); Cholecystectomy, laparoscopic (02/2001); sinus surgery (1979); Dilation and curettage of uterus (1988); jessica and bso (cervix removed) (04/2003); Breast surgery (1985); Hysterectomy (2003); Cardioversion; Colonoscopy; transthoracic echocardiogram (06/2012); pacemaker placement (06/21/2012); ablation of dysrhythmic focus; Cardioversion (03/10/2014); and Breast biopsy. Allergies: Allergies   Allergen Reactions    Latex Hives    Darvon [Propoxyphene Hcl] Shortness Of Breath    Demerol Rash     Breathing problems    Dilaudid [Hydromorphone Hcl] Anaphylaxis    Valium Rash     Breathing problems    Celecoxib Diarrhea    Norco [Hydrocodone-Acetaminophen] Diarrhea, Nausea Only and Other (See Comments)     A-Fib    Norvasc [Amlodipine] Other (See Comments)     HA, dizziness    Oxycodone Itching    Tape Mattie Mean Tape] Other (See Comments)     BLISTER    Vasotec [Enalapril] Other (See Comments)     Nausea, vomiting    Diazepam Rash     Fam HX: her  family history includes Breast Cancer in her maternal cousin; Colon Cancer (age of onset: [de-identified]) in her mother; Coronary Art Dis (age of onset: 62) in her father; Coronary Art Dis (age of onset: 66) in her mother; Heart Disease in her father and mother; Migraines in her sister; Seizures in her brother; Stroke in her mother.   Soc HX:   Social History     Socioeconomic History    Marital status:      Spouse name: None    Number of children: None    Years of education: None    Highest education level: None   Occupational History    None   Tobacco Use    Smoking status: Never Smoker    Smokeless tobacco: Never Used    Tobacco comment: reviewed 11/4/15 Vaping Use    Vaping Use: Never used   Substance and Sexual Activity    Alcohol use: No    Drug use: No    Sexual activity: Yes     Partners: Male     Comment:    Other Topics Concern    None   Social History Narrative    None     Social Determinants of Health     Financial Resource Strain:     Difficulty of Paying Living Expenses: Not on file   Food Insecurity:     Worried About Running Out of Food in the Last Year: Not on file    Scottie of Food in the Last Year: Not on file   Transportation Needs:     Lack of Transportation (Medical): Not on file    Lack of Transportation (Non-Medical):  Not on file   Physical Activity:     Days of Exercise per Week: Not on file    Minutes of Exercise per Session: Not on file   Stress:     Feeling of Stress : Not on file   Social Connections:     Frequency of Communication with Friends and Family: Not on file    Frequency of Social Gatherings with Friends and Family: Not on file    Attends Hinduism Services: Not on file    Active Member of [address] [place] [address] or Organizations: Not on file    Attends Club or Organization Meetings: Not on file    Marital Status: Not on file   Intimate Partner Violence:     Fear of Current or Ex-Partner: Not on file    Emotionally Abused: Not on file    Physically Abused: Not on file    Sexually Abused: Not on file   Housing Stability:     Unable to Pay for Housing in the Last Year: Not on file    Number of [place] in the Last Year: Not on file    Unstable Housing in the Last Year: Not on file       Medications:   Medications:    dilTIAZem  240 mg Oral Daily    metoprolol succinate  50 mg Oral Daily    sodium chloride  500 mL IntraVENous Once      Infusions:   PRN Meds:      Labs      CBC:   Recent Labs     04/29/22  1040   WBC 6.1   HGB 14.1   *     BMP:    Recent Labs     04/29/22  1040      K 3.7      CO2 23   BUN 14   CREATININE 0.9   GLUCOSE 69*     Hepatic:   Recent Labs     04/29/22  1040   AST 28   ALT 24   BILITOT 1.7*   ALKPHOS 66     Lipids:   Lab Results   Component Value Date    CHOL 154 04/15/2021    HDL 54 04/15/2021    TRIG 84 04/15/2021     Hemoglobin A1C:   Lab Results   Component Value Date    LABA1C 5.8 09/11/2018     TSH:   Lab Results   Component Value Date    TSH 3.76 10/19/2021     Troponin:   Lab Results   Component Value Date    TROPONINT <0.010 04/29/2022    TROPONINT <0.010 02/09/2021     Lactic Acid: No results for input(s): LACTA in the last 72 hours. BNP: No results for input(s): PROBNP in the last 72 hours.   UA:  Lab Results   Component Value Date    NITRU NEGATIVE 09/24/2020    COLORU STRAW 09/24/2020    PHUR 5.5 11/02/2015    WBCUA 9 09/24/2020    RBCUA <1 09/24/2020    MUCUS RARE 09/24/2020    TRICHOMONAS NONE SEEN 09/24/2020    BACTERIA NEGATIVE 09/24/2020    CLARITYU CLEAR 09/24/2020    SPECGRAV 1.005 09/24/2020    LEUKOCYTESUR MODERATE 09/24/2020    UROBILINOGEN NORMAL 09/24/2020    BILIRUBINUR NEGATIVE 09/24/2020    BILIRUBINUR neg 11/02/2015    BLOODU NEGATIVE 09/24/2020    GLUCOSEU neg 11/02/2015    KETUA NEGATIVE 09/24/2020     Urine Cultures: No results found for: [facility]  Blood Cultures: No results found for: BC  No results found for: BLOODCULT2  Organism:   Lab Results   Component Value Date    [facility] 07/18/2012       Imaging/Diagnostics Last 24 Hours   Echocardiogram complete 2D with doppler with color    Result Date: 4/29/2022  Transthoracic Echocardiography Report (TTE)  Demographics   Patient Name       Marisa Fortune     Date of Study       04/29/2022   Date of Birth      1945         Gender              Female   Age                67 year(s)         Race                   Patient Number     [id number]         Room Number         DP54   Visit Number       [id number]   Corporate ID       [id number]   Accession Number   [id number]         23 Jayshree Fuentes RVT   Ordering Physician Meggan Hand                     APRN-CNP           Physician           MD  Procedure Type of Study   TTE procedure:ECHOCARDIOGRAM COMPLETE 2D W DOPPLER W COLOR. Procedure Date Date: 04/29/2022 Start: 02:21 PM Study Location: Portable Technical Quality: Adequate visualization Indications:Ventricular tachycardia. Patient Status: Routine Height: 67 inches Weight: 130 pounds BSA: 1.68 m2 BMI: 20.36 kg/m2 HR: 106 bpm BP: 107/71 mmHg  Conclusions   Summary  Left ventricular systolic function is low normal.  Ejection fraction is visually estimated at 45-50%. Septal wall is hypokinetic  Grade II diastolic dysfunction. Moderately dilated left atrium. Moderate aortic regurgitation; PHT: 328 msec. Moderate tricuspid regurgitation; RVSP: 39 mmHg. Mild PHTN. No evidence of any pericardial effusion. Signature   ------------------------------------------------------------------  Electronically signed by Cedric Ontiveros MD (Interpreting  physician) on 04/29/2022 at 03:04 PM  ------------------------------------------------------------------   Findings   Left Ventricle  Left ventricular systolic function is low normal.  Ejection fraction is visually estimated at 45- 50%. Grade II diastolic dysfunction. septal wall is hypokinetic  Left ventricle size is normal.   Left Atrium  Moderately dilated left atrium. Right Atrium  Moderately dilated right atrium. Right Ventricle  PPM wiring visualized within the right ventricle. Aortic Valve  Moderate aortic regurgitation; PHT: 328 msec. Mitral Valve  Mild mitral regurgitation. Tricuspid Valve  Moderate tricuspid regurgitation; RVSP: 39 mmHg. Mild PHTN. Pulmonic Valve  The pulmonic valve was not well visualized. Pericardial Effusion  No evidence of any pericardial effusion. Pleural Effusion  No evidence of pleural effusion.    Miscellaneous  Inferior vena cava is dilated, measuring at 2.2 cm, and does not collapse  with respiration. M-Mode/2D Measurements & Calculations   LV Diastolic Dimension:  LV Systolic Dimension:  LA Dimension: 4 cmAO Root  3.9 cm                   2.9 cm                  Dimension: 3 cmLA Area:  LV FS:25.6 %             LV Volume Diastolic: 37 43.3 cm2  LV PW Diastolic: 6.97 cm ml  LV PW Systolic: 6.39 cm  LV Volume Systolic: 18  Septum Diastolic: 4.55   ml  cm                       LV EDV/LV EDV Index: 37 RV Diastolic Dimension:  Septum Systolic: 1.47 cm YO/48 T9KQ ESV/LV ESV   3.51 cm  CO: 6.44 l/min           Index: 18 ml/11 m2  CI: 3.83 l/m*m2          EF Calculated (A4C):    LA/Aorta: 1.33                           51.4 %  LV Area Diastolic: 50.8  EF Calculated (2D):     LA volume/Index: 75 ml  cm2                      51.1 %                  /71Q8  LV Area Systolic: 20.0  cm2                      LV Length: 6.92 cm                            LVOT: 2.2 cm  Doppler Measurements & Calculations   MV Peak E-Wave: 129 cm/s                         LVOT Peak Velocity: 91 cm/s  MV Peak A-Wave: 109 cm/s                         LVOT Mean Velocity: 61.2  MV E/A Ratio: 1.18                               cm/s  MV Peak Gradient: 6.66                           LVOT Peak Gradient: 3  mmHg                     LVOT VTI: 16 cm         mmHgLVOT Mean Gradient: 2                           AV P1/2t: 328 msec      mmHg  MV P1/2t: 35 msec        Estimated PASP: 39.4    Estimated RVSP: 39 mmHg  MVA by PHT:6.29 cm2      mmHg                    Estimated RAP:15 mmHg   MV E' Septal Velocity:  3.29 cm/s                                        TR Velocity:247 cm/s  MV E' Lateral Velocity:                          TR Gradient:24.4 mmHg  11.1 cm/s  MV E/E' septal: 39.21  MV E/E' lateral: 11.62      XR CHEST PORTABLE    Result Date: 4/29/2022  EXAMINATION: ONE XRAY VIEW OF THE CHEST 4/29/2022 10:46 am COMPARISON: None.  HISTORY: ORDERING SYSTEM PROVIDED HISTORY: cp/palpitatons TECHNOLOGIST PROVIDED HISTORY: Reason for exam:->cp/palpitatons Reason for Exam: cp/palpitatons Additional signs and symptoms: cp/palpitatons Relevant Medical/Surgical History: cp/palpitatons FINDINGS: The lungs are clear, no effusion. No pneumothorax. Heart is normal size. Left pacemaker Mediastinal and hilar contours are within normal limits. Bony thorax no acute abnormality. 1. No acute cardiopulmonary abnormality.        Personally reviewed Lab Studies, Imaging, and discussed case with Mary Stubbs    Electronically signed by ANTHONY Hernandez CNP on 4/29/2022 at 4:11 PM

## 2022-04-29 NOTE — ED NOTES
Attempted to call report at this time. Charge nurse said the nurse getting the patient would call me right back she was hanging blood.       Kaylee Saini, SHRUTHI  04/29/22 [id number]

## 2022-04-29 NOTE — ED PROVIDER NOTES
I independently examined and evaluated Jerome Bill. In brief, 69-year-old female with complaint of shortness of breath, palpitations. . Occurred on Tuesday and today. Worse with exertion, felt winded and out of breath, palpitations. On Tuesday she felt just more fatigued, was doing yard work, has not been able to even work outside since then because of how bad she has felt. Today extremely more fatigued, short of breath. She was having a charley horse last night in her leg. No cough or fevers. No specific chest pain but has had palpitations. Does have a history of paroxysmal A. fib, GERD, mitral valve disease, symptomatic bradycardia, pacemaker dual-chamber. Focused exam revealed patient no acute distress seated on the bed, regular rate and rhythm, respirations. Soft and nondistended. No peripheral edema. She is alert and oriented. ED course: Labs are unremarkable, troponin is normal, however given her age and comorbidities plan for admission for serial troponins, ACS rule out, evaluation by cardiology    EKG:  Atrial fibrillation with rate of 91 bpm, nonspecific T wave changes. She did develop a similar type feeling to the palpitations and fatigue that she had had earlier and found that she did go into atrial fibrillation with rapid ventricular response, did not require any medications to get her out of that, however I do wonder if she is going in and out of rapid ventricular response that is causing some of her symptoms. We will continue on telemetry, plan for admission    Patient is comfortable with this plan, she was expecting to need to stay today, does not feel like things are right, still does not feel at her baseline    All diagnostic, treatment, and disposition decisions were made by myself in conjunction with the advanced practice provider. I personally saw the patient and performed a substantive portion of the visit including all aspects of the medical decision making.      For all further details of the patient's emergency department visit, please see the advanced practice provider's documentation. Comment: Please note this report has been produced using speech recognition software and may contain errors related to that system including errors in grammar, punctuation, and spelling, as well as words and phrases that may be inappropriate. If there are any questions or concerns please feel free to contact the dictating provider for clarification.         Yen Gilliam MD  04/29/22 [id number]

## 2022-04-29 NOTE — ACP (ADVANCE CARE PLANNING)
Patient does not have any ACP documents/Medical Power of . LSW notes hospital will follow [place]'s Next of Kin hierarchy in the following descending order for priority:    Guardian  Spouse  [de-identified] of adult Children  Parents  [de-identified] of adult Siblings  Nearest Relative not described above    Per [place]'s Next of Kin hierarchy: Patients' Spouse will be Primary Healthcare Decision Maker and Child will be Secondary Decision Maker.

## 2022-04-29 NOTE — CONSULTS
[facility] [place]  [address], [address] [facility],Third [address]  Phone: [phone]    Fax [phone]                  Morris Kapoor MD, Lesley Gomez MD, Josy Griffin MD, MD Tamara Xie MD Avelino Luria, MD Webb Economy, MD Hosea Dublin, ANTHONY Lamar, ANTHONY Covington, ANTHONY Hand, ANTHONY Graf PA-C    CARDIOLOGY CONSULT NOTE     Reason for consultation: Chest pressure with A. fib RVR      Primary care physician: Kennedy Gonzalez MD      Thank you for the consult. Chief Complaints :  Chief Complaint   Patient presents with    Palpitations    Chest Pain        History of present illness:Genesis is a 67 y. o.year old  female with a past medical history of persistent atrial fibrillation, hyperlipidemia, hypothyroidism. Patient presents with increased shortness of breath, palpitations and chest pressure over the last 2 to 3 days. Patient stated that she was in the garden when she began to feel more fatigued and weak. She went inside to rest and never really regained her strength. She is also noticed that she has some chest pressure that is associated with her fatigue and shortness of breath. Chest pressure is not necessarily related to exertion however her shortness of breath is. She has a past medical history significant for persistent atrial fibrillation s/p 2 failed ablations and a permanent pacemaker. She is currently being managed on Toprol-XL 50 mg daily, Cardizem 180 mg daily and Coumadin. She is a Dr. Angeles Gage patient and follows up regularly. Her heart rate is generally well controlled. Patient does not have personal history of CAD but does have significant family history of it. Does not typically have chest pressure. Initial troponin was negative potassium of 3.7, creatinine of 0.9, TSH of 2.8, hemoglobin 14.1, INR of 1.97. CXR nonacute.       Past medical history:    has a past medical history of Abnormal echocardiogram, Anemia, Aortic insufficiency, Atrial fibrillation ([address] Atrial flutter ([address] Bursitis, trochanteric, Cerumen impaction, Diverticulosis, Diverticulosis of colon, DJD (degenerative joint disease), cervical, DVT (deep venous thrombosis) ([address] Dyslipidemia, Environmental allergies, Family history of colon cancer, Gastric polyp, Gastritis, GERD (gastroesophageal reflux disease), H/O cardiovascular stress test, H/O echocardiogram, H/O exercise stress test, History of Holter monitoring, History of mammogram, Hx of colonoscopy, Hx of Doppler Venous ultrasound, Hyperlipidemia, Hypertension, Hypothyroidism, Internal hemorrhoid, Intervertebral disc protrusion, Left shoulder pain, Long term current use of anticoagulant, Menstruation, Pacemaker, Pulmonary hypertension ([address] Restless legs, Right shoulder strain, Sciatica, Sciatica, Shoulder pain, left, Shoulder pain, right, Sinus bradycardia, Sinusitis, Tinnitus, and Vision changes. Past surgical history:   has a past surgical history that includes Tonsillectomy (08/2006); Upper gastrointestinal endoscopy (04/2008); Cholecystectomy, laparoscopic (02/2001); sinus surgery (1979); Dilation and curettage of uterus (1988); jessica and bso (cervix removed) (04/2003); Breast surgery (1985); Hysterectomy (2003); Cardioversion; Colonoscopy; transthoracic echocardiogram (06/2012); pacemaker placement (06/21/2012); ablation of dysrhythmic focus; Cardioversion (03/10/2014); and Breast biopsy. Social History:   reports that she has never smoked. She has never used smokeless tobacco. She reports that she does not drink alcohol and does not use drugs. Family history:  Family history of CAD at early age in father. Mother also had CAD.      Allergies   Allergen Reactions    Latex Hives    Darvon [Propoxyphene Hcl] Shortness Of Breath    Demerol Rash     Breathing problems    Dilaudid [Hydromorphone Hcl] Anaphylaxis    Valium Rash     Breathing problems    Celecoxib Diarrhea    Norco [Hydrocodone-Acetaminophen] Diarrhea, Nausea Only and Other (See Comments)     A-Fib    Norvasc [Amlodipine] Other (See Comments)     HA, dizziness    Oxycodone Itching    Tape Danna Colder Tape] Other (See Comments)     BLISTER    Vasotec [Enalapril] Other (See Comments)     Nausea, vomiting    Diazepam Rash       No current facility-administered medications for this encounter. No current facility-administered medications for this encounter. Current Outpatient Medications   Medication Sig Dispense Refill    donepezil (ARICEPT) 10 MG tablet Take 1 tablet by mouth daily 30 tablet 5    dilTIAZem (CARDIZEM CD) 180 MG extended release capsule Take 1 capsule by mouth daily TAKE 1 CAPSULE BY MOUTH EVERY DAY 90 capsule 3    warfarin (COUMADIN) 3 MG tablet Take 3 mg by mouth daily      levothyroxine (SYNTHROID) 50 MCG tablet TAKE 1 TABLET BY MOUTH EVERY DAY BEFORE BREAKFAST 90 tablet 3    metoprolol succinate (TOPROL XL) 50 MG extended release tablet TAKE 1 AND 1/2 TABLETS BY MOUTH EVERY  tablet 3    atorvastatin (LIPITOR) 10 MG tablet TAKE 1 TABLET BY MOUTH EVERY DAY 90 tablet 3    Vitamin D (CHOLECALCIFEROL) 25 MCG (1000 UT) TABS tablet Take 1,000 Units by mouth daily      Fexofenadine HCl (ALLEGRA ALLERGY PO) Take by mouth as needed       Dexlansoprazole (DEXILANT) 60 MG CPDR Take 1 tablet by mouth daily. Review of Systems   Constitutional: Positive for fatigue. Negative for diaphoresis and fever. Eyes: Negative for visual disturbance. Respiratory: Positive for shortness of breath. Negative for chest tightness. Cardiovascular: Positive for chest pain ( Pressure) and palpitations. Negative for leg swelling. Gastrointestinal: Negative for abdominal pain, diarrhea and vomiting. Endocrine: Negative for cold intolerance and heat intolerance. Musculoskeletal: Negative for arthralgias.    Skin: Negative for pallor and rash. Neurological: Negative for dizziness, syncope, light-headedness and headaches. Hematological: Bruises/bleeds easily. Psychiatric/Behavioral: Negative for dysphoric mood. The patient is not nervous/anxious. Physical Examination:    Vitals:    04/29/22 1020   BP: 107/71   Pulse: 85   Resp: 16   Temp: 98.1 °F (36.7 °C)   TempSrc: Oral   SpO2: 100%   Weight: 130 lb (59 kg)   Height: 5' 7\" (1.702 m)       Physical Exam  Constitutional:       General: She is not in acute distress. Appearance: She is not diaphoretic. HENT:      Head: Normocephalic and atraumatic. Right Ear: External ear normal.      Left Ear: External ear normal.      Nose: Nose normal.      Mouth/Throat:      Mouth: Mucous membranes are moist.   Eyes:      Extraocular Movements: Extraocular movements intact. Comments: Pupils equal and round   Neck:      Vascular: No carotid bruit. Cardiovascular:      Rate and Rhythm: Tachycardia present. Rhythm irregular. Pulses: Normal pulses. Heart sounds: S1 normal and S2 normal. No murmur heard. No friction rub. No gallop. Pulmonary:      Effort: Pulmonary effort is normal. No respiratory distress. Breath sounds: Normal breath sounds. No rales. Chest:      Chest wall: No tenderness. Abdominal:      Palpations: Abdomen is soft. Tenderness: There is no abdominal tenderness. Musculoskeletal:      Right lower leg: No edema. Left lower leg: No edema. Skin:     General: Skin is warm and dry. Capillary Refill: Capillary refill takes less than 2 seconds. Findings: No rash. Comments: Skin turgor brisk   Neurological:      Mental Status: She is alert and oriented to person, place, and time. Mental status is at baseline. Psychiatric:         Behavior: Behavior is cooperative. Thought Content:  Thought content normal.         Judgment: Judgment normal.        Medical decision making and Data review:    Lab Review   Recent Labs     04/29/22  1040   WBC 6.1   HGB 14.1   HCT 44.4   *      Recent Labs     04/29/22  1040      K 3.7      CO2 23   BUN 14   CREATININE 0.9     Recent Labs     04/29/22  1040   AST 28   ALT 24   BILITOT 1.7*   ALKPHOS 66     Recent Labs     04/29/22  1040   TROPONINT <0.010       No results for input(s): PROBNP in the last 72 hours. Lab Results   Component Value Date    INR 1.97 04/29/2022    PROTIME 25.6 (H) 04/29/2022       EKG: Reviewed by me  Atrial fibrillation with rapid ventricular response. Inferior T wave inversions, similar to previous EKGs    ECHO: 2/7/2017   Summary   Left ventricle size is normal.   Ejection fraction is visually estimated at 55-60%. Mildly dilated left atrium. Mild to Moderate mitral regurgitation is present. Mild-to-moderate tricuspid regurgitation. No evidence of pericardial effusion. Mild pulmonary HTN    Pacer analysis: 03/27/2022  Pacer analysis is reviewed is consistent with normal dual-chamber non-MRI [employer] pacer function with stable leads and appropriate battery status for the age of the device. Remaining average battery life is 4.5 years. Device is programmed to DDDR mode at a lower rate of 60 bpm and 96% ventricle sensing noted.  Patient is in persistent atrial fibrillation on warfarin for anticoagulation therapy.     Recommend continued every three month check and follow up office visit as scheduled. Chest Xray: Reviewed by me    1. No acute cardiopulmonary abnormality. All labs, medications and tests reviewed by myself including data  from outside source , patient and available family . Continue all other medications of all above medical condition listed as is. Impression:  Active Problems:    * No active hospital problems. *  Resolved Problems:    * No resolved hospital problems. *      Assessment and plan: 67 y. o.year old with   1. Persistent atrial fibrillation s/p permanent pacemaker and failed ablation x2  -Heart rate in 100s to 120s, has not received home medications today. Check magnesium, potassium 3.7 (goal of 4.0-4.5). Increase home cardizem to 240 mg. Give toprol xl 50 mg.   2. Chest pressure  -Initial troponin negative. EKG per above. No signs of wall motion abnormalities on preliminary echo results. Can consider outpatient stress test.   3. Valvular heart disease  -Moderate aortic regurgitation and mitral regurgitation noted. 4. Hyperlipidemia  -On Lipitor 10 mg at home. 5. Hypothyroidism  -TSH of 2.830. On Cardizem 180 mg daily, Toprol-XL 50 mg, Coumadin, Synthroid, Lipitor 10 mg    Thank you  much for consult and giving us the opportunity in contributing in the care of this patient. Please feel free to call me for any questions.        Dunia Vo PA-C, 4/29/2022 2:10 PM

## 2022-04-29 NOTE — ED PROVIDER NOTES
[facility] 4E  EMERGENCY DEPARTMENT ENCOUNTER        Pt Name: Jaime Zavaleta  MRN: [id number]  [place] 1945  Date of evaluation: 4/29/2022  Provider: Cristina Jalloh PA-C  PCP: Alondra Agudelo MD  Note Started: 2:21 PM EDT       I have seen and evaluated this patient with my supervising physician Dr. Milford Meigs       Chief Complaint   Patient presents with    Palpitations    Chest Pain         HISTORY OF PRESENT ILLNESS   (Location/Symptom, Timing/Onset, Context/Setting, Quality, Duration, Modifying Factors, Severity)  Note limiting factors. Chief Complaint: chest pain, sob     Jaime Zavaleta is a 67 y.o. female who presents c/o \"medina been in pain for hours\". She localizes her pain over her left chest near her pacemaker. She describes it as gradual in onset. She mentions the onset was while she was doing some yard work today, and she also mentions a previous episode 3 days ago again when she was gardening. She had developed the chest pain became \"winded\". She had gone in and rested for 40 minutes, return to the yard work and the symptoms returned. Since then she is feeling more fatigued. Symptoms returned today which prompted her visit to the emergency department. She also had pain into her right great toe, and felt that he had been curling down, that her  is at bedside thought might be a charley horse but otherwise she denies any extremity pain or swelling, recent illness, fever, numbness tingling weakness        Nursing Notes were all reviewed and agreed with or any disagreements were addressed in the HPI. REVIEW OF SYSTEMS    (2-9 systems for level 4, 10 or more for level 5)     Review of Systems   Constitutional: Negative for chills and fever. HENT: Negative for congestion and rhinorrhea. Eyes: Negative for pain and visual disturbance. Respiratory: Positive for shortness of breath. Negative for cough.     Cardiovascular: Positive for chest pain. Negative for leg swelling. Gastrointestinal: Negative for abdominal pain, diarrhea, nausea and vomiting. Genitourinary: Negative for dysuria and hematuria. Musculoskeletal: Negative for back pain and myalgias. Skin: Negative for rash and wound. Neurological: Negative for dizziness and light-headedness. Review of Systems   Constitutional: Negative for chills and fever. HENT: Negative for congestion and rhinorrhea. Eyes: Negative for pain and visual disturbance. Respiratory: Negative for cough and shortness of breath. Cardiovascular: Negative for chest pain and leg swelling. Gastrointestinal: Positive for diarrhea (chronic). Negative for abdominal pain, nausea and vomiting. Genitourinary: Negative for dysuria and hematuria. Musculoskeletal: Negative for back pain and myalgias. Skin: Negative for rash and wound. Neurological: Negative for dizziness and light-headedness.      PAST MEDICAL HISTORY     Past Medical History:   Diagnosis Date    Abnormal echocardiogram     EF 60%, mild aortic indsufficiency, mild pulmonary hypertension    Anemia     Aortic insufficiency     mild per echo 8/24/2010    Atrial fibrillation (HCC)     failed propafenone, Multaq and flecainide - 7/2011 plan for AFib ablation - [facility]- Dr Brayden Pearson Atrial flutter ([facility] [address]     Bursitis, trochanteric 08/2004    s/p injection    Cerumen impaction 04/24/2012    Diverticulosis 2015    Dr. Sameer Gotti C scope    Diverticulosis of colon 01/2010    Colonoscopy-Dr Reece ACUÑA (degenerative joint disease), cervical     cervical, generalized disc buldge   MRI 3/02    DVT (deep venous thrombosis) ([facility] [address]     Dyslipidemia 2002    Environmental allergies     Family history of colon cancer     mother    Gastric polyp     8/2006, 11/2009 benign- Dr Clarisse Colon Gastritis 04/2008    mild superficial per Dr Sameer Gotti    GERD (gastroesophageal reflux disease) 08/2006    Dr Clarisse Colon H/O cardiovascular stress test 07/13/2004 07/13 EF58%, No scintigraphic evidence of inducible myocardial ischemia 04/13Normal study. No wall motion abnormality seen. EF 65%    H/O echocardiogram 07/18/2013 7/13-EF 50-55% Mild AR.  H/O exercise stress test 02/07/2017    EF63% normal    History of Holter monitoring 09/23/2015    48 hour - abnormal holter revealing afib throughout the recording with interspersed pacemaker beats, HR does not appear to be well controlled    History of mammogram     last mammo 7/25/11, Dr. Nate Anderson yearly    Hx of colonoscopy     1/21/2010    Hx of Doppler Venous ultrasound 08/31/2021    No DVT or SVT, No significant reflux in RLE, Significant reflux in Left CFV    Hyperlipidemia     Hypertension     Hypothyroidism     Internal hemorrhoid 2015    Dr. Krista Ly C scope     Intervertebral disc protrusion 05/2009    wry neck with C4-5      Left shoulder pain 04/2004    (L) shoulder impingement  mild DJD    Long term current use of anticoagulant 07/26/2016    **Coumadin Clinic follows PT/INRs, along w/prescribing pt's Coumadin dosages. **    Menstruation     age 14    Pacemaker 06/21/2012    dual chamber- checked every 3 months    Pulmonary hypertension ([address]     mild per echo 8/24/2010, Pt refused sleep study (June 2012)    Restless legs 08/2003    ferritin    Right shoulder strain 02/2003    no seperation, bony contusion anterior humeral head  MRI 3/03    Sciatica 07/2009    (R) chronic intermittent s/p epidural injections  Dr Josy Gomez Sciatica     Shoulder pain, left 03/2011    RTC tendinopathy, type II acrominum, bursitis- referred to Dr. Pierce Boyer Shoulder pain, right 03/10/2009    impingement, physical thearpy   (Main)  calcific bursitis  s/p injection    Sinus bradycardia     Sinusitis 12/12/2011    Tinnitus 03/2002    Vision changes 03/19/2013       SURGICAL HISTORY     Past Surgical History:   Procedure Laterality Date    ABLATION OF DYSRHYTHMIC FOCUS      BREAST BIOPSY      BREAST SURGERY  1985    Right breast tumor excised    CARDIOVERSION      1/2008 Dr Epstein Delay; 12/2008    CARD[facility]ERSION  03/10/2014    [facility]    CHOLECYSTECTOMY, LAPAROSCOPIC  02/2001    Dr Davis Pun      [phone] (Dr Becky Zhang)   [id number] [facility]  06/21/2012    [employer] Adapta ADDRL1 -not mri compatible   Bel [place] Handler NANCY AND BSO  04/2003    fibroid      Dr Arianna Ruiz  08/2006    Dr India Eduardo ECHOCARDIOGRAM  06/2012    transthoracic cardioversion-Dr. Tami Hyman    UPPER GASTROINTESTINAL ENDOSCOPY  04/2008    mild superficial gastritis  Dr Becky Zhang; 2009       [address]       Current Discharge Medication List      CONTINUE these medications which have NOT CHANGED    Details   donepezil (ARICEPT) 10 MG tablet Take 1 tablet by mouth daily  Qty: 30 tablet, Refills: 5    Associated Diagnoses: MCI (mild cognitive impairment)      dilTIAZem (CARDIZEM CD) 180 MG extended release capsule Take 1 capsule by mouth daily TAKE 1 CAPSULE BY MOUTH EVERY DAY  Qty: 90 capsule, Refills: 3      warfarin (COUMADIN) 3 MG tablet Take 3 mg by mouth daily      levothyroxine (SYNTHROID) 50 MCG tablet TAKE 1 TABLET BY MOUTH EVERY DAY BEFORE BREAKFAST  Qty: 90 tablet, Refills: 3    Associated Diagnoses: Acquired hypothyroidism      metoprolol succinate (TOPROL XL) 50 MG extended release tablet TAKE 1 AND 1/2 TABLETS BY MOUTH EVERY DAY  Qty: 135 tablet, Refills: 3    Associated Diagnoses: PAF (paroxysmal atrial fibrillation) (HCC)      atorvastatin (LIPITOR) 10 MG tablet TAKE 1 TABLET BY MOUTH EVERY DAY  Qty: 90 tablet, Refills: 3    Associated Diagnoses: Mixed hyperlipidemia      Vitamin D (CHOLECALCIFEROL) 25 MCG (1000 UT) TABS tablet Take 1,000 Units by mouth daily    Comments: Labeling may look different. 25 mcg=1000 Units. Please double check dosages.       Fexofenadine HCl (ALLEGRA ALLERGY PO) Take by mouth as needed       Dexlansoprazole (DEXILANT) 60 MG CPDR Take 1 tablet by mouth daily. ALLERGIES     Latex, Darvon [propoxyphene hcl], Demerol, Dilaudid [hydromorphone hcl], Valium, Celecoxib, Norco [hydrocodone-acetaminophen], Norvasc [amlodipine], Oxycodone, Tape [adhesive tape], Vasotec [enalapril], and Diazepam    FAMILYHISTORY       Family History   Problem Relation Age of Onset    Stroke Mother     Heart Disease Mother     Coronary Art Dis Mother 66        CABG    Colon Cancer Mother [de-identified]        colon     Heart Disease Father     Coronary Art Dis Father 62        CABG    Migraines Sister     Seizures Brother     Breast Cancer Maternal Cousin         under 48        SOCIAL HISTORY       Social History     Socioeconomic History    Marital status:      Spouse name: None    Number of children: None    Years of education: None    Highest education level: None   Occupational History    None   Tobacco Use    Smoking status: Never Smoker    Smokeless tobacco: Never Used    Tobacco comment: reviewed 11/4/15   Vaping Use    Vaping Use: Never used   Substance and Sexual Activity    Alcohol use: No    Drug use: No    Sexual activity: Yes     Partners: Male     Comment:    Other Topics Concern    None   Social History Narrative    None     Social Determinants of Health     Financial Resource Strain:     Difficulty of Paying Living Expenses: Not on file   Food Insecurity:     Worried About [address] [employer] in the Last Year: Not on file    Scottie of Food in the Last Year: Not on file   Transportation Needs:     Lack of Transportation (Medical): Not on file    Lack of Transportation (Non-Medical):  Not on file   Physical Activity:     Days of Exercise per Week: Not on file    Minutes of Exercise per Session: Not on file   Stress:     Feeling of Stress : Not on file   Social Connections:     Frequency of Communication with Friends and Family: Not on file    Frequency of Social Gatherings with Friends and Family: Not on file    Attends Mandaen Services: Not on file    Active Member of Clubs or Organizations: Not on file    Attends Club or Organization Meetings: Not on file    Marital Status: Not on file   Intimate Partner Violence:     Fear of Current or Ex-Partner: Not on file    Emotionally Abused: Not on file    Physically Abused: Not on file    Sexually Abused: Not on file   Housing Stability:     Unable to Pay for Housing in the Last Year: Not on file    Number of [place] in the Last Year: Not on file    Unstable Housing in the Last Year: Not on file       SCREENINGS    [place] Coma Scale  Eye Opening: Spontaneous  Best Verbal Response: Oriented  Best Motor Response: Obeys commands  Greg Coma Scale Score: 15 Heart Score for chest pain patients  History:  Moderately Suspicious  ECG: Non-Specifc repolarization disturbance/LBTB/PM  Patient Age: > 65 years  *Risk factors for Atherosclerotic disease: Hypertension,Hypercholesterolemia  Risk Factors: 1 or 2 risk factors  Troponin: < 1X normal limit  Heart Score Total: 5      PHYSICAL EXAM    (up to 7 for level 4, 8 or more for level 5)     ED Triage Vitals [04/29/22 1020]   BP Temp Temp Source Pulse Resp SpO2 Height Weight   107/71 98.1 °F (36.7 °C) Oral 85 16 100 % 5' 7\" (1.702 m) 130 lb (59 kg)       Physical Exam    DIAGNOSTIC RESULTS   LABS:    Labs Reviewed   CBC WITH AUTO DIFFERENTIAL - Abnormal; Notable for the following components:       Result Value    MCHC 31.8 (*)     Platelets 593 (*)     Monocytes % 13.6 (*)     All other components within normal limits   COMPREHENSIVE METABOLIC PANEL W/ REFLEX TO MG FOR LOW K - Abnormal; Notable for the following components:    Glucose 69 (*)     Total Bilirubin 1.7 (*)     All other components within normal limits   PROTIME/INR & PTT - Abnormal; Notable for the following components:    Protime 25.6 (*)     All other components within normal limits   BRAIN NATRIURETIC PEPTIDE - Abnormal; Notable for the following components:    Pro-BNP 1,079 (*)     All other components within normal limits   TROPONIN   TSH   MAGNESIUM   MAGNESIUM   TROPONIN   TROPONIN   TROPONIN   LIPID PANEL   COMPREHENSIVE METABOLIC PANEL   MAGNESIUM   HEMOGLOBIN A1C   CBC   PROTIME-INR   TROPONIN       When ordered, only abnormal lab results are displayed. All other labs were within normal range or not returned as of this dictation. EKG: When ordered, EKG's are interpreted by the Emergency Department Physician in the absence of a cardiologist.  Please see their note for interpretation of EKG. RADIOLOGY:   Non-plain film images such as CT, Ultrasound and MRI are read by the radiologist. Plain radiographic images are visualized andpreliminarily interpreted by the  ED Provider with the below findings:        Interpretation perthe Radiologist below, if available at the time of this note:    XR CHEST PORTABLE   Final Result      1. No acute cardiopulmonary abnormality. XR CHEST PORTABLE    Result Date: 4/29/2022  EXAMINATION: ONE XRAY VIEW OF THE CHEST 4/29/2022 10:46 am COMPARISON: None. HISTORY: ORDERING SYSTEM PROVIDED HISTORY: cp/palpitatons TECHNOLOGIST PROVIDED HISTORY: Reason for exam:->cp/palpitatons Reason for Exam: cp/palpitatons Additional signs and symptoms: cp/palpitatons Relevant Medical/Surgical History: cp/palpitatons FINDINGS: The lungs are clear, no effusion. No pneumothorax. Heart is normal size. Left pacemaker Mediastinal and hilar contours are within normal limits. Bony thorax no acute abnormality. 1. No acute cardiopulmonary abnormality.          PROCEDURES   Unless otherwise noted below, none     Procedures    CRITICAL CARE TIME   N/A    CONSULTS:  IP CONSULT TO CARDIOLOGY  IP CONSULT TO HOSPITALIST  IP CONSULT TO CARDIOLOGY      EMERGENCY DEPARTMENT COURSE and DIFFERENTIAL DIAGNOSIS/MDM:   Vitals:    Vitals:    04/29/22 1819 04/29/22 1820 04/29/22 1821 04/29/22 2057   BP:    108/79   Pulse: 118 118 112 106   Resp:    17   Temp:    97.6 °F (36.4 °C)   TempSrc:    Oral   SpO2:    96%   Weight:       Height:           Patient was given thefollowing medications:  Medications   ondansetron (ZOFRAN-ODT) disintegrating tablet 4 mg (has no administration in time range)     Or   ondansetron (ZOFRAN) injection 4 mg (has no administration in time range)   nitroGLYCERIN (NITROSTAT) SL tablet 0.4 mg (has no administration in time range)   sodium chloride flush 0.9 % injection 5-40 mL (has no administration in time range)   sodium chloride flush 0.9 % injection 5-40 mL (has no administration in time range)   0.9 % sodium chloride infusion (has no administration in time range)   0.9 % sodium chloride infusion ( IntraVENous New Bag 4/29/22 1755)   dilTIAZem (CARDIZEM CD) extended release capsule 240 mg (240 mg Oral Given 4/29/22 1856)   0.9 % sodium chloride bolus (500 mLs IntraVENous Not Given 4/29/22 1755)   metoprolol succinate (TOPROL XL) extended release tablet 50 mg (has no administration in time range)   aspirin chewable tablet 324 mg (324 mg Oral Given 4/29/22 1504)           54-year-old female with known history of A. fib, hypothyroidism, pacemaker, presents with above HPI. She is seen by Dr. Sabina Owen. Discussion with her and she is describing some exertional chest pain and shortness of breath accompanied with fatigue. Chest pain shortness of breath work-up initiated. @9255 patient discussed with on-call cardiologist Beatrice Clemens regarding symptoms, presentation, work-up. We discussed outpatient follow-up versus admission for observation and further work-up sooner. I had also discussed these options with patient, who prefers to be admitted.   Hospitalist paged, patient discussed with and accepted by hospitalist  For chest pain, serial work work ups and cardiology consult    @6995 patient discussed with and accepted by hospitalist .  I spoke with hospitalist LEVI chapa who also had FaceTime with patient, noted concern for possible RVR, and requested 500cc normal saline. I have ordered this, and discussed with RN Jus Horner who agreed to get this started before patient needs department. FINAL IMPRESSION      1. Chest pain, unspecified type    2. Shortness of breath          DISPOSITION/PLAN   DISPOSITION Admitted 04/29/2022 03:01:19 PM      PATIENT REFERREDTO:  No follow-up provider specified.     DISCHARGE MEDICATIONS:  Current Discharge Medication List          DISCONTINUED MEDICATIONS:  Current Discharge Medication List                 (Please note that portions ofthis note were completed with a voice recognition program.  Efforts were made to edit the dictations but occasionally words are mis-transcribed.)    Malini Ruiz PA-C (electronically signed)             Malini Ruiz PA-C  04/30/22 [id number]

## 2022-04-30 LAB
ALBUMIN SERPL-MCNC: 3.5 GM/DL (ref 3.4–5)
ALP BLD-CCNC: 59 IU/L (ref 40–128)
ALT SERPL-CCNC: 20 U/L (ref 10–40)
ANION GAP SERPL CALCULATED.3IONS-SCNC: 11 MMOL/L (ref 4–16)
AST SERPL-CCNC: 21 IU/L (ref 15–37)
BILIRUB SERPL-MCNC: 1.7 MG/DL (ref 0–1)
BUN BLDV-MCNC: 9 MG/DL (ref 6–23)
CALCIUM SERPL-MCNC: 8.1 MG/DL (ref 8.3–10.6)
CHLORIDE BLD-SCNC: 106 MMOL/L (ref 99–110)
CHOLESTEROL: 115 MG/DL
CO2: 23 MMOL/L (ref 21–32)
CREAT SERPL-MCNC: 0.7 MG/DL (ref 0.6–1.1)
EKG ATRIAL RATE: 98 BPM
EKG DIAGNOSIS: NORMAL
EKG Q-T INTERVAL: 354 MS
EKG QRS DURATION: 80 MS
EKG QTC CALCULATION (BAZETT): 440 MS
EKG R AXIS: 38 DEGREES
EKG T AXIS: 188 DEGREES
EKG VENTRICULAR RATE: 93 BPM
ESTIMATED AVERAGE GLUCOSE: 108 MG/DL
GFR AFRICAN AMERICAN: >60 ML/MIN/1.73M2
GFR NON-AFRICAN AMERICAN: >60 ML/MIN/1.73M2
GLUCOSE BLD-MCNC: 80 MG/DL (ref 70–99)
HBA1C MFR BLD: 5.4 % (ref 4.2–6.3)
HCT VFR BLD CALC: 40.2 % (ref 37–47)
HDLC SERPL-MCNC: 51 MG/DL
HEMOGLOBIN: 12.8 GM/DL (ref 12.5–16)
INR BLD: 1.96 INDEX
LDL CHOLESTEROL CALCULATED: 55 MG/DL
MAGNESIUM: 1.9 MG/DL (ref 1.8–2.4)
MCH RBC QN AUTO: 29.6 PG (ref 27–31)
MCHC RBC AUTO-ENTMCNC: 31.8 % (ref 32–36)
MCV RBC AUTO: 93.1 FL (ref 78–100)
PDW BLD-RTO: 13.3 % (ref 11.7–14.9)
PLATELET # BLD: 102 K/CU MM (ref 140–440)
PMV BLD AUTO: 11 FL (ref 7.5–11.1)
POTASSIUM SERPL-SCNC: 4 MMOL/L (ref 3.5–5.1)
PROTHROMBIN TIME: 25.5 SECONDS (ref 11.7–14.5)
RBC # BLD: 4.32 M/CU MM (ref 4.2–5.4)
SODIUM BLD-SCNC: 140 MMOL/L (ref 135–145)
TOTAL PROTEIN: 5.6 GM/DL (ref 6.4–8.2)
TRIGL SERPL-MCNC: 47 MG/DL
TROPONIN T: <0.01 NG/ML
TROPONIN T: <0.01 NG/ML
WBC # BLD: 3.7 K/CU MM (ref 4–10.5)

## 2022-04-30 PROCEDURE — 94150 VITAL CAPACITY TEST: CPT

## 2022-04-30 PROCEDURE — 36415 COLL VENOUS BLD VENIPUNCTURE: CPT

## 2022-04-30 PROCEDURE — 83735 ASSAY OF MAGNESIUM: CPT

## 2022-04-30 PROCEDURE — 6370000000 HC RX 637 (ALT 250 FOR IP): Performed by: NURSE PRACTITIONER

## 2022-04-30 PROCEDURE — 80061 LIPID PANEL: CPT

## 2022-04-30 PROCEDURE — G0378 HOSPITAL OBSERVATION PER HR: HCPCS

## 2022-04-30 PROCEDURE — 6370000000 HC RX 637 (ALT 250 FOR IP): Performed by: INTERNAL MEDICINE

## 2022-04-30 PROCEDURE — APPSS45 APP SPLIT SHARED TIME 31-45 MINUTES: Performed by: NURSE PRACTITIONER

## 2022-04-30 PROCEDURE — 6370000000 HC RX 637 (ALT 250 FOR IP)

## 2022-04-30 PROCEDURE — 2580000003 HC RX 258: Performed by: NURSE PRACTITIONER

## 2022-04-30 PROCEDURE — 80053 COMPREHEN METABOLIC PANEL: CPT

## 2022-04-30 PROCEDURE — 85027 COMPLETE CBC AUTOMATED: CPT

## 2022-04-30 PROCEDURE — 85610 PROTHROMBIN TIME: CPT

## 2022-04-30 PROCEDURE — 84484 ASSAY OF TROPONIN QUANT: CPT

## 2022-04-30 PROCEDURE — 99215 OFFICE O/P EST HI 40 MIN: CPT | Performed by: INTERNAL MEDICINE

## 2022-04-30 PROCEDURE — 93005 ELECTROCARDIOGRAM TRACING: CPT | Performed by: NURSE PRACTITIONER

## 2022-04-30 PROCEDURE — 83036 HEMOGLOBIN GLYCOSYLATED A1C: CPT

## 2022-04-30 PROCEDURE — 94761 N-INVAS EAR/PLS OXIMETRY MLT: CPT

## 2022-04-30 PROCEDURE — 94664 DEMO&/EVAL PT USE INHALER: CPT

## 2022-04-30 RX ORDER — WARFARIN SODIUM 3 MG/1
3 TABLET ORAL DAILY
Status: DISCONTINUED | OUTPATIENT
Start: 2022-04-30 | End: 2022-05-01 | Stop reason: HOSPADM

## 2022-04-30 RX ORDER — ACETAMINOPHEN 500 MG
1000 TABLET ORAL ONCE
Status: COMPLETED | OUTPATIENT
Start: 2022-04-30 | End: 2022-04-30

## 2022-04-30 RX ORDER — ACETAMINOPHEN 325 MG/1
650 TABLET ORAL EVERY 6 HOURS PRN
Status: DISCONTINUED | OUTPATIENT
Start: 2022-04-30 | End: 2022-05-01 | Stop reason: HOSPADM

## 2022-04-30 RX ADMIN — ACETAMINOPHEN 1000 MG: 500 TABLET ORAL at 02:07

## 2022-04-30 RX ADMIN — METOPROLOL SUCCINATE 50 MG: 50 TABLET, EXTENDED RELEASE ORAL at 08:41

## 2022-04-30 RX ADMIN — ACETAMINOPHEN 650 MG: 325 TABLET ORAL at 14:57

## 2022-04-30 RX ADMIN — METOPROLOL SUCCINATE 50 MG: 50 TABLET, EXTENDED RELEASE ORAL at 21:43

## 2022-04-30 RX ADMIN — DILTIAZEM HYDROCHLORIDE 240 MG: 240 CAPSULE, COATED, EXTENDED RELEASE ORAL at 08:42

## 2022-04-30 RX ADMIN — NITROGLYCERIN 0.4 MG: 0.4 TABLET, ORALLY DISINTEGRATING SUBLINGUAL at 16:02

## 2022-04-30 RX ADMIN — WARFARIN SODIUM 3 MG: 3 TABLET ORAL at 17:17

## 2022-04-30 RX ADMIN — SODIUM CHLORIDE: 9 INJECTION, SOLUTION INTRAVENOUS at 01:16

## 2022-04-30 RX ADMIN — SODIUM CHLORIDE: 9 INJECTION, SOLUTION INTRAVENOUS at 17:16

## 2022-04-30 RX ADMIN — SODIUM CHLORIDE, PRESERVATIVE FREE 10 ML: 5 INJECTION INTRAVENOUS at 21:42

## 2022-04-30 RX ADMIN — SODIUM CHLORIDE: 9 INJECTION, SOLUTION INTRAVENOUS at 09:39

## 2022-04-30 ASSESSMENT — PAIN SCALES - GENERAL
PAINLEVEL_OUTOF10: 9
PAINLEVEL_OUTOF10: 0
PAINLEVEL_OUTOF10: 4
PAINLEVEL_OUTOF10: 5

## 2022-04-30 ASSESSMENT — ENCOUNTER SYMPTOMS
COUGH: 0
NAUSEA: 0
SHORTNESS OF BREATH: 1
ABDOMINAL PAIN: 0
EYE PAIN: 0
BACK PAIN: 0
DIARRHEA: 0
VOMITING: 0
RHINORRHEA: 0

## 2022-04-30 ASSESSMENT — PAIN DESCRIPTION - DESCRIPTORS: DESCRIPTORS: SHARP

## 2022-04-30 ASSESSMENT — PAIN DESCRIPTION - LOCATION: LOCATION: CHEST

## 2022-04-30 NOTE — PROGRESS NOTES
PHARMACY ANTICOAGULATION MONITORING SERVICE    Maximino Boast is a 67 y.o. female on warfarin therapy for atrial fibrillation. Pharmacy consulted by Cora CRUZ for monitoring and adjustment of treatment. Indication for anticoagulation: a fib  INR goal: 2-3  Warfarin dose prior to admission: 3 mg daily per anticoag clinic notes    Pertinent Laboratory Values Recent Labs     04/29/22  1040 04/29/22  1040 04/30/22  0709   INR 1.97   < > 1.96   HGB 14.1   < > 12.8   HCT 44.4   < > 40.2   *  --  102*    < > = values in this interval not displayed. Assessment/Plan:   Drug Interactions: no new   INR 1.96   Will continue warfarin 3 mg daily   [address] [place] [address] will continue to monitor and adjust warfarin therapy as indicated    Thank you for the consult.   Mer Roman [facility]  4/30/2022 1:18 PM

## 2022-04-30 NOTE — PROGRESS NOTES
V2.0  [facility] Hospitalist Progress Note      Name:  Abiola Jorge /Age/Sex: 1945  (78 y.o. female)   MRN & CSN:  [id number] & [id number] Encounter Date/Time: 2022 5:37 PM EDT    Location:  [address] [place] [address] PCP: Julio Cesar Shaver MD       Hospital Day: 2    Assessment and Plan:   Abiola Jorge is a 67 y.o. female with pmh of hypertension, persistent atrial fibrillation and hypothyroidism who presents with intermittent chest pressure, palpitation and generalized fatigue when she woke up on the day of admission. 1.  Persistent atrial fibrillation with rapid ventricular rate/pacemaker: Patient with history of ablation in the past.  TSH is normal.  Echo-EF 45 to 50% with septal motion abnormality which may be artifact. Troponin x3-negative. The rate is better controlled with Toprol 50 mg twice daily and Cardizem to 40 mg daily. On Coumadin for stroke prophylaxis. Cardiology consult appreciated. 2.  Chest pain: Troponin x3-negative. Stress test as outpatient or on Monday. 3.  Hypothyroidism: Continue Synthroid. Chronic problems include hypertension, hyperlipidemia, gastroesophageal reflux, restless leg syndrome and DVT. DVT prophylaxis: Coumadin  Disposition:        Diet ADULT DIET; Regular; Low Fat/Low Chol/High Fiber/2 gm Na   DVT Prophylaxis [] Lovenox, []  Heparin, [] SCDs, [] Ambulation,  [] Eliquis, [] Xarelto  [x] Coumadin   Code Status Full Code   Disposition From: Home  Expected Disposition: Home  Estimated Date of Discharge: Tomorrow  Patient requires continued admission due to persistent atrial fibrillation with rapid ventricular rate and chest pain. Surrogate Decision Maker/ POA      Subjective:     Chief Complaint: Palpitations and Chest Pain       Abiola Jorge is a 67 y.o. female who presents with chest pressure, palpitation and generalized fatigue when she woke up. No palpitation or dizziness this morning.          Review of Systems:    Review of Systems    Nothing significant. Objective: Intake/Output Summary (Last 24 hours) at 4/30/2022 1737  Last data filed at 4/30/2022 1317  Gross per 24 hour   Intake 480 ml   Output --   Net 480 ml        Vitals:   Vitals:    04/30/22 1558   BP: 94/67   Pulse: 72   Resp:    Temp: 98.1 °F (36.7 °C)   SpO2: 98%       Physical Exam:   General: Elderly female. No apparent respiratory distress. Eyes: EOMI. Anicteric. ENT: neck supple  Cardiovascular: Irregularly irregular. No murmur or S3. Respiratory: Clear to auscultation  Gastrointestinal: Soft, non tender, no palpable mass. Genitourinary: no suprapubic tenderness. No Nicole catheter. Musculoskeletal: No edema  Skin: warm, dry  Neuro: Alert. Oriented x3. Psych: Mood appropriate.      Medications:   Medications:    warfarin  3 mg Oral Daily    sodium chloride flush  5-40 mL IntraVENous 2 times per day    dilTIAZem  240 mg Oral Daily    sodium chloride  500 mL IntraVENous Once    metoprolol succinate  50 mg Oral BID      Infusions:    sodium chloride      sodium chloride 125 mL/hr at 04/30/22 1716     PRN Meds: acetaminophen, 650 mg, Q6H PRN  ondansetron, 4 mg, Q8H PRN   Or  ondansetron, 4 mg, Q6H PRN  nitroGLYCERIN, 0.4 mg, Q5 Min PRN  sodium chloride flush, 5-40 mL, PRN  sodium chloride, 25 mL, PRN        Labs      Recent Results (from the past 24 hour(s))   Magnesium    Collection Time: 04/29/22  8:49 PM   Result Value Ref Range    Magnesium 2.0 1.8 - 2.4 mg/dl   Brain Natriuretic Peptide    Collection Time: 04/29/22  8:49 PM   Result Value Ref Range    Pro-BNP 1,079 (H) <300 PG/ML   Troponin    Collection Time: 04/29/22  8:49 PM   Result Value Ref Range    Troponin T <0.010 <0.01 NG/ML   Troponin    Collection Time: 04/29/22 10:41 PM   Result Value Ref Range    Troponin T <0.010 <0.01 NG/ML   EKG 12 lead    Collection Time: 04/30/22  7:03 AM   Result Value Ref Range    Ventricular Rate 93 BPM    Atrial Rate 98 BPM    QRS Duration 80 ms    Q-T Interval 354 ms    QTc Calculation (Bazett) 440 ms    R Axis 38 degrees    T Axis 188 degrees    Diagnosis       Atrial fibrillation  Low voltage QRS  T wave abnormality, consider inferior ischemia  Abnormal ECG  When compared with ECG of 29-APR-2022 11:38,  T wave inversion now evident in Lateral leads     EKG 12 lead    Collection Time: 04/30/22  7:04 AM   Result Value Ref Range    Ventricular Rate 88 BPM    Atrial Rate 127 BPM    QRS Duration 82 ms    Q-T Interval 358 ms    QTc Calculation (Bazett) 433 ms    R Axis 36 degrees    T Axis 213 degrees    Diagnosis       Atrial fibrillation  Low voltage QRS  T wave abnormality, consider inferior ischemia  Abnormal ECG  When compared with ECG of 29-APR-2022 11:38,  T wave inversion now evident in Lateral leads     Troponin    Collection Time: 04/30/22  7:09 AM   Result Value Ref Range    Troponin T <0.010 <0.01 NG/ML   Lipid panel - fasting    Collection Time: 04/30/22  7:09 AM   Result Value Ref Range    Triglycerides 47 <150 MG/DL    Cholesterol 115 <200 MG/DL    HDL 51 >40 MG/DL    LDL Calculated 55 <100 MG/DL   Comprehensive Metabolic Panel    Collection Time: 04/30/22  7:09 AM   Result Value Ref Range    Sodium 140 135 - 145 MMOL/L    Potassium 4.0 3.5 - 5.1 MMOL/L    Chloride 106 99 - 110 mMol/L    CO2 23 21 - 32 MMOL/L    BUN 9 6 - 23 MG/DL    CREATININE 0.7 0.6 - 1.1 MG/DL    Glucose 80 70 - 99 MG/DL    Calcium 8.1 (L) 8.3 - 10.6 MG/DL    Albumin 3.5 3.4 - 5.0 GM/DL    Total Protein 5.6 (L) 6.4 - 8.2 GM/DL    Total Bilirubin 1.7 (H) 0.0 - 1.0 MG/DL    ALT 20 10 - 40 U/L    AST 21 15 - 37 IU/L    Alkaline Phosphatase 59 40 - 128 IU/L    GFR Non-African American >60 >60 mL/min/1.73m2    GFR African American >60 >60 mL/min/1.73m2    Anion Gap 11 4 - 16   Magnesium    Collection Time: 04/30/22  7:09 AM   Result Value Ref Range    Magnesium 1.9 1.8 - 2.4 mg/dl   Hemoglobin A1c    Collection Time: 04/30/22  7:09 AM   Result Value Ref Range    Hemoglobin A1C 5.4 4.2 - 6.3 %    eAG 108 mg/dL   CBC    Collection Time: 04/30/22  7:09 AM   Result Value Ref Range    WBC 3.7 (L) 4.0 - 10.5 K/CU MM    RBC 4.32 4.2 - 5.4 M/CU MM    Hemoglobin 12.8 12.5 - 16.0 GM/DL    Hematocrit 40.2 37 - 47 %    MCV 93.1 78 - 100 FL    MCH 29.6 27 - 31 PG    MCHC 31.8 (L) 32.0 - 36.0 %    RDW 13.3 11.7 - 14.9 %    Platelets 662 (L) 786 - 440 K/CU MM    MPV 11.0 7.5 - 11.1 FL   Protime-INR    Collection Time: 04/30/22  7:09 AM   Result Value Ref Range    Protime 25.5 (H) 11.7 - 14.5 SECONDS    INR 1.96 INDEX   Troponin    Collection Time: 04/30/22  2:51 PM   Result Value Ref Range    Troponin T <0.010 <0.01 NG/ML        Imaging/Diagnostics Last 24 Hours   Echocardiogram complete 2D with doppler with color    Result Date: 4/29/2022  Transthoracic Echocardiography Report (TTE)  Demographics   Patient Name       Marilou Hills     Date of Study       04/29/2022   Date of Birth      1945         Gender              Female   Age                67 year(s)         Race                   Patient Number     [id number]         Room Number         MX41   Visit Number       [id number]   Corporate ID       [id number]   Accession Number   [id number]         Sonographer         Murtaza Schroeder RVT   Ordering Physician Keven Norris                     APRN-CNP           Physician           MD  Procedure Type of Study   TTE procedure:ECHOCARDIOGRAM COMPLETE 2D W DOPPLER W COLOR. Procedure Date Date: 04/29/2022 Start: 02:21 PM Study Location: Portable Technical Quality: Adequate visualization Indications:Ventricular tachycardia. Patient Status: Routine Height: 67 inches Weight: 130 pounds BSA: 1.68 m2 BMI: 20.36 kg/m2 HR: 106 bpm BP: 107/71 mmHg  Conclusions   Summary  Left ventricular systolic function is low normal.  Ejection fraction is visually estimated at 45-50%.   Septal wall is hypokinetic Grade II diastolic dysfunction. Moderately dilated left atrium. Moderate aortic regurgitation; PHT: 328 msec. Moderate tricuspid regurgitation; RVSP: 39 mmHg. Mild PHTN. No evidence of any pericardial effusion. Signature   ------------------------------------------------------------------  Electronically signed by Yola Rasheed MD (Interpreting  physician) on 04/29/2022 at 03:04 PM  ------------------------------------------------------------------   Findings   Left Ventricle  Left ventricular systolic function is low normal.  Ejection fraction is visually estimated at 45- 50%. Grade II diastolic dysfunction. septal wall is hypokinetic  Left ventricle size is normal.   Left Atrium  Moderately dilated left atrium. Right Atrium  Moderately dilated right atrium. Right Ventricle  PPM wiring visualized within the right ventricle. Aortic Valve  Moderate aortic regurgitation; PHT: 328 msec. Mitral Valve  Mild mitral regurgitation. Tricuspid Valve  Moderate tricuspid regurgitation; RVSP: 39 mmHg. Mild PHTN. Pulmonic Valve  The pulmonic valve was not well visualized. Pericardial Effusion  No evidence of any pericardial effusion. Pleural Effusion  No evidence of pleural effusion. Miscellaneous  Inferior vena cava is dilated, measuring at 2.2 cm, and does not collapse  with respiration.   M-Mode/2D Measurements & Calculations   LV Diastolic Dimension:  LV Systolic Dimension:  LA Dimension: 4 cmAO Root  3.9 cm                   2.9 cm                  Dimension: 3 cmLA Area:  LV FS:25.6 %             LV Volume Diastolic: 37 61.4 cm2  LV PW Diastolic: 7.05 cm ml  LV PW Systolic: 2.25 cm  LV Volume Systolic: 18  Septum Diastolic: 1.47   ml  cm                       LV EDV/LV EDV Index: 37 RV Diastolic Dimension:  Septum Systolic: 7.16 cm RQ/63 C5GN ESV/LV ESV   3.51 cm  CO: 6.44 l/min           Index: 18 ml/11 m2  CI: 3.83 l/m*m2          EF Calculated (A4C):    LA/Aorta: 1.33 51.4 %  LV Area Diastolic: 37.0  EF Calculated (2D):     LA volume/Index: 75 ml  cm2                      51.1 %                  /80W2  LV Area Systolic: 10.8  cm2                      LV Length: 6.92 cm                            LVOT: 2.2 cm  Doppler Measurements & Calculations   MV Peak E-Wave: 129 cm/s                         LVOT Peak Velocity: 91 cm/s  MV Peak A-Wave: 109 cm/s                         LVOT Mean Velocity: 61.2  MV E/A Ratio: 1.18                               cm/s  MV Peak Gradient: 6.66                           LVOT Peak Gradient: 3  mmHg                     LVOT VTI: 16 cm         mmHgLVOT Mean Gradient: 2                           AV P1/2t: 328 msec      mmHg  MV P1/2t: 35 msec        Estimated PASP: 39.4    Estimated RVSP: 39 mmHg  MVA by PHT:6.29 cm2      mmHg                    Estimated RAP:15 mmHg   MV E' Septal Velocity:  3.29 cm/s                                        TR Velocity:247 cm/s  MV E' Lateral Velocity:                          TR Gradient:24.4 mmHg  11.1 cm/s  MV E/E' septal: 39.21  MV E/E' lateral: 11.62      XR CHEST PORTABLE    Result Date: 4/29/2022  EXAMINATION: ONE XRAY VIEW OF THE CHEST 4/29/2022 10:46 am COMPARISON: None. HISTORY: ORDERING SYSTEM PROVIDED HISTORY: cp/palpitatons TECHNOLOGIST PROVIDED HISTORY: Reason for exam:->cp/palpitatons Reason for Exam: cp/palpitatons Additional signs and symptoms: cp/palpitatons Relevant Medical/Surgical History: cp/palpitatons FINDINGS: The lungs are clear, no effusion. No pneumothorax. Heart is normal size. Left pacemaker Mediastinal and hilar contours are within normal limits. Bony thorax no acute abnormality. 1. No acute cardiopulmonary abnormality.        Electronically signed by Prosper Aneglo MD on 4/30/2022 at 5:37 PM none

## 2022-04-30 NOTE — PROGRESS NOTES
Cardiology Progress Note     Today's Plan resume coumadin-pharmacy to dose   Can be discharged from cardiology standpoint-  To follow up in office     Admit Date:  4/29/2022    Consult reason/ Seen today for: chest pain/ at fib     Subjective and  Overnight Events:  Up sitting in chair-complains of nil    Telemetry v paced     Assessment / Plan:     Persistent atrial fib ; rate is better controlled: BB increased to bid ; continue with Cardizem; recommend to resume anticoagulation for stroke prevention  if no contraindications     TSH normal  ;  Magnesium normal    Chest pain: resolved: troponin negative x 3:no further testing at this time- can pursue stress test as outpatient      Echo completed: mildly depressed EF 45-50%: sepal wall hypokinetic ? Related to PPM        History of Presenting Illness:    Chief complain on admission : 67 y. o.year old who is admitted for  Chief Complaint   Patient presents with    Palpitations    Chest Pain        Past medical history:    has a past medical history of Abnormal echocardiogram, Anemia, Aortic insufficiency, Atrial fibrillation ([address] Atrial flutter ([address] Bursitis, trochanteric, Cerumen impaction, Diverticulosis, Diverticulosis of colon, DJD (degenerative joint disease), cervical, DVT (deep venous thrombosis) ([address] Dyslipidemia, Environmental allergies, Family history of colon cancer, Gastric polyp, Gastritis, GERD (gastroesophageal reflux disease), H/O cardiovascular stress test, H/O echocardiogram, H/O exercise stress test, History of Holter monitoring, History of mammogram, Hx of colonoscopy, Hx of Doppler Venous ultrasound, Hyperlipidemia, Hypertension, Hypothyroidism, Internal hemorrhoid, Intervertebral disc protrusion, Left shoulder pain, Long term current use of anticoagulant, Menstruation, Pacemaker, Pulmonary hypertension ([address] Restless legs, Right shoulder strain, Sciatica, Sciatica, Shoulder pain, left, Shoulder pain, right, Sinus bradycardia, Sinusitis, Tinnitus, and Vision changes. Past surgical history:   has a past surgical history that includes Tonsillectomy (08/2006); Upper gastrointestinal endoscopy (04/2008); Cholecystectomy, laparoscopic (02/2001); sinus surgery (1979); Dilation and curettage of uterus (1988); jessica and bso (cervix removed) (04/2003); Breast surgery (1985); Hysterectomy (2003); Cardioversion; Colonoscopy; transthoracic echocardiogram (06/2012); pacemaker placement (06/21/2012); ablation of dysrhythmic focus; Cardioversion (03/10/2014); and Breast biopsy. Social History:   reports that she has never smoked. She has never used smokeless tobacco. She reports that she does not drink alcohol and does not use drugs. Family history:  family history includes Breast Cancer in her maternal cousin; Colon Cancer (age of onset: [de-identified]) in her mother; Coronary Art Dis (age of onset: 62) in her father; Coronary Art Dis (age of onset: 66) in her mother; Heart Disease in her father and mother; Migraines in her sister; Seizures in her brother; Stroke in her mother.     Allergies   Allergen Reactions    Latex Hives    Darvon [Propoxyphene Hcl] Shortness Of Breath    Demerol Rash     Breathing problems    Dilaudid [Hydromorphone Hcl] Anaphylaxis    Valium Rash     Breathing problems    Celecoxib Diarrhea    Norco [Hydrocodone-Acetaminophen] Diarrhea, Nausea Only and Other (See Comments)     A-Fib    Norvasc [Amlodipine] Other (See Comments)     HA, dizziness    Oxycodone Itching    Tape Marella [place] Tape] Other (See Comments)     BLISTER    Vasotec [Enalapril] Other (See Comments)     Nausea, vomiting    Diazepam Rash       Review of Systems:   All 14 systems were reviewed and are negative  Except for the positive findings which are documented     /78   Pulse 110   Temp 98 °F (36.7 °C) (Oral)   Resp 17   Ht 5' 7\" (1.702 m)   Wt 130 lb (59 kg)   SpO2 95%   BMI 20.36 kg/m²       Intake/Output Summary (Last 24 hours) at 4/30/2022 1139  Last data filed at 4/30/2022 9798  Gross per 24 hour   Intake 240 ml   Output --   Net 240 ml       Physical Exam:  Physical Exam  Vitals reviewed. HENT:      Head: Normocephalic. Eyes:      Pupils: Pupils are equal, round, and reactive to light. Cardiovascular:      Rate and Rhythm: Rhythm irregular. Pulses: Normal pulses. Heart sounds: Normal heart sounds. Pulmonary:      Effort: Pulmonary effort is normal.      Breath sounds: Normal breath sounds. Abdominal:      General: There is no distension. Tenderness: There is no abdominal tenderness. Musculoskeletal:      Right lower leg: No edema. Left lower leg: No edema. Skin:     General: Skin is warm and dry. Neurological:      Mental Status: She is alert. Mental status is at baseline.    Psychiatric:         Mood and Affect: Mood normal.          Medications:    sodium chloride flush  5-40 mL IntraVENous 2 times per day    dilTIAZem  240 mg Oral Daily    sodium chloride  500 mL IntraVENous Once    metoprolol succinate  50 mg Oral BID      sodium chloride      sodium chloride 125 mL/hr at 04/30/22 0939     ondansetron **OR** ondansetron, nitroGLYCERIN, sodium chloride flush, sodium chloride    Lab Data:  CBC:   Recent Labs     04/29/22  1040 04/30/22  0709   WBC 6.1 3.7*   HGB 14.1 12.8   HCT 44.4 40.2   MCV 93.1 93.1   * 102*     BMP:   Recent Labs     04/29/22  1040 04/30/22  0709    140   K 3.7 4.0    106   CO2 23 23   BUN 14 9   CREATININE 0.9 0.7     PT/INR:   Recent Labs     04/28/22  1118 04/28/22  1119 04/29/22  1040 04/30/22  0709   PROTIME 24.2*  --  25.6* 25.5*   INR 2.0 2.0 1.97 1.96     BNP:    Recent Labs     04/29/22 2049   PROBNP 1,079*     TROPONIN:   Recent Labs     04/29/22 2049 04/29/22  2241 04/30/22  0709   TROPONINT <0.010 <0.010 <0.010              Impression:  Principal Problem:    Chest pain  Active Problems:    PAF (paroxysmal atrial fibrillation) ([facility])    Long term current use of anticoagulant    Pacemaker dual chamber    S/P ablation of atrial fibrillation  Resolved Problems:    * No resolved hospital problems. *       All labs, medications and tests reviewed by myself, continue all other medications of all above medical condition listed as is except for changes mentioned above. Thank you   Please call with questions. Electronically signed by ANTHONY Rawls CNP on 4/30/2022 at 11:39 AM         CARDIOLOGY ATTENDING ADDENDUM    I have seen, spoken to and examined this patient personally, independent of the NP/PAC. I have reviewed the hospital care given to date and reviewed all pertinent labs and imaging. I have spoken with patient, nursing staff and provided written and verbal instructions . The above note has been reviewed. I have spent substantive amount of time in formulating patient care. Physical Exam:    General:   Awake, alert  Head:normal  Eye:normal  Chest:   Clear to auscultation  0 Basilar crackles   Cardiovascular:  S1S2   Abdomen: soft   Extremities:   0 edema  Pulses; palpable      MEDICAL DECISION MAKING :     Persistent A. fib  Status post pacemaker  Noncardiac chest pain  Mildly depressed LVEF on echocardiogram with possible septal wall hypokinesis. Possible secondary to pacemaker. Patient doing well. Recommend outpatient stress MPI  Beta-blocker increased to twice daily  Continue with Cardizem  Recommend anticoagulation for stroke prophylaxis. Warfarin dosing per pharmacy    No further cardiac work-up at this point.   Outpatient cardiology follow-up  Call us with questions    Dr. Jose David Mckeon MD

## 2022-05-01 VITALS
HEART RATE: 91 BPM | BODY MASS INDEX: 21.3 KG/M2 | RESPIRATION RATE: 16 BRPM | SYSTOLIC BLOOD PRESSURE: 118 MMHG | WEIGHT: 135.7 LBS | DIASTOLIC BLOOD PRESSURE: 76 MMHG | OXYGEN SATURATION: 97 % | HEIGHT: 67 IN | TEMPERATURE: 97.7 F

## 2022-05-01 LAB
EKG ATRIAL RATE: 127 BPM
EKG DIAGNOSIS: NORMAL
EKG Q-T INTERVAL: 358 MS
EKG QRS DURATION: 82 MS
EKG QTC CALCULATION (BAZETT): 433 MS
EKG R AXIS: 36 DEGREES
EKG T AXIS: 213 DEGREES
EKG VENTRICULAR RATE: 88 BPM

## 2022-05-01 PROCEDURE — 2580000003 HC RX 258: Performed by: NURSE PRACTITIONER

## 2022-05-01 PROCEDURE — 6370000000 HC RX 637 (ALT 250 FOR IP): Performed by: NURSE PRACTITIONER

## 2022-05-01 PROCEDURE — G0378 HOSPITAL OBSERVATION PER HR: HCPCS

## 2022-05-01 PROCEDURE — 96360 HYDRATION IV INFUSION INIT: CPT

## 2022-05-01 PROCEDURE — 96361 HYDRATE IV INFUSION ADD-ON: CPT

## 2022-05-01 PROCEDURE — 6370000000 HC RX 637 (ALT 250 FOR IP)

## 2022-05-01 PROCEDURE — 93010 ELECTROCARDIOGRAM REPORT: CPT | Performed by: INTERNAL MEDICINE

## 2022-05-01 RX ORDER — QUETIAPINE FUMARATE 25 MG/1
50 TABLET, FILM COATED ORAL ONCE
Status: COMPLETED | OUTPATIENT
Start: 2022-05-01 | End: 2022-05-01

## 2022-05-01 RX ORDER — DILTIAZEM HYDROCHLORIDE 240 MG/1
240 CAPSULE, COATED, EXTENDED RELEASE ORAL DAILY
Qty: 30 CAPSULE | Refills: 3 | Status: ON HOLD | OUTPATIENT
Start: 2022-05-02 | End: 2022-05-09 | Stop reason: SDUPTHER

## 2022-05-01 RX ORDER — LEVOTHYROXINE SODIUM 0.05 MG/1
50 TABLET ORAL DAILY
Status: DISCONTINUED | OUTPATIENT
Start: 2022-05-01 | End: 2022-05-01 | Stop reason: HOSPADM

## 2022-05-01 RX ORDER — QUETIAPINE FUMARATE 25 MG/1
50 TABLET, FILM COATED ORAL NIGHTLY PRN
Status: DISCONTINUED | OUTPATIENT
Start: 2022-05-01 | End: 2022-05-01 | Stop reason: HOSPADM

## 2022-05-01 RX ORDER — ATORVASTATIN CALCIUM 10 MG/1
10 TABLET, FILM COATED ORAL NIGHTLY
Status: DISCONTINUED | OUTPATIENT
Start: 2022-05-01 | End: 2022-05-01 | Stop reason: HOSPADM

## 2022-05-01 RX ORDER — METOPROLOL SUCCINATE 50 MG/1
50 TABLET, EXTENDED RELEASE ORAL 2 TIMES DAILY
Qty: 30 TABLET | Refills: 3 | Status: SHIPPED | OUTPATIENT
Start: 2022-05-01

## 2022-05-01 RX ORDER — DONEPEZIL HYDROCHLORIDE 10 MG/1
10 TABLET, FILM COATED ORAL DAILY
Status: DISCONTINUED | OUTPATIENT
Start: 2022-05-01 | End: 2022-05-01 | Stop reason: HOSPADM

## 2022-05-01 RX ADMIN — LEVOTHYROXINE SODIUM 50 MCG: 0.05 TABLET ORAL at 08:12

## 2022-05-01 RX ADMIN — SODIUM CHLORIDE: 9 INJECTION, SOLUTION INTRAVENOUS at 02:14

## 2022-05-01 RX ADMIN — DONEPEZIL HYDROCHLORIDE 10 MG: 10 TABLET, FILM COATED ORAL at 02:08

## 2022-05-01 RX ADMIN — QUETIAPINE FUMARATE 50 MG: 25 TABLET ORAL at 02:08

## 2022-05-01 RX ADMIN — SODIUM CHLORIDE, PRESERVATIVE FREE 10 ML: 5 INJECTION INTRAVENOUS at 08:15

## 2022-05-01 RX ADMIN — ATORVASTATIN CALCIUM 10 MG: 10 TABLET, FILM COATED ORAL at 02:08

## 2022-05-01 RX ADMIN — DILTIAZEM HYDROCHLORIDE 240 MG: 240 CAPSULE, COATED, EXTENDED RELEASE ORAL at 08:14

## 2022-05-01 NOTE — PROGRESS NOTES
Nutrition Assessment     Type and Reason for Visit: Initial,Positive Nutrition Screen    Nutrition Assessment:  Pt assessed due to a positive screen for weight loss. Pt has not lost any weight and is consuming 100% of her meals. Pt is at low risk at this time.     Reginaldo Quiñones RD, LD  Contact: [phone]

## 2022-05-01 NOTE — DISCHARGE SUMMARY
V2.0  Discharge Summary    Name:  Reji Mares /Age/Sex: 1945 (67 y.o. female)   Admit Date: 2022  Discharge Date: 22    MRN & CSN:  [id number] & [id number] Encounter Date and Time 22 2:24 PM EDT    Attending:  No att. providers found Discharging Provider: Lluvia Turner MD       Hospital Course:     Brief HPI: Reji Mares is a 67 y.o. female who presented with chest pressure, palpitation and generalized fatigue. Brief Problem Based Course:  Reji Mares is a 67 y.o. female with pmh of Afib, Hypothyroidism, HTN, HPL, Valve Disease and GERD who presents to the ER for evaluation of intermittent chest pain that awoke her out of her sleep around 0300 today. She rates pain 8/10 which has since resolved. Patient describes pain as pressure. Admits to associated symptoms of fatigue and left big toe. Patient was given full strength ASA by ED provider. Patient denies fever, chills, abdominal pain, headaches, dysuria, hematuria, weakness. Patient denies history of DVT, recent travel, surgeries, or lower extremity swelling. Patient denies nicotine use. At this time, patient's chest pain has improved none.     Patient was found to have Atrial Fibrillation with  in the ER. Cardiology was consulted. Admitted as:  Reji Mares is a 67 y.o. female with pmh of hypertension, persistent atrial fibrillation and hypothyroidism who presents with intermittent chest pressure, palpitation and generalized fatigue when she woke up on the day of admission.     1.  Persistent atrial fibrillation with rapid ventricular rate/pacemaker: Patient with history of ablation in the past.  TSH is normal.  Echo-EF 45 to 50% with septal motion abnormality which may be artifact. Troponin x3-negative. The rate is better controlled with Toprol 50 mg twice daily and Cardizem 240 mg daily. On Coumadin for stroke prophylaxis. Cardiology consulted.     2. Chest pain: Troponin x3-negative.     Outpatient stress test recommended.     3. Hypothyroidism: Continue Synthroid.     Chronic problems include hypertension, hyperlipidemia, gastroesophageal reflux, restless leg syndrome and DVT. The patient expressed appropriate understanding of, and agreement with the discharge recommendations, medications, and plan.      Consults this admission:  IP CONSULT TO CARDIOLOGY  IP CONSULT TO HOSPITALIST  IP CONSULT TO CARDIOLOGY  IP CONSULT TO PHARMACY    Discharge Diagnosis:   Chest pain    Persistent atrial fibrillation with rapid ventricular rate  Chest pain    Discharge Instruction:   Follow up appointments: PCP and cardiology  Primary care physician: Negrito Gunter MD within 2 weeks  Diet: cardiac diet   Activity: activity as tolerated  Disposition: Discharged to:   [x]Home, [][facility], []SNF, []Acute Rehab, []Hospice   Condition on discharge: Stable  Labs and Tests to be Followed up as an outpatient by PCP or Specialist: Outpatient stress test.    Discharge Medications:        Medication List      CHANGE how you take these medications    dilTIAZem 240 MG extended release capsule  Commonly known as: CARDIZEM CD  Take 1 capsule by mouth daily  Start taking on: May 2, 2022  What changed:   · medication strength  · how much to take  · additional instructions     metoprolol succinate 50 MG extended release tablet  Commonly known as: TOPROL XL  Take 1 tablet by mouth in the morning and at bedtime  What changed:   · how much to take  · how to take this  · when to take this  · additional instructions        CONTINUE taking these medications    ALLEGRA ALLERGY PO     atorvastatin 10 MG tablet  Commonly known as: LIPITOR  TAKE 1 TABLET BY MOUTH EVERY DAY     Dexilant 60 MG Cpdr delayed release capsule  Generic drug: dexlansoprazole     donepezil 10 MG tablet  Commonly known as: ARICEPT  Take 1 tablet by mouth daily     levothyroxine 50 MCG tablet  Commonly known as: SYNTHROID  TAKE 1 TABLET BY MOUTH EVERY DAY BEFORE BREAKFAST Vitamin D 25 MCG (1000 UT) Tabs tablet  Commonly known as: CHOLECALCIFEROL     warfarin 3 MG tablet  Commonly known as: COUMADIN  Take as directed. If you are unsure how to take this medication, talk to your nurse or doctor. Where to Get Your Medications      These medications were sent to [address] [place] [address] [phone]  [address] [place] [address]    Phone: [phone]   · dilTIAZem 240 MG extended release capsule  · metoprolol succinate 50 MG extended release tablet        Objective Findings at Discharge:   /76   Pulse 91   Temp 97.7 °F (36.5 °C) (Oral)   Resp 16   Ht 5' 7\" (1.702 m)   Wt 135 lb 11.2 oz (61.6 kg)   SpO2 97%   BMI 21.25 kg/m²     Discussed with her nurse.  at bedside. No reported incident. Patient denies any chest pain. Physical Exam:   General: Elderly female. No apparent respiratory distress. Eyes: EOMI. Anicteric. ENT: neck supple  Cardiovascular: Irregularly irregular. No murmur or S3. Respiratory: Clear to auscultation  Gastrointestinal: Soft, non tender, no palpable mass. Genitourinary: no suprapubic tenderness. No Nicole catheter. Musculoskeletal: No edema  Skin: warm, dry  Neuro: Alert. Oriented x3. Psych: Mood appropriate.      Labs and Imaging   Echocardiogram complete 2D with doppler with color    Result Date: 4/29/2022  Transthoracic Echocardiography Report (TTE)  Demographics   Patient Name       [facility]     Date of Study       04/29/2022   Date of Birth      1945         Gender              Female   Age                67 year(s)         Race                   Patient Number     [id number]         Room Number         GE88   Visit Number       [id number]   Corporate ID       [id number]   Accession Number   [id number]         23 Jayshree Fuetnes RVT   Ordering Physician Susan CRUZ-CNP           Physician           MD  Procedure Type of Study   TTE procedure:ECHOCARDIOGRAM COMPLETE 2D W DOPPLER W COLOR. Procedure Date Date: 04/29/2022 Start: 02:21 PM Study Location: Portable Technical Quality: Adequate visualization Indications:Ventricular tachycardia. Patient Status: Routine Height: 67 inches Weight: 130 pounds BSA: 1.68 m2 BMI: 20.36 kg/m2 HR: 106 bpm BP: 107/71 mmHg  Conclusions   Summary  Left ventricular systolic function is low normal.  Ejection fraction is visually estimated at 45-50%. Septal wall is hypokinetic  Grade II diastolic dysfunction. Moderately dilated left atrium. Moderate aortic regurgitation; PHT: 328 msec. Moderate tricuspid regurgitation; RVSP: 39 mmHg. Mild PHTN. No evidence of any pericardial effusion. Signature   ------------------------------------------------------------------  Electronically signed by Gilda Cassidy MD (Interpreting  physician) on 04/29/2022 at 03:04 PM  ------------------------------------------------------------------   Findings   Left Ventricle  Left ventricular systolic function is low normal.  Ejection fraction is visually estimated at 45- 50%. Grade II diastolic dysfunction. septal wall is hypokinetic  Left ventricle size is normal.   Left Atrium  Moderately dilated left atrium. Right Atrium  Moderately dilated right atrium. Right Ventricle  PPM wiring visualized within the right ventricle. Aortic Valve  Moderate aortic regurgitation; PHT: 328 msec. Mitral Valve  Mild mitral regurgitation. Tricuspid Valve  Moderate tricuspid regurgitation; RVSP: 39 mmHg. Mild PHTN. Pulmonic Valve  The pulmonic valve was not well visualized. Pericardial Effusion  No evidence of any pericardial effusion. Pleural Effusion  No evidence of pleural effusion.    Miscellaneous  Inferior vena cava is dilated, measuring at 2.2 cm, and does not collapse  with respiration. M-Mode/2D Measurements & Calculations   LV Diastolic Dimension:  LV Systolic Dimension:  LA Dimension: 4 cmAO Root  3.9 cm                   2.9 cm                  Dimension: 3 cmLA Area:  LV FS:25.6 %             LV Volume Diastolic: 37 31.2 cm2  LV PW Diastolic: 2.95 cm ml  LV PW Systolic: 7.42 cm  LV Volume Systolic: 18  Septum Diastolic: 9.60   ml  cm                       LV EDV/LV EDV Index: 37 RV Diastolic Dimension:  Septum Systolic: 6.11 cm IL/34 F1KV ESV/LV ESV   3.51 cm  CO: 6.44 l/min           Index: 18 ml/11 m2  CI: 3.83 l/m*m2          EF Calculated (A4C):    LA/Aorta: 1.33                           51.4 %  LV Area Diastolic: 10.6  EF Calculated (2D):     LA volume/Index: 75 ml  cm2                      51.1 %                  /31N7  LV Area Systolic: 55.7  cm2                      LV Length: 6.92 cm                            LVOT: 2.2 cm  Doppler Measurements & Calculations   MV Peak E-Wave: 129 cm/s                         LVOT Peak Velocity: 91 cm/s  MV Peak A-Wave: 109 cm/s                         LVOT Mean Velocity: 61.2  MV E/A Ratio: 1.18                               cm/s  MV Peak Gradient: 6.66                           LVOT Peak Gradient: 3  mmHg                     LVOT VTI: 16 cm         mmHgLVOT Mean Gradient: 2                           AV P1/2t: 328 msec      mmHg  MV P1/2t: 35 msec        Estimated PASP: 39.4    Estimated RVSP: 39 mmHg  MVA by PHT:6.29 cm2      mmHg                    Estimated RAP:15 mmHg   MV E' Septal Velocity:  3.29 cm/s                                        TR Velocity:247 cm/s  MV E' Lateral Velocity:                          TR Gradient:24.4 mmHg  11.1 cm/s  MV E/E' septal: 39.21  MV E/E' lateral: 11.62      XR CHEST PORTABLE    Result Date: 4/29/2022  EXAMINATION: ONE XRAY VIEW OF THE CHEST 4/29/2022 10:46 am COMPARISON: None.  HISTORY: ORDERING SYSTEM PROVIDED HISTORY: cp/palpitatons TECHNOLOGIST PROVIDED HISTORY: Reason for exam:->cp/palpitatons Reason for Exam: cp/palpitatons Additional signs and symptoms: cp/palpitatons Relevant Medical/Surgical History: cp/palpitatons FINDINGS: The lungs are clear, no effusion. No pneumothorax. Heart is normal size. Left pacemaker Mediastinal and hilar contours are within normal limits. Bony thorax no acute abnormality. 1. No acute cardiopulmonary abnormality. CBC:   Recent Labs     04/29/22  1040 04/30/22  0709   WBC 6.1 3.7*   HGB 14.1 12.8   * 102*     BMP:    Recent Labs     04/29/22  1040 04/30/22  0709    140   K 3.7 4.0    106   CO2 23 23   BUN 14 9   CREATININE 0.9 0.7   GLUCOSE 69* 80     Hepatic:   Recent Labs     04/29/22  1040 04/30/22  0709   AST 28 21   ALT 24 20   BILITOT 1.7* 1.7*   ALKPHOS 66 59     Lipids:   Lab Results   Component Value Date    CHOL 115 04/30/2022    CHOL 154 04/15/2021    HDL 51 04/30/2022    TRIG 47 04/30/2022     Hemoglobin A1C:   Lab Results   Component Value Date    LABA1C 5.4 04/30/2022     TSH:   Lab Results   Component Value Date    TSH 3.76 10/19/2021     Troponin:   Lab Results   Component Value Date    TROPONINT <0.010 04/30/2022    TROPONINT <0.010 04/30/2022    TROPONINT <0.010 04/29/2022     Lactic Acid: No results for input(s): LACTA in the last 72 hours.   BNP:   Recent Labs     04/29/22 2049   PROBNP 1,079*     UA:  Lab Results   Component Value Date    NITRU NEGATIVE 09/24/2020    COLORU STRAW 09/24/2020    PHUR 5.5 11/02/2015    WBCUA 9 09/24/2020    RBCUA <1 09/24/2020    MUCUS RARE 09/24/2020    TRICHOMONAS NONE SEEN 09/24/2020    BACTERIA NEGATIVE 09/24/2020    CLARITYU CLEAR 09/24/2020    SPECGRAV 1.005 09/24/2020    LEUKOCYTESUR MODERATE 09/24/2020    UROBILINOGEN NORMAL 09/24/2020    BILIRUBINUR NEGATIVE 09/24/2020    BILIRUBINUR neg 11/02/2015    BLOODU NEGATIVE 09/24/2020    GLUCOSEU neg 11/02/2015    KETUA NEGATIVE 09/24/2020     Urine Cultures: No results found for: LABURIN  Blood Cultures: No results found for: BC  No results found for: BLOODCULT2  Organism:   Lab Results   Component Value Date    [facility] 07/18/2012       Time Spent Discharging patient 34 minutes    Electronically signed by Subha Howell MD on 5/1/2022 at 2:24 PM

## 2022-05-01 NOTE — PROGRESS NOTES
AVS reviewed with pt and . PIV removed. No further voiced concerns at this time. Pt wheeled to main entrance to be retrieved by  via car. No further action needed at this time.

## 2022-05-01 NOTE — PROGRESS NOTES
PHARMACY ANTICOAGULATION MONITORING SERVICE    Jerome Bill is a 67 y.o. female on warfarin therapy for atrial fibrillation. Pharmacy consulted by Vahid CRUZ for monitoring and adjustment of treatment. Indication for anticoagulation: a fib  INR goal: 2-3  Warfarin dose prior to admission: 3 mg daily per anticoag clinic notes    Pertinent Laboratory Values   Recent Labs     04/29/22  1040 04/29/22  1040 04/30/22  0709   INR 1.97   < > 1.96   HGB 14.1   < > 12.8   HCT 44.4   < > 40.2   *  --  102*    < > = values in this interval not displayed.        Assessment/Plan:   Drug Interactions: no new   INR 1.96 on 4/30   Will continue warfarin 3 mg daily   [address] [place] [address] will continue to monitor and adjust warfarin therapy as indicated    Thank you for the consult.  Patrizia Cadet, [facility]  5/1/2022 9:37 AM

## 2022-05-01 NOTE — PROGRESS NOTES
Patient confused, scared, and screaming. She states that we want to harm her and are tricking her. Does not want us to move close to her nor touch her. Attempted to redirect and reorient patient but unable to calm down. NP notified. New orders placed.

## 2022-05-02 ENCOUNTER — TELEPHONE (OUTPATIENT)
Dept: FAMILY MEDICINE CLINIC | Age: 77
End: 2022-05-02

## 2022-05-02 ENCOUNTER — CARE COORDINATION (OUTPATIENT)
Dept: CASE MANAGEMENT | Age: 77
End: 2022-05-02

## 2022-05-02 NOTE — CARE COORDINATION
Deshaun 45 Transitions Initial Follow Up Call    Call within 2 business days of discharge: Yes    Patient: Smitha Shen Patient : 1945   MRN: [id number]  Reason for Admission: Chest pain  Discharge Date: 22 RARS: No data recorded    Last Discharge [facility]       Complaint Diagnosis Description Type Department Provider    22 Palpitations; Chest Pain Chest pain, unspecified type . .. ED to Hosp-Admission (Discharged) (ADMITTED) [address] Elliott Ch MD; Karolyn Davila. .. Spoke with: Sony Yoo briefly as she was trying to rest. States she is ok, felt as if she overdid it this am. C/o some BOUDREAUX, but better when resting. Denies chest pain, no palpitations. Sandy Lennert if above symptoms worsen or if she devels chest pain to contact PCP/Cardiologist.     Discussed & reviewed  AVS & DC meds- she has picked up new meds, noted changes in Cardizem & Metoprolol. Taking all other meds same as PTA as prescribed. Discussed importance of follow up appts, needs PCP appt within 7 days. Her Cardiologist is Dr Alec Hawley. Informed CC Madison Barker to please makes these appts. Pt has appt 22 @ the Anticoagulation clinic in  [address] [place] [address] on 22. Declines need for transportation resources. Pt lives with her , he will transport. Pt typically indept wih all ADL's. Non-face-to-face services provided:  Scheduled appointment with PCP--  Scheduled appointment with Specialist--  Education of patient/family/caregiver/guardian to support self-management-care @ home.    Assessment and support for treatment adherence and medication management-DC meds reviewed, 1111F completed    Care Transitions 24 Hour Call    Schedule Follow Up Appointment with PCP: Completed  Do you have a copy of your discharge instructions?: Yes  Do you have all of your prescriptions and are they filled?: Yes  Have you been contacted by a [facility] [place] Pharmacist?: No  Have you scheduled your follow up appointment?: No  Do you feel like you have everything you need to keep you well at home?: Yes  Care Transitions Interventions     Transportation Support: Declined      DME Assistance: Declined     Senior Services: Declined    Disease Association: Completed             Follow Up  Future Appointments   Date Time Provider Ritu Lamas   2022 10:30 AM Sebastian   2022  3:45 PM Kia Mallory MD [facility]   8/10/2022  1:00 PM Janet Galindo MD [facility]   2022  9:30 AM MD [facility]     Transitions of Care Initial Call    Was this an external facility discharge? No Discharge Facility: [facility]    Challenges to be reviewed by the provider   Additional needs identified to be addressed with provider: No  none             Method of communication with provider : none      Advance Care Planning:   Does patient have an Advance Directive: patient declined education. Was this a readmission? No  Patient stated reason for admission: Chest pain  Patients top risk factors for readmission: medical condition-HTN, AFib, Mitral Valve DZ, Pacemaker, HLD, Hypothyroidism    Care Transition Nurse (CTN) contacted the patient by telephone to perform post hospital discharge assessment. Verified name and  with patient as identifiers. Provided introduction to self, and explanation of the CTN role. CTN reviewed discharge instructions, medical action plan and red flags with patient who verbalized understanding. Patient given an opportunity to ask questions and does not have any further questions or concerns at this time. Were discharge instructions available to patient? Yes. Reviewed appropriate site of care based on symptoms and resources available to patient including: PCP  Specialist  Urgent care clinics  When to call 911. The patient agrees to contact the PCP office for questions related to their healthcare.      Medication reconciliation was performed with patient, who verbalizes understanding of administration of home medications. Advised obtaining a 90-day supply of all daily and as-needed medications. Covid Risk Education     Educated patient about risk for severe COVID-19 due to risk factors according to CDC guidelines. CTN reviewed discharge instructions, medical action plan and red flag symptoms with the patient who verbalized understanding. Discussed COVID vaccination status: No. Education provided on COVID-19 vaccination as appropriate. Discussed exposure protocols and quarantine with CDC Guidelines. Patient was given an opportunity to verbalize any questions and concerns and agrees to contact CTN or health care provider for questions related to their healthcare. Reviewed and educated patient on any new and changed medications related to discharge diagnosis. CTN provided contact information. Plan for follow-up call in 5-7 days based on severity of symptoms and risk factors.   Plan for next call: symptom management--  follow up appointment--  medication management--      Mera Ballesteros RN  [phone]

## 2022-05-02 NOTE — CARE COORDINATION
Request from HAYES Αλκυονίδων Chasity, RN.,    Please schedule patient for hospital f/u    Patient scheduled with NP on 5/4 @ 2:20pm    Cardiology appt Fri 13th at [address] [place] [address].       CTN will call patient    Arielle Johansen, [address] [place] [address] Coordination Transition

## 2022-05-08 ENCOUNTER — HOSPITAL ENCOUNTER (INPATIENT)
Age: 77
LOS: 1 days | Discharge: HOME | DRG: 281 | End: 2022-05-09
Attending: EMERGENCY MEDICINE | Admitting: INTERNAL MEDICINE
Payer: MEDICARE

## 2022-05-08 ENCOUNTER — APPOINTMENT (OUTPATIENT)
Dept: GENERAL RADIOLOGY | Age: 77
DRG: 281 | End: 2022-05-08
Payer: MEDICARE

## 2022-05-08 ENCOUNTER — APPOINTMENT (OUTPATIENT)
Dept: CT IMAGING | Age: 77
DRG: 281 | End: 2022-05-08
Payer: MEDICARE

## 2022-05-08 DIAGNOSIS — R07.9 CHEST PAIN, UNSPECIFIED TYPE: Primary | ICD-10-CM

## 2022-05-08 LAB
ALBUMIN SERPL-MCNC: 3.9 GM/DL (ref 3.4–5)
ALP BLD-CCNC: 64 IU/L (ref 40–129)
ALT SERPL-CCNC: 31 U/L (ref 10–40)
ANION GAP SERPL CALCULATED.3IONS-SCNC: 12 MMOL/L (ref 4–16)
APTT: 25.8 SECONDS (ref 25.1–37.1)
AST SERPL-CCNC: 32 IU/L (ref 15–37)
BASOPHILS ABSOLUTE: 0 K/CU MM
BASOPHILS RELATIVE PERCENT: 0.6 % (ref 0–1)
BILIRUB SERPL-MCNC: 0.8 MG/DL (ref 0–1)
BUN BLDV-MCNC: 23 MG/DL (ref 6–23)
CALCIUM SERPL-MCNC: 8.8 MG/DL (ref 8.3–10.6)
CHLORIDE BLD-SCNC: 106 MMOL/L (ref 99–110)
CO2: 23 MMOL/L (ref 21–32)
CREAT SERPL-MCNC: 1 MG/DL (ref 0.6–1.1)
DIFFERENTIAL TYPE: ABNORMAL
EOSINOPHILS ABSOLUTE: 0.2 K/CU MM
EOSINOPHILS RELATIVE PERCENT: 3.2 % (ref 0–3)
GFR AFRICAN AMERICAN: >60 ML/MIN/1.73M2
GFR NON-AFRICAN AMERICAN: 54 ML/MIN/1.73M2
GLUCOSE BLD-MCNC: 105 MG/DL (ref 70–99)
HCT VFR BLD CALC: 41.7 % (ref 37–47)
HEMOGLOBIN: 13.3 GM/DL (ref 12.5–16)
IMMATURE NEUTROPHIL %: 0.3 % (ref 0–0.43)
INR BLD: 1.41 INDEX
LACTATE: 1.4 MMOL/L (ref 0.4–2)
LIPASE: 50 IU/L (ref 13–60)
LYMPHOCYTES ABSOLUTE: 3.1 K/CU MM
LYMPHOCYTES RELATIVE PERCENT: 47.5 % (ref 24–44)
MAGNESIUM: 2 MG/DL (ref 1.8–2.4)
MCH RBC QN AUTO: 29.3 PG (ref 27–31)
MCHC RBC AUTO-ENTMCNC: 31.9 % (ref 32–36)
MCV RBC AUTO: 91.9 FL (ref 78–100)
MONOCYTES ABSOLUTE: 0.7 K/CU MM
MONOCYTES RELATIVE PERCENT: 10.9 % (ref 0–4)
NUCLEATED RBC %: 0 %
PDW BLD-RTO: 13.6 % (ref 11.7–14.9)
PLATELET # BLD: 134 K/CU MM (ref 140–440)
PMV BLD AUTO: 10.8 FL (ref 7.5–11.1)
POTASSIUM SERPL-SCNC: 3.9 MMOL/L (ref 3.5–5.1)
PRO-BNP: 1063 PG/ML
PROTHROMBIN TIME: 18.2 SECONDS (ref 11.7–14.5)
RBC # BLD: 4.54 M/CU MM (ref 4.2–5.4)
SEGMENTED NEUTROPHILS ABSOLUTE COUNT: 2.5 K/CU MM
SEGMENTED NEUTROPHILS RELATIVE PERCENT: 37.5 % (ref 36–66)
SODIUM BLD-SCNC: 141 MMOL/L (ref 135–145)
TOTAL CK: 87 IU/L (ref 26–140)
TOTAL IMMATURE NEUTOROPHIL: 0.02 K/CU MM
TOTAL NUCLEATED RBC: 0 K/CU MM
TOTAL PROTEIN: 6.7 GM/DL (ref 6.4–8.2)
TROPONIN T: <0.01 NG/ML
WBC # BLD: 6.6 K/CU MM (ref 4–10.5)

## 2022-05-08 PROCEDURE — 71045 X-RAY EXAM CHEST 1 VIEW: CPT

## 2022-05-08 PROCEDURE — 83880 ASSAY OF NATRIURETIC PEPTIDE: CPT

## 2022-05-08 PROCEDURE — 71275 CT ANGIOGRAPHY CHEST: CPT

## 2022-05-08 PROCEDURE — 83735 ASSAY OF MAGNESIUM: CPT

## 2022-05-08 PROCEDURE — 83605 ASSAY OF LACTIC ACID: CPT

## 2022-05-08 PROCEDURE — 85610 PROTHROMBIN TIME: CPT

## 2022-05-08 PROCEDURE — 80053 COMPREHEN METABOLIC PANEL: CPT

## 2022-05-08 PROCEDURE — 93005 ELECTROCARDIOGRAM TRACING: CPT | Performed by: EMERGENCY MEDICINE

## 2022-05-08 PROCEDURE — 84484 ASSAY OF TROPONIN QUANT: CPT

## 2022-05-08 PROCEDURE — 83690 ASSAY OF LIPASE: CPT

## 2022-05-08 PROCEDURE — 82550 ASSAY OF CK (CPK): CPT

## 2022-05-08 PROCEDURE — 85730 THROMBOPLASTIN TIME PARTIAL: CPT

## 2022-05-08 PROCEDURE — 6360000004 HC RX CONTRAST MEDICATION: Performed by: EMERGENCY MEDICINE

## 2022-05-08 PROCEDURE — 85025 COMPLETE CBC W/AUTO DIFF WBC: CPT

## 2022-05-08 PROCEDURE — 99285 EMERGENCY DEPT VISIT HI MDM: CPT

## 2022-05-08 PROCEDURE — G0378 HOSPITAL OBSERVATION PER HR: HCPCS

## 2022-05-08 PROCEDURE — 1200000000 HC SEMI PRIVATE

## 2022-05-08 PROCEDURE — 6370000000 HC RX 637 (ALT 250 FOR IP): Performed by: EMERGENCY MEDICINE

## 2022-05-08 RX ORDER — ACETAMINOPHEN 500 MG
1000 TABLET ORAL ONCE
Status: COMPLETED | OUTPATIENT
Start: 2022-05-08 | End: 2022-05-08

## 2022-05-08 RX ORDER — ONDANSETRON 2 MG/ML
4 INJECTION INTRAMUSCULAR; INTRAVENOUS EVERY 30 MIN PRN
Status: DISCONTINUED | OUTPATIENT
Start: 2022-05-08 | End: 2022-05-09

## 2022-05-08 RX ADMIN — ACETAMINOPHEN 1000 MG: 500 TABLET ORAL at 21:34

## 2022-05-08 RX ADMIN — IOPAMIDOL 75 ML: 755 INJECTION, SOLUTION INTRAVENOUS at 22:21

## 2022-05-08 ASSESSMENT — ENCOUNTER SYMPTOMS
GASTROINTESTINAL NEGATIVE: 1
ALLERGIC/IMMUNOLOGIC NEGATIVE: 1
SHORTNESS OF BREATH: 0
EYES NEGATIVE: 1

## 2022-05-09 ENCOUNTER — HOSPITAL ENCOUNTER (INPATIENT)
Age: 77
LOS: 1 days | Discharge: HOME | DRG: 281 | End: 2022-05-10
Attending: EMERGENCY MEDICINE | Admitting: INTERNAL MEDICINE
Payer: MEDICARE

## 2022-05-09 ENCOUNTER — APPOINTMENT (OUTPATIENT)
Dept: NUCLEAR MEDICINE | Age: 77
DRG: 281 | End: 2022-05-09
Payer: MEDICARE

## 2022-05-09 ENCOUNTER — PATIENT MESSAGE (OUTPATIENT)
Dept: FAMILY MEDICINE CLINIC | Age: 77
End: 2022-05-09

## 2022-05-09 VITALS
HEART RATE: 76 BPM | WEIGHT: 137.8 LBS | BODY MASS INDEX: 22.96 KG/M2 | TEMPERATURE: 97.6 F | DIASTOLIC BLOOD PRESSURE: 56 MMHG | HEIGHT: 65 IN | OXYGEN SATURATION: 96 % | RESPIRATION RATE: 16 BRPM | SYSTOLIC BLOOD PRESSURE: 95 MMHG

## 2022-05-09 DIAGNOSIS — E78.2 MIXED HYPERLIPIDEMIA: ICD-10-CM

## 2022-05-09 DIAGNOSIS — R06.00 DYSPNEA, UNSPECIFIED TYPE: Primary | ICD-10-CM

## 2022-05-09 DIAGNOSIS — R77.8 ELEVATED TROPONIN: ICD-10-CM

## 2022-05-09 DIAGNOSIS — I48.91 ATRIAL FIBRILLATION, UNSPECIFIED TYPE (HCC): ICD-10-CM

## 2022-05-09 PROBLEM — I21.4 NSTEMI (NON-ST ELEVATED MYOCARDIAL INFARCTION) (HCC): Status: ACTIVE | Noted: 2022-05-09

## 2022-05-09 LAB
ALBUMIN SERPL-MCNC: 4.1 GM/DL (ref 3.4–5)
ALP BLD-CCNC: 72 IU/L (ref 40–129)
ALT SERPL-CCNC: 35 U/L (ref 10–40)
ANION GAP SERPL CALCULATED.3IONS-SCNC: 11 MMOL/L (ref 4–16)
APTT: 28.6 SECONDS (ref 25.1–37.1)
APTT: 28.8 SECONDS (ref 25.1–37.1)
AST SERPL-CCNC: 45 IU/L (ref 15–37)
BASOPHILS ABSOLUTE: 0.1 K/CU MM
BASOPHILS RELATIVE PERCENT: 0.8 % (ref 0–1)
BILIRUB SERPL-MCNC: 1.5 MG/DL (ref 0–1)
BUN BLDV-MCNC: 14 MG/DL (ref 6–23)
CALCIUM SERPL-MCNC: 9.2 MG/DL (ref 8.3–10.6)
CHLORIDE BLD-SCNC: 105 MMOL/L (ref 99–110)
CO2: 24 MMOL/L (ref 21–32)
CREAT SERPL-MCNC: 0.9 MG/DL (ref 0.6–1.1)
DIFFERENTIAL TYPE: ABNORMAL
EKG ATRIAL RATE: 129 BPM
EKG DIAGNOSIS: NORMAL
EKG DIAGNOSIS: NORMAL
EKG Q-T INTERVAL: 366 MS
EKG Q-T INTERVAL: 400 MS
EKG QRS DURATION: 80 MS
EKG QRS DURATION: 80 MS
EKG QTC CALCULATION (BAZETT): 445 MS
EKG QTC CALCULATION (BAZETT): 470 MS
EKG R AXIS: 51 DEGREES
EKG R AXIS: 54 DEGREES
EKG T AXIS: -59 DEGREES
EKG T AXIS: 42 DEGREES
EKG VENTRICULAR RATE: 83 BPM
EKG VENTRICULAR RATE: 89 BPM
EOSINOPHILS ABSOLUTE: 0.2 K/CU MM
EOSINOPHILS RELATIVE PERCENT: 2.7 % (ref 0–3)
GFR AFRICAN AMERICAN: >60 ML/MIN/1.73M2
GFR NON-AFRICAN AMERICAN: >60 ML/MIN/1.73M2
GLUCOSE BLD-MCNC: 101 MG/DL (ref 70–99)
HAV IGM SER IA-ACNC: NON REACTIVE
HCT VFR BLD CALC: 43.9 % (ref 37–47)
HEMOGLOBIN: 14 GM/DL (ref 12.5–16)
HEPATITIS B CORE IGM ANTIBODY: NON REACTIVE
HEPATITIS B SURFACE ANTIGEN: NON REACTIVE
HEPATITIS C ANTIBODY: NON REACTIVE
IMMATURE NEUTROPHIL %: 0.2 % (ref 0–0.43)
INR BLD: 1.44 INDEX
INR BLD: 1.46 INDEX
LV EF: 60 %
LVEF MODALITY: NORMAL
LYMPHOCYTES ABSOLUTE: 2.8 K/CU MM
LYMPHOCYTES RELATIVE PERCENT: 47.6 % (ref 24–44)
MCH RBC QN AUTO: 29.6 PG (ref 27–31)
MCHC RBC AUTO-ENTMCNC: 31.9 % (ref 32–36)
MCV RBC AUTO: 92.8 FL (ref 78–100)
MONOCYTES ABSOLUTE: 0.8 K/CU MM
MONOCYTES RELATIVE PERCENT: 13 % (ref 0–4)
NUCLEATED RBC %: 0 %
PDW BLD-RTO: 13.6 % (ref 11.7–14.9)
PLATELET # BLD: 135 K/CU MM (ref 140–440)
PMV BLD AUTO: 10.5 FL (ref 7.5–11.1)
POTASSIUM SERPL-SCNC: 4.2 MMOL/L (ref 3.5–5.1)
PRO-BNP: 1305 PG/ML
PROTHROMBIN TIME: 18.6 SECONDS (ref 11.7–14.5)
PROTHROMBIN TIME: 18.9 SECONDS (ref 11.7–14.5)
RBC # BLD: 4.73 M/CU MM (ref 4.2–5.4)
SEGMENTED NEUTROPHILS ABSOLUTE COUNT: 2.1 K/CU MM
SEGMENTED NEUTROPHILS RELATIVE PERCENT: 35.7 % (ref 36–66)
SODIUM BLD-SCNC: 140 MMOL/L (ref 135–145)
T4 FREE: 1.06 NG/DL (ref 0.9–1.8)
TOTAL IMMATURE NEUTOROPHIL: 0.01 K/CU MM
TOTAL NUCLEATED RBC: 0 K/CU MM
TOTAL PROTEIN: 7.3 GM/DL (ref 6.4–8.2)
TROPONIN T: 0.15 NG/ML
TROPONIN T: 0.15 NG/ML
TROPONIN T: 0.19 NG/ML
TROPONIN T: <0.01 NG/ML
TSH HIGH SENSITIVITY: 4.44 UIU/ML (ref 0.27–4.2)
WBC # BLD: 5.9 K/CU MM (ref 4–10.5)

## 2022-05-09 PROCEDURE — 86038 ANTINUCLEAR ANTIBODIES: CPT

## 2022-05-09 PROCEDURE — 85610 PROTHROMBIN TIME: CPT

## 2022-05-09 PROCEDURE — A9500 TC99M SESTAMIBI: HCPCS | Performed by: INTERNAL MEDICINE

## 2022-05-09 PROCEDURE — 6360000002 HC RX W HCPCS: Performed by: NURSE PRACTITIONER

## 2022-05-09 PROCEDURE — 6360000002 HC RX W HCPCS: Performed by: EMERGENCY MEDICINE

## 2022-05-09 PROCEDURE — 85730 THROMBOPLASTIN TIME PARTIAL: CPT

## 2022-05-09 PROCEDURE — 96365 THER/PROPH/DIAG IV INF INIT: CPT

## 2022-05-09 PROCEDURE — 6360000002 HC RX W HCPCS: Performed by: INTERNAL MEDICINE

## 2022-05-09 PROCEDURE — G0378 HOSPITAL OBSERVATION PER HR: HCPCS

## 2022-05-09 PROCEDURE — 6370000000 HC RX 637 (ALT 250 FOR IP): Performed by: INTERNAL MEDICINE

## 2022-05-09 PROCEDURE — C9113 INJ PANTOPRAZOLE SODIUM, VIA: HCPCS | Performed by: NURSE PRACTITIONER

## 2022-05-09 PROCEDURE — 83880 ASSAY OF NATRIURETIC PEPTIDE: CPT

## 2022-05-09 PROCEDURE — 6370000000 HC RX 637 (ALT 250 FOR IP): Performed by: PHYSICIAN ASSISTANT

## 2022-05-09 PROCEDURE — 78452 HT MUSCLE IMAGE SPECT MULT: CPT

## 2022-05-09 PROCEDURE — 80074 ACUTE HEPATITIS PANEL: CPT

## 2022-05-09 PROCEDURE — 2500000003 HC RX 250 WO HCPCS: Performed by: EMERGENCY MEDICINE

## 2022-05-09 PROCEDURE — 36415 COLL VENOUS BLD VENIPUNCTURE: CPT

## 2022-05-09 PROCEDURE — 93005 ELECTROCARDIOGRAM TRACING: CPT | Performed by: EMERGENCY MEDICINE

## 2022-05-09 PROCEDURE — 85025 COMPLETE CBC W/AUTO DIFF WBC: CPT

## 2022-05-09 PROCEDURE — 6370000000 HC RX 637 (ALT 250 FOR IP): Performed by: NURSE PRACTITIONER

## 2022-05-09 PROCEDURE — 93010 ELECTROCARDIOGRAM REPORT: CPT | Performed by: INTERNAL MEDICINE

## 2022-05-09 PROCEDURE — 2580000003 HC RX 258: Performed by: INTERNAL MEDICINE

## 2022-05-09 PROCEDURE — 6370000000 HC RX 637 (ALT 250 FOR IP): Performed by: EMERGENCY MEDICINE

## 2022-05-09 PROCEDURE — 3430000000 HC RX DIAGNOSTIC RADIOPHARMACEUTICAL: Performed by: INTERNAL MEDICINE

## 2022-05-09 PROCEDURE — 93017 CV STRESS TEST TRACING ONLY: CPT

## 2022-05-09 PROCEDURE — 96374 THER/PROPH/DIAG INJ IV PUSH: CPT

## 2022-05-09 PROCEDURE — 84439 ASSAY OF FREE THYROXINE: CPT

## 2022-05-09 PROCEDURE — 84484 ASSAY OF TROPONIN QUANT: CPT

## 2022-05-09 PROCEDURE — 2140000000 HC CCU INTERMEDIATE R&B

## 2022-05-09 PROCEDURE — 2580000003 HC RX 258: Performed by: EMERGENCY MEDICINE

## 2022-05-09 PROCEDURE — 80053 COMPREHEN METABOLIC PANEL: CPT

## 2022-05-09 PROCEDURE — A4216 STERILE WATER/SALINE, 10 ML: HCPCS | Performed by: EMERGENCY MEDICINE

## 2022-05-09 PROCEDURE — 99285 EMERGENCY DEPT VISIT HI MDM: CPT

## 2022-05-09 PROCEDURE — 96375 TX/PRO/DX INJ NEW DRUG ADDON: CPT

## 2022-05-09 PROCEDURE — 94761 N-INVAS EAR/PLS OXIMETRY MLT: CPT

## 2022-05-09 PROCEDURE — 84443 ASSAY THYROID STIM HORMONE: CPT

## 2022-05-09 PROCEDURE — 99222 1ST HOSP IP/OBS MODERATE 55: CPT | Performed by: INTERNAL MEDICINE

## 2022-05-09 RX ORDER — HEPARIN SODIUM 1000 [USP'U]/ML
30 INJECTION, SOLUTION INTRAVENOUS; SUBCUTANEOUS PRN
Status: DISCONTINUED | OUTPATIENT
Start: 2022-05-10 | End: 2022-05-10

## 2022-05-09 RX ORDER — WARFARIN SODIUM 4 MG/1
4 TABLET ORAL ONCE
Status: DISCONTINUED | OUTPATIENT
Start: 2022-05-09 | End: 2022-05-09 | Stop reason: HOSPADM

## 2022-05-09 RX ORDER — HEPARIN SODIUM 1000 [USP'U]/ML
60 INJECTION, SOLUTION INTRAVENOUS; SUBCUTANEOUS PRN
Status: DISCONTINUED | OUTPATIENT
Start: 2022-05-10 | End: 2022-05-10

## 2022-05-09 RX ORDER — SODIUM CHLORIDE 9 MG/ML
INJECTION, SOLUTION INTRAVENOUS PRN
Status: DISCONTINUED | OUTPATIENT
Start: 2022-05-09 | End: 2022-05-10

## 2022-05-09 RX ORDER — LEVOTHYROXINE SODIUM 0.05 MG/1
50 TABLET ORAL DAILY
Status: DISCONTINUED | OUTPATIENT
Start: 2022-05-09 | End: 2022-05-09 | Stop reason: HOSPADM

## 2022-05-09 RX ORDER — LEVOTHYROXINE SODIUM 0.05 MG/1
50 TABLET ORAL DAILY
Status: DISCONTINUED | OUTPATIENT
Start: 2022-05-10 | End: 2022-05-10 | Stop reason: HOSPADM

## 2022-05-09 RX ORDER — DILTIAZEM HYDROCHLORIDE 120 MG/1
240 CAPSULE, COATED, EXTENDED RELEASE ORAL DAILY
Status: DISCONTINUED | OUTPATIENT
Start: 2022-05-10 | End: 2022-05-09 | Stop reason: HOSPADM

## 2022-05-09 RX ORDER — METOPROLOL SUCCINATE 50 MG/1
50 TABLET, EXTENDED RELEASE ORAL 2 TIMES DAILY
Status: DISCONTINUED | OUTPATIENT
Start: 2022-05-09 | End: 2022-05-09

## 2022-05-09 RX ORDER — ONDANSETRON 4 MG/1
4 TABLET, ORALLY DISINTEGRATING ORAL EVERY 8 HOURS PRN
Status: DISCONTINUED | OUTPATIENT
Start: 2022-05-09 | End: 2022-05-09 | Stop reason: HOSPADM

## 2022-05-09 RX ORDER — DILTIAZEM HYDROCHLORIDE 120 MG/1
120 CAPSULE, COATED, EXTENDED RELEASE ORAL DAILY
Status: DISCONTINUED | OUTPATIENT
Start: 2022-05-10 | End: 2022-05-10

## 2022-05-09 RX ORDER — ACETAMINOPHEN 325 MG/1
650 TABLET ORAL EVERY 6 HOURS PRN
Status: DISCONTINUED | OUTPATIENT
Start: 2022-05-09 | End: 2022-05-09 | Stop reason: HOSPADM

## 2022-05-09 RX ORDER — LIDOCAINE 4 G/G
1 PATCH TOPICAL DAILY
Status: DISCONTINUED | OUTPATIENT
Start: 2022-05-09 | End: 2022-05-09 | Stop reason: HOSPADM

## 2022-05-09 RX ORDER — DILTIAZEM HYDROCHLORIDE 120 MG/1
240 CAPSULE, COATED, EXTENDED RELEASE ORAL DAILY
Status: DISCONTINUED | OUTPATIENT
Start: 2022-05-09 | End: 2022-05-09

## 2022-05-09 RX ORDER — VITAMIN B COMPLEX
1000 TABLET ORAL DAILY
Status: DISCONTINUED | OUTPATIENT
Start: 2022-05-10 | End: 2022-05-10 | Stop reason: HOSPADM

## 2022-05-09 RX ORDER — METOPROLOL SUCCINATE 50 MG/1
50 TABLET, EXTENDED RELEASE ORAL 2 TIMES DAILY
Status: DISCONTINUED | OUTPATIENT
Start: 2022-05-09 | End: 2022-05-10 | Stop reason: HOSPADM

## 2022-05-09 RX ORDER — POLYETHYLENE GLYCOL 3350 17 G/17G
17 POWDER, FOR SOLUTION ORAL DAILY PRN
Status: DISCONTINUED | OUTPATIENT
Start: 2022-05-09 | End: 2022-05-09 | Stop reason: HOSPADM

## 2022-05-09 RX ORDER — ACETAMINOPHEN 325 MG/1
650 TABLET ORAL ONCE
Status: COMPLETED | OUTPATIENT
Start: 2022-05-09 | End: 2022-05-09

## 2022-05-09 RX ORDER — PANTOPRAZOLE SODIUM 40 MG/10ML
40 INJECTION, POWDER, LYOPHILIZED, FOR SOLUTION INTRAVENOUS ONCE
Status: COMPLETED | OUTPATIENT
Start: 2022-05-09 | End: 2022-05-09

## 2022-05-09 RX ORDER — FENTANYL CITRATE 50 UG/ML
25 INJECTION, SOLUTION INTRAMUSCULAR; INTRAVENOUS
Status: DISCONTINUED | OUTPATIENT
Start: 2022-05-09 | End: 2022-05-10 | Stop reason: HOSPADM

## 2022-05-09 RX ORDER — SODIUM CHLORIDE 0.9 % (FLUSH) 0.9 %
5-40 SYRINGE (ML) INJECTION EVERY 12 HOURS SCHEDULED
Status: DISCONTINUED | OUTPATIENT
Start: 2022-05-09 | End: 2022-05-10

## 2022-05-09 RX ORDER — PANTOPRAZOLE SODIUM 40 MG/1
40 TABLET, DELAYED RELEASE ORAL
Status: DISCONTINUED | OUTPATIENT
Start: 2022-05-09 | End: 2022-05-09 | Stop reason: HOSPADM

## 2022-05-09 RX ORDER — ATORVASTATIN CALCIUM 40 MG/1
80 TABLET, FILM COATED ORAL NIGHTLY
Status: DISCONTINUED | OUTPATIENT
Start: 2022-05-09 | End: 2022-05-10 | Stop reason: HOSPADM

## 2022-05-09 RX ORDER — WARFARIN SODIUM 3 MG/1
3 TABLET ORAL DAILY
Status: DISCONTINUED | OUTPATIENT
Start: 2022-05-10 | End: 2022-05-09 | Stop reason: HOSPADM

## 2022-05-09 RX ORDER — ONDANSETRON 2 MG/ML
4 INJECTION INTRAMUSCULAR; INTRAVENOUS EVERY 6 HOURS PRN
Status: DISCONTINUED | OUTPATIENT
Start: 2022-05-09 | End: 2022-05-10 | Stop reason: HOSPADM

## 2022-05-09 RX ORDER — POLYETHYLENE GLYCOL 3350 17 G/17G
17 POWDER, FOR SOLUTION ORAL DAILY PRN
Status: DISCONTINUED | OUTPATIENT
Start: 2022-05-09 | End: 2022-05-10 | Stop reason: HOSPADM

## 2022-05-09 RX ORDER — ONDANSETRON 4 MG/1
4 TABLET, ORALLY DISINTEGRATING ORAL EVERY 8 HOURS PRN
Status: DISCONTINUED | OUTPATIENT
Start: 2022-05-09 | End: 2022-05-10 | Stop reason: HOSPADM

## 2022-05-09 RX ORDER — DONEPEZIL HYDROCHLORIDE 10 MG/1
10 TABLET, FILM COATED ORAL DAILY
Status: DISCONTINUED | OUTPATIENT
Start: 2022-05-10 | End: 2022-05-10 | Stop reason: HOSPADM

## 2022-05-09 RX ORDER — LIDOCAINE 4 G/G
1 PATCH TOPICAL DAILY
Qty: 10 PATCH | Refills: 0 | Status: CANCELLED | OUTPATIENT
Start: 2022-05-09 | End: 2022-05-19

## 2022-05-09 RX ORDER — DONEPEZIL HYDROCHLORIDE 5 MG/1
10 TABLET, FILM COATED ORAL DAILY
Status: DISCONTINUED | OUTPATIENT
Start: 2022-05-09 | End: 2022-05-09

## 2022-05-09 RX ORDER — ACETAMINOPHEN 325 MG/1
650 TABLET ORAL EVERY 6 HOURS PRN
Status: DISCONTINUED | OUTPATIENT
Start: 2022-05-09 | End: 2022-05-10

## 2022-05-09 RX ORDER — HEPARIN SODIUM 1000 [USP'U]/ML
60 INJECTION, SOLUTION INTRAVENOUS; SUBCUTANEOUS ONCE
Status: COMPLETED | OUTPATIENT
Start: 2022-05-09 | End: 2022-05-09

## 2022-05-09 RX ORDER — SODIUM CHLORIDE 0.9 % (FLUSH) 0.9 %
5-40 SYRINGE (ML) INJECTION PRN
Status: DISCONTINUED | OUTPATIENT
Start: 2022-05-09 | End: 2022-05-09 | Stop reason: HOSPADM

## 2022-05-09 RX ORDER — ONDANSETRON 2 MG/ML
4 INJECTION INTRAMUSCULAR; INTRAVENOUS EVERY 6 HOURS PRN
Status: DISCONTINUED | OUTPATIENT
Start: 2022-05-09 | End: 2022-05-09 | Stop reason: HOSPADM

## 2022-05-09 RX ORDER — SODIUM CHLORIDE 9 MG/ML
INJECTION, SOLUTION INTRAVENOUS PRN
Status: DISCONTINUED | OUTPATIENT
Start: 2022-05-09 | End: 2022-05-09 | Stop reason: HOSPADM

## 2022-05-09 RX ORDER — ACETAMINOPHEN 650 MG/1
650 SUPPOSITORY RECTAL EVERY 6 HOURS PRN
Status: DISCONTINUED | OUTPATIENT
Start: 2022-05-09 | End: 2022-05-10

## 2022-05-09 RX ORDER — VITAMIN B COMPLEX
1000 TABLET ORAL DAILY
Status: DISCONTINUED | OUTPATIENT
Start: 2022-05-09 | End: 2022-05-09 | Stop reason: HOSPADM

## 2022-05-09 RX ORDER — ACETAMINOPHEN 650 MG/1
650 SUPPOSITORY RECTAL EVERY 6 HOURS PRN
Status: DISCONTINUED | OUTPATIENT
Start: 2022-05-09 | End: 2022-05-09 | Stop reason: HOSPADM

## 2022-05-09 RX ORDER — SODIUM CHLORIDE 0.9 % (FLUSH) 0.9 %
5-40 SYRINGE (ML) INJECTION EVERY 12 HOURS SCHEDULED
Status: DISCONTINUED | OUTPATIENT
Start: 2022-05-09 | End: 2022-05-09 | Stop reason: HOSPADM

## 2022-05-09 RX ORDER — HEPARIN SODIUM 10000 [USP'U]/100ML
5-30 INJECTION, SOLUTION INTRAVENOUS CONTINUOUS
Status: DISCONTINUED | OUTPATIENT
Start: 2022-05-09 | End: 2022-05-10

## 2022-05-09 RX ORDER — ATORVASTATIN CALCIUM 40 MG/1
40 TABLET, FILM COATED ORAL NIGHTLY
Status: DISCONTINUED | OUTPATIENT
Start: 2022-05-09 | End: 2022-05-09 | Stop reason: HOSPADM

## 2022-05-09 RX ORDER — PANTOPRAZOLE SODIUM 40 MG/1
40 TABLET, DELAYED RELEASE ORAL
Status: DISCONTINUED | OUTPATIENT
Start: 2022-05-10 | End: 2022-05-10 | Stop reason: HOSPADM

## 2022-05-09 RX ORDER — DONEPEZIL HYDROCHLORIDE 10 MG/1
10 TABLET, FILM COATED ORAL NIGHTLY
Status: DISCONTINUED | OUTPATIENT
Start: 2022-05-09 | End: 2022-05-09 | Stop reason: HOSPADM

## 2022-05-09 RX ORDER — MAGNESIUM SULFATE 1 G/100ML
1000 INJECTION INTRAVENOUS ONCE
Status: COMPLETED | OUTPATIENT
Start: 2022-05-09 | End: 2022-05-09

## 2022-05-09 RX ORDER — DILTIAZEM HYDROCHLORIDE 120 MG/1
120 CAPSULE, COATED, EXTENDED RELEASE ORAL DAILY
Qty: 30 CAPSULE | Refills: 1 | Status: ON HOLD | OUTPATIENT
Start: 2022-05-09 | End: 2022-05-10 | Stop reason: SDUPTHER

## 2022-05-09 RX ORDER — ASPIRIN 81 MG/1
81 TABLET, CHEWABLE ORAL DAILY
Status: DISCONTINUED | OUTPATIENT
Start: 2022-05-09 | End: 2022-05-09 | Stop reason: HOSPADM

## 2022-05-09 RX ORDER — ASPIRIN 81 MG/1
81 TABLET, CHEWABLE ORAL DAILY
Status: DISCONTINUED | OUTPATIENT
Start: 2022-05-10 | End: 2022-05-10 | Stop reason: HOSPADM

## 2022-05-09 RX ORDER — SODIUM CHLORIDE 0.9 % (FLUSH) 0.9 %
5-40 SYRINGE (ML) INJECTION PRN
Status: DISCONTINUED | OUTPATIENT
Start: 2022-05-09 | End: 2022-05-10

## 2022-05-09 RX ORDER — METOPROLOL SUCCINATE 50 MG/1
50 TABLET, EXTENDED RELEASE ORAL 2 TIMES DAILY
Status: DISCONTINUED | OUTPATIENT
Start: 2022-05-09 | End: 2022-05-09 | Stop reason: HOSPADM

## 2022-05-09 RX ADMIN — HEPARIN SODIUM 3730 UNITS: 1000 INJECTION INTRAVENOUS; SUBCUTANEOUS at 21:20

## 2022-05-09 RX ADMIN — KIT FOR THE PREPARATION OF TECHNETIUM TC99M SESTAMIBI 10 MILLICURIE: 1 INJECTION, POWDER, LYOPHILIZED, FOR SOLUTION PARENTERAL at 11:14

## 2022-05-09 RX ADMIN — REGADENOSON 0.4 MG: 0.08 INJECTION, SOLUTION INTRAVENOUS at 09:42

## 2022-05-09 RX ADMIN — SODIUM CHLORIDE, PRESERVATIVE FREE 10 ML: 5 INJECTION INTRAVENOUS at 11:23

## 2022-05-09 RX ADMIN — Medication 1000 UNITS: at 11:22

## 2022-05-09 RX ADMIN — ATORVASTATIN CALCIUM 40 MG: 40 TABLET, FILM COATED ORAL at 01:22

## 2022-05-09 RX ADMIN — ACETAMINOPHEN 650 MG: 325 TABLET ORAL at 07:36

## 2022-05-09 RX ADMIN — KIT FOR THE PREPARATION OF TECHNETIUM TC99M SESTAMIBI 30 MILLICURIE: 1 INJECTION, POWDER, LYOPHILIZED, FOR SOLUTION PARENTERAL at 11:14

## 2022-05-09 RX ADMIN — METOPROLOL SUCCINATE 50 MG: 50 TABLET, EXTENDED RELEASE ORAL at 22:27

## 2022-05-09 RX ADMIN — PANTOPRAZOLE SODIUM 40 MG: 40 INJECTION, POWDER, LYOPHILIZED, FOR SOLUTION INTRAVENOUS at 06:20

## 2022-05-09 RX ADMIN — METOPROLOL SUCCINATE 50 MG: 50 TABLET, EXTENDED RELEASE ORAL at 01:22

## 2022-05-09 RX ADMIN — NITROGLYCERIN 0.5 INCH: 20 OINTMENT TOPICAL at 23:24

## 2022-05-09 RX ADMIN — ACETAMINOPHEN 650 MG: 325 TABLET ORAL at 01:22

## 2022-05-09 RX ADMIN — DONEPEZIL HYDROCHLORIDE 10 MG: 10 TABLET, FILM COATED ORAL at 01:22

## 2022-05-09 RX ADMIN — HEPARIN SODIUM AND DEXTROSE 12 UNITS/KG/HR: 10000; 5 INJECTION INTRAVENOUS at 21:22

## 2022-05-09 RX ADMIN — MAGNESIUM SULFATE IN DEXTROSE 1000 MG: 10 INJECTION, SOLUTION INTRAVENOUS at 20:21

## 2022-05-09 RX ADMIN — ATORVASTATIN CALCIUM 80 MG: 40 TABLET, FILM COATED ORAL at 22:27

## 2022-05-09 RX ADMIN — ACETAMINOPHEN 650 MG: 325 TABLET ORAL at 19:34

## 2022-05-09 RX ADMIN — ASPIRIN 81 MG 81 MG: 81 TABLET ORAL at 11:22

## 2022-05-09 RX ADMIN — FAMOTIDINE 20 MG: 10 INJECTION, SOLUTION INTRAVENOUS at 00:36

## 2022-05-09 ASSESSMENT — PAIN SCALES - GENERAL
PAINLEVEL_OUTOF10: 0
PAINLEVEL_OUTOF10: 7
PAINLEVEL_OUTOF10: 3
PAINLEVEL_OUTOF10: 3
PAINLEVEL_OUTOF10: 0
PAINLEVEL_OUTOF10: 0

## 2022-05-09 ASSESSMENT — PAIN DESCRIPTION - PAIN TYPE: TYPE: ACUTE PAIN

## 2022-05-09 ASSESSMENT — LIFESTYLE VARIABLES
HOW OFTEN DO YOU HAVE A DRINK CONTAINING ALCOHOL: NEVER
HOW OFTEN DO YOU HAVE A DRINK CONTAINING ALCOHOL: NEVER

## 2022-05-09 ASSESSMENT — PAIN DESCRIPTION - DESCRIPTORS: DESCRIPTORS: ACHING

## 2022-05-09 ASSESSMENT — PAIN - FUNCTIONAL ASSESSMENT: PAIN_FUNCTIONAL_ASSESSMENT: ACTIVITIES ARE NOT PREVENTED

## 2022-05-09 ASSESSMENT — PAIN DESCRIPTION - LOCATION
LOCATION: HEAD
LOCATION: HEAD

## 2022-05-09 NOTE — PROGRESS NOTES
Discharge education completed with pt and , pt demonstrated appropriate teach back, IV's removed, pt has prescription called in to pharmacy, medication's and side effect education completed, pt has discharge paperwork, AVS signed, pt denies any needs or questions at this time.

## 2022-05-09 NOTE — PLAN OF CARE
Problem: Safety - Adult  Goal: Free from fall injury  5/9/2022 1546 by Guy White RN  Outcome: Completed  5/9/2022 1243 by Guy White RN  Outcome: Progressing  Flowsheets (Taken 5/9/2022 0736)  Free From Fall Injury: Instruct family/caregiver on patient safety     Problem: Discharge Planning  Goal: Discharge to home or other facility with appropriate resources  5/9/2022 1546 by Guy White RN  Outcome: Completed  5/9/2022 1243 by Guy White RN  Outcome: Progressing  Flowsheets (Taken 5/9/2022 9626)  Discharge to home or other facility with appropriate resources: Identify barriers to discharge with patient and caregiver     Problem: Pain  Goal: Verbalizes/displays adequate comfort level or baseline comfort level  5/9/2022 1546 by Guy White RN  Outcome: Completed  5/9/2022 1243 by Guy White RN  Outcome: Progressing

## 2022-05-09 NOTE — PLAN OF CARE
Problem: Safety - Adult  Goal: Free from fall injury  Outcome: Progressing     Problem: Discharge Planning  Goal: Discharge to home or other facility with appropriate resources  Outcome: Progressing

## 2022-05-09 NOTE — H&P
History and Physical      Name:  Joel Stevens /Age/Sex: 1945  (67 y.o. female)   MRN & CSN:  [id number] & [id number] Encounter Date/Time: 2022 11:29 PM EDT   Location:  [place] [address] PCP: Jose Florence MD       Hospital Day: 1    Assessment and Plan:     #. Chest pain  -Troponin x1 negative, EKG with no acute ST-T wave changes.  -Serial troponins, repeat EKG. -CTA chest-no acute process.  -Patient had similar presentation-admitted to hospital (-)-was evaluated by cardiology and recommended outpatient follow-up. Patient has stress test scheduled on Wednesday.  -Consult cardiology. #.  Chronic A. fib ()  -h/o cardioversion(2015), ablation  -Patient's heart rate not controlled at rest-ranging from   -Resume home medications-Cardizem to 40 mg daily, metoprolol succinate 50 mg twice daily. As per  at bedside metoprolol was increased during recent admission.  -Patient is on Coumadin. Last dose 2022.  -Pharmacy consult to dose Coumadin. #. S/p Pacemaker    #. Hypothyroidism-resume levothyroxine. #.  Mild cognitive impairment-on donepezil    #. GERD-Dexilant. Disposition:   Current Living situation: Home with     Diet  cardiac. N.p.o. after midnight   DVT Prophylaxis  patient on Coumadin   Code Status Prior   Surrogate Decision Maker/ POA      History from:   EMR, patient. History of Present Illness:     Chief Complaint: Chest pain  Joel Stevens is a 67 y.o. female with chronic atrial fibrillation, on anticoagulation, hypertension, hyperlipidemia, hypothyroidism, GERD, mild cognitive impairment was brought to ED for chest pain. Patient reported that she started having 10/10 intensity pain while watching TV. Pain was pressure type, constant, denied any radiation, denied any aggravating or relieving factors, denied any nausea, vomiting, shortness of breath, diaphoresis.   Patient reported similar kind of pain few days ago when she was in the hospital.  She took Tylenol at home. Pain is currently 3/10 intensity. Patient denied any fever, chills, cough, denied any abdominal pain, denied any urinary complaints, denied any constipation or diarrhea. At presentation patient was noted to have /84, HR 77, RR 16, temp 97.9, saturating 99% on room air. HR ranging from  during my evaluation. CBC, CMP within normal range, lactic acid 1.4, proBNP 1063, lactic acid 1.4. EKG-no acute process. CTA chest-no PE, mild bronchial wall thickening which may be seen in inflammatory conditions such as bronchitis reactive airway disease and smoking. Patient was given Tylenol, famotidine in ER. Review of Systems: Need 10 Elements   10 point review of systems conducted and pertinent positives and negatives as per HPI. Objective:   No intake or output data in the 24 hours ending 05/08/22 2329   Vitals:   Vitals:    05/08/22 2040 05/08/22 2048   BP:  120/84   Pulse: 77    Resp: 16    Temp: 97.9 °F (36.6 °C)    SpO2: 99%        Medications Prior to Admission   Reviewed medications with patient    Prior to Admission medications    Medication Sig Start Date End Date Taking?  Authorizing Provider   metoprolol succinate (TOPROL XL) 50 MG extended release tablet Take 1 tablet by mouth in the morning and at bedtime 5/1/22   Red Rolle MD   dilTIAZem (CARDIZEM CD) 240 MG extended release capsule Take 1 capsule by mouth daily 5/2/22   Red Rolle MD   donepezil (ARICEPT) 10 MG tablet Take 1 tablet by mouth daily 4/14/22   Joe Molina DO   warfarin (COUMADIN) 3 MG tablet Take 3 mg by mouth daily    Historical Provider, MD   levothyroxine (SYNTHROID) 50 MCG tablet TAKE 1 TABLET BY MOUTH EVERY DAY BEFORE BREAKFAST 1/25/22   Geraldo Daugherty MD   atorvastatin (LIPITOR) 10 MG tablet TAKE 1 TABLET BY MOUTH EVERY DAY 10/19/21   Geraldo Daugherty MD   Vitamin D (CHOLECALCIFEROL) 25 MCG (1000 UT) TABS tablet Take 1,000 Units by mouth daily    Historical Provider, MD   Fexofenadine HCl (ALLEGRA ALLERGY PO) Take by mouth as needed     Historical Provider, MD   Dexlansoprazole (DEXILANT) 60 MG CPDR Take 1 tablet by mouth daily. 2/16/11   Historical Provider, MD       Physical Exam: Need 8 Elements   Physical Exam     GEN  -Awake, alert, NAD.   EYES   -PERRL. HENT  -MM are moist.   RESP  -LS CTA equal bilat, no wheezes, rales or rhonchi. Symmetric chest movement. No respiratory distress noted. C/V  -irregular, tachycardia without appreciable M/R/G. Mild peripheral edema. No reproducible chest wall tenderness. GI  -Abdomen is soft non-distended, no significant tenderness. No masses or guarding. + BS in all quadrants. Rectal exam deferred.   -No CVA tenderness. Nicole catheter is not present. MS  -B/L extremities strong muscles strength. Full movements. No gross joint deformities. Minimal swelling, intact sensation symmetrical.   SKIN  -Normal coloration, warm, dry. NEURO  - Awake, alert, oriented x 3, no focal deficits. PSYC  - Appropriate affect. Past Medical History:   Reviewed patient's past medical, surgical, social, family history and allergies.       PMHx   Past Medical History:   Diagnosis Date    Abnormal echocardiogram     EF 60%, mild aortic indsufficiency, mild pulmonary hypertension    Anemia     Aortic insufficiency     mild per echo 8/24/2010    Atrial fibrillation ([address]     failed propafenone, Multaq and flecainide - 7/2011 plan for AFib ablation - [facility]- Dr Opal Gomez Atrial flutter [facility])     Bursitis, trochanteric 08/2004    s/p injection    Cerumen impaction 04/24/2012    Diverticulosis 2015    Dr. Dia BANKS scope    Diverticulosis of colon 01/2010    Colonoscopy-Dr Reece ACUÑA (degenerative joint disease), cervical     cervical, generalized disc buldge   MRI 3/02    DVT (deep venous thrombosis) ([address]     Dyslipidemia 2002    Environmental allergies     Family history of colon cancer     mother    Gastric polyp     8/2006, 11/2009 benign- Dr Allyssa Lozano Gastritis 04/2008    mild superficial per Dr Allyssa Lozano GERD (gastroesophageal reflux disease) 08/2006    Dr Allyssa Lozano H/O cardiovascular stress test 07/13/2004 07/13 EF58%, No scintigraphic evidence of inducible myocardial ischemia 04/13Normal study. No wall motion abnormality seen. EF 65%    H/O echocardiogram 07/18/2013 7/13-EF 50-55% Mild AR.  H/O exercise stress test 02/07/2017    EF63% normal    History of Holter monitoring 09/23/2015    48 hour - abnormal holter revealing afib throughout the recording with interspersed pacemaker beats, HR does not appear to be well controlled    History of mammogram     last mammo 7/25/11, Dr. Demetrius Pierre yearly    Hx of colonoscopy     1/21/2010    Hx of Doppler Venous ultrasound 08/31/2021    No DVT or SVT, No significant reflux in RLE, Significant reflux in Left CFV    Hyperlipidemia     Hypertension     Hypothyroidism     Internal hemorrhoid 2015    Dr. Tony BANKS scope     Intervertebral disc protrusion 05/2009    wry neck with C4-5      Left shoulder pain 04/2004    (L) shoulder impingement  mild DJD    Long term current use of anticoagulant 07/26/2016    **Coumadin Clinic follows PT/INRs, along w/prescribing pt's Coumadin dosages. **    Menstruation     age 14    Pacemaker 06/21/2012    dual chamber- checked every 3 months    Pulmonary hypertension ([address]     mild per echo 8/24/2010, Pt refused sleep study (June 2012)    Restless legs 08/2003    ferritin    Right shoulder strain 02/2003    no seperation, bony contusion anterior humeral head  MRI 3/03    Sciatica 07/2009    (R) chronic intermittent s/p epidural injections  Dr Estrella Chaudhry Sciatica     Shoulder pain, left 03/2011    RTC tendinopathy, type II acrominum, bursitis- referred to Dr. Renetta Kumar    Shoulder pain, right 03/10/2009    impingement, physical thearpy   ([place])  calcific bursitis  s/p injection    Sinus bradycardia  Sinusitis 12/12/2011    Tinnitus 03/2002    Vision changes 03/19/2013     PSHX:  has a past surgical history that includes Tonsillectomy (08/2006); Upper gastrointestinal endoscopy (04/2008); Cholecystectomy, laparoscopic (02/2001); sinus surgery (1979); Dilation and curettage of uterus (1988); jessica and bso (cervix removed) (04/2003); Breast surgery (1985); Hysterectomy (2003); Cardioversion; Colonoscopy; transthoracic echocardiogram (06/2012); pacemaker placement (06/21/2012); ablation of dysrhythmic focus; Cardioversion (03/10/2014); and Breast biopsy. Allergies: Allergies   Allergen Reactions    Latex Hives    Darvon [Propoxyphene Hcl] Shortness Of Breath    Demerol Rash     Breathing problems    Dilaudid [Hydromorphone Hcl] Anaphylaxis    Valium Rash     Breathing problems    Celecoxib Diarrhea    Norco [Hydrocodone-Acetaminophen] Diarrhea, Nausea Only and Other (See Comments)     A-Fib    Norvasc [Amlodipine] Other (See Comments)     HA, dizziness    Oxycodone Itching    Tape Sweta Francisco Tape] Other (See Comments)     BLISTER    Vasotec [Enalapril] Other (See Comments)     Nausea, vomiting    Diazepam Rash     Fam HX: family history includes Breast Cancer in her maternal cousin; Colon Cancer (age of onset: [de-identified]) in her mother; Coronary Art Dis (age of onset: 62) in her father; Coronary Art Dis (age of onset: 66) in her mother; Heart Disease in her father and mother; Migraines in her sister; Seizures in her brother; Stroke in her mother.   Soc HX:   Social History     Socioeconomic History    Marital status:      Spouse name: None    Number of children: None    Years of education: None    Highest education level: None   Occupational History    None   Tobacco Use    Smoking status: Never Smoker    Smokeless tobacco: Never Used    Tobacco comment: reviewed 11/4/15   Vaping Use    Vaping Use: Never used   Substance and Sexual Activity    Alcohol use: No    Drug use: No    Sexual activity: Yes     Partners: Male     Comment:    Other Topics Concern    None   Social History Narrative    None     Social Determinants of Health     Financial Resource Strain:     Difficulty of Paying Living Expenses: Not on file   Food Insecurity:     Worried About Running Out of Food in the Last Year: Not on file    Scottie of Food in the Last Year: Not on file   Transportation Needs:     Lack of Transportation (Medical): Not on file    Lack of Transportation (Non-Medical):  Not on file   Physical Activity:     Days of Exercise per Week: Not on file    Minutes of Exercise per Session: Not on file   Stress:     Feeling of Stress : Not on file   Social Connections:     Frequency of Communication with Friends and Family: Not on file    Frequency of Social Gatherings with Friends and Family: Not on file    Attends Faith Services: Not on file    Active Member of [address] [place] [address] [employer] or Organizations: Not on file    Attends Club or Organization Meetings: Not on file    Marital Status: Not on file   Intimate Partner Violence:     Fear of Current or Ex-Partner: Not on file    Emotionally Abused: Not on file    Physically Abused: Not on file    Sexually Abused: Not on file   Housing Stability:     Unable to Pay for Housing in the Last Year: Not on file    Number of [place] in the Last Year: Not on file    Unstable Housing in the Last Year: Not on file       Medications:   Medications:    famotidine (PEPCID) injection  20 mg IntraVENous Once      Infusions:   PRN Meds: ondansetron, 4 mg, Q30 Min PRN        Labs      CBC:   Recent Labs     05/08/22 2045   WBC 6.6   HGB 13.3   *     BMP:    Recent Labs     05/08/22 2045      K 3.9      CO2 23   BUN 23   CREATININE 1.0   GLUCOSE 105*     Hepatic:   Recent Labs     05/08/22 2045   AST 32   ALT 31   BILITOT 0.8   ALKPHOS 64     Lipids:   Lab Results   Component Value Date    CHOL 115 04/30/2022    CHOL 154 04/15/2021    HDL 51 04/30/2022    TRIG 47 04/30/2022     Hemoglobin A1C:   Lab Results   Component Value Date    LABA1C 5.4 04/30/2022     TSH:   Lab Results   Component Value Date    TSH 3.76 10/19/2021     Troponin:   Lab Results   Component Value Date    TROPONINT <0.010 05/08/2022    TROPONINT <0.010 04/30/2022    TROPONINT <0.010 04/30/2022     Lactic Acid: No results for input(s): LACTA in the last 72 hours. BNP:   Recent Labs     05/08/22 2045   PROBNP 1,063*     UA:  Lab Results   Component Value Date    NITRU NEGATIVE 09/24/2020    COLORU STRAW 09/24/2020    PHUR 5.5 11/02/2015    WBCUA 9 09/24/2020    RBCUA <1 09/24/2020    MUCUS RARE 09/24/2020    TRICHOMONAS NONE SEEN 09/24/2020    BACTERIA NEGATIVE 09/24/2020    CLARITYU CLEAR 09/24/2020    SPECGRAV 1.005 09/24/2020    LEUKOCYTESUR MODERATE 09/24/2020    UROBILINOGEN NORMAL 09/24/2020    BILIRUBINUR NEGATIVE 09/24/2020    BILIRUBINUR neg 11/02/2015    BLOODU NEGATIVE 09/24/2020    GLUCOSEU neg 11/02/2015    KETUA NEGATIVE 09/24/2020     Urine Cultures: No results found for: LABURIN  Blood Cultures: No results found for: BC  No results found for: BLOODCULT2  Organism:   Lab Results   Component Value Date    ORG SCNG 07/18/2012       Imaging/Diagnostics Last 24 Hours   XR CHEST PORTABLE    Result Date: 5/8/2022  EXAMINATION: ONE XRAY VIEW OF THE CHEST 5/8/2022 2:52 pm COMPARISON: April 29, 2022 HISTORY: ORDERING SYSTEM PROVIDED HISTORY: Chest pain TECHNOLOGIST PROVIDED HISTORY: Reason for exam:->Chest pain Reason for Exam: Chest pain Additional signs and symptoms: NONE FINDINGS: Adequate inspiration is present. No focal area of consolidation, pleural effusion, or pneumothorax is present. A granuloma is present within the left lung base. Heart size appears normal.  Permanent cardiac pacemaker is in place. No acute osseous abnormality is present.      No evidence of acute cardiopulmonary disease     CTA CHEST W CONTRAST    Result Date: 5/8/2022  EXAMINATION: CTA OF THE CHEST 5/8/2022 7:20 pm TECHNIQUE: CTA of the chest was performed after the administration of intravenous contrast.  Multiplanar reformatted images are provided for review. MIP images are provided for review. Dose modulation, iterative reconstruction, and/or weight based adjustment of the mA/kV was utilized to reduce the radiation dose to as low as reasonably achievable. COMPARISON: 10/14/2011 HISTORY: ORDERING SYSTEM PROVIDED HISTORY: chest pain TECHNOLOGIST PROVIDED HISTORY: Reason for exam:->chest pain Decision Support Exception - unselect if not a suspected or confirmed emergency medical condition->Emergency Medical Condition (MA) Reason for Exam: chest pain FINDINGS: Pulmonary Arteries: There is a suboptimal contrast bolus. In addition, there is streak artifact generated by the patient's pacemaker. Consequently, the segmental and subsegmental pulmonary arterial branches are not well evaluated. No central pulmonary embolism is detected. Mediastinum: Visualized thyroid unremarkable. No lymphadenopathy is seen. The esophagus is patulous but otherwise unremarkable. Cardiac chambers are enlarged. No pericardial effusion is seen. The thoracic aorta is normal in caliber without evidence of dissection. Lungs/pleura: Mild to moderate peripheral fibrosis is noted, especially the upper lungs, and that appears is mildly progressed when compared to the previous exam from 2011 no acute infiltrate is identified. No pneumothorax identified. No pleural effusion is found. There is diffuse bronchial wall thickening. The airways are patent. Upper Abdomen: Periportal edema is identified within the liver. Images of the upper abdomen otherwise unremarkable. Soft Tissues/Bones: No acute or suspicious bony abnormalities are identified. Limited evaluation of the segmental and subsegmental pulmonary branches. No central pulmonary embolism is detected.  Mild bronchial wall thickening, which may be seen in inflammatory conditions such as bronchitis, reactive airways disease, and smoking. Mild to moderate peripheral fibrosis seen bilaterally, greater in the upper lungs, mildly progressed when compared to the previous exam. Periportal edema. That is a nonspecific finding, and can be secondary to localized disease (such as hepatitis), but can also be secondary to systemic etiologies (such as third-spacing). Personally reviewed Lab Studies, Imaging, and discussed case with ED physician.     Electronically signed by Travon Flores MD on 5/8/2022 at 11:29 PM

## 2022-05-09 NOTE — PROGRESS NOTES
Call from nurse that troponin elevated and was previously normal.  Patient denies any chest pain, shortness of breath, dizziness, or other symptoms. Patient just discharged today and had negative stress test and cardiology followed. Discussed with cardiologist who recommended patient come to the emergency room with any symptoms. Patient advised to follow-up with PCP and cardiology ASAP. Patient educated in depth on symptoms to warra nt immediate medical attention and verbalized understanding.

## 2022-05-09 NOTE — PROGRESS NOTES
PHARMACY ANTICOAGULATION MONITORING SERVICE    Papito Griffiths is a 67 y.o. female on warfarin therapy for chronic atrial fibrillation. Pharmacy consulted by Dr. Abhijit Brown for monitoring and adjustment of treatment. Indication for anticoagulation: Chronic atrial fibrillation  INR goal: 2 - 3  Warfarin dose prior to admission: 3 mg po daily    Pertinent Laboratory Values   Recent Labs     05/08/22 2045   INR 1.41   HGB 13.3   HCT 41.7   *       Assessment/Plan:  Drug Interactions:   Aspirin (Home med)  Levothyroxine (Home med)  Acetaminophen (PRN)  INR subtherapeutic on admission at 1.41  Give Warfarin 4 mg dose today, then resume warfarin 3 mg po daily; Trend INR daily  Pharmacy will continue to monitor and adjust warfarin therapy as indicated    Thank you for the consult.   Olivia Valencia [facility]  5/9/2022 1:26 AM

## 2022-05-09 NOTE — PLAN OF CARE
Problem: Safety - Adult  Goal: Free from fall injury  5/9/2022 1243 by Berna German RN  Outcome: Progressing  Flowsheets (Taken 5/9/2022 0736)  Free From Fall Injury: Instruct family/caregiver on patient safety  5/9/2022 0055 by Huber Hallman RN  Outcome: Progressing     Problem: Discharge Planning  Goal: Discharge to home or other facility with appropriate resources  5/9/2022 1243 by Berna German RN  Outcome: Progressing  Flowsheets (Taken 5/9/2022 0736)  Discharge to home or other facility with appropriate resources: Identify barriers to discharge with patient and caregiver  5/9/2022 0055 by Huber Hallman RN  Outcome: Progressing     Problem: Pain  Goal: Verbalizes/displays adequate comfort level or baseline comfort level  Outcome: Progressing

## 2022-05-09 NOTE — ED PROVIDER NOTES
Linsey Lawrence 44      TRIAGE CHIEF COMPLAINT:   Chest Pain (central, no radiation, sharp, 10/10, deneis N/V, SOB, hx afib, pacemaker)      [place]:  Izzy Gallo is a 67 y.o. female that presents with complaint of chest pain. Patient has a history of A. fib she was in the hospital recently for chest pain she states she has a pacemaker. She states 10-10 chest pain prior to arrival.  Came by EMS to give her nitro and aspirin which did not help. She is on Coumadin. Denies any fevers nausea vomiting shortness of breath cough abdominal pain back pain swelling. No other questions or concerns. Patient states she can only take Tylenol has multiple allergies. REVIEW OF SYSTEMS:  At least 10 systems reviewed and otherwise acutely negative except as in the [address] Review of Systems   Constitutional: Negative. HENT: Negative. Eyes: Negative. Respiratory: Negative for shortness of breath. Cardiovascular: Positive for chest pain. Gastrointestinal: Negative. Endocrine: Negative. Genitourinary: Negative. Musculoskeletal: Negative. Skin: Negative. Allergic/Immunologic: Negative. Neurological: Negative. Hematological: Negative. All other systems reviewed and are negative.       Past Medical History:   Diagnosis Date    Abnormal echocardiogram     EF 60%, mild aortic indsufficiency, mild pulmonary hypertension    Anemia     Aortic insufficiency     mild per echo 8/24/2010    Atrial fibrillation (HCC)     failed propafenone, Multaq and flecainide - 7/2011 plan for AFib ablation - [facility]- Dr Lemuel Guadarrama Atrial flutter [facility])     Bursitis, trochanteric 08/2004    s/p injection    Cerumen impaction 04/24/2012    Diverticulosis 2015    Dr. Blair Scale C scope    Diverticulosis of colon 01/2010    Colonoscopy-Dr Reece ACUÑA (degenerative joint disease), cervical     cervical, generalized disc buldge   MRI 3/02    DVT (deep venous thrombosis) ([address] [address]     Dyslipidemia 2002    Environmental allergies     Family history of colon cancer     mother    Gastric polyp     8/2006, 11/2009 benign- Dr Claudina Cockayne Gastritis 04/2008    mild superficial per Dr Becky Zhang    GERD (gastroesophageal reflux disease) 08/2006    Dr Claudina Cockayne H/O cardiovascular stress test 07/13/2004 07/13 EF58%, No scintigraphic evidence of inducible myocardial ischemia 04/13Normal study. No wall motion abnormality seen. EF 65%    H/O echocardiogram 07/18/2013 7/13-EF 50-55% Mild AR.  H/O exercise stress test 02/07/2017    EF63% normal    History of Holter monitoring 09/23/2015    48 hour - abnormal holter revealing afib throughout the recording with interspersed pacemaker beats, HR does not appear to be well controlled    History of mammogram     last mammo 7/25/11, Dr. Daniel Mares yearly    Hx of colonoscopy     1/21/2010    Hx of Doppler Venous ultrasound 08/31/2021    No DVT or SVT, No significant reflux in RLE, Significant reflux in Left CFV    Hyperlipidemia     Hypertension     Hypothyroidism     Internal hemorrhoid 2015    Dr. Becky Zhang C scope     Intervertebral disc protrusion 05/2009    wry neck with C4-5      Left shoulder pain 04/2004    (L) shoulder impingement  mild DJD    Long term current use of anticoagulant 07/26/2016    **[facility] follows PT/INRs, along w/prescribing pt's Coumadin dosages. **    Menstruation     age 14    Pacemaker 06/21/2012    dual chamber- checked every 3 months    Pulmonary hypertension ([address]     mild per echo 8/24/2010, Pt refused sleep study (June 2012)    Restless legs 08/2003    ferritin    Right shoulder strain 02/2003    no seperation, bony contusion anterior humeral head  MRI 3/03    Sciatica 07/2009    (R) chronic intermittent s/p epidural injections  Dr Mattie Griffin Sciatica     Shoulder pain, left 03/2011    RTC tendinopathy, type II acrominum, bursitis- referred to Dr. Dinorah Jose    Shoulder pain, right 03/10/2009    impingement, physical thearpy   (Main)  calcific bursitis  s/p injection    Sinus bradycardia     Sinusitis 12/12/2011    Tinnitus 03/2002    Vision changes 03/19/2013     Past Surgical History:   Procedure Laterality Date    ABLATION OF DYSRHYTHMIC FOCUS      BREAST BIOPSY      BREAST SURGERY  1985    Right breast tumor excised    CARDIOVERSION      1/2008 Dr Twin Davis; 12/2008    CARD[facility]ION  03/10/2014    [facility]    CHOLECYSTECTOMY, LAPAROSCOPIC  02/2001    Dr Lexa Mcneil      [phone] (Dr Andrade Kim)   [address] [facility],  [address]    HYSTERECTOMY  2003    PACEMAKER PLACEMENT  06/21/2012    Medtronic Adapta ADDRL1 -not mri compatible   Jenny Coffman NANCY AND BSO  04/2003    fibroid      Dr Enrrique Hayes  08/2006    Dr Laurel Lakhani ECHOCARDIOGRAM  06/2012    transthoracic cardioversion-Dr. Radha Apodaca    UPPER GASTROINTESTINAL ENDOSCOPY  04/2008    mild superficial gastritis  Dr Andrade Kim; 2009     Family History   Problem Relation Age of Onset    Stroke Mother     Heart Disease Mother     Coronary Art Dis Mother 66        CABG    Colon Cancer Mother [de-identified]        colon     Heart Disease Father     Coronary Art Dis Father 62        CABG    Migraines Sister     Seizures Brother     Breast Cancer Maternal Cousin         under 48     Social History     Socioeconomic History    Marital status:      Spouse name: Not on file    Number of children: Not on file    Years of education: Not on file    Highest education level: Not on file   Occupational History    Not on file   Tobacco Use    Smoking status: Never Smoker    Smokeless tobacco: Never Used    Tobacco comment: reviewed 11/4/15   Vaping Use    Vaping Use: Never used   Substance and Sexual Activity    Alcohol use: No    Drug use: No    Sexual activity: Yes     Partners: Male     Comment:    Other Topics Concern    Not on file   Social History Narrative    Not on file     Social Determinants of Health     Financial Resource Strain:     Difficulty of Paying Living Expenses: Not on file   Food Insecurity:     Worried About Running Out of Food in the Last Year: Not on file    Scottie of Food in the Last Year: Not on file   Transportation Needs:     Lack of Transportation (Medical): Not on file    Lack of Transportation (Non-Medical):  Not on file   Physical Activity:     Days of Exercise per Week: Not on file    Minutes of Exercise per Session: Not on file   Stress:     Feeling of Stress : Not on file   Social Connections:     Frequency of Communication with Friends and Family: Not on file    Frequency of Social Gatherings with Friends and Family: Not on file    Attends Judaism Services: Not on file    Active Member of [address] [place] [address] [employer] or Organizations: Not on file    Attends Club or Organization Meetings: Not on file    Marital Status: Not on file   Intimate Partner Violence:     Fear of Current or Ex-Partner: Not on file    Emotionally Abused: Not on file    Physically Abused: Not on file    Sexually Abused: Not on file   Housing Stability:     Unable to Pay for Housing in the Last Year: Not on file    Number of [place] in the Last Year: Not on file    Unstable Housing in the Last Year: Not on file     Current Facility-Administered Medications   Medication Dose Route Frequency Provider Last Rate Last Admin    ondansetron (ZOFRAN) injection 4 mg  4 mg IntraVENous Q30 Min PRN Fabián Dial, DO        famotidine (PEPCID) 20 mg in sodium chloride (PF) 10 mL injection  20 mg IntraVENous Once Fabián Dial, DO         Current Outpatient Medications   Medication Sig Dispense Refill    metoprolol succinate (TOPROL XL) 50 MG extended release tablet Take 1 tablet by mouth in the morning and at bedtime 30 tablet 3    dilTIAZem (CARDIZEM CD) 240 MG extended release capsule Take 1 capsule by mouth daily 30 capsule 3    donepezil (ARICEPT) 10 MG tablet Take 1 tablet by mouth daily 30 tablet 5    warfarin (COUMADIN) 3 MG tablet Take 3 mg by mouth daily      levothyroxine (SYNTHROID) 50 MCG tablet TAKE 1 TABLET BY MOUTH EVERY DAY BEFORE BREAKFAST 90 tablet 3    atorvastatin (LIPITOR) 10 MG tablet TAKE 1 TABLET BY MOUTH EVERY DAY 90 tablet 3    Vitamin D (CHOLECALCIFEROL) 25 MCG (1000 UT) TABS tablet Take 1,000 Units by mouth daily      Fexofenadine HCl (ALLEGRA ALLERGY PO) Take by mouth as needed       Dexlansoprazole (DEXILANT) 60 MG CPDR Take 1 tablet by mouth daily.         Allergies   Allergen Reactions    Latex Hives    Darvon [Propoxyphene Hcl] Shortness Of Breath    Demerol Rash     Breathing problems    Dilaudid [Hydromorphone Hcl] Anaphylaxis    Valium Rash     Breathing problems    Celecoxib Diarrhea    Norco [Hydrocodone-Acetaminophen] Diarrhea, Nausea Only and Other (See Comments)     A-Fib    Norvasc [Amlodipine] Other (See Comments)     HA, dizziness    Oxycodone Itching    Tape Elige Lathe Tape] Other (See Comments)     BLISTER    Vasotec [Enalapril] Other (See Comments)     Nausea, vomiting    Diazepam Rash     Current Facility-Administered Medications   Medication Dose Route Frequency Provider Last Rate Last Admin    ondansetron (ZOFRAN) injection 4 mg  4 mg IntraVENous Q30 Min PRN Clorinda Belts, DO        famotidine (PEPCID) 20 mg in sodium chloride (PF) 10 mL injection  20 mg IntraVENous Once Clorinda Belts, DO         Current Outpatient Medications   Medication Sig Dispense Refill    metoprolol succinate (TOPROL XL) 50 MG extended release tablet Take 1 tablet by mouth in the morning and at bedtime 30 tablet 3    dilTIAZem (CARDIZEM CD) 240 MG extended release capsule Take 1 capsule by mouth daily 30 capsule 3    donepezil (ARICEPT) 10 MG tablet Take 1 tablet by mouth daily 30 tablet 5    warfarin (COUMADIN) 3 MG tablet Take 3 mg by mouth daily      levothyroxine (SYNTHROID) 50 MCG tablet TAKE 1 TABLET BY MOUTH EVERY DAY BEFORE BREAKFAST 90 tablet 3    atorvastatin (LIPITOR) 10 MG tablet TAKE 1 TABLET BY MOUTH EVERY DAY 90 tablet 3    Vitamin D (CHOLECALCIFEROL) 25 MCG (1000 UT) TABS tablet Take 1,000 Units by mouth daily      Fexofenadine HCl (ALLEGRA ALLERGY PO) Take by mouth as needed       Dexlansoprazole (DEXILANT) 60 MG CPDR Take 1 tablet by mouth daily. Nursing Notes Reviewed    VITAL SIGNS:  ED Triage Vitals [05/08/22 2040]   Enc Vitals Group      BP       Pulse 77      Resp 16      Temp 97.9 °F (36.6 °C)      Temp src       SpO2 99 %      Weight       Height       Head Circumference       Peak Flow       Pain Score       Pain Loc       Pain Edu? Excl. in [address]? PHYSICAL EXAM:  Physical Exam  Vitals and nursing note reviewed. Constitutional:       General: She is not in acute distress. Appearance: Normal appearance. She is well-developed and well-groomed. She is not ill-appearing, toxic-appearing or diaphoretic. HENT:      Head: Normocephalic and atraumatic. Right Ear: External ear normal.      Left Ear: External ear normal.   Eyes:      General: No scleral icterus. Right eye: No discharge. Left eye: No discharge. Extraocular Movements: Extraocular movements intact. Conjunctiva/sclera: Conjunctivae normal.   Neck:      Vascular: No JVD. Trachea: Phonation normal.   Cardiovascular:      Rate and Rhythm: Normal rate. Rhythm irregular. Pulses: Normal pulses. Heart sounds: Normal heart sounds. No murmur heard. No friction rub. No gallop. Pulmonary:      Effort: Pulmonary effort is normal. No respiratory distress. Breath sounds: Normal breath sounds. No stridor. No wheezing, rhonchi or rales. Abdominal:      General: Bowel sounds are normal. There is no distension. Palpations: Abdomen is soft. There is no mass. Tenderness: There is no abdominal tenderness. There is no guarding or rebound.  Negative signs include Morrissey's sign, Rovsing's sign and McBurney's sign. Hernia: No hernia is present. Musculoskeletal:         General: No swelling, tenderness, deformity or signs of injury. Normal range of motion. Cervical back: Full passive range of motion without pain and normal range of motion. No edema, erythema, signs of trauma, rigidity, torticollis or crepitus. No pain with movement. Normal range of motion. Right lower leg: No edema. Left lower leg: No edema. Skin:     General: Skin is warm. Coloration: Skin is not jaundiced or pale. Findings: No bruising, erythema, lesion or rash. Neurological:      General: No focal deficit present. Mental Status: She is alert and oriented to person, place, and time. GCS: GCS eye subscore is 4. GCS verbal subscore is 5. GCS motor subscore is 6. Cranial Nerves: Cranial nerves are intact. No cranial nerve deficit, dysarthria or facial asymmetry. Sensory: Sensation is intact. No sensory deficit. Motor: Motor function is intact. No weakness, tremor, atrophy, abnormal muscle tone or seizure activity. Coordination: Coordination normal.   Psychiatric:         Attention and Perception: Attention and perception normal.         Mood and Affect: Mood is anxious. Speech: Speech normal.         Behavior: Behavior normal. Behavior is cooperative. Thought Content:  Thought content normal.         Cognition and Memory: Cognition and memory normal.         Judgment: Judgment normal.           I have reviewed andinterpreted all of the currently available lab results from this visit (if applicable):    Results for orders placed or performed during the hospital encounter of 05/08/22   CBC with Auto Differential   Result Value Ref Range    WBC 6.6 4.0 - 10.5 K/CU MM    RBC 4.54 4.2 - 5.4 M/CU MM    Hemoglobin 13.3 12.5 - 16.0 GM/DL    Hematocrit 41.7 37 - 47 %    MCV 91.9 78 - 100 FL    MCH 29.3 27 - 31 PG    MCHC 31.9 (L) 32.0 - 36.0 %    RDW 13.6 11.7 - 14.9 % Platelets 335 (L) 718 - 440 K/CU MM    MPV 10.8 7.5 - 11.1 FL    Differential Type AUTOMATED DIFFERENTIAL     Segs Relative 37.5 36 - 66 %    Lymphocytes % 47.5 (H) 24 - 44 %    Monocytes % 10.9 (H) 0 - 4 %    Eosinophils % 3.2 (H) 0 - 3 %    Basophils % 0.6 0 - 1 %    Segs Absolute 2.5 K/CU MM    Lymphocytes Absolute 3.1 K/CU MM    Monocytes Absolute 0.7 K/CU MM    Eosinophils Absolute 0.2 K/CU MM    Basophils Absolute 0.0 K/CU MM    Nucleated RBC % 0.0 %    Total Nucleated RBC 0.0 K/CU MM    Total Immature Neutrophil 0.02 K/CU MM    Immature Neutrophil % 0.3 0 - 0.43 %   Comprehensive Metabolic Panel   Result Value Ref Range    Sodium 141 135 - 145 MMOL/L    Potassium 3.9 3.5 - 5.1 MMOL/L    Chloride 106 99 - 110 mMol/L    CO2 23 21 - 32 MMOL/L    BUN 23 6 - 23 MG/DL    CREATININE 1.0 0.6 - 1.1 MG/DL    Glucose 105 (H) 70 - 99 MG/DL    Calcium 8.8 8.3 - 10.6 MG/DL    Albumin 3.9 3.4 - 5.0 GM/DL    Total Protein 6.7 6.4 - 8.2 GM/DL    Total Bilirubin 0.8 0.0 - 1.0 MG/DL    ALT 31 10 - 40 U/L    AST 32 15 - 37 IU/L    Alkaline Phosphatase 64 40 - 129 IU/L    GFR Non- 54 (L) >60 mL/min/1.73m2    GFR African American >60 >60 mL/min/1.73m2    Anion Gap 12 4 - 16   Lipase   Result Value Ref Range    Lipase 50 13 - 60 IU/L   Troponin   Result Value Ref Range    Troponin T <0.010 <0.01 NG/ML   Brain Natriuretic Peptide   Result Value Ref Range    Pro-BNP 1,063 (H) <300 PG/ML   CK   Result Value Ref Range    Total CK 87 26 - 140 IU/L   Magnesium   Result Value Ref Range    Magnesium 2.0 1.8 - 2.4 mg/dl   Protime/INR & PTT   Result Value Ref Range    Protime 18.2 (H) 11.7 - 14.5 SECONDS    INR 1.41 INDEX    aPTT 25.8 25.1 - 37.1 SECONDS   Lactic Acid   Result Value Ref Range    Lactate 1.4 0.4 - 2.0 mMOL/L        Radiographs (if obtained):  [] The following radiograph was interpreted by myself in the absence of a radiologist:  [x] Radiologist's Report Reviewed:    CXR, CT PE    Echocardiogram complete 2D with doppler with color    Result Date: 4/29/2022  Transthoracic Echocardiography Report (TTE)  Demographics   Patient Name       Tanisha Mroalez     Date of Study       04/29/2022   Date of Birth      1945         Gender              Female   Age                67 year(s)         Race                   Patient Number     [id number]         Room Number         HV67   Visit Number       [id number]   Corporate ID       [id number]   Accession Number   [id number]         23 Jayshree Fuentes, T   Ordering Physician Kaila Caal-CNP           Physician           MD  Procedure Type of Study   TTE procedure:ECHOCARDIOGRAM COMPLETE 2D W DOPPLER W COLOR. Procedure Date Date: 04/29/2022 Start: 02:21 PM Study Location: Portable Technical Quality: Adequate visualization Indications:Ventricular tachycardia. Patient Status: Routine Height: 67 inches Weight: 130 pounds BSA: 1.68 m2 BMI: 20.36 kg/m2 HR: 106 bpm BP: 107/71 mmHg  Conclusions   Summary  Left ventricular systolic function is low normal.  Ejection fraction is visually estimated at 45-50%. Septal wall is hypokinetic  Grade II diastolic dysfunction. Moderately dilated left atrium. Moderate aortic regurgitation; PHT: 328 msec. Moderate tricuspid regurgitation; RVSP: 39 mmHg. Mild PHTN. No evidence of any pericardial effusion. Signature   ------------------------------------------------------------------  Electronically signed by Keith Head MD (Interpreting  physician) on 04/29/2022 at 03:04 PM  ------------------------------------------------------------------   Findings   Left Ventricle  Left ventricular systolic function is low normal.  Ejection fraction is visually estimated at 45- 50%. Grade II diastolic dysfunction.   septal wall is hypokinetic  Left ventricle size is normal.   Left Atrium Moderately dilated left atrium. Right Atrium  Moderately dilated right atrium. Right Ventricle  PPM wiring visualized within the right ventricle. Aortic Valve  Moderate aortic regurgitation; PHT: 328 msec. Mitral Valve  Mild mitral regurgitation. Tricuspid Valve  Moderate tricuspid regurgitation; RVSP: 39 mmHg. Mild PHTN. Pulmonic Valve  The pulmonic valve was not well visualized. Pericardial Effusion  No evidence of any pericardial effusion. Pleural Effusion  No evidence of pleural effusion. Miscellaneous  Inferior vena cava is dilated, measuring at 2.2 cm, and does not collapse  with respiration.   M-Mode/2D Measurements & Calculations   LV Diastolic Dimension:  LV Systolic Dimension:  LA Dimension: 4 cmAO Root  3.9 cm                   2.9 cm                  Dimension: 3 cmLA Area:  LV FS:25.6 %             LV Volume Diastolic: 37 93.4 cm2  LV PW Diastolic: 1.79 cm ml  LV PW Systolic: 0.22 cm  LV Volume Systolic: 18  Septum Diastolic: 4.49   ml  cm                       LV EDV/LV EDV Index: 37 RV Diastolic Dimension:  Septum Systolic: 4.48 cm CE/05 F6VM ESV/LV ESV   3.51 cm  CO: 6.44 l/min           Index: 18 ml/11 m2  CI: 3.83 l/m*m2          EF Calculated (A4C):    LA/Aorta: 1.33                           51.4 %  LV Area Diastolic: 35.6  EF Calculated (2D):     LA volume/Index: 75 ml  cm2                      51.1 %                  /68D5  LV Area Systolic: 89.2  cm2                      LV Length: 6.92 cm                            LVOT: 2.2 cm  Doppler Measurements & Calculations   MV Peak E-Wave: 129 cm/s                         LVOT Peak Velocity: 91 cm/s  MV Peak A-Wave: 109 cm/s                         LVOT Mean Velocity: 61.2  MV E/A Ratio: 1.18                               cm/s  MV Peak Gradient: 6.66                           LVOT Peak Gradient: 3  mmHg                     LVOT VTI: 16 cm         mmHgLVOT Mean Gradient: 2                           AV P1/2t: 328 msec      mmHg  MV P1/2t: 35 msec        Estimated PASP: 39.4    Estimated RVSP: 39 mmHg  MVA by PHT:6.29 cm2      mmHg                    Estimated RAP:15 mmHg   MV E' Septal Velocity:  3.29 cm/s                                        TR Velocity:247 cm/s  MV E' Lateral Velocity:                          TR Gradient:24.4 mmHg  11.1 cm/s  MV E/E' septal: 39.21  MV E/E' lateral: 11.62      XR CHEST PORTABLE    Result Date: 4/29/2022  EXAMINATION: ONE XRAY VIEW OF THE CHEST 4/29/2022 10:46 am COMPARISON: None. HISTORY: ORDERING SYSTEM PROVIDED HISTORY: cp/palpitatons TECHNOLOGIST PROVIDED HISTORY: Reason for exam:->cp/palpitatons Reason for Exam: cp/palpitatons Additional signs and symptoms: cp/palpitatons Relevant Medical/Surgical History: cp/palpitatons FINDINGS: The lungs are clear, no effusion. No pneumothorax. Heart is normal size. Left pacemaker Mediastinal and hilar contours are within normal limits. Bony thorax no acute abnormality. 1. No acute cardiopulmonary abnormality. EKG (if obtained): (All EKG's are interpreted by myself in the absence of a cardiologist)    12 lead EKG per my interpretation:  Atrial Fibrillation 83 PVC's  Axis is   Normal  QTc is  470  There is no specific T wave changes appreciated. There is no specific ST wave changes appreciated. Prior EKG to compare with was not available       MDM:    Patient complaint of chest pain again started prior to arrival constant pain is sharp in nature. She took aspirin nitro by EMS did not help. Pain is constant she is on Coumadin for history of A. fib. She is otherwise in no distress she appears anxious vital signs are stable EKG is negative. She will be given Tylenol she has multiple allergies will check labs, imaging. INR subtherapeutic at 1.4. We will get a CT PE study given A. fib subtherapeutic INR chest pain. Patient recheck she still having chest pain. So far work-up is negative except for subtherapeutic INR.   CT PE study shows possible bronchitis but she is not been coughing. Pacemaker was interrogated and is functioning normally. Patient does appear anxious on my exam she states her pacemaker lights have been flashing at times and this makes her very worried she states she is never had chest pain like this that makes her worried and scared. Otherwise she appears well vital signs are stable patient be admitted to hospital medicine who I talked to for ACS rule out otherwise stable we will try Pepcid to see if that helps her pain. CLINICAL IMPRESSION:  Final diagnoses:   Chest pain, unspecified type       (Please note that portions of this note may have been completed with a voice recognition program. Efforts were made to edit the dictations but occasionally words aremis-transcribed.)    DISPOSITION REFERRAL (if applicable):  No follow-up provider specified.     DISPOSITION MEDICATIONS (if applicable):  New Prescriptions    No medications on file          Gabriel Ordaz, [facility],   05/08/22 9519

## 2022-05-09 NOTE — CARE COORDINATION
Pt noted for possible re-admission. Pt was previously admitted 4/29-5/1 for chest pain. Pt returned 5/8-5/9 with chest pain and had a negative stress test. Pt was d/c today at 4:45. Pt had labs run and Troponin was 0.151. Inpt RN contact hospitalist and they contacted Cardiology. Pt was encouraged to return to ed. Pt returns with SOB and elevated Troponin. Pt was seen today by inpt CM today. Pt had no needs. Pt declined offer of Bartolome Rosado. Pt has insurance, PCP, and no DME's. Pt independent of ADL's. No CM needs at this time.

## 2022-05-09 NOTE — ED NOTES
[employer] rep called and sts pts pacemaker is functioning properly, long-standing a-fib, no other issues     Magno Harley, RN  05/08/22 1448

## 2022-05-09 NOTE — PROGRESS NOTES
Pt arrived from ED via stretcher. Ambulated to restroom and bed in the room. A&O and able to tell me she was diagnosed with dementia. Orientated to room and call light system. No needs voiced at this time.  No skin issues to report

## 2022-05-09 NOTE — PROGRESS NOTES
Pt had trop of 0.151 after discharge, Charge nurse updated, CLOTILDE Barakat told this nurse to page cardio and ask if they had any further instruction since the pt had negative stress test today. Cardio stated to call the pt and see how she was feeling but gave no further instructions. This nurse called and spoke with  and wife and informed them they should go to the emergency room and be checked out immediately. Pt stated she did not know if she was going to go.  Once again this nurse told her that he would encourage her to go be checked out, pt denies any chest pain, Dr. Pilar Andino notified and stated he would call pt and make a note

## 2022-05-09 NOTE — DISCHARGE SUMMARY
V2.0  Discharge Summary    Name:  Carrie Richards /Age/Sex: 1945 (67 y.o. female)   Admit Date: 2022  Discharge Date: 22    MRN & CSN:  [id number] & [id number] Encounter Date and Time 22 3:13 PM EDT    Attending:  Burt Hodge MD [facility]ing [facility]:     Brief HPI: Carrie Richards is a 67 y.o. female with past medical history of atrial fibrillation, anemia, dyslipidemia, gastritis who presents with Chest pain     Problems addressed during this hospitalization:     Chest Pain - likely costochondritis   - presented with left chest wall pain, reproducible on palpation   - EKG with no acute ischemic change  - trop T neg x2  - CTPA with no PE  -Echo 2022 with EF 45%, septal wall hypokinesis, G2 DD, moderately dilated left atrium, moderate AR, moderate TR  -Stress test normal  -Given that patient's pain is reproducible, feel this is musculoskeletal and likely costochondritis, recommended Tylenol and topical lidocaine as needed  - follow-up with PCP      Persistent atrial fibrillation   - s/p cardioversion , ablation   - HR 90s, cardizem and metoprolol recently increased, BP now soft, but MAP >65 and asymptomatic  - cardiology recommended to continue metoprolol XL 50mg BID and reduce cardizem to 180mg daily   - INR subtherapeutic, recommended coumadin 4.5mg x1 today, then resume home dosing of 3mg daily, recheck INR at coumadin clinic on Friday     Pulmonary fibrosis  -CT chest with mild to moderate peripheral fibrosis, mildly progressed from previous exam  - no hypoxia  -Follows with pulmonology as outpatient, recommended continued follow-up    Periportal edema  - noted on CTA chest   - benign abdominal exam   - LFTs WNL   - hepatitis panel and NIKI pending on discharge, will follow-up and contact patient with abnormalities   - discussed with patient, will follow-up with PCP      Hypothyroidism   - TSH 4.4, free T4 pending on discharge, will follow-up  - continue home synthroid      Mild cognitive impairment  - continue home aricept      GERD  - continue home PPI     This patient was discussed with Dr. Stanley Lai. He was agreeable with patient's plan at discharge as dictated above. The patient expressed appropriate understanding of, and agreement with the discharge recommendations, medications, and plan. Consults this admission:  IP CONSULT TO HOSPITALIST  IP CONSULT TO CARDIOLOGY  IP CONSULT TO PHARMACY    Discharge Diagnosis:   Costochondritis     Discharge Instruction:   Follow up appointments: PCP, cardiology, pulmonology   Primary care physician: Maribell Tong MD within 2 weeks  Diet: regular diet   Activity: activity as tolerated  Disposition: Discharged to:   [x]Home, [][facility], []SNF, []Acute Rehab, []Hospice   Condition on discharge: Stable  Labs and Tests to be Followed up as an outpatient by PCP or Specialist:     Discharge Medications:        Medication List      ASK your doctor about these medications    ALLEGRA ALLERGY PO     atorvastatin 10 MG tablet  Commonly known as: LIPITOR  TAKE 1 TABLET BY MOUTH EVERY DAY     Dexilant 60 MG Cpdr delayed release capsule  Generic drug: dexlansoprazole     dilTIAZem 240 MG extended release capsule  Commonly known as: CARDIZEM CD  Take 1 capsule by mouth daily     donepezil 10 MG tablet  Commonly known as: ARICEPT  Take 1 tablet by mouth daily     levothyroxine 50 MCG tablet  Commonly known as: SYNTHROID  TAKE 1 TABLET BY MOUTH EVERY DAY BEFORE BREAKFAST     metoprolol succinate 50 MG extended release tablet  Commonly known as: TOPROL XL  Take 1 tablet by mouth in the morning and at bedtime     Vitamin D 25 MCG (1000 UT) Tabs tablet  Commonly known as: CHOLECALCIFEROL     warfarin 3 MG tablet  Commonly known as: COUMADIN  Take as directed. If you are unsure how to take this medication, talk to your nurse or doctor.            Objective Findings at Discharge:   BP (!) 95/56   Pulse 76 Temp 97.6 °F (36.4 °C) (Oral)   Resp 16   Ht 5' 5\" (1.651 m)   Wt 137 lb 12.8 oz (62.5 kg)   SpO2 96%   BMI 22.93 kg/m²       Physical Exam:   General: NAD  Eyes: EOMI  ENT: neck supple  Cardiovascular: Irregularly irregular  Respiratory: Clear to auscultation bilaterally, respirations even and unlabored on room air  Gastrointestinal: Abdomen soft, non tender, no ascites  Genitourinary: no suprapubic tenderness  Musculoskeletal: No edema  Skin: warm, dry  Neuro: Alert and oriented x4. Ambulating without difficulty. Psych: Mood appropriate. Labs and Imaging   XR CHEST PORTABLE    Result Date: 5/8/2022  EXAMINATION: ONE XRAY VIEW OF THE CHEST 5/8/2022 2:52 pm COMPARISON: April 29, 2022 HISTORY: ORDERING SYSTEM PROVIDED HISTORY: Chest pain TECHNOLOGIST PROVIDED HISTORY: Reason for exam:->Chest pain Reason for Exam: Chest pain Additional signs and symptoms: NONE FINDINGS: Adequate inspiration is present. No focal area of consolidation, pleural effusion, or pneumothorax is present. A granuloma is present within the left lung base. Heart size appears normal.  Permanent cardiac pacemaker is in place. No acute osseous abnormality is present. No evidence of acute cardiopulmonary disease     CTA CHEST W CONTRAST    Result Date: 5/8/2022  EXAMINATION: CTA OF THE CHEST 5/8/2022 7:20 pm TECHNIQUE: CTA of the chest was performed after the administration of intravenous contrast.  Multiplanar reformatted images are provided for review. MIP images are provided for review. Dose modulation, iterative reconstruction, and/or weight based adjustment of the mA/kV was utilized to reduce the radiation dose to as low as reasonably achievable.  COMPARISON: 10/14/2011 HISTORY: ORDERING SYSTEM PROVIDED HISTORY: chest pain TECHNOLOGIST PROVIDED HISTORY: Reason for exam:->chest pain Decision Support Exception - unselect if not a suspected or confirmed emergency medical condition->Emergency Medical Condition (MA) Reason for Exam: chest pain FINDINGS: Pulmonary Arteries: There is a suboptimal contrast bolus. In addition, there is streak artifact generated by the patient's pacemaker. Consequently, the segmental and subsegmental pulmonary arterial branches are not well evaluated. No central pulmonary embolism is detected. Mediastinum: Visualized thyroid unremarkable. No lymphadenopathy is seen. The esophagus is patulous but otherwise unremarkable. Cardiac chambers are enlarged. No pericardial effusion is seen. The thoracic aorta is normal in caliber without evidence of dissection. Lungs/pleura: Mild to moderate peripheral fibrosis is noted, especially the upper lungs, and that appears is mildly progressed when compared to the previous exam from 2011 no acute infiltrate is identified. No pneumothorax identified. No pleural effusion is found. There is diffuse bronchial wall thickening. The airways are patent. Upper Abdomen: Periportal edema is identified within the liver. Images of the upper abdomen otherwise unremarkable. Soft Tissues/Bones: No acute or suspicious bony abnormalities are identified. Limited evaluation of the segmental and subsegmental pulmonary branches. No central pulmonary embolism is detected. Mild bronchial wall thickening, which may be seen in inflammatory conditions such as bronchitis, reactive airways disease, and smoking. Mild to moderate peripheral fibrosis seen bilaterally, greater in the upper lungs, mildly progressed when compared to the previous exam. Periportal edema. That is a nonspecific finding, and can be secondary to localized disease (such as hepatitis), but can also be secondary to systemic etiologies (such as third-spacing).      NM MYOCARDIAL SPECT REST EXERCISE OR RX    Result Date: 5/9/2022  Cardiac Perfusion Imaging   Demographics   Patient Name      Colette Jacome     Date of study        05/09/2022   Date of Birth     1945         Gender               Female   Age 67 year(s)         Race                    Patient Number    [id number]         Room Number          3391   Visit Number      [id number]          Height               65 inches   Corporate ID      [id number]           Weight               137 pounds   Accession Number  [id number]                                        NM Technologist      Jaylen Garland MD        Cardiologist         Crystal LLANOS   Conclusions   Summary  Normal tracer uptake in all segments of myocardium on stress ans rest  images. Normal Lexiscan nuclear scintigraphic study suggestive of normal  myocardial perfusion. Gated images demonstrate normal left ventricular  systolic function with EF of 60 %. Recommendation  Continue present medical therapy and followup in office as scheduled. Signatures   ------------------------------------------------------------------  Electronically signed by Natasha Bernstein MD  (Interpreting cardiologist) on 05/09/2022 at 12:19  ------------------------------------------------------------------  Procedure Procedure Type:   Nuclear Stress Test:Pharmacological, Myocardial Perfusion Imaging with  Pharm, NM MYOCARDIAL SPECT REST EXERCISE OR RX  Indications: Chest pain. Risk Factors   The patient risk factors include:hypercholesterolemia and hypertension. Stress Protocols   Resting ECG  a fib   Resting HR:76 bpm  Resting BP:109/65 mmHg  Stress Protocol:Pharmacologic - Lexiscan  Peak HR:85 bpm               HR/BP product:9265  Peak BP:109/65 mmHg  Predicted HR: 143 bpm  % of predicted HR: 59   Exercise duration: 01:03 min   Symptoms  No symptoms with Lexiscan infusion. Procedure Medications   - Lexiscan I.V. bolus (over 15sec.) 0.4 mg admininstered @ 05/09/2022 09:45.   Imaging Protocols   Rest                             Stress   Isotope:Sestamibi 99mTc          Isotope: Sestamibi 99mTc Isotope dose:10.6 mCi            Isotope dose:30.7 mCi  Administration route: I.V. Administration route: I.V. Injection Date:05/09/2022 08:00  Injection Date:05/09/2022 09:45  Scan Date:05/09/2022 08:45       Scan Date:05/09/2022 10:30   Technique:        SPECT          Technique:        Gated                                                     SPECT   Procedure Description   Upon patient arrival, the patient is identified using two identifiers and  the physician order is verified. An IV is established and 8-11mCi of 99mTc  Sestamibi is intravenously injected and followed with 10mL 0.9% Normal  Saline flush. A circulation period of 45 minutes occurs prior to resting  SPECT imaging. After imaging is complete the patient is escorted to the  stress lab. The patient is connected to the ECG and blood pressure is  measured. The RN starts the stress portion of the exam and rapidly  intravenously injects Lexiscan (regadenosine) 0.4mg over a period of 10 to15  seconds and follows with 5mL 0.9% Normal Saline flush. Immediately following  the Nuclear Technologist intravenously injects 22-33mCi of 99mTc Sestamibi  and 5mL 0.9% Normal Saline flush. After completion, recovery, and removal of  the IV, the patient rests during the second circulation period of 45  minutes. Final stress SPECT gated imaging is performed. The patient may  return home or to their room after stress imaging. The images are processed  and final charting is completed and sent to the appropriate cardiologist for  interpretation and reporting. Perfusion Interpretation   Normal tracer uptake in all segments of myocardium on stress ans rest  images.   Imaging Results    Summed scores     - Summed stress score: 4     - Summed rest score: 0     - Summed difference score:    4   Rest ejection  Ejection fraction:66 %  EDV :53 ml  ESV :18 ml  Stroke volume :35 ml  Medical History   Accession#:  [id number]  Admission Data Admission date: 05/08/2022 Admission Time: 20:36 Hospital Status: Outpatient. CBC:   Recent Labs     05/08/22 2045   WBC 6.6   HGB 13.3   *     BMP:    Recent Labs     05/08/22 2045      K 3.9      CO2 23   BUN 23   CREATININE 1.0   GLUCOSE 105*     Hepatic:   Recent Labs     05/08/22 2045   AST 32   ALT 31   BILITOT 0.8   ALKPHOS 64     Lipids:   Lab Results   Component Value Date    CHOL 115 04/30/2022    CHOL 154 04/15/2021    HDL 51 04/30/2022    TRIG 47 04/30/2022     Hemoglobin A1C:   Lab Results   Component Value Date    LABA1C 5.4 04/30/2022     TSH:   Lab Results   Component Value Date    TSH 3.76 10/19/2021     Troponin:   Lab Results   Component Value Date    TROPONINT <0.010 05/09/2022    TROPONINT <0.010 05/08/2022    TROPONINT <0.010 04/30/2022     Lactic Acid: No results for input(s): LACTA in the last 72 hours.   BNP:   Recent Labs     05/08/22 2045   PROBNP 1,063*     UA:  Lab Results   Component Value Date    NITRU NEGATIVE 09/24/2020    COLORU STRAW 09/24/2020    PHUR 5.5 11/02/2015    WBCUA 9 09/24/2020    RBCUA <1 09/24/2020    MUCUS RARE 09/24/2020    TRICHOMONAS NONE SEEN 09/24/2020    BACTERIA NEGATIVE 09/24/2020    CLARITYU CLEAR 09/24/2020    SPECGRAV 1.005 09/24/2020    LEUKOCYTESUR MODERATE 09/24/2020    UROBILINOGEN NORMAL 09/24/2020    BILIRUBINUR NEGATIVE 09/24/2020    BILIRUBINUR neg 11/02/2015    BLOODU NEGATIVE 09/24/2020    GLUCOSEU neg 11/02/2015    KETUA NEGATIVE 09/24/2020     Urine Cultures: No results found for: LABURIN  Blood Cultures: No results found for: BC  No results found for: BLOODCULT2  Organism:   Lab Results   Component Value Date    [facility] 07/18/2012       Time Spent Discharging patient 35 minutes    Electronically signed by Clovis Villagran PA-C on 5/9/2022 at 3:17 PM

## 2022-05-09 NOTE — ED TRIAGE NOTES
To ED last night for chest pain admitted to the 4th floor,had a stress test and discharged,patient called at home and instructed to come to the ED for and elevated troponin

## 2022-05-09 NOTE — ED NOTES
This RN takes over pt care at this time. Pt denies chest pain currently, sts it comes and goes. Pt reports feeling \"warm and comfortable\" VSS, respirations even and unlabored, NAD noted at this time. Pt updated on POC.      Jeremi Palm RN  05/09/22 [id number]

## 2022-05-09 NOTE — CARE COORDINATION
Chart reviewed and met w/ pt to initiate discharge planning. CM introduced self and explained role. Pt is from home w/ her  who is able to drive. Pt has PCP and insurance with affordable RX copays. Pt declined any DME or HHC currently and does not feel she will need at discharge. Pt declined any other needs from CM at this time. CM available should needs present.

## 2022-05-09 NOTE — ED PROVIDER NOTES
Emergency Department Encounter    Patient: Joel Stevens  MRN: [id number]  : 1945  Date of Evaluation: 2022  ED Provider:  Grant Doan MD    Triage Chief Complaint:   Shortness of Breath and Other (elevated trop)    [place]:  Joel Stevens is a 67 y.o. female that presents with complaint of shortness of breath, chest pain. Was seen here yesterday, admitted, had a normal troponin, negative stress test today and was discharged. Repeat troponin came back after she was discharged. They were called, she is still having some chest pain and so came in to be evaluated. It is \"not nearly as bad as it was yesterday\". It is however still sharp, over the left side of her chest.  No leg swelling or pain. She had had similar pain at the end of April, had been admitted and set up for an outpatient stress test, that pain recurred yesterday. No nausea or vomiting. No sweating. No passing out.       ROS - see HPI, below listed is current ROS at time of my eval:  10 systems reviewed negative except as above    Past Medical History:   Diagnosis Date    Abnormal echocardiogram     EF 60%, mild aortic indsufficiency, mild pulmonary hypertension    Anemia     Aortic insufficiency     mild per echo 2010    Atrial fibrillation (HCC)     failed propafenone, Multaq and flecainide - 2011 plan for AFib ablation - [facility]- Dr Chu Maldonado Atrial flutter ([facility] [address]     Bursitis, trochanteric 2004    s/p injection    Cerumen impaction 2012    Diverticulosis     Dr. Alejandrina Hernandez C scope    Diverticulosis of colon 2010    Colonoscopy-Dr Reece ACUÑA (degenerative joint disease), cervical     cervical, generalized disc buldge   MRI 3/02    DVT (deep venous thrombosis) ([facility] [address]     Dyslipidemia     Environmental allergies     Family history of colon cancer     mother    Gastric polyp     2006, 2009 benign- Dr Eliz Billings Gastritis 2008    mild superficial per Dr Eliz Billings GERD (gastroesophageal reflux disease) 08/2006    Dr Rhonda Pulido H/O cardiovascular stress test 07/13/2004 07/13 EF58%, No scintigraphic evidence of inducible myocardial ischemia 04/13Normal study. No wall motion abnormality seen. EF 65%    H/O echocardiogram 07/18/2013 7/13-EF 50-55% Mild AR.  H/O exercise stress test 02/07/2017    EF63% normal    History of Holter monitoring 09/23/2015    48 hour - abnormal holter revealing afib throughout the recording with interspersed pacemaker beats, HR does not appear to be well controlled    History of mammogram     last mammo 7/25/11, Dr. Katie Jonas yearly    Hx of colonoscopy     1/21/2010    Hx of Doppler Venous ultrasound 08/31/2021    No DVT or SVT, No significant reflux in RLE, Significant reflux in Left CFV    Hyperlipidemia     Hypertension     Hypothyroidism     Internal hemorrhoid 2015    Dr. Simon Martinez C scope     Intervertebral disc protrusion 05/2009    wry neck with C4-5      Left shoulder pain 04/2004    (L) shoulder impingement  mild DJD    Long term current use of anticoagulant 07/26/2016    **[facility] follows PT/INRs, along w/prescribing pt's Coumadin dosages. **    Menstruation     age 14    Pacemaker 06/21/2012    dual chamber- checked every 3 months    Pulmonary hypertension ([address]     mild per echo 8/24/2010, Pt refused sleep study (June 2012)    Restless legs 08/2003    ferritin    Right shoulder strain 02/2003    no seperation, bony contusion anterior humeral head  MRI 3/03    Sciatica 07/2009    (R) chronic intermittent s/p epidural injections  Dr Candy Villalpando Sciatica     Shoulder pain, left 03/2011    RTC tendinopathy, type II acrominum, bursitis- referred to Dr. Gomez Janey Shoulder pain, right 03/10/2009    impingement, physical thearpy   (Main)  calcific bursitis  s/p injection    Sinus bradycardia     Sinusitis 12/12/2011    Tinnitus 03/2002    Vision changes 03/19/2013     Past Surgical History:   Procedure Laterality Date  ABLATION OF DYSRHYTHMIC FOCUS      BREAST BIOPSY      BREAST SURGERY  1985    Right breast tumor excised    CARDIOVERSION      1/2008 Dr Marquita Cortes; 12/2008    CARDIOVERSION  03/10/2014    [address] [place] [address]    CHOLECYSTECTOMY, LAPAROSCOPIC  02/2001    Dr Kendall Sanford      [phone] (Dr Davie Spain)   [address]    HYSTERECTOMY  2003    PACEMAKER PLACEMENT  06/21/2012    Medtronic Adapta ADDRL1 -not mri compatible   Rip Card    Dr Parrish Weaver  04/2003    fibroid      Dr Rakesh Dillon  08/2006    Dr Gallardo Postal ECHOCARDIOGRAM  06/2012    transthoracic cardioversion-Dr. Carla Bentley    UPPER GASTROINTESTINAL ENDOSCOPY  04/2008    mild superficial gastritis  Dr Davie Spain; 2009     Family History   Problem Relation Age of Onset    Stroke Mother     Heart Disease Mother     Coronary Art Dis Mother 66        CABG    Colon Cancer Mother [de-identified]        colon     Heart Disease Father     Coronary Art Dis Father 62        CABG    Migraines Sister     Seizures Brother     Breast Cancer Maternal Cousin         under 48     Social History     Socioeconomic History    Marital status:      Spouse name: Not on file    Number of children: Not on file    Years of education: Not on file    Highest education level: Not on file   Occupational History    Not on file   Tobacco Use    Smoking status: Never Smoker    Smokeless tobacco: Never Used    Tobacco comment: reviewed 11/4/15   Vaping Use    Vaping Use: Never used   Substance and Sexual Activity    Alcohol use: No    Drug use: No    Sexual activity: Not Currently     Partners: Male     Comment:    Other Topics Concern    Not on file   Social History Narrative    Not on file     Social Determinants of Health     Financial Resource Strain:     Difficulty of Paying Living Expenses: Not on file   Food Insecurity:     Worried About [address] in the Last Year: Not on file    Ran Out of Food in the Last Year: Not on file   Transportation Needs:     Lack of Transportation (Medical): Not on file    Lack of Transportation (Non-Medical):  Not on file   Physical Activity:     Days of Exercise per Week: Not on file    Minutes of Exercise per Session: Not on file   Stress:     Feeling of Stress : Not on file   Social Connections:     Frequency of Communication with Friends and Family: Not on file    Frequency of Social Gatherings with Friends and Family: Not on file    Attends [employer] Services: Not on file    Active Member of [address] [place] [address] [employer] Partners or Organizations: Not on file    Attends Club or Organization Meetings: Not on file    Marital Status: Not on file   Intimate Partner Violence:     Fear of Current or Ex-Partner: Not on file    Emotionally Abused: Not on file    Physically Abused: Not on file    Sexually Abused: Not on file   Housing Stability:     Unable to Pay for Housing in the Last Year: Not on file    Number of [place] in the Last Year: Not on file    Unstable Housing in the Last Year: Not on file     Current Facility-Administered Medications   Medication Dose Route Frequency Provider Last Rate Last Admin    [START ON 5/10/2022] heparin (porcine) injection 3,730 Units  60 Units/kg IntraVENous PRN Nicho Sanders MD       [place] [START ON 5/10/2022] heparin (porcine) injection 1,860 Units  30 Units/kg IntraVENous PRN Nicho Sanders MD        heparin 25,000 units in dextrose 5% 250 mL (premix) infusion  5-30 Units/kg/hr IntraVENous Continuous Nicho Sanders MD 7.5 mL/hr at 05/09/22 2122 12 Units/kg/hr at 05/09/22 2122    fentaNYL (SUBLIMAZE) injection 25 mcg  25 mcg IntraVENous Q2H PRN Nicho Sanders MD        [START ON 5/10/2022] pantoprazole (PROTONIX) tablet 40 mg  40 mg Oral QAM AC Nicho Sanders MD        [START ON 5/10/2022] dilTIAZem (CARDIZEM CD) extended release capsule 120 mg  120 mg Oral Daily Nicho Sanders MD        [START ON 5/10/2022] donepezil (ARICEPT) tablet 10 mg  10 mg Oral Daily Murphy Louis MD        [START ON 5/10/2022] levothyroxine (SYNTHROID) tablet 50 mcg  50 mcg Oral Daily Murphy Louis MD        metoprolol succinate (TOPROL XL) extended release tablet 50 mg  50 mg Oral BID Murphy Louis MD   50 mg at 05/09/22 2227    [START ON 5/10/2022] Vitamin D (CHOLECALCIFEROL) tablet 1,000 Units  1,000 Units Oral Daily Murphy Louis MD        sodium chloride flush 0.9 % injection 5-40 mL  5-40 mL IntraVENous 2 times per day Murphy Louis MD        sodium chloride flush 0.9 % injection 5-40 mL  5-40 mL IntraVENous PRN Murphy Louis MD        0.9 % sodium chloride infusion   IntraVENous PRN Murphy Louis MD        ondansetron (ZOFRAN-ODT) disintegrating tablet 4 mg  4 mg Oral Q8H PRN Murphy Louis MD        Or    ondansetron (ZOFRAN) injection 4 mg  4 mg IntraVENous Q6H PRN Murphy Louis MD        acetaminophen (TYLENOL) tablet 650 mg  650 mg Oral Q6H PRN Murphy Louis MD        Or    acetaminophen (TYLENOL) suppository 650 mg  650 mg Rectal Q6H PRN Murphy Louis MD        polyethylene glycol (GLYCOLAX) packet 17 g  17 g Oral Daily PRN Murphy Louis MD       [facility] ON 5/10/2022] aspirin chewable tablet 81 mg  81 mg Oral Daily Murphy Louis MD        atorvastatin (LIPITOR) tablet 80 mg  80 mg Oral Nightly Murphy Louis MD   80 mg at 05/09/22 2227    [START ON 5/10/2022] nitroglycerin (NITRO-BID) 2 % ointment 0.5 inch  0.5 inch Topical 4 times per day Murphy Louis MD         Allergies   Allergen Reactions    Latex Hives    Darvon [Propoxyphene Hcl] Shortness Of Breath    Demerol Rash     Breathing problems    Dilaudid [Hydromorphone Hcl] Anaphylaxis    Valium Rash     Breathing problems    Celecoxib Diarrhea    Norco [Hydrocodone-Acetaminophen] Diarrhea, Nausea Only and Other (See Comments)     A-Fib    Norvasc [Amlodipine] Other (See Comments)     HA, dizziness    Oxycodone Itching    Tape Suzzane Hardaway Tape] Other (See Comments)     BLISTER    Vasotec [Enalapril] Other (See Comments)     Nausea, vomiting    Diazepam Rash       Nursing Notes Reviewed    Physical Exam:  Triage VS:    ED Triage Vitals [05/09/22 1845]   Enc Vitals Group      BP (!) 123/90      Pulse 76      Resp 18      Temp 98.3 °F (36.8 °C)      Temp Source Oral      SpO2 99 %      Weight       Height       Head Circumference       Peak Flow       Pain Score       Pain Loc       Pain Edu? Excl. in [address]? My pulse ox interpretation is - normal    General appearance:  No acute distress. Skin:  Warm. Dry. Eye:  Extraocular movements intact. Ears, nose, mouth and throat:  Oral mucosa moist   Neck:  Trachea midline. Extremity:  No swelling. Normal ROM     Heart:  Regular rate and rhythm, normal S1 & S2, no extra heart sounds. Perfusion:  intact  Respiratory:  Lungs clear to auscultation bilaterally. Respirations nonlabored. Abdominal:   Soft. Nontender. Non distended.    Neurological:  Alert and oriented          Psychiatric:  Appropriate    I have reviewed and interpreted all of the currently available lab results from this visit (if applicable):  Results for orders placed or performed during the hospital encounter of 05/09/22   CBC with Auto Differential   Result Value Ref Range    WBC 5.9 4.0 - 10.5 K/CU MM    RBC 4.73 4.2 - 5.4 M/CU MM    Hemoglobin 14.0 12.5 - 16.0 GM/DL    Hematocrit 43.9 37 - 47 %    MCV 92.8 78 - 100 FL    MCH 29.6 27 - 31 PG    MCHC 31.9 (L) 32.0 - 36.0 %    RDW 13.6 11.7 - 14.9 %    Platelets 236 (L) 214 - 440 K/CU MM    MPV 10.5 7.5 - 11.1 FL    Differential Type AUTOMATED DIFFERENTIAL     Segs Relative 35.7 (L) 36 - 66 %    Lymphocytes % 47.6 (H) 24 - 44 %    Monocytes % 13.0 (H) 0 - 4 %    Eosinophils % 2.7 0 - 3 %    Basophils % 0.8 0 - 1 %    Segs Absolute 2.1 K/CU MM    Lymphocytes Absolute 2.8 K/CU MM    Monocytes Absolute 0.8 K/CU MM Eosinophils Absolute 0.2 K/CU MM    Basophils Absolute 0.1 K/CU MM    Nucleated RBC % 0.0 %    Total Nucleated RBC 0.0 K/CU MM    Total Immature Neutrophil 0.01 K/CU MM    Immature Neutrophil % 0.2 0 - 0.43 %   Comprehensive Metabolic Panel   Result Value Ref Range    Sodium 140 135 - 145 MMOL/L    Potassium 4.2 3.5 - 5.1 MMOL/L    Chloride 105 99 - 110 mMol/L    CO2 24 21 - 32 MMOL/L    BUN 14 6 - 23 MG/DL    CREATININE 0.9 0.6 - 1.1 MG/DL    Glucose 101 (H) 70 - 99 MG/DL    Calcium 9.2 8.3 - 10.6 MG/DL    Albumin 4.1 3.4 - 5.0 GM/DL    Total Protein 7.3 6.4 - 8.2 GM/DL    Total Bilirubin 1.5 (H) 0.0 - 1.0 MG/DL    ALT 35 10 - 40 U/L    AST 45 (H) 15 - 37 IU/L    Alkaline Phosphatase 72 40 - 129 IU/L    GFR Non-African American >60 >60 mL/min/1.73m2    GFR African American >60 >60 mL/min/1.73m2    Anion Gap 11 4 - 16   Troponin   Result Value Ref Range    Troponin T 0.153 (HH) <0.01 NG/ML   Brain Natriuretic Peptide   Result Value Ref Range    Pro-BNP 1,305 (H) <300 PG/ML   Protime/INR & PTT   Result Value Ref Range    Protime 18.6 (H) 11.7 - 14.5 SECONDS    INR 1.44 INDEX    aPTT 28.6 25.1 - 37.1 SECONDS   APTT   Result Value Ref Range    aPTT 28.8 25.1 - 37.1 SECONDS   EKG 12 Lead   Result Value Ref Range    Ventricular Rate 104 BPM    Atrial Rate 96 BPM    QRS Duration 78 ms    Q-T Interval 338 ms    QTc Calculation (Bazett) 444 ms    R Axis 34 degrees    T Axis -65 degrees    Diagnosis       Atrial fibrillation with rapid ventricular response  Septal infarct , age undetermined  T wave abnormality, consider inferior ischemia  Abnormal ECG  When compared with ECG of 08-MAY-2022 20:44,  Atrial fibrillation has replaced Electronic ventricular pacemaker        Radiographs (if obtained):  Radiologist's Report Reviewed:  NM MYOCARDIAL SPECT REST EXERCISE OR RX    Result Date: 5/9/2022  Cardiac Perfusion Imaging   Demographics   Patient Name      [facility]     Date of study        05/09/2022   Date of Birth 1945         Gender               Female   Age               67 year(s)         Race                    Patient Number    [id number]         Room Number          7053   Visit Number      [id number]          Height               65 inches   Corporate ID      [id number]           Weight               137 pounds   Accession Number  [id number]                                        NM Technologist      Abe Sanford MD        Cardiologist         Crystal LLANOS   Conclusions   Summary  Normal tracer uptake in all segments of myocardium on stress ans rest  images. Normal Lexiscan nuclear scintigraphic study suggestive of normal  myocardial perfusion. Gated images demonstrate normal left ventricular  systolic function with EF of 60 %. Recommendation  Continue present medical therapy and followup in office as scheduled. Signatures   ------------------------------------------------------------------  Electronically signed by Arun Burt MD  (Interpreting cardiologist) on 05/09/2022 at 12:19  ------------------------------------------------------------------  Procedure Procedure Type:   Nuclear Stress Test:Pharmacological, Myocardial Perfusion Imaging with  Pharm, NM MYOCARDIAL SPECT REST EXERCISE OR RX  Indications: Chest pain. Risk Factors   The patient risk factors include:hypercholesterolemia and hypertension. Stress Protocols   Resting ECG  a fib   Resting HR:76 bpm  Resting BP:109/65 mmHg  Stress Protocol:Pharmacologic - Lexiscan  Peak HR:85 bpm               HR/BP product:9265  Peak BP:109/65 mmHg  Predicted HR: 143 bpm  % of predicted HR: 59   Exercise duration: 01:03 min   Symptoms  No symptoms with Lexiscan infusion. Procedure Medications   - Lexiscan I.V. bolus (over 15sec.) 0.4 mg admininstered @ 05/09/2022 09:45.   Imaging Protocols   Rest                             Stress Isotope:Sestamibi 99mTc          Isotope: Sestamibi 99mTc  Isotope dose:10.6 mCi            Isotope dose:30.7 mCi  Administration route: I.V. Administration route: I.V. Injection Date:05/09/2022 08:00  Injection Date:05/09/2022 09:45  Scan Date:05/09/2022 08:45       Scan Date:05/09/2022 10:30   Technique:        SPECT          Technique:        Gated                                                     SPECT   Procedure Description   Upon patient arrival, the patient is identified using two identifiers and  the physician order is verified. An IV is established and 8-11mCi of 99mTc  Sestamibi is intravenously injected and followed with 10mL 0.9% Normal  Saline flush. A circulation period of 45 minutes occurs prior to resting  SPECT imaging. After imaging is complete the patient is escorted to the  stress lab. The patient is connected to the ECG and blood pressure is  measured. The RN starts the stress portion of the exam and rapidly  intravenously injects Lexiscan (regadenosine) 0.4mg over a period of 10 to15  seconds and follows with 5mL 0.9% Normal Saline flush. Immediately following  the Nuclear Technologist intravenously injects 22-33mCi of 99mTc Sestamibi  and 5mL 0.9% Normal Saline flush. After completion, recovery, and removal of  the IV, the patient rests during the second circulation period of 45  minutes. Final stress SPECT gated imaging is performed. The patient may  return home or to their room after stress imaging. The images are processed  and final charting is completed and sent to the appropriate cardiologist for  interpretation and reporting. Perfusion Interpretation   Normal tracer uptake in all segments of myocardium on stress ans rest  images.   Imaging Results    Summed scores     - Summed stress score: 4     - Summed rest score: 0     - Summed difference score:    4   Rest ejection  Ejection fraction:66 %  EDV :53 ml  ESV :18 ml  Stroke volume :35 ml  Medical History   Accession#: [id number]  Admission Data Admission date: 05/08/2022 Admission Time: 20:36 Hospital Status: Outpatient. EKG (if obtained): (All EKG's are interpreted by myself in the absence of a cardiologist)  Atrial fibrillation with rapid ventricular response, rate of 104 bpm.  T wave inversion noted in lead II, III, aVF, V4, V5 and V6. Compared to previous from yesterday and earlier today those T wave inversions are new. However when I review the EKG from 29 April when she was admitted she did have the T wave inversions at that time, they had normalized between 29 April and yesterday when she was seen. MDM:  41-year-old female with history as above presents with concern for elevated troponin, has continued to have some chest pain though not as severe as yesterday. Repeat EKG as above. Labs have been ordered, we will keep her on monitors and I will discuss with cardiology. Troponin here 0.15. She did appear to have a possible run of V. tach on the monitor, we did not capture a strip, could have been A. fib with aberrancy, on repeat EKG is A. fib, with frequent ventricular paced complexes. I did discuss with , plan for admission, given 1g mag. Discussed with hospitalist, they will start heparin as INR came back 1.4. Total critical care time today provided was 32minutes. This excludes seperately billable procedure. Critical care time provided for NSTEMI that required close evaluation and/or intervention with concern for patient decompensation. Clinical Impression:  1. Dyspnea, unspecified type    2. Elevated troponin    3. Atrial fibrillation, unspecified type [facility])      Disposition referral (if applicable):  No follow-up provider specified.   Disposition medications (if applicable):  Current Discharge Medication List        ED Provider Disposition Time  DISPOSITION Admitted 05/09/2022 08:40:52 PM      Comment: Please note this report has been produced using speech recognition software and may contain errors related to that system including errors in grammar, punctuation, and spelling, as well as words and phrases that may be inappropriate. Efforts were made to edit the dictations.        Grant Doan MD  05/09/22 0024

## 2022-05-09 NOTE — CONSULTS
Chart reviewed  Full note to follow                      Name:  Adela Owen /Age/Sex: 1945  (67 y.o. female)   MRN & CSN:  [id number] & [id number] Admission Date/Time: 2022  8:36 PM   Location:  [facility] PCP: Jeremy Khanna, [address] [facility] Day: 2          Referring physician:  Sharyle Dalton, MD         Reason for consultation: Chest pain        Thanks for referral.    Information source: Patient    CC; chest pain         HPI:   Thank you for involving me in taking  care of Adela Owen who  is a 67 y. o.year  Old female  Presents with history of A. fib, hypertension, hyperlipidemia on chronic anticoagulation now admitted with complains of chest pain which was severe initial troponins and EKGs are unremarkable had CAD work-up in the past which was unremarkable as well patient at present is complaining of cramps in her lower extremity                     Past medical history:    has a past medical history of Abnormal echocardiogram, Anemia, Aortic insufficiency, Atrial fibrillation ([address] Atrial flutter ([address] Bursitis, trochanteric, Cerumen impaction, Diverticulosis, Diverticulosis of colon, DJD (degenerative joint disease), cervical, DVT (deep venous thrombosis) ([address] Dyslipidemia, Environmental allergies, Family history of colon cancer, Gastric polyp, Gastritis, GERD (gastroesophageal reflux disease), H/O cardiovascular stress test, H/O echocardiogram, H/O exercise stress test, History of Holter monitoring, History of mammogram, Hx of colonoscopy, Hx of Doppler Venous ultrasound, Hyperlipidemia, Hypertension, Hypothyroidism, Internal hemorrhoid, Intervertebral disc protrusion, Left shoulder pain, Long term current use of anticoagulant, Menstruation, Pacemaker, Pulmonary hypertension ([address] Restless legs, Right shoulder strain, Sciatica, Sciatica, Shoulder pain, left, Shoulder pain, right, Sinus bradycardia, Sinusitis, Tinnitus, and Vision changes.   Past surgical history: has a past surgical history that includes Tonsillectomy (08/2006); Upper gastrointestinal endoscopy (04/2008); Cholecystectomy, laparoscopic (02/2001); sinus surgery (1979); Dilation and curettage of uterus (1988); jessica and bso (cervix removed) (04/2003); Breast surgery (1985); Hysterectomy (2003); Cardioversion; Colonoscopy; transthoracic echocardiogram (06/2012); pacemaker placement (06/21/2012); ablation of dysrhythmic focus; Cardioversion (03/10/2014); and Breast biopsy. Social History:   reports that she has never smoked. She has never used smokeless tobacco. She reports that she does not drink alcohol and does not use drugs. Family history:  family history includes Breast Cancer in her maternal cousin; Colon Cancer (age of onset: [de-identified]) in her mother; Coronary Art Dis (age of onset: 62) in her father; Coronary Art Dis (age of onset: 66) in her mother; Heart Disease in her father and mother; Migraines in her sister; Seizures in her brother; Stroke in her mother.     Allergies   Allergen Reactions    Latex Hives    Darvon [Propoxyphene Hcl] Shortness Of Breath    Demerol Rash     Breathing problems    Dilaudid [Hydromorphone Hcl] Anaphylaxis    Valium Rash     Breathing problems    Celecoxib Diarrhea    Norco [Hydrocodone-Acetaminophen] Diarrhea, Nausea Only and Other (See Comments)     A-Fib    Norvasc [Amlodipine] Other (See Comments)     HA, dizziness    Oxycodone Itching    Tape [place] Tape] Other (See Comments)     BLISTER    Vasotec [Enalapril] Other (See Comments)     Nausea, vomiting    Diazepam Rash       sodium chloride flush 0.9 % injection 5-40 mL, 2 times per day  sodium chloride flush 0.9 % injection 5-40 mL, PRN  0.9 % sodium chloride infusion, PRN  ondansetron (ZOFRAN-ODT) disintegrating tablet 4 mg, Q8H PRN   Or  ondansetron (ZOFRAN) injection 4 mg, Q6H PRN  acetaminophen (TYLENOL) tablet 650 mg, Q6H PRN   Or  acetaminophen (TYLENOL) suppository 650 mg, Q6H PRN  polyethylene glycol (GLYCOLAX) packet 17 g, Daily PRN  aspirin chewable tablet 81 mg, Daily  atorvastatin (LIPITOR) tablet 40 mg, Nightly  pantoprazole (PROTONIX) tablet 40 mg, QAM AC  dilTIAZem (CARDIZEM CD) extended release capsule 240 mg, Daily  levothyroxine (SYNTHROID) tablet 50 mcg, Daily  metoprolol succinate (TOPROL XL) extended release tablet 50 mg, BID  Vitamin D (CHOLECALCIFEROL) tablet 1,000 Units, Daily  donepezil (ARICEPT) tablet 10 mg, Nightly  warfarin (COUMADIN) tablet 4 mg, Once  [START ON 5/10/2022] warfarin (COUMADIN) tablet 3 mg, Daily      Current Facility-Administered Medications   Medication Dose Route Frequency Provider Last Rate Last Admin    sodium chloride flush 0.9 % injection 5-40 mL  5-40 mL IntraVENous 2 times per day Yael Hill MD        sodium chloride flush 0.9 % injection 5-40 mL  5-40 mL IntraVENous PRN Yael Hill MD        0.9 % sodium chloride infusion   IntraVENous PRN Yael Hill MD        ondansetron (ZOFRAN-ODT) disintegrating tablet 4 mg  4 mg Oral Q8H PRN Yael Hill MD        Or    ondansetron (ZOFRAN) injection 4 mg  4 mg IntraVENous Q6H PRN Yael Hill MD        acetaminophen (TYLENOL) tablet 650 mg  650 mg Oral Q6H PRN Yael Hill MD   650 mg at 05/09/22 0122    Or    acetaminophen (TYLENOL) suppository 650 mg  650 mg Rectal Q6H PRN Yael Hill MD        polyethylene glycol (GLYCOLAX) packet 17 g  17 g Oral Daily PRN Yael Hill MD        aspirin chewable tablet 81 mg  81 mg Oral Daily Yael Hill MD        atorvastatin (LIPITOR) tablet 40 mg  40 mg Oral Nightly Yael Hill MD   40 mg at 05/09/22 0122    pantoprazole (PROTONIX) tablet 40 mg  40 mg Oral QAM AC Daniele Nunez MD        dilTIAZem (CARDIZEM CD) extended release capsule 240 mg  240 mg Oral Daily Yael Hill MD        levothyroxine (SYNTHROID) tablet 50 mcg  50 mcg Oral Daily Yael Hill MD       Hood metoprolol succinate (TOPROL XL) extended release tablet 50 mg  50 mg Oral BID Agustín Alonzo MD   50 mg at 05/09/22 0122    Vitamin D (CHOLECALCIFEROL) tablet 1,000 Units  1,000 Units Oral Daily Agustín Alonzo MD        donepezil (ARICEPT) tablet 10 mg  10 mg Oral Nightly ANTHONY Blackwood - CNP   10 mg at 05/09/22 0122    warfarin (COUMADIN) tablet 4 mg  4 mg Oral Once Agustín Alonzo MD       Red Hernandez Tamir Morrell ON 5/10/2022] warfarin (COUMADIN) tablet 3 mg  3 mg Oral Daily Agustín Alonzo MD         Review of Systems:  All 14 systems reviewed, all negative except for chest pain    Physical Examination:    /77   Pulse 89   Temp 98.4 °F (36.9 °C) (Oral)   Resp 20   Ht 5' 5\" (1.651 m)   Wt 137 lb 12.8 oz (62.5 kg)   SpO2 97%   BMI 22.93 kg/m²      Wt Readings from Last 3 Encounters:   05/09/22 137 lb 12.8 oz (62.5 kg)   05/01/22 135 lb 11.2 oz (61.6 kg)   04/07/22 137 lb 3.2 oz (62.2 kg)     Body mass index is 22.93 kg/m². General Appearance: Fair  Head: normocephalic     Eyes: normal, noninjected conjunctiva    ENT: normal mucosa, noninjected throat, normal     NECK: No JVP  No thyromegaly        Cardiovascular: No thrills palpated   Auscultation: Normal S1 and S2, no murmur   carotid bruit no   Abdominal Aorta no bruit    Respiratory:    Breath sounds clear    Extremities: Trace edema clubbing ,   no cyanosis    SKIN: Warm and well perfused, no pallor or cyanosis    Vascular exam:  Pedal Pulses: Palp bilaterally        Abdomen:  No masses or tenderness. No organomegaly noted. Neurological:  Oriented to time, place, and person   No focal neurological deficit noted.   Psychiatric:normal mood, no anxiety    Lab Review   Recent Results (from the past 24 hour(s))   CBC with Auto Differential    Collection Time: 05/08/22  8:45 PM   Result Value Ref Range    WBC 6.6 4.0 - 10.5 K/CU MM    RBC 4.54 4.2 - 5.4 M/CU MM    Hemoglobin 13.3 12.5 - 16.0 GM/DL    Hematocrit 41.7 37 - 47 % MCV 91.9 78 - 100 FL    MCH 29.3 27 - 31 PG    MCHC 31.9 (L) 32.0 - 36.0 %    RDW 13.6 11.7 - 14.9 %    Platelets 376 (L) 233 - 440 K/CU MM    MPV 10.8 7.5 - 11.1 FL    Differential Type AUTOMATED DIFFERENTIAL     Segs Relative 37.5 36 - 66 %    Lymphocytes % 47.5 (H) 24 - 44 %    Monocytes % 10.9 (H) 0 - 4 %    Eosinophils % 3.2 (H) 0 - 3 %    Basophils % 0.6 0 - 1 %    Segs Absolute 2.5 K/CU MM    Lymphocytes Absolute 3.1 K/CU MM    Monocytes Absolute 0.7 K/CU MM    Eosinophils Absolute 0.2 K/CU MM    Basophils Absolute 0.0 K/CU MM    Nucleated RBC % 0.0 %    Total Nucleated RBC 0.0 K/CU MM    Total Immature Neutrophil 0.02 K/CU MM    Immature Neutrophil % 0.3 0 - 0.43 %   Comprehensive Metabolic Panel    Collection Time: 05/08/22  8:45 PM   Result Value Ref Range    Sodium 141 135 - 145 MMOL/L    Potassium 3.9 3.5 - 5.1 MMOL/L    Chloride 106 99 - 110 mMol/L    CO2 23 21 - 32 MMOL/L    BUN 23 6 - 23 MG/DL    CREATININE 1.0 0.6 - 1.1 MG/DL    Glucose 105 (H) 70 - 99 MG/DL    Calcium 8.8 8.3 - 10.6 MG/DL    Albumin 3.9 3.4 - 5.0 GM/DL    Total Protein 6.7 6.4 - 8.2 GM/DL    Total Bilirubin 0.8 0.0 - 1.0 MG/DL    ALT 31 10 - 40 U/L    AST 32 15 - 37 IU/L    Alkaline Phosphatase 64 40 - 129 IU/L    GFR Non- 54 (L) >60 mL/min/1.73m2    GFR African American >60 >60 mL/min/1.73m2    Anion Gap 12 4 - 16   Lipase    Collection Time: 05/08/22  8:45 PM   Result Value Ref Range    Lipase 50 13 - 60 IU/L   Troponin    Collection Time: 05/08/22  8:45 PM   Result Value Ref Range    Troponin T <0.010 <0.01 NG/ML   Brain Natriuretic Peptide    Collection Time: 05/08/22  8:45 PM   Result Value Ref Range    Pro-BNP 1,063 (H) <300 PG/ML   CK    Collection Time: 05/08/22  8:45 PM   Result Value Ref Range    Total CK 87 26 - 140 IU/L   Magnesium    Collection Time: 05/08/22  8:45 PM   Result Value Ref Range    Magnesium 2.0 1.8 - 2.4 mg/dl   Protime/INR & PTT    Collection Time: 05/08/22  8:45 PM   Result Value Ref Range    Protime 18.2 (H) 11.7 - 14.5 SECONDS    INR 1.41 INDEX    aPTT 25.8 25.1 - 37.1 SECONDS   Lactic Acid    Collection Time: 05/08/22  9:00 PM   Result Value Ref Range    Lactate 1.4 0.4 - 2.0 mMOL/L   Troponin    Collection Time: 05/09/22  3:02 AM   Result Value Ref Range    Troponin T <0.010 <0.01 NG/ML   Protime-INR    Collection Time: 05/09/22  3:02 AM   Result Value Ref Range    Protime 18.9 (H) 11.7 - 14.5 SECONDS    INR 1.46 INDEX           Assessment/Recommendations:     -Chest pain initial troponins and EKGs unremarkable we will continue to check that we will also get a Lexiscan on her for further assessment  -Chronic atrial fibrillation patient is on anticoagulation which will be continued  -Patient also has a permanent pacemaker which is on [facility] and was recently interrogated  -Recent echo showed EF about 45 to 50%  -Patient was recently discharged after being admitted for A. fib with RVR         Bevin Councilman, MD, 5/9/2022 6:35 AM       Please note this report has been partially produced using speech recognition software and may contain errors related to that system including errors in grammar, punctuation, and spelling, as well as words and phrases that may be inappropriate. If there are any questions or concerns please feel free to contact the dictating provider for clarification.

## 2022-05-10 ENCOUNTER — TELEPHONE (OUTPATIENT)
Dept: FAMILY MEDICINE CLINIC | Age: 77
End: 2022-05-10

## 2022-05-10 VITALS
HEIGHT: 65 IN | WEIGHT: 137 LBS | RESPIRATION RATE: 26 BRPM | DIASTOLIC BLOOD PRESSURE: 87 MMHG | TEMPERATURE: 97.6 F | HEART RATE: 94 BPM | SYSTOLIC BLOOD PRESSURE: 115 MMHG | BODY MASS INDEX: 22.82 KG/M2 | OXYGEN SATURATION: 98 %

## 2022-05-10 LAB
ALBUMIN SERPL-MCNC: 3.7 GM/DL (ref 3.4–5)
ALP BLD-CCNC: 62 IU/L (ref 40–128)
ALT SERPL-CCNC: 27 U/L (ref 10–40)
ANION GAP SERPL CALCULATED.3IONS-SCNC: 8 MMOL/L (ref 4–16)
APTT: 32.6 SECONDS (ref 25.1–37.1)
APTT: >240 SECONDS (ref 25.1–37.1)
AST SERPL-CCNC: 33 IU/L (ref 15–37)
BASOPHILS ABSOLUTE: 0 K/CU MM
BASOPHILS RELATIVE PERCENT: 0.7 % (ref 0–1)
BILIRUB SERPL-MCNC: 1.7 MG/DL (ref 0–1)
BUN BLDV-MCNC: 10 MG/DL (ref 6–23)
CALCIUM SERPL-MCNC: 8.6 MG/DL (ref 8.3–10.6)
CHLORIDE BLD-SCNC: 106 MMOL/L (ref 99–110)
CO2: 26 MMOL/L (ref 21–32)
CREAT SERPL-MCNC: 0.7 MG/DL (ref 0.6–1.1)
DIFFERENTIAL TYPE: ABNORMAL
EKG DIAGNOSIS: NORMAL
EKG DIAGNOSIS: NORMAL
EKG Q-T INTERVAL: 338 MS
EKG Q-T INTERVAL: 412 MS
EKG QRS DURATION: 74 MS
EKG QRS DURATION: 78 MS
EKG QTC CALCULATION (BAZETT): 444 MS
EKG QTC CALCULATION (BAZETT): 457 MS
EKG R AXIS: 34 DEGREES
EKG R AXIS: 69 DEGREES
EKG T AXIS: -27 DEGREES
EKG T AXIS: -65 DEGREES
EKG VENTRICULAR RATE: 104 BPM
EKG VENTRICULAR RATE: 74 BPM
EOSINOPHILS ABSOLUTE: 0.2 K/CU MM
EOSINOPHILS RELATIVE PERCENT: 3.3 % (ref 0–3)
GFR AFRICAN AMERICAN: >60 ML/MIN/1.73M2
GFR NON-AFRICAN AMERICAN: >60 ML/MIN/1.73M2
GLUCOSE BLD-MCNC: 105 MG/DL (ref 70–99)
HCT VFR BLD CALC: 41.6 % (ref 37–47)
HEMOGLOBIN: 13.4 GM/DL (ref 12.5–16)
IMMATURE NEUTROPHIL %: 0.2 % (ref 0–0.43)
INR BLD: 1.64 INDEX
LV EF: 50 %
LVEF MODALITY: NORMAL
LYMPHOCYTES ABSOLUTE: 1.9 K/CU MM
LYMPHOCYTES RELATIVE PERCENT: 41.6 % (ref 24–44)
MAGNESIUM: 2.1 MG/DL (ref 1.8–2.4)
MCH RBC QN AUTO: 29.5 PG (ref 27–31)
MCHC RBC AUTO-ENTMCNC: 32.2 % (ref 32–36)
MCV RBC AUTO: 91.6 FL (ref 78–100)
MONOCYTES ABSOLUTE: 0.5 K/CU MM
MONOCYTES RELATIVE PERCENT: 10.7 % (ref 0–4)
NUCLEATED RBC %: 0 %
PDW BLD-RTO: 13.8 % (ref 11.7–14.9)
PHOSPHORUS: 3.3 MG/DL (ref 2.5–4.9)
PLATELET # BLD: 134 K/CU MM (ref 140–440)
PMV BLD AUTO: 10.9 FL (ref 7.5–11.1)
POTASSIUM SERPL-SCNC: 4.3 MMOL/L (ref 3.5–5.1)
PROTHROMBIN TIME: 21.3 SECONDS (ref 11.7–14.5)
RBC # BLD: 4.54 M/CU MM (ref 4.2–5.4)
SEGMENTED NEUTROPHILS ABSOLUTE COUNT: 2 K/CU MM
SEGMENTED NEUTROPHILS RELATIVE PERCENT: 43.5 % (ref 36–66)
SODIUM BLD-SCNC: 140 MMOL/L (ref 135–145)
TOTAL IMMATURE NEUTOROPHIL: 0.01 K/CU MM
TOTAL NUCLEATED RBC: 0 K/CU MM
TOTAL PROTEIN: 6.2 GM/DL (ref 6.4–8.2)
TROPONIN T: 0.14 NG/ML
WBC # BLD: 4.6 K/CU MM (ref 4–10.5)

## 2022-05-10 PROCEDURE — 93458 L HRT ARTERY/VENTRICLE ANGIO: CPT | Performed by: INTERNAL MEDICINE

## 2022-05-10 PROCEDURE — 6370000000 HC RX 637 (ALT 250 FOR IP): Performed by: INTERNAL MEDICINE

## 2022-05-10 PROCEDURE — B2151ZZ FLUOROSCOPY OF LEFT HEART USING LOW OSMOLAR CONTRAST: ICD-10-PCS | Performed by: INTERNAL MEDICINE

## 2022-05-10 PROCEDURE — 84100 ASSAY OF PHOSPHORUS: CPT

## 2022-05-10 PROCEDURE — 4A023N7 MEASUREMENT OF CARDIAC SAMPLING AND PRESSURE, LEFT HEART, PERCUTANEOUS APPROACH: ICD-10-PCS | Performed by: INTERNAL MEDICINE

## 2022-05-10 PROCEDURE — 93010 ELECTROCARDIOGRAM REPORT: CPT | Performed by: INTERNAL MEDICINE

## 2022-05-10 PROCEDURE — B2111ZZ FLUOROSCOPY OF MULTIPLE CORONARY ARTERIES USING LOW OSMOLAR CONTRAST: ICD-10-PCS | Performed by: INTERNAL MEDICINE

## 2022-05-10 PROCEDURE — 94761 N-INVAS EAR/PLS OXIMETRY MLT: CPT

## 2022-05-10 PROCEDURE — 85025 COMPLETE CBC W/AUTO DIFF WBC: CPT

## 2022-05-10 PROCEDURE — 2580000003 HC RX 258: Performed by: INTERNAL MEDICINE

## 2022-05-10 PROCEDURE — 6360000002 HC RX W HCPCS

## 2022-05-10 PROCEDURE — B2161ZZ FLUOROSCOPY OF RIGHT AND LEFT HEART USING LOW OSMOLAR CONTRAST: ICD-10-PCS | Performed by: INTERNAL MEDICINE

## 2022-05-10 PROCEDURE — 6360000004 HC RX CONTRAST MEDICATION

## 2022-05-10 PROCEDURE — 80053 COMPREHEN METABOLIC PANEL: CPT

## 2022-05-10 PROCEDURE — 99222 1ST HOSP IP/OBS MODERATE 55: CPT | Performed by: INTERNAL MEDICINE

## 2022-05-10 PROCEDURE — 99024 POSTOP FOLLOW-UP VISIT: CPT | Performed by: INTERNAL MEDICINE

## 2022-05-10 PROCEDURE — 2500000003 HC RX 250 WO HCPCS

## 2022-05-10 PROCEDURE — 84484 ASSAY OF TROPONIN QUANT: CPT

## 2022-05-10 PROCEDURE — 93005 ELECTROCARDIOGRAM TRACING: CPT | Performed by: INTERNAL MEDICINE

## 2022-05-10 PROCEDURE — 85730 THROMBOPLASTIN TIME PARTIAL: CPT

## 2022-05-10 PROCEDURE — APPSS45 APP SPLIT SHARED TIME 31-45 MINUTES: Performed by: NURSE PRACTITIONER

## 2022-05-10 PROCEDURE — 85610 PROTHROMBIN TIME: CPT

## 2022-05-10 PROCEDURE — 93458 L HRT ARTERY/VENTRICLE ANGIO: CPT

## 2022-05-10 PROCEDURE — 36415 COLL VENOUS BLD VENIPUNCTURE: CPT

## 2022-05-10 PROCEDURE — 83735 ASSAY OF MAGNESIUM: CPT

## 2022-05-10 RX ORDER — DILTIAZEM HYDROCHLORIDE 180 MG/1
360 CAPSULE, COATED, EXTENDED RELEASE ORAL DAILY
Status: DISCONTINUED | OUTPATIENT
Start: 2022-05-11 | End: 2022-05-10 | Stop reason: HOSPADM

## 2022-05-10 RX ORDER — SODIUM CHLORIDE 9 MG/ML
INJECTION, SOLUTION INTRAVENOUS PRN
Status: DISCONTINUED | OUTPATIENT
Start: 2022-05-10 | End: 2022-05-10 | Stop reason: HOSPADM

## 2022-05-10 RX ORDER — ATORVASTATIN CALCIUM 40 MG/1
TABLET, FILM COATED ORAL
Qty: 30 TABLET | Refills: 0 | Status: SHIPPED | OUTPATIENT
Start: 2022-05-10 | End: 2022-06-07 | Stop reason: SDUPTHER

## 2022-05-10 RX ORDER — SODIUM CHLORIDE 0.9 % (FLUSH) 0.9 %
5-40 SYRINGE (ML) INJECTION PRN
Status: DISCONTINUED | OUTPATIENT
Start: 2022-05-10 | End: 2022-05-10 | Stop reason: HOSPADM

## 2022-05-10 RX ORDER — SODIUM CHLORIDE 9 MG/ML
INJECTION, SOLUTION INTRAVENOUS CONTINUOUS
Status: DISCONTINUED | OUTPATIENT
Start: 2022-05-10 | End: 2022-05-10 | Stop reason: HOSPADM

## 2022-05-10 RX ORDER — ASPIRIN 81 MG/1
81 TABLET, CHEWABLE ORAL DAILY
Qty: 30 TABLET | Refills: 3 | Status: SHIPPED | OUTPATIENT
Start: 2022-05-11

## 2022-05-10 RX ORDER — SODIUM CHLORIDE 0.9 % (FLUSH) 0.9 %
5-40 SYRINGE (ML) INJECTION EVERY 12 HOURS SCHEDULED
Status: DISCONTINUED | OUTPATIENT
Start: 2022-05-10 | End: 2022-05-10 | Stop reason: HOSPADM

## 2022-05-10 RX ORDER — DILTIAZEM HYDROCHLORIDE 360 MG/1
360 CAPSULE, EXTENDED RELEASE ORAL DAILY
Qty: 30 CAPSULE | Refills: 0 | Status: SHIPPED | OUTPATIENT
Start: 2022-05-10 | End: 2022-06-02 | Stop reason: SDUPTHER

## 2022-05-10 RX ADMIN — DILTIAZEM HYDROCHLORIDE 120 MG: 120 CAPSULE, COATED, EXTENDED RELEASE ORAL at 14:47

## 2022-05-10 RX ADMIN — SODIUM CHLORIDE: 9 INJECTION, SOLUTION INTRAVENOUS at 14:33

## 2022-05-10 RX ADMIN — METOPROLOL SUCCINATE 50 MG: 50 TABLET, EXTENDED RELEASE ORAL at 14:46

## 2022-05-10 RX ADMIN — NITROGLYCERIN 0.5 INCH: 20 OINTMENT TOPICAL at 06:07

## 2022-05-10 RX ADMIN — ASPIRIN 81 MG 81 MG: 81 TABLET ORAL at 14:47

## 2022-05-10 RX ADMIN — Medication 1000 UNITS: at 14:46

## 2022-05-10 RX ADMIN — LEVOTHYROXINE SODIUM 50 MCG: 0.05 TABLET ORAL at 06:07

## 2022-05-10 RX ADMIN — PANTOPRAZOLE SODIUM 40 MG: 40 TABLET, DELAYED RELEASE ORAL at 06:07

## 2022-05-10 RX ADMIN — Medication 10 ML: at 09:07

## 2022-05-10 RX ADMIN — ACETAMINOPHEN 650 MG: 325 TABLET ORAL at 04:44

## 2022-05-10 RX ADMIN — DONEPEZIL HYDROCHLORIDE 10 MG: 10 TABLET, FILM COATED ORAL at 14:46

## 2022-05-10 ASSESSMENT — PAIN SCALES - GENERAL
PAINLEVEL_OUTOF10: 0
PAINLEVEL_OUTOF10: 0
PAINLEVEL_OUTOF10: 6
PAINLEVEL_OUTOF10: 0

## 2022-05-10 ASSESSMENT — PAIN DESCRIPTION - DESCRIPTORS: DESCRIPTORS: ACHING

## 2022-05-10 ASSESSMENT — PAIN DESCRIPTION - LOCATION: LOCATION: OTHER (COMMENT)

## 2022-05-10 NOTE — ED NOTES
Pt currently connected to 12-lead ECG machine to capture any anticipated rhythm changes per Dr. Yordan Friedman.     ELOY Glover Balloon  05/09/22 2027

## 2022-05-10 NOTE — ED NOTES
Pt with multiple runs of Vtach, pt denies chest pain, complains of HA and feeling tired.  Dr. Johns Left notified, pt placed on de-fib pads, repeat EKG completed     Michel Urrutia RN  05/09/22 2011

## 2022-05-10 NOTE — PROGRESS NOTES
Cardiology Progress Note     Today's Plan [facility]    Admit Date:  5/9/2022    Consult reason/ Seen today for: chest pain    Subjective and  Overnight Events:  States she is feeling better today: denies chest pain or palpitations    Telemetry; atrial fib with intermittent V paced   No VT noted over night    Assessment / Plan:     Chest pain- resolved: NM stress test completed- normal perfusion and EF    Elevated troponin: ruled in positive for NSTEMI: peak troponin 0.087: EKG with T wave inversion in inferior leads : plan for [facility]: on toprol/ Nitro bid-atorvastatin    Atrial fib-persistent: rate is controlled:on cardizem: AC on hold for [facility]     VT : while in ED noted to have runs of VT: given Magnesium :no VT over night:  plan for [facility] to r/o ischemia. Pacemaker- [employer] pacemaker in place: left sided pocket intact: will have analysis done today       History of Presenting Illness:    Chief complain on admission : 67 y. o.year old who is admitted for  Chief Complaint   Patient presents with    Shortness of Breath    Other     elevated trop        Past medical history:    has a past medical history of Abnormal echocardiogram, Anemia, Aortic insufficiency, Atrial fibrillation ([address] Atrial flutter ([address] Bursitis, trochanteric, Cerumen impaction, Diverticulosis, Diverticulosis of colon, DJD (degenerative joint disease), cervical, DVT (deep venous thrombosis) ([address] Dyslipidemia, Environmental allergies, Family history of colon cancer, Gastric polyp, Gastritis, GERD (gastroesophageal reflux disease), H/O cardiovascular stress test, H/O echocardiogram, H/O exercise stress test, History of Holter monitoring, History of mammogram, Hx of colonoscopy, Hx of Doppler Venous ultrasound, Hyperlipidemia, Hypertension, Hypothyroidism, Internal hemorrhoid, Intervertebral disc protrusion, Left shoulder pain, Long term current use of anticoagulant, Menstruation, Pacemaker, Pulmonary hypertension ([facility] [address] Restless legs, Right shoulder strain, Sciatica, Sciatica, Shoulder pain, left, Shoulder pain, right, Sinus bradycardia, Sinusitis, Tinnitus, and Vision changes. Past surgical history:   has a past surgical history that includes Tonsillectomy (08/2006); Upper gastrointestinal endoscopy (04/2008); Cholecystectomy, laparoscopic (02/2001); sinus surgery (1979); Dilation and curettage of uterus (1988); jessica and bso (cervix removed) (04/2003); Breast surgery (1985); Hysterectomy (2003); Cardioversion; Colonoscopy; transthoracic echocardiogram (06/2012); pacemaker placement (06/21/2012); ablation of dysrhythmic focus; Cardioversion (03/10/2014); and Breast biopsy. Social History:   reports that she has never smoked. She has never used smokeless tobacco. She reports that she does not drink alcohol and does not use drugs. Family history:  family history includes Breast Cancer in her maternal cousin; Colon Cancer (age of onset: [de-identified]) in her mother; Coronary Art Dis (age of onset: 62) in her father; Coronary Art Dis (age of onset: 66) in her mother; Heart Disease in her father and mother; Migraines in her sister; Seizures in her brother; Stroke in her mother.     Allergies   Allergen Reactions    Latex Hives    Darvon [Propoxyphene Hcl] Shortness Of Breath    Demerol Rash     Breathing problems    Dilaudid [Hydromorphone Hcl] Anaphylaxis    Valium Rash     Breathing problems    Celecoxib Diarrhea    Norco [Hydrocodone-Acetaminophen] Diarrhea, Nausea Only and Other (See Comments)     A-Fib    Norvasc [Amlodipine] Other (See Comments)     HA, dizziness    Oxycodone Itching    Tape Dirk Walker Tape] Other (See Comments)     BLISTER    Vasotec [Enalapril] Other (See Comments)     Nausea, vomiting    Diazepam Rash       Review of Systems:   All 14 systems were reviewed and are negative  Except for the positive findings which are documented     /68   Pulse 68 Temp 97.4 °F (36.3 °C) (Oral)   Resp 12   Ht 5' 5\" (1.651 m)   Wt 137 lb (62.1 kg)   SpO2 98%   BMI 22.80 kg/m²       Intake/Output Summary (Last 24 hours) at 5/10/2022 0937  Last data filed at 5/10/2022 5229  Gross per 24 hour   Intake 184.61 ml   Output --   Net 184.61 ml       Physical Exam:  Physical Exam  Vitals reviewed. HENT:      Head: Normocephalic and atraumatic. Mouth/Throat:      Mouth: Mucous membranes are moist.   Cardiovascular:      Rate and Rhythm: Rhythm irregular. Heart sounds: Normal heart sounds. Comments: Left sided pacer pocket intact  Pulmonary:      Effort: Pulmonary effort is normal.      Breath sounds: Normal breath sounds. Abdominal:      General: There is no distension. Tenderness: There is no abdominal tenderness. Musculoskeletal:      Right lower leg: No edema. Left lower leg: No edema. Skin:     General: Skin is warm and dry. Capillary Refill: Capillary refill takes less than 2 seconds. Neurological:      Mental Status: She is alert. Mental status is at baseline.           Medications:    pantoprazole  40 mg Oral QAM AC    dilTIAZem  120 mg Oral Daily    donepezil  10 mg Oral Daily    levothyroxine  50 mcg Oral Daily    metoprolol succinate  50 mg Oral BID    Vitamin D  1,000 Units Oral Daily    sodium chloride flush  5-40 mL IntraVENous 2 times per day    aspirin  81 mg Oral Daily    atorvastatin  80 mg Oral Nightly    nitroglycerin  0.5 inch Topical 4 times per day      sodium chloride       fentanNYL, sodium chloride flush, sodium chloride, ondansetron **OR** ondansetron, acetaminophen **OR** acetaminophen, polyethylene glycol    Lab Data:  CBC:   Recent Labs     05/08/22 2045 05/09/22  1850 05/10/22  0854   WBC 6.6 5.9 4.6   HGB 13.3 14.0 13.4   HCT 41.7 43.9 41.6   MCV 91.9 92.8 91.6   * 135* 134*     BMP:   Recent Labs     05/08/22 2045 05/09/22  1850    140   K 3.9 4.2    105   CO2 23 24   BUN 23 14 CREATININE 1.0 0.9     PT/INR:   Recent Labs     22  0302 22  1850 05/10/22  0854   PROTIME 18.9* 18.6* 21.3*   INR 1.46 1.44 1.64     BNP:    Recent Labs     22  185   PROBNP 1,063* 1,305*     TROPONIN:   Recent Labs     22  1850 22  2233 05/10/22  0216   TROPONINT 0.153* 0.187* 0.143*              Impression:  Principal Problem:    NSTEMI (non-ST elevated myocardial infarction) ([facility] [address]  Resolved Problems:    * No resolved hospital problems. *       All labs, medications and tests reviewed by myself, continue all other medications of all above medical condition listed as is except for changes mentioned above. Thank you   Please call with questions. Electronically signed by ANTHONY Snow CNP on 5/10/2022 at 9:37 AM  I have seen ,spoken to  and examined this patient personally, independently of the [facility]. I have reviewed the hospital care given to date and reviewed all pertinent labs and imaging. I have spoken with patient, nursing staff and provided written and verbal instructions . The above note has been reviewed     CARDIOLOGY ATTENDING ADDENDUM    HPI:  I have reviewed the above HPI  And agree with above     Pulse Range: Pulse  Av.5  Min: 68  Max: 101    Blood Presuure Range:  Systolic (81HZG), DEX:647 , Min:95 , OZM:249   ; Diastolic (77EGO), GHW:65, Min:56, Max:90      Pulse ox Range: SpO2  Av.9 %  Min: 96 %  Max: 100 %    24hr I & O:    Intake/Output Summary (Last 24 hours) at 5/10/2022 1006  Last data filed at 5/10/2022 1676  Gross per 24 hour   Intake 184.61 ml   Output --   Net 184.61 ml         /68   Pulse 68   Temp 97.4 °F (36.3 °C) (Oral)   Resp 12   Ht 5' 5\" (1.651 m)   Wt 137 lb (62.1 kg)   SpO2 98%   BMI 22.80 kg/m²       Physical Exam:  General:  Awake, alert, NAD  Head:normal  Eye:normal  Neck:  No JVD   Chest:  Clear to auscultation, respiration easy  Cardiovascular:  RRR S1S2  Abdomen:   nontender  Extremities: Trace edema  Pulses; palpable  Neuro: grossly normal      MEDICAL DECISION MAKING;    Pt assessed , chart reviewed, patient examined examined , all available data was reviewed, following is the plan which was discussed with [facility] as well:    -Had chest pain, Cardiolite was normal however the patient troponin was elevated and will proceed with cardiac catheterization later today for further assessment    -Atrial fibrillation heart rate is well controlled anticoagulation is on hold given that the patient is going for cardiac cath    -vt: Patient has shortness of VT which can be #1 phenomenon with his A. fib with aberrancy once he is ruled out we will consult EP for further clarification    -Hyperlipidemia on Lipitor 80 mg p.o. daily            Noe Bill MD [facility] [place]

## 2022-05-10 NOTE — PROGRESS NOTES
Patient discharging at this time. Cath site to R groin soft, no bleeding noted, no hematoma noted to site. Patient educated on no lifting, keep dressing site dry and intact until 2 pm tomorrow. Take meds as prescribed, easy activities,no strenuous activities. Patient and family voiced understanding. Educated patient and  on when meds are due to be given again, patient and  voiced understanding. IV removed from R FA. No bleeding to site. Patient ambulated in hallway prior to d/c and patient tolerated well. Patient educated on making follow up appointments, patient and family voiced understanding. R groin site checked again prior to leaving unit, no bleeding or hematoma noted. Site remains soft.

## 2022-05-10 NOTE — PROGRESS NOTES
Atrial fibrillation with RVR - chronic atrial fibrillation  Chest pain      Was interrogated and patient has atrial fibrillation with RVR episodes intermittently. Wide-complex rhythm appears to be more like aberrant conduction. Recommend increasing the Cardizem to 360 mg oral daily and continue with metoprolol 50 mg twice daily. Looks like patient has developed tolerance to the medication of needing higher doses to keep the rate under control. We can follow her as an outpatient.   Recommend anticoagulation if not contraindicated    Full note to follow

## 2022-05-10 NOTE — PROGRESS NOTES
Hospital Medicine Progress Note     Date:  5/10/2022    PCP: Angelito Hernandes MD (Tel: [phone])    Date of Admission: 5/9/2022    Chief complaint:   Chief Complaint   Patient presents with    Shortness of Breath    Other     elevated trop       Brief hospital course: 26-year-old female with history of dementia, paroxysmal atrial fibrillation (on chronic anticoagulation with Coumadin), chronic combined systolic and diastolic heart failure (most recent echocardiogram with ejection fraction of 45 to 92%, grade 2 diastolic dysfunction), chronic thrombocytopenia, history of essential hypertension, hypothyroidism, hyperlipidemia, history of DVT, GERD, diverticulosis, who has had 2 hospitalizations in less than 2 weeks with presentation of chest pain, admitted to hospital for chest pain, discharged on 5/9/2022 following negative troponin and stress test.  However, shortly after discharge, it was noted that a follow-up troponin was resulted and was elevated. Patient was then called to present back to the emergency room, where she reports that she continues to have ongoing chest discomfort, albeit mild. Troponin obtained in the emergency room remains elevated at 0.15, EKG with inverted T waves in inferior lateral leads. Cardiologist was consulted from the emergency room, recommended admission and initiation of anticoagulation with plan for invasive cardiac testing. Assessment/plan:  1. NSTEMI. Continue heparin infusion, antiplatelet therapy, high-dose statin, nitroglycerin. As needed fentanyl for pain. Troponin peaked at 0.187 at 11:33 PM on 5/9/2022 and has since trended downward. Cardiology planning cardiac catheterization today.   2. Other comorbidities: history of dementia, paroxysmal atrial fibrillation (on chronic anticoagulation with Coumadin), chronic combined systolic and diastolic heart failure (most recent echocardiogram with ejection fraction of 45 to 50%, grade 2 diastolic dysfunction), history of essential hypertension, hypothyroidism, hyperlipidemia, history of DVT, GERD, diverticulosis, chronic thrombocytopenia. Diet: Diet NPO Exceptions are: Sips of Water with Meds    Code status: Full Code   ----------  Subjective  No chest pain today. Denies other needs. Discussed with patient, daughter and  at bedside. Objective  Physical exam:  Vitals: /68   Pulse 68   Temp 97.4 °F (36.3 °C) (Oral)   Resp 12   Ht 5' 5\" (1.651 m)   Wt 137 lb (62.1 kg)   SpO2 98%   BMI 22.80 kg/m²   Gen/overall appearance: Not in acute distress. Alert. Oriented X3. Head: Normocephalic, atraumatic  Eyes: EOMI, good acuity  ENT: Oral mucosa moist  Neck: No JVD, thyromegaly  CVS: Nml S1S2, no MRG, RRR  Pulm: Clear bilaterally. No crackles/wheezes  Gastrointestinal: Soft, NT/ND, +BS  Musculoskeletal: No edema. Warm  Neuro: No focal deficit. Moves extremity spontaneously. Psychiatry: Appropriate affect. Not agitated. Skin: Warm, dry with normal turgor. No rash  Capillary refill: Brisk,< 3 seconds   Peripheral Pulses: +2 palpable, equal bilaterally      24HR INTAKE/OUTPUT:      Intake/Output Summary (Last 24 hours) at 5/10/2022 1136  Last data filed at 5/10/2022 2860  Gross per 24 hour   Intake 184.61 ml   Output --   Net 184.61 ml     I/O last 3 completed shifts: In: 184.6 [P.O.:50; I.V.:134.6]  Out: -   No intake/output data recorded.   Meds:    pantoprazole  40 mg Oral QAM AC    dilTIAZem  120 mg Oral Daily    donepezil  10 mg Oral Daily    levothyroxine  50 mcg Oral Daily    metoprolol succinate  50 mg Oral BID    Vitamin D  1,000 Units Oral Daily    sodium chloride flush  5-40 mL IntraVENous 2 times per day    aspirin  81 mg Oral Daily    atorvastatin  80 mg Oral Nightly    nitroglycerin  0.5 inch Topical 4 times per day     Infusions:    sodium chloride       PRN Meds: fentanNYL, sodium chloride flush, sodium chloride, ondansetron **OR** ondansetron, acetaminophen **OR** acetaminophen, polyethylene glycol    Labs/imaging:  CBC:   Recent Labs     05/08/22  2045 05/09/22  1850 05/10/22  0854   WBC 6.6 5.9 4.6   HGB 13.3 14.0 13.4   * 135* 134*     BMP:    Recent Labs     05/08/22  2045 05/09/22  1850 05/10/22  0854    140 140   K 3.9 4.2 4.3    105 106   CO2 23 24 26   BUN 23 14 10   CREATININE 1.0 0.9 0.7   GLUCOSE 105* 101* 105*     Hepatic:   Recent Labs     05/08/22  2045 05/09/22  1850 05/10/22  0854   AST 32 45* 33   ALT 31 35 27   BILITOT 0.8 1.5* 1.7*   ALKPHOS 64 72 62       Alem Espinoza MD  -------------------------------  Rounding hospitalist

## 2022-05-10 NOTE — PLAN OF CARE
Problem: Discharge Planning  Goal: Discharge to home or other facility with appropriate resources  Outcome: Progressing  Flowsheets (Taken 5/9/2022 2215)  Discharge to home or other facility with appropriate resources: Identify barriers to discharge with patient and caregiver     Problem: Pain  Goal: Verbalizes/displays adequate comfort level or baseline comfort level  Outcome: Progressing     Problem: Chronic Conditions and Co-morbidities  Goal: Patient's chronic conditions and co-morbidity symptoms are monitored and maintained or improved  Outcome: Progressing

## 2022-05-10 NOTE — PLAN OF CARE
Nutrition Problem #1: Inadequate oral intake  Intervention: Food and/or Nutrient Delivery: Continue NPO,Start Oral Diet  Nutritional

## 2022-05-10 NOTE — PROGRESS NOTES
Comprehensive Nutrition Assessment    Type and Reason for Visit:  Initial (Weight Loss)    Nutrition Recommendations/Plan:   1. Advance diet as medically able by day 5   2. Please start [place] Standard oral nutrition supplements once able to advance diet     Malnutrition Assessment:  Malnutrition Status: At risk for malnutrition (Comment) (05/10/22 1226)    Context:  Chronic Illness     Findings of the 6 clinical characteristics of malnutrition:  Energy Intake:   (Reduced intake related high fiber intake)  Weight Loss:  Mild weight loss (specify amount and time period)     Body Fat Loss:  No significant body fat loss     Muscle Mass Loss:  Mild muscle mass loss Temples (temporalis)  Fluid Accumulation:  Unable to assess     Strength:  Not Performed    Nutrition Assessment:    Admitted with NSTEMI. Currently NPO due to cardiac testing, waiting diet advancement. Pt had a good appetite at home, two small meals in AM with dinner at a restaurant. Diet typically includes high fiber. C/o multiple and speedy BMs. States UBW of 170 lb with weight loss. Unable to verify weight per chart review, but noted some weight loss. Discussed importance of keeping LBM and balanced meals. Pt with good muscle/fat stores. Pt willing to trial strawberry oral nutrition supplements once diet advances. Follow as moderate nutrition risk. Nutrition Related Findings:    Pt walks M-F. Takes warfarin at home. Wound Type: None       Current Nutrition Intake & Therapies:    Average Meal Intake: NPO  Average Supplements Intake: NPO  Diet NPO Exceptions are: Sips of Water with Meds    Anthropometric Measures:  Height: 5' 5\" (165.1 cm)  Ideal Body Weight (IBW): 125 lbs (57 kg)    Admission Body Weight: 136 lb 14.5 oz (62.1 kg)  Current Body Weight: 136 lb 14.5 oz (62.1 kg), 109.5 % IBW.  Weight Source: Bed Scale  Current BMI (kg/m2): 22.8  Usual Body Weight: 140 lb (63.5 kg) (per hx, office visits)  % Weight Change (Calculated): -2.2  Weight Adjustment For: No Adjustment  BMI Categories: Normal Weight (BMI 22.0 to 24.9) age over 72    Estimated Daily Nutrient Needs:  Energy Requirements Based On: Kcal/kg  Weight Used for Energy Requirements: Current  Energy (kcal/day): 6269-2365 (25-30 kcals/kg)  Weight Used for Protein Requirements: Ideal  Protein (g/day): 57-68 (1-1.2 g/kg)  Method Used for Fluid Requirements: Other (Comment)  Fluid (ml/day): fluids per MD    Nutrition Diagnosis:   · Inadequate oral intake related to acute injury/trauma,cardiac dysfunction as evidenced by NPO or clear liquid status due to medical condition    · Unintended weight loss related to altered GI function as evidenced by weight loss (pt verbalizes high frequency of BMs at home)    Nutrition Interventions:   Food and/or Nutrient Delivery: Continue NPO,Start Oral Diet  Nutrition Education/Counseling: No recommendation at this time  Coordination of Nutrition Care: Continue to monitor while inpatient,Feeding Assistance/Environment Change,Interdisciplinary Rounds       Goals:  Goals: Initiate PO diet (by NPO day 5)     Nutrition Monitoring and Evaluation:   Behavioral-Environmental Outcomes: None Identified  Food/Nutrient Intake Outcomes: Supplement Intake,Food and Nutrient Intake  Physical Signs/Symptoms Outcomes: Biochemical Data,GI Status,Meal Time Behavior,Weight,Nutrition Focused Physical Findings    Discharge Planning:     Too soon to determine     Chelsea Casey RD, LD  Contact: [phone]

## 2022-05-10 NOTE — PROGRESS NOTES
CHART REVIEWED  PT EXAMINED  [facility] PT  RISKS BENEFITS AND ALERTNATIVES EXPLAINED IN DETAIL  CONSENT OBTANED

## 2022-05-10 NOTE — CONSULTS
Electrophysiology Consult Note      Reason for consultation:  NSVT    Chief complaint : Shortness of breath, chest pressure    Referring physician:       Primary care physician: Ree Penn MD      History of Present Illness:     Carrie Richards is a 67year old female with a history of dementia, persistent atrial fibrillation, chronic anticoagulation, thrombocytopenia, hypertension, hypothyroidism, hyperlipdemia, DVT, dual chamber pacemaker presents with complaints of shortness of breath with exertion and chest pressure. Patient was admitted and had noted negative troponin and normal mycardial perfusion on stress test.   Patient was discharged home. Per chart patient had a troponin to result after patient was discharged that was elevated and patient was called back to the the hospital. Patient reports that she continued to have chest pressure. Patient had an EKG with noted inverted T waves. Patient was admitted, started on heparin drip and went for left heart catheterization this morning. LHC revealed no significant CAD except for 60-70% disease of the ostium. EP was consulted for concern for NSVT noted on tele and on pacer report. Patient reports that she is in atrial fibrillation consistently. She states that she will intermittently have palpitations that come on suddenly and resolve. She denies aggravating or alleviating factors. She denies dizziness, lightheadedness, edema, or syncope. Patient has had failed cardioversion in 6/2015. She had ad prior ablation in 2011 and 2014 at [facility] with Dr Cortez Buchanan. He has been on sotalol and amiodarone in the past. She failed both medications. Patient did not have hybrid approach with n contact technique.       Pastmedical history:   Past Medical History:   Diagnosis Date    Abnormal echocardiogram     EF 60%, mild aortic indsufficiency, mild pulmonary hypertension    Anemia     Aortic insufficiency mild per echo 8/24/2010    Atrial fibrillation ([address]     failed propafenone, Multaq and flecainide - 7/2011 plan for AFib ablation - [facility]- Dr Victorino Diehl Atrial flutter [facility])     Bursitis, trochanteric 08/2004    s/p injection    Cerumen impaction 04/24/2012    Diverticulosis 2015    Dr. Ludy BANKS scope    Diverticulosis of colon 01/2010    Colonoscopy-Dr Reece ACUÑA (degenerative joint disease), cervical     cervical, generalized disc buldge   MRI 3/02    DVT (deep venous thrombosis) ([address]     Dyslipidemia 2002    Environmental allergies     Family history of colon cancer     mother    Gastric polyp     8/2006, 11/2009 benign- Dr Skyler Garcia Gastritis 04/2008    mild superficial per Dr Ludy Franco    GERD (gastroesophageal reflux disease) 08/2006    Dr Skyler Garcia H/O cardiovascular stress test 07/13/2004 07/13 EF58%, No scintigraphic evidence of inducible myocardial ischemia 04/13Normal study. No wall motion abnormality seen. EF 65%    H/O echocardiogram 07/18/2013 7/13-EF 50-55% Mild AR.  H/O exercise stress test 02/07/2017    EF63% normal    History of Holter monitoring 09/23/2015    48 hour - abnormal holter revealing afib throughout the recording with interspersed pacemaker beats, HR does not appear to be well controlled    History of mammogram     last mammo 7/25/11, Dr. Marquez Pitch yearly    Hx of colonoscopy     1/21/2010    Hx of Doppler Venous ultrasound 08/31/2021    No DVT or SVT, No significant reflux in RLE, Significant reflux in Left CFV    Hyperlipidemia     Hypertension     Hypothyroidism     Internal hemorrhoid 2015    Dr. Ludy BANKS scope     Intervertebral disc protrusion 05/2009    wry neck with C4-5      Left shoulder pain 04/2004    (L) shoulder impingement  mild DJD    Long term current use of anticoagulant 07/26/2016    **[facility] follows PT/INRs, along w/prescribing pt's Coumadin dosages. **    Menstruation     age 14    Pacemaker 06/21/2012    dual chamber- checked every 3 months    Pulmonary hypertension ([address]     mild per echo 8/24/2010, Pt refused sleep study (June 2012)    Restless legs 08/2003    ferritin    Right shoulder strain 02/2003    no seperation, bony contusion anterior humeral head  MRI 3/03    Sciatica 07/2009    (R) chronic intermittent s/p epidural injections  Dr Raj Mariscal Sciatica     Shoulder pain, left 03/2011    RTC tendinopathy, type II acrominum, bursitis- referred to Dr. Bibi Galvin    Shoulder pain, right 03/10/2009    impingement, physical thearpy   (Main)  calcific bursitis  s/p injection    Sinus bradycardia     Sinusitis 12/12/2011    Tinnitus 03/2002    Vision changes 03/19/2013       Surgical history :   Past Surgical History:   Procedure Laterality Date    ABLATION OF DYSRHYTHMIC FOCUS      BREAST BIOPSY      BREAST SURGERY  1985    Right breast tumor excised    CARDIOVERSION      1/2008 Dr Alejandra Gill; 12/2008    CARDIOVERSION  03/10/2014    [facility]    CHOLECYSTECTOMY, LAPAROSCOPIC  02/2001    Dr Dk Orosco      [phone] (Dr Alana Samson)   [address] [place] [address] HYSTERECTOMY  [address] [place] [address]  06/21/2012    [employer] Adapta ADDRL1 -not mri compatible   Dunia Stains    Dr Gallegos Cons  04/2003    fibroid      Dr Lillie Moon  08/2006    Dr Ford Dress ECHOCARDIOGRAM  06/2012    transthoracic cardioversion-Dr. Manda Garza    UPPER GASTROINTESTINAL ENDOSCOPY  04/2008    mild superficial gastritis  Dr Alana Samson; 2009       Family history:   Family History   Problem Relation Age of Onset    Stroke Mother     Heart Disease Mother     Coronary Art Dis Mother 66        CABG    Colon Cancer Mother [de-identified]        colon     Heart Disease Father     Coronary Art Dis Father 62        CABG    Migraines Sister     Seizures Brother     Breast Cancer Maternal Cousin         under 48         Social history :  reports that she has never smoked. She has never used smokeless tobacco. She reports that she does not drink alcohol and does not use drugs. Allergies   Allergen Reactions    Latex Hives    Darvon [Propoxyphene Hcl] Shortness Of Breath    Demerol Rash     Breathing problems    Dilaudid [Hydromorphone Hcl] Anaphylaxis    Valium Rash     Breathing problems    Celecoxib Diarrhea    Norco [Hydrocodone-Acetaminophen] Diarrhea, Nausea Only and Other (See Comments)     A-Fib    Norvasc [Amlodipine] Other (See Comments)     HA, dizziness    Oxycodone Itching    Tape Suzzane Tumtum Tape] Other (See Comments)     BLISTER    Vasotec [Enalapril] Other (See Comments)     Nausea, vomiting    Diazepam Rash       No current facility-administered medications on file prior to encounter. Current Outpatient Medications on File Prior to Encounter   Medication Sig Dispense Refill    metoprolol succinate (TOPROL XL) 50 MG extended release tablet Take 1 tablet by mouth in the morning and at bedtime 30 tablet 3    donepezil (ARICEPT) 10 MG tablet Take 1 tablet by mouth daily 30 tablet 5    warfarin (COUMADIN) 3 MG tablet Take 3 mg by mouth daily      levothyroxine (SYNTHROID) 50 MCG tablet TAKE 1 TABLET BY MOUTH EVERY DAY BEFORE BREAKFAST (Patient not taking: Reported on 5/10/2022) 90 tablet 3    Vitamin D (CHOLECALCIFEROL) 25 MCG (1000 UT) TABS tablet Take 1,000 Units by mouth daily      Fexofenadine HCl (ALLEGRA ALLERGY PO) Take by mouth as needed  (Patient not taking: Reported on 5/10/2022)      Dexlansoprazole (DEXILANT) 60 MG CPDR Take 1 tablet by mouth daily. Review of Systems:   Review of Systems   Constitutional: Negative for activity change, chills, fatigue and fever. HENT: Negative for congestion, ear pain and tinnitus. Eyes: Negative for photophobia, pain and visual disturbance. Respiratory: Positive for shortness of breath. Negative for cough, chest tightness and wheezing.     Cardiovascular: Positive for chest pain and palpitations. Negative for leg swelling. Gastrointestinal: Negative for abdominal pain, blood in stool, constipation, diarrhea, nausea and vomiting. Endocrine: Negative for cold intolerance and heat intolerance. Genitourinary: Negative for dysuria, flank pain and hematuria. Musculoskeletal: Negative for arthralgias, back pain, myalgias and neck stiffness. Skin: Negative for color change and rash. Allergic/Immunologic: Negative for food allergies. Neurological: Negative for dizziness, light-headedness, numbness and headaches. Hematological: Does not bruise/bleed easily. Psychiatric/Behavioral: Negative for agitation, behavioral problems and confusion. Examination:      Vitals:    05/10/22 1430 05/10/22 1445 05/10/22 1500 05/10/22 1604   BP: 129/77 130/87 121/81 124/85   Pulse: 93 89 95 95   Resp: 15 22 19 18   Temp: 97.6 °F (36.4 °C) 97.6 °F (36.4 °C) 97.9 °F (36.6 °C) 97.6 °F (36.4 °C)   TempSrc: Oral Oral Oral    SpO2:       Weight:       Height:            Body mass index is 22.8 kg/m². Physical Exam  Constitutional:       General: She is not in acute distress. Appearance: She is not ill-appearing or diaphoretic. HENT:      Head: Normocephalic and atraumatic. Right Ear: External ear normal.      Left Ear: External ear normal.      Nose: No congestion. Mouth/Throat:      Mouth: Mucous membranes are moist.   Eyes:      Extraocular Movements: Extraocular movements intact. Conjunctiva/sclera: Conjunctivae normal.      Pupils: Pupils are equal, round, and reactive to light. Cardiovascular:      Rate and Rhythm: Normal rate. Rhythm irregular. Heart sounds: No murmur heard. Pulmonary:      Effort: Pulmonary effort is normal. No respiratory distress. Breath sounds: Normal breath sounds. No wheezing or rhonchi. Abdominal:      General: Abdomen is flat. Bowel sounds are normal. There is no distension. Palpations: Abdomen is soft.       Tenderness: There is no abdominal tenderness. Musculoskeletal:         General: No tenderness. Cervical back: Normal range of motion. No tenderness. Right lower leg: No edema. Left lower leg: No edema. Skin:     General: Skin is warm. Neurological:      General: No focal deficit present. Mental Status: She is alert and oriented to person, place, and time. Cranial Nerves: No cranial nerve deficit. Psychiatric:         Mood and Affect: Mood normal.               CBC:   Lab Results   Component Value Date    WBC 4.6 05/10/2022    HGB 13.4 05/10/2022    HCT 41.6 05/10/2022     05/10/2022     Lipids:  Lab Results   Component Value Date    CHOL 115 04/30/2022    TRIG 47 04/30/2022    HDL 51 04/30/2022    LDLCALC 55 04/30/2022    LDLDIRECT 72 08/01/2020     PT/INR:   Lab Results   Component Value Date    INR 1.64 05/10/2022        BMP:    Lab Results   Component Value Date     05/10/2022    K 4.3 05/10/2022     05/10/2022    CO2 26 05/10/2022    BUN 10 05/10/2022     CMP:   Lab Results   Component Value Date    AST 33 05/10/2022    PROT 6.2 (L) 05/10/2022    BILITOT 1.7 (H) 05/10/2022    ALKPHOS 62 05/10/2022     TSH:    Lab Results   Component Value Date    TSH 3.76 10/19/2021       EKGINTERPRETATION - EKG Interpretation:        IMPRESSION / RECOMMENDATIONS:     Atrial fibrillation with RVR - chronic atrial fibrillation  Chest pain  Pacemaker  History of Hypothyroid  HTN  HLD  History of DVT     Was interrogated and patient has atrial fibrillation with RVR episodes intermittently. Wide-complex rhythm appears to be more like aberrant conduction. Recommend increasing the Cardizem to 360 mg oral daily and continue with metoprolol 50 mg twice daily. Looks like patient has developed tolerance to the medication of needing higher doses to keep the rate under control.     We can follow her as an outpatient.   Recommend anticoagulation if not contraindicated        Thanks again for allowing me to participate in care of this patient. Please call me if you have any questions. With best regards. Joel Harris MD, 5/10/2022 4:27 PM     Please note this report has been partially produced using speech recognition software and may contain errors related to that system including errors in grammar, punctuation, and spelling, as well as words and phrases that may be inappropriate. If there are any questions or concerns please feel free to contact the dictating provider for clarification.

## 2022-05-10 NOTE — DISCHARGE SUMMARY
Hospital Discharge Summary    Patient's PCP: Negrito Gunter MD  Admit Date: 5/9/2022   Discharge Date: 5/10/2022    Admitting Physician: Dr. Stefano Clark MD  Discharge Physician: Dr. Stefano Clark MD     Consults:   IP CONSULT TO CARDIOLOGY  IP CONSULT TO HOSPITALIST  IP CONSULT TO CARDIOLOGY  IP CONSULT TO CARDIAC REHAB    Brief HPI: Patient admitted with chest pain. Brief hospital course: 79-year-old female with history of dementia, paroxysmal atrial fibrillation (on chronic anticoagulation with Coumadin), chronic combined systolic and diastolic heart failure (most recent echocardiogram with ejection fraction of 45 to 14%, grade 2 diastolic dysfunction), chronic thrombocytopenia, history of essential hypertension, hypothyroidism, hyperlipidemia, history of DVT, GERD, diverticulosis, who has had 2 hospitalizations in less than 2 weeks with presentation of chest pain, admitted to hospital for chest pain, discharged on 5/9/2022 following negative troponin and stress test.  However, shortly after discharge, it was noted that a follow-up troponin was resulted and was elevated.  Patient was then called to present back to the emergency room, where she reports that she continues to have ongoing chest discomfort, albeit mild.  Troponin obtained in the emergency room remains elevated at 0.15, EKG with inverted T waves in inferior lateral leads.  Cardiologist was consulted from the emergency room, recommended admission and initiation of anticoagulation with plan for invasive cardiac testing. 1. NSTEMI. Patient was on heparin infusion which has since been discontinued since cardiac catheterization did not reveal significant disease; she does have 60 to 70% stenosis in ostial, for which medical management is recommended. Continue antiplatelet therapy, beta-blocker, statin. 2. Wide complex tachycardia.   Patient has been evaluated by EP and they have recommended increasing cardizem from 120 to 360 and adjustments made to pacer/defibrillator. Patient to follow up with EP as outpatient. Patient to resume coumadin as outpatient, INR check in 2 days. 3. Other comorbidities: history of dementia, paroxysmal atrial fibrillation (on chronic anticoagulation with Coumadin), chronic combined systolic and diastolic heart failure (most recent echocardiogram with ejection fraction of 45 to 75%, grade 2 diastolic dysfunction), history of essential hypertension, hypothyroidism, hyperlipidemia, history of DVT, GERD, diverticulosis, chronic thrombocytopenia. Invasive procedures:  Status postcardiac catheterization:  Angiographic Findings   Diagnostic Findings   Cardiac Arteries and Lesion Findings   LMCA: Normal  LAD: Moderate Lumen Irregularity and Single stenosis  60 to 70 % Ostial   Diagonal disease. LCx: Mild Lumen Irregularity. RCA: Normal (no stenosis %). Discharge Diagnoses:   Principal Problem:    NSTEMI (non-ST elevated myocardial infarction) ([facility] [address]  Active Problems:    PAF (paroxysmal atrial fibrillation) ([facility])    Essential hypertension    Mixed hyperlipidemia    Pacemaker dual chamber    Dyspnea    S/P ablation of atrial fibrillation  Resolved Problems:    * No resolved hospital problems. *      Physical Exam: /87   Pulse 89   Temp 97.6 °F (36.4 °C) (Oral)   Resp 22   Ht 5' 5\" (1.651 m)   Wt 137 lb (62.1 kg)   SpO2 98%   BMI 22.80 kg/m²   Gen/overall appearance: Not in acute distress. Alert. Oriented x3. Head: Normocephalic, atraumatic  Eyes: EOMI, good acuity  ENT: Oral mucosa moist  Neck: No JVD, thyromegaly  CVS: Nml S1S2, no MRG, RRR  Pulm: Clear bilaterally. No crackles/wheezes  Gastrointestinal: Soft, NT/ND, +BS  Musculoskeletal: No edema. Warm  Neuro: No focal deficit. Moves extremity spontaneously. Psychiatry: Appropriate affect. Not agitated. Skin: Warm, dry with normal turgor.  No rash  Capillary refill: Brisk,< 3 seconds   Peripheral Pulses: +2 palpable, equal bilaterally    Significant diagnostic studies that may require follow up:  Echocardiogram complete 2D with doppler with color    Result Date: 4/29/2022  Transthoracic Echocardiography Report (TTE)  Demographics   Patient Name       Millie Regalado     Date of Study       04/29/2022   Date of Birth      1945         Gender              Female   Age                67 year(s)         Race                   Patient Number     [id number]         Room Number         SA69   Visit Number       [id number]   Corporate ID       [id number]   Accession Number   [id number]         23 Jayshree Fuentes RVT   Ordering Physician Moises Olszewski, Melissa Luna                     APRN-CNP           Physician           MD  Procedure Type of Study   TTE procedure:ECHOCARDIOGRAM COMPLETE 2D W DOPPLER W COLOR. Procedure Date Date: 04/29/2022 Start: 02:21 PM Study Location: Portable Technical Quality: Adequate visualization Indications:Ventricular tachycardia. Patient Status: Routine Height: 67 inches Weight: 130 pounds BSA: 1.68 m2 BMI: 20.36 kg/m2 HR: 106 bpm BP: 107/71 mmHg  Conclusions   Summary  Left ventricular systolic function is low normal.  Ejection fraction is visually estimated at 45-50%. Septal wall is hypokinetic  Grade II diastolic dysfunction. Moderately dilated left atrium. Moderate aortic regurgitation; PHT: 328 msec. Moderate tricuspid regurgitation; RVSP: 39 mmHg. Mild PHTN. No evidence of any pericardial effusion. Signature   ------------------------------------------------------------------  Electronically signed by Bernardo Bocanegra MD (Interpreting  physician) on 04/29/2022 at 03:04 PM  ------------------------------------------------------------------   Findings   Left Ventricle  Left ventricular systolic function is low normal.  Ejection fraction is visually estimated at 45- 50%.   Grade II diastolic dysfunction. septal wall is hypokinetic  Left ventricle size is normal.   Left Atrium  Moderately dilated left atrium. Right Atrium  Moderately dilated right atrium. Right Ventricle  PPM wiring visualized within the right ventricle. Aortic Valve  Moderate aortic regurgitation; PHT: 328 msec. Mitral Valve  Mild mitral regurgitation. Tricuspid Valve  Moderate tricuspid regurgitation; RVSP: 39 mmHg. Mild PHTN. Pulmonic Valve  The pulmonic valve was not well visualized. Pericardial Effusion  No evidence of any pericardial effusion. Pleural Effusion  No evidence of pleural effusion. Miscellaneous  Inferior vena cava is dilated, measuring at 2.2 cm, and does not collapse  with respiration.   M-Mode/2D Measurements & Calculations   LV Diastolic Dimension:  LV Systolic Dimension:  LA Dimension: 4 cmAO Root  3.9 cm                   2.9 cm                  Dimension: 3 cmLA Area:  LV FS:25.6 %             LV Volume Diastolic: 37 45.2 cm2  LV PW Diastolic: 3.98 cm ml  LV PW Systolic: 9.54 cm  LV Volume Systolic: 18  Septum Diastolic: 6.50   ml  cm                       LV EDV/LV EDV Index: 37 RV Diastolic Dimension:  Septum Systolic: 6.17 cm TI/44 S9GX ESV/LV ESV   3.51 cm  CO: 6.44 l/min           Index: 18 ml/11 m2  CI: 3.83 l/m*m2          EF Calculated (A4C):    LA/Aorta: 1.33                           51.4 %  LV Area Diastolic: 21.2  EF Calculated (2D):     LA volume/Index: 75 ml  cm2                      51.1 %                  /59K3  LV Area Systolic: 07.7  cm2                      LV Length: 6.92 cm                            LVOT: 2.2 cm  Doppler Measurements & Calculations   MV Peak E-Wave: 129 cm/s                         LVOT Peak Velocity: 91 cm/s  MV Peak A-Wave: 109 cm/s                         LVOT Mean Velocity: 61.2  MV E/A Ratio: 1.18                               cm/s  MV Peak Gradient: 6.66                           LVOT Peak Gradient: 3  mmHg                     LVOT VTI: 16 cm         mmHgLVOT Mean Gradient: 2                           AV P1/2t: 328 msec      mmHg  MV P1/2t: 35 msec        Estimated PASP: 39.4    Estimated RVSP: 39 mmHg  MVA by PHT:6.29 cm2      mmHg                    Estimated RAP:15 mmHg   MV E' Septal Velocity:  3.29 cm/s                                        TR Velocity:247 cm/s  MV E' Lateral Velocity:                          TR Gradient:24.4 mmHg  11.1 cm/s  MV E/E' septal: 39.21  MV E/E' lateral: 11.62      XR CHEST PORTABLE    Result Date: 5/8/2022  EXAMINATION: ONE XRAY VIEW OF THE CHEST 5/8/2022 2:52 pm COMPARISON: April 29, 2022 HISTORY: ORDERING SYSTEM PROVIDED HISTORY: Chest pain TECHNOLOGIST PROVIDED HISTORY: Reason for exam:->Chest pain Reason for Exam: Chest pain Additional signs and symptoms: NONE FINDINGS: Adequate inspiration is present. No focal area of consolidation, pleural effusion, or pneumothorax is present. A granuloma is present within the left lung base. Heart size appears normal.  Permanent cardiac pacemaker is in place. No acute osseous abnormality is present. No evidence of acute cardiopulmonary disease     XR CHEST PORTABLE    Result Date: 4/29/2022  EXAMINATION: ONE XRAY VIEW OF THE CHEST 4/29/2022 10:46 am COMPARISON: None. HISTORY: ORDERING SYSTEM PROVIDED HISTORY: cp/palpitatons TECHNOLOGIST PROVIDED HISTORY: Reason for exam:->cp/palpitatons Reason for Exam: cp/palpitatons Additional signs and symptoms: cp/palpitatons Relevant Medical/Surgical History: cp/palpitatons FINDINGS: The lungs are clear, no effusion. No pneumothorax. Heart is normal size. Left pacemaker Mediastinal and hilar contours are within normal limits. Bony thorax no acute abnormality. 1. No acute cardiopulmonary abnormality.      CTA CHEST W CONTRAST    Result Date: 5/8/2022  EXAMINATION: CTA OF THE CHEST 5/8/2022 7:20 pm TECHNIQUE: CTA of the chest was performed after the administration of intravenous contrast.  Multiplanar reformatted images are provided for review. MIP images are provided for review. Dose modulation, iterative reconstruction, and/or weight based adjustment of the mA/kV was utilized to reduce the radiation dose to as low as reasonably achievable. COMPARISON: 10/14/2011 HISTORY: ORDERING SYSTEM PROVIDED HISTORY: chest pain TECHNOLOGIST PROVIDED HISTORY: Reason for exam:->chest pain Decision Support Exception - unselect if not a suspected or confirmed emergency medical condition->Emergency Medical Condition (MA) Reason for Exam: chest pain FINDINGS: Pulmonary Arteries: There is a suboptimal contrast bolus. In addition, there is streak artifact generated by the patient's pacemaker. Consequently, the segmental and subsegmental pulmonary arterial branches are not well evaluated. No central pulmonary embolism is detected. Mediastinum: Visualized thyroid unremarkable. No lymphadenopathy is seen. The esophagus is patulous but otherwise unremarkable. Cardiac chambers are enlarged. No pericardial effusion is seen. The thoracic aorta is normal in caliber without evidence of dissection. Lungs/pleura: Mild to moderate peripheral fibrosis is noted, especially the upper lungs, and that appears is mildly progressed when compared to the previous exam from 2011 no acute infiltrate is identified. No pneumothorax identified. No pleural effusion is found. There is diffuse bronchial wall thickening. The airways are patent. Upper Abdomen: Periportal edema is identified within the liver. Images of the upper abdomen otherwise unremarkable. Soft Tissues/Bones: No acute or suspicious bony abnormalities are identified. Limited evaluation of the segmental and subsegmental pulmonary branches. No central pulmonary embolism is detected. Mild bronchial wall thickening, which may be seen in inflammatory conditions such as bronchitis, reactive airways disease, and smoking.  Mild to moderate peripheral fibrosis seen bilaterally, greater in the upper lungs, mildly progressed when compared to the previous exam. Periportal edema. That is a nonspecific finding, and can be secondary to localized disease (such as hepatitis), but can also be secondary to systemic etiologies (such as third-spacing). NM MYOCARDIAL SPECT REST EXERCISE OR RX    Result Date: 5/9/2022  Cardiac Perfusion Imaging   Demographics   Patient Name      Jojo Babin     Date of study        05/09/2022   Date of Birth     1945         Gender               Female   Age               67 year(s)         Race                    Patient Number    [id number]         Room Number          0476   Visit Number      [id number]          Height               65 inches   Corporate ID      [id number]           Weight               137 pounds   Accession Number  [id number]                                        NM Technologist      Jillian Disla, Gypsy Boyer MD        Cardiologist         Crystal LLANOS   Conclusions   Summary  Normal tracer uptake in all segments of myocardium on stress ans rest  images. Normal Lexiscan nuclear scintigraphic study suggestive of normal  myocardial perfusion. Gated images demonstrate normal left ventricular  systolic function with EF of 60 %. Recommendation  Continue present medical therapy and followup in office as scheduled. Signatures   ------------------------------------------------------------------  Electronically signed by Sara Chen MD  (Interpreting cardiologist) on 05/09/2022 at 12:19  ------------------------------------------------------------------  Procedure Procedure Type:   Nuclear Stress Test:Pharmacological, Myocardial Perfusion Imaging with  Pharm, NM MYOCARDIAL SPECT REST EXERCISE OR RX  Indications: Chest pain. Risk Factors   The patient risk factors include:hypercholesterolemia and hypertension.   Stress Protocols   Resting ECG  a fib Resting HR:76 bpm  Resting BP:109/65 mmHg  Stress Protocol:Pharmacologic - Lexiscan  Peak HR:85 bpm               HR/BP product:9265  Peak BP:109/65 mmHg  Predicted HR: 143 bpm  % of predicted HR: 59   Exercise duration: 01:03 min   Symptoms  No symptoms with Lexiscan infusion. Procedure Medications   - Lexiscan I.V. bolus (over 15sec.) 0.4 mg admininstered @ 05/09/2022 09:45. Imaging Protocols   Rest                             Stress   Isotope:Sestamibi 99mTc          Isotope: Sestamibi 99mTc  Isotope dose:10.6 mCi            Isotope dose:30.7 mCi  Administration route: I.V. Administration route: I.V. Injection Date:05/09/2022 08:00  Injection Date:05/09/2022 09:45  Scan Date:05/09/2022 08:45       Scan Date:05/09/2022 10:30   Technique:        SPECT          Technique:        Gated                                                     SPECT   Procedure Description   Upon patient arrival, the patient is identified using two identifiers and  the physician order is verified. An IV is established and 8-11mCi of 99mTc  Sestamibi is intravenously injected and followed with 10mL 0.9% Normal  Saline flush. A circulation period of 45 minutes occurs prior to resting  SPECT imaging. After imaging is complete the patient is escorted to the  stress lab. The patient is connected to the ECG and blood pressure is  measured. The RN starts the stress portion of the exam and rapidly  intravenously injects Lexiscan (regadenosine) 0.4mg over a period of 10 to15  seconds and follows with 5mL 0.9% Normal Saline flush. Immediately following  the Nuclear Technologist intravenously injects 22-33mCi of 99mTc Sestamibi  and 5mL 0.9% Normal Saline flush. After completion, recovery, and removal of  the IV, the patient rests during the second circulation period of 45  minutes. Final stress SPECT gated imaging is performed. The patient may  return home or to their room after stress imaging.  The images are processed  and final charting is completed and sent to the appropriate cardiologist for  interpretation and reporting. Perfusion Interpretation   Normal tracer uptake in all segments of myocardium on stress ans rest  images. Imaging Results    Summed scores     - Summed stress score: 4     - Summed rest score: 0     - Summed difference score:    4   Rest ejection  Ejection fraction:66 %  EDV :53 ml  ESV :18 ml  Stroke volume :35 ml  Medical History   Accession#:  [id number]  Admission Data Admission date: 05/08/2022 Admission Time: 20:36 Hospital Status: Outpatient. Treatments: As above. Discharge Medications:     Medication List      START taking these medications    aspirin 81 MG chewable tablet  Take 1 tablet by mouth daily  Start taking on: May 11, 2022        CHANGE how you take these medications    atorvastatin 40 MG tablet  Commonly known as: LIPITOR  TAKE 1 TABLET BY MOUTH EVERY DAY  What changed: medication strength     dilTIAZem 360 MG extended release capsule  Commonly known as: CARDIZEM CD  Take 1 capsule by mouth daily  What changed:   · medication strength  · how much to take        CONTINUE taking these medications    ALLEGRA ALLERGY PO     Dexilant 60 MG Cpdr delayed release capsule  Generic drug: dexlansoprazole     donepezil 10 MG tablet  Commonly known as: ARICEPT  Take 1 tablet by mouth daily     levothyroxine 50 MCG tablet  Commonly known as: SYNTHROID  TAKE 1 TABLET BY MOUTH EVERY DAY BEFORE BREAKFAST     metoprolol succinate 50 MG extended release tablet  Commonly known as: TOPROL XL  Take 1 tablet by mouth in the morning and at bedtime     Vitamin D 25 MCG (1000 UT) Tabs tablet  Commonly known as: CHOLECALCIFEROL     warfarin 3 MG tablet  Commonly known as: COUMADIN  Take as directed. If you are unsure how to take this medication, talk to your nurse or doctor.            Where to Get Your Medications      These medications were sent to [address] [place] [address]. - P [phone] - F [phone]  [address] [facility] [address]    Phone: [phone]   · aspirin 81 MG chewable tablet  · atorvastatin 40 MG tablet  · dilTIAZem 360 MG extended release capsule         Activity: activity as tolerated  Diet: ADULT DIET; Regular; Low Fat/Low Chol/High Fiber/2 gm Na      Disposition: home  Discharged Condition: Stable  Follow Up:   Vidal Faustin MD  [address] [address]  [phone]    Schedule an appointment as soon as possible for a visit in 1 week      Yoni Collins MD  [address] [address]  [phone]    Schedule an appointment as soon as possible for a visit in 1 week      Shilo Vail MD  [address] [facility]  [phone]    Schedule an appointment as soon as possible for a visit in 1 week        Code status:  Full Code     Total time spent on discharge, finalizing medications, referrals and arranging outpatient follow up was more than 30 minutes      Thank you Dr. Shilo Vail MD for the opportunity to be involved in this patients care.

## 2022-05-10 NOTE — TELEPHONE ENCOUNTER
From: Janna Muro  To: Dr. Serena Shultz: 5/9/2022 6:07 PM EDT  Subject: recent hospital stays    Elpidio Skaggs was in [facility] the week end of april 29 th Fri . till noon on Sunday heart issues . she was taken by squad last night . She a stress test today , sent home about 4 :45 today . then was called to come back , due to blood test results . We are confused , there is no chest pain or any other signs .

## 2022-05-10 NOTE — H&P
Hospital Medicine History and Physical    5/9/2022    Date of Admission: 5/9/2022    Date of Service: Pt seen/examined on 5/9/2022 and admitted to inpatient. Assessment/plan:  1. NSTEMI. Patient is on Coumadin but current INR is 1.4. Start heparin infusion. Continue antiplatelet therapy, high-dose statin. Nitroglycerin patch. As needed fentanyl for pain. Obtain serial troponin. Monitor on telemetry. N.p.o. after midnight. 2. Other comorbidities: history of dementia, paroxysmal atrial fibrillation (on chronic anticoagulation with Coumadin), chronic combined systolic and diastolic heart failure (most recent echocardiogram with ejection fraction of 45 to 05%, grade 2 diastolic dysfunction), history of essential hypertension, hypothyroidism, hyperlipidemia, history of DVT, GERD, diverticulosis, chronic thrombocytopenia. Activities: Bedrest  Prophylaxis: On heparin infusion  Code status: Full code    ==========================================================  Chief complaint:  Chief Complaint   Patient presents with    Shortness of Breath    Other     elevated trop       History of Presenting Illness: This is a pleasant 67 y.o. female with history of dementia, paroxysmal atrial fibrillation (on chronic anticoagulation with Coumadin), chronic combined systolic and diastolic heart failure (most recent echocardiogram with ejection fraction of 45 to 57%, grade 2 diastolic dysfunction), chronic thrombocytopenia, history of essential hypertension, hypothyroidism, hyperlipidemia, history of DVT, GERD, diverticulosis, who has had 2 hospitalizations in less than 2 weeks with presentation of chest pain, admitted to hospital for chest pain, discharged earlier today following negative troponin and stress test.  However, shortly after discharge, it was noted a follow-up troponin was just resulted and was elevated.   Patient was then called to present back to the emergency room, where she reports that she continues to have ongoing chest discomfort, albeit mild. Troponin obtained in the emergency room this evening remains elevated at 0.15, EKG with inverted T waves in inferior lateral leads. Cardiologist has been consulted from the emergency room, recommendation is to admit, start anticoagulation with plan for invasive cardiac testing in the morning. Past Medical History:      Diagnosis Date    Abnormal echocardiogram     EF 60%, mild aortic indsufficiency, mild pulmonary hypertension    Anemia     Aortic insufficiency     mild per echo 8/24/2010    Atrial fibrillation (HCC)     failed propafenone, Multaq and flecainide - 7/2011 plan for AFib ablation - [facility]- Dr Salome Kiser Atrial flutter ([facility] [address]     Bursitis, trochanteric 08/2004    s/p injection    Cerumen impaction 04/24/2012    Diverticulosis 2015    Dr. Belinda Greenwood C scope    Diverticulosis of colon 01/2010    Colonoscopy-Dr Reece ACUÑA (degenerative joint disease), cervical     cervical, generalized disc buldge   MRI 3/02    DVT (deep venous thrombosis) ([facility] [address]     Dyslipidemia 2002    Environmental allergies     Family history of colon cancer     mother    Gastric polyp     8/2006, 11/2009 benign- Dr Ayan Estrada Gastritis 04/2008    mild superficial per Dr Belinda Greenwood    GERD (gastroesophageal reflux disease) 08/2006    Dr Ayan Estrada H/O cardiovascular stress test 07/13/2004 07/13 EF58%, No scintigraphic evidence of inducible myocardial ischemia 04/13Normal study. No wall motion abnormality seen. EF 65%    H/O echocardiogram 07/18/2013 7/13-EF 50-55% Mild AR.      H/O exercise stress test 02/07/2017    EF63% normal    History of Holter monitoring 09/23/2015    48 hour - abnormal holter revealing afib throughout the recording with interspersed pacemaker beats, HR does not appear to be well controlled    History of mammogram     last mammo 7/25/11, Dr. Zohaib Swanson yearly    Hx of colonoscopy     1/21/2010    Hx of Doppler Venous ultrasound 08/31/2021    No DVT or SVT, No significant reflux in RLE, Significant reflux in Left CFV    Hyperlipidemia     Hypertension     Hypothyroidism     Internal hemorrhoid 2015    Dr. Rossy Chen C scope     Intervertebral disc protrusion 05/2009    wry neck with C4-5      Left shoulder pain 04/2004    (L) shoulder impingement  mild DJD    Long term current use of anticoagulant 07/26/2016    **Coumadin Clinic follows PT/INRs, along w/prescribing pt's Coumadin dosages. **    Menstruation     age 14    Pacemaker 06/21/2012    dual chamber- checked every 3 months    Pulmonary hypertension ([address]     mild per echo 8/24/2010, Pt refused sleep study (June 2012)    Restless legs 08/2003    ferritin    Right shoulder strain 02/2003    no seperation, bony contusion anterior humeral head  MRI 3/03    Sciatica 07/2009    (R) chronic intermittent s/p epidural injections  Dr Aaron Mcarthur Sciatica     Shoulder pain, left 03/2011    RTC tendinopathy, type II acrominum, bursitis- referred to Dr. Jhony Oconnell    Shoulder pain, right 03/10/2009    impingement, physical thearpy   (Main)  calcific bursitis  s/p injection    Sinus bradycardia     Sinusitis 12/12/2011    Tinnitus 03/2002    Vision changes 03/19/2013       Past Surgical History:      Procedure Laterality Date    ABLATION OF DYSRHYTHMIC FOCUS      BREAST BIOPSY      BREAST SURGERY  1985    Right breast tumor excised    CARDIOVERSION      1/2008 Dr Americo Cole; 12/2008    CARD[facility]ION  03/10/2014    [facility]    CHOLECYSTECTOMY, LAPAROSCOPIC  02/2001    Dr Ailyn Howard      [phone] (Dr Rossy Chen)   [address] [facility] HYSTERECTOMY  2003   [facility]  06/21/2012    [employer] Adapta ADDRL1 -not mri compatible   Limmie Dopp    Dr Jamel Bridges NANCY AND BSO  04/2003    fibroid      Dr Antonieta Baker  08/2006    Dr Nolasco Se ECHOCARDIOGRAM  06/2012    transthoracic cardioversion- [facility] [place]    UPPER GASTROINTESTINAL ENDOSCOPY  04/2008    mild superficial gastritis  Dr Fredrick Sharma; 2009       Medications (prior to admission):  Prior to Admission medications    Medication Sig Start Date End Date Taking? Authorizing Provider   dilTIAZem (CARDIZEM CD) 120 MG extended release capsule Take 1 capsule by mouth daily 5/9/22   Minh Guerrero PA-C   metoprolol succinate (TOPROL XL) 50 MG extended release tablet Take 1 tablet by mouth in the morning and at bedtime 5/1/22   Prosper Angelo MD   donepezil (ARICEPT) 10 MG tablet Take 1 tablet by mouth daily 4/14/22   Shital Molina DO   warfarin (COUMADIN) 3 MG tablet Take 3 mg by mouth daily    Historical Provider, MD   levothyroxine (SYNTHROID) 50 MCG tablet TAKE 1 TABLET BY MOUTH EVERY DAY BEFORE BREAKFAST 1/25/22   Nirali Lopez MD   atorvastatin (LIPITOR) 10 MG tablet TAKE 1 TABLET BY MOUTH EVERY DAY 10/19/21   Nirali Lopez MD   Vitamin D (CHOLECALCIFEROL) 25 MCG (1000 UT) TABS tablet Take 1,000 Units by mouth daily    Historical Provider, MD   Fexofenadine HCl (ALLEGRA ALLERGY PO) Take by mouth as needed     Historical Provider, MD   Dexlansoprazole (DEXILANT) 60 MG CPDR Take 1 tablet by mouth daily. 2/16/11   Historical Provider, MD       Allergy(ies):  Latex, Darvon [propoxyphene hcl], Demerol, Dilaudid [hydromorphone hcl], Valium, Celecoxib, Norco [hydrocodone-acetaminophen], Norvasc [amlodipine], Oxycodone, Tape [adhesive tape], Vasotec [enalapril], and Diazepam    Social History:  TOBACCO:  reports that she has never smoked. She has never used smokeless tobacco.  ETOH:  reports no history of alcohol use.     Family History:      Problem Relation Age of Onset   [de-identified] Stroke Mother     Heart Disease Mother     Coronary Art Dis Mother 66        CABG    Colon Cancer Mother [de-identified]        colon     Heart Disease Father     Coronary Art Dis Father 62        CABG    Migraines Sister     Seizures Brother     Breast Cancer Maternal Cousin         under 50       Review of Systems:  Pertinent positives are listed in HPI. At least 10-point ROS reviewed and were negative. Vitals and physical examination:  /77   Pulse 93   Temp 98.3 °F (36.8 °C) (Oral)   Resp 22   Ht 5' 5\" (1.651 m)   Wt 137 lb (62.1 kg)   SpO2 98%   BMI 22.80 kg/m²   Gen/overall appearance: Not in acute distress. Alert. Oriented X3   Head: Normocephalic, atraumatic  Eyes: EOMI, good acuity  ENT: Oral mucosa moist  Neck: No JVD, thyromegaly  CVS: Nml S1S2, no MRG. Irregular. Pulm: Clear bilaterally. No crackles/wheezes  Gastrointestinal: Soft, NT/ND, +BS  Musculoskeletal: No edema. Warm  Neuro: No focal deficit. Moves extremity spontaneously. Psychiatry: Appropriate affect. Not agitated. Skin: Warm, dry with normal turgor.  No rash  Capillary refill: Brisk,< 3 seconds   Peripheral Pulses: +2 palpable, equal bilaterally       Labs/imaging/EKG:  CBC:   Recent Labs     05/08/22 2045 05/09/22  1850   WBC 6.6 5.9   HGB 13.3 14.0   * 135*     BMP:    Recent Labs     05/08/22 2045 05/09/22  1850    140   K 3.9 4.2    105   CO2 23 24   BUN 23 14   CREATININE 1.0 0.9   GLUCOSE 105* 101*     Hepatic:   Recent Labs     05/08/22 2045 05/09/22  1850   AST 32 45*   ALT 31 35   BILITOT 0.8 1.5*   ALKPHOS 64 72       Echocardiogram complete 2D with doppler with color    Result Date: 4/29/2022  Transthoracic Echocardiography Report (TTE)  Demographics   Patient Name       Marcelo Garcia     Date of Study       04/29/2022   Date of Birth      1945         Gender              Female   Age                67 year(s)         Race                   Patient Number     [id number]         Room Number         ZB10   Visit Number       [id number]   Corporate ID       [id number]   Accession Number   [id number]         23 Jayshree Fuentes RVT   Ordering Physician Ruta Dance Interpreting        Larissa CRUZ-CNP           Physician           MD  Procedure Type of Study   TTE procedure:ECHOCARDIOGRAM COMPLETE 2D W DOPPLER W COLOR. Procedure Date Date: 04/29/2022 Start: 02:21 PM Study Location: Portable Technical Quality: Adequate visualization Indications:Ventricular tachycardia. Patient Status: Routine Height: 67 inches Weight: 130 pounds BSA: 1.68 m2 BMI: 20.36 kg/m2 HR: 106 bpm BP: 107/71 mmHg  Conclusions   Summary  Left ventricular systolic function is low normal.  Ejection fraction is visually estimated at 45-50%. Septal wall is hypokinetic  Grade II diastolic dysfunction. Moderately dilated left atrium. Moderate aortic regurgitation; PHT: 328 msec. Moderate tricuspid regurgitation; RVSP: 39 mmHg. Mild PHTN. No evidence of any pericardial effusion. Signature   ------------------------------------------------------------------  Electronically signed by Larissa Khoury MD (Interpreting  physician) on 04/29/2022 at 03:04 PM  ------------------------------------------------------------------   Findings   Left Ventricle  Left ventricular systolic function is low normal.  Ejection fraction is visually estimated at 45- 50%. Grade II diastolic dysfunction. septal wall is hypokinetic  Left ventricle size is normal.   Left Atrium  Moderately dilated left atrium. Right Atrium  Moderately dilated right atrium. Right Ventricle  PPM wiring visualized within the right ventricle. Aortic Valve  Moderate aortic regurgitation; PHT: 328 msec. Mitral Valve  Mild mitral regurgitation. Tricuspid Valve  Moderate tricuspid regurgitation; RVSP: 39 mmHg. Mild PHTN. Pulmonic Valve  The pulmonic valve was not well visualized. Pericardial Effusion  No evidence of any pericardial effusion. Pleural Effusion  No evidence of pleural effusion. Miscellaneous  Inferior vena cava is dilated, measuring at 2.2 cm, and does not collapse  with respiration. M-Mode/2D Measurements & Calculations   LV Diastolic Dimension:  LV Systolic Dimension:  LA Dimension: 4 cmAO Root  3.9 cm                   2.9 cm                  Dimension: 3 cmLA Area:  LV FS:25.6 %             LV Volume Diastolic: 37 82.9 cm2  LV PW Diastolic: 0.72 cm ml  LV PW Systolic: 4.08 cm  LV Volume Systolic: 18  Septum Diastolic: 0.91   ml  cm                       LV EDV/LV EDV Index: 37 RV Diastolic Dimension:  Septum Systolic: 2.74 cm QK/34 D7JD ESV/LV ESV   3.51 cm  CO: 6.44 l/min           Index: 18 ml/11 m2  CI: 3.83 l/m*m2          EF Calculated (A4C):    LA/Aorta: 1.33                           51.4 %  LV Area Diastolic: 89.3  EF Calculated (2D):     LA volume/Index: 75 ml  cm2                      51.1 %                  /43D3  LV Area Systolic: 65.0  cm2                      LV Length: 6.92 cm                            LVOT: 2.2 cm  Doppler Measurements & Calculations   MV Peak E-Wave: 129 cm/s                         LVOT Peak Velocity: 91 cm/s  MV Peak A-Wave: 109 cm/s                         LVOT Mean Velocity: 61.2  MV E/A Ratio: 1.18                               cm/s  MV Peak Gradient: 6.66                           LVOT Peak Gradient: 3  mmHg                     LVOT VTI: 16 cm         mmHgLVOT Mean Gradient: 2                           AV P1/2t: 328 msec      mmHg  MV P1/2t: 35 msec        Estimated PASP: 39.4    Estimated RVSP: 39 mmHg  MVA by PHT:6.29 cm2      mmHg                    Estimated RAP:15 mmHg   MV E' Septal Velocity:  3.29 cm/s                                        TR Velocity:247 cm/s  MV E' Lateral Velocity:                          TR Gradient:24.4 mmHg  11.1 cm/s  MV E/E' septal: 39.21  MV E/E' lateral: 11.62      XR CHEST PORTABLE    Result Date: 5/8/2022  EXAMINATION: ONE XRAY VIEW OF THE CHEST 5/8/2022 2:52 pm COMPARISON: April 29, 2022 HISTORY: ORDERING SYSTEM PROVIDED HISTORY: Chest pain TECHNOLOGIST PROVIDED HISTORY: Reason for exam:->Chest pain Reason for Exam: Chest pain Additional signs and symptoms: NONE FINDINGS: Adequate inspiration is present. No focal area of consolidation, pleural effusion, or pneumothorax is present. A granuloma is present within the left lung base. Heart size appears normal.  Permanent cardiac pacemaker is in place. No acute osseous abnormality is present. No evidence of acute cardiopulmonary disease     XR CHEST PORTABLE    Result Date: 4/29/2022  EXAMINATION: ONE XRAY VIEW OF THE CHEST 4/29/2022 10:46 am COMPARISON: None. HISTORY: ORDERING SYSTEM PROVIDED HISTORY: cp/palpitatons TECHNOLOGIST PROVIDED HISTORY: Reason for exam:->cp/palpitatons Reason for Exam: cp/palpitatons Additional signs and symptoms: cp/palpitatons Relevant Medical/Surgical History: cp/palpitatons FINDINGS: The lungs are clear, no effusion. No pneumothorax. Heart is normal size. Left pacemaker Mediastinal and hilar contours are within normal limits. Bony thorax no acute abnormality. 1. No acute cardiopulmonary abnormality. CTA CHEST W CONTRAST    Result Date: 5/8/2022  EXAMINATION: CTA OF THE CHEST 5/8/2022 7:20 pm TECHNIQUE: CTA of the chest was performed after the administration of intravenous contrast.  Multiplanar reformatted images are provided for review. MIP images are provided for review. Dose modulation, iterative reconstruction, and/or weight based adjustment of the mA/kV was utilized to reduce the radiation dose to as low as reasonably achievable. COMPARISON: 10/14/2011 HISTORY: ORDERING SYSTEM PROVIDED HISTORY: chest pain TECHNOLOGIST PROVIDED HISTORY: Reason for exam:->chest pain Decision Support Exception - unselect if not a suspected or confirmed emergency medical condition->Emergency Medical Condition (MA) Reason for Exam: chest pain FINDINGS: Pulmonary Arteries: There is a suboptimal contrast bolus. In addition, there is streak artifact generated by the patient's pacemaker.   Consequently, the segmental and subsegmental pulmonary arterial branches are not well evaluated. No central pulmonary embolism is detected. Mediastinum: Visualized thyroid unremarkable. No lymphadenopathy is seen. The esophagus is patulous but otherwise unremarkable. Cardiac chambers are enlarged. No pericardial effusion is seen. The thoracic aorta is normal in caliber without evidence of dissection. Lungs/pleura: Mild to moderate peripheral fibrosis is noted, especially the upper lungs, and that appears is mildly progressed when compared to the previous exam from 2011 no acute infiltrate is identified. No pneumothorax identified. No pleural effusion is found. There is diffuse bronchial wall thickening. The airways are patent. Upper Abdomen: Periportal edema is identified within the liver. Images of the upper abdomen otherwise unremarkable. Soft Tissues/Bones: No acute or suspicious bony abnormalities are identified. Limited evaluation of the segmental and subsegmental pulmonary branches. No central pulmonary embolism is detected. Mild bronchial wall thickening, which may be seen in inflammatory conditions such as bronchitis, reactive airways disease, and smoking. Mild to moderate peripheral fibrosis seen bilaterally, greater in the upper lungs, mildly progressed when compared to the previous exam. Periportal edema. That is a nonspecific finding, and can be secondary to localized disease (such as hepatitis), but can also be secondary to systemic etiologies (such as third-spacing).      NM MYOCARDIAL SPECT REST EXERCISE OR RX    Result Date: 5/9/2022  Cardiac Perfusion Imaging   Demographics   Patient Name      Lindsey Eisenberg     Date of study        05/09/2022   Date of Birth     1945         Gender               Female   Age               67 year(s)         Race                    Patient Number    [id number]         Room Number          4214   Visit Number      [id number]          Height               65 inches   Corporate ID      [id number] Weight               137 pounds   Accession Number  [id number]                                        NM Technologist      Abe Sanford MD        Cardiologist         Crystal LLANOS   Conclusions   Summary  Normal tracer uptake in all segments of myocardium on stress ans rest  images. Normal Lexiscan nuclear scintigraphic study suggestive of normal  myocardial perfusion. Gated images demonstrate normal left ventricular  systolic function with EF of 60 %. Recommendation  Continue present medical therapy and followup in office as scheduled. Signatures   ------------------------------------------------------------------  Electronically signed by Arun Burt MD  (Interpreting cardiologist) on 05/09/2022 at 12:19  ------------------------------------------------------------------  Procedure Procedure Type:   Nuclear Stress Test:Pharmacological, Myocardial Perfusion Imaging with  Pharm, NM MYOCARDIAL SPECT REST EXERCISE OR RX  Indications: Chest pain. Risk Factors   The patient risk factors include:hypercholesterolemia and hypertension. Stress Protocols   Resting ECG  a fib   Resting HR:76 bpm  Resting BP:109/65 mmHg  Stress Protocol:Pharmacologic - Lexiscan  Peak HR:85 bpm               HR/BP product:9265  Peak BP:109/65 mmHg  Predicted HR: 143 bpm  % of predicted HR: 59   Exercise duration: 01:03 min   Symptoms  No symptoms with Lexiscan infusion. Procedure Medications   - Lexiscan I.V. bolus (over 15sec.) 0.4 mg admininstered @ 05/09/2022 09:45. Imaging Protocols   Rest                             Stress   Isotope:Sestamibi 99mTc          Isotope: Sestamibi 99mTc  Isotope dose:10.6 mCi            Isotope dose:30.7 mCi  Administration route: I.V. Administration route: I.V.   Injection Date:05/09/2022 08:00  Injection Date:05/09/2022 09:45  Scan Date:05/09/2022 08:45       Scan Date:05/09/2022 10:30   Technique:        SPECT          Technique:        Gated                                                     SPECT   Procedure Description   Upon patient arrival, the patient is identified using two identifiers and  the physician order is verified. An IV is established and 8-11mCi of 99mTc  Sestamibi is intravenously injected and followed with 10mL 0.9% Normal  Saline flush. A circulation period of 45 minutes occurs prior to resting  SPECT imaging. After imaging is complete the patient is escorted to the  stress lab. The patient is connected to the ECG and blood pressure is  measured. The RN starts the stress portion of the exam and rapidly  intravenously injects Lexiscan (regadenosine) 0.4mg over a period of 10 to15  seconds and follows with 5mL 0.9% Normal Saline flush. Immediately following  the Nuclear Technologist intravenously injects 22-33mCi of 99mTc Sestamibi  and 5mL 0.9% Normal Saline flush. After completion, recovery, and removal of  the IV, the patient rests during the second circulation period of 45  minutes. Final stress SPECT gated imaging is performed. The patient may  return home or to their room after stress imaging. The images are processed  and final charting is completed and sent to the appropriate cardiologist for  interpretation and reporting. Perfusion Interpretation   Normal tracer uptake in all segments of myocardium on stress ans rest  images. Imaging Results    Summed scores     - Summed stress score: 4     - Summed rest score: 0     - Summed difference score:    4   Rest ejection  Ejection fraction:66 %  EDV :53 ml  ESV :18 ml  Stroke volume :35 ml  Medical History   Accession#:  [id number]  Admission Data Admission date: 05/08/2022 Admission Time: 20:36 Hospital Status: Outpatient. EKG: Atrial fibrillation, rate 89 beats per minutes. Inverted T waves in inferior lateral leads. PVCs present. No acute ST changes. . I reviewed EKG.     Discussed with ER physician.       Thank you Lynda Francisco MD for the opportunity to be involved in this patient's care.    -----------------------------  Hood Cobb MD  [facility]

## 2022-05-11 ENCOUNTER — CARE COORDINATION (OUTPATIENT)
Dept: CASE MANAGEMENT | Age: 77
End: 2022-05-11

## 2022-05-11 ENCOUNTER — TELEPHONE (OUTPATIENT)
Dept: FAMILY MEDICINE CLINIC | Age: 77
End: 2022-05-11

## 2022-05-11 DIAGNOSIS — I21.4 NSTEMI (NON-ST ELEVATED MYOCARDIAL INFARCTION) (HCC): Primary | ICD-10-CM

## 2022-05-11 PROCEDURE — 1111F DSCHRG MED/CURRENT MED MERGE: CPT | Performed by: FAMILY MEDICINE

## 2022-05-11 NOTE — CARE COORDINATION
[facility] 45 Transitions Initial Follow Up Call    Call within 2 business days of discharge: Yes    Patient: Celina Osborne Patient : 1945   MRN: [id number]  Reason for Admission: NSTEMI  Discharge Date: 5/10/22 RARS: Readmission Risk Score: 11.1 ( )      Last Discharge [facility]       Complaint Diagnosis Description Type Department Provider    22 Shortness of Breath; Other Dyspnea, unspecified type . .. ED to Hosp-Admission (Discharged) (ADMITTED) [facility] 3N Corrinne Plumber, MD; [place] Comment. .. Spoke with: Lester Orvilleon, patient's spouse    Non-face-to-face services provided:  Scheduled appointment with PCP-Appt on 22  Scheduled appointment with Specialist-Cardiology on 22 and EP on 22  Obtained and reviewed discharge summary and/or continuity of care documents  Education of patient/family/caregiver/guardian to support self-management-when to contact providers     Contacted patient for initial care transition follow up. Spoke with patient's spouse Lester Paul. He states that [address] did okay overnight. He denies [address] having any c/o chest pain/discomfort, increased shortness of breath (breathing at baseline), dizziness/lightheadedness, headaches, numbness/tingling. He reports that she is having back pain. Taking Tylenol which has been effective \"so far\". He states that she's had 2 episodes of loose stools this morning. She is eating and drinking fluids. Denies having any nausea/vomiting. Encouraged to stay hydrated. Advised to continue to monitor stools and if they continue, to contact PCP office or try something OTC anti-diarrheal.  Lester Paul states heart catheterization site \"looks good\". Denies having any signs/symptoms of infection. Reviewed medication list.  1111F completed. He is aware of the changes to Atorvastatin and Diltiazem. He states she is not taking Allegra and taking Zyrtec. Discussed when to contact providers with any new or worsening symptoms.   He verbalized understanding. She has follow ups scheduled with cardiology on 22 and EP on 22. Also discussed importance of following up with PCP. He is agreeable to have CTN schedule appointment. They are available any day and time. He states she prefers to see Dr. Gregorio Rogers but she only works on  and is agreeable to see NP. No other questions or needs at this time. CTN contacted PCP office. Spoke with Michelle. Appointment scheduled for Thursday, 22 at 11:30 with Jose Alfredo Calles CNP. CTN called patient's spouse back. Informed him of date and time of appointment with Jose Aflredo Calles CNP. Also reviewed appointment dates and times with cardiology and EP. He verbalized understanding. No questions or needs at this time. Transitions of Care Initial Call    Was this an external facility discharge? No Discharge Facility: [facility]     Challenges to be reviewed by the provider   Additional needs identified to be addressed with provider: No  none             Method of communication with provider : none    Advance Care Planning:   Does patient have an Advance Directive: Not on file    Care Transition Nurse (CTN) contacted the family by telephone to perform post hospital discharge assessment. Verified name and  with family as identifiers. Provided introduction to self, and explanation of the CTN role. CTN reviewed discharge instructions, medical action plan and red flags with family who verbalized understanding. Family given an opportunity to ask questions and does not have any further questions or concerns at this time. Were discharge instructions available to patient? Yes. Reviewed appropriate site of care based on symptoms and resources available to patient including: PCP  Specialist. The family agrees to contact the PCP office for questions related to their healthcare.      Medication reconciliation was performed with family, who verbalizes understanding of administration of home medications. Was patient discharged with a pulse oximeter? no    CTN provided contact information. Plan for follow-up call in 5-7 days based on severity of symptoms and risk factors.   Plan for next call: symptom management-any new or worsening symptoms (Loose stools, CP, SOB)      Care Transitions 24 Hour Call    Do you have a copy of your discharge instructions?: Yes  Do you have all of your prescriptions and are they filled?: Yes  Have you been contacted by a [address]?: No  Have you scheduled your follow up appointment?: Yes (Comment: PCP on 5/12/22; Cardiology on 5/13/22; EP on 5/17/22)  How are you going to get to your appointment?: Car - family or friend to transport  Do you feel like you have everything you need to keep you well at home?: Yes  Care Transitions Interventions     Transportation Support: Declined      DME Assistance: [address] [place]: Declined    Disease Association: Completed             Follow Up  Future Appointments   Date Time Provider Ritu Lamas   5/12/2022 11:30 AM ANTHONY Klein  [facility]   5/13/2022 10:20 AM ANTHONY Mcmahon CNP Critical access hospital [facility]   5/17/2022 10:00 AM ANTHONY Collazo [address] [facility]   5/26/2022 10:30 AM [address] [place] [address]   6/29/2022  3:45 PM Rony Friedman MD [address]   8/10/2022  1:00 PM Anita Valenzuela MD Critical access hospital [facility]   11/8/2022  9:30 AM Maurisio Torres MD [facility]       Belinda Escobedo, SHRUTHI

## 2022-05-11 NOTE — TELEPHONE ENCOUNTER
[facility] 45 Transitions Initial Follow Up Call    Outreach made within 2 business days of discharge: Yes    Patient: Herb Bee Patient : 1945   MRN: [id number]  Reason for Admission: There are no discharge diagnoses documented for the most recent discharge. Discharge Date: 5/10/22       Spoke with: patient     Discharge department/facility: [facility]    TCM Interactive Patient Contact:  Was patient able to fill all prescriptions: Yes  Was patient instructed to bring all medications to the follow-up visit: Yes  Is patient taking all medications as directed in the discharge summary?  Yes  Does patient understand their discharge instructions: Yes  Does patient have questions or concerns that need addressed prior to 7-14 day follow up office visit: no    Scheduled appointment with PCP within 7-14 days    Follow Up  Future Appointments   Date Time Provider Ritu Lamas   2022 11:30 AM ANTHONY Chapa  [facility]   2022 10:20 AM ANTHONY Rodrigues CNP [facility]   2022 10:00 AM ANTHONY Duncan CNP [facility]   2022 10:30  [facility]GT Georgiana Roxo   2022  3:45 PM Alvarez Barber MD [address] [place]   8/10/2022  1:00 PM Javed Aragon MD [facility]   2022  9:30 AM Darell Del Toro  [facility]       Saira Zhu MA

## 2022-05-12 ENCOUNTER — OFFICE VISIT (OUTPATIENT)
Dept: FAMILY MEDICINE CLINIC | Age: 77
End: 2022-05-12
Payer: MEDICARE

## 2022-05-12 VITALS
DIASTOLIC BLOOD PRESSURE: 68 MMHG | OXYGEN SATURATION: 98 % | SYSTOLIC BLOOD PRESSURE: 118 MMHG | WEIGHT: 131.6 LBS | HEIGHT: 65 IN | BODY MASS INDEX: 21.92 KG/M2 | HEART RATE: 82 BPM

## 2022-05-12 DIAGNOSIS — I21.4 NSTEMI (NON-ST ELEVATED MYOCARDIAL INFARCTION) (HCC): ICD-10-CM

## 2022-05-12 DIAGNOSIS — Z79.01 LONG TERM CURRENT USE OF ANTICOAGULANT: ICD-10-CM

## 2022-05-12 DIAGNOSIS — Z09 HOSPITAL DISCHARGE FOLLOW-UP: Primary | ICD-10-CM

## 2022-05-12 DIAGNOSIS — E78.2 MIXED HYPERLIPIDEMIA: ICD-10-CM

## 2022-05-12 DIAGNOSIS — I48.0 PAF (PAROXYSMAL ATRIAL FIBRILLATION) (HCC): ICD-10-CM

## 2022-05-12 DIAGNOSIS — I10 ESSENTIAL HYPERTENSION: ICD-10-CM

## 2022-05-12 LAB — ANTI-NUCLEAR ANTIBODY (ANA): NORMAL

## 2022-05-12 PROCEDURE — 1111F DSCHRG MED/CURRENT MED MERGE: CPT | Performed by: NURSE PRACTITIONER

## 2022-05-12 PROCEDURE — 99496 TRANSJ CARE MGMT HIGH F2F 7D: CPT | Performed by: NURSE PRACTITIONER

## 2022-05-12 RX ORDER — CETIRIZINE HYDROCHLORIDE 10 MG/1
10 TABLET ORAL DAILY
COMMUNITY

## 2022-05-12 ASSESSMENT — PATIENT HEALTH QUESTIONNAIRE - PHQ9
SUM OF ALL RESPONSES TO PHQ9 QUESTIONS 1 & 2: 0
1. LITTLE INTEREST OR PLEASURE IN DOING THINGS: 0
SUM OF ALL RESPONSES TO PHQ QUESTIONS 1-9: 0
2. FEELING DOWN, DEPRESSED OR HOPELESS: 0
SUM OF ALL RESPONSES TO PHQ QUESTIONS 1-9: 0

## 2022-05-12 NOTE — PROGRESS NOTES
Post-Discharge Transitional Care  Follow Up      Janna Muro   YOB: 1945    Date of Office Visit:  5/12/2022  Date of Hospital Admission: 5/9/22  Date of Hospital Discharge: 5/10/22  Risk of hospital readmission (high >=14%. Medium >=10%) :Readmission Risk Score: 11.1 ( )      Care management risk score Rising risk (score 2-5) and Complex Care (Scores >=6): 1     Non face to face  following discharge, date last encounter closed (first attempt may have been earlier): 5/11/2022 10:46 AM    Call initiated 2 business days of discharge: Yes    ASSESSMENT/PLAN:     Medical Decision Making: high complexity  No follow-ups on file. On this date 5/12/2022 I have spent 25 minutes reviewing previous notes, test results and face to face with the patient discussing the diagnosis and importance of compliance with the treatment plan as well as documenting on the day of the visit.        Subjective:   HPI:  Follow up of Hospital problems/diagnosis(es):         Per hospital discharge note     Brief hospital course: 70-year-old female with history of dementia, paroxysmal  atrial fibrillation (on chronic anticoagulation with Coumadin), chronic combined  systolic and diastolic heart failure (most recent echocardiogram with ejection  fraction of 45 to 62%, grade 2 diastolic dysfunction), chronic thrombocytopenia,  history of essential hypertension, hypothyroidism, hyperlipidemia, history of DVT,  GERD, diverticulosis, who has had 2 hospitalizations in less than 2 weeks with  presentation of chest pain, admitted to hospital for chest pain, discharged on  5/9/2022 following negative troponin and stress test.  However, shortly after  discharge, it  was noted that a follow-up troponin was resulted and was elevated.   Patient was then called to present back to the emergency room, where she  reports that she continues to  have ongoing chest discomfort.  Troponin obtained in  the emergency room remains elevated at 0.15, EKG with inverted T waves in  Inferior lateral leads.  Cardiologist was consulted from the emergency room,  recommended admission and initiation of anticoagulation with plan for invasive  cardiac testing.     1. NSTEMI.   Patient was on heparin infusion which has since been discontinued since cardiac catheterization did not reveal significant disease; she does have 60 to 70% stenosis in ostial, for which medical management is recommended. Continue antiplatelet therapy, beta-blocker, statin. 2. Wide complex tachycardia. Patient has been evaluated by EP and they have recommended increasing cardizem from 120 to 360 and adjustments made to pacer/defibrillator. Patient to follow up with EP as outpatient. Patient to resume coumadin as outpatient, INR check in 2 days.       Discharge Diagnoses:    Principal Problem:     NSTEMI (non-ST elevated myocardial infarction) ([address]   Active Problems:     PAF (paroxysmal atrial fibrillation) ([facility])     Essential hypertension     Mixed hyperlipidemia     Pacemaker dual chamber     Dyspnea     S/P ablation of atrial fibrillation          Has been helping with HPI today. Reports the hospital added metoprolol and Lipitor at bedtime. Also reports that they increased her Cardizem dose  Patient denies any complaints today. Denies chest pain shortness of breath dizziness headache heart palpitations nausea vomiting diarrhea or constipation  Reports that she is fatigued and has been chronically, but worse since other hospital admissions   and wife do acknowledge that there is an appointment tomorrow with cardiology in 5/17/2022 with electrophysiology. Coumadin is not new to her-history of A.  Fib  She is following with Coumadin clinic  Denies any excessive bruising or bleeding, blood in stools   and wife adamantly deny concerns toda    Reports wound to right chest after removing EKG leads  No drainage or open wound present      Inpatient course: Discharge summary reviewed- see chart. Interval history/Current status: Stable. Resolved symptoms    Patient Active Problem List   Diagnosis    PAF (paroxysmal atrial fibrillation) (HCC)    Mixed hyperlipidemia    Allergic rhinitis    Acquired hypothyroidism    Long term current use of anticoagulant    Pacemaker dual chamber    Sciatica    Gout    Essential hypertension    Anemia    Gastroesophageal reflux disease without esophagitis    Hip bursitis    Hearing loss of both ears due to cerumen impaction    Mitral valve disease    S/P ablation of atrial fibrillation    Symptomatic bradycardia    Memory problem    Chest pain    NSTEMI (non-ST elevated [facility] infarction) [facility]    Dyspnea       Medications listed as ordered at the time of discharge from hospital     Medication List          Accurate as of May 12, 2022 12:08 PM. If you have any questions, ask your nurse or doctor. CONTINUE taking these medications    ALLEGRA ALLERGY PO     aspirin 81 MG chewable tablet  Take 1 tablet by mouth daily     atorvastatin 40 MG tablet  Commonly known as: LIPITOR  TAKE 1 TABLET BY MOUTH EVERY DAY     cetirizine 10 MG tablet  Commonly known as: ZYRTEC     Dexilant 60 MG Cpdr delayed release capsule  Generic drug: dexlansoprazole     dilTIAZem 360 MG extended release capsule  Commonly known as: CARDIZEM CD  Take 1 capsule by mouth daily     donepezil 10 MG tablet  Commonly known as: ARICEPT  Take 1 tablet by mouth daily     levothyroxine 50 MCG tablet  Commonly known as: SYNTHROID  TAKE 1 TABLET BY MOUTH EVERY DAY BEFORE BREAKFAST     metoprolol succinate 50 MG extended release tablet  Commonly known as: TOPROL XL  Take 1 tablet by mouth in the morning and at bedtime     Vitamin D 25 MCG (1000 UT) Tabs tablet  Commonly known as: CHOLECALCIFEROL     warfarin 3 MG tablet  Commonly known as: COUMADIN  Take as directed by the anticoagulation clinic.  If you are unsure how to take this medication, talk to your nurse or doctor. Medications marked \"taking\" at this time  Outpatient Medications Marked as Taking for the 5/12/22 encounter (Office Visit) with ANTHONY Monk NP   Medication Sig Dispense Refill    cetirizine (ZYRTEC) 10 MG tablet Take 10 mg by mouth daily      aspirin 81 MG chewable tablet Take 1 tablet by mouth daily 30 tablet 3    atorvastatin (LIPITOR) 40 MG tablet TAKE 1 TABLET BY MOUTH EVERY DAY 30 tablet 0    dilTIAZem (CARDIZEM CD) 360 MG extended release capsule Take 1 capsule by mouth daily 30 capsule 0    metoprolol succinate (TOPROL XL) 50 MG extended release tablet Take 1 tablet by mouth in the morning and at bedtime 30 tablet 3    donepezil (ARICEPT) 10 MG tablet Take 1 tablet by mouth daily 30 tablet 5    warfarin (COUMADIN) 3 MG tablet Take 3 mg by mouth daily      levothyroxine (SYNTHROID) 50 MCG tablet TAKE 1 TABLET BY MOUTH EVERY DAY BEFORE BREAKFAST 90 tablet 3    Vitamin D (CHOLECALCIFEROL) 25 MCG (1000 UT) TABS tablet Take 1,000 Units by mouth daily      Fexofenadine HCl (ALLEGRA ALLERGY PO) Take by mouth as needed       Dexlansoprazole (DEXILANT) 60 MG CPDR Take 1 tablet by mouth daily. Medications patient taking as of now reconciled against medications ordered at time of hospital discharge: Yes        Objective:    /68 (Site: Right Upper Arm, Position: Sitting, Cuff Size: Medium Adult)   Pulse 82   Ht 5' 5\" (1.651 m)   Wt 131 lb 9.6 oz (59.7 kg)   SpO2 98%   BMI 21.90 kg/m²         Physical Exam  Vitals reviewed. Constitutional:       General: She is not in acute distress. Appearance: Normal appearance. She is not ill-appearing, toxic-appearing or diaphoretic. HENT:      Head: Normocephalic and atraumatic. Nose: Nose normal.   Eyes:      Extraocular Movements: Extraocular movements intact. Pupils: Pupils are equal, round, and reactive to light. Cardiovascular:      Rate and Rhythm: Normal rate and regular rhythm.       Heart sounds: Normal heart sounds. Pulmonary:      Effort: Pulmonary effort is normal.      Breath sounds: Normal breath sounds. Abdominal:      General: Bowel sounds are normal. There is no distension. Palpations: Abdomen is soft. There is no mass. Tenderness: There is no abdominal tenderness. Hernia: No hernia is present. Musculoskeletal:         General: Normal range of motion. Cervical back: Normal range of motion and neck supple. Right lower leg: No edema. Left lower leg: No edema. Skin:     General: Skin is warm and dry. Comments: Scabbed laceration to right chest   Neurological:      Mental Status: She is alert and oriented to person, place, and time. Mental status is at baseline. Psychiatric:         Mood and Affect: Mood normal.         Judgment: Judgment normal.         Hospital discharge follow-up  A. Fib  Long-term use of anticoagulant-Coumadin  NSTEMI  Hyperlipidemia  Essential hypertension      PLAN:  Medications reviewed. Discussed the importance of taking Lipitor at night. Discussed possible shortness of breath and fatigue with metoprolol. Discussed low-sodium diet and her weight today. Has lost weight recently due to  cutting out carbohydrates and patient following institute as well as being ill over the last few months, in and out of the hospital.  Discussed trying Ensure shakes. Also discussed cleaning right chest laceration with soap and water. It is scabbed over. Advised to not remove any EKG stickers without soap and water. Follow-up with cardiology tomorrow 5/13/2022 and electrophysiology 5/17/2022 as scheduled. Also follow-up with Coumadin clinic. Patient and spouse state their understanding of plan. An electronic signature was used to authenticate this note.   --ANTHONY Rivera - SALLY

## 2022-05-13 ENCOUNTER — OFFICE VISIT (OUTPATIENT)
Dept: CARDIOLOGY CLINIC | Age: 77
End: 2022-05-13
Payer: MEDICARE

## 2022-05-13 VITALS
HEIGHT: 65 IN | WEIGHT: 132.8 LBS | HEART RATE: 74 BPM | SYSTOLIC BLOOD PRESSURE: 124 MMHG | BODY MASS INDEX: 22.13 KG/M2 | DIASTOLIC BLOOD PRESSURE: 68 MMHG

## 2022-05-13 DIAGNOSIS — Z95.0 CARDIAC PACEMAKER IN SITU: Primary | ICD-10-CM

## 2022-05-13 DIAGNOSIS — I10 ESSENTIAL HYPERTENSION: ICD-10-CM

## 2022-05-13 DIAGNOSIS — I48.0 PAF (PAROXYSMAL ATRIAL FIBRILLATION) (HCC): ICD-10-CM

## 2022-05-13 DIAGNOSIS — E78.5 HYPERLIPIDEMIA, UNSPECIFIED HYPERLIPIDEMIA TYPE: ICD-10-CM

## 2022-05-13 DIAGNOSIS — Z09 HOSPITAL DISCHARGE FOLLOW-UP: ICD-10-CM

## 2022-05-13 PROCEDURE — 99214 OFFICE O/P EST MOD 30 MIN: CPT | Performed by: NURSE PRACTITIONER

## 2022-05-13 PROCEDURE — 1036F TOBACCO NON-USER: CPT | Performed by: NURSE PRACTITIONER

## 2022-05-13 PROCEDURE — G8399 PT W/DXA RESULTS DOCUMENT: HCPCS | Performed by: NURSE PRACTITIONER

## 2022-05-13 PROCEDURE — G8427 DOCREV CUR MEDS BY ELIG CLIN: HCPCS | Performed by: NURSE PRACTITIONER

## 2022-05-13 PROCEDURE — 1111F DSCHRG MED/CURRENT MED MERGE: CPT | Performed by: NURSE PRACTITIONER

## 2022-05-13 PROCEDURE — G8420 CALC BMI NORM PARAMETERS: HCPCS | Performed by: NURSE PRACTITIONER

## 2022-05-13 PROCEDURE — 1090F PRES/ABSN URINE INCON ASSESS: CPT | Performed by: NURSE PRACTITIONER

## 2022-05-13 PROCEDURE — 1123F ACP DISCUSS/DSCN MKR DOCD: CPT | Performed by: NURSE PRACTITIONER

## 2022-05-13 NOTE — PROGRESS NOTES
5/13/2022  Primary cardiologist: Dr. Li Abler:   Josefa Stanton  is an established 67 y.o.  female here for a follow up on left heart catheterization. SUBJECTIVE/OBJECTIVE:  Josefa Stanton is a 67 y.o. female with a history of   1. Cardiac pacemaker in situ    2. PAF (paroxysmal atrial fibrillation) ([address]    3. Essential hypertension    4. Hyperlipidemia, unspecified hyperlipidemia type      HPI :   Josefa Stanton reports that she is feeling well post [facility]. She is  Not having any chest pain or issues     Review of Systems   Constitutional: Negative for malaise/fatigue. Eyes: Negative for visual disturbance. Cardiovascular: Negative for chest pain, claudication, cyanosis, dyspnea on exertion, irregular heartbeat, leg swelling, near-syncope, orthopnea, palpitations, paroxysmal nocturnal dyspnea and syncope. Respiratory: Negative for shortness of breath and sleep disturbances due to breathing. Neurological: Negative for focal weakness, light-headedness and weakness. Psychiatric/Behavioral: Negative for depression. The patient is not nervous/anxious. Vitals:    05/13/22 1021   BP: 124/68   Pulse: 74   Weight: 132 lb 12.8 oz (60.2 kg)   Height: 5' 5\" (1.651 m)     No flowsheet data found. Wt Readings from Last 3 Encounters:   05/13/22 132 lb 12.8 oz (60.2 kg)   05/12/22 131 lb 9.6 oz (59.7 kg)   05/10/22 137 lb (62.1 kg)     Body mass index is 22.1 kg/m². Physical Exam  Vitals reviewed. Constitutional:       General: She is not in acute distress. Appearance: Normal appearance. She is obese. She is not ill-appearing. HENT:      Head: Atraumatic. Neck:      Vascular: No carotid bruit. Cardiovascular:      Rate and Rhythm: Normal rate and regular rhythm. Pulses: Normal pulses. Heart sounds: Normal heart sounds. No murmur heard. Pulmonary:      Effort: Pulmonary effort is normal. No respiratory distress. Breath sounds: Normal breath sounds.    Musculoskeletal:         General: No swelling or deformity. Cervical back: Neck supple. No muscular tenderness. Skin:     Comments: right groin site is soft without hematoma, bruising or bleeding noted. Neurological:      Mental Status: She is alert. Current Outpatient Medications   Medication Sig Dispense Refill    cetirizine (ZYRTEC) 10 MG tablet Take 10 mg by mouth daily      aspirin 81 MG chewable tablet Take 1 tablet by mouth daily 30 tablet 3    atorvastatin (LIPITOR) 40 MG tablet TAKE 1 TABLET BY MOUTH EVERY DAY 30 tablet 0    dilTIAZem (CARDIZEM CD) 360 MG extended release capsule Take 1 capsule by mouth daily 30 capsule 0    metoprolol succinate (TOPROL XL) 50 MG extended release tablet Take 1 tablet by mouth in the morning and at bedtime 30 tablet 3    donepezil (ARICEPT) 10 MG tablet Take 1 tablet by mouth daily 30 tablet 5    warfarin (COUMADIN) 3 MG tablet Take 3 mg by mouth daily      levothyroxine (SYNTHROID) 50 MCG tablet TAKE 1 TABLET BY MOUTH EVERY DAY BEFORE BREAKFAST 90 tablet 3    Vitamin D (CHOLECALCIFEROL) 25 MCG (1000 UT) TABS tablet Take 1,000 Units by mouth daily      Fexofenadine HCl (ALLEGRA ALLERGY PO) Take by mouth as needed       Dexlansoprazole (DEXILANT) 60 MG CPDR Take 1 tablet by mouth daily. No current facility-administered medications for this visit. 5/9/2022 NM Stress test   Summary   Normal tracer uptake in all segments of myocardium on stress ans rest   images. Normal Lexiscan nuclear scintigraphic study suggestive of normal   myocardial perfusion. Gated images demonstrate normal left ventricular   systolic function with EF of 60 %. 5/9/2022 [facility]   FINDINGS:   Right dominant system. No significant CAD except 60 to 70 % disease of the ostium of a   Smaller Diagonal.      LV: Low normal LV function LVEF about 50 %.     All pertinent data reviewed and discussed with patient      4/29/2022 Echo   Summary   Left ventricular systolic function is low normal. Ejection fraction is visually estimated at 45-50%. Septal wall is hypokinetic   Grade II diastolic dysfunction. Moderately dilated left atrium. Moderate aortic regurgitation; PHT: 328 msec. Moderate tricuspid regurgitation; RVSP: 39 mmHg. Mild PHTN. No evidence of any pericardial effusion. ASSESSMENT/PLAN:    Chest pain  Resolved. LHC 80% ostial OM small caliber. - no intervention  ? Tachycardia induced elevation of troponin. Atrial fibrillation  Well controlled  Continue metoprolol and cardizem and ASA and coumadin as chads vasc score is 5. Atrial Fibrillation CHADSVASC2 Score Stroke Risk:   67 y.o. > 76 - 2    female Female - 1   CHF HX: No - 0   HTN HX: Yes - 1   Stroke/TIA/Thromboembolism No - 0   Vascular Disease HX: Yes - 1   Diabetes Mellitus No - 0   CHADSVASC 2 Score 5      Annual Stroke Risk 7.2% - moderate-high        Hyperlipidemia  Lipid panel reviewed  On lipitor. No reported adverse events or reported side effects from medication(s). She is stable on current dose. Medications reviewed and confirmed with patient         Tests ordered:  None. Follow-up  As scheduled     Signed:  ANTHONY Dong CNP, 5/13/2022, 10:43 AM    An electronic signature was used to authenticate this note. Please note this report has been partially produced using speech recognition software and may contain errors related to that system including errors in grammar, punctuation, and spelling, as well as words and phrases that may be inappropriate. If there are any questions or concerns please feel free to contact the dictating provider for clarification.

## 2022-05-16 ENCOUNTER — CARE COORDINATION (OUTPATIENT)
Dept: CASE MANAGEMENT | Age: 77
End: 2022-05-16

## 2022-05-16 NOTE — CARE COORDINATION
[facility] Transitions Follow Up Call    2022    Patient: Carrie Richards  Patient : 1945   MRN: [id number]  Reason for Admission: NSTEMI  Discharge Date: 5/10/22 RARS: Readmission Risk Score: 11.1 ( )         Spoke with: Carrie Richards, patient    Contacted patient for care transition follow up. Spoke very briefly with patient. Nathen Henry states that she is doing better. Denies having any c/o chest pain/discomfort, increased shortness of breath although she does become short of breath with exertion at times, dizziness/lightheadedness, headaches, numbness/tingling. She reports she continues to have back pain but is managed by Tylenol. She had follow up with PCP office and cardiology. She is aware of when to contact providers. No questions or needs at this time. Care Transitions Follow Up Call    Needs to be reviewed by the provider   Additional needs identified to be addressed with provider: No  none             Method of communication with provider : none      Care Transition Nurse (CTN) contacted the patient by telephone to follow up after admission on 22-5/10/22. Verified name and  with patient as identifiers. Addressed changes since last contact: none-symptoms have improved  Discussed follow-up appointments. If no appointment was previously scheduled, appointment scheduling offered: Yes. Is follow up appointment scheduled within 7 days of discharge? Yes. CTN reviewed discharge instructions, medical action plan and red flags with patient and discussed any barriers to care and/or understanding of plan of care after discharge. Discussed appropriate site of care based on symptoms and resources available to patient including: PCP  Specialist. The patient agrees to contact the PCP office for questions related to their healthcare.      Patients top risk factors for readmission: medical condition-NSTEMI, AFIB, HTN  Interventions to address risk factors: Education of patient/family/caregiver/guardian to support self-management-when to contact providers    CTN provided contact information for future needs. Plan for follow-up call in 7-10 days based on severity of symptoms and risk factors. Plan for next call: symptom management-any new or worsening symptoms    Care Transitions Subsequent and Final Call    Subsequent and Final Calls  Do you have any ongoing symptoms?: Yes  Patient-reported symptoms: Pain, Shortness of Breath  Do you currently have any active services?: No  Do you have any needs or concerns that I can assist you with?: No  Care Transitions Interventions     Transportation Support: Declined      DME Assistance: Declined     Senior Services: Declined    Disease Association: Completed      Other Interventions:            Follow Up  Future Appointments   Date Time Provider Ritu Lamas   5/17/2022 10:00 AM ANTHONY Valiente - CNP [facility]   5/26/2022 10:30 AM [address] [place] [address]   6/1/2022 12:30 PM [address], MD [address] [place]   6/29/2022  3:45 PM Nestor Ramos MD [address] [place]   8/10/2022  1:00 PM Randy Johnston MD [facility]   11/8/2022  9:30 AM Negrito Gunter MD [facility] GLORY Pino RN

## 2022-05-17 ENCOUNTER — OFFICE VISIT (OUTPATIENT)
Dept: CARDIOLOGY CLINIC | Age: 77
End: 2022-05-17
Payer: MEDICARE

## 2022-05-17 VITALS
HEART RATE: 71 BPM | SYSTOLIC BLOOD PRESSURE: 108 MMHG | BODY MASS INDEX: 21.19 KG/M2 | DIASTOLIC BLOOD PRESSURE: 60 MMHG | WEIGHT: 135 LBS | HEIGHT: 67 IN

## 2022-05-17 DIAGNOSIS — Z09 HOSPITAL DISCHARGE FOLLOW-UP: ICD-10-CM

## 2022-05-17 DIAGNOSIS — Z95.0 PACEMAKER: ICD-10-CM

## 2022-05-17 DIAGNOSIS — I48.0 PAF (PAROXYSMAL ATRIAL FIBRILLATION) (HCC): ICD-10-CM

## 2022-05-17 DIAGNOSIS — I48.20 CHRONIC ATRIAL FIBRILLATION (HCC): Primary | ICD-10-CM

## 2022-05-17 DIAGNOSIS — I10 ESSENTIAL HYPERTENSION: ICD-10-CM

## 2022-05-17 PROCEDURE — G8427 DOCREV CUR MEDS BY ELIG CLIN: HCPCS | Performed by: NURSE PRACTITIONER

## 2022-05-17 PROCEDURE — G8399 PT W/DXA RESULTS DOCUMENT: HCPCS | Performed by: NURSE PRACTITIONER

## 2022-05-17 PROCEDURE — 1111F DSCHRG MED/CURRENT MED MERGE: CPT | Performed by: NURSE PRACTITIONER

## 2022-05-17 PROCEDURE — 1123F ACP DISCUSS/DSCN MKR DOCD: CPT | Performed by: NURSE PRACTITIONER

## 2022-05-17 PROCEDURE — G8420 CALC BMI NORM PARAMETERS: HCPCS | Performed by: NURSE PRACTITIONER

## 2022-05-17 PROCEDURE — 99214 OFFICE O/P EST MOD 30 MIN: CPT | Performed by: NURSE PRACTITIONER

## 2022-05-17 PROCEDURE — 1036F TOBACCO NON-USER: CPT | Performed by: NURSE PRACTITIONER

## 2022-05-17 PROCEDURE — 4040F PNEUMOC VAC/ADMIN/RCVD: CPT | Performed by: NURSE PRACTITIONER

## 2022-05-17 PROCEDURE — 93288 INTERROG EVL PM/LDLS PM IP: CPT | Performed by: NURSE PRACTITIONER

## 2022-05-17 PROCEDURE — 1090F PRES/ABSN URINE INCON ASSESS: CPT | Performed by: NURSE PRACTITIONER

## 2022-05-17 ASSESSMENT — ENCOUNTER SYMPTOMS
NAUSEA: 0
DIARRHEA: 0
SHORTNESS OF BREATH: 1
BLOOD IN STOOL: 0
ABDOMINAL PAIN: 0
COLOR CHANGE: 0
CONSTIPATION: 0
CHEST TIGHTNESS: 0
VOMITING: 0
EYE PAIN: 0
PHOTOPHOBIA: 0
BACK PAIN: 0
WHEEZING: 0
COUGH: 0

## 2022-05-17 NOTE — PROGRESS NOTES
Electrophysiology Follow up Note      Reason for consultation:  NSVT    Chief complaint: denies cardiac complaints    Referring physician: Megan Mendoza      Primary care physician: Venkata Anand MD      History of Present Illness: This visit 5/19/2022  Patient here today for follow-up on atrial fibrillation. Patient reports that she has 1 episode of palpitations since discharge from the hospital.  She states that overall she is feeling well. She denies chest pain, shortness of breath, lightheadedness, dizziness, edema or syncope. He is not drinking caffeine. She does not exercise. She does not smoke tobacco or drink alcohol. Patient had recent left heart cath on May 10. Patient did not require intervention at that time. Previous visit  Claudia Brantley is a 67year old female with a history of dementia, persistent atrial fibrillation, chronic anticoagulation, thrombocytopenia, hypertension, hypothyroidism, hyperlipdemia, DVT, dual chamber pacemaker presents with complaints of shortness of breath with exertion and chest pressure. Patient was admitted and had noted negative troponin and normal mycardial perfusion on stress test.   Patient was discharged home. Per chart patient had a troponin to result after patient was discharged that was elevated and patient was called back to the the hospital. Patient reports that she continued to have chest pressure. Patient had an EKG with noted inverted T waves. Patient was admitted, started on heparin drip and went for left heart catheterization this morning. LHC revealed no significant CAD except for 60-70% disease of the ostium. EP was consulted for concern for NSVT noted on tele and on pacer report. Patient reports that she is in atrial fibrillation consistently. She states that she will intermittently have palpitations that come on suddenly and resolve. She denies aggravating or alleviating factors.  She denies dizziness, lightheadedness, edema, or syncope. Patient has had failed cardioversion in 6/2015. She had ad prior ablation in 2011 and 2014 at [facility] with Dr Stephania Curtis. He has been on sotalol and amiodarone in the past. She failed both medications. Patient did not have hybrid approach with n contact technique. Pastmedical history:   Past Medical History:   Diagnosis Date    Abnormal echocardiogram     EF 60%, mild aortic indsufficiency, mild pulmonary hypertension    Anemia     Aortic insufficiency     mild per echo 8/24/2010    Atrial fibrillation (HCC)     failed propafenone, Multaq and flecainide - 7/2011 plan for AFib ablation - [facility]- Dr Sorin Putnam Atrial flutter ([facility] [address]     Bursitis, trochanteric 08/2004    s/p injection    Cerumen impaction 04/24/2012    Diverticulosis 2015    Dr. Ricardo BANKS scope    Diverticulosis of colon 01/2010    Colonoscopy-Dr Reece ACUÑA (degenerative joint disease), cervical     cervical, generalized disc buldge   MRI 3/02    DVT (deep venous thrombosis) ([facility] [address]     Dyslipidemia 2002    Environmental allergies     Family history of colon cancer     mother    Gastric polyp     8/2006, 11/2009 benign- Dr Hussein Monet Gastritis 04/2008    mild superficial per Dr Ricardo Melchor    GERD (gastroesophageal reflux disease) 08/2006    Dr Hussein Monet H/O cardiovascular stress test 07/13/2004 07/13 EF58%, No scintigraphic evidence of inducible myocardial ischemia 04/13Normal study. No wall motion abnormality seen. EF 65%    H/O echocardiogram 07/18/2013 7/13-EF 50-55% Mild AR.      H/O exercise stress test 02/07/2017    EF63% normal    History of Holter monitoring 09/23/2015    48 hour - abnormal holter revealing afib throughout the recording with interspersed pacemaker beats, HR does not appear to be well controlled    History of mammogram     last mammo 7/25/11, Dr. Bridger Weber yearly    Hx of colonoscopy     1/21/2010    Hx of Doppler Venous ultrasound 08/31/2021    No DVT or SVT, No significant reflux in RLE, Significant reflux in Left CFV    Hyperlipidemia     Hypertension     Hypothyroidism     Internal hemorrhoid 2015    Dr. Simon Martinez C scope     Intervertebral disc protrusion 05/2009    wry neck with C4-5      Left shoulder pain 04/2004    (L) shoulder impingement  mild DJD    Long term current use of anticoagulant 07/26/2016    **Coumadin Clinic follows PT/INRs, along w/prescribing pt's Coumadin dosages. **    Menstruation     age 14    Pacemaker 06/21/2012    dual chamber- checked every 3 months    Pulmonary hypertension ([address]     mild per echo 8/24/2010, Pt refused sleep study (June 2012)    Restless legs 08/2003    ferritin    Right shoulder strain 02/2003    no seperation, bony contusion anterior humeral head  MRI 3/03    Sciatica 07/2009    (R) chronic intermittent s/p epidural injections  Dr Candy Villalpando Sciatica     Shoulder pain, left 03/2011    RTC tendinopathy, type II acrominum, bursitis- referred to Dr. Ana Maria Porter    Shoulder pain, right 03/10/2009    impingement, physical thearpy   (Main)  calcific bursitis  s/p injection    Sinus bradycardia     Sinusitis 12/12/2011    Tinnitus 03/2002    Vision changes 03/19/2013       Surgical history :   Past Surgical History:   Procedure Laterality Date    ABLATION OF DYSRHYTHMIC FOCUS      BREAST BIOPSY      BREAST SURGERY  1985    Right breast tumor excised    CARDIOVERSION      1/2008 Dr Adia Barnett; 12/2008    CARDIOVERSION  03/10/2014    [address]    CHOLECYSTECTOMY, LAPAROSCOPIC  02/2001    Dr Ellis Rater      [phone] (Dr Simon Martinez)   [facility] HYSTERECTOMY  2003   [facility]  06/21/2012    Medtronic Adapta ADDRL1 -not mri compatible   Selene Saab NANCY AND BSO  04/2003    fibroid      Dr Violeta Dorsey  08/2006    Dr Virgin Osgood ECHOCARDIOGRAM  06/2012    transthoracic cardioversion- Shailesh    UPPER GASTROINTESTINAL ENDOSCOPY  04/2008    mild superficial gastritis  Dr Becky Zhang; 2009       Family history:   Family History   Problem Relation Age of Onset    Stroke Mother     Heart Disease Mother     Coronary Art Dis Mother 66        CABG    Colon Cancer Mother [de-identified]        colon     Heart Disease Father     Coronary Art Dis Father 62        CABG    Migraines Sister     Seizures Brother     Breast Cancer Maternal Cousin         under 48         Social history :  reports that she has never smoked. She has never used smokeless tobacco. She reports that she does not drink alcohol and does not use drugs.     Allergies   Allergen Reactions    Latex Hives    Darvon [Propoxyphene Hcl] Shortness Of Breath    Demerol Rash     Breathing problems    Dilaudid [Hydromorphone Hcl] Anaphylaxis    Valium Rash     Breathing problems    Celecoxib Diarrhea    Norco [Hydrocodone-Acetaminophen] Diarrhea, Nausea Only and Other (See Comments)     A-Fib    Norvasc [Amlodipine] Other (See Comments)     HA, dizziness    Oxycodone Itching    Tape [Adhesive Tape] Other (See Comments)     BLISTER    Vasotec [Enalapril] Other (See Comments)     Nausea, vomiting    Diazepam Rash       Current Outpatient Medications on File Prior to Visit   Medication Sig Dispense Refill    cetirizine (ZYRTEC) 10 MG tablet Take 10 mg by mouth daily      aspirin 81 MG chewable tablet Take 1 tablet by mouth daily 30 tablet 3    atorvastatin (LIPITOR) 40 MG tablet TAKE 1 TABLET BY MOUTH EVERY DAY 30 tablet 0    dilTIAZem (CARDIZEM CD) 360 MG extended release capsule Take 1 capsule by mouth daily 30 capsule 0    metoprolol succinate (TOPROL XL) 50 MG extended release tablet Take 1 tablet by mouth in the morning and at bedtime 30 tablet 3    donepezil (ARICEPT) 10 MG tablet Take 1 tablet by mouth daily 30 tablet 5    warfarin (COUMADIN) 3 MG tablet Take 3 mg by mouth daily      levothyroxine (SYNTHROID) 50 MCG tablet TAKE 1 TABLET BY MOUTH EVERY DAY BEFORE BREAKFAST 90 tablet 3    Vitamin D (CHOLECALCIFEROL) 25 MCG (1000 UT) TABS tablet Take 1,000 Units by mouth daily      Fexofenadine HCl (ALLEGRA ALLERGY PO) Take by mouth as needed       Dexlansoprazole (DEXILANT) 60 MG CPDR Take 1 tablet by mouth daily. No current facility-administered medications on file prior to visit. Review of Systems:   Review of Systems   Constitutional: Negative for activity change, chills, fatigue and fever. HENT: Negative for congestion, ear pain and tinnitus. Eyes: Negative for photophobia, pain and visual disturbance. Respiratory: Negative for shortness of breath. Negative for cough, chest tightness and wheezing. Cardiovascular: Negative for chest pain and positive palpitations. Negative for leg swelling. Gastrointestinal: Negative for abdominal pain, blood in stool, constipation, diarrhea, nausea and vomiting. Endocrine: Negative for cold intolerance and heat intolerance. Genitourinary: Negative for dysuria, flank pain and hematuria. Musculoskeletal: Negative for arthralgias, back pain, myalgias and neck stiffness. Skin: Negative for color change and rash. Allergic/Immunologic: Negative for food allergies. Neurological: Negative for dizziness, light-headedness, numbness and headaches. Hematological: Does not bruise/bleed easily. Psychiatric/Behavioral: Negative for agitation, behavioral problems and confusion. Examination:      Vitals:    05/17/22 1022   BP: 108/60   Site: Left Upper Arm   Position: Sitting   Cuff Size: Small Adult   Pulse: 71   Weight: 135 lb (61.2 kg)   Height: 5' 7\" (1.702 m)        Body mass index is 21.14 kg/m². Physical Exam  Constitutional:       General: She is not in acute distress. Appearance: She is not ill-appearing or diaphoretic. HENT:      Head: Normocephalic and atraumatic.       Right Ear: External ear normal.      Left Ear: External ear normal.      Nose: No congestion. Mouth/Throat:      Mouth: Mucous membranes are moist.   Eyes:      Extraocular Movements: Extraocular movements intact. Conjunctiva/sclera: Conjunctivae normal.      Pupils: Pupils are equal, round, and reactive to light. Cardiovascular:      Rate and Rhythm: Normal rate. Rhythm irregular. Heart sounds: No murmur heard. Pulmonary:      Effort: Pulmonary effort is normal. No respiratory distress. Breath sounds: Normal breath sounds. No wheezing or rhonchi. Abdominal:      General: Abdomen is flat. Bowel sounds are normal. There is no distension. Palpations: Abdomen is soft. Tenderness: There is no abdominal tenderness. Musculoskeletal:         General: No tenderness. Cervical back: Normal range of motion. No tenderness. Right lower leg: No edema. Left lower leg: No edema. Skin:     General: Skin is warm. Neurological:      General: No focal deficit present. Mental Status: She is alert and oriented to person, place, and time. Cranial Nerves: No cranial nerve deficit.    Psychiatric:         Mood and Affect: Mood normal.               CBC:   Lab Results   Component Value Date    WBC 4.6 05/10/2022    HGB 13.4 05/10/2022    HCT 41.6 05/10/2022     05/10/2022     Lipids:  Lab Results   Component Value Date    CHOL 115 04/30/2022    TRIG 47 04/30/2022    HDL 51 04/30/2022    LDLCALC 55 04/30/2022    LDLDIRECT 72 08/01/2020     PT/INR:   Lab Results   Component Value Date    INR 1.64 05/10/2022        BMP:    Lab Results   Component Value Date     05/10/2022    K 4.3 05/10/2022     05/10/2022    CO2 26 05/10/2022    BUN 10 05/10/2022     CMP:   Lab Results   Component Value Date    AST 33 05/10/2022    PROT 6.2 (L) 05/10/2022    BILITOT 1.7 (H) 05/10/2022    ALKPHOS 62 05/10/2022     TSH:    Lab Results   Component Value Date    TSH 3.76 10/19/2021       EKGINTERPRETATION - EKG Interpretation:        IMPRESSION / RECOMMENDATIONS: Ventricular paced    Atrial fibrillation with RVR - chronic atrial fibrillation  Chest pain  Pacemaker  History of Hypothyroid  HTN  HLD  History of DVT     Patient reports that she has been feeling well since discharge. She states that she had 1 brief episode of palpitations but it resolved on its own. She reports that overall she is not very symptomatic    We will continue Cardizem CD3 160 mg daily  Patient is on Coumadin. Patient denies history of bleed. Patient does have a history of DVT. INR monitored by PCP    's device interrogated and A. fib appears to be well controlled  Patient would like to control with medication as long as possible before proceeding with procedures  Patient to follow-up with Dr. Paco Mcdonough in 1 month to discuss further plan of care    Vitals:    05/17/22 1022   BP: 108/60   Pulse: 71     Blood pressure is stable continue Toprol-XL 50 mg twice daily      Thanks again for allowing me to participate in care of this patient. Please call me if you have any questions. With best regards. Jim Atkins, ANTHONY - CNP, 5/19/2022 9:14 AM     Please note this report has been partially produced using speech recognition software and may contain errors related to that system including errors in grammar, punctuation, and spelling, as well as words and phrases that may be inappropriate. If there are any questions or concerns please feel free to contact the dictating provider for clarification. Device Assessment:    The device is Medtronic pacemaker - Dual Chamber chamber      MRI Compatible : no    Device interrogation was performed. Mode: AAIR --- DDDR     Sensing is normal. Impedence is normal.  Threshold is normal.    18 mode changes    Estimated battery life is 4.5 years     The underlying rhythm is AS<VS.  3.8 % atrial paced; 3.9 % ventricular paced.       Atrial Arrhythmia : Yes    Non sustained VT episodes : No    Sustained VT episodes : No      The patient is occasionally pacemaker dependent.

## 2022-05-19 PROBLEM — I48.20 CHRONIC ATRIAL FIBRILLATION (HCC): Status: ACTIVE | Noted: 2022-05-19

## 2022-05-24 ENCOUNTER — CARE COORDINATION (OUTPATIENT)
Dept: CASE MANAGEMENT | Age: 77
End: 2022-05-24

## 2022-05-24 NOTE — CARE COORDINATION
Interventions:            Follow Up  Future Appointments   Date Time Provider Ritu Cydney   5/26/2022 10:30  [facility] [address]   6/1/2022 12:30 PM Yumiko Ashraf MD [address] [place]   6/15/2022  1:15 PM Anil Mcnally MD [facility]   6/29/2022  3:45 PM Yumiko Ashraf MD [address] [place]   8/10/2022  1:00 PM Rock Whelan MD [facility]   11/8/2022  9:30 AM Ly Penny MD [facility]       Jose Macdonald, RN

## 2022-05-26 ENCOUNTER — ANTI-COAG VISIT (OUTPATIENT)
Dept: PHARMACY | Age: 77
End: 2022-05-26
Payer: MEDICARE

## 2022-05-26 DIAGNOSIS — I48.0 PAF (PAROXYSMAL ATRIAL FIBRILLATION) (HCC): Primary | ICD-10-CM

## 2022-05-26 LAB
INTERNATIONAL NORMALIZATION RATIO, POC: 1.7
POC INR: 1.7 INDEX
PROTHROMBIN TIME, POC: 20.4 SECONDS (ref 10–14.3)

## 2022-05-26 PROCEDURE — 36416 COLLJ CAPILLARY BLOOD SPEC: CPT

## 2022-05-26 PROCEDURE — 85610 PROTHROMBIN TIME: CPT

## 2022-05-26 PROCEDURE — 99212 OFFICE O/P EST SF 10 MIN: CPT

## 2022-05-26 NOTE — PROGRESS NOTES
Medication Management Service  [facility]  [phone]    Visit Date: 5/26/2022   Subjective:       Izzy Phoenix is a 67 y.o. female who presents to clinic today for anticoagulation monitoring and adjustment. Patient seen in clinic for warfarin management due to  Indication:   atrial fibrillation. INR goal: of 2.0-3.0. Duration of therapy: indefinite. Patient reports the following:   Adherent with regimen  Missed or extra doses:  None   Bleeding or thromboembolic side effects:  None  Significant medication changes:  None  Significant dietary changes: None  Significant alcohol or tobacco changes: None  Significant recent illness, disease state changes, or hospitalization:  Admitted x3   Upcoming surgeries or procedures:  None  Falls: None           Assessment and Plan     PT/INR done in office per protocol. INR today is 1.7, subtherapeutic. Plan:  Increase weekly dose ~7% to warfarin 3mg dialy except 4.5mg on Thursdays. Recheck INR in 2 week(s). Patient verbalized understanding of dosing directions and information discussed. Dosing schedule given to patient including phone number, appointment date, and time. Progress note sent to referring office. Patient acknowledges working in consult agreement with pharmacist as referred by his/her physician.       Electronically signed by Renny Perez [address] [place] [address] on 5/26/22 at 10:43 AM EDT  For Pharmacy Admin Tracking Only     Intervention Detail: Dose Adjustment: 1, reason: Therapy Optimization   Total # of Interventions Recommended: 1   Total # of Interventions Accepted: 1   Time Spent (min): 15

## 2022-05-31 ASSESSMENT — ENCOUNTER SYMPTOMS
SHORTNESS OF BREATH: 0
SLEEP DISTURBANCES DUE TO BREATHING: 0
ORTHOPNEA: 0

## 2022-06-02 ENCOUNTER — TELEPHONE (OUTPATIENT)
Dept: CARDIOLOGY CLINIC | Age: 77
End: 2022-06-02

## 2022-06-02 ENCOUNTER — CARE COORDINATION (OUTPATIENT)
Dept: CASE MANAGEMENT | Age: 77
End: 2022-06-02

## 2022-06-02 RX ORDER — DILTIAZEM HYDROCHLORIDE 360 MG/1
360 CAPSULE, EXTENDED RELEASE ORAL DAILY
Qty: 30 CAPSULE | Refills: 1 | Status: SHIPPED | OUTPATIENT
Start: 2022-06-02 | End: 2022-06-27

## 2022-06-02 NOTE — CARE COORDINATION
[facility] Transitions Follow Up Call    2022    Patient: Papito Griffiths  Patient : 1945   MRN: [id number]  Reason for Admission: NSTEMI  Discharge Date: 5/10/22 RARS: Readmission Risk Score: 11.1 ( )         Spoke with: Patient's spouse    Attempted to contact patient for care transition follow up. Unable to reach patient. Left message with contact information and request for call back. Attempted to call mobile number listed. Male answered the phone and was speaking Haitian. Informed him I have the wrong number. CTN contacted patient's spouse. He states that Eugenio Nicole is doing really good. He states Eugenio Nicole has not had any c/o chest pain/discomfort, increased shortness of breath, numbness or tingling since returning home. She is eating and drinking fluids w/o issues. She has followed up with her providers since being discharged. Discussed when to contact providers with any new or worsening symptoms. He verbalized understanding. CTN verified patient's mobile number. He confirmed phone number is correct but states she rarely answers the phone. Informed him that a Haitian speaking male answered the phone. He states they do not speak Haitian. He would like his mobile number to be the secondary phone number. CTN will make a note in the chart. He does not have any questions or needs at this time. No further outreach. Care Transitions Follow Up Call    Needs to be reviewed by the provider   Additional needs identified to be addressed with provider: No  none             Method of communication with provider : none      Care Transition Nurse contacted the family by telephone to follow up after admission on 22-5/10/22. Verified name and  with family as identifiers. Addressed changes since last contact: none  Discussed follow-up appointments. If no appointment was previously scheduled, appointment scheduling offered: Yes.    Is follow up appointment scheduled within 7 days of discharge? Yes    CTN reviewed discharge instructions, medical action plan and red flags with family and discussed any barriers to care and/or understanding of plan of care after discharge. Discussed appropriate site of care based on symptoms and resources available to patient including: PCP  Specialist. The family agrees to contact the PCP office for questions related to their healthcare. Patients top risk factors for readmission: medical condition-NSTEMI, AFIB, HTN  Interventions to address risk factors: Education of patient/family/caregiver/guardian to support self-management-when to contact providers     CTN provided contact information for future needs. No further follow-up call indicated based on severity of symptoms and risk factors. Care Transitions Subsequent and Final Call    Subsequent and Final Calls  Do you have any ongoing symptoms?: No  Have your medications changed?: No  Do you have any questions related to your medications?: No  Do you currently have any active services?: No  Do you have any needs or concerns that I can assist you with?: No  Care Transitions Interventions     Transportation Support: Declined      DME Assistance: Declined     Senior Services: Declined    Disease Association: Completed      Other Interventions:            Follow Up  Future Appointments   Date Time Provider Ritu Lamas   6/9/2022 11:15 AM [address] [place] [address]   6/9/2022  1:00 PM [facility] CT ROOM [address] [place] [address] [address] [facility]   6/15/2022  1:15 PM Anil Robertson MD [facility]   6/29/2022  3:45 PM [address] Ashby MD [facility] [address] [place] [address] [place]   8/10/2022  1:00 PM Moses De La Cruz MD [facility]   11/8/2022  9:30 AM Bety Ray MD [facility]       Melissa Ocampo RN

## 2022-06-02 NOTE — TELEPHONE ENCOUNTER
Per Dr Sallye Dandy consult during recent hospitalization patient cardizem increased to 360 mg daily. Will pend prescription to [facility], to review and approve. Patient has follow up scheduled with Dr Sallye Dandy 6/15. Tried to return phone call to patients , Rogelio Bales, to advise. No answer. Message left advising and asked that he call the office with any further questions or concerns.

## 2022-06-02 NOTE — TELEPHONE ENCOUNTER
Pt's spouse called states she only has 7 pills left of the Cardizem 360mg left from the hospital orders and no refills. He just filled the low dose 240mg but needs to know which dose she is to take and if higher dose will need refill called ti [facility] [address]  Please advise

## 2022-06-06 ENCOUNTER — TELEPHONE (OUTPATIENT)
Dept: CARDIOLOGY CLINIC | Age: 77
End: 2022-06-06

## 2022-06-06 NOTE — TELEPHONE ENCOUNTER
Scheduled patient to see HARRY Sue CNP on 6/7/22 @ 220 pm. Dr Roger Clifford and Marycruz Andrews CNP were out of office this week.  Patient aware

## 2022-06-07 ENCOUNTER — OFFICE VISIT (OUTPATIENT)
Dept: CARDIOLOGY CLINIC | Age: 77
End: 2022-06-07
Payer: MEDICARE

## 2022-06-07 VITALS
HEART RATE: 84 BPM | HEIGHT: 67 IN | BODY MASS INDEX: 21.94 KG/M2 | DIASTOLIC BLOOD PRESSURE: 70 MMHG | SYSTOLIC BLOOD PRESSURE: 110 MMHG | WEIGHT: 139.8 LBS

## 2022-06-07 DIAGNOSIS — E78.2 MIXED HYPERLIPIDEMIA: ICD-10-CM

## 2022-06-07 DIAGNOSIS — I10 ESSENTIAL HYPERTENSION: ICD-10-CM

## 2022-06-07 DIAGNOSIS — I48.20 CHRONIC ATRIAL FIBRILLATION (HCC): Primary | ICD-10-CM

## 2022-06-07 DIAGNOSIS — M79.606 PAIN OF LOWER EXTREMITY, UNSPECIFIED LATERALITY: ICD-10-CM

## 2022-06-07 DIAGNOSIS — I25.10 CORONARY ARTERY DISEASE INVOLVING NATIVE CORONARY ARTERY OF NATIVE HEART WITHOUT ANGINA PECTORIS: ICD-10-CM

## 2022-06-07 DIAGNOSIS — I38 VHD (VALVULAR HEART DISEASE): ICD-10-CM

## 2022-06-07 DIAGNOSIS — Z95.0 PACEMAKER: ICD-10-CM

## 2022-06-07 PROCEDURE — 1036F TOBACCO NON-USER: CPT | Performed by: NURSE PRACTITIONER

## 2022-06-07 PROCEDURE — 1111F DSCHRG MED/CURRENT MED MERGE: CPT | Performed by: NURSE PRACTITIONER

## 2022-06-07 PROCEDURE — G8420 CALC BMI NORM PARAMETERS: HCPCS | Performed by: NURSE PRACTITIONER

## 2022-06-07 PROCEDURE — G8427 DOCREV CUR MEDS BY ELIG CLIN: HCPCS | Performed by: NURSE PRACTITIONER

## 2022-06-07 PROCEDURE — G8399 PT W/DXA RESULTS DOCUMENT: HCPCS | Performed by: NURSE PRACTITIONER

## 2022-06-07 PROCEDURE — 99214 OFFICE O/P EST MOD 30 MIN: CPT | Performed by: NURSE PRACTITIONER

## 2022-06-07 PROCEDURE — 1123F ACP DISCUSS/DSCN MKR DOCD: CPT | Performed by: NURSE PRACTITIONER

## 2022-06-07 PROCEDURE — 1090F PRES/ABSN URINE INCON ASSESS: CPT | Performed by: NURSE PRACTITIONER

## 2022-06-07 RX ORDER — ATORVASTATIN CALCIUM 40 MG/1
TABLET, FILM COATED ORAL
Qty: 30 TABLET | Refills: 0 | Status: SHIPPED | OUTPATIENT
Start: 2022-06-07 | End: 2022-07-05

## 2022-06-07 ASSESSMENT — ENCOUNTER SYMPTOMS: SHORTNESS OF BREATH: 0

## 2022-06-07 NOTE — ASSESSMENT & PLAN NOTE
- Evaluated by electrophysiology. Questionable NSVT ruled as aberrant conduction. Patient has PPM and is on chronic anticoagulation.

## 2022-06-07 NOTE — PROGRESS NOTES
[place] PHYSICAL REHABILITATION [facility] [address] [facility],Third [address]  Phone: [phone]    Fax [phone]                  Claudia Mace MD, Edith Reich MD, Alessandra Hernandez MD, MD Marilee Gonzalez MD, Smooth Fuller MD, Chayo Arce MD, [address] [facility], [facility]       Aimee Murillo, APRERNIE Cruz, [facility]        Cardiology Progress Note       6/7/2022    RE: Izzy Phoenix  ([birth year])                             Primary cardiologist: Dr. Jluis Latham       Subjective:  CC:   1. Chronic atrial fibrillation ([address]    2. VHD (valvular heart disease)    3. Coronary artery disease involving native coronary artery of native heart without angina pectoris    4. Essential hypertension    5. Pacemaker dual chamber    6. Mixed hyperlipidemia    7. Pain of lower extremity, unspecified laterality        HPI: Izzy Phoenix, who is a  67y.o. year old female with a past medical history as listed below. Patient presents to the office for follow up on CAD, HTN, atrial fibrillation, PPM, and hyperlipidemia. He was recently admitted to hospital for chest pain, negative troponins, normal stress test.  Patient had abnormal troponin post discharge and was called back to the hospital for further evaluation. She then had inverted T waves on EKG and proceeded to have LHC which showed nonobstructing CAD 60-70% stenosis of the ostium. She was found to have NSVT on telemetry and was evaluated by electrophysiology which ruled readings as to be wide-complex rhythm with aberrant conduction. She has had failed cardioversion in 2015 then had ablation in 2011 and 2014 at [address] [place] [address]. She had failed sotalol and amiodarone medication therapy in the past.  Patient complains of right lower extremity pain since having C May 10, 2022.     Past Medical History:   Diagnosis Date    Abnormal echocardiogram     EF 60%, mild aortic indsufficiency, mild pulmonary hypertension    Anemia     Aortic insufficiency     mild per echo 8/24/2010    Atrial fibrillation (HCC)     failed propafenone, Multaq and flecainide - 7/2011 plan for AFib ablation - [facility]- Dr Maddi Samson Atrial flutter [facility])     Bursitis, trochanteric 08/2004    s/p injection    Cerumen impaction 04/24/2012    Diverticulosis 2015    Dr. Fredrick BANKS scope    Diverticulosis of colon 01/2010    Colonoscopy-Dr Newell    DJD (degenerative joint disease), cervical     cervical, generalized disc buldge   MRI 3/02    DVT (deep venous thrombosis) ([address]     Dyslipidemia 2002    Environmental allergies     Family history of colon cancer     mother    Gastric polyp     8/2006, 11/2009 benign- Dr Kylah Flores Gastritis 04/2008    mild superficial per Dr Fredrick Sharma    GERD (gastroesophageal reflux disease) 08/2006    Dr Kylah Flores H/O cardiovascular stress test 07/13/2004 07/13 EF58%, No scintigraphic evidence of inducible myocardial ischemia 04/13Normal study. No wall motion abnormality seen. EF 65%    H/O echocardiogram 07/18/2013 7/13-EF 50-55% Mild AR.  H/O exercise stress test 02/07/2017    EF63% normal    History of Holter monitoring 09/23/2015    48 hour - abnormal holter revealing afib throughout the recording with interspersed pacemaker beats, HR does not appear to be well controlled    History of mammogram     last mammo 7/25/11, Dr. Emely Loya yearly    Hx of colonoscopy     1/21/2010    Hx of Doppler Venous ultrasound 08/31/2021    No DVT or SVT, No significant reflux in RLE, Significant reflux in Left CFV    Hyperlipidemia     Hypertension     Hypothyroidism     Internal hemorrhoid 2015    Dr. Fredrick BANKS scope     Intervertebral disc protrusion 05/2009    wry neck with C4-5      Left shoulder pain 04/2004    (L) shoulder impingement  mild DJD    Long term current use of anticoagulant 07/26/2016    **[facility] follows PT/INRs, along w/prescribing pt's Coumadin dosages. **    Menstruation     age 14    Pacemaker 06/21/2012    dual chamber- checked every 3 months    Pulmonary hypertension (HCC)     mild per echo 8/24/2010, Pt refused sleep study (June 2012)    Restless legs 08/2003    ferritin    Right shoulder strain 02/2003    no seperation, bony contusion anterior humeral head  MRI 3/03    Sciatica 07/2009    (R) chronic intermittent s/p epidural injections  Dr Annalise Simons Sciatica     Shoulder pain, left 03/2011    RTC tendinopathy, type II acrominum, bursitis- referred to Dr. [facility] Shoulder pain, right 03/10/2009    impingement, physical thearpy   (Main)  calcific bursitis  s/p injection    Sinus bradycardia     Sinusitis 12/12/2011    Tinnitus 03/2002    Vision changes 03/19/2013       Current Outpatient Medications   Medication Sig Dispense Refill    dilTIAZem (CARDIZEM CD) 360 MG extended release capsule Take 1 capsule by mouth daily 30 capsule 1    cetirizine (ZYRTEC) 10 MG tablet Take 10 mg by mouth daily      aspirin 81 MG chewable tablet Take 1 tablet by mouth daily 30 tablet 3    atorvastatin (LIPITOR) 40 MG tablet TAKE 1 TABLET BY MOUTH EVERY DAY 30 tablet 0    metoprolol succinate (TOPROL XL) 50 MG extended release tablet Take 1 tablet by mouth in the morning and at bedtime 30 tablet 3    donepezil (ARICEPT) 10 MG tablet Take 1 tablet by mouth daily 30 tablet 5    warfarin (COUMADIN) 3 MG tablet Take 3 mg by mouth daily      levothyroxine (SYNTHROID) 50 MCG tablet TAKE 1 TABLET BY MOUTH EVERY DAY BEFORE BREAKFAST 90 tablet 3    Vitamin D (CHOLECALCIFEROL) 25 MCG (1000 UT) TABS tablet Take 1,000 Units by mouth daily      Fexofenadine HCl (ALLEGRA ALLERGY PO) Take by mouth as needed       Dexlansoprazole (DEXILANT) 60 MG CPDR Take 1 tablet by mouth daily. No current facility-administered medications for this visit. Review of Systems:  Review of Systems   Respiratory: Negative for shortness of breath.     Cardiovascular: Negative for chest pain, palpitations and leg swelling. Musculoskeletal: Negative. Skin: Negative. Neurological: Negative for dizziness and weakness. All other systems reviewed and are negative. Objective:      Physical Exam:  /70 (Site: Left Upper Arm, Position: Sitting, Cuff Size: Small Adult)   Pulse 84   Ht 5' 7\" (1.702 m)   Wt 139 lb 12.8 oz (63.4 kg)   BMI 21.90 kg/m²   Wt Readings from Last 3 Encounters:   06/07/22 139 lb 12.8 oz (63.4 kg)   06/01/22 136 lb (61.7 kg)   05/17/22 135 lb (61.2 kg)     Body mass index is 21.9 kg/m². Physical exam:  Physical Exam  Constitutional:       Appearance: She is well-developed. Cardiovascular:      Rate and Rhythm: Normal rate and regular rhythm. Pulses: Intact distal pulses. Dorsalis pedis pulses are 2+ on the right side and 2+ on the left side. Posterior tibial pulses are 2+ on the right side and 2+ on the left side. Heart sounds: Normal heart sounds, S1 normal and S2 normal.   Pulmonary:      Effort: Pulmonary effort is normal.      Breath sounds: Normal breath sounds. Musculoskeletal:         General: Normal range of motion. Skin:     General: Skin is warm and dry. Neurological:      Mental Status: She is alert and oriented to person, place, and time.           DATA:  Lab Results   Component Value Date    CKTOTAL 87 05/08/2022    CKMB 1.3 10/14/2011    TROPONINI <0.006 03/05/2013    TROPONINI 0.056 10/15/2011     BNP:    Lab Results   Component Value Date     03/04/2013     PT/INR:  No results found for: PTINR  Lab Results   Component Value Date    LABA1C 5.4 04/30/2022    LABA1C 5.8 09/11/2018     Lab Results   Component Value Date    CHOL 115 04/30/2022    TRIG 47 04/30/2022    HDL 51 04/30/2022    LDLCALC 55 04/30/2022    LDLDIRECT 72 08/01/2020     Lab Results   Component Value Date    ALT 27 05/10/2022    AST 33 05/10/2022     TSH:    Lab Results   Component Value Date    TSH 3.76 10/19/2021       Vitals: 06/07/22 1435   BP: 110/70   Pulse: 84       Echo:4/29/22  Left ventricular systolic function is low normal.   Ejection fraction is visually estimated at 45-50%. Septal wall is hypokinetic   Grade II diastolic dysfunction. Moderately dilated left atrium. Moderate aortic regurgitation; PHT: 328 msec. Moderate tricuspid regurgitation; RVSP: 39 mmHg. Mild PHTN. No evidence of any pericardial effusion. Cath:5/10/22  Right dominant system. No significant CAD except 60 to 70 % disease of the ostium of a   Smaller Diagonal.      LV: Low normal LV function LVEF about 50 %. The ASCVD Risk score (Telma Burnham et al., 2013) failed to calculate for the following reasons: The patient has a prior MI or stroke diagnosis      Assessment/ Plan:     VHD (valvular heart disease)   - Recent echo shows moderate aortic regurgitation pressure half-time 328 msec. Tricuspid regurgitation, RSVP 39 mmHg. Mild pulmonary hypertension. EF 45-50%    Chronic atrial fibrillation ([address]   - Evaluated by electrophysiology. Questionable NSVT ruled as aberrant conduction. Patient has PPM and is on chronic anticoagulation. Pacemaker dual chamber   - Stable recent device interrogation reviewed. Mixed hyperlipidemia   -At or near goal Yes    -She is to continue current medications (Lipitor 40 mg) Hepatic function panel WNL. No abdominal pain. No myalgias.     -The nature of cardiac risk has been fully discussed with this patient. I have made her aware of her LDL target goal given her cardiovascular risk analysis. I have discussed the appropriate diet. The need for lifelong compliance in order to reduce risk is stressed. A regular exercise program is recommended to help achieve and maintain normal body weight, fitness and improve lipid balance. A written copy of a low fat, low cholesterol diet has been given to the patient.      Essential hypertension   - Stable, continue with cardizem 360 mg daily, Toprol-XL 50 mg daily    Coronary artery disease involving native coronary artery of native heart without angina pectoris   - No significant CAD except for 60-70% disease of ostium which is of smaller diagonal.    Leg pain   -Reports right thigh pain that is worse with walking. Groin soft no hematoma, no brusing at insertion site or throughout the leg. There were no postprocedure complications after sheath removal noted. Likely muscle skeletal pain recommend rest, ice, and tylenol for pain relief. Will get US to R/O pseudoaneurysm. Patient seen, interviewed and examined. Testing was reviewed. Patient is encouraged to exercise even a brisk walk for 30 minutes at least 3 to 4 times a week. Lifestyle and risk factor modificatons discussed. Various goals are discussed and questions answered. Continue current medications. Appropriate prescriptions are addressed. Questions answered and patient verbalizes understanding. Call for any problems, questions, or concerns. Pt is to follow up in 1 months for Cardiac management    Electronically signed by Marbin Girard.  ANTHONY Christopher CNP on 6/7/2022 at 2:46 PM

## 2022-06-07 NOTE — ASSESSMENT & PLAN NOTE
- Recent echo shows moderate aortic regurgitation pressure half-time 328 msec. Tricuspid regurgitation, RSVP 39 mmHg. Mild pulmonary hypertension.   EF 45-50%

## 2022-06-09 ENCOUNTER — ANTI-COAG VISIT (OUTPATIENT)
Dept: PHARMACY | Age: 77
End: 2022-06-09
Payer: MEDICARE

## 2022-06-09 ENCOUNTER — HOSPITAL ENCOUNTER (OUTPATIENT)
Dept: CT IMAGING | Age: 77
Discharge: HOME | End: 2022-06-09
Payer: MEDICARE

## 2022-06-09 DIAGNOSIS — J84.9 ILD (INTERSTITIAL LUNG DISEASE) (HCC): ICD-10-CM

## 2022-06-09 DIAGNOSIS — I48.0 PAF (PAROXYSMAL ATRIAL FIBRILLATION) (HCC): Primary | ICD-10-CM

## 2022-06-09 LAB
INTERNATIONAL NORMALIZATION RATIO, POC: 2.3
POC INR: 2.3 INDEX
PROTHROMBIN TIME, POC: 27.8 SECONDS (ref 10–14.3)

## 2022-06-09 PROCEDURE — 85610 PROTHROMBIN TIME: CPT

## 2022-06-09 PROCEDURE — 71250 CT THORAX DX C-: CPT

## 2022-06-09 NOTE — PROGRESS NOTES
Medication Management Service  [facility]  [phone]    Visit Date: 6/9/2022   Subjective:       Joel Stevens is a 67 y.o. female who presents to clinic today for anticoagulation monitoring and adjustment. Patient seen in clinic for warfarin management due to  Indication:   atrial fibrillation. INR goal: of 2.0-3.0. Duration of therapy: indefinite. Patient reports the following:   Adherent with regimen  Missed or extra doses:  None   Bleeding or thromboembolic side effects:  None  Significant medication changes:  None  Significant dietary changes: None  Significant alcohol or tobacco changes: None  Significant recent illness, disease state changes, or hospitalization:  None  Upcoming surgeries or procedures:  None  Falls: None           Assessment and Plan     PT/INR done in office per protocol. INR today is 2.3, therapeutic. Plan: Will continue current regimen of warfarin 3mg daily except 4.5mg on Thursdays. Recheck INR in 3 week(s). Patient verbalized understanding of dosing directions and information discussed. Dosing schedule given to patient including phone number, appointment date, and time. Progress note sent to referring office. Patient acknowledges working in consult agreement with pharmacist as referred by his/her physician.       Electronically signed by Norma Ugalde, [facility] on 6/9/22 at 11:34 AM EDT    For Pharmacy Admin Tracking Only     Intervention Detail:    Total # of Interventions Recommended:    Total # of Interventions Accepted:    Time Spent (min): 15

## 2022-06-14 ENCOUNTER — PROCEDURE VISIT (OUTPATIENT)
Dept: CARDIOLOGY CLINIC | Age: 77
End: 2022-06-14
Payer: MEDICARE

## 2022-06-14 DIAGNOSIS — I25.10 CORONARY ARTERY DISEASE INVOLVING NATIVE CORONARY ARTERY OF NATIVE HEART WITHOUT ANGINA PECTORIS: ICD-10-CM

## 2022-06-14 DIAGNOSIS — M79.606 PAIN OF LOWER EXTREMITY, UNSPECIFIED LATERALITY: ICD-10-CM

## 2022-06-14 DIAGNOSIS — I72.4 FEMORAL ARTERY PSEUDO-ANEURYSM, RIGHT (HCC): Primary | ICD-10-CM

## 2022-06-14 DIAGNOSIS — E78.2 MIXED HYPERLIPIDEMIA: ICD-10-CM

## 2022-06-14 DIAGNOSIS — I10 ESSENTIAL HYPERTENSION: ICD-10-CM

## 2022-06-14 DIAGNOSIS — I48.20 CHRONIC ATRIAL FIBRILLATION (HCC): ICD-10-CM

## 2022-06-14 DIAGNOSIS — I38 VHD (VALVULAR HEART DISEASE): ICD-10-CM

## 2022-06-14 DIAGNOSIS — Z95.0 PACEMAKER: ICD-10-CM

## 2022-06-14 PROCEDURE — 93926 LOWER EXTREMITY STUDY: CPT | Performed by: INTERNAL MEDICINE

## 2022-06-15 ENCOUNTER — HOSPITAL ENCOUNTER (OUTPATIENT)
Age: 77
Discharge: HOME | End: 2022-06-15
Payer: MEDICARE

## 2022-06-15 ENCOUNTER — OFFICE VISIT (OUTPATIENT)
Dept: CARDIOLOGY CLINIC | Age: 77
End: 2022-06-15
Payer: MEDICARE

## 2022-06-15 DIAGNOSIS — Z95.0 PACEMAKER: ICD-10-CM

## 2022-06-15 DIAGNOSIS — I48.20 CHRONIC ATRIAL FIBRILLATION (HCC): Primary | ICD-10-CM

## 2022-06-15 LAB
ANION GAP SERPL CALCULATED.3IONS-SCNC: 15 MMOL/L (ref 4–16)
BUN BLDV-MCNC: 16 MG/DL (ref 6–23)
CALCIUM SERPL-MCNC: 9.2 MG/DL (ref 8.3–10.6)
CHLORIDE BLD-SCNC: 103 MMOL/L (ref 99–110)
CO2: 22 MMOL/L (ref 21–32)
CREAT SERPL-MCNC: 0.7 MG/DL (ref 0.6–1.1)
GFR AFRICAN AMERICAN: >60 ML/MIN/1.73M2
GFR NON-AFRICAN AMERICAN: >60 ML/MIN/1.73M2
GLUCOSE BLD-MCNC: 89 MG/DL (ref 70–99)
MAGNESIUM: 2.1 MG/DL (ref 1.8–2.4)
POTASSIUM SERPL-SCNC: 4.8 MMOL/L (ref 3.5–5.1)
PRO-BNP: 1377 PG/ML
SODIUM BLD-SCNC: 140 MMOL/L (ref 135–145)

## 2022-06-15 PROCEDURE — 36415 COLL VENOUS BLD VENIPUNCTURE: CPT

## 2022-06-15 PROCEDURE — 1036F TOBACCO NON-USER: CPT | Performed by: INTERNAL MEDICINE

## 2022-06-15 PROCEDURE — 80048 BASIC METABOLIC PNL TOTAL CA: CPT

## 2022-06-15 PROCEDURE — 83880 ASSAY OF NATRIURETIC PEPTIDE: CPT

## 2022-06-15 PROCEDURE — G8427 DOCREV CUR MEDS BY ELIG CLIN: HCPCS | Performed by: INTERNAL MEDICINE

## 2022-06-15 PROCEDURE — G8420 CALC BMI NORM PARAMETERS: HCPCS | Performed by: INTERNAL MEDICINE

## 2022-06-15 PROCEDURE — 1090F PRES/ABSN URINE INCON ASSESS: CPT | Performed by: INTERNAL MEDICINE

## 2022-06-15 PROCEDURE — 1123F ACP DISCUSS/DSCN MKR DOCD: CPT | Performed by: INTERNAL MEDICINE

## 2022-06-15 PROCEDURE — 83735 ASSAY OF MAGNESIUM: CPT

## 2022-06-15 PROCEDURE — G8399 PT W/DXA RESULTS DOCUMENT: HCPCS | Performed by: INTERNAL MEDICINE

## 2022-06-15 PROCEDURE — 99214 OFFICE O/P EST MOD 30 MIN: CPT | Performed by: INTERNAL MEDICINE

## 2022-06-15 RX ORDER — TORSEMIDE 10 MG/1
10 TABLET ORAL DAILY
Qty: 3 TABLET | Refills: 0 | Status: SHIPPED | OUTPATIENT
Start: 2022-06-15 | End: 2022-06-22 | Stop reason: SDUPTHER

## 2022-06-15 NOTE — PROGRESS NOTES
Electrophysiology Follow up Note      Reason for consultation:  NSVT    Chief complaint: shortness of breath with exertion    Referring physician: Tirso Quinonez      Primary care physician: Keven Hyde MD      History of Present Illness: This visit (6/15/2022)      Chief Complaint   Patient presents with    1 Month Follow-Up     SOBOE, swelling in legs, ankles and feet bilat. Previous visit:    This visit 5/19/2022  Patient here today for follow-up on atrial fibrillation. Patient reports that she has 1 episode of palpitations since discharge from the hospital.  She states that overall she is feeling well. She denies chest pain, shortness of breath, lightheadedness, dizziness, edema or syncope. He is not drinking caffeine. She does not exercise. She does not smoke tobacco or drink alcohol. Patient had recent left heart cath on May 10. Patient did not require intervention at that time. Previous visit  Amina Skinner is a 67year old female with a history of dementia, persistent atrial fibrillation, chronic anticoagulation, thrombocytopenia, hypertension, hypothyroidism, hyperlipdemia, DVT, dual chamber pacemaker presents with complaints of shortness of breath with exertion and chest pressure. Patient was admitted and had noted negative troponin and normal mycardial perfusion on stress test.   Patient was discharged home. Per chart patient had a troponin to result after patient was discharged that was elevated and patient was called back to the the hospital. Patient reports that she continued to have chest pressure. Patient had an EKG with noted inverted T waves. Patient was admitted, started on heparin drip and went for left heart catheterization this morning. LHC revealed no significant CAD except for 60-70% disease of the ostium. EP was consulted for concern for NSVT noted on tele and on pacer report.    Patient reports that she is in atrial fibrillation consistently. She states that she will intermittently have palpitations that come on suddenly and resolve. She denies aggravating or alleviating factors. She denies dizziness, lightheadedness, edema, or syncope. Patient has had failed cardioversion in 6/2015. She had ad prior ablation in 2011 and 2014 at [facility] with Dr Nancy Bonilla. He has been on sotalol and amiodarone in the past. She failed both medications. Patient did not have hybrid approach with n contact technique. Pastmedical history:   Past Medical History:   Diagnosis Date    Abnormal echocardiogram     EF 60%, mild aortic indsufficiency, mild pulmonary hypertension    Anemia     Aortic insufficiency     mild per echo 8/24/2010    Atrial fibrillation (HCC)     failed propafenone, Multaq and flecainide - 7/2011 plan for AFib ablation - [facility]- Dr Alonzo Sanchez Atrial flutter ([facility] [address]     Bursitis, trochanteric 08/2004    s/p injection    Cerumen impaction 04/24/2012    Diverticulosis 2015    Dr. Nimco Winters C scope    Diverticulosis of colon 01/2010    Colonoscopy-Dr Reece ACUÑA (degenerative joint disease), cervical     cervical, generalized disc buldge   MRI 3/02    DVT (deep venous thrombosis) ([facility] [address]     Dyslipidemia 2002    Environmental allergies     Family history of colon cancer     mother    Gastric polyp     8/2006, 11/2009 benign- Dr Martin Streeter Gastritis 04/2008    mild superficial per Dr Nimco Winters    GERD (gastroesophageal reflux disease) 08/2006    Dr Martin Streeter H/O cardiovascular stress test 07/13/2004 07/13 EF58%, No scintigraphic evidence of inducible myocardial ischemia 04/13Normal study. No wall motion abnormality seen. EF 65%    H/O Doppler ultrasound 06/14/2022    VL Lower Ext Arteries/Venous Right. No evidence of pseudoaneurysm, AV fistula, or hematoma is present.  H/O echocardiogram 07/18/2013 7/13-EF 50-55% Mild AR.      H/O exercise stress test 02/07/2017    EF63% normal    History of Holter monitoring 09/23/2015    48 hour - abnormal holter revealing afib throughout the recording with interspersed pacemaker beats, HR does not appear to be well controlled    History of mammogram     last mammo 7/25/11, Dr. Macros Sauer yearly    Hx of colonoscopy     1/21/2010    Hx of Doppler Venous ultrasound 08/31/2021    No DVT or SVT, No significant reflux in RLE, Significant reflux in Left CFV    Hyperlipidemia     Hypertension     Hypothyroidism     Internal hemorrhoid 2015    Dr. Martín Serrano C scope     Intervertebral disc protrusion 05/2009    wry neck with C4-5      Left shoulder pain 04/2004    (L) shoulder impingement  mild DJD    Long term current use of anticoagulant 07/26/2016    **Coumadin Clinic follows PT/INRs, along w/prescribing pt's Coumadin dosages. **    Menstruation     age 14    Pacemaker 06/21/2012    dual chamber- checked every 3 months    Pulmonary hypertension ([address]     mild per echo 8/24/2010, Pt refused sleep study (June 2012)    Restless legs 08/2003    ferritin    Right shoulder strain 02/2003    no seperation, bony contusion anterior humeral head  MRI 3/03    Sciatica 07/2009    (R) chronic intermittent s/p epidural injections  Dr Anthony Negron Sciatica     Shoulder pain, left 03/2011    RTC tendinopathy, type II acrominum, bursitis- referred to Dr. Michael Ocampo    Shoulder pain, right 03/10/2009    impingement, physical thearpy   (Main)  calcific bursitis  s/p injection    Sinus bradycardia     Sinusitis 12/12/2011    Tinnitus 03/2002    Vision changes 03/19/2013       Surgical history :   Past Surgical History:   Procedure Laterality Date    ABLATION OF DYSRHYTHMIC FOCUS      BREAST BIOPSY      BREAST SURGERY  1985    Right breast tumor excised    CARDIOVERSION      1/2008 Dr Malika Clayton; 12/2008    CARDIOVERSION  03/10/2014    [facility]    CHOLECYSTECTOMY, LAPAROSCOPIC  02/2001    Dr Mati Mendoza      [id number] (Dr Martín Serrano)   Samir Gomez UTERUS  1988    HYSTERECTOMY (CERVIX STATUS UNKNOWN)  2003    PACEMAKER PLACEMENT  06/21/2012    Medtronic Adapta ADDRL1 -not mri compatible   Jarrett Rios (CERVIX REMOVED)  04/2003    fibroid      Dr Loraine French  08/2006    Dr Amanda Rojas ECHOCARDIOGRAM  06/2012    transthoracic cardioversion-Dr. Shell Duff    UPPER GASTROINTESTINAL ENDOSCOPY  04/2008    mild superficial gastritis  Dr Yelena Noel; 2009       Family history:   Family History   Problem Relation Age of Onset    Stroke Mother     Heart Disease Mother     Coronary Art Dis Mother 66        CABG    Colon Cancer Mother [de-identified]        colon     Heart Disease Father     Coronary Art Dis Father 62        CABG    Migraines Sister     Seizures Brother     Breast Cancer Maternal Cousin         under 48         Social history :  reports that she has never smoked. She has never used smokeless tobacco. She reports that she does not drink alcohol and does not use drugs.     Allergies   Allergen Reactions    Latex Hives    Darvon [Propoxyphene Hcl] Shortness Of Breath    Demerol Rash     Breathing problems    Dilaudid [Hydromorphone Hcl] Anaphylaxis    Valium Rash     Breathing problems    Celecoxib Diarrhea    Norco [Hydrocodone-Acetaminophen] Diarrhea, Nausea Only and Other (See Comments)     A-Fib    Norvasc [Amlodipine] Other (See Comments)     HA, dizziness    Oxycodone Itching    Tape [Adhesive Tape] Other (See Comments)     BLISTER    Vasotec [Enalapril] Other (See Comments)     Nausea, vomiting    Diazepam Rash       Current Outpatient Medications on File Prior to Visit   Medication Sig Dispense Refill    atorvastatin (LIPITOR) 40 MG tablet TAKE 1 TABLET BY MOUTH EVERY DAY 30 tablet 0    dilTIAZem (CARDIZEM CD) 360 MG extended release capsule Take 1 capsule by mouth daily 30 capsule 1    cetirizine (ZYRTEC) 10 MG tablet Take 10 mg by mouth daily      aspirin 81 MG chewable tablet Take 1 Pulse: 82   Weight: 141 lb 6.4 oz (64.1 kg)   Height: 5' 7\" (1.702 m)       Body mass index is 22.15 kg/m². Physical Exam  Constitutional:       Appearance: Normal appearance. She is not ill-appearing. HENT:      Head: Normocephalic and atraumatic. Mouth/Throat:      Mouth: Mucous membranes are moist.   Eyes:      Conjunctiva/sclera: Conjunctivae normal.   Cardiovascular:      Rate and Rhythm: Normal rate and regular rhythm. Heart sounds: No murmur heard. Pulmonary:      Effort: Pulmonary effort is normal.      Breath sounds: No rales. Abdominal:      General: Abdomen is flat. Palpations: Abdomen is soft. Musculoskeletal:         General: No tenderness. Normal range of motion. Cervical back: Normal range of motion. Right lower leg: Edema present. Left lower leg: Edema present. Skin:     General: Skin is warm and dry. Neurological:      General: No focal deficit present. Mental Status: She is alert and oriented to person, place, and time.                  CBC:   Lab Results   Component Value Date    WBC 4.6 05/10/2022    HGB 13.4 05/10/2022    HCT 41.6 05/10/2022     05/10/2022     Lipids:  Lab Results   Component Value Date    CHOL 115 04/30/2022    TRIG 47 04/30/2022    HDL 51 04/30/2022    LDLCALC 55 04/30/2022    LDLDIRECT 72 08/01/2020     PT/INR:   Lab Results   Component Value Date    INR 2.3 06/09/2022    INR 2.3 06/09/2022    INR 1.64 05/10/2022        BMP:    Lab Results   Component Value Date     05/10/2022    K 4.3 05/10/2022     05/10/2022    CO2 26 05/10/2022    BUN 10 05/10/2022     CMP:   Lab Results   Component Value Date    AST 33 05/10/2022    PROT 6.2 (L) 05/10/2022    BILITOT 1.7 (H) 05/10/2022    ALKPHOS 62 05/10/2022     TSH:    Lab Results   Component Value Date    TSH 3.76 10/19/2021       EKGINTERPRETATION - EKG Interpretation:            IMPRESSION / RECOMMENDATIONS:     Atrial fibrillation with intermittent RVR especially with exertion - chronic atrial fibrillation - on coumadin  Chest pain  Pacemaker  History of Hypothyroid  HTN  HLD  History of DVT    Patient with bilateral lower leg edema. Patient appears to be in diastolic heart failure as she has shortness of breath with exertion. She is drinking a lot of water and I told her to limit her water intake I will give her torsemide for 3 days as her blood pressure is on the lower side. Patient is atrial fibrillation and having intermittent RVR episodes. Patient is on metoprolol and Cardizem    Options discussed with the patient about AV node ablation with a BiV pacer upgrade as patient has LVEF of 45 to 50%. Patient also enquired if she can have an MRI compatible device as recently she needed MRI but it was not performed because of non compatible device. Patient has a [place] atrial lead and Medtronic ventricular lead. I discussed with the patient that it may be not compatible as a system given that she has 2 different leads if he have to then have to extract the [facility] atrial lead and then put LV lead and then perform the AV node ablation and I discussed the risk with a lead extraction and patient wants to think about it. For now I would like to put patient on torsemide for 3 days and see her back next week. No DVT or hematoma or aneurysm noted and patient has right hip pain    Patient is on metoprolol and Cardizem. Blood pressure is soft. Intermittently patient is still having RVR episodes and I think this is leading to her diastolic heart failure symptoms    Thanks again for allowing me to participate in care of this patient. Please call me if you have any questions. With best regards.       Milo Rubinstein, MD, 6/15/2022 1:37 PM     Please note this report has been partially produced using speech recognition software and may contain errors related to that system including errors in grammar, punctuation, and spelling, as well as words and phrases that may be inappropriate. If there are any questions or concerns please feel free to contact the dictating provider for clarification.

## 2022-06-15 NOTE — PATIENT INSTRUCTIONS
Please be informed that if you contact our office outside of normal business hours the physician on call cannot help with any scheduling or rescheduling issues, procedure instruction questions or any type of medication issue. We advise you for any urgent/emergency that you go to the nearest emergency room! PLEASE CALL OUR OFFICE DURING NORMAL BUSINESS HOURS    Monday - Friday   8 am to 5 pm    [place]: [address]: [phone]    [place]:  [phone]    **It is YOUR responsibilty to bring medication bottles and/or updated medication list to [address] [place] [address].  This will allow us to better serve you and all your healthcare needs**

## 2022-06-16 ENCOUNTER — TELEPHONE (OUTPATIENT)
Dept: CARDIOLOGY CLINIC | Age: 77
End: 2022-06-16

## 2022-06-16 RX ORDER — POTASSIUM CHLORIDE 750 MG/1
10 TABLET, EXTENDED RELEASE ORAL DAILY
Qty: 30 TABLET | Refills: 0 | Status: SHIPPED | OUTPATIENT
Start: 2022-06-16 | End: 2022-07-08

## 2022-06-22 ENCOUNTER — OFFICE VISIT (OUTPATIENT)
Dept: CARDIOLOGY CLINIC | Age: 77
End: 2022-06-22
Payer: MEDICARE

## 2022-06-22 VITALS
DIASTOLIC BLOOD PRESSURE: 62 MMHG | HEIGHT: 67 IN | HEART RATE: 76 BPM | BODY MASS INDEX: 21.97 KG/M2 | WEIGHT: 140 LBS | SYSTOLIC BLOOD PRESSURE: 108 MMHG

## 2022-06-22 DIAGNOSIS — I48.20 CHRONIC ATRIAL FIBRILLATION WITH RVR (HCC): Primary | ICD-10-CM

## 2022-06-22 PROCEDURE — G8428 CUR MEDS NOT DOCUMENT: HCPCS | Performed by: INTERNAL MEDICINE

## 2022-06-22 PROCEDURE — 1090F PRES/ABSN URINE INCON ASSESS: CPT | Performed by: INTERNAL MEDICINE

## 2022-06-22 PROCEDURE — 99213 OFFICE O/P EST LOW 20 MIN: CPT | Performed by: INTERNAL MEDICINE

## 2022-06-22 PROCEDURE — 1036F TOBACCO NON-USER: CPT | Performed by: INTERNAL MEDICINE

## 2022-06-22 PROCEDURE — G8420 CALC BMI NORM PARAMETERS: HCPCS | Performed by: INTERNAL MEDICINE

## 2022-06-22 PROCEDURE — G8399 PT W/DXA RESULTS DOCUMENT: HCPCS | Performed by: INTERNAL MEDICINE

## 2022-06-22 PROCEDURE — 1123F ACP DISCUSS/DSCN MKR DOCD: CPT | Performed by: INTERNAL MEDICINE

## 2022-06-22 RX ORDER — TORSEMIDE 10 MG/1
10 TABLET ORAL DAILY
Qty: 30 TABLET | Refills: 1 | Status: SHIPPED | OUTPATIENT
Start: 2022-06-22 | End: 2022-07-15

## 2022-06-22 NOTE — PATIENT INSTRUCTIONS
Covid test, procedure paperwork, and pre-testing scheduled on 7/7/2022 at 10:30 am. Procedure scheduled with Dr. Randalyn Scheuermann on 7/12/2022 at [facility].

## 2022-06-22 NOTE — PROGRESS NOTES
Vein \"LEG PROBLEM Questionnaire\"  1. Do you have prominent leg veins? Yes   2. Do you have any skin discoloration? Yes  3. Do you have any healed or active sores? Yes  4. Do you have swelling of the legs? Yes  5. Do you have a family history of varicose veins? No  6. Does your profession involve pro-longed        standing or heavy lifting? No  7. Have you been fighting overweight problems? No  8. Do you have restless legs? No  9. Do you have any night time cramps? Yes  10. Do you have any of the following in your legs:        Weakness I  11. If Yes - Have they worn compression stockings sometimes  12.  If they have worn compression stockings        PT DID NOT BRING MEDICATIONS OR LIST TO TODAYS APPT

## 2022-06-22 NOTE — PROGRESS NOTES
Electrophysiology Follow up Note      Reason for consultation:  NSVT    Chief complaint: shortness of breath with exertion    Referring physician: Amaya Luo      Primary care physician: Jorge Troncoso MD      History of Present Illness: This visit (6/22/2022)      Chief Complaint   Patient presents with    Follow-up     Pt is here to discuss her lab results, she states she does have some Edema but better on medication. Patient has dizziness and shortness of breath with exertion. Wants to consider the procedure           Previous visit: (6/15/2022)      Chief Complaint   Patient presents with    1 Month Follow-Up     SOBOE, swelling in legs, ankles and feet bilat. Previous visit:    This visit 5/19/2022  Patient here today for follow-up on atrial fibrillation. Patient reports that she has 1 episode of palpitations since discharge from the hospital.  She states that overall she is feeling well. She denies chest pain, shortness of breath, lightheadedness, dizziness, edema or syncope. He is not drinking caffeine. She does not exercise. She does not smoke tobacco or drink alcohol. Patient had recent left heart cath on May 10. Patient did not require intervention at that time. Previous visit  Octavio Bueno is a 67year old female with a history of dementia, persistent atrial fibrillation, chronic anticoagulation, thrombocytopenia, hypertension, hypothyroidism, hyperlipdemia, DVT, dual chamber pacemaker presents with complaints of shortness of breath with exertion and chest pressure. Patient was admitted and had noted negative troponin and normal mycardial perfusion on stress test.   Patient was discharged home. Per chart patient had a troponin to result after patient was discharged that was elevated and patient was called back to the the hospital. Patient reports that she continued to have chest pressure.  Patient had an EKG with noted inverted T waves. Patient was admitted, started on heparin drip and went for left heart catheterization this morning. LHC revealed no significant CAD except for 60-70% disease of the ostium. EP was consulted for concern for NSVT noted on tele and on pacer report. Patient reports that she is in atrial fibrillation consistently. She states that she will intermittently have palpitations that come on suddenly and resolve. She denies aggravating or alleviating factors. She denies dizziness, lightheadedness, edema, or syncope. Patient has had failed cardioversion in 6/2015. She had ad prior ablation in 2011 and 2014 at [address] [place] [address] with Dr Merle Hastings. He has been on sotalol and amiodarone in the past. She failed both medications. Patient did not have hybrid approach with n contact technique. Pastmedical history:   Past Medical History:   Diagnosis Date    Abnormal echocardiogram     EF 60%, mild aortic indsufficiency, mild pulmonary hypertension    Anemia     Aortic insufficiency     mild per echo 8/24/2010    Atrial fibrillation (HCC)     failed propafenone, Multaq and flecainide - 7/2011 plan for AFib ablation - [facility]- Dr Arabella Spurling Atrial flutter ([facility] [place] [address]     Bursitis, trochanteric 08/2004    s/p injection    Cerumen impaction 04/24/2012    Diverticulosis 2015    Dr. Dian Marion C scope    Diverticulosis of colon 01/2010    Colonoscopy-Dr Reece ACUÑA (degenerative joint disease), cervical     cervical, generalized disc buldge   MRI 3/02    DVT (deep venous thrombosis) ([facility] [place] [address]     Dyslipidemia 2002    Environmental allergies     Family history of colon cancer     mother    Gastric polyp     8/2006, 11/2009 benign- Dr Jann Mcdowell Gastritis 04/2008    mild superficial per Dr Dian Marion    GERD (gastroesophageal reflux disease) 08/2006    Dr Jann Mcdowell H/O cardiovascular stress test 07/13/2004 07/13 EF58%, No scintigraphic evidence of inducible myocardial ischemia 04/13Normal study.  No wall motion abnormality seen. EF 65%    H/O Doppler ultrasound 06/14/2022    VL Lower Ext Arteries/Venous Right. No evidence of pseudoaneurysm, AV fistula, or hematoma is present.  H/O echocardiogram 07/18/2013 7/13-EF 50-55% Mild AR.  H/O exercise stress test 02/07/2017    EF63% normal    History of Holter monitoring 09/23/2015    48 hour - abnormal holter revealing afib throughout the recording with interspersed pacemaker beats, HR does not appear to be well controlled    History of mammogram     last mammo 7/25/11, Dr. Deshaun Dominguez yearly    Hx of colonoscopy     1/21/2010    Hx of Doppler Venous ultrasound 08/31/2021    No DVT or SVT, No significant reflux in RLE, Significant reflux in Left CFV    Hyperlipidemia     Hypertension     Hypothyroidism     Internal hemorrhoid 2015    Dr. Gene Hunter C scope     Intervertebral disc protrusion 05/2009    wry neck with C4-5      Left shoulder pain 04/2004    (L) shoulder impingement  mild DJD    Long term current use of anticoagulant 07/26/2016    **Coumadin Clinic follows PT/INRs, along w/prescribing pt's Coumadin dosages. **    Menstruation     age 14    Pacemaker 06/21/2012    dual chamber- checked every 3 months    Pulmonary hypertension ([address]     mild per echo 8/24/2010, Pt refused sleep study (June 2012)    Restless legs 08/2003    ferritin    Right shoulder strain 02/2003    no seperation, bony contusion anterior humeral head  MRI 3/03    Sciatica 07/2009    (R) chronic intermittent s/p epidural injections  Dr Judy Portillo Sciatica     Shoulder pain, left 03/2011    RTC tendinopathy, type II acrominum, bursitis- referred to Dr. Analisa Chaney Shoulder pain, right 03/10/2009    impingement, physical thearpy   (Main)  calcific bursitis  s/p injection    Sinus bradycardia     Sinusitis 12/12/2011    Tinnitus 03/2002    Vision changes 03/19/2013       Surgical history :   Past Surgical History:   Procedure Laterality Date    ABLATION OF DYSRHYTHMIC FOCUS      BREAST BIOPSY [facility]    Right breast tumor excised    CARDIOVERSION      1/2008 Dr Jabier Hagan; 12/2008    CARD[facility]ION  03/10/2014    [facility]    CHOLECYSTECTOMY, LAPAROSCOPIC  02/2001    Dr Ashwin Angulo      [id number] (Dr Gayle Dexter)   [address] (CERVIX STATUS UNKNOWN)  2003    OTHER SURGICAL HISTORY Left 07/12/2022    upgrade to Medtronic Bi vi pacer, with AV node ablation performed by Dr. Damaris Dandy.  PACEMAKER PLACEMENT  06/21/2012    Medtronic Adapta ADDRL1 -not mri compatible   Lorri Mehta (CERVIX REMOVED)  04/2003    fibroid      Dr Jhaveri Hem  08/2006    Dr Vizcaino Antis ECHOCARDIOGRAM  06/2012    transthoracic cardioversion-Dr. Idania Winter    UPPER GASTROINTESTINAL ENDOSCOPY  04/2008    mild superficial gastritis  Dr Gayle Dexter; 2009       Family history:   Family History   Problem Relation Age of Onset    Stroke Mother     Heart Disease Mother     Coronary Art Dis Mother 66        CABG    Colon Cancer Mother [de-identified]        colon     Heart Disease Father     Coronary Art Dis Father 62        CABG    Migraines Sister     Seizures Brother     Breast Cancer Maternal Cousin         under 48         Social history :  reports that she has never smoked. She has never used smokeless tobacco. She reports that she does not drink alcohol and does not use drugs.     Allergies   Allergen Reactions    Latex Hives    Darvon [Propoxyphene Hcl] Shortness Of Breath    Demerol Rash     Breathing problems    Dilaudid [Hydromorphone Hcl] Anaphylaxis    Valium Rash     Breathing problems    Celecoxib Diarrhea    Norco [Hydrocodone-Acetaminophen] Diarrhea, Nausea Only and Other (See Comments)     A-Fib    Norvasc [Amlodipine] Other (See Comments)     HA, dizziness    Oxycodone Itching    Tape Fátima Knows Tape] Other (See Comments)     BLISTER    Vasotec [Enalapril] Other (See Comments) Nausea, vomiting    Diazepam Rash       Current Outpatient Medications on File Prior to Visit   Medication Sig Dispense Refill    cetirizine (ZYRTEC) 10 MG tablet Take 10 mg by mouth daily      aspirin 81 MG chewable tablet Take 1 tablet by mouth daily 30 tablet 3    metoprolol succinate (TOPROL XL) 50 MG extended release tablet Take 1 tablet by mouth in the morning and at bedtime 30 tablet 3    donepezil (ARICEPT) 10 MG tablet Take 1 tablet by mouth daily 30 tablet 5    warfarin (COUMADIN) 3 MG tablet Take 3 mg by mouth daily Except take 4.5mg on Thursdays      levothyroxine (SYNTHROID) 50 MCG tablet TAKE 1 TABLET BY MOUTH EVERY DAY BEFORE BREAKFAST 90 tablet 3    Vitamin D (CHOLECALCIFEROL) 25 MCG (1000 UT) TABS tablet Take 1,000 Units by mouth daily      Fexofenadine HCl (ALLEGRA ALLERGY PO) Take by mouth as needed       Dexlansoprazole (DEXILANT) 60 MG CPDR Take 1 tablet by mouth daily. No current facility-administered medications on file prior to visit. Review of Systems:   Review of Systems   Constitutional: Negative for activity change, chills, fatigue and fever. HENT: Negative for congestion, ear pain and tinnitus. Eyes: Negative for photophobia, pain and visual disturbance. Respiratory: shortness of breath with exertion. Negative for cough, chest tightness and wheezing. Cardiovascular:  Denies chest pain. Has palpitations  leg swelling. Gastrointestinal: Negative for abdominal pain, blood in stool, constipation, diarrhea, nausea and vomiting. Endocrine: Negative for cold intolerance and heat intolerance. Genitourinary: Negative for dysuria, flank pain and hematuria. Musculoskeletal: Negative for arthralgias, back pain, myalgias and neck stiffness. Skin: Negative for color change and rash. Allergic/Immunologic: Negative for food allergies. Neurological: Negative for dizziness, light-headedness, numbness and headaches.    Hematological: Does not bruise/bleed

## 2022-06-27 DIAGNOSIS — Z95.0 PACEMAKER: Primary | ICD-10-CM

## 2022-06-27 RX ORDER — DILTIAZEM HYDROCHLORIDE 360 MG/1
CAPSULE, EXTENDED RELEASE ORAL
Qty: 30 CAPSULE | Refills: 5 | Status: SHIPPED | OUTPATIENT
Start: 2022-06-27 | End: 2022-06-29

## 2022-06-30 ENCOUNTER — ANTI-COAG VISIT (OUTPATIENT)
Dept: PHARMACY | Age: 77
End: 2022-06-30
Payer: MEDICARE

## 2022-06-30 VITALS
WEIGHT: 141.4 LBS | HEART RATE: 82 BPM | HEIGHT: 67 IN | SYSTOLIC BLOOD PRESSURE: 110 MMHG | DIASTOLIC BLOOD PRESSURE: 70 MMHG | BODY MASS INDEX: 22.19 KG/M2

## 2022-06-30 DIAGNOSIS — I48.0 PAF (PAROXYSMAL ATRIAL FIBRILLATION) (HCC): Primary | ICD-10-CM

## 2022-06-30 LAB
INTERNATIONAL NORMALIZATION RATIO, POC: 2.5
POC INR: 2.5 INDEX
PROTHROMBIN TIME, POC: 29.8 SECONDS (ref 10–14.3)

## 2022-06-30 PROCEDURE — 85610 PROTHROMBIN TIME: CPT

## 2022-06-30 PROCEDURE — 99211 OFF/OP EST MAY X REQ PHY/QHP: CPT

## 2022-06-30 PROCEDURE — 36416 COLLJ CAPILLARY BLOOD SPEC: CPT

## 2022-06-30 NOTE — PROGRESS NOTES
Medication Management Service  [facility]  [phone]    Visit Date: 6/30/2022   Subjective:       Oc Petersen is a 67 y.o. female who presents to clinic today for anticoagulation monitoring and adjustment. Patient seen in clinic for warfarin management due to  Indication:   atrial fibrillation. INR goal: of 2.0-3.0. Duration of therapy: indefinite. Patient reports the following:   Adherent with regimen  Missed or extra doses:  None   Bleeding or thromboembolic side effects:  None  Significant medication changes:  torsemide  Significant dietary changes: None  Significant alcohol or tobacco changes: None  Significant recent illness, disease state changes, or hospitalization:  ER for wound check  Upcoming surgeries or procedures:  New pacemaker 7/12  Falls: None           Assessment and Plan     PT/INR done in office per protocol. INR today is 2.5, therapeutic. Procedure 7/12  Plan: Will continue current regimen of warfarin 3mg daily except 4.5mg on Thursdays. Recheck INR in 4 week(s). Patient verbalized understanding of dosing directions and information discussed. Dosing schedule given to patient including phone number, appointment date, and time. Progress note sent to referring office. Patient acknowledges working in consult agreement with pharmacist as referred by his/her physician.       Electronically signed by NICCI Khan [facility] on 6/30/22 at 11:24 AM EDT    For Pharmacy Admin Tracking Only     Intervention Detail:    Total # of Interventions Recommended:    Total # of Interventions Accepted:    Time Spent (min): 15

## 2022-07-03 DIAGNOSIS — E78.2 MIXED HYPERLIPIDEMIA: ICD-10-CM

## 2022-07-03 PROCEDURE — 93296 REM INTERROG EVL PM/IDS: CPT | Performed by: INTERNAL MEDICINE

## 2022-07-03 PROCEDURE — 93294 REM INTERROG EVL PM/LDLS PM: CPT | Performed by: INTERNAL MEDICINE

## 2022-07-05 RX ORDER — ATORVASTATIN CALCIUM 40 MG/1
40 TABLET, FILM COATED ORAL DAILY
Qty: 30 TABLET | Refills: 5 | Status: SHIPPED | OUTPATIENT
Start: 2022-07-05

## 2022-07-06 ENCOUNTER — PROCEDURE VISIT (OUTPATIENT)
Dept: CARDIOLOGY CLINIC | Age: 77
End: 2022-07-06
Payer: MEDICARE

## 2022-07-06 DIAGNOSIS — Z95.0 CARDIAC PACEMAKER IN SITU: Primary | ICD-10-CM

## 2022-07-07 ENCOUNTER — HOSPITAL ENCOUNTER (OUTPATIENT)
Age: 77
Setting detail: SPECIMEN
Discharge: HOME | End: 2022-07-07
Payer: MEDICARE

## 2022-07-07 ENCOUNTER — HOSPITAL ENCOUNTER (OUTPATIENT)
Age: 77
Discharge: HOME | End: 2022-07-07
Payer: MEDICARE

## 2022-07-07 ENCOUNTER — NURSE ONLY (OUTPATIENT)
Dept: CARDIOLOGY CLINIC | Age: 77
End: 2022-07-07

## 2022-07-07 ENCOUNTER — HOSPITAL ENCOUNTER (OUTPATIENT)
Dept: GENERAL RADIOLOGY | Age: 77
Discharge: HOME | End: 2022-07-07
Payer: MEDICARE

## 2022-07-07 DIAGNOSIS — I48.20 CHRONIC ATRIAL FIBRILLATION (HCC): Primary | ICD-10-CM

## 2022-07-07 DIAGNOSIS — Z01.810 PRE-OPERATIVE CARDIOVASCULAR EXAMINATION: ICD-10-CM

## 2022-07-07 DIAGNOSIS — Z79.01 LONG TERM (CURRENT) USE OF ANTICOAGULANTS: ICD-10-CM

## 2022-07-07 LAB
ANION GAP SERPL CALCULATED.3IONS-SCNC: 9 MMOL/L (ref 4–16)
APTT: 28.1 SECONDS (ref 25.1–37.1)
BASOPHILS ABSOLUTE: 0 K/CU MM
BASOPHILS RELATIVE PERCENT: 0.5 % (ref 0–1)
BUN BLDV-MCNC: 13 MG/DL (ref 6–23)
CALCIUM SERPL-MCNC: 9.4 MG/DL (ref 8.3–10.6)
CHLORIDE BLD-SCNC: 105 MMOL/L (ref 99–110)
CO2: 27 MMOL/L (ref 21–32)
CREAT SERPL-MCNC: 0.8 MG/DL (ref 0.6–1.1)
DIFFERENTIAL TYPE: ABNORMAL
EOSINOPHILS ABSOLUTE: 0.3 K/CU MM
EOSINOPHILS RELATIVE PERCENT: 4.2 % (ref 0–3)
GFR AFRICAN AMERICAN: >60 ML/MIN/1.73M2
GFR NON-AFRICAN AMERICAN: >60 ML/MIN/1.73M2
GLUCOSE BLD-MCNC: 69 MG/DL (ref 70–99)
HCT VFR BLD CALC: 40.4 % (ref 37–47)
HEMOGLOBIN: 13 GM/DL (ref 12.5–16)
IMMATURE NEUTROPHIL %: 0.3 % (ref 0–0.43)
INR BLD: 1.1 INDEX
LYMPHOCYTES ABSOLUTE: 2.2 K/CU MM
LYMPHOCYTES RELATIVE PERCENT: 35.6 % (ref 24–44)
MAGNESIUM: 2 MG/DL (ref 1.8–2.4)
MCH RBC QN AUTO: 30.2 PG (ref 27–31)
MCHC RBC AUTO-ENTMCNC: 32.2 % (ref 32–36)
MCV RBC AUTO: 94 FL (ref 78–100)
MONOCYTES ABSOLUTE: 0.7 K/CU MM
MONOCYTES RELATIVE PERCENT: 11 % (ref 0–4)
NUCLEATED RBC %: 0 %
PDW BLD-RTO: 14.5 % (ref 11.7–14.9)
PHOSPHORUS: 3.7 MG/DL (ref 2.5–4.9)
PLATELET # BLD: 118 K/CU MM (ref 140–440)
PMV BLD AUTO: 11.2 FL (ref 7.5–11.1)
POTASSIUM SERPL-SCNC: 4.9 MMOL/L (ref 3.5–5.1)
PROTHROMBIN TIME: 14.2 SECONDS (ref 11.7–14.5)
RBC # BLD: 4.3 M/CU MM (ref 4.2–5.4)
SEGMENTED NEUTROPHILS ABSOLUTE COUNT: 3 K/CU MM
SEGMENTED NEUTROPHILS RELATIVE PERCENT: 48.4 % (ref 36–66)
SODIUM BLD-SCNC: 141 MMOL/L (ref 135–145)
TOTAL IMMATURE NEUTOROPHIL: 0.02 K/CU MM
TOTAL NUCLEATED RBC: 0 K/CU MM
WBC # BLD: 6.2 K/CU MM (ref 4–10.5)

## 2022-07-07 PROCEDURE — 85610 PROTHROMBIN TIME: CPT

## 2022-07-07 PROCEDURE — U0005 INFEC AGEN DETEC AMPLI PROBE: HCPCS

## 2022-07-07 PROCEDURE — 85730 THROMBOPLASTIN TIME PARTIAL: CPT

## 2022-07-07 PROCEDURE — 80048 BASIC METABOLIC PNL TOTAL CA: CPT

## 2022-07-07 PROCEDURE — 84100 ASSAY OF PHOSPHORUS: CPT

## 2022-07-07 PROCEDURE — 71046 X-RAY EXAM CHEST 2 VIEWS: CPT

## 2022-07-07 PROCEDURE — U0003 INFECTIOUS AGENT DETECTION BY NUCLEIC ACID (DNA OR RNA); SEVERE ACUTE RESPIRATORY SYNDROME CORONAVIRUS 2 (SARS-COV-2) (CORONAVIRUS DISEASE [COVID-19]), AMPLIFIED PROBE TECHNIQUE, MAKING USE OF HIGH THROUGHPUT TECHNOLOGIES AS DESCRIBED BY CMS-2020-01-R: HCPCS

## 2022-07-07 PROCEDURE — 36415 COLL VENOUS BLD VENIPUNCTURE: CPT

## 2022-07-07 PROCEDURE — 83735 ASSAY OF MAGNESIUM: CPT

## 2022-07-07 PROCEDURE — 85025 COMPLETE CBC W/AUTO DIFF WBC: CPT

## 2022-07-07 NOTE — PROGRESS NOTES
Patient here in office and educated on BIV PPM Implant Upgrade w/AVN Ablation, schedule for 7/12/22 @ 1130, with arrival @ 0930, @ [facility]; risk explained; and consents signed. Also copy of orders given for labs and CXR due 7/7/2022 at [facility]. Instruction given to patient to :  NPO after midnight the night before procedure; call hospital at [phone] to pre-register. May take rest of morning meds of procedure. Hold Torsemide the morning before procedure. If taking Coumadin patient is to call the office after labs drawn, for results and instructions. Patient voiced understanding. Copies of consent & info scanned in chart. Patient performed COVID nasal swab for 10 seconds in each nostril. Patient was wearing a mask and provided gloves and tissues. This LOBITO, LPN, RN present in the room, 6 feet away. Patient dropped swab in  labeled collection tube. Lab order, facesheet and copy of insurance card placed in collection bag. Bag placed in biohazard bag and placed in fridge.  called for .

## 2022-07-08 LAB
SARS-COV-2: NOT DETECTED
SOURCE: NORMAL

## 2022-07-08 RX ORDER — POTASSIUM CHLORIDE 750 MG/1
TABLET, EXTENDED RELEASE ORAL
Qty: 30 TABLET | Refills: 0 | Status: SHIPPED | OUTPATIENT
Start: 2022-07-08 | End: 2022-08-01 | Stop reason: SDUPTHER

## 2022-07-08 NOTE — TELEPHONE ENCOUNTER
Advised patients  not to stop taking Coumadin per Paige Doctor.  INR was only 1.10, he voiced understanding

## 2022-07-12 ENCOUNTER — APPOINTMENT (OUTPATIENT)
Dept: GENERAL RADIOLOGY | Age: 77
End: 2022-07-12
Attending: INTERNAL MEDICINE
Payer: MEDICARE

## 2022-07-12 ENCOUNTER — HOSPITAL ENCOUNTER (OUTPATIENT)
Dept: CARDIAC CATH/INVASIVE PROCEDURES | Age: 77
Discharge: HOME | End: 2022-07-12
Attending: INTERNAL MEDICINE | Admitting: INTERNAL MEDICINE
Payer: MEDICARE

## 2022-07-12 VITALS
WEIGHT: 135 LBS | SYSTOLIC BLOOD PRESSURE: 118 MMHG | BODY MASS INDEX: 21.19 KG/M2 | HEART RATE: 80 BPM | DIASTOLIC BLOOD PRESSURE: 76 MMHG | HEIGHT: 67 IN | RESPIRATION RATE: 16 BRPM | OXYGEN SATURATION: 97 % | TEMPERATURE: 96.1 F

## 2022-07-12 DIAGNOSIS — Z95.0 STATUS POST BIVENTRICULAR PACEMAKER: Primary | ICD-10-CM

## 2022-07-12 LAB
ABO/RH: NORMAL
ANTIBODY SCREEN: NEGATIVE
APTT: 30.6 SECONDS (ref 25.1–37.1)
INR BLD: 1.79 INDEX
PROTHROMBIN TIME: 23.2 SECONDS (ref 11.7–14.5)

## 2022-07-12 PROCEDURE — 33229 REMV&REPLC PM GEN MULT LEADS: CPT | Performed by: INTERNAL MEDICINE

## 2022-07-12 PROCEDURE — 71045 X-RAY EXAM CHEST 1 VIEW: CPT

## 2022-07-12 PROCEDURE — 6370000000 HC RX 637 (ALT 250 FOR IP): Performed by: INTERNAL MEDICINE

## 2022-07-12 PROCEDURE — 86850 RBC ANTIBODY SCREEN: CPT

## 2022-07-12 PROCEDURE — C1889 IMPLANT/INSERT DEVICE, NOC: HCPCS

## 2022-07-12 PROCEDURE — C1900 LEAD, CORONARY VENOUS: HCPCS

## 2022-07-12 PROCEDURE — 33225 L VENTRIC PACING LEAD ADD-ON: CPT

## 2022-07-12 PROCEDURE — 86900 BLOOD TYPING SEROLOGIC ABO: CPT

## 2022-07-12 PROCEDURE — 85730 THROMBOPLASTIN TIME PARTIAL: CPT

## 2022-07-12 PROCEDURE — 6360000002 HC RX W HCPCS

## 2022-07-12 PROCEDURE — 86901 BLOOD TYPING SEROLOGIC RH(D): CPT

## 2022-07-12 PROCEDURE — 33229 REMV&REPLC PM GEN MULT LEADS: CPT

## 2022-07-12 PROCEDURE — 2500000003 HC RX 250 WO HCPCS

## 2022-07-12 PROCEDURE — C2621 PMKR, OTHER THAN SING/DUAL: HCPCS

## 2022-07-12 PROCEDURE — 6360000004 HC RX CONTRAST MEDICATION

## 2022-07-12 PROCEDURE — 85610 PROTHROMBIN TIME: CPT

## 2022-07-12 PROCEDURE — 93650 ICAR CATH ABLTJ AV NODE FUNC: CPT | Performed by: INTERNAL MEDICINE

## 2022-07-12 PROCEDURE — 93650 ICAR CATH ABLTJ AV NODE FUNC: CPT

## 2022-07-12 PROCEDURE — 33225 L VENTRIC PACING LEAD ADD-ON: CPT | Performed by: INTERNAL MEDICINE

## 2022-07-12 RX ORDER — GABAPENTIN 100 MG/1
100 CAPSULE ORAL 3 TIMES DAILY
Status: DISCONTINUED | OUTPATIENT
Start: 2022-07-12 | End: 2022-07-12 | Stop reason: HOSPADM

## 2022-07-12 RX ORDER — ATORVASTATIN CALCIUM 40 MG/1
40 TABLET, FILM COATED ORAL DAILY
Status: CANCELLED | OUTPATIENT
Start: 2022-07-12

## 2022-07-12 RX ORDER — TORSEMIDE 10 MG/1
10 TABLET ORAL DAILY
Status: CANCELLED | OUTPATIENT
Start: 2022-07-12

## 2022-07-12 RX ORDER — DOXYCYCLINE HYCLATE 100 MG
100 TABLET ORAL DAILY
Status: DISCONTINUED | OUTPATIENT
Start: 2022-07-12 | End: 2022-07-12 | Stop reason: HOSPADM

## 2022-07-12 RX ORDER — VITAMIN B COMPLEX
1000 TABLET ORAL DAILY
Status: CANCELLED | OUTPATIENT
Start: 2022-07-12

## 2022-07-12 RX ORDER — CETIRIZINE HYDROCHLORIDE 10 MG/1
10 TABLET ORAL DAILY
Status: CANCELLED | OUTPATIENT
Start: 2022-07-12

## 2022-07-12 RX ORDER — CAPSAICIN 0.07 G/100G
CREAM TOPICAL
Qty: 1 EACH | Refills: 1 | Status: SHIPPED | OUTPATIENT
Start: 2022-07-12 | End: 2022-08-11

## 2022-07-12 RX ORDER — TRAMADOL HYDROCHLORIDE 50 MG/1
50 TABLET ORAL EVERY 8 HOURS PRN
Status: CANCELLED | OUTPATIENT
Start: 2022-07-12

## 2022-07-12 RX ORDER — ASPIRIN 81 MG/1
81 TABLET, CHEWABLE ORAL DAILY
Status: CANCELLED | OUTPATIENT
Start: 2022-07-12

## 2022-07-12 RX ORDER — GABAPENTIN 100 MG/1
100 CAPSULE ORAL 3 TIMES DAILY
Qty: 45 CAPSULE | Refills: 3 | Status: SHIPPED | OUTPATIENT
Start: 2022-07-12 | End: 2022-09-27

## 2022-07-12 RX ORDER — ACETAMINOPHEN 500 MG
500 TABLET ORAL 4 TIMES DAILY PRN
Qty: 30 TABLET | Refills: 1 | Status: SHIPPED | OUTPATIENT
Start: 2022-07-12 | End: 2022-09-27

## 2022-07-12 RX ORDER — CAPSAICIN 0.025 %
CREAM (GRAM) TOPICAL 3 TIMES DAILY
Status: DISCONTINUED | OUTPATIENT
Start: 2022-07-12 | End: 2022-07-12 | Stop reason: HOSPADM

## 2022-07-12 RX ORDER — ACETAMINOPHEN 325 MG/1
650 TABLET ORAL EVERY 6 HOURS PRN
Status: DISCONTINUED | OUTPATIENT
Start: 2022-07-12 | End: 2022-07-12 | Stop reason: HOSPADM

## 2022-07-12 RX ORDER — LEVOTHYROXINE SODIUM 0.05 MG/1
50 TABLET ORAL DAILY
Status: CANCELLED | OUTPATIENT
Start: 2022-07-13

## 2022-07-12 RX ORDER — PANTOPRAZOLE SODIUM 40 MG/1
40 TABLET, DELAYED RELEASE ORAL
Status: CANCELLED | OUTPATIENT
Start: 2022-07-13

## 2022-07-12 RX ORDER — ACETAMINOPHEN 325 MG/1
650 TABLET ORAL EVERY 6 HOURS PRN
Status: CANCELLED | OUTPATIENT
Start: 2022-07-12

## 2022-07-12 RX ORDER — TRAMADOL HYDROCHLORIDE 50 MG/1
50 TABLET ORAL EVERY 8 HOURS PRN
Status: DISCONTINUED | OUTPATIENT
Start: 2022-07-12 | End: 2022-07-12 | Stop reason: HOSPADM

## 2022-07-12 RX ORDER — POTASSIUM CHLORIDE 20 MEQ/1
10 TABLET, EXTENDED RELEASE ORAL DAILY
Status: CANCELLED | OUTPATIENT
Start: 2022-07-12

## 2022-07-12 RX ORDER — WARFARIN SODIUM 3 MG/1
3 TABLET ORAL DAILY
Status: CANCELLED | OUTPATIENT
Start: 2022-07-12

## 2022-07-12 RX ORDER — METOPROLOL SUCCINATE 50 MG/1
50 TABLET, EXTENDED RELEASE ORAL 2 TIMES DAILY
Status: CANCELLED | OUTPATIENT
Start: 2022-07-12

## 2022-07-12 RX ORDER — DONEPEZIL HYDROCHLORIDE 10 MG/1
10 TABLET, FILM COATED ORAL DAILY
Status: CANCELLED | OUTPATIENT
Start: 2022-07-12

## 2022-07-12 RX ORDER — TRAMADOL HYDROCHLORIDE 50 MG/1
50 TABLET ORAL EVERY 8 HOURS PRN
Qty: 9 TABLET | Refills: 0 | Status: SHIPPED | OUTPATIENT
Start: 2022-07-12 | End: 2022-07-15

## 2022-07-12 RX ORDER — CAPSAICIN 0.07 G/100G
CREAM TOPICAL 3 TIMES DAILY
Status: DISCONTINUED | OUTPATIENT
Start: 2022-07-12 | End: 2022-07-12 | Stop reason: ALTCHOICE

## 2022-07-12 RX ADMIN — CAPSAICIN: 0.25 CREAM TOPICAL at 16:11

## 2022-07-12 RX ADMIN — GABAPENTIN 100 MG: 100 CAPSULE ORAL at 16:10

## 2022-07-12 RX ADMIN — ACETAMINOPHEN 650 MG: 325 TABLET ORAL at 14:37

## 2022-07-12 ASSESSMENT — PAIN DESCRIPTION - LOCATION: LOCATION: GROIN

## 2022-07-12 ASSESSMENT — PAIN DESCRIPTION - ORIENTATION: ORIENTATION: RIGHT

## 2022-07-12 ASSESSMENT — PAIN SCALES - GENERAL: PAINLEVEL_OUTOF10: 8

## 2022-07-12 ASSESSMENT — PAIN DESCRIPTION - DESCRIPTORS: DESCRIPTORS: ACHING;DISCOMFORT

## 2022-07-12 NOTE — PROGRESS NOTES
Patient complaining of pain again in the groin area just  like before the procedure. I discussed with the family and  and patient daughter and they report that since her cath in May she had been having pain in the right groin area. Patient had evaluation and no problems were noted but she still continues to be sensitive and pain in the area. I evaluated the patient there was no hematoma or bruit. Her pain was more in the lateral iliac section which is away from the femoral area. To me it appears more like a muscle pull or neurological pain. Patient feels that during the cath she was lifted on the table and she had pain after that. Not entirely sure what happened before but she was complaining of pain so I am going to start her on capsicin cream and also put her on Neurontin for now and if the pain does not subside in a week or so then she needs to see Ortho.  Discussed with patient and family and they agree with the plan

## 2022-07-12 NOTE — OP NOTE
Electrophysiology Department,                   [facility]. Date of Procedure: 7/12/2022  Patient's Name: Yoel Sexton  YOB: 1945   Medical Record Number: [id number]      Procedures performed:    Intracardiac catheter ablation of atrioventricular node function, atrioventricular conduction for creation of complete heart block    Procedure Performed by: Tammi Mary MD     Sedation : Versed and Fentany    Blood loss : 20      Indication of the procedure:    Patient is a 78-year-old female with uncontrolled persistent trial fibrillation with RVR - despite medical therapy and unable to tolerate any further increase in medication with mild LV systolic dysfunction here for AVN ablation and BIV pacemaker upgrade    Details of procedure: The patient was brought to the electrophysiology laboratory in stable condition. The patient was in a fasting and non-sedated state. The risks, benefits and alternatives of the procedure were discussed with the patient. The risks including, but not limited to, the risks of vascular injury, bleeding, infection, device malfunction, lead dislodgement, radiation exposure, injury to cardiac and surrounding structures, pulmonary embolism, stroke, myocardial infarction and death were discussed in detail. The patient opted to proceed with the procedure. Written informed consent was signed and placed in the chart. At this point, a timeout protocol was completed to identify the patient and the procedure being performed. The patient was prepped and draped in a sterile fashion. Lidocaine was administered to the right groin. Using a modified Seldinger technique, venous access was obtained, 8F sheath was placed and it was upgraded for an SRO sheath. AV Node ablation    Patient was in atrial fibrillation with RVR . Under fluoroscopic guidance, we identified the  \" his \" position with F-J curve nonirrigated ablation catheter.   Pulled back and mild inferior position we has a good A to V signals and ablation was performed 35W for 60 seconds. Complete block was achieved with completely dependent on the pacemaker . (Back up pacing was arranged at 40 by the pacemaker prior to start of the procedure). After appropriate waiting period, we noted that patient was still paced through out with complete AV block. Sheaths and catheter were removed and hemostasis was achieved with manual compression    We then programmed the device to VVI 80 bpm.    Patient tolerated he procedure well and no complications were noted.     Summary:    Intracardiac catheter ablation of atrioventricular node function, atrioventricular conduction for creation of complete heart block      Plan:    Plan for discharge today  Bedrest for 3 hours

## 2022-07-12 NOTE — OP NOTE
[facility]     Electrophysiology Procedure Note       Date of Procedure: 7/12/2022  Patient's Name: Corinne Blackwell  YOB: 1945   Medical Record Number: [id number]    Procedure Performed by: Rosita Dietrich MD        Procedures performed:  · Insertion of left ventricular lead under fluoroscopy  · Implantation of a Biv-Pacemaker  · Removal of old dual chamber pace maker  · Electronic analysis of lead and device. · Coronary sinus venogram  · IV sedation    Sedation: fentanyl    Blood loss : 20cc    Indication of the procedure:    Patient is a 18-year-old female with uncontrolled persistent trial fibrillation with RVR - despite medical therapy and unable to tolerate any further increase in medication with mild LV systolic dysfunction WITH NYHA class III symptoms here for AVN ablation and BIV pacemaker upgrade    Details of procedure: The patient was brought to the electrophysiology laboratory in stable condition. The patient was in a fasting and non-sedated state. The risks, benefits and alternatives of the procedure were discussed with the patient. The risks including, but not limited to, the risks of vascular injury, bleeding, infection, device malfunction, lead dislodgement, radiation exposure, injury to cardiac and surrounding structures (including pneumothorax), stroke, myocardial infarction and death were discussed in detail. The patient opted to proceed with the device implantation. Written informed consent was signed and placed in the chart. Prophylactic antibiotic was given. Selective venography of the left upper extremity was done to rule out compression of the vein, and because of difficult access, which showed patent vein. The patient was prepped and draped in a sterile fashion. A timeout protocol was completed to identify the patient and the procedure being performed. IV sedation was provided with IV Versed, Fentanyl.   Patient is a very thin lady in the subpectoral area and we noted the device curvature and also the leads coming into the device with extension of the tissue. I was concerned about device infection in this patient given her thin tissue area. So I will be extending the pocket and also making the suture line thickened over the scar by approximating more tissue so that the device is buried under the tissue. Patient would be not able to stand the submuscular placement. Patient appears to be having pain even before starting the procedure especially in the right lateral iliac area where she reports that she had a cath in April and ultrasound and CT was performed and there was no abnormalities. It appears more like she had a muscular or neurological pain in that area. Patient reported pain in multiple areas so I discussed with her prior to the procedure if she wants to continue that the procedure and she wanted to continue with the procedure. During the procedure patient though had no complaints on low-dose fentanyl       An incision was made in the left pectoral area over previous scar after administration of lidocaine. Using electrocautery and blunt dissection, a pocket was created. Central venous access into the left axillary vein was obtained using the modified Seldinger technique. After central venous access was obtained, a long extended hook sheath was placed in the axillary vein. Using a long sheath and guidewire, we gained access to the coronary sinus using quad pole deflectable catheter. Coronary sinus had an unusual take off and was a difficult access. We performed a CS venography. Postero lateral branch was noted and was very small and anterolateral branch was noted. PL branch was only decent size vessel which was able to gain access into. Quad pole lead was placed. BIV pacing had a shorter QRS compared to RV pacing so we decided to secure the lead in that position. After confirming appropriate function, the sheath was split and removed. The lead was secured to the underlying tissue using suture material.       Old device was disconnected from the old leads and old pacemaker was removed from the pocket. The pocket was irrigated with an antibiotic solution. The leads were then connected to the new pulse generator (Biv-pacer) which was then placed into the cleaned pocket. Antibiotic medicated [employer] pocket pouch (Tyrx)  was placed to decrease the risk of infection. The pocket was then closed in three separate subcutaneous layers using 2-0 & 2-0 Vicryl and subcuticular layer using 4-0 Vicryl. The skin was covered with Steri-Strips and pressure dressing. All sponge and needle counts were reported as correct at the end of the procedure. The patient tolerated the procedure well and there were no complications. Patient was transported to the holding area in stable condition. DEVICE and LEADS information:              Plan:     Plan for discharge of the patient today  Discussed with the patient and the patient  and they prefer that  Will give doxycycline for 5 days as postsurgical prophylaxis  Tylenol for pain  Tramadol only for severe pain. Patient tolerated fentanyl okay without any issues.   Patient has been thinks that she had tramadol in the past and Tylenol in the past and it was okay    Stop Cardizem  Continue with metoprolol and Coumadin    FU in 10 days in office

## 2022-07-12 NOTE — PROGRESS NOTES
Dr Bonnie Montemayor at bedside at bedside speaking with patient and family. Additional orders received.

## 2022-07-12 NOTE — PROGRESS NOTES
Outpatient Pharmacy Progress Note for Meds-to-Beds    Total number of Prescriptions Filled: 3  The following medications were dispensed to the patient during the discharge process:  Acetaminophen  Tramadol  Gabapentin     Additional Documentation:  Patient picked-up the medication(s) in the OP Pharmacy      Thank you for letting us serve your patients.   [address]    [address], [address] [facility]    Phone: [phone]    Fax: [phone]

## 2022-07-12 NOTE — FLOWSHEET NOTE
Dr Lily Chaney at bedside. Pt cont to cry out in pain despite attempts per staff to comfort.  Tylenol given (see MAR)

## 2022-07-12 NOTE — PROGRESS NOTES
Discharge instructions reviewed. Questions answered. Belongings gathered and checked with admission list. PIV removed. Femoral site WNL.

## 2022-07-12 NOTE — FLOWSHEET NOTE
Receiving beside report from Kai Ureña. Pt screaming out in pain and c/o pain at right groin. Right groin venous puncture site with dry and intact dressing and no evidence of bleeding/hematoma. Family states this c/o pain has been since pt had [facility] in May. Dr Juliano Rizzo phoned and updated on pt status. Orders received for Tylenol. Emotional support given to pt and family.  Will cont to monitor and update Dr Juliano Rizzo re pt status

## 2022-07-12 NOTE — H&P
Electrophysiology H&p Note      Reason for consultation:  NSVT    Chief complaint: AV node ablation and BIV pacer    Referring physician: Lisa Lugo      Primary care physician: Ramonita Bhardwaj MD      History of Present Illness: Today visit (07/12/22)    Patient here for AV node ablation and BIV pacer upgrade. No change in H&P noted from previous clinic visit. Previous visit (6/15/2022)      Chief Complaint   Patient presents with    1 Month Follow-Up     SOBOE, swelling in legs, ankles and feet bilat. Previous visit:    This visit 5/19/2022  Patient here today for follow-up on atrial fibrillation. Patient reports that she has 1 episode of palpitations since discharge from the hospital.  She states that overall she is feeling well. She denies chest pain, shortness of breath, lightheadedness, dizziness, edema or syncope. He is not drinking caffeine. She does not exercise. She does not smoke tobacco or drink alcohol. Patient had recent left heart cath on May 10. Patient did not require intervention at that time. Previous visit  Neil Ramirez is a 67year old female with a history of dementia, persistent atrial fibrillation, chronic anticoagulation, thrombocytopenia, hypertension, hypothyroidism, hyperlipdemia, DVT, dual chamber pacemaker presents with complaints of shortness of breath with exertion and chest pressure. Patient was admitted and had noted negative troponin and normal mycardial perfusion on stress test.   Patient was discharged home. Per chart patient had a troponin to result after patient was discharged that was elevated and patient was called back to the the hospital. Patient reports that she continued to have chest pressure. Patient had an EKG with noted inverted T waves. Patient was admitted, started on heparin drip and went for left heart catheterization this morning.  LHC revealed no significant CAD except for 60-70% disease of the ostium. EP was consulted for concern for NSVT noted on tele and on pacer report. Patient reports that she is in atrial fibrillation consistently. She states that she will intermittently have palpitations that come on suddenly and resolve. She denies aggravating or alleviating factors. She denies dizziness, lightheadedness, edema, or syncope. Patient has had failed cardioversion in 6/2015. She had ad prior ablation in 2011 and 2014 at [facility] with Dr Manuel Logan. He has been on sotalol and amiodarone in the past. She failed both medications. Patient did not have hybrid approach with n contact technique. Pastmedical history:   Past Medical History:   Diagnosis Date    Abnormal echocardiogram     EF 60%, mild aortic indsufficiency, mild pulmonary hypertension    Anemia     Aortic insufficiency     mild per echo 8/24/2010    Atrial fibrillation (HCC)     failed propafenone, Multaq and flecainide - 7/2011 plan for AFib ablation - [facility]- Dr Patricio Corado Atrial flutter ([facility] [address]     Bursitis, trochanteric 08/2004    s/p injection    Cerumen impaction 04/24/2012    Diverticulosis 2015    Dr. Kamilah George C scope    Diverticulosis of colon 01/2010    Colonoscopy-Dr Reece ACUÑA (degenerative joint disease), cervical     cervical, generalized disc buldge   MRI 3/02    DVT (deep venous thrombosis) ([facility] [address]     Dyslipidemia 2002    Environmental allergies     Family history of colon cancer     mother    Gastric polyp     8/2006, 11/2009 benign- Dr Edi Ortega Gastritis 04/2008    mild superficial per Dr Kamilah George    GERD (gastroesophageal reflux disease) 08/2006    Dr Edi Ortega H/O cardiovascular stress test 07/13/2004 07/13 EF58%, No scintigraphic evidence of inducible myocardial ischemia 04/13Normal study. No wall motion abnormality seen. EF 65%    H/O Doppler ultrasound 06/14/2022    VL Lower Ext Arteries/Venous Right. No evidence of pseudoaneurysm, AV fistula, or hematoma is present.     H/O echocardiogram 07/18/2013 7/13-EF 50-55% Mild AR.  H/O exercise stress test 02/07/2017    EF63% normal    History of Holter monitoring 09/23/2015    48 hour - abnormal holter revealing afib throughout the recording with interspersed pacemaker beats, HR does not appear to be well controlled    History of mammogram     last mammo 7/25/11, Dr. Jeffry Chan yearly    Hx of colonoscopy     1/21/2010    Hx of Doppler Venous ultrasound 08/31/2021    No DVT or SVT, No significant reflux in RLE, Significant reflux in Left CFV    Hyperlipidemia     Hypertension     Hypothyroidism     Internal hemorrhoid 2015    Dr. Gem Rueda C scope     Intervertebral disc protrusion 05/2009    wry neck with C4-5      Left shoulder pain 04/2004    (L) shoulder impingement  mild DJD    Long term current use of anticoagulant 07/26/2016    **Coumadin Clinic follows PT/INRs, along w/prescribing pt's Coumadin dosages. **    Menstruation     age 14    Pacemaker 06/21/2012    dual chamber- checked every 3 months    Pulmonary hypertension ([address]     mild per echo 8/24/2010, Pt refused sleep study (June 2012)    Restless legs 08/2003    ferritin    Right shoulder strain 02/2003    no seperation, bony contusion anterior humeral head  MRI 3/03    Sciatica 07/2009    (R) chronic intermittent s/p epidural injections  Dr Katy De Leon Sciatica     Shoulder pain, left 03/2011    RTC tendinopathy, type II acrominum, bursitis- referred to Dr. Elodia Arriaga Shoulder pain, right 03/10/2009    impingement, physical thearpy   (Main)  calcific bursitis  s/p injection    Sinus bradycardia     Sinusitis 12/12/2011    Tinnitus 03/2002    Vision changes 03/19/2013       Surgical history :   Past Surgical History:   Procedure Laterality Date    ABLATION OF DYSRHYTHMIC FOCUS      BREAST BIOPSY      BREAST SURGERY  1985    Right breast tumor excised    CARDIOVERSION      1/2008 Dr Sandra Kennedy; 12/2008    CARDIOVERSION  03/10/2014    [facility]    CHOLECYSTECTOMY, LAPAROSCOPIC  02/2001    Dr Cherelle Fitzgerald      [id number] (Dr Gayla Lin)   [id number] (CERVIX STATUS UNKNOWN)  2003    PACEMAKER PLACEMENT  06/21/2012    Medtronic Adapta ADDRL1 -not mri compatible   Rhoda Hussein (CERVIX REMOVED)  04/2003    fibroid      Dr Dionne Doll  08/2006    Dr Kelley Baltazar ECHOCARDIOGRAM  06/2012    transthoracic cardioversion-Dr. Myriam Jacobs    UPPER GASTROINTESTINAL ENDOSCOPY  04/2008    mild superficial gastritis  Dr Gayla Lin; 2009       Family history:   Family History   Problem Relation Age of Onset    Stroke Mother     Heart Disease Mother     Coronary Art Dis Mother 66        CABG    Colon Cancer Mother [de-identified]        colon     Heart Disease Father     Coronary Art Dis Father 62        CABG    Migraines Sister     Seizures Brother     Breast Cancer Maternal Cousin         under 48         Social history :  reports that she has never smoked. She has never used smokeless tobacco. She reports that she does not drink alcohol and does not use drugs. Allergies   Allergen Reactions    Latex Hives    Darvon [Propoxyphene Hcl] Shortness Of Breath    Demerol Rash     Breathing problems    Dilaudid [Hydromorphone Hcl] Anaphylaxis    Valium Rash     Breathing problems    Celecoxib Diarrhea    Norco [Hydrocodone-Acetaminophen] Diarrhea, Nausea Only and Other (See Comments)     A-Fib    Norvasc [Amlodipine] Other (See Comments)     HA, dizziness    Oxycodone Itching    Tape Jeaneen Eastern Tape] Other (See Comments)     BLISTER    Vasotec [Enalapril] Other (See Comments)     Nausea, vomiting    Diazepam Rash       No current facility-administered medications on file prior to encounter.      Current Outpatient Medications on File Prior to Encounter   Medication Sig Dispense Refill    KLOR-CON M10 10 MEQ extended release tablet TAKE 1 TABLET BY MOUTH EVERY DAY 30 tablet 0    atorvastatin (LIPITOR) 40 MG tablet Take 1 tablet by mouth daily TAKE 1 TABLET BY MOUTH EVERY DAY 30 tablet 5    dilTIAZem (CARDIZEM CD) 240 MG extended release capsule TAKE 1 CAPSULE BY MOUTH EVERY DAY      dilTIAZem (CARDIZEM CD) 360 MG extended release capsule       torsemide (DEMADEX) 10 MG tablet Take 1 tablet by mouth daily 30 tablet 1    cetirizine (ZYRTEC) 10 MG tablet Take 10 mg by mouth daily      aspirin 81 MG chewable tablet Take 1 tablet by mouth daily 30 tablet 3    metoprolol succinate (TOPROL XL) 50 MG extended release tablet Take 1 tablet by mouth in the morning and at bedtime 30 tablet 3    donepezil (ARICEPT) 10 MG tablet Take 1 tablet by mouth daily 30 tablet 5    warfarin (COUMADIN) 3 MG tablet Take 3 mg by mouth daily Except take 4.5mg on Thursdays      levothyroxine (SYNTHROID) 50 MCG tablet TAKE 1 TABLET BY MOUTH EVERY DAY BEFORE BREAKFAST 90 tablet 3    Vitamin D (CHOLECALCIFEROL) 25 MCG (1000 UT) TABS tablet Take 1,000 Units by mouth daily      Fexofenadine HCl (ALLEGRA ALLERGY PO) Take by mouth as needed       Dexlansoprazole (DEXILANT) 60 MG CPDR Take 1 tablet by mouth daily. Review of Systems:   Review of Systems   Constitutional: Negative for activity change, chills, fatigue and fever. HENT: Negative for congestion, ear pain and tinnitus. Eyes: Negative for photophobia, pain and visual disturbance. Respiratory: shortness of breath with exertion. Negative for cough, chest tightness and wheezing. Cardiovascular:  Denies chest pain, palpitations Negative for leg swelling. Gastrointestinal: Negative for abdominal pain, blood in stool, constipation, diarrhea, nausea and vomiting. Endocrine: Negative for cold intolerance and heat intolerance. Genitourinary: Negative for dysuria, flank pain and hematuria. Musculoskeletal: Negative for arthralgias, back pain, myalgias and neck stiffness. Skin: Negative for color change and rash. Allergic/Immunologic: Negative for food allergies. Neurological: Negative for dizziness, light-headedness, numbness and headaches. Hematological: Does not bruise/bleed easily. Psychiatric/Behavioral: Negative for agitation, behavioral problems and confusion. Examination:      Vitals:    07/12/22 0949   BP: 122/79   Pulse: 81   Resp: 18   Temp: (!) 96.1 °F (35.6 °C)   SpO2: 97%   Weight: 135 lb (61.2 kg)   Height: 5' 7\" (1.702 m)       Body mass index is 21.14 kg/m². Physical Exam  Constitutional:       Appearance: Normal appearance. She is not ill-appearing. HENT:      Head: Normocephalic and atraumatic. Mouth/Throat:      Mouth: Mucous membranes are moist.   Eyes:      Conjunctiva/sclera: Conjunctivae normal.   Cardiovascular:      Rate and Rhythm: Normal rate and regular rhythm. Heart sounds: No murmur heard. Pulmonary:      Effort: Pulmonary effort is normal.      Breath sounds: No rales. Abdominal:      General: Abdomen is flat. Palpations: Abdomen is soft. Musculoskeletal:         General: No tenderness. Normal range of motion. Cervical back: Normal range of motion. Right lower leg: Edema present. Left lower leg: Edema present. Skin:     General: Skin is warm and dry. Neurological:      General: No focal deficit present. Mental Status: She is alert and oriented to person, place, and time.                  CBC:   Lab Results   Component Value Date/Time    WBC 6.2 07/07/2022 10:55 AM    HGB 13.0 07/07/2022 10:55 AM    HCT 40.4 07/07/2022 10:55 AM     07/07/2022 10:55 AM     Lipids:  Lab Results   Component Value Date    CHOL 115 04/30/2022    TRIG 47 04/30/2022    HDL 51 04/30/2022    LDLCALC 55 04/30/2022    LDLDIRECT 72 08/01/2020     PT/INR:   Lab Results   Component Value Date/Time    INR 1.10 07/07/2022 10:55 AM        BMP:    Lab Results   Component Value Date     07/07/2022    K 4.9 07/07/2022     07/07/2022    CO2 27 07/07/2022    BUN 13 07/07/2022     CMP:   Lab Results   Component Value Date    AST 33 05/10/2022    PROT 6.2 (L) 05/10/2022    BILITOT 1.7 (H) 05/10/2022    ALKPHOS 62 05/10/2022     TSH:    Lab Results   Component Value Date/Time    TSH 3.76 10/19/2021 02:12 PM       EKGINTERPRETATION - EKG Interpretation:            IMPRESSION / RECOMMENDATIONS:     Atrial fibrillation with intermittent RVR especially with exertion - chronic atrial fibrillation - on coumadin  Chest pain  Pacemaker  History of Hypothyroid  HTN  HLD  History of DVT    Patient with bilateral lower leg edema. Patient appears to be in diastolic heart failure as she has shortness of breath with exertion. She is drinking a lot of water and I told her to limit her water intake I will give her torsemide for 3 days as her blood pressure is on the lower side. Patient is atrial fibrillation and having intermittent RVR episodes. Patient is on metoprolol and Cardizem    Options discussed with the patient about AV node ablation with a BiV pacer upgrade as patient has LVEF of 45 to 50%. Patient also enquired if she can have an MRI compatible device as recently she needed MRI but it was not performed because of non compatible device. Patient has a [place] atrial lead and [employer]tr[employer] ventricular lead. I discussed with the patient that it may be not compatible as a system given that she has 2 different leads if he have to then have to extract the [facility] atrial lead and then put LV lead and then perform the AV node ablation and I discussed the risk with a lead extraction and patient wants to think about it. For now I would like to put patient on torsemide for 3 days and see her back next week. No DVT or hematoma or aneurysm noted and patient has right hip pain    Patient is on metoprolol and Cardizem. Blood pressure is soft.   Intermittently patient is still having RVR episodes and I think this is leading to her diastolic heart failure symptoms    Thanks again for allowing me to participate in care of this patient. Please call me if you have any questions. With best regards. Ana Lilia Carlton MD, 7/12/2022 10:38 AM     Please note this report has been partially produced using speech recognition software and may contain errors related to that system including errors in grammar, punctuation, and spelling, as well as words and phrases that may be inappropriate. If there are any questions or concerns please feel free to contact the dictating provider for clarification.

## 2022-07-13 ENCOUNTER — NURSE ONLY (OUTPATIENT)
Dept: NON INVASIVE DIAGNOSTICS | Age: 77
End: 2022-07-13

## 2022-07-13 NOTE — PROGRESS NOTES
Post procedure follow up call to patient and   Patient reports that the groin pain is not any worse since procedure yesterday  Patient is using capsacin cream and neurontin. Patient to follow up in the office as scheduled.

## 2022-07-15 ENCOUNTER — HOSPITAL ENCOUNTER (OUTPATIENT)
Dept: GENERAL RADIOLOGY | Age: 77
Discharge: HOME | End: 2022-07-15
Payer: MEDICARE

## 2022-07-15 ENCOUNTER — OFFICE VISIT (OUTPATIENT)
Dept: CARDIOLOGY CLINIC | Age: 77
End: 2022-07-15
Payer: MEDICARE

## 2022-07-15 ENCOUNTER — HOSPITAL ENCOUNTER (OUTPATIENT)
Age: 77
Discharge: HOME | End: 2022-07-15
Payer: MEDICARE

## 2022-07-15 VITALS
SYSTOLIC BLOOD PRESSURE: 118 MMHG | DIASTOLIC BLOOD PRESSURE: 74 MMHG | BODY MASS INDEX: 21.06 KG/M2 | HEIGHT: 67 IN | HEART RATE: 80 BPM | WEIGHT: 134.2 LBS

## 2022-07-15 DIAGNOSIS — R52 PAIN: ICD-10-CM

## 2022-07-15 DIAGNOSIS — M79.89 SWELLING OF EXTREMITY: Primary | ICD-10-CM

## 2022-07-15 PROCEDURE — 1123F ACP DISCUSS/DSCN MKR DOCD: CPT | Performed by: INTERNAL MEDICINE

## 2022-07-15 PROCEDURE — 72170 X-RAY EXAM OF PELVIS: CPT

## 2022-07-15 PROCEDURE — 1090F PRES/ABSN URINE INCON ASSESS: CPT | Performed by: INTERNAL MEDICINE

## 2022-07-15 PROCEDURE — G8420 CALC BMI NORM PARAMETERS: HCPCS | Performed by: INTERNAL MEDICINE

## 2022-07-15 PROCEDURE — 99214 OFFICE O/P EST MOD 30 MIN: CPT | Performed by: INTERNAL MEDICINE

## 2022-07-15 PROCEDURE — G8427 DOCREV CUR MEDS BY ELIG CLIN: HCPCS | Performed by: INTERNAL MEDICINE

## 2022-07-15 PROCEDURE — G8399 PT W/DXA RESULTS DOCUMENT: HCPCS | Performed by: INTERNAL MEDICINE

## 2022-07-15 PROCEDURE — 1036F TOBACCO NON-USER: CPT | Performed by: INTERNAL MEDICINE

## 2022-07-15 RX ORDER — TORSEMIDE 10 MG/1
TABLET ORAL
Qty: 30 TABLET | Refills: 1 | Status: SHIPPED | OUTPATIENT
Start: 2022-07-15 | End: 2022-08-10

## 2022-07-15 NOTE — PROGRESS NOTES
CARDIOLOGY NOTE      7/15/2022    RE: Vasyl Kay  (1945)                               TO:  Dr. Randy Martel MD            Chivo Fernando is a 65 y.o. female who was seen today for management of atrial fibrillation                                    HPI:                   Pt has h/o atrial fibrillation, hypertension, chronic anticoagulation, pacemaker implantation, history of AV kathi ablation, valvular heart disease, seen today for follow-up.  Pt has had BiV upgrade recently earlier this month a few days ago  Says during the cath and during this procedure when she was moved on the cath table she might have injured her right hip and has been having pain since then    Vasyl Kay has the following history recorded in care path:  Patient Active Problem List    Diagnosis Date Noted    Essential hypertension 12/18/2014    Long term current use of anticoagulant     Acquired hypothyroidism 04/24/2012    PAF (paroxysmal atrial fibrillation) ([address] 01/27/2011    Persistent atrial fibrillation with RVR ([address]     Coronary artery disease involving native coronary artery of native heart without angina pectoris 06/07/2022    Leg pain 06/07/2022    Chronic atrial fibrillation ([address] 05/19/2022    Dyspnea     NSTEMI (non-ST elevated myocardial infarction) ([address] 05/09/2022    Chest pain 04/29/2022    Gastroesophageal reflux disease without esophagitis 09/08/2015    Anemia 12/22/2014    Gout 09/23/2013    Pacemaker dual chamber 10/24/2012    Sciatica     Mixed hyperlipidemia 10/24/2011    Hip bursitis 04/14/2016    Allergic rhinitis 10/24/2011    Memory problem 01/25/2022    Hearing loss of both ears due to cerumen impaction 03/27/2018    S/P AV (atrioventricular) kathi ablation 06/05/2014    Symptomatic bradycardia 06/21/2012    VHD (valvular heart disease) 06/06/2012     Current Outpatient Medications   Medication Sig Dispense Refill    torsemide (DEMADEX) 10 MG tablet TAKE 1 TABLET BY MOUTH EVERY DAY 30 tablet 1    traMADol (ULTRAM) 50 MG tablet Take 1 tablet by mouth every 8 hours as needed (only use for severe pain) for up to 3 days. 9 tablet 0    acetaminophen (TYLENOL) 500 MG tablet Take 1 tablet by mouth 4 times daily as needed for Pain 30 tablet 1    gabapentin (NEURONTIN) 100 MG capsule Take 1 capsule by mouth 3 times daily for 15 days. 45 capsule 3    capsicum (ZOSTRIX) 0.075 % topical cream Apply topically 3 times daily. 1 each 1    KLOR-CON M10 10 MEQ extended release tablet TAKE 1 TABLET BY MOUTH EVERY DAY 30 tablet 0    atorvastatin (LIPITOR) 40 MG tablet Take 1 tablet by mouth daily TAKE 1 TABLET BY MOUTH EVERY DAY 30 tablet 5    cetirizine (ZYRTEC) 10 MG tablet Take 10 mg by mouth daily      aspirin 81 MG chewable tablet Take 1 tablet by mouth daily 30 tablet 3    metoprolol succinate (TOPROL XL) 50 MG extended release tablet Take 1 tablet by mouth in the morning and at bedtime 30 tablet 3    donepezil (ARICEPT) 10 MG tablet Take 1 tablet by mouth daily 30 tablet 5    warfarin (COUMADIN) 3 MG tablet Take 3 mg by mouth daily Except take 4.5mg on Thursdays      levothyroxine (SYNTHROID) 50 MCG tablet TAKE 1 TABLET BY MOUTH EVERY DAY BEFORE BREAKFAST 90 tablet 3    Vitamin D (CHOLECALCIFEROL) 25 MCG (1000 UT) TABS tablet Take 1,000 Units by mouth daily      Fexofenadine HCl (ALLEGRA ALLERGY PO) Take by mouth as needed       Dexlansoprazole (DEXILANT) 60 MG CPDR Take 1 tablet by mouth daily. No current facility-administered medications for this visit.      Allergies: Latex, Darvon [propoxyphene hcl], Demerol, Dilaudid [hydromorphone hcl], Valium, Celecoxib, Norco [hydrocodone-acetaminophen], Norvasc [amlodipine], Oxycodone, Tape [adhesive tape], Vasotec [enalapril], and Diazepam  Past Medical History:   Diagnosis Date    Abnormal echocardiogram     EF 60%, mild aortic indsufficiency, mild pulmonary hypertension    Anemia     Aortic insufficiency     mild per echo 8/24/2010 Atrial fibrillation ([facility]     failed propafenone, Multaq and flecainide - 7/2011 plan for AFib ablation - [facility]- Dr Mary Carmen Raman    Atrial flutter [facility])     Bursitis, trochanteric 08/2004    s/p injection    Cerumen impaction 04/24/2012    Diverticulosis 2015    Dr. Anshu BANKS scope    Diverticulosis of colon 01/2010    Colonoscopy-Dr Reece ACUÑA (degenerative joint disease), cervical     cervical, generalized disc buldge   MRI 3/02    DVT (deep venous thrombosis) ([facility] [address]     Dyslipidemia 2002    Environmental allergies     Family history of colon cancer     mother    Gastric polyp     8/2006, 11/2009 benign- Dr Anshu Griffith    Gastritis 04/2008    mild superficial per Dr Anshu Griffith    GERD (gastroesophageal reflux disease) 08/2006    Dr Anshu Griffith    H/O cardiovascular stress test 07/13/2004 07/13 EF58%, No scintigraphic evidence of inducible myocardial ischemia 04/13Normal study. No wall motion abnormality seen. EF 65%    H/O Doppler ultrasound 06/14/2022    VL Lower Ext Arteries/Venous Right. No evidence of pseudoaneurysm, AV fistula, or hematoma is present. H/O echocardiogram 07/18/2013 7/13-EF 50-55% Mild AR.      H/O exercise stress test 02/07/2017    EF63% normal    History of Holter monitoring 09/23/2015    48 hour - abnormal holter revealing afib throughout the recording with interspersed pacemaker beats, HR does not appear to be well controlled    History of mammogram     last mammo 7/25/11, Dr. Isabelle Redd yearly    Hx of colonoscopy     1/21/2010    Hx of Doppler Venous ultrasound 08/31/2021    No DVT or SVT, No significant reflux in RLE, Significant reflux in Left CFV    Hyperlipidemia     Hypertension     Hypothyroidism     Internal hemorrhoid 2015    Dr. Anshu BANKS scope     Intervertebral disc protrusion 05/2009    wry neck with C4-5      Left shoulder pain 04/2004    (L) shoulder impingement  mild DJD    Long term current use of anticoagulant 07/26/2016    **Coumadin Clinic follows PT/INRs, along w/prescribing pt's Coumadin dosages. **    Menstruation     age 14    Pacemaker 06/21/2012    dual chamber- checked every 3 months    Pulmonary hypertension (HCC)     mild per echo 8/24/2010, Pt refused sleep study (June 2012)    Restless legs 08/2003    ferritin    Right shoulder strain 02/2003    no seperation, bony contusion anterior humeral head  MRI 3/03    Sciatica 07/2009    (R) chronic intermittent s/p epidural injections  Dr Leobardo Gutiérrez    Sciatica     Shoulder pain, left 03/2011    RTC tendinopathy, type II acrominum, bursitis- referred to Dr. Joann Bradford    Shoulder pain, right 03/10/2009    impingement, physical thearpy   ([place])  calcific bursitis  s/p injection    Sinus bradycardia     Sinusitis 12/12/2011    Tinnitus 03/2002    Vision changes 03/19/2013     Past Surgical History:   Procedure Laterality Date    ABLATION OF DYSRHYTHMIC FOCUS      BREAST BIOPSY      BREAST SURGERY  1985    Right breast tumor excised    CARDIOVERSION      1/2008 Dr Samuel Cheek; 12/2008    CARDIOVERSION  03/10/2014    [address] [place] [address], LAPAROSCOPIC  02/2001    Dr Lluvia Fleming      [id number] (Dr Jose G Ignacio)    Jose Magda (CERVIX STATUS UNKNOWN)  2003    OTHER SURGICAL HISTORY Left 07/12/2022    upgrade to Medtronic Bi vi pacer, with AV node ablation performed by Dr. Linda Valdez.     PACEMAKER PLACEMENT  06/21/2012    Medtronic Adapta ADDRL1 -not mri compatible    Bushra Best (CERVIX REMOVED)  04/2003    fibroid      Dr Mary Ann Awad  08/2006    Dr Cm Tenorio ECHOCARDIOGRAM  06/2012    transthoracic cardioversion-Dr. Alli Jordan    UPPER GASTROINTESTINAL ENDOSCOPY  04/2008    mild superficial gastritis  Dr Jose G Ignacio; 2009      As reviewed   Family History   Problem Relation Age of Onset    Stroke Mother     Heart Disease Mother     Coronary Art Dis Mother 66        CABG    Colon Cancer Mother [de-identified]        colon     Heart Disease Father Coronary Art Dis Father 62        CABG    Migraines Sister     Seizures Brother     Breast Cancer Maternal Cousin         under 48     Social History     Tobacco Use    Smoking status: Never    Smokeless tobacco: Never    Tobacco comments:     reviewed 11/4/15   Substance Use Topics    Alcohol use: No        Objective: There were no vitals filed for this visit. There were no vitals taken for this visit. No flowsheet data found. Wt Readings from Last 3 Encounters:   07/12/22 135 lb (61.2 kg)   06/29/22 140 lb (63.5 kg)   06/22/22 140 lb (63.5 kg)     There is no height or weight on file to calculate BMI. GENERAL - Alert, oriented, pleasant, in no apparent distress. EYES: No jaundice, no conjunctival pallor. SKIN: It is warm & dry. No rashes. No Echhymosis    HEENT - No clinically significant abnormalities seen. Neck - Supple. No jugular venous distention noted. No carotid bruits. Cardiovascular - Normal S1 and S2 without obvious murmur or gallop. Extremities - No cyanosis, clubbing, or significant edema. Pulmonary - No respiratory distress. No wheezes or rales. Abdomen - No masses, tenderness, or organomegaly. Musculoskeletal - No significant edema. No joint deformities. No muscle wasting. Neurologic - Cranial nerves II through XII are grossly intact. There were no gross focal neurologic abnormalities.     Lab Review   Lab Results   Component Value Date/Time    CKTOTAL 87 05/08/2022 08:45 PM    CKMB 1.3 10/14/2011 04:00 PM    TROPONINT 0.143 05/10/2022 02:16 AM     BNP:    Lab Results   Component Value Date/Time     03/04/2013 03:15 PM     PT/INR:    Lab Results   Component Value Date    INR 1.79 07/12/2022     Lab Results   Component Value Date    LABA1C 5.4 04/30/2022    LABA1C 5.8 09/11/2018     Lab Results   Component Value Date    WBC 6.2 07/07/2022    HCT 40.4 07/07/2022    MCV 94.0 07/07/2022     (L) 07/07/2022     Lab Results   Component Value Date    CHOL 115 04/30/2022    TRIG 47 04/30/2022    HDL 51 04/30/2022    LDLCALC 55 04/30/2022    LDLDIRECT 72 08/01/2020     Lab Results   Component Value Date    ALT 27 05/10/2022    AST 33 05/10/2022     BMP:    Lab Results   Component Value Date/Time     07/07/2022 10:55 AM    K 4.9 07/07/2022 10:55 AM     07/07/2022 10:55 AM    CO2 27 07/07/2022 10:55 AM    BUN 13 07/07/2022 10:55 AM    CREATININE 0.8 07/07/2022 10:55 AM     CMP:   Lab Results   Component Value Date/Time     07/07/2022 10:55 AM    K 4.9 07/07/2022 10:55 AM     07/07/2022 10:55 AM    CO2 27 07/07/2022 10:55 AM    BUN 13 07/07/2022 10:55 AM    PROT 6.2 05/10/2022 08:54 AM    PROT 6.9 03/04/2013 03:15 PM     TSH:    Lab Results   Component Value Date/Time    TSH 3.76 10/19/2021 02:12 PM    TSHHS 4.440 05/09/2022 03:02 AM           Assessment & Plan:    - Atrial fibrillation, pt is  compliant with meds. Patient does not have symptoms from atrial fibrillation  On Coumadin    -  LIPID MANAGEMENT:  Importance of lipid levels discussed with patient   and patient was given dietary advice. NCEP- ATP III guidelines reviewed with patient.     -   Changes  in medicines made: No     On Lipitor 40 mg p.o. daily    -Hypothyroidism on Synthroid 50 mcg p.o. daily    -Permanent pacemaker plantation on the 12th of this month patient had upgrade to BiV with a BiV pacemaker placed and the old pacemaker was extracted                      -HFrEF patient is on medical therapy which will be continued with beta-blocker, diuretics and a BiV    - LE swelling v doppler, has swelling in her legs with the after venous Doppler to see if any reflux  -Pain in the right hip will obtain x-ray of the pelvis to see if there is any problem there         Belle Zheng MD    [facility] [place]    Please note this report has been partially produced using speech recognition software and may contain errors related to that system including errors in grammar, punctuation, and spelling, as well as words and phrases that may be inappropriate. If there are any questions or concerns please feel free to contact the dictating provider for clarification.

## 2022-07-19 ENCOUNTER — TELEPHONE (OUTPATIENT)
Dept: CARDIOLOGY CLINIC | Age: 77
End: 2022-07-19

## 2022-07-22 ENCOUNTER — NURSE ONLY (OUTPATIENT)
Dept: CARDIOLOGY CLINIC | Age: 77
End: 2022-07-22

## 2022-07-22 VITALS — TEMPERATURE: 97.2 F

## 2022-07-22 DIAGNOSIS — Z95.0 STATUS POST PLACEMENT OF CARDIAC PACEMAKER: Primary | ICD-10-CM

## 2022-07-22 PROCEDURE — 99024 POSTOP FOLLOW-UP VISIT: CPT | Performed by: INTERNAL MEDICINE

## 2022-07-22 NOTE — PROGRESS NOTES
Patient seen for site check post pacer implant. Dressing removed. No signs of inflammation or infection noted. There is a pin sized opening to the left of the incision. It was cleaned in sterile fashion and 2 steri strips applied. The rest of the incision the edges well approximated. Patient has no complaints of pain or discomfort. Single steri strip applied. Patient instructed to no lift arm higher than shoulder level.

## 2022-07-25 NOTE — PROGRESS NOTES
Medication Management Service  [facility]  [phone]    Visit Date: 7/28/2022   Subjective:       Melida Gomez is a 65 y.o. female who presents to clinic today for anticoagulation monitoring and adjustment. Patient seen in clinic for warfarin management due to  Indication:   atrial fibrillation. INR goal: of 2.0-3.0. Duration of therapy: indefinite. Patient reports the following:   Adherent with regimen  Missed or extra doses:  None   Bleeding or thromboembolic side effects:  None  Significant medication changes:  None  Significant dietary changes: None  Significant alcohol or tobacco changes: None  Significant recent illness, disease state changes, or hospitalization:  Pacemaker 7/12  Upcoming surgeries or procedures:  None  Falls: None           Assessment and Plan     PT/INR done in office per protocol. INR today is 2.3, therapeutic. Plan: Will continue current regimen of warfarin 3mg daily except 4.5mg on Thursdays. Recheck INR in 4 week(s). Patient verbalized understanding of dosing directions and information discussed. Dosing schedule given to patient including phone number, appointment date, and time. Progress note sent to referring office. Patient acknowledges working in consult agreement with pharmacist as referred by his/her physician. Electronically signed by Kamilah Dumont [facility] on 7/25/22 at 6:48 PM EDT    For Pharmacy Admin Tracking Only    Intervention Detail:   Total # of Interventions Recommended:    Total # of Interventions Accepted:   Time Spent (min): 15

## 2022-07-28 ENCOUNTER — ANTI-COAG VISIT (OUTPATIENT)
Dept: PHARMACY | Age: 77
End: 2022-07-28
Payer: MEDICARE

## 2022-07-28 DIAGNOSIS — I48.0 PAF (PAROXYSMAL ATRIAL FIBRILLATION) (HCC): Primary | ICD-10-CM

## 2022-07-28 LAB
INTERNATIONAL NORMALIZATION RATIO, POC: 2.3
POC INR: 2.3 INDEX
PROTHROMBIN TIME, POC: 27.6 SECONDS (ref 10–14.3)

## 2022-07-28 PROCEDURE — 99211 OFF/OP EST MAY X REQ PHY/QHP: CPT

## 2022-07-28 PROCEDURE — 85610 PROTHROMBIN TIME: CPT

## 2022-07-28 PROCEDURE — 36416 COLLJ CAPILLARY BLOOD SPEC: CPT

## 2022-08-01 RX ORDER — POTASSIUM CHLORIDE 750 MG/1
10 TABLET, EXTENDED RELEASE ORAL DAILY
Qty: 30 TABLET | Refills: 5 | Status: SHIPPED | OUTPATIENT
Start: 2022-08-01 | End: 2022-11-04

## 2022-08-02 ENCOUNTER — APPOINTMENT (OUTPATIENT)
Dept: CT IMAGING | Age: 77
End: 2022-08-02
Payer: MEDICARE

## 2022-08-02 ENCOUNTER — APPOINTMENT (OUTPATIENT)
Dept: GENERAL RADIOLOGY | Age: 77
End: 2022-08-02
Payer: MEDICARE

## 2022-08-02 ENCOUNTER — HOSPITAL ENCOUNTER (EMERGENCY)
Age: 77
Discharge: HOME | End: 2022-08-02
Attending: EMERGENCY MEDICINE
Payer: MEDICARE

## 2022-08-02 VITALS
TEMPERATURE: 97.7 F | BODY MASS INDEX: 21.19 KG/M2 | HEART RATE: 80 BPM | WEIGHT: 135 LBS | OXYGEN SATURATION: 97 % | HEIGHT: 67 IN | DIASTOLIC BLOOD PRESSURE: 68 MMHG | SYSTOLIC BLOOD PRESSURE: 107 MMHG | RESPIRATION RATE: 18 BRPM
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
## 2022-08-02 DIAGNOSIS — S29.019A THORACIC MYOFASCIAL STRAIN, INITIAL ENCOUNTER: ICD-10-CM

## 2022-08-02 DIAGNOSIS — S39.012A STRAIN OF LUMBAR REGION, INITIAL ENCOUNTER: ICD-10-CM

## 2022-08-02 LAB
ALBUMIN SERPL-MCNC: 4.5 GM/DL (ref 3.4–5)
ALP BLD-CCNC: 67 IU/L (ref 40–129)
ALT SERPL-CCNC: 40 U/L (ref 10–40)
ANION GAP SERPL CALCULATED.3IONS-SCNC: 7 MMOL/L (ref 4–16)
APTT: 31.9 SECONDS (ref 25.1–37.1)
AST SERPL-CCNC: 41 IU/L (ref 15–37)
BASOPHILS ABSOLUTE: 0 K/CU MM
BASOPHILS RELATIVE PERCENT: 0.7 % (ref 0–1)
BILIRUB SERPL-MCNC: 1.1 MG/DL (ref 0–1)
BUN BLDV-MCNC: 15 MG/DL (ref 6–23)
CALCIUM SERPL-MCNC: 9.5 MG/DL (ref 8.3–10.6)
CHLORIDE BLD-SCNC: 107 MMOL/L (ref 99–110)
CO2: 30 MMOL/L (ref 21–32)
CREAT SERPL-MCNC: 0.8 MG/DL (ref 0.6–1.1)
DIFFERENTIAL TYPE: ABNORMAL
EOSINOPHILS ABSOLUTE: 0.2 K/CU MM
EOSINOPHILS RELATIVE PERCENT: 2.6 % (ref 0–3)
GFR AFRICAN AMERICAN: >60 ML/MIN/1.73M2
GFR NON-AFRICAN AMERICAN: >60 ML/MIN/1.73M2
GLUCOSE BLD-MCNC: 90 MG/DL (ref 70–99)
HCT VFR BLD CALC: 41.8 % (ref 37–47)
HEMOGLOBIN: 13.6 GM/DL (ref 12.5–16)
IMMATURE NEUTROPHIL %: 0.2 % (ref 0–0.43)
INR BLD: 2.28 INDEX
LYMPHOCYTES ABSOLUTE: 2.3 K/CU MM
LYMPHOCYTES RELATIVE PERCENT: 39.2 % (ref 24–44)
MCH RBC QN AUTO: 30 PG (ref 27–31)
MCHC RBC AUTO-ENTMCNC: 32.5 % (ref 32–36)
MCV RBC AUTO: 92.1 FL (ref 78–100)
MONOCYTES ABSOLUTE: 0.7 K/CU MM
MONOCYTES RELATIVE PERCENT: 11.4 % (ref 0–4)
NUCLEATED RBC %: 0 %
PDW BLD-RTO: 14.6 % (ref 11.7–14.9)
PLATELET # BLD: 144 K/CU MM (ref 140–440)
PMV BLD AUTO: 9.9 FL (ref 7.5–11.1)
POTASSIUM SERPL-SCNC: 4.3 MMOL/L (ref 3.5–5.1)
PROTHROMBIN TIME: 29.7 SECONDS (ref 11.7–14.5)
RBC # BLD: 4.54 M/CU MM (ref 4.2–5.4)
SEGMENTED NEUTROPHILS ABSOLUTE COUNT: 2.7 K/CU MM
SEGMENTED NEUTROPHILS RELATIVE PERCENT: 45.9 % (ref 36–66)
SODIUM BLD-SCNC: 144 MMOL/L (ref 135–145)
TOTAL IMMATURE NEUTOROPHIL: 0.01 K/CU MM
TOTAL NUCLEATED RBC: 0 K/CU MM
TOTAL PROTEIN: 7.5 GM/DL (ref 6.4–8.2)
WBC # BLD: 5.9 K/CU MM (ref 4–10.5)

## 2022-08-02 PROCEDURE — 85730 THROMBOPLASTIN TIME PARTIAL: CPT

## 2022-08-02 PROCEDURE — 72128 CT CHEST SPINE W/O DYE: CPT

## 2022-08-02 PROCEDURE — 6370000000 HC RX 637 (ALT 250 FOR IP): Performed by: EMERGENCY MEDICINE

## 2022-08-02 PROCEDURE — 70450 CT HEAD/BRAIN W/O DYE: CPT

## 2022-08-02 PROCEDURE — 85610 PROTHROMBIN TIME: CPT

## 2022-08-02 PROCEDURE — 72131 CT LUMBAR SPINE W/O DYE: CPT

## 2022-08-02 PROCEDURE — 85025 COMPLETE CBC W/AUTO DIFF WBC: CPT

## 2022-08-02 PROCEDURE — 71045 X-RAY EXAM CHEST 1 VIEW: CPT

## 2022-08-02 PROCEDURE — 80053 COMPREHEN METABOLIC PANEL: CPT

## 2022-08-02 PROCEDURE — 72125 CT NECK SPINE W/O DYE: CPT

## 2022-08-02 PROCEDURE — 99284 EMERGENCY DEPT VISIT MOD MDM: CPT

## 2022-08-02 RX ORDER — ACETAMINOPHEN 500 MG
500 TABLET ORAL
Status: COMPLETED | OUTPATIENT
Start: 2022-08-02 | End: 2022-08-02

## 2022-08-02 RX ORDER — FENTANYL CITRATE 50 UG/ML
25 INJECTION, SOLUTION INTRAMUSCULAR; INTRAVENOUS ONCE
Status: DISCONTINUED | OUTPATIENT
Start: 2022-08-02 | End: 2022-08-02 | Stop reason: HOSPADM

## 2022-08-02 RX ADMIN — ACETAMINOPHEN 500 MG: 500 TABLET ORAL at 14:26

## 2022-08-02 ASSESSMENT — PAIN DESCRIPTION - DESCRIPTORS: DESCRIPTORS: SHARP

## 2022-08-02 ASSESSMENT — PAIN DESCRIPTION - PAIN TYPE: TYPE: ACUTE PAIN

## 2022-08-02 ASSESSMENT — PAIN DESCRIPTION - FREQUENCY: FREQUENCY: CONTINUOUS

## 2022-08-02 ASSESSMENT — PAIN DESCRIPTION - LOCATION
LOCATION: RIB CAGE
LOCATION: BACK

## 2022-08-02 ASSESSMENT — PAIN DESCRIPTION - ORIENTATION: ORIENTATION: RIGHT;POSTERIOR

## 2022-08-02 ASSESSMENT — PAIN SCALES - GENERAL
PAINLEVEL_OUTOF10: 5
PAINLEVEL_OUTOF10: 10

## 2022-08-02 NOTE — ED PROVIDER NOTES
EMERGENCY DEPARTMENT ENCOUNTER      CHIEF COMPLAINT:   Fall  Back pain    HPI: Terrilee Sandifer is a 65 y.o. female who presents to the emergency department, with her , for evaluation after she had a fall. The patient states that she was backing up to sit down on the couch at 2 AM when she misjudged how close she was and fell to the ground. She landed on her buttocks and fell backwards. She hit her head but did not lose consciousness. She is anticoagulated on Coumadin. She states that she has had back pain since the fall. It is located in the lower thoracic/upper lumbar region. It hurts in the midline. It has been painful since the fall. She is taken Tylenol with some relief. She has chronic intermittent numbness and pain in her legs. It is no worse than usual.  She has not had any bowel or bladder dysfunction since the fall. She denies saddle anesthesia. She denies any other injuries. She has been able to ambulate since the fall. She denies fevers, chills, chest pain, shortness of breath, nausea, vomiting or any other complaints. REVIEW OF SYSTEMS:   Constitutional:  Denies fever or chills  Eyes:  Denies change in visual acuity  HENT:  Denies nasal congestion or sore throat  Respiratory:  Denies cough or shortness of breath  Cardiovascular:  Denies chest pain or edema  GI:  Denies abdominal pain, nausea, vomiting, bloody stools or diarrhea  :  Denies dysuria  Musculoskeletal: See HPI  Integument:  Denies rash  Neurologic:  Denies headache, focal weakness or sensory changes  \"Remaining review of systems reviewed and negative. I have reviewed the nursing triage documentation and agree unless otherwise noted below. \"      PAST MEDICAL HISTORY:   Past Medical History:   Diagnosis Date    Abnormal echocardiogram     EF 60%, mild aortic indsufficiency, mild pulmonary hypertension    Anemia     Aortic insufficiency     mild per echo 8/24/2010    Atrial fibrillation (HCC)     failed propafenone, Multaq and flecainide - 7/2011 plan for AFib ablation - [facility]- Dr Michael Nogueira    Atrial flutter [facility])     Bursitis, trochanteric 08/2004    s/p injection    Cerumen impaction 04/24/2012    Diverticulosis 2015    Dr. Leanna BANKS scope    Diverticulosis of colon 01/2010    Colonoscopy-Dr Reece ACUÑA (degenerative joint disease), cervical     cervical, generalized disc buldge   MRI 3/02    DVT (deep venous thrombosis) ([address]     Dyslipidemia 2002    Environmental allergies     Family history of colon cancer     mother    Gastric polyp     8/2006, 11/2009 benign- Dr Leanna Vivar    Gastritis 04/2008    mild superficial per Dr Leanna Vivar    GERD (gastroesophageal reflux disease) 08/2006    Dr Leanna Vivar    H/O cardiovascular stress test 07/13/2004 07/13 EF58%, No scintigraphic evidence of inducible myocardial ischemia 04/13Normal study. No wall motion abnormality seen. EF 65%    H/O Doppler ultrasound 06/14/2022    VL Lower Ext Arteries/Venous Right. No evidence of pseudoaneurysm, AV fistula, or hematoma is present. H/O echocardiogram 07/18/2013 7/13-EF 50-55% Mild AR. H/O exercise stress test 02/07/2017    EF63% normal    History of Holter monitoring 09/23/2015    48 hour - abnormal holter revealing afib throughout the recording with interspersed pacemaker beats, HR does not appear to be well controlled    History of mammogram     last mammo 7/25/11, Dr. Saldaña Pry yearly    Hx of colonoscopy     1/21/2010    Hx of Doppler Venous ultrasound 08/31/2021    No DVT or SVT, No significant reflux in RLE, Significant reflux in Left CFV    Hyperlipidemia     Hypertension     Hypothyroidism     Internal hemorrhoid 2015    Dr. Leanna BANKS scope     Intervertebral disc protrusion 05/2009    wry neck with C4-5      Left shoulder pain 04/2004    (L) shoulder impingement  mild DJD    Long term current use of anticoagulant 07/26/2016    **Coumadin Clinic follows PT/INRs, along w/prescribing pt's Coumadin dosages. **    Menstruation     age 14 Pacemaker 06/21/2012    dual chamber- checked every 3 months    Pulmonary hypertension ([address]     mild per echo 8/24/2010, Pt refused sleep study (June 2012)    Restless legs 08/2003    ferritin    Right shoulder strain 02/2003    no seperation, bony contusion anterior humeral head  MRI 3/03    Sciatica 07/2009    (R) chronic intermittent s/p epidural injections  Dr Judd Rossi    Sciatica     Shoulder pain, left 03/2011    RTC tendinopathy, type II acrominum, bursitis- referred to Dr. Rashmi Roy    Shoulder pain, right 03/10/2009    impingement, physical thearpy   ([place])  calcific bursitis  s/p injection    Sinus bradycardia     Sinusitis 12/12/2011    Tinnitus 03/2002    Vision changes 03/19/2013       CURRENT MEDICATIONS:   Home medications reviewed. SURGICAL HISTORY:   Past Surgical History:   Procedure Laterality Date    ABLATION OF DYSRHYTHMIC FOCUS      BREAST BIOPSY      BREAST SURGERY  1985    Right breast tumor excised    CARDIOVERSION      1/2008 Dr Beck Turner; 12/2008    CARDIOVERSION  03/10/2014    [facility]    CHOLECYSTECTOMY, LAPAROSCOPIC  02/2001    Dr Servin Rings    COLONOSCOPY      [id number] (Dr Sean Hester)    [place] (CERVIX STATUS UNKNOWN)  2003    OTHER SURGICAL HISTORY Left 07/12/2022    upgrade to Medtronic Bi vi pacer, with AV node ablation performed by Dr. Shelton Rodrigues.     PACEMAKER PLACEMENT  06/21/2012    Medtronic Adapta ADDRL1 -not mri compatible    [place] Dr Mannie Craver (CERVIX REMOVED)  04/2003    fibroid      Dr Maryam Gomez  08/2006    Dr Laura Hurd ECHOCARDIOGRAM  06/2012    transthoracic cardioversion-Dr. Sejal Orozco    UPPER GASTROINTESTINAL ENDOSCOPY  04/2008    mild superficial gastritis  Dr Sean Hester; 2009       FAMILY HISTORY:   Family History   Problem Relation Age of Onset    Stroke Mother     Heart Disease Mother     Coronary Art Dis Mother 66        CABG    Colon Cancer Mother [de-identified]        colon Heart Disease Father     Coronary Art Dis Father 62        CABG    Migraines Sister     Seizures Brother     Breast Cancer Maternal Cousin         under 48       SOCIAL HISTORY:   Social History     Socioeconomic History    Marital status:      Spouse name: Not on file    Number of children: Not on file    Years of education: Not on file    Highest education level: Not on file   Occupational History    Not on file   Tobacco Use    Smoking status: Never    Smokeless tobacco: Never    Tobacco comments:     reviewed 11/4/15   Vaping Use    Vaping Use: Never used   Substance and Sexual Activity    Alcohol use: No    Drug use: No    Sexual activity: Not Currently     Partners: Male     Comment:    Other Topics Concern    Not on file   Social History Narrative    Not on file     Social Determinants of Health     Financial Resource Strain: Not on file   Food Insecurity: Not on file   Transportation Needs: Not on file   Physical Activity: Not on file   Stress: Not on file   Social Connections: Not on file   Intimate Partner Violence: Not on file   Housing Stability: Not on file       ALLERGIES: Latex, Darvon [propoxyphene hcl], Demerol, Dilaudid [hydromorphone hcl], Valium, Celecoxib, Norco [hydrocodone-acetaminophen], Norvasc [amlodipine], Oxycodone, Tape [adhesive tape], Vasotec [enalapril], and Diazepam    PHYSICAL EXAM:  VITAL SIGNS:   ED Triage Vitals [08/02/22 1122]   Enc Vitals Group      /80      Heart Rate 80      Resp 20      Temp 97.7 °F (36.5 °C)      Temp Source Oral      SpO2 100 %      Weight 135 lb (61.2 kg)      Height 5' 7\" (1.702 m)      Head Circumference       Peak Flow       Pain Score       Pain Loc       Pain Edu? Excl. in [address]? Constitutional:  Non-toxic appearance  HENT: Normocephalic, Atraumatic  Eyes:  PERRL, Conjunctiva normal, No discharge. Neck: Normal range of motion, No tenderness, Supple, No stridor, No lymphadenopathy.   Cardiovascular:  Normal heart rate, Normal rhythm  Pulmonary/Chest:  Normal breath sounds, No respiratory distress, No wheezing  Abdomen: Bowel sounds normal, Soft, No tenderness, No masses, No pulsatile masses  Back: There is midline lower thoracic/upper lumbar tenderness to palpation, no step-offs, no paraspinal tenderness  Extremities:  Normal range of motion, Intact distal pulses, No tenderness  Neurologic:  Alert & oriented x 3, Normal motor function, Sensation intact to light touch throughout, No focal deficits  Skin:  Warm, Dry, No erythema, No rash      EKG Interpretation  None    Radiology / Procedures:  CT THORACIC SPINE WO CONTRAST (Final result)  Result time 08/02/22 13:14:36  Final result by Kassandra Mann (08/02/22 13:14:36)                Impression:    1. No evidence of acute osseous abnormality involving the thoracic or lumbar   spine. 2.  Incidentally noted 0.5 cm right lower lobe pulmonary nodule. See   recommendations below. RECOMMENDATIONS:   Fleischner Society guidelines for follow-up and management of incidentally   detected pulmonary nodules:     Single Solid Nodule:     Nodule size less than 6 mm   In a low-risk patient, no routine follow-up. In a high-risk patient, optional CT at 12 months. - Low risk patients include individuals with minimal or absent history of   smoking and other known risk factors. - High risk patients include individuals with a history or smoking or known   risk factors. Radiology 2017 http://pubs. rsna.org/doi/full/10.1148/radiol. 1550675883             Narrative:    EXAMINATION:   CT OF THE THORACIC SPINE WITHOUT CONTRAST; CT OF THE LUMBAR SPINE WITHOUT   CONTRAST  8/2/2022 12:51 pm:     TECHNIQUE:   CT of the thoracic spine was performed without the administration of   intravenous contrast. Multiplanar reformatted images are provided for review.    Automated exposure control, iterative reconstruction, and/or weight based   adjustment of the mA/kV was utilized to reduce the radiation dose to as low   as reasonably achievable.; CT of the lumbar spine was performed without the   administration of intravenous contrast. Multiplanar reformatted images are   provided for review. Adjustment of mA and/or kV according to patient size   was utilized. Automated exposure control, iterative reconstruction, and/or   weight based adjustment of the mA/kV was utilized to reduce the radiation   dose to as low as reasonably achievable. COMPARISON:   06/09/2022 CT chest     HISTORY:   ORDERING SYSTEM PROVIDED HISTORY: Fall, back pain   TECHNOLOGIST PROVIDED HISTORY:   Reason for exam:->Fall, back pain   Reason for Exam: trauma/ fall, back pain     FINDINGS:   BONES/ALIGNMENT: Mild diffuse osseous demineralization. Exaggerated thoracic   kyphosis. Mild levoconvex curvature of the thoracic spine. There is normal   alignment of the spine. The vertebral body heights are maintained. No osseous   destructive lesion is seen. DEGENERATIVE CHANGES: Mild degenerative disc of the midthoracic spine. No   significant degenerative disc disease of the lumbar spine. No gross spinal   canal stenosis of the thoracic spine. Bilateral lower lumbar facet   arthropathy. SOFT TISSUES: No paraspinal mass is seen. Partially visualized pacemaker   leads terminating in the right atrium, right ventricle, and coronary vein. Coronary artery calcifications. Left lower lobe calcified granuloma. There   is a 0.5 cm noncalcified pulmonary nodule in the right lower lobe (series 8,   image 83). Scattered linear scarring/atelectasis. Atherosclerotic   calcifications of the abdominal aorta. There is mild fullness of the left   renal collecting system without visualized obstructing stone. There is a 0.3   cm nonobstructing stone within the inferior pole of the right kidney.                          CT LUMBAR SPINE WO CONTRAST (Final result)  Result time 08/02/22 13:14:36  Final result by Aquilino Collins (08/02/22 13:14:36)                Impression:    1. No evidence of acute osseous abnormality involving the thoracic or lumbar   spine. 2.  Incidentally noted 0.5 cm right lower lobe pulmonary nodule. See   recommendations below. RECOMMENDATIONS:   Fleischner Society guidelines for follow-up and management of incidentally   detected pulmonary nodules:     Single Solid Nodule:     Nodule size less than 6 mm   In a low-risk patient, no routine follow-up. In a high-risk patient, optional CT at 12 months. - Low risk patients include individuals with minimal or absent history of   smoking and other known risk factors. - High risk patients include individuals with a history or smoking or known   risk factors. Radiology 2017 http://pubs. rsna.org/doi/full/10.1148/radiol. 5278661678             Narrative:    EXAMINATION:   CT OF THE THORACIC SPINE WITHOUT CONTRAST; CT OF THE LUMBAR SPINE WITHOUT   CONTRAST  8/2/2022 12:51 pm:     TECHNIQUE:   CT of the thoracic spine was performed without the administration of   intravenous contrast. Multiplanar reformatted images are provided for review. Automated exposure control, iterative reconstruction, and/or weight based   adjustment of the mA/kV was utilized to reduce the radiation dose to as low   as reasonably achievable.; CT of the lumbar spine was performed without the   administration of intravenous contrast. Multiplanar reformatted images are   provided for review. Adjustment of mA and/or kV according to patient size   was utilized. Automated exposure control, iterative reconstruction, and/or   weight based adjustment of the mA/kV was utilized to reduce the radiation   dose to as low as reasonably achievable.      COMPARISON:   06/09/2022 CT chest     HISTORY:   ORDERING SYSTEM PROVIDED HISTORY: Fall, back pain   TECHNOLOGIST PROVIDED HISTORY:   Reason for exam:->Fall, back pain   Reason for Exam: trauma/ fall, back pain     FINDINGS:   BONES/ALIGNMENT: Mild diffuse osseous demineralization. Exaggerated thoracic   kyphosis. Mild levoconvex curvature of the thoracic spine. There is normal   alignment of the spine. The vertebral body heights are maintained. No osseous   destructive lesion is seen. DEGENERATIVE CHANGES: Mild degenerative disc of the midthoracic spine. No   significant degenerative disc disease of the lumbar spine. No gross spinal   canal stenosis of the thoracic spine. Bilateral lower lumbar facet   arthropathy. SOFT TISSUES: No paraspinal mass is seen. Partially visualized pacemaker   leads terminating in the right atrium, right ventricle, and coronary vein. Coronary artery calcifications. Left lower lobe calcified granuloma. There   is a 0.5 cm noncalcified pulmonary nodule in the right lower lobe (series 8,   image 83). Scattered linear scarring/atelectasis. Atherosclerotic   calcifications of the abdominal aorta. There is mild fullness of the left   renal collecting system without visualized obstructing stone. There is a 0.3   cm nonobstructing stone within the inferior pole of the right kidney. CT CERVICAL SPINE WO CONTRAST (Final result)  Result time 08/02/22 13:13:18  Final result by Barb Mares MD (08/02/22 13:13:18)                Impression:    No acute abnormality of the cervical spine. Narrative:    EXAMINATION:   CT OF THE CERVICAL SPINE WITHOUT CONTRAST 8/2/2022 12:51 pm     TECHNIQUE:   CT of the cervical spine was performed without the administration of   intravenous contrast. Multiplanar reformatted images are provided for review. Automated exposure control, iterative reconstruction, and/or weight based   adjustment of the mA/kV was utilized to reduce the radiation dose to as low   as reasonably achievable.      COMPARISON:   09/24/2020     HISTORY:   ORDERING SYSTEM PROVIDED HISTORY: Fall   TECHNOLOGIST PROVIDED HISTORY:   Reason for exam:->Fall   Decision Support Exception - unselect if not a suspected or confirmed   emergency medical condition->Emergency Medical Condition ([place])   Reason for Exam: trauma/ fall, pain     FINDINGS:   BONES/ALIGNMENT: There is no acute fracture or traumatic malalignment. DEGENERATIVE CHANGES: Mild-to-moderate degenerative disc disease from C4-C7. SOFT TISSUES: Biapical pleuroparenchymal scarring. CT HEAD WO CONTRAST (Final result)  Result time 08/02/22 13:07:02  Final result by Latoya Camacho MD (08/02/22 13:07:02)                Impression:    No acute intracranial abnormality. Narrative:    EXAMINATION:   CT OF THE HEAD WITHOUT CONTRAST  8/2/2022 12:51 pm     TECHNIQUE:   CT of the head was performed without the administration of intravenous   contrast. Automated exposure control, iterative reconstruction, and/or weight   based adjustment of the mA/kV was utilized to reduce the radiation dose to as   low as reasonably achievable. COMPARISON:   01/21/2022     HISTORY:   ORDERING SYSTEM PROVIDED HISTORY: Fall   TECHNOLOGIST PROVIDED HISTORY:   Has a \"code stroke\" or \"stroke alert\" been called? ->No   Reason for exam:->Fall   Decision Support Exception - unselect if not a suspected or confirmed   emergency medical condition->Emergency Medical Condition ([place])   Reason for Exam: trauma/ fall, pain     FINDINGS:   BRAIN/VENTRICLES: No acute blood products, shift of the midline structures,   or CT evidence of acute infarct. Mild diffuse cortical volume loss and   compensatory ventricular enlargement appears unchanged. Periventricular and   subcortical white matter hypoattenuation bilaterally compatible with chronic   microvascular ischemic changes. ORBITS: The visualized portion of the orbits demonstrate no acute abnormality. SINUSES: The visualized paranasal sinuses and mastoid air cells demonstrate   no acute abnormality.      SOFT TISSUES/SKULL:  No acute abnormality of the visualized skull or soft tissues. XR CHEST PORTABLE (Final result)  Result time 08/02/22 12:40:55  Final result by Oneida Caldeorn MD (08/02/22 12:40:55)                Impression:    No radiographic evidence of acute cardiopulmonary disease. Narrative:    EXAMINATION:   ONE XRAY VIEW OF THE CHEST     8/2/2022 12:15 pm     COMPARISON:   07/12/2022     HISTORY:   ORDERING SYSTEM PROVIDED HISTORY: Fall   TECHNOLOGIST PROVIDED HISTORY:   Reason for exam:->Fall   Reason for Exam: fall     FINDINGS:   Biventricular pacer leads remain in satisfactory position in the AP view. Mild cardiomegaly redemonstrated. Cardiomediastinal silhouette otherwise   within normal limits. Left basilar calcified granuloma redemonstrated   compatible with remote granulomatous disease. Lungs and costophrenic sulci   are otherwise clear. No pneumothorax or subdiaphragmatic free air. No acute   osseous abnormality identified. ED COURSE & MEDICAL DECISION MAKING:  Pertinent Labs & Imaging studies reviewed. (See chart for details)  On exam, the patient is afebrile and nontoxic appearing. She is hemodynamically stable and neurologically intact. Labs are obtained and there are no clinically significant lab abnormalities. INR is therapeutic at 2.28.  CT head is negative for acute intracranial abnormality. CT cervical spine shows no acute abnormality. CTs of the thoracic and lumbar spine were obtained and there is no evidence of acute osseous abnormality. There is an incidentally noted 0.5 cm right lower lobe pulmonary nodule for which no further follow-up is needed. Chest x-ray is negative for acute abnormality. Patient has multiple allergies to pain medicines. She was given Tylenol which she can take, with some relief. I suspect that the patient has a mechanical fall and sustained a thoracic and lumbar strain.  I have a low suspicion for intracranial hemorrhage or skull fracture, cervical spine fracture dislocation, pacemaker dislodgment, acute thoracic/lumbar fracture dislocation, cauda equina syndrome or neurological injury/deficit. I discussed admission to the hospital for pain control versus outpatient management and the patient felt like she was okay to go home. Her  was comfortable with this plan. I feel that the patient is stable for outpatient management follow up in 2-3 days. The patient is given return precautions. The patient verbalized understanding, was agreeable with plan, and the patient was discharged home in stable condition. Clinical Impression:  1. Fall, initial encounter    2. Thoracic myofascial strain, initial encounter    3. Strain of lumbar region, initial encounter        Disposition referral (if applicable):  Bautista Brower MD  [address] [facility]  [phone]    Schedule an appointment as soon as possible for a visit       [facility] Emergency Department  [address] [address] [phone]  [phone]  Schedule an appointment as soon as possible for a visit   If symptoms worsen    Disposition medications (if applicable):  Discharge Medication List as of 8/2/2022  2:46 PM            Comment: Please note this report has been produced using speech recognition software and may contain errors related to that system including errors in grammar, punctuation, and spelling, as well as words and phrases that may be inappropriate. If there are any questions or concerns please feel free to contact the dictating provider for clarification.         Nevin Kong MD  08/02/22 [id number]

## 2022-08-04 ENCOUNTER — TELEPHONE (OUTPATIENT)
Dept: CARDIOLOGY CLINIC | Age: 77
End: 2022-08-04

## 2022-08-05 ENCOUNTER — HOSPITAL ENCOUNTER (OUTPATIENT)
Dept: GENERAL RADIOLOGY | Age: 77
Discharge: HOME | End: 2022-08-05
Payer: MEDICARE

## 2022-08-05 ENCOUNTER — HOSPITAL ENCOUNTER (OUTPATIENT)
Age: 77
Discharge: HOME | End: 2022-08-05
Payer: MEDICARE

## 2022-08-05 ENCOUNTER — OFFICE VISIT (OUTPATIENT)
Dept: FAMILY MEDICINE CLINIC | Age: 77
End: 2022-08-05
Payer: MEDICARE

## 2022-08-05 VITALS
HEART RATE: 80 BPM | WEIGHT: 134.8 LBS | OXYGEN SATURATION: 99 % | BODY MASS INDEX: 21.11 KG/M2 | SYSTOLIC BLOOD PRESSURE: 102 MMHG | DIASTOLIC BLOOD PRESSURE: 78 MMHG
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
## 2022-08-05 DIAGNOSIS — M25.551 RIGHT HIP PAIN: ICD-10-CM
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
## 2022-08-05 PROCEDURE — 1090F PRES/ABSN URINE INCON ASSESS: CPT | Performed by: NURSE PRACTITIONER

## 2022-08-05 PROCEDURE — 99214 OFFICE O/P EST MOD 30 MIN: CPT | Performed by: NURSE PRACTITIONER

## 2022-08-05 PROCEDURE — 1036F TOBACCO NON-USER: CPT | Performed by: NURSE PRACTITIONER

## 2022-08-05 PROCEDURE — 1123F ACP DISCUSS/DSCN MKR DOCD: CPT | Performed by: NURSE PRACTITIONER

## 2022-08-05 PROCEDURE — G8427 DOCREV CUR MEDS BY ELIG CLIN: HCPCS | Performed by: NURSE PRACTITIONER

## 2022-08-05 PROCEDURE — 73502 X-RAY EXAM HIP UNI 2-3 VIEWS: CPT

## 2022-08-05 PROCEDURE — G8399 PT W/DXA RESULTS DOCUMENT: HCPCS | Performed by: NURSE PRACTITIONER

## 2022-08-05 PROCEDURE — G8420 CALC BMI NORM PARAMETERS: HCPCS | Performed by: NURSE PRACTITIONER

## 2022-08-05 NOTE — PROGRESS NOTES
2022     Della Duncan (:  1945) is a 65 y.o. female, here for evaluation of the following medical concerns:    ER visit follow up   22 FALL     The patient states that she was backing up to sit down on the couch at 2 AM when she misjudged how close she was and fell to the ground. She landed on her buttocks and fell backwards. She hit her head on the couch but did not lose consciousness. She is anticoagulated on Coumadin. She states that she has had back pain since the fall, but it has improved. Originally complained of midline back pain at the ER but today reports right hip pain. .. Tylenol with some relief. She has chronic intermittent numbness and pain in her legs. It is no worse than usual.  She has not had any bowel or bladder dysfunction since the fall. She denies saddle anesthesia. She denies any other injuries. She has been able to ambulate since the fall. CT head cervical spine lumbar spine thoracic spine without acute abnormalities post injury. Incidental 0.5 cm right lower lobe pulmonary nodule  Radiologist recommended follow-up in 12 months    Reports her pain is getting better   States  \"It was a tiny bit better this morning because I slept in this morning\". Still having trouble getting up and down from sitting due to pain. Declines to take anything other than tylenol   Declines pt       Right hip is tender to touch   No tenderness noted to the spine         Also complaint of left deltoid tightness and pain since having pacer placed one month ago. She has movement of the left arm, but reports pulling like pain when doing a lateral raise. No pain to the pacer site or shoulder joint itself. Sees cardio next week           Review of Systems    Prior to Visit Medications    Medication Sig Taking? Authorizing Provider   potassium chloride (KLOR-CON M10) 10 MEQ extended release tablet Take 1 tablet by mouth in the morning.  Yes Mercy Guido, APRN - CNP torsemide (DEMADEX) 10 MG tablet TAKE 1 TABLET BY MOUTH EVERY DAY Yes Jhon Cedeño, APRN - CNP   capsicum (ZOSTRIX) 0.075 % topical cream Apply topically 3 times daily. Yes Jesus Bishop MD   atorvastatin (LIPITOR) 40 MG tablet Take 1 tablet by mouth daily TAKE 1 TABLET BY MOUTH EVERY DAY Yes Valeria Hall MD   cetirizine (ZYRTEC) 10 MG tablet Take 10 mg by mouth daily Yes Historical Provider, MD   aspirin 81 MG chewable tablet Take 1 tablet by mouth daily Yes Lee Pagan MD   metoprolol succinate (TOPROL XL) 50 MG extended release tablet Take 1 tablet by mouth in the morning and at bedtime Yes Alissa Underwood MD   donepezil (ARICEPT) 10 MG tablet Take 1 tablet by mouth daily Yes Gómez Molina DO   warfarin (COUMADIN) 3 MG tablet Take 3 mg by mouth daily Except take 4.5mg on Thursdays Yes Historical Provider, MD   levothyroxine (SYNTHROID) 50 MCG tablet TAKE 1 TABLET BY MOUTH EVERY DAY BEFORE BREAKFAST Yes Macel Lefort, MD   Vitamin D (CHOLECALCIFEROL) 25 MCG (1000 UT) TABS tablet Take 1,000 Units by mouth daily Yes Historical Provider, MD   Fexofenadine HCl (ALLEGRA ALLERGY PO) Take by mouth as needed  Yes Historical Provider, MD   dexlansoprazole (DEXILANT) 60 MG CPDR delayed release capsule Take 1 tablet by mouth daily. Yes Historical Provider, MD   acetaminophen (TYLENOL) 500 MG tablet Take 1 tablet by mouth 4 times daily as needed for Pain  Anil Jose Boyer MD   gabapentin (NEURONTIN) 100 MG capsule Take 1 capsule by mouth 3 times daily for 15 days.   Jesus Bishop MD        Social History     Tobacco Use    Smoking status: Never    Smokeless tobacco: Never    Tobacco comments:     reviewed 11/4/15   Substance Use Topics    Alcohol use: No        Vitals:    08/05/22 0905   BP: 102/78   Site: Right Upper Arm   Position: Sitting   Cuff Size: Medium Adult   Pulse: 80   SpO2: 99%   Weight: 134 lb 12.8 oz (61.1 kg)     Estimated body mass index is 21.11 kg/m² as calculated from the following:    Height as of 8/2/22: 5' 7\" (1.702 m). Weight as of this encounter: 134 lb 12.8 oz (61.1 kg). Physical Exam  Vitals reviewed. Constitutional:       General: She is not in acute distress. Appearance: Normal appearance. She is not ill-appearing, toxic-appearing or diaphoretic. HENT:      Head: Normocephalic and atraumatic. Nose: Nose normal.   Eyes:      Extraocular Movements: Extraocular movements intact. Pupils: Pupils are equal, round, and reactive to light. Cardiovascular:      Rate and Rhythm: Normal rate and regular rhythm. Heart sounds: Normal heart sounds. Pulmonary:      Effort: Pulmonary effort is normal.      Breath sounds: Normal breath sounds. Abdominal:      Palpations: Abdomen is soft. Musculoskeletal:         General: Tenderness (right hip, lateral and high) present. Normal range of motion. Cervical back: Normal range of motion and neck supple. Right lower leg: No edema. Left lower leg: No edema. Skin:     General: Skin is warm and dry. Neurological:      General: No focal deficit present. Mental Status: She is alert and oriented to person, place, and time. Mental status is at baseline. Psychiatric:         Mood and Affect: Mood normal.         Behavior: Behavior normal.         Thought Content: Thought content normal.         Judgment: Judgment normal.       ASSESSMENT/PLAN:  1. Fall, sequela  - XR HIP 2-3 VW W PELVIS RIGHT; Future    2. Right hip pain    - XR HIP 2-3 VW W PELVIS RIGHT; Future        XRAY today   Heat PRN  Tylenol PRN  Declined PT by patient     Follow up with cardio on pacer. Let them know about the shoulder. Likely a strain. Advised heat and stretching. She declined ortho referral           All care gaps addressed     All questions answered    Discussed use, benefit, and side effects of prescribed medications. Barriers to compliance discussed. All patient questions answered.   Pt voiced understanding. Present to the ER for any emergent or acute symptoms not managed at home or in office. Please note that this chart was generated using dragon dictation software. Although every effort was made to ensure the accuracy of this automated transcription, some errors in transcription may have occurred. No follow-ups on file. An electronic signature was used to authenticate this note.     --ANTHONY Adams - NP on 8/5/2022 at 9:39 PM

## 2022-08-08 ENCOUNTER — PROCEDURE VISIT (OUTPATIENT)
Dept: CARDIOLOGY CLINIC | Age: 77
End: 2022-08-08
Payer: MEDICARE

## 2022-08-08 DIAGNOSIS — M79.89 SWELLING OF EXTREMITY: Primary | ICD-10-CM

## 2022-08-08 PROCEDURE — 93970 EXTREMITY STUDY: CPT | Performed by: INTERNAL MEDICINE

## 2022-08-11 RX ORDER — TORSEMIDE 10 MG/1
10 TABLET ORAL DAILY
Qty: 30 TABLET | Refills: 5 | Status: SHIPPED | OUTPATIENT
Start: 2022-08-11

## 2022-08-12 ENCOUNTER — OFFICE VISIT (OUTPATIENT)
Dept: CARDIOLOGY CLINIC | Age: 77
End: 2022-08-12
Payer: MEDICARE

## 2022-08-12 VITALS
HEIGHT: 67 IN | BODY MASS INDEX: 21.91 KG/M2 | WEIGHT: 139.6 LBS | DIASTOLIC BLOOD PRESSURE: 72 MMHG | HEART RATE: 81 BPM | SYSTOLIC BLOOD PRESSURE: 110 MMHG

## 2022-08-12 DIAGNOSIS — I48.0 PAF (PAROXYSMAL ATRIAL FIBRILLATION) (HCC): Primary | ICD-10-CM

## 2022-08-12 PROCEDURE — 93000 ELECTROCARDIOGRAM COMPLETE: CPT | Performed by: NURSE PRACTITIONER

## 2022-08-12 PROCEDURE — 99024 POSTOP FOLLOW-UP VISIT: CPT | Performed by: NURSE PRACTITIONER

## 2022-08-12 NOTE — PROGRESS NOTES
Here today for 4-week follow-up status post implantation of BiV pacemaker. Left upper chest site well approximated. No redness no swelling no hematoma  EKG obtained patient noted to be ventricular paced  Device interrogated  Device Assessment:     The device is [employer] BIV[employer] . Device interrogation was performed. Mode : VVIR     Sensing is normal. Impedence is normal.  Threshold is normal.     There has not been interval changes. Estimated battery life is 6.1 years    Atrial Arrhythmia : Atrial lead turned off. Status post AV node ablation    Non sustained VT episodes : No    Sustained VT episodes : No    Patient activity reported by the device to be 0.1 hr/day     The underlying rhythm is ventricular sensed ventricular paced. 100% ventricular paced. The patient ventricular pacemaker dependent.       HF management parameter still collecting data    Patient to follow-up with Dr. Brennen Martinez in 3 months  We will continue to monitor transmissions as scheduled

## 2022-08-18 ENCOUNTER — TELEPHONE (OUTPATIENT)
Dept: CARDIOLOGY CLINIC | Age: 77
End: 2022-08-18

## 2022-08-18 NOTE — LETTER
Cardiology [address]  Phone: [phone]  Fax: [phone]    8/18/2022        Krista Pagan  [address]  [address] [id number]            Dear Kelly Maria: This is your [facility] schedule. You can sandra your calendar with these dates. Remember that your device is wireless and should automatically do these checks while you are sleeping. If for any reason I do not get your transmission then I will call you and ask that you send a manual transmission. If you have any questions or concerns, please call and ask for [place] at [phone]. Thank you.

## 2022-08-23 ENCOUNTER — TELEPHONE (OUTPATIENT)
Dept: PHARMACY | Age: 77
End: 2022-08-23

## 2022-08-23 DIAGNOSIS — I48.0 PAF (PAROXYSMAL ATRIAL FIBRILLATION) (HCC): ICD-10-CM

## 2022-08-23 DIAGNOSIS — Z79.01 LONG TERM CURRENT USE OF ANTICOAGULANT: Primary | ICD-10-CM

## 2022-08-23 NOTE — TELEPHONE ENCOUNTER
Annual referral renewal. Referring provider is Dr. Cecil Langford. New referral pended in this encounter. Message sent to provider requesting to sign.     For Pharmacy Admin Tracking Only    Time Spent (min): 5

## 2022-08-25 ENCOUNTER — ANTI-COAG VISIT (OUTPATIENT)
Dept: PHARMACY | Age: 77
End: 2022-08-25
Payer: MEDICARE

## 2022-08-25 DIAGNOSIS — I48.0 PAF (PAROXYSMAL ATRIAL FIBRILLATION) (HCC): Primary | ICD-10-CM

## 2022-08-25 LAB
INTERNATIONAL NORMALIZATION RATIO, POC: 2.5
POC INR: 2.5 INDEX
PROTHROMBIN TIME, POC: 29.7 SECONDS (ref 10–14.3)

## 2022-08-25 PROCEDURE — 36416 COLLJ CAPILLARY BLOOD SPEC: CPT

## 2022-08-25 PROCEDURE — 99211 OFF/OP EST MAY X REQ PHY/QHP: CPT

## 2022-08-25 PROCEDURE — 85610 PROTHROMBIN TIME: CPT

## 2022-08-25 NOTE — PROGRESS NOTES
Medication Management Service  [facility]  [phone]    Visit Date: 8/25/2022   Subjective:       Serena Davila is a 65 y.o. female who presents to clinic today for anticoagulation monitoring and adjustment. Patient seen in clinic for warfarin management due to  Indication:   atrial fibrillation. INR goal: of 2.0-3.0. Duration of therapy: indefinite. Patient reports the following:   Adherent with regimen  Missed or extra doses:  None   Bleeding or thromboembolic side effects:  None  Significant medication changes:  None  Significant dietary changes: None  Significant alcohol or tobacco changes: None  Significant recent illness, disease state changes, or hospitalization:  None  Upcoming surgeries or procedures:  None  Falls: None           Assessment and Plan     PT/INR done in office per protocol. INR today is 2.5, therapeutic. Plan: Will continue current regimen of warfarin 3mg daily except 4.5mg . Recheck INR in 4 week(s). Patient verbalized understanding of dosing directions and information discussed. Dosing schedule given to patient including phone number, appointment date, and time. Progress note sent to referring office. Patient acknowledges working in consult agreement with pharmacist as referred by his/her physician. Electronically signed by Sonia Valladares [facility] on 8/25/22 at 11:11 AM EDT    For Pharmacy Admin Tracking Only    Intervention Detail:   Total # of Interventions Recommended:    Total # of Interventions Accepted:   Time Spent (min): 15

## 2022-09-21 NOTE — PROGRESS NOTES
Medication Management Service  [facility]  [phone]    Visit Date: 9/22/2022   Subjective:       Neil Ramirez is a 65 y.o. female who presents to clinic today for anticoagulation monitoring and adjustment. Patient seen in clinic for warfarin management due to  Indication:   atrial fibrillation. INR goal: of 2.0-3.0. Duration of therapy: indefinite. Patient reports the following:   Adherent with regimen  Missed or extra doses:  None   Bleeding or thromboembolic side effects:  None  Significant medication changes:  None  Significant dietary changes: None  Significant alcohol or tobacco changes: None  Significant recent illness, disease state changes, or hospitalization: rash on arm  Upcoming surgeries or procedures:  None  Falls: None           Assessment and Plan     PT/INR done in office per protocol. INR today is 2.7, therapeutic. Plan: Will continue current regimen of warfarin 3mg daily except 4.5mg on Thursdays. Recheck INR in 4 week(s). Patient verbalized understanding of dosing directions and information discussed. Dosing schedule given to patient including phone number, appointment date, and time. Progress note sent to referring office. Patient acknowledges working in consult agreement with pharmacist as referred by his/her physician. Electronically signed by NICCI Palacios [facility] on 9/21/22 at 4:26 PM EDT    For Pharmacy Admin Tracking Only    Intervention Detail:   Total # of Interventions Recommended:    Total # of Interventions Accepted:   Time Spent (min): 15

## 2022-09-22 ENCOUNTER — ANTI-COAG VISIT (OUTPATIENT)
Dept: PHARMACY | Age: 77
End: 2022-09-22
Payer: MEDICARE

## 2022-09-22 DIAGNOSIS — I48.0 PAF (PAROXYSMAL ATRIAL FIBRILLATION) (HCC): Primary | ICD-10-CM

## 2022-09-22 LAB
INTERNATIONAL NORMALIZATION RATIO, POC: 2.7
POC INR: 2.7 INDEX
PROTHROMBIN TIME, POC: 32 SECONDS (ref 10–14.3)

## 2022-09-22 PROCEDURE — 85610 PROTHROMBIN TIME: CPT

## 2022-09-22 PROCEDURE — 36416 COLLJ CAPILLARY BLOOD SPEC: CPT

## 2022-09-22 PROCEDURE — 99211 OFF/OP EST MAY X REQ PHY/QHP: CPT

## 2022-09-24 DIAGNOSIS — G31.84 MCI (MILD COGNITIVE IMPAIRMENT): ICD-10-CM

## 2022-09-26 ENCOUNTER — TELEPHONE (OUTPATIENT)
Dept: FAMILY MEDICINE CLINIC | Age: 77
End: 2022-09-26

## 2022-09-26 NOTE — TELEPHONE ENCOUNTER
Patient due for refills of Aricept. Patient is due for follow up and we will need to reach out to schedule.

## 2022-09-27 ENCOUNTER — OFFICE VISIT (OUTPATIENT)
Dept: FAMILY MEDICINE CLINIC | Age: 77
End: 2022-09-27
Payer: MEDICARE

## 2022-09-27 VITALS
OXYGEN SATURATION: 99 % | SYSTOLIC BLOOD PRESSURE: 124 MMHG | HEIGHT: 67 IN | BODY MASS INDEX: 21.16 KG/M2 | WEIGHT: 134.8 LBS | DIASTOLIC BLOOD PRESSURE: 78 MMHG | HEART RATE: 74 BPM

## 2022-09-27 DIAGNOSIS — L23.7 POISON IVY DERMATITIS: Primary | ICD-10-CM

## 2022-09-27 PROCEDURE — 1036F TOBACCO NON-USER: CPT | Performed by: FAMILY MEDICINE

## 2022-09-27 PROCEDURE — G8420 CALC BMI NORM PARAMETERS: HCPCS | Performed by: FAMILY MEDICINE

## 2022-09-27 PROCEDURE — G8427 DOCREV CUR MEDS BY ELIG CLIN: HCPCS | Performed by: FAMILY MEDICINE

## 2022-09-27 PROCEDURE — 1123F ACP DISCUSS/DSCN MKR DOCD: CPT | Performed by: FAMILY MEDICINE

## 2022-09-27 PROCEDURE — G8399 PT W/DXA RESULTS DOCUMENT: HCPCS | Performed by: FAMILY MEDICINE

## 2022-09-27 PROCEDURE — 99213 OFFICE O/P EST LOW 20 MIN: CPT | Performed by: FAMILY MEDICINE

## 2022-09-27 PROCEDURE — 1090F PRES/ABSN URINE INCON ASSESS: CPT | Performed by: FAMILY MEDICINE

## 2022-09-27 RX ORDER — TRIAMCINOLONE ACETONIDE 1 MG/G
CREAM TOPICAL EVERY 12 HOURS
Qty: 45 G | Refills: 1 | Status: SHIPPED | OUTPATIENT
Start: 2022-09-27

## 2022-09-27 SDOH — ECONOMIC STABILITY: FOOD INSECURITY: WITHIN THE PAST 12 MONTHS, THE FOOD YOU BOUGHT JUST DIDN'T LAST AND YOU DIDN'T HAVE MONEY TO GET MORE.: PATIENT DECLINED

## 2022-09-27 SDOH — ECONOMIC STABILITY: FOOD INSECURITY: WITHIN THE PAST 12 MONTHS, YOU WORRIED THAT YOUR FOOD WOULD RUN OUT BEFORE YOU GOT MONEY TO BUY MORE.: PATIENT DECLINED

## 2022-09-27 ASSESSMENT — SOCIAL DETERMINANTS OF HEALTH (SDOH): HOW HARD IS IT FOR YOU TO PAY FOR THE VERY BASICS LIKE FOOD, HOUSING, MEDICAL CARE, AND HEATING?: PATIENT DECLINED

## 2022-09-27 NOTE — PROGRESS NOTES
Plan:   1. Poison ivy dermatitis  The following orders have not been finalized:  -     triamcinolone (KENALOG) 0.1 % cream  .     Skin care reviewed. No current evidence of secondary skin infection. Can use Vaseline or antibacterial ointment on chin once daily with spot treating with steroid cream only for the itch. All patient questions answered. Pt voiced understanding. Return for Keep upcoming appointment in this office. -----------------------------------------------------------------------------------------------            Chief Complaint   Patient presents with    Poison Ivy     Noticed a week ago , all over   Rash on face and arms, has been using calamine lotion which has been helpful for the itching. The patient denies cough, chest pain, dyspnea, wheezing or hemoptysis. No active bleeding or discharge from largest blister- now scab on chin. ROS: Pertinent items are noted in HPI. All other ROS negative     reports that she has never smoked. She has never used smokeless tobacco.      Physical Exam   Nursing note reviewed  /78   Pulse 74   Ht 5' 7\" (1.702 m)   Wt 134 lb 12.8 oz (61.1 kg)   SpO2 99%   BMI 21.11 kg/m²   BP Readings from Last 3 Encounters:   09/27/22 124/78   08/12/22 110/72   08/05/22 102/78     Wt Readings from Last 3 Encounters:   09/27/22 134 lb 12.8 oz (61.1 kg)   08/12/22 139 lb 9.6 oz (63.3 kg)   08/05/22 134 lb 12.8 oz (61.1 kg)     Here with     Physical Exam  Constitutional:       General: She is not in acute distress. Appearance: She is not toxic-appearing or diaphoretic. HENT:      Head:        Nose: Nose normal.      Mouth/Throat:      Mouth: No oral lesions. Pharynx: Uvula midline. No uvula swelling. Eyes:      Conjunctiva/sclera: Conjunctivae normal.      Pupils: Pupils are equal, round, and reactive to light. Cardiovascular:      Rate and Rhythm: Normal rate and regular rhythm.    Pulmonary:      Effort: Pulmonary effort is normal. Breath sounds: Normal breath sounds. No wheezing. Musculoskeletal:      Comments: No joint swelling seen   Lymphadenopathy:      Cervical: No cervical adenopathy. Skin:     Coloration: Skin is not pale. Findings: Rash present. Neurological:      Mental Status: She is alert.          Assessment and Plan: See above

## 2022-09-28 NOTE — TELEPHONE ENCOUNTER
Requested Prescriptions     Pending Prescriptions Disp Refills    donepezil (ARICEPT) 10 MG tablet [Pharmacy Med Name: DONEPEZIL HCL 10 MG TABLET] 90 tablet 1     Sig: TAKE 1 TABLET BY MOUTH EVERY DAY    Pt scheduled for follow up 11/16/22.

## 2022-09-29 RX ORDER — DONEPEZIL HYDROCHLORIDE 10 MG/1
TABLET, FILM COATED ORAL
Qty: 90 TABLET | Refills: 1 | Status: SHIPPED | OUTPATIENT
Start: 2022-09-29

## 2022-10-18 ENCOUNTER — PROCEDURE VISIT (OUTPATIENT)
Dept: CARDIOLOGY CLINIC | Age: 77
End: 2022-10-18
Payer: MEDICARE

## 2022-10-18 DIAGNOSIS — Z95.0 BIVENTRICULAR CARDIAC PACEMAKER IN SITU: Primary | ICD-10-CM

## 2022-10-18 DIAGNOSIS — Z95.0 CARDIAC PACEMAKER IN SITU: ICD-10-CM

## 2022-10-18 PROCEDURE — 93296 REM INTERROG EVL PM/IDS: CPT | Performed by: INTERNAL MEDICINE

## 2022-10-18 PROCEDURE — 93294 REM INTERROG EVL PM/LDLS PM: CPT | Performed by: INTERNAL MEDICINE

## 2022-10-18 PROCEDURE — 93297 REM INTERROG DEV EVAL ICPMS: CPT | Performed by: INTERNAL MEDICINE

## 2022-10-20 ENCOUNTER — ANTI-COAG VISIT (OUTPATIENT)
Dept: PHARMACY | Age: 77
End: 2022-10-20
Payer: MEDICARE

## 2022-10-20 DIAGNOSIS — I48.0 PAF (PAROXYSMAL ATRIAL FIBRILLATION) (HCC): Primary | ICD-10-CM

## 2022-10-20 LAB
INTERNATIONAL NORMALIZATION RATIO, POC: 2.2
POC INR: 2.2 INDEX
PROTHROMBIN TIME, POC: 26.8 SECONDS (ref 10–14.3)

## 2022-10-20 PROCEDURE — 85610 PROTHROMBIN TIME: CPT

## 2022-10-20 PROCEDURE — 36416 COLLJ CAPILLARY BLOOD SPEC: CPT

## 2022-10-20 PROCEDURE — 99212 OFFICE O/P EST SF 10 MIN: CPT

## 2022-10-20 RX ORDER — WARFARIN SODIUM 3 MG/1
3 TABLET ORAL DAILY
Qty: 100 TABLET | Refills: 3 | Status: SHIPPED | OUTPATIENT
Start: 2022-10-20

## 2022-10-20 NOTE — PROGRESS NOTES
Medication Management Service  [facility]  [phone]    Visit Date: 10/20/2022   Subjective:       Matthew Silva is a 65 y.o. female who presents to clinic today for anticoagulation monitoring and adjustment. Patient seen in clinic for warfarin management due to  Indication:   atrial fibrillation. INR goal: of 2.0-3.0. Duration of therapy: indefinite. Patient reports the following:   Adherent with regimen  Missed or extra doses:  None   Bleeding or thromboembolic side effects:  None  Significant medication changes:  None  Significant dietary changes: None  Significant alcohol or tobacco changes: None  Significant recent illness, disease state changes, or hospitalization:  None  Upcoming surgeries or procedures:  None  Falls: None           Assessment and Plan     PT/INR done in office per protocol. INR today is 2.2, therapeutic. Refill needed  Plan: Will continue current regimen of warfarin 3mg daily except 4.5mg on Thursdays. ERx sent. Recheck INR in 4 week(s). Patient verbalized understanding of dosing directions and information discussed. Dosing schedule given to patient including phone number, appointment date, and time. Progress note sent to referring office. Patient acknowledges working in consult agreement with pharmacist as referred by his/her physician.       Electronically signed by Verna Neely [facility] on 10/20/22 at 10:59  [address] Only    Intervention Detail: Refill(s) Provided  Total # of Interventions Recommended: 1  Total # of Interventions Accepted: 1  Time Spent (min): 15

## 2022-11-04 RX ORDER — POTASSIUM CHLORIDE 750 MG/1
TABLET, EXTENDED RELEASE ORAL
Qty: 90 TABLET | Refills: 1 | Status: SHIPPED | OUTPATIENT
Start: 2022-11-04 | End: 2022-11-08 | Stop reason: ALTCHOICE

## 2022-11-08 ENCOUNTER — OFFICE VISIT (OUTPATIENT)
Dept: FAMILY MEDICINE CLINIC | Age: 77
End: 2022-11-08
Payer: MEDICARE

## 2022-11-08 VITALS
WEIGHT: 137 LBS | OXYGEN SATURATION: 93 % | HEART RATE: 65 BPM | DIASTOLIC BLOOD PRESSURE: 64 MMHG | SYSTOLIC BLOOD PRESSURE: 122 MMHG | HEIGHT: 67 IN | BODY MASS INDEX: 21.5 KG/M2

## 2022-11-08 DIAGNOSIS — R23.4 ESCHAR OF LOWER LEG: ICD-10-CM

## 2022-11-08 DIAGNOSIS — Z00.00 MEDICARE ANNUAL WELLNESS VISIT, SUBSEQUENT: Primary | ICD-10-CM

## 2022-11-08 PROCEDURE — 1123F ACP DISCUSS/DSCN MKR DOCD: CPT | Performed by: FAMILY MEDICINE

## 2022-11-08 PROCEDURE — G0439 PPPS, SUBSEQ VISIT: HCPCS | Performed by: FAMILY MEDICINE

## 2022-11-08 PROCEDURE — 3074F SYST BP LT 130 MM HG: CPT | Performed by: FAMILY MEDICINE

## 2022-11-08 PROCEDURE — 3078F DIAST BP <80 MM HG: CPT | Performed by: FAMILY MEDICINE

## 2022-11-08 PROCEDURE — G8484 FLU IMMUNIZE NO ADMIN: HCPCS | Performed by: FAMILY MEDICINE

## 2022-11-08 ASSESSMENT — LIFESTYLE VARIABLES
HOW MANY STANDARD DRINKS CONTAINING ALCOHOL DO YOU HAVE ON A TYPICAL DAY: PATIENT DOES NOT DRINK
HOW OFTEN DO YOU HAVE A DRINK CONTAINING ALCOHOL: NEVER

## 2022-11-08 ASSESSMENT — PATIENT HEALTH QUESTIONNAIRE - PHQ9
1. LITTLE INTEREST OR PLEASURE IN DOING THINGS: 0
SUM OF ALL RESPONSES TO PHQ QUESTIONS 1-9: 0
SUM OF ALL RESPONSES TO PHQ9 QUESTIONS 1 & 2: 0
2. FEELING DOWN, DEPRESSED OR HOPELESS: 0

## 2022-11-08 NOTE — PROGRESS NOTES
Medicare Annual Wellness Visit    Wanda Tavares is here for Medicare AWV    Assessment & Plan   Medicare annual wellness visit, subsequent  Eschar of lower leg  -     [address]    Recommendations for Preventive Services Due: see orders and patient instructions/AVS.  Recommended screening schedule for the next 5-10 years is provided to the patient in written form: see Patient Instructions/AVS.     Return in about 6 months (around 5/8/2023) for Med refills for chronic conditions- same day as  appointment. Subjective   The following acute and/or chronic problems were also addressed today:    Following with neurology and feels aricept has been helpful for memory issues. Patient and  would like referral to wound clinic for right slow healing lower leg eschar. Some right leg cramping at night that sometimes wakes her up from sleep. Taking torsemide for vascular insufficiency but not every day as the polyuria is bothersome for her. Appetite and sleep and moods otherwise doing well. Patient's complete Health Risk Assessment and screening values have been reviewed and are found in Flowsheets. The following problems were reviewed today and where indicated follow up appointments were made and/or referrals ordered.     Positive Risk Factor Screenings with Interventions:    Fall Risk:  Do you feel unsteady or are you worried about falling? : (!) yes  2 or more falls in past year?: no  Fall with injury in past year?: no   Fall Risk Interventions:    Patient declines any further evaluation/treatment for this issue            General Health and ACP:  General  In general, how would you say your health is?: Very Good  In the past 7 days, have you experienced any of the following: New or Increased Pain, New or Increased Fatigue, Loneliness, Social Isolation, Stress or Anger?: No  Do you get the social and emotional support that you need?: Yes  Do you have a Living Will?: (!) No    Advance Directives       Power of  Living Will ACP-Advance Directive ACP-Power of     Not on File Not on File Not on File Not on File          General Health Risk Interventions:  Encouraged to complete    Health Habits/Nutrition:  Physical Activity: Inactive    Days of Exercise per Week: 0 days    Minutes of Exercise per Session: 0 min     Have you lost any weight without trying in the past 3 months?: No  Body mass index: 21.45  Have you seen the dentist within the past year?: Yes  Health Habits/Nutrition Interventions:  Patient walking twice a week    Hearing/Vision:  Do you or your family notice any trouble with your hearing that hasn't been managed with hearing aids?: (!) Yes  Do you have difficulty driving, watching TV, or doing any of your daily activities because of your eyesight?: (!) Yes  Have you had an eye exam within the past year?: Yes  No results found.   Hearing/Vision Interventions:  Hearing concerns:  encouraged audiology evalu  Vision concerns:  patient encouraged to make appointment with his/her eye specialist    Safety:  Do you have working smoke detectors?: (!) No  Do you have any tripping hazards - loose or unsecured carpets or rugs?: (!) Yes  Do you have any tripping hazards - clutter in doorways, halls, or stairs?: (!) Yes  Do you have either shower bars, grab bars, non-slip mats or non-slip surfaces in your shower or bathtub?: Yes  Do all of your stairways have a railing or banister?: (!) No  Do you always fasten your seatbelt when you are in a car?: Yes  Safety Interventions:  Patient declines any further evaluation/treatment for this issue    ADLs:  In the past 7 days, did you need help from others to perform any of the following everyday activities: Eating, dressing, grooming, bathing, toileting, or walking/balance?: (!) Yes  Select all that apply: (!) Dressing, Walking/Balance  In the past 7 days, did you need help from others to take care of any of the following: Laundry, housekeeping, banking/finances, shopping, telephone use, food preparation, transportation, or taking medications?: No  ADL Interventions:  Patient declines any further evaluation/treatment for this issue          Objective   Vitals:    11/08/22 0914   BP: 122/64   Site: Left Upper Arm   Position: Sitting   Cuff Size: Medium Adult   Pulse: 65   SpO2: 93%   Weight: 137 lb (62.1 kg)   Height: 5' 7\" (1.702 m)      Body mass index is 21.46 kg/m². Blood pressure-  Heart rate-    Respiratory rate-    Temperature-  Pulse oximetry-     No flowsheet data found. Nursing note reviewed  /64 (Site: Left Upper Arm, Position: Sitting, Cuff Size: Medium Adult)   Pulse 65   Ht 5' 7\" (1.702 m)   Wt 137 lb (62.1 kg)   SpO2 93%   BMI 21.46 kg/m²   BP Readings from Last 3 Encounters:   11/08/22 122/64   09/27/22 124/78   08/12/22 110/72     Wt Readings from Last 3 Encounters:   11/08/22 137 lb (62.1 kg)   09/27/22 134 lb 12.8 oz (61.1 kg)   08/12/22 139 lb 9.6 oz (63.3 kg)       Constitutional: [x] Appears well-developed and well-nourished [x] No apparent distress      [] Abnormal-   Mental status  [x] Alert and awake  [x] Oriented to person/place/time [x]Able to follow commands      Eyes:  EOM    [x]  Normal  [] Abnormal-  Sclera  [x]  Normal  [] Abnormal -         Discharge []  None visible  [] Abnormal -    HENT:   [x] Normocephalic, atraumatic.   [] Abnormal   [] Mouth/Throat: Mucous membranes are moist.     External Ears [] Normal  [] Abnormal-     Neck: [] No visualized mass     Pulmonary/Chest: [x] Respiratory effort normal.  [x] No visualized signs of difficulty breathing or respiratory distress        [] Abnormal-      Musculoskeletal:   [] Normal gait with no signs of ataxia         [] Normal range of motion of neck        [] Abnormal-       Neurological:        [x] No Facial Asymmetry (Cranial nerve 7 motor function) (limited exam to video visit)          [x] No gaze palsy        [] Abnormal- Skin:        [] No significant exanthematous lesions or discoloration noted on facial skin         [x] Abnormal- venous stasis hyperpigmentation to both lower legs with 40mm raised hypertropic eschar on right lateral lower leg            Psychiatric:       [x] Normal Affect [x] No Hallucinations        [] Abnormal-         Allergies   Allergen Reactions    Latex Hives    Darvon [Propoxyphene Hcl] Shortness Of Breath    Demerol Rash     Breathing problems    Dilaudid [Hydromorphone Hcl] Anaphylaxis    Valium Rash     Breathing problems    Celecoxib Diarrhea    Norco [Hydrocodone-Acetaminophen] Diarrhea, Nausea Only and Other (See Comments)     A-Fib    Norvasc [Amlodipine] Other (See Comments)     HA, dizziness    Oxycodone Itching    Tape Granite Navi Tape] Other (See Comments)     BLISTER    Vasotec [Enalapril] Other (See Comments)     Nausea, vomiting    Diazepam Rash     Prior to Visit Medications    Medication Sig Taking? Authorizing Provider   warfarin (COUMADIN) 3 MG tablet Take 1 tablet by mouth daily Except take 1 and 1/2 tablets (4.5mg) on Thursdays or as directed Yes Carolin Anguiano MD   donepezil (ARICEPT) 10 MG tablet TAKE 1 TABLET BY MOUTH EVERY DAY Yes Fly Molina DO   torsemide (DEMADEX) 10 MG tablet Take 1 tablet by mouth in the morning.  Yes Lea Caldwell MD   atorvastatin (LIPITOR) 40 MG tablet Take 1 tablet by mouth daily TAKE 1 TABLET BY MOUTH EVERY DAY Yes Lea Caldwell MD   cetirizine (ZYRTEC) 10 MG tablet Take 10 mg by mouth daily Yes Historical Provider, MD   aspirin 81 MG chewable tablet Take 1 tablet by mouth daily Yes Hussain Kimball MD   metoprolol succinate (TOPROL XL) 50 MG extended release tablet Take 1 tablet by mouth in the morning and at bedtime Yes Nina Vazquez MD   levothyroxine (SYNTHROID) 50 MCG tablet TAKE 1 TABLET BY MOUTH EVERY DAY BEFORE BREAKFAST Yes Carolin Anguiano MD   Vitamin D (CHOLECALCIFEROL) 25 MCG (1000 UT) TABS tablet Take 1,000 Units by mouth daily Yes Historical Provider, MD   dexlansoprazole (DEXILANT) 60 MG CPDR delayed release capsule Take 1 tablet by mouth daily.  Yes Historical Provider, MD Cruz (Including outside providers/suppliers regularly involved in providing care):   Patient Care Team:  Kelsey Del Toro MD as PCP - Rosalin Bence, MD as PCP - [facility] Empaneled Provider  Jarod Echevarria MD as Consulting Physician (Cardiology)     Reviewed and updated this visit:  Tobacco  Allergies  Meds  Problems  Med Hx  Surg Hx  Soc Hx  Fam Hx

## 2022-11-11 ENCOUNTER — TELEPHONE (OUTPATIENT)
Dept: FAMILY MEDICINE CLINIC | Age: 77
End: 2022-11-11

## 2022-11-11 ENCOUNTER — HOSPITAL ENCOUNTER (OUTPATIENT)
Dept: WOUND CARE | Age: 77
Discharge: HOME | End: 2022-11-11
Payer: MEDICARE

## 2022-11-11 VITALS
RESPIRATION RATE: 16 BRPM | SYSTOLIC BLOOD PRESSURE: 104 MMHG | TEMPERATURE: 98 F | DIASTOLIC BLOOD PRESSURE: 59 MMHG | HEART RATE: 73 BPM

## 2022-11-11 DIAGNOSIS — L97.212 NON-PRESSURE CHRONIC ULCER OF RIGHT CALF WITH FAT LAYER EXPOSED (HCC): Primary | ICD-10-CM

## 2022-11-11 PROCEDURE — 99203 OFFICE O/P NEW LOW 30 MIN: CPT | Performed by: NURSE PRACTITIONER

## 2022-11-11 PROCEDURE — 99213 OFFICE O/P EST LOW 20 MIN: CPT

## 2022-11-11 PROCEDURE — 6370000000 HC RX 637 (ALT 250 FOR IP): Performed by: NURSE PRACTITIONER

## 2022-11-11 PROCEDURE — 11042 DBRDMT SUBQ TIS 1ST 20SQCM/<: CPT

## 2022-11-11 PROCEDURE — 11042 DBRDMT SUBQ TIS 1ST 20SQCM/<: CPT | Performed by: NURSE PRACTITIONER

## 2022-11-11 RX ORDER — GINSENG 100 MG
CAPSULE ORAL ONCE
Status: CANCELLED | OUTPATIENT
Start: 2022-11-11 | End: 2022-11-11

## 2022-11-11 RX ORDER — BACITRACIN ZINC AND POLYMYXIN B SULFATE 500; 1000 [USP'U]/G; [USP'U]/G
OINTMENT TOPICAL ONCE
Status: CANCELLED | OUTPATIENT
Start: 2022-11-11 | End: 2022-11-11

## 2022-11-11 RX ORDER — CLOBETASOL PROPIONATE 0.5 MG/G
OINTMENT TOPICAL ONCE
Status: CANCELLED | OUTPATIENT
Start: 2022-11-11 | End: 2022-11-11

## 2022-11-11 RX ORDER — LIDOCAINE HYDROCHLORIDE 40 MG/ML
SOLUTION TOPICAL ONCE
Status: CANCELLED | OUTPATIENT
Start: 2022-11-11 | End: 2022-11-11

## 2022-11-11 RX ORDER — BETAMETHASONE DIPROPIONATE 0.05 %
OINTMENT (GRAM) TOPICAL ONCE
Status: CANCELLED | OUTPATIENT
Start: 2022-11-11 | End: 2022-11-11

## 2022-11-11 RX ORDER — LIDOCAINE HYDROCHLORIDE 20 MG/ML
JELLY TOPICAL ONCE
Status: CANCELLED | OUTPATIENT
Start: 2022-11-11 | End: 2022-11-11

## 2022-11-11 RX ORDER — LIDOCAINE 40 MG/G
CREAM TOPICAL ONCE
Status: CANCELLED | OUTPATIENT
Start: 2022-11-11 | End: 2022-11-11

## 2022-11-11 RX ORDER — GENTAMICIN SULFATE 1 MG/G
OINTMENT TOPICAL ONCE
Status: COMPLETED | OUTPATIENT
Start: 2022-11-11 | End: 2022-11-11

## 2022-11-11 RX ORDER — BACITRACIN, NEOMYCIN, POLYMYXIN B 400; 3.5; 5 [USP'U]/G; MG/G; [USP'U]/G
OINTMENT TOPICAL ONCE
Status: CANCELLED | OUTPATIENT
Start: 2022-11-11 | End: 2022-11-11

## 2022-11-11 RX ORDER — LIDOCAINE 50 MG/G
OINTMENT TOPICAL ONCE
Status: CANCELLED | OUTPATIENT
Start: 2022-11-11 | End: 2022-11-11

## 2022-11-11 RX ORDER — DOXYCYCLINE HYCLATE 100 MG
100 TABLET ORAL 2 TIMES DAILY
Qty: 20 TABLET | Refills: 0 | Status: SHIPPED | OUTPATIENT
Start: 2022-11-11 | End: 2022-11-21

## 2022-11-11 RX ORDER — GENTAMICIN SULFATE 1 MG/G
OINTMENT TOPICAL
Qty: 30 G | Refills: 3 | Status: SHIPPED | OUTPATIENT
Start: 2022-11-11 | End: 2022-11-18

## 2022-11-11 RX ORDER — GENTAMICIN SULFATE 1 MG/G
OINTMENT TOPICAL ONCE
Status: CANCELLED | OUTPATIENT
Start: 2022-11-11 | End: 2022-11-11

## 2022-11-11 RX ADMIN — GENTAMICIN SULFATE: 1 OINTMENT TOPICAL at 09:18

## 2022-11-11 NOTE — TELEPHONE ENCOUNTER
Spouse called and wanted to know if patient could take 500 mg of tylenol for the pain that she is having on her leg. Wound clinic scrapped her leg for a culture.  According to the AV'S on 11/8/22 it state that she was to discontinue tylenol and allegra

## 2022-11-11 NOTE — PROGRESS NOTES
[facility] Initial Visit      Genesis Stewart  AGE: 65 y.o. GENDER: female  : 1945  EPISODE DATE:  2022   Referred by: Lexus Dye MD PCP    Subjective:     CHIEF COMPLAINT nonhealing wound to right ankle     HISTORY of PRESENT ILLNESS      Onel Richmond is a 65 y.o. female who presents to the [address] [place] [address] for an initial visit for evaluation and treatment of Chronic venous and non-healing/non-surgical  ulcer(s) of  R ankle lateral.  The condition is of moderate severity. The ulcer has been present for 6 months. The underlying cause is thought to be nonhealing venous wound from when she was fluid overloaded and got a blister. This never healed although her cardiac issues are now resolved and she is very stable. The patients care to date has included nothing so far. The patient has significant underlying medical conditions as below.      Wound Pain Timing/Severity: waxing and waning  Quality of pain: aching, burning  Severity of pain:  2 / 10   Modifying Factors: none  Associated Signs/Symptoms: erythema and pain        PAST MEDICAL HISTORY        Diagnosis Date    Abnormal echocardiogram     EF 60%, mild aortic indsufficiency, mild pulmonary hypertension    Anemia     Aortic insufficiency     mild per echo 2010    Atrial fibrillation (HCC)     failed propafenone, Multaq and flecainide - 2011 plan for AFib ablation - [facility]- Dr Kat Best    Atrial flutter [facility])     Bursitis, trochanteric 2004    s/p injection    Cerumen impaction 2012    Diverticulosis     Dr. Jason Wesley C scope    Diverticulosis of colon 2010    Colonoscopy-Dr Reece ACUÑA (degenerative joint disease), cervical     cervical, generalized disc buldge   MRI 3/02    DVT (deep venous thrombosis) ([facility] [address]     Dyslipidemia     Environmental allergies     Family history of colon cancer     mother    Gastric polyp     2006, 2009 benign- Dr Jason Wesley    Gastritis 2008    mild superficial per Dr Jason Wesley GERD (gastroesophageal reflux disease) 08/2006    Dr Sudeep Drake    H/O cardiovascular stress test 07/13/2004 07/13 EF58%, No scintigraphic evidence of inducible myocardial ischemia 04/13Normal study. No wall motion abnormality seen. EF 65%    H/O Doppler ultrasound 06/14/2022    VL Lower Ext Arteries/Venous Right. No evidence of pseudoaneurysm, AV fistula, or hematoma is present. H/O echocardiogram 07/18/2013 7/13-EF 50-55% Mild AR. H/O exercise stress test 02/07/2017    EF63% normal    History of Holter monitoring 09/23/2015    48 hour - abnormal holter revealing afib throughout the recording with interspersed pacemaker beats, HR does not appear to be well controlled    History of mammogram     last mammo 7/25/11, Dr. Gisel Martel yearly    Hx of colonoscopy     1/21/2010    Hx of Doppler Venous ultrasound 08/31/2021    No DVT or SVT, No significant reflux in RLE, Significant reflux in Left CFV    Hyperlipidemia     Hypertension     Hypothyroidism     Internal hemorrhoid 2015    Dr. Sudeep Drake C scope     Intervertebral disc protrusion 05/2009    wry neck with C4-5      Left shoulder pain 04/2004    (L) shoulder impingement  mild DJD    Long term current use of anticoagulant 07/26/2016    **Coumadin Clinic follows PT/INRs, along w/prescribing pt's Coumadin dosages. **    Menstruation     age 14    Pacemaker 06/21/2012    dual chamber- checked every 3 months    Pulmonary hypertension ([address]     mild per echo 8/24/2010, Pt refused sleep study (June 2012)    Restless legs 08/2003    ferritin    Right shoulder strain 02/2003    no seperation, bony contusion anterior humeral head  MRI 3/03    Sciatica 07/2009    (R) chronic intermittent s/p epidural injections  Dr Colin Sánchez    Sciatica     Shoulder pain, left 03/2011    RTC tendinopathy, type II acrominum, bursitis- referred to Dr. Cristina Leal    Shoulder pain, right 03/10/2009    impingement, physical thearpy   ([place])  calcific bursitis  s/p injection    Sinus bradycardia Sinusitis 12/12/2011    Tinnitus 03/2002    Vision changes 03/19/2013       PAST SURGICAL HISTORY    Past Surgical History:   Procedure Laterality Date    ABLATION OF DYSRHYTHMIC FOCUS      BREAST BIOPSY      BREAST SURGERY  1985    Right breast tumor excised    CARDIOVERSION      1/2008 Dr Rian Flaherty; 12/2008    CARDIOVERSION  03/10/2014    [facility]    CHOLECYSTECTOMY, LAPAROSCOPIC  02/2001    Dr Frannie Stevens    COLONOSCOPY      [phone] (Dr [address])    [place] (CERVIX STATUS UNKNOWN)  2003    OTHER SURGICAL HISTORY Left 07/12/2022    upgrade to Medtronic Bi vi pacer, with AV node ablation performed by Dr. Tello Hugo.     PACEMAKER PLACEMENT  06/21/2012    Medtronic Adapta ADDRL1 -not mri compatible    Doroteo Alfaro (CERVIX REMOVED)  04/2003    fibroid      Dr Helen Marie  08/2006    Dr Kaya Singh ECHOCARDIOGRAM  06/2012    transthoracic cardioversion-Dr. Zuniga Reading    UPPER GASTROINTESTINAL ENDOSCOPY  04/2008    mild superficial gastritis  Dr [address]; 2009       FAMILY HISTORY    Family History   Problem Relation Age of Onset    Stroke Mother     Heart Disease Mother     Coronary Art Dis Mother 66        CABG    Colon Cancer Mother [de-identified]        colon     Heart Disease Father     Coronary Art Dis Father 62        CABG    Migraines Sister     Seizures Brother     Breast Cancer Maternal Cousin         under 48       SOCIAL HISTORY    Social History     Tobacco Use    Smoking status: Never    Smokeless tobacco: Never    Tobacco comments:     reviewed 11/4/15   Vaping Use    Vaping Use: Never used   Substance Use Topics    Alcohol use: No    Drug use: No       ALLERGIES    Allergies   Allergen Reactions    Latex Hives    Darvon [Propoxyphene Hcl] Shortness Of Breath    Demerol Rash     Breathing problems    Dilaudid [Hydromorphone Hcl] Anaphylaxis    Valium Rash     Breathing problems    Celecoxib Diarrhea    Norco [Hydrocodone-Acetaminophen] Diarrhea, Nausea Only and Other (See Comments)     A-Fib    Norvasc [Amlodipine] Other (See Comments)     HA, dizziness    Oxycodone Itching    Tape [Adhesive Tape] Other (See Comments)     BLISTER    Vasotec [Enalapril] Other (See Comments)     Nausea, vomiting    Diazepam Rash       MEDICATIONS    Current Outpatient Medications on File Prior to Encounter   Medication Sig Dispense Refill    warfarin (COUMADIN) 3 MG tablet Take 1 tablet by mouth daily Except take 1 and 1/2 tablets (4.5mg) on Thursdays or as directed 100 tablet 3    donepezil (ARICEPT) 10 MG tablet TAKE 1 TABLET BY MOUTH EVERY DAY 90 tablet 1    torsemide (DEMADEX) 10 MG tablet Take 1 tablet by mouth in the morning. 30 tablet 5    atorvastatin (LIPITOR) 40 MG tablet Take 1 tablet by mouth daily TAKE 1 TABLET BY MOUTH EVERY DAY 30 tablet 5    cetirizine (ZYRTEC) 10 MG tablet Take 10 mg by mouth daily      aspirin 81 MG chewable tablet Take 1 tablet by mouth daily 30 tablet 3    metoprolol succinate (TOPROL XL) 50 MG extended release tablet Take 1 tablet by mouth in the morning and at bedtime 30 tablet 3    levothyroxine (SYNTHROID) 50 MCG tablet TAKE 1 TABLET BY MOUTH EVERY DAY BEFORE BREAKFAST 90 tablet 3    Vitamin D (CHOLECALCIFEROL) 25 MCG (1000 UT) TABS tablet Take 1,000 Units by mouth daily      dexlansoprazole (DEXILANT) 60 MG CPDR delayed release capsule Take 1 tablet by mouth daily. No current facility-administered medications on file prior to encounter.        PROBLEM LIST    Patient Active Problem List   Diagnosis    PAF (paroxysmal atrial fibrillation) ([facility])    Mixed hyperlipidemia    Allergic rhinitis    Acquired hypothyroidism    Long term current use of anticoagulant    Pacemaker dual chamber    Sciatica    Gout    Essential hypertension    Anemia    Gastroesophageal reflux disease without esophagitis    Hip bursitis    Hearing loss of both ears due to cerumen impaction    VHD (valvular heart disease) S/P AV (atrioventricular) kathi ablation    Symptomatic bradycardia    Memory problem    Chest pain    NSTEMI (non-ST elevated myocardial infarction) (HCC)    Dyspnea    Chronic atrial fibrillation (HCC)    Coronary artery disease involving native coronary artery of native heart without angina pectoris    Leg pain    Persistent atrial fibrillation with RVR (HCC)    WD-Non-pressure chronic ulcer of right calf with fat layer exposed (HCC)       REVIEW OF SYSTEMS    Constitutional: negative for anorexia, chills, fatigue, fevers, malaise, sweats, and weight loss  Respiratory: negative for pleurisy/chest pain, pneumonia, shortness of breath, sputum, stridor, and wheezing  Cardiovascular: negative for lower extremity edema, near-syncope, orthopnea, palpitations, paroxysmal nocturnal dyspnea, syncope, and tachypnea  Integument/breast: positive for skin lesion(s)      Objective:      BP (!) 104/59   Pulse 73   Temp 98 °F (36.7 °C) (Temporal)   Resp 16     PHYSICAL EXAM  General Appearance: alert and oriented to person, place and time, well-developed and well-nourished, in no acute distress  Pulmonary/Chest: clear to auscultation bilaterally- no wheezes, rales or rhonchi, normal air movement, no respiratory distress  Cardiovascular: normal rate, normal S1 and S2, no gallops, intact distal pulses, and no carotid bruits  Dermatologic exam: Visual inspection of the periwound reveals the skin to be normal in turgor and texture and dry  Wound exam: see wound description below in procedure note      Assessment:       Gian Bingham  appears to have a non-healing wound of the right lower extremity . The etiology of the wound is felt to be venous and non-healing/non-surgical. There are multiple complicating factors including none. A comprehensive wound management program would be helpful to heal this wound. Assessments completed include fall risk and nutritional, functional,and psychological status.   At this time appropriate care would include: periodic debridement and wound care as below. Problem List Items Addressed This Visit          Other    WD-Non-pressure chronic ulcer of right calf with fat layer exposed ([address] - Primary    Relevant Orders    Initiate Outpatient Wound Care Protocol         Procedure Note    Indications:  Based on my examination of this patient's wound(s) today, sharp excision into necrotic subcutaneous tissue is required to promote healing and evaluate the extent of previous healing. Performed by: ANTHONY Fitzpatrick CNP    Consent obtained: Yes    Time out taken:  Yes    Pain Control: N/A      Debridement:Excisional Debridement    Using curette the wound(s) was/were sharply debrided down through and including the removal of subcutaneous tissue.         Devitalized Tissue Debrided:  fibrin, biofilm, slough, and exudate    Pre Debridement Measurements:  Are located in the Wound Documentation Flow Sheet    All active wounds listed below with today's date are evaluated  Wound(s)    debrided this date include # : 1     Post  Debridement Measurements:  Wound 11/11/22 Pretibial Right;Lateral #1 (Active)   Wound Image   11/11/22 0820   Wound Etiology Venous 11/11/22 0820   Dressing Status New dressing applied 11/11/22 0905   Wound Cleansed Wound cleanser 11/11/22 0820   Wound Length (cm) 2 cm 11/11/22 0820   Wound Width (cm) 1.5 cm 11/11/22 0820   Wound Depth (cm) 0.1 cm 11/11/22 0820   Wound Surface Area (cm^2) 3 cm^2 11/11/22 0820   Wound Volume (cm^3) 0.3 cm^3 11/11/22 0820   Post-Procedure Length (cm) 2 cm 11/11/22 0849   Post-Procedure Width (cm) 1.5 cm 11/11/22 0849   Post-Procedure Depth (cm) 0.1 cm 11/11/22 0849   Post-Procedure Surface Area (cm^2) 3 cm^2 11/11/22 0849   Post-Procedure Volume (cm^3) 0.3 cm^3 11/11/22 0849   Distance Tunneling (cm) 0 cm 11/11/22 0820   Tunneling Position ___ O'Clock 0 11/11/22 0820   Undermining Starts ___ O'Clock 0 11/11/22 0820   Undermining Ends___ O'Clock 00 11/11/22 0820   Undermining Maxium Distance (cm) 0 11/11/22 0820   Wound Assessment Granulation tissue 11/11/22 0849   Drainage Amount Moderate 11/11/22 0849   Drainage Description Serosanguinous 11/11/22 0849   Odor None 11/11/22 0820   Verónica-wound Assessment Intact 11/11/22 0820   Margins Attached edges 11/11/22 0820   Wound Thickness Description not for Pressure Injury Full thickness 11/11/22 0849   Number of days: 0       Percent of Wound(s) Debrided: 100%    Total  Area  Debrided:  3 sq cm     Bleeding:  Minimal    Hemostasis Achieved:  by pressure    Procedural Pain:  6  / 10     Post Procedural Pain:  1 / 10     Response to treatment:  With complaints of pain. Likely an issue with bacterial burden  Will treat with topical gent and PO doxy  Hope to heal this quickly  Will consider wrapping after bacteria controlled  Follow up 1 week       Plan:     Discharge Instructions         [address]    NOTE: Upon discharge from the [address], you will receive a patient experience survey. We would be grateful if you would take the time to fill this survey out. Wound care order history:     KATRIN's   Right       Left    Date:   Cultures:     Grafts:     Antibiotics:        Continuing wound care orders and information:                Residence:                Continue home health care with:    Your wound-care supplies will be provided by: Wound cleansing:     Do not scrub or use excessive force. Wash hands with soap and water before and after dressing changes. Prior to applying a clean dressing, cleanse wound with normal saline, wound cleanser, or mild soap and water. Ask the physician or nurse before getting the wound(s) wet in a shower    Daily Wound management:   Keep weight off wounds and reposition every 2 hours. Avoid standing for long periods of time. Apply wraps/stockings in AM and remove at bedtime.    If swelling is present, elevate legs to the level of the heart or above for 30 minutes 4-5 times a day and/or when sitting. When taking antibiotics take entire prescription as ordered by physician do not stop taking until medicine is all gone. Orders for this week: 11/11/22       Rx:    Right Lateral Ankle - Wash with soap and water, pat dry. Apply Gentamicin and saline damp hydrofera blue cut to size of wound. Cover with ABD. Secure with conform and tape. Wear Tubi F. Change daily. Follow up with Gilma Magana CNP in 1 week in the wound care center  Call [phone] for any questions or concerns.   Date__________   Time____________          Treatment Note Wound 11/11/22 Pretibial Right;Lateral #1-Dressing/Treatment:  (Gentamicin,Saline Damp Hydrofera Blue cut to [employer])    Written Patient Dismissal Instructions Given          Electronically signed by ANTHONY Stacy CNP on 11/11/2022 at 11:31 AM

## 2022-11-11 NOTE — DISCHARGE INSTRUCTIONS
PHYSICIAN ORDERS AND DISCHARGE INSTRUCTIONS    NOTE: Upon discharge from the [address], you will receive a patient experience survey. We would be grateful if you would take the time to fill this survey out. Wound care order history:     KATRIN's   Right       Left    Date:   Cultures:     Grafts:     Antibiotics:        Continuing wound care orders and information:                Residence:                Continue home health care with:    Your wound-care supplies will be provided by: Wound cleansing:     Do not scrub or use excessive force. Wash hands with soap and water before and after dressing changes. Prior to applying a clean dressing, cleanse wound with normal saline, wound cleanser, or mild soap and water. Ask the physician or nurse before getting the wound(s) wet in a shower    Daily Wound management:   Keep weight off wounds and reposition every 2 hours. Avoid standing for long periods of time. Apply wraps/stockings in AM and remove at bedtime. If swelling is present, elevate legs to the level of the heart or above for 30 minutes 4-5 times a day and/or when sitting. When taking antibiotics take entire prescription as ordered by physician do not stop taking until medicine is all gone. Orders for this week: 11/11/22       Rx:    Right Lateral Ankle - Wash with soap and water, pat dry. Apply Gentamicin and saline damp hydrofera blue cut to size of wound. Cover with ABD. Secure with conform and tape. Wear Tubi F. Change daily. Follow up with Rachelle Milian CNP in 1 week in the wound care center  Call [phone] for any questions or concerns.   Date__________   Time____________

## 2022-11-15 NOTE — PROGRESS NOTES
11/16/22    [facility]  1945    Chief Complaint   Patient presents with    Dementia       History of Present Illness  Laure Mendoza is a 65 y.o. female presenting today for follow-up of: Dementia. She remains on Aricept. Last MMSE was 24 out of 30 on 4/7/2022. There were concerns of a right parietal lobe stroke due to her positive Stock's reflex in the left hand and as astereognosis. CT head did not show infarct, she is not able to have MRI brain due to pacemaker. She is already taking Coumadin and statin for stroke prevention, she has a history of A. fib. At today's visit, she is accompanied by her spouse. Laure Mendoza tells me her short term memory loss frustrates her. She will start telling a story and can't remember the point she was trying to make. She does not drive, manage her medications or cook any longer. She does dress/bathe herself. She enjoys taking a walk outside with her sister to stay active. She has a good appetite. She has been evaluated for sleep apnea in the past and was told she did not have it. Current Outpatient Medications   Medication Sig Dispense Refill    memantine (NAMENDA) 10 MG tablet Take 1 tablet by mouth 2 times daily NOTE TO PHARMACY: DO NOT FILL UNTIL 5 MG RX IS COMPLETED 60 tablet 5    memantine (NAMENDA) 5 MG tablet Take (1) 5 mg tab QD x7 days, then 1 BID x7 days; then 2 tabs Q am-1 tab pm x7 days then change to 10mg RX 42 tablet 0    doxycycline hyclate (VIBRA-TABS) 100 MG tablet Take 1 tablet by mouth 2 times daily for 10 days 20 tablet 0    gentamicin (GARAMYCIN) 0.1 % ointment Apply to wound daily and as needed 30 g 3    warfarin (COUMADIN) 3 MG tablet Take 1 tablet by mouth daily Except take 1 and 1/2 tablets (4.5mg) on Thursdays or as directed 100 tablet 3    donepezil (ARICEPT) 10 MG tablet TAKE 1 TABLET BY MOUTH EVERY DAY 90 tablet 1    torsemide (DEMADEX) 10 MG tablet Take 1 tablet by mouth in the morning.  30 tablet 5    atorvastatin (LIPITOR) 40 MG tablet Take 1 tablet by mouth daily TAKE 1 TABLET BY MOUTH EVERY DAY 30 tablet 5    cetirizine (ZYRTEC) 10 MG tablet Take 10 mg by mouth daily      aspirin 81 MG chewable tablet Take 1 tablet by mouth daily 30 tablet 3    metoprolol succinate (TOPROL XL) 50 MG extended release tablet Take 1 tablet by mouth in the morning and at bedtime 30 tablet 3    levothyroxine (SYNTHROID) 50 MCG tablet TAKE 1 TABLET BY MOUTH EVERY DAY BEFORE BREAKFAST 90 tablet 3    Vitamin D (CHOLECALCIFEROL) 25 MCG (1000 UT) TABS tablet Take 1,000 Units by mouth daily      dexlansoprazole (DEXILANT) 60 MG CPDR delayed release capsule Take 1 tablet by mouth daily. No current facility-administered medications for this visit. Physical Exam:  Also present during visit: spouse.     Constitutional              Weight: well nourished  Mental Status              Orientation: oriented to person, oriented to place, oriented to problem and oriented to time              Mood/Affect: appropriate mood and appropriate affect              Memory/Other: remote memory intact, fund of knowledge intact, attention span normal, concentration normal and recent memory impaired   Language              Language: Repetition and naming intact with the exception of a \"pen cap\", she does have some proposition of speech issues during exam and will stop in the middle of a sentence not being able to get through her thoughts, (normal) language, no dysarthria and (normal) articulation  Cranial Nerves              CN II Right: visual fields appear intact              CN II Left: visual fields appear intact              CN III, IV, VI: EOM no nystagmus, normal pursuit and extraocular muscle strength normal              CN III: pupil normal size, pupil reactive to light and dark, pupil accomodates and no ptosis              CN IV: normal              CN VI: normal              CN V Right: normal sensation and muscles of mastication intact              CN V Left: normal sensation and muscles of mastication intact              CN VII Right: normal facial expression              CN VII Left: normal facial expression              CN VIII Right: hearing in tact to normal conversation              CN VIII Left: hearing in tact to normal conversation              CN IX,X: normal palatal movement              CN XI Right: normal sternocleidomastoid and normal trapezius              CN XI Left: normal sternocleidomastoid and normal trapezius              CN XII: no tremors of the tongue, no fasciculation of the tongue, tongue protrudes midline, normal power to left and normal power to right  Gait and Stance              Gait/Posture: station normal, ambulates independently, gait normal and Romberg's test normal  Motor/Coordination Exam              General: no bradykinesia, no tremors, no chorea, no athetosis, no myoclonus and no dyskinesia              Right Upper Extremity: normal motor strength, normal bulk and normal tone              Left Upper Extremity: normal motor strength, normal bulk and normal tone              Right Lower Extremity: normal motor strength, normal bulk and normal tone              Left Lower Extremity: normal motor strength, normal bulk and normal tone              Coordination: no drift, normal finger-to-nose and rapid alternating movements normal  Reflexes              Reflexes Right: DTRS are normal throughout              Reflexes Left: DTRS are normal throughout              Plantar Reflex Right: response downgoing              Plantar Reflex Left: response downgoing              Hoffmans Reflex Right: absent              Hoffmans Reflex Left: present  Sensory              Sensation: normal light touch, normal pinprick, normal temperature, normal position, normal DSS, no neglect and decreased vibration lowers Left leg        27 Point MMSE Screen:   Orientation (Time): 5/5   Orientation (Place): 5/5   Learning 3 Objects: 3/3   Attention: 4/5   Recall 3 Objects: 0/3   Namin/2   Repitition:    3-Step Command: 0/3   TOTAL:       /70   Pulse 62   Wt 135 lb (61.2 kg)   SpO2 99%   BMI 21.14 kg/m²     Assessment and Plan     Diagnosis Orders   1. MCI (mild cognitive impairment)  memantine (NAMENDA) 10 MG tablet    memantine (NAMENDA) 5 MG tablet      2. Expressive dysphasia  Ambulatory referral to Speech Therapy      3. Apraxia  Ambulatory referral to Speech Therapy      Holli Finney was seen in neurological follow up in regards to dementia. She remains on Aricept 10 mg daily and tolerating well. To help further slow the decline of her memory loss, I will initiate Namenda 10 mg twice daily titration. Since Holli Finney is unable to complete an MRI of her brain due to her pacemaker, a right parietal lobe stroke cannot be ruled out. She will remain on Coumadin and statin for stroke prevention as she has Afib. Holli Finney was agreeable to speech therapy to see if this improves her expressive aphasia. We discussed nonpharmacologic interventions including staying active cognitively, socially, and physically to help slow down the progression of memory loss. Return in about 6 months (around 2023).     Mark Sinha, APRN - CNP

## 2022-11-16 ENCOUNTER — OFFICE VISIT (OUTPATIENT)
Dept: NEUROLOGY | Age: 77
End: 2022-11-16
Payer: MEDICARE

## 2022-11-16 ENCOUNTER — OFFICE VISIT (OUTPATIENT)
Dept: CARDIOLOGY CLINIC | Age: 77
End: 2022-11-16
Payer: MEDICARE

## 2022-11-16 VITALS
HEIGHT: 67 IN | BODY MASS INDEX: 21.72 KG/M2 | WEIGHT: 138.4 LBS | OXYGEN SATURATION: 97 % | DIASTOLIC BLOOD PRESSURE: 62 MMHG | SYSTOLIC BLOOD PRESSURE: 98 MMHG | HEART RATE: 72 BPM

## 2022-11-16 VITALS
OXYGEN SATURATION: 99 % | HEART RATE: 62 BPM | BODY MASS INDEX: 21.14 KG/M2 | DIASTOLIC BLOOD PRESSURE: 70 MMHG | WEIGHT: 135 LBS | SYSTOLIC BLOOD PRESSURE: 110 MMHG

## 2022-11-16 DIAGNOSIS — Z95.0 BIVENTRICULAR CARDIAC PACEMAKER IN SITU: Primary | ICD-10-CM

## 2022-11-16 DIAGNOSIS — G31.84 MCI (MILD COGNITIVE IMPAIRMENT): Primary | ICD-10-CM

## 2022-11-16 DIAGNOSIS — R47.02 EXPRESSIVE DYSPHASIA: ICD-10-CM

## 2022-11-16 DIAGNOSIS — M79.89 SWELLING OF EXTREMITY: ICD-10-CM

## 2022-11-16 DIAGNOSIS — R48.2 APRAXIA: ICD-10-CM

## 2022-11-16 PROBLEM — G30.9 ALZHEIMER'S DISEASE, UNSPECIFIED (CODE) (HCC): Status: ACTIVE | Noted: 2022-11-16

## 2022-11-16 PROCEDURE — G8420 CALC BMI NORM PARAMETERS: HCPCS | Performed by: INTERNAL MEDICINE

## 2022-11-16 PROCEDURE — 1123F ACP DISCUSS/DSCN MKR DOCD: CPT | Performed by: INTERNAL MEDICINE

## 2022-11-16 PROCEDURE — G8427 DOCREV CUR MEDS BY ELIG CLIN: HCPCS | Performed by: NURSE PRACTITIONER

## 2022-11-16 PROCEDURE — 1090F PRES/ABSN URINE INCON ASSESS: CPT | Performed by: INTERNAL MEDICINE

## 2022-11-16 PROCEDURE — G8427 DOCREV CUR MEDS BY ELIG CLIN: HCPCS | Performed by: INTERNAL MEDICINE

## 2022-11-16 PROCEDURE — G8484 FLU IMMUNIZE NO ADMIN: HCPCS | Performed by: INTERNAL MEDICINE

## 2022-11-16 PROCEDURE — 99214 OFFICE O/P EST MOD 30 MIN: CPT | Performed by: INTERNAL MEDICINE

## 2022-11-16 PROCEDURE — G8484 FLU IMMUNIZE NO ADMIN: HCPCS | Performed by: NURSE PRACTITIONER

## 2022-11-16 PROCEDURE — 1036F TOBACCO NON-USER: CPT | Performed by: NURSE PRACTITIONER

## 2022-11-16 PROCEDURE — G8399 PT W/DXA RESULTS DOCUMENT: HCPCS | Performed by: INTERNAL MEDICINE

## 2022-11-16 PROCEDURE — 3074F SYST BP LT 130 MM HG: CPT | Performed by: INTERNAL MEDICINE

## 2022-11-16 PROCEDURE — G8420 CALC BMI NORM PARAMETERS: HCPCS | Performed by: NURSE PRACTITIONER

## 2022-11-16 PROCEDURE — G8399 PT W/DXA RESULTS DOCUMENT: HCPCS | Performed by: NURSE PRACTITIONER

## 2022-11-16 PROCEDURE — 99214 OFFICE O/P EST MOD 30 MIN: CPT | Performed by: NURSE PRACTITIONER

## 2022-11-16 PROCEDURE — 3078F DIAST BP <80 MM HG: CPT | Performed by: NURSE PRACTITIONER

## 2022-11-16 PROCEDURE — 1123F ACP DISCUSS/DSCN MKR DOCD: CPT | Performed by: NURSE PRACTITIONER

## 2022-11-16 PROCEDURE — 3078F DIAST BP <80 MM HG: CPT | Performed by: INTERNAL MEDICINE

## 2022-11-16 PROCEDURE — 1036F TOBACCO NON-USER: CPT | Performed by: INTERNAL MEDICINE

## 2022-11-16 PROCEDURE — 1090F PRES/ABSN URINE INCON ASSESS: CPT | Performed by: NURSE PRACTITIONER

## 2022-11-16 PROCEDURE — 3074F SYST BP LT 130 MM HG: CPT | Performed by: NURSE PRACTITIONER

## 2022-11-16 RX ORDER — MEMANTINE HYDROCHLORIDE 5 MG/1
TABLET ORAL
Qty: 42 TABLET | Refills: 0 | Status: SHIPPED | OUTPATIENT
Start: 2022-11-16

## 2022-11-16 RX ORDER — FEXOFENADINE HCL 180 MG/1
180 TABLET ORAL DAILY
COMMUNITY

## 2022-11-16 RX ORDER — MEMANTINE HYDROCHLORIDE 10 MG/1
10 TABLET ORAL 2 TIMES DAILY
Qty: 60 TABLET | Refills: 5 | Status: SHIPPED | OUTPATIENT
Start: 2022-11-16

## 2022-11-16 NOTE — PROGRESS NOTES
CARDIOLOGY NOTE      11/16/2022    RE: Ashtyn Smyth  (1945)                               TO:  MD Ryan To is a 65 y.o. female who was seen today for management of  A fib                                    HPI:   Patient is here for    - Atrial fibrillation, pt is  compliant with meds. Patient does not have symptoms from atrial fibrillation  - Hypertension,is  well controlled, pt is  compliant with medicines  - Hyperlipidimea, lipids are in acceptable range.  Pt  is  compliant with medicines  - ppm  Compliant with carelink                The patient does not have cardiac complaints  Had high optovol, better now    Ashtyn Smyth has the following history recorded in care path:  Patient Active Problem List    Diagnosis Date Noted    Essential hypertension 12/18/2014    Long term current use of anticoagulant     Acquired hypothyroidism 04/24/2012    PAF (paroxysmal atrial fibrillation) ([address] 01/27/2011    Alzheimer's disease, unspecified 11/16/2022    WD-Non-pressure chronic ulcer of right calf with fat layer exposed ([address] 11/11/2022    Persistent atrial fibrillation with RVR ([address]     Coronary artery disease involving native coronary artery of native heart without angina pectoris 06/07/2022    Leg pain 06/07/2022    Chronic atrial fibrillation ([address] 05/19/2022    Dyspnea     NSTEMI (non-ST elevated myocardial infarction) ([address] 05/09/2022    Chest pain 04/29/2022    Gastroesophageal reflux disease without esophagitis 09/08/2015    Anemia 12/22/2014    Gout 09/23/2013    Pacemaker dual chamber 10/24/2012    Sciatica     Mixed hyperlipidemia 10/24/2011    Hip bursitis 04/14/2016    Allergic rhinitis 10/24/2011    Memory problem 01/25/2022    Hearing loss of both ears due to cerumen impaction 03/27/2018    S/P AV (atrioventricular) kathi ablation 06/05/2014    Symptomatic bradycardia 06/21/2012    VHD (valvular heart disease) 06/06/2012 Current Outpatient Medications   Medication Sig Dispense Refill    memantine (NAMENDA) 10 MG tablet Take 1 tablet by mouth 2 times daily NOTE TO PHARMACY: DO NOT FILL UNTIL 5 MG RX IS COMPLETED 60 tablet 5    memantine (NAMENDA) 5 MG tablet Take (1) 5 mg tab QD x7 days, then 1 BID x7 days; then 2 tabs Q am-1 tab pm x7 days then change to 10mg RX 42 tablet 0    doxycycline hyclate (VIBRA-TABS) 100 MG tablet Take 1 tablet by mouth 2 times daily for 10 days 20 tablet 0    gentamicin (GARAMYCIN) 0.1 % ointment Apply to wound daily and as needed 30 g 3    warfarin (COUMADIN) 3 MG tablet Take 1 tablet by mouth daily Except take 1 and 1/2 tablets (4.5mg) on Thursdays or as directed 100 tablet 3    donepezil (ARICEPT) 10 MG tablet TAKE 1 TABLET BY MOUTH EVERY DAY 90 tablet 1    torsemide (DEMADEX) 10 MG tablet Take 1 tablet by mouth in the morning. 30 tablet 5    atorvastatin (LIPITOR) 40 MG tablet Take 1 tablet by mouth daily TAKE 1 TABLET BY MOUTH EVERY DAY 30 tablet 5    cetirizine (ZYRTEC) 10 MG tablet Take 10 mg by mouth daily      aspirin 81 MG chewable tablet Take 1 tablet by mouth daily 30 tablet 3    metoprolol succinate (TOPROL XL) 50 MG extended release tablet Take 1 tablet by mouth in the morning and at bedtime 30 tablet 3    levothyroxine (SYNTHROID) 50 MCG tablet TAKE 1 TABLET BY MOUTH EVERY DAY BEFORE BREAKFAST 90 tablet 3    Vitamin D (CHOLECALCIFEROL) 25 MCG (1000 UT) TABS tablet Take 1,000 Units by mouth daily      dexlansoprazole (DEXILANT) 60 MG CPDR delayed release capsule Take 1 tablet by mouth daily. No current facility-administered medications for this visit.      Allergies: Latex, Darvon [propoxyphene hcl], Demerol, Dilaudid [hydromorphone hcl], Valium, Celecoxib, Norco [hydrocodone-acetaminophen], Norvasc [amlodipine], Oxycodone, Tape [adhesive tape], Vasotec [enalapril], and Diazepam  Past Medical History:   Diagnosis Date    Abnormal echocardiogram     EF 60%, mild aortic indsufficiency, mild pulmonary hypertension    Anemia     Aortic insufficiency     mild per echo 8/24/2010    Atrial fibrillation (HCC)     failed propafenone, Multaq and flecainide - 7/2011 plan for AFib ablation - [facility]- Dr Zara Herrmann    Atrial flutter [facility])     Bursitis, trochanteric 08/2004    s/p injection    Cerumen impaction 04/24/2012    Diverticulosis 2015    Dr. Axel BANKS scope    Diverticulosis of colon 01/2010    Colonoscopy-Dr Reece ACUÑA (degenerative joint disease), cervical     cervical, generalized disc buldge   MRI 3/02    DVT (deep venous thrombosis) ([address]     Dyslipidemia 2002    Environmental allergies     Family history of colon cancer     mother    Gastric polyp     8/2006, 11/2009 benign- Dr Axel Carranza    Gastritis 04/2008    mild superficial per Dr Axel Carranza    GERD (gastroesophageal reflux disease) 08/2006    Dr Axel Carranza    H/O cardiovascular stress test 07/13/2004 07/13 EF58%, No scintigraphic evidence of inducible myocardial ischemia 04/13Normal study. No wall motion abnormality seen. EF 65%    H/O Doppler ultrasound 06/14/2022    VL Lower Ext Arteries/Venous Right. No evidence of pseudoaneurysm, AV fistula, or hematoma is present. H/O echocardiogram 07/18/2013 7/13-EF 50-55% Mild AR.      H/O exercise stress test 02/07/2017    EF63% normal    History of Holter monitoring 09/23/2015    48 hour - abnormal holter revealing afib throughout the recording with interspersed pacemaker beats, HR does not appear to be well controlled    History of mammogram     last mammo 7/25/11, Dr. Daisy Mendoza yearly    Hx of colonoscopy     1/21/2010    Hx of Doppler Venous ultrasound 08/31/2021    No DVT or SVT, No significant reflux in RLE, Significant reflux in Left CFV    Hyperlipidemia     Hypertension     Hypothyroidism     Internal hemorrhoid 2015    Dr. Axel BANKS scope     Intervertebral disc protrusion 05/2009    wry neck with C4-5      Left shoulder pain 04/2004    (L) shoulder impingement  mild DJD    Long term current use of anticoagulant 07/26/2016    **Coumadin Clinic follows PT/INRs, along w/prescribing pt's Coumadin dosages. **    Menstruation     age 14    Pacemaker 06/21/2012    dual chamber- checked every 3 months    Pulmonary hypertension (HCC)     mild per echo 8/24/2010, Pt refused sleep study (June 2012)    Restless legs 08/2003    ferritin    Right shoulder strain 02/2003    no seperation, bony contusion anterior humeral head  MRI 3/03    Sciatica 07/2009    (R) chronic intermittent s/p epidural injections  Dr Kristin George    Sciatica     Shoulder pain, left 03/2011    RTC tendinopathy, type II acrominum, bursitis- referred to Dr. Lenin Dennis    Shoulder pain, right 03/10/2009    impingement, physical thearpy   ([place])  calcific bursitis  s/p injection    Sinus bradycardia     Sinusitis 12/12/2011    Tinnitus 03/2002    Vision changes 03/19/2013     Past Surgical History:   Procedure Laterality Date    ABLATION OF DYSRHYTHMIC FOCUS      BREAST BIOPSY      BREAST SURGERY  1985    Right breast tumor excised    CARDIOVERSION      1/2008 Dr Wei Pedroza; 12/2008    CARDIOVERSION  03/10/2014    [address] [place] [address], LAPAROSCOPIC  02/2001    Dr Alice Jay      [phone] (Dr Axel Carranza)    [place]da (CERVIX STATUS UNKNOWN)  2003    OTHER SURGICAL HISTORY Left 07/12/2022    upgrade to Medtronic Bi vi pacer, with AV node ablation performed by Dr. Gianna Cuellar.     PACEMAKER PLACEMENT  06/21/2012    Medtronic Adapta ADDRL1 -not mri compatible    Christina Mares (CERVIX REMOVED)  04/2003    fibroid      Dr Mary Glez  08/2006    Dr Castro Sat ECHOCARDIOGRAM  06/2012    transthoracic cardioversion-Dr. Angel Holbrook    UPPER GASTROINTESTINAL ENDOSCOPY  04/2008    mild superficial gastritis  Dr Axel Carranza; 2009      As reviewed   Family History   Problem Relation Age of Onset    Stroke Mother     Heart Disease Mother     Coronary Art Dis Mother 66        CABG    Colon Cancer Mother [de-identified]        colon     Heart Disease Father     Coronary Art Dis Father 62        CABG    Migraines Sister     Seizures Brother     Breast Cancer Maternal Cousin         under 48     Social History     Tobacco Use    Smoking status: Never    Smokeless tobacco: Never    Tobacco comments:     reviewed 11/4/15   Substance Use Topics    Alcohol use: No      Review of Systems:    Constitutional: Negative for diaphoresis and fatigue  Psychological:Negative for anxiety or depression  HENT: Negative for headaches, nasal congestion, sinus pain or vertigo  Eyes: Negative for visual disturbance. Endocrine: Negative for polydipsia/polyuria  Respiratory: Negative for shortness of breath  Cardiovascular: Negative for chest pain, dyspnea on exertion, claudication, edema, irregular heartbeat, murmur, palpitations or shortness of breath  Gastrointestinal: Negative for abdominal pain or heartburn  Genito-Urinary: Negative for urinary frequency/urgency  Musculoskeletal: Negative for muscle pain, muscular weakness, negative for pain in arm and leg or swelling in foot and leg  Neurological: Negative for dizziness, headaches, memory loss, numbness/tingling, visual changes, syncope  Dermatological: Negative for rash    Objective: There were no vitals taken for this visit. Wt Readings from Last 3 Encounters:   11/16/22 135 lb (61.2 kg)   11/08/22 137 lb (62.1 kg)   09/27/22 134 lb 12.8 oz (61.1 kg)     There is no height or weight on file to calculate BMI. GENERAL - Alert, oriented, pleasant, in no apparent distress. EYES: No jaundice, no conjunctival pallor. SKIN: It is warm & dry. No rashes. No Echhymosis    HEENT - No clinically significant abnormalities seen. Neck - Supple. No jugular venous distention noted. No carotid bruits. Cardiovascular - Normal S1 and S2 without obvious murmur or gallop. Extremities - No cyanosis, clubbing, or significant edema.     Pulmonary - No respiratory distress. No wheezes or rales. Abdomen - No masses, tenderness, or organomegaly. Musculoskeletal - No significant edema. No joint deformities. No muscle wasting. Neurologic - Cranial nerves II through XII are grossly intact. There were no gross focal neurologic abnormalities. Lab Review   Lab Results   Component Value Date/Time    CKTOTAL 87 05/08/2022 08:45 PM    CKMB 1.3 10/14/2011 04:00 PM    TROPONINT 0.143 05/10/2022 02:16 AM     BNP:    Lab Results   Component Value Date/Time     03/04/2013 03:15 PM     PT/INR:    Lab Results   Component Value Date    INR 2.2 10/20/2022     Lab Results   Component Value Date    LABA1C 5.4 04/30/2022    LABA1C 5.8 09/11/2018     Lab Results   Component Value Date    WBC 5.9 08/02/2022    HCT 41.8 08/02/2022    MCV 92.1 08/02/2022     08/02/2022     Lab Results   Component Value Date    CHOL 115 04/30/2022    TRIG 47 04/30/2022    HDL 51 04/30/2022    LDLCALC 55 04/30/2022    LDLDIRECT 72 08/01/2020     Lab Results   Component Value Date    ALT 40 08/02/2022    AST 41 (H) 08/02/2022     BMP:    Lab Results   Component Value Date/Time     08/02/2022 01:30 PM    K 4.3 08/02/2022 01:30 PM     08/02/2022 01:30 PM    CO2 30 08/02/2022 01:30 PM    BUN 15 08/02/2022 01:30 PM    CREATININE 0.8 08/02/2022 01:30 PM     CMP:   Lab Results   Component Value Date/Time     08/02/2022 01:30 PM    K 4.3 08/02/2022 01:30 PM     08/02/2022 01:30 PM    CO2 30 08/02/2022 01:30 PM    BUN 15 08/02/2022 01:30 PM    PROT 7.5 08/02/2022 01:30 PM    PROT 6.9 03/04/2013 03:15 PM     TSH:    Lab Results   Component Value Date/Time    TSH 3.76 10/19/2021 02:12 PM    TSHHS 4.440 05/09/2022 03:02 AM           Impression:    No diagnosis found.      Patient Active Problem List   Diagnosis Code    PAF (paroxysmal atrial fibrillation) ([facility]) I48.0    Mixed hyperlipidemia E78.2    Allergic rhinitis J30.9    Acquired hypothyroidism E03.9    Long term current use of anticoagulant Z79.01    Pacemaker dual chamber Z95.0    Sciatica M54.30    Gout M10.9    Essential hypertension I10    Anemia D64.9    Gastroesophageal reflux disease without esophagitis K21.9    Hip bursitis M70.70    Hearing loss of both ears due to cerumen impaction H61.23    VHD (valvular heart disease) I38    S/P AV (atrioventricular) akthi ablation Z98.890    Symptomatic bradycardia R00.1    Memory problem R41. 3    Chest pain R07.9    NSTEMI (non-ST elevated myocardial infarction) ([facility]) I21.4    Dyspnea R06.00    Chronic atrial fibrillation ([facility]) I48.20    Coronary artery disease involving native coronary artery of native heart without angina pectoris I25.10    Leg pain M79.606    Persistent atrial fibrillation with RVR ([facility]) I48.19    WD-Non-pressure chronic ulcer of right calf with fat layer exposed ([facility] L97.212    Alzheimer's disease, unspecified G30.9       Assessment & Plan:    -  Hypertension: Patients blood pressure is normal. Patient is advised about low sodium diet. Present medical regimen will not be changed. on cardizem compliant the blood pressure is well controlled we will continue         - Atrial fibrillation, pt is  compliant with meds. Patient does not have symptoms from atrial fibrillation     On coumadin compliant we will check the INR        -  LIPID MANAGEMENT:  Available lipid  lab data reviewed  and patient was given dietary advice. NCEP- ATP III guidelines reviewed with patient. -   Changes  in medicines made: No     On lipitor compliant we will check the LDL                    - swelling of ext: v doppler  Conclusions        Summary        No evidence of DVT or SVT in the bilateral common femoral vein, femoral    vein, popliteal vein, greater saphenous vein or small saphenous vein. Significant reflux noted of the Right CFV, FV mid, and GSV at SFJ (1.9s) and    knee (0.6s). Significant reflux noted in the Left CFV.         Recommendations        COMPRESSION SOCKS    SEE DR BROWNE        - ppm  On carelink, has pain at PPm site    ICD Analysis: The CareLink done today shows that the OptiVol is normal    ICD analysis is reviewed and filed in the device chart. Analysis is consistent with normal  function with stable leads and appropriate battery status for the age of the device. No therapies detected. Programming parameters are for the optimum function for this device in this patient. Patient is using pacer function A pace, V [id number] Optivolnormal    Recommend every three month device check and follow up office visit as scheduled.     Steffi Archibald MD, 11/16/2022 3:24 PM       Leo Greenberg  [facility]

## 2022-11-16 NOTE — PATIENT INSTRUCTIONS
Please contact our office around 2/15/23 to get your follow up appointment made for May 2023. Our scheduling phone number is [phone]. Thank you!

## 2022-11-17 ENCOUNTER — ANTI-COAG VISIT (OUTPATIENT)
Dept: PHARMACY | Age: 77
End: 2022-11-17
Payer: MEDICARE

## 2022-11-17 DIAGNOSIS — I48.0 PAF (PAROXYSMAL ATRIAL FIBRILLATION) (HCC): Primary | ICD-10-CM

## 2022-11-17 LAB
INTERNATIONAL NORMALIZATION RATIO, POC: 2.4
POC INR: 2.4 INDEX
PROTHROMBIN TIME, POC: 28.4 SECONDS (ref 10–14.3)

## 2022-11-17 PROCEDURE — 85610 PROTHROMBIN TIME: CPT

## 2022-11-17 PROCEDURE — 99211 OFF/OP EST MAY X REQ PHY/QHP: CPT

## 2022-11-17 PROCEDURE — 36416 COLLJ CAPILLARY BLOOD SPEC: CPT

## 2022-11-17 NOTE — PROGRESS NOTES
Medication Management Service  [facility]  [phone]    Visit Date: 11/17/2022   Subjective:       Rafa Ha is a 65 y.o. female who presents to clinic today for anticoagulation monitoring and adjustment. Patient seen in clinic for warfarin management due to  Indication:   atrial fibrillation. INR goal: of 2.0-3.0. Duration of therapy: indefinite. Patient reports the following:   Adherent with regimen  Missed or extra doses:  None   Bleeding or thromboembolic side effects:  None  Significant medication changes:  doxycycline 100mg BID for 10 days  Significant dietary changes: None  Significant alcohol or tobacco changes: None  Significant recent illness, disease state changes, or hospitalization:  None  Upcoming surgeries or procedures:  None  Falls: None           Assessment and Plan     PT/INR done in office per protocol. INR today is 2.4, therapeutic. Advised to call if new meds started before next appointment. Plan: Will continue current regimen of warfarin 3mg daily except 4.5mg on Thurdays. Recheck INR in 4 week(s). Patient verbalized understanding of dosing directions and information discussed. Dosing schedule given to patient including phone number, appointment date, and time. Progress note sent to referring office. Patient acknowledges working in consult agreement with pharmacist as referred by his/her physician. Electronically signed by NICCI Mills [facility] on 11/17/22 at 11:05 AM EST    For Pharmacy Admin Tracking Only    Intervention Detail:   Total # of Interventions Recommended:    Total # of Interventions Accepted:   Time Spent (min): 15

## 2022-11-18 ENCOUNTER — HOSPITAL ENCOUNTER (OUTPATIENT)
Dept: WOUND CARE | Age: 77
Discharge: HOME | End: 2022-11-18
Payer: MEDICARE

## 2022-11-18 VITALS
TEMPERATURE: 98 F | RESPIRATION RATE: 18 BRPM | HEART RATE: 73 BPM | SYSTOLIC BLOOD PRESSURE: 108 MMHG | DIASTOLIC BLOOD PRESSURE: 73 MMHG

## 2022-11-18 DIAGNOSIS — L97.212 NON-PRESSURE CHRONIC ULCER OF RIGHT CALF WITH FAT LAYER EXPOSED (HCC): Primary | ICD-10-CM

## 2022-11-18 PROCEDURE — 11042 DBRDMT SUBQ TIS 1ST 20SQCM/<: CPT

## 2022-11-18 PROCEDURE — 11042 DBRDMT SUBQ TIS 1ST 20SQCM/<: CPT | Performed by: NURSE PRACTITIONER

## 2022-11-18 RX ORDER — LIDOCAINE HYDROCHLORIDE 20 MG/ML
JELLY TOPICAL ONCE
OUTPATIENT
Start: 2022-11-18 | End: 2022-11-18

## 2022-11-18 RX ORDER — GENTAMICIN SULFATE 1 MG/G
OINTMENT TOPICAL ONCE
OUTPATIENT
Start: 2022-11-18 | End: 2022-11-18

## 2022-11-18 RX ORDER — BACITRACIN, NEOMYCIN, POLYMYXIN B 400; 3.5; 5 [USP'U]/G; MG/G; [USP'U]/G
OINTMENT TOPICAL ONCE
OUTPATIENT
Start: 2022-11-18 | End: 2022-11-18

## 2022-11-18 RX ORDER — LIDOCAINE 40 MG/G
CREAM TOPICAL ONCE
OUTPATIENT
Start: 2022-11-18 | End: 2022-11-18

## 2022-11-18 RX ORDER — CLOBETASOL PROPIONATE 0.5 MG/G
OINTMENT TOPICAL ONCE
OUTPATIENT
Start: 2022-11-18 | End: 2022-11-18

## 2022-11-18 RX ORDER — GENTAMICIN SULFATE 1 MG/G
OINTMENT TOPICAL ONCE
Status: DISCONTINUED | OUTPATIENT
Start: 2022-11-18 | End: 2022-11-19 | Stop reason: HOSPADM

## 2022-11-18 RX ORDER — GINSENG 100 MG
CAPSULE ORAL ONCE
OUTPATIENT
Start: 2022-11-18 | End: 2022-11-18

## 2022-11-18 RX ORDER — BACITRACIN ZINC AND POLYMYXIN B SULFATE 500; 1000 [USP'U]/G; [USP'U]/G
OINTMENT TOPICAL ONCE
OUTPATIENT
Start: 2022-11-18 | End: 2022-11-18

## 2022-11-18 RX ORDER — BETAMETHASONE DIPROPIONATE 0.05 %
OINTMENT (GRAM) TOPICAL ONCE
OUTPATIENT
Start: 2022-11-18 | End: 2022-11-18

## 2022-11-18 RX ORDER — LIDOCAINE HYDROCHLORIDE 40 MG/ML
SOLUTION TOPICAL ONCE
OUTPATIENT
Start: 2022-11-18 | End: 2022-11-18

## 2022-11-18 RX ORDER — LIDOCAINE 50 MG/G
OINTMENT TOPICAL ONCE
OUTPATIENT
Start: 2022-11-18 | End: 2022-11-18

## 2022-11-18 NOTE — PROGRESS NOTES
Wound Care Center Progress Note With Procedure    Genesis Stewart  AGE: 65 y.o. GENDER: female  : 1945  EPISODE DATE:  2022     Subjective:     Chief Complaint   Patient presents with    Wound Check     Right leg         HISTORY of PRESENT ILLNESS     Onel Richmond is a 65 y.o. female who presents to the [address] [place] [address] for an initial visit for evaluation and treatment of Chronic venous and non-healing/non-surgical  ulcer(s) of  R ankle lateral.  The condition is of moderate severity. The ulcer has been present for 6 months. The underlying cause is thought to be nonhealing venous wound from when she was fluid overloaded and got a blister. This never healed although her cardiac issues are now resolved and she is very stable. The patients care to date has included nothing so far. The patient has significant underlying medical conditions as below. Patient much improved. Still complains of pain but not as severe. Very clean and some fibrin remains but wound is smaller.   No issues with dressing changes        Wound Pain Timing/Severity: waxing and waning  Quality of pain: aching, burning  Severity of pain:  2  10   Modifying Factors: none  Associated Signs/Symptoms: erythema and pain        PAST MEDICAL HISTORY        Diagnosis Date    Abnormal echocardiogram     EF 60%, mild aortic indsufficiency, mild pulmonary hypertension    Anemia     Aortic insufficiency     mild per echo 2010    Atrial fibrillation ([address]     failed propafenone, Multaq and flecainide - 2011 plan for AFib ablation - [facility]- Dr Kat Best    Atrial flutter [facility])     Bursitis, trochanteric 2004    s/p injection    Cerumen impaction 2012    Diverticulosis     Dr. Jason BANKS scope    Diverticulosis of colon 2010    Colonoscopy-Dr Reece ACUÑA (degenerative joint disease), cervical     cervical, generalized disc buldge   MRI 3/02    DVT (deep venous thrombosis) ([address]     Dyslipidemia  Environmental allergies     Family history of colon cancer     mother    Gastric polyp     8/2006, 11/2009 benign- Dr Arianne Ely    Gastritis 04/2008    mild superficial per Dr Arianne Ely    GERD (gastroesophageal reflux disease) 08/2006    Dr Arianne Ely    H/O cardiovascular stress test 07/13/2004 07/13 EF58%, No scintigraphic evidence of inducible myocardial ischemia 04/13Normal study. No wall motion abnormality seen. EF 65%    H/O Doppler ultrasound 06/14/2022    VL Lower Ext Arteries/Venous Right. No evidence of pseudoaneurysm, AV fistula, or hematoma is present. H/O echocardiogram 07/18/2013 7/13-EF 50-55% Mild AR. H/O exercise stress test 02/07/2017    EF63% normal    History of Holter monitoring 09/23/2015    48 hour - abnormal holter revealing afib throughout the recording with interspersed pacemaker beats, HR does not appear to be well controlled    History of mammogram     last mammo 7/25/11, Dr. Sanchez [place] yearly    Hx of colonoscopy     1/21/2010    Hx of Doppler Venous ultrasound 08/31/2021    No DVT or SVT, No significant reflux in RLE, Significant reflux in Left CFV    Hyperlipidemia     Hypertension     Hypothyroidism     Internal hemorrhoid 2015    Dr. Arianne Ely C scope     Intervertebral disc protrusion 05/2009    wry neck with C4-5      Left shoulder pain 04/2004    (L) shoulder impingement  mild DJD    Long term current use of anticoagulant 07/26/2016    **Coumadin Clinic follows PT/INRs, along w/prescribing pt's Coumadin dosages. **    Menstruation     age 14    Pacemaker 06/21/2012    dual chamber- checked every 3 months    Pulmonary hypertension ([address]     mild per echo 8/24/2010, Pt refused sleep study (June 2012)    Restless legs 08/2003    ferritin    Right shoulder strain 02/2003    no seperation, bony contusion anterior humeral head  MRI 3/03    Sciatica 07/2009    (R) chronic intermittent s/p epidural injections  Dr Nguyen Hind    Sciatica     Shoulder pain, left 03/2011    RTC tendinopathy, type II acrominum, bursitis- referred to Dr. Cruz Ro    Shoulder pain, right 03/10/2009    impingement, physical thearpy   ([place])  calcific bursitis  s/p injection    Sinus bradycardia     Sinusitis 12/12/2011    Tinnitus 03/2002    Vision changes 03/19/2013       PAST SURGICAL HISTORY    Past Surgical History:   Procedure Laterality Date    ABLATION OF DYSRHYTHMIC FOCUS      BREAST BIOPSY      BREAST SURGERY  1985    Right breast tumor excised    CARDIOVERSION      1/2008 Dr Patti Campbell; 12/2008    CARDIOVERSION  03/10/2014    [address] [place] [address]    CHOLECYSTECTOMY, LAPAROSCOPIC  02/2001    Dr Montoya Primes    COLONOSCOPY      [phone] (Dr Chapin Hill)    [place] (CERVIX STATUS UNKNOWN)  2003    OTHER SURGICAL HISTORY Left 07/12/2022    upgrade to [employer] Bi vi pacer, with AV node ablation performed by Dr. Leanna Noel.     PACEMAKER PLACEMENT  06/21/2012    Medtronic Adapta ADDRL1 -not mri compatible    [facility]    Dr Aure Blancas (CERVIX REMOVED)  04/2003    fibroid      Dr Harrington Pheasant  08/2006    Dr Min Childers ECHOCARDIOGRAM  06/2012    transthoracic cardioversion-Dr. Donis Holstein    UPPER GASTROINTESTINAL ENDOSCOPY  04/2008    mild superficial gastritis  Dr Chapin Hill; 2009       FAMILY HISTORY    Family History   Problem Relation Age of Onset    Stroke Mother     Heart Disease Mother     Coronary Art Dis Mother 66        CABG    Colon Cancer Mother [de-identified]        colon     Heart Disease Father     Coronary Art Dis Father 62        CABG    Migraines Sister     Seizures Brother     Breast Cancer Maternal Cousin         under 48       SOCIAL HISTORY    Social History     Tobacco Use    Smoking status: Never    Smokeless tobacco: Never    Tobacco comments:     reviewed 11/4/15   Vaping Use    Vaping Use: Never used   Substance Use Topics    Alcohol use: No    Drug use: No       ALLERGIES    Allergies   Allergen Reactions    Latex Hives    Darvon [Propoxyphene Hcl] Shortness Of Breath    Demerol Rash     Breathing problems    Dilaudid [Hydromorphone Hcl] Anaphylaxis    Valium Rash     Breathing problems    Celecoxib Diarrhea    Norco [Hydrocodone-Acetaminophen] Diarrhea, Nausea Only and Other (See Comments)     A-Fib    Norvasc [Amlodipine] Other (See Comments)     HA, dizziness    Oxycodone Itching    Tape [Adhesive Tape] Other (See Comments)     BLISTER    Vasotec [Enalapril] Other (See Comments)     Nausea, vomiting    Diazepam Rash       MEDICATIONS    Current Outpatient Medications on File Prior to Encounter   Medication Sig Dispense Refill    memantine (NAMENDA) 10 MG tablet Take 1 tablet by mouth 2 times daily NOTE TO PHARMACY: DO NOT FILL UNTIL 5 MG RX IS COMPLETED (Patient not taking: No sig reported) 60 tablet 5    memantine (NAMENDA) 5 MG tablet Take (1) 5 mg tab QD x7 days, then 1 BID x7 days; then 2 tabs Q am-1 tab pm x7 days then change to 10mg RX (Patient taking differently: Take (1) 5 mg tab QD x7 days, then 1 BID x7 days; then 2 tabs Q am-1 tab pm x7 days then change to 10mg RX    Havent picked up from pharmacy) 42 tablet 0    fexofenadine (ALLEGRA) 180 MG tablet Take 180 mg by mouth daily      doxycycline hyclate (VIBRA-TABS) 100 MG tablet Take 1 tablet by mouth 2 times daily for 10 days 20 tablet 0    gentamicin (GARAMYCIN) 0.1 % ointment Apply to wound daily and as needed 30 g 3    warfarin (COUMADIN) 3 MG tablet Take 1 tablet by mouth daily Except take 1 and 1/2 tablets (4.5mg) on Thursdays or as directed 100 tablet 3    donepezil (ARICEPT) 10 MG tablet TAKE 1 TABLET BY MOUTH EVERY DAY 90 tablet 1    torsemide (DEMADEX) 10 MG tablet Take 1 tablet by mouth in the morning.  30 tablet 5    atorvastatin (LIPITOR) 40 MG tablet Take 1 tablet by mouth daily TAKE 1 TABLET BY MOUTH EVERY DAY 30 tablet 5    cetirizine (ZYRTEC) 10 MG tablet Take 10 mg by mouth daily (Patient not taking: No sig reported)      aspirin 81 MG chewable tablet Take 1 tablet by mouth daily 30 tablet 3    metoprolol succinate (TOPROL XL) 50 MG extended release tablet Take 1 tablet by mouth in the morning and at bedtime 30 tablet 3    levothyroxine (SYNTHROID) 50 MCG tablet TAKE 1 TABLET BY MOUTH EVERY DAY BEFORE BREAKFAST 90 tablet 3    Vitamin D (CHOLECALCIFEROL) 25 MCG (1000 UT) TABS tablet Take 1,000 Units by mouth daily      dexlansoprazole (DEXILANT) 60 MG CPDR delayed release capsule Take 1 tablet by mouth daily. No current facility-administered medications on file prior to encounter. REVIEW OF SYSTEMS    Pertinent items are noted in HPI. Constitutional: Negative for systemic symptoms including fever, chills and malaise. Objective:      /73   Pulse 73   Temp 98 °F (36.7 °C) (Temporal)   Resp 18     PHYSICAL EXAM      General: The patient is in no acute distress. Mental status:  Patient is appropriate, is  oriented to place and plan of care. Dermatologic exam: Visual inspection of the periwound reveals the skin to be normal in turgor and texture and dry  Wound exam: see wound description below in procedure note      Assessment:     Problem List Items Addressed This Visit          Other    WD-Non-pressure chronic ulcer of right calf with fat layer exposed ([facility] - Primary    Relevant Medications    gentamicin (GARAMYCIN) 0.1 % ointment (Start on 11/18/2022  9:00 AM)    Other Relevant Orders    Initiate Outpatient Wound Care Protocol     Procedure Note    Indications:  Based on my examination of this patient's wound(s) today, sharp excision into necrotic subcutaneous tissue is required to promote healing and evaluate the extent of previous healing. Performed by: ANTHONY Lopez - CNP    Consent obtained: Yes    Time out taken:  Yes    Pain Control: N/A      Debridement:Excisional Debridement    Using #15 blade scalpel the wound(s) was/were sharply debrided down through and including the removal of subcutaneous tissue.         Devitalized Tissue Debrided: fibrin, biofilm, slough, exudate, and callus    Pre Debridement Measurements:  Are located in the Wound Documentation Flow Sheet    All active wounds listed below with today's date are evaluated  Wound(s)    debrided this date include # : 1     Post  Debridement Measurements:  Wound 11/11/22 Pretibial Right;Lateral #1 (Active)   Wound Image   11/11/22 0820   Wound Etiology Venous 11/18/22 0813   Dressing Status New dressing applied 11/11/22 0905   Wound Cleansed Soap and water 11/18/22 0813   Offloading for Diabetic Foot Ulcers Offloading not required 11/18/22 0813   Wound Length (cm) 1.3 cm 11/18/22 0813   Wound Width (cm) 1 cm 11/18/22 0813   Wound Depth (cm) 0.1 cm 11/18/22 0813   Wound Surface Area (cm^2) 1.3 cm^2 11/18/22 0813   Change in Wound Size % (l*w) 56.67 11/18/22 0813   Wound Volume (cm^3) 0.13 cm^3 11/18/22 0813   Wound Healing % 57 11/18/22 0813   Post-Procedure Length (cm) 1.3 cm 11/18/22 0817   Post-Procedure Width (cm) 1 cm 11/18/22 0817   Post-Procedure Depth (cm) 0.1 cm 11/18/22 0817   Post-Procedure Surface Area (cm^2) 1.3 cm^2 11/18/22 0817   Post-Procedure Volume (cm^3) 0.13 cm^3 11/18/22 0817   Distance Tunneling (cm) 0 cm 11/18/22 0813   Tunneling Position ___ O'Clock 0 11/18/22 0813   Undermining Starts ___ O'Clock 0 11/18/22 0813   Undermining Ends___ O'Clock 0 11/18/22 0813   Undermining Maxium Distance (cm) 0 11/18/22 0813   Wound Assessment Pink/red;Slough 11/18/22 0813   Drainage Amount Moderate 11/18/22 0813   Drainage Description Serosanguinous 11/18/22 0813   Odor None 11/18/22 0813   Verónica-wound Assessment Intact 11/18/22 0813   Margins Attached edges 11/18/22 0813   Wound Thickness Description not for Pressure Injury Full thickness 11/18/22 0813   Number of days: 7       Percent of Wound(s) Debrided: approximately 100%    Total  Area  Debrided:  1.3 sq cm     Bleeding:  Minimal    Hemostasis Achieved:  by pressure    Procedural Pain:  4  / 10     Post Procedural Pain:  0 / 10 Response to treatment:  With complaints of pain. Status of wound progress and description from last visit:   Much improved. Follow up 2 weeks for holiday      Plan:       Discharge Instructions         PHYSICIAN ORDERS AND DISCHARGE INSTRUCTIONS     NOTE: Upon discharge from the [address], you will receive a patient experience survey. We would be grateful if you would take the time to fill this survey out. Wound care order history:                 KATRIN's   Right       Left               Date:              Cultures:                Grafts:                Antibiotics:           Continuing wound care orders and information:                 Residence:                Continue home health care with:               Your wound-care supplies will be provided by: Wound cleansing:               Do not scrub or use excessive force. Wash hands with soap and water before and after dressing changes. Prior to applying a clean dressing, cleanse wound with normal saline, wound cleanser, or mild soap and water. Ask the physician or nurse before getting the wound(s) wet in a shower     Daily Wound management:              Keep weight off wounds and reposition every 2 hours. Avoid standing for long periods of time. Apply wraps/stockings in AM and remove at bedtime. If swelling is present, elevate legs to the level of the heart or above for 30 minutes 4-5 times a day and/or when sitting. When taking antibiotics take entire prescription as ordered by physician do not stop taking until medicine is all gone. Orders for this week: 11/18/22                  Rx:     Right Lateral Ankle - Wash with soap and water, pat dry. Apply Gentamicin and saline damp hydrofera blue cut to size of wound. Cover with ABD. Secure with conform and tape.  Wear Tubi F. Change daily. Follow up with Emily Lees CNP in 2 weeks in the wound care center. Call [phone] for any questions or concerns.   Date__________   Time____________        Treatment Note      Written Patient Dismissal Instructions Given            Electronically signed by ANTHONY Arrieta CNP on 11/18/2022 at 8:32 AM

## 2022-11-18 NOTE — DISCHARGE INSTRUCTIONS
PHYSICIAN ORDERS AND DISCHARGE INSTRUCTIONS     NOTE: Upon discharge from the [address], you will receive a patient experience survey. We would be grateful if you would take the time to fill this survey out. Wound care order history:                 KATRIN's   Right       Left               Date:              Cultures:                Grafts:                Antibiotics:           Continuing wound care orders and information:                 Residence:                Continue home health care with:               Your wound-care supplies will be provided by: Wound cleansing:               Do not scrub or use excessive force. Wash hands with soap and water before and after dressing changes. Prior to applying a clean dressing, cleanse wound with normal saline, wound cleanser, or mild soap and water. Ask the physician or nurse before getting the wound(s) wet in a shower     Daily Wound management:              Keep weight off wounds and reposition every 2 hours. Avoid standing for long periods of time. Apply wraps/stockings in AM and remove at bedtime. If swelling is present, elevate legs to the level of the heart or above for 30 minutes 4-5 times a day and/or when sitting. When taking antibiotics take entire prescription as ordered by physician do not stop taking until medicine is all gone. Orders for this week: 11/18/22                  Rx:     Right Lateral Ankle - Wash with soap and water, pat dry. Apply Gentamicin and saline damp hydrofera blue cut to size of wound. Cover with ABD. Secure with conform and tape. Wear Tubi F. Change daily. Follow up with Gerardo Li CNP in 2 weeks in the wound care center. Call [phone] for any questions or concerns.   Date__________   Time____________

## 2022-12-02 ENCOUNTER — HOSPITAL ENCOUNTER (OUTPATIENT)
Dept: WOUND CARE | Age: 77
Discharge: HOME | End: 2022-12-02
Payer: MEDICARE

## 2022-12-02 VITALS — RESPIRATION RATE: 18 BRPM | DIASTOLIC BLOOD PRESSURE: 62 MMHG | SYSTOLIC BLOOD PRESSURE: 97 MMHG | TEMPERATURE: 96.9 F

## 2022-12-02 DIAGNOSIS — L97.212 NON-PRESSURE CHRONIC ULCER OF RIGHT CALF WITH FAT LAYER EXPOSED (HCC): Primary | ICD-10-CM

## 2022-12-02 PROCEDURE — 11042 DBRDMT SUBQ TIS 1ST 20SQCM/<: CPT

## 2022-12-02 RX ORDER — GINSENG 100 MG
CAPSULE ORAL ONCE
OUTPATIENT
Start: 2022-12-02 | End: 2022-12-02

## 2022-12-02 RX ORDER — GENTAMICIN SULFATE 1 MG/G
OINTMENT TOPICAL ONCE
OUTPATIENT
Start: 2022-12-02 | End: 2022-12-02

## 2022-12-02 RX ORDER — LIDOCAINE HYDROCHLORIDE 40 MG/ML
SOLUTION TOPICAL ONCE
OUTPATIENT
Start: 2022-12-02 | End: 2022-12-02

## 2022-12-02 RX ORDER — LIDOCAINE 50 MG/G
OINTMENT TOPICAL ONCE
OUTPATIENT
Start: 2022-12-02 | End: 2022-12-02

## 2022-12-02 RX ORDER — BACITRACIN, NEOMYCIN, POLYMYXIN B 400; 3.5; 5 [USP'U]/G; MG/G; [USP'U]/G
OINTMENT TOPICAL ONCE
OUTPATIENT
Start: 2022-12-02 | End: 2022-12-02

## 2022-12-02 RX ORDER — GENTAMICIN SULFATE 1 MG/G
OINTMENT TOPICAL ONCE
Status: DISCONTINUED | OUTPATIENT
Start: 2022-12-02 | End: 2022-12-03 | Stop reason: HOSPADM

## 2022-12-02 RX ORDER — CLOBETASOL PROPIONATE 0.5 MG/G
OINTMENT TOPICAL ONCE
OUTPATIENT
Start: 2022-12-02 | End: 2022-12-02

## 2022-12-02 RX ORDER — LIDOCAINE HYDROCHLORIDE 20 MG/ML
JELLY TOPICAL ONCE
OUTPATIENT
Start: 2022-12-02 | End: 2022-12-02

## 2022-12-02 RX ORDER — BACITRACIN ZINC AND POLYMYXIN B SULFATE 500; 1000 [USP'U]/G; [USP'U]/G
OINTMENT TOPICAL ONCE
OUTPATIENT
Start: 2022-12-02 | End: 2022-12-02

## 2022-12-02 RX ORDER — BETAMETHASONE DIPROPIONATE 0.05 %
OINTMENT (GRAM) TOPICAL ONCE
OUTPATIENT
Start: 2022-12-02 | End: 2022-12-02

## 2022-12-02 RX ORDER — LIDOCAINE 40 MG/G
CREAM TOPICAL ONCE
OUTPATIENT
Start: 2022-12-02 | End: 2022-12-02

## 2022-12-02 ASSESSMENT — PAIN SCALES - GENERAL: PAINLEVEL_OUTOF10: 1

## 2022-12-02 ASSESSMENT — PAIN DESCRIPTION - ORIENTATION: ORIENTATION: RIGHT

## 2022-12-02 ASSESSMENT — PAIN DESCRIPTION - LOCATION: LOCATION: LEG

## 2022-12-02 NOTE — PROGRESS NOTES
Wound Care Center Progress Note With Procedure    Genesis Stewart  AGE: 65 y.o. GENDER: female  : 1945  EPISODE DATE:  2022     Subjective:     Chief Complaint   Patient presents with    Wound Check     Right lower leg          HISTORY of PRESENT ILLNESS     David Mahajan is a 65 y.o. female who presents to the [address] [place] [address] for an initial visit for evaluation and treatment of Chronic venous and non-healing/non-surgical  ulcer(s) of  R ankle lateral.  The condition is of moderate severity. The ulcer has been present for 6 months. The underlying cause is thought to be nonhealing venous wound from when she was fluid overloaded and got a blister. This never healed although her cardiac issues are now resolved and she is very stable. The patients care to date has included nothing so far. The patient has significant underlying medical conditions as below. Patient wound is improved in size and is now ready for collagen     Patient educated on the 6 essential components necessary for wound healing: Circulation, Debridements, Proper Dressings and Topical Wound Products, Infection Control, Edema Control and Offloading. Patient educated on those factors that negatively effect or impact wound healing: smoking, obesity, uncontrolled diabetes, anticoagulant and immunosuppressive regimens, inadequate nutrition, untreated arterial and venous disease if applicable and measures to manage edema. Off Loading  Offloading or minimizing or removing weight placed on an area with poor circulation such as diabetic wounds or pressure.  This can be achieved with crutches, wheel chair, knee walker etc. Minimizing pressure through partial weight bearing (minimizing the amount of  pressure applied and or the amount of time on the area of pressure) or maintaining a non-weight bearing status can be used to promote and often can be essential for thee wound to heal. Off loading may also need to be achieved for non-weight bearing wounds such as pressure ulcers to the torso. Turning and changing positions frequently, at least every two hours. Use of pressure cushion if sitting up in chair. Skin Care  Keep skin clean and well moisturized , moisturize routinely with ointments for heavier moisturizer needs for extremely dry skin or cracks such as A&D ointment and lotions for a light moisturizer such as CeraVe or Eucerin. If incontinent change incontinence garments as soon as soiled and keeping skin clean and use barrier cream to protect the skin. Reduce Salt and Sodium  Choose low- or reduced- sodium, or no-salt-added versions of foods and condiments when available. Buy fresh, plain frozen, or canned with no-added-salt vegetables. Use fresh poultry, fish and lean meat, rather than canned, smoked or processed types. Choose ready-to-eat breakfast cereals that are lower in sodium. Limit cured foods (such as starks and ham), foods packed in brine (such as pickled foods) and condiments (such as MSG, mustard, horseradish, and catsup). Limit even lower sodium versions of soy sauce and teriyaki sauce-treat these condiments just like salt). In cooking and at the table, flavor foods with herbs, spices, lemon, lime, vinegar or salt-free seasoning blends. Start by cutting salt in half. Cook rice, pasta and hot cereals without salt. Cut back on instant or flavored rice, pasta and cereal mixes, which usually have added salt. Choose convenience foods that are lower in sodium. Limit frozen dinners, packaged mixes, canned soups and dressings. Rinse canned foods, such as tuna, to remove some sodium. Choose fruits or vegetables instead of salty snack foods. Edema Management if applicable  Whenever resting, raise your legs up. Try to keep the swollen area higher than the level of your heart. Take breaks from standing or sitting in one position. Walk around to increase the blood flow in your lower legs.  Move your feet and ankles often while you stand, or tighten and relax your leg muscles. Wear support stockings. Put them on in the morning, before swelling gets worse. Eat a balanced diet. Lose weight if you need to. Limit the amount of salt (sodium) in your diet. Salt holds fluid in the body and may increase swelling. Apply compression stocking(s) every morning as soon as you get up. Remove at bedtime unless instructed to wear day and night. Hand wash and line dry to prevent loss of elasticity. Replace every 3-4 months to ensure proper fit. Weight Management if Applicable  Will need to ultimately change overall eating behaviors to have success with weight loss. Encouraged to weigh daily and work towards a goal of 1-2 pounds of weight loss weekly. Encouraged to journal all food intake, myfitness pal is a useful tool to help keep track of food intake and caloric value. Keep calorie level at approximately 8331-9236. Protein intake is to be a minimum of 60 grams per day (unless otherwise directed). Water drinking was encouraged with a goal of 64oz-128oz daily. Beverages to be calorie free except for milk. Every other beverage should be water, avoid soda. Continue to increase level of physical activity.  Refer to weight management as indicated and       Wound Pain Timing/Severity: waxing and waning  Quality of pain: aching, burning  Severity of pain:  2 / 10   Modifying Factors: none  Associated Signs/Symptoms: erythema and pain        PAST MEDICAL HISTORY        Diagnosis Date    Abnormal echocardiogram     EF 60%, mild aortic indsufficiency, mild pulmonary hypertension    Anemia     Aortic insufficiency     mild per echo 8/24/2010    Atrial fibrillation ([address]     failed propafenone, Multaq and flecainide - 7/2011 plan for AFib ablation - [facility]- Dr Trista Ngo    Atrial flutter [facility])     Bursitis, trochanteric 08/2004    s/p injection    Cerumen impaction 04/24/2012    Diverticulosis 2015    Dr. Darcie BANKS scope    Diverticulosis of colon 01/2010    Colonoscopy-Dr Reece ACUÑA (degenerative joint disease), cervical     cervical, generalized disc buldge   MRI 3/02    DVT (deep venous thrombosis) ([address]     Dyslipidemia 2002    Environmental allergies     Family history of colon cancer     mother    Gastric polyp     8/2006, 11/2009 benign- Dr Omega Prieto    Gastritis 04/2008    mild superficial per Dr Omega Prieto    GERD (gastroesophageal reflux disease) 08/2006    Dr Omega Prieto    H/O cardiovascular stress test 07/13/2004 07/13 EF58%, No scintigraphic evidence of inducible myocardial ischemia 04/13Normal study. No wall motion abnormality seen. EF 65%    H/O Doppler ultrasound 06/14/2022    VL Lower Ext Arteries/Venous Right. No evidence of pseudoaneurysm, AV fistula, or hematoma is present. H/O echocardiogram 07/18/2013 7/13-EF 50-55% Mild AR. H/O exercise stress test 02/07/2017    EF63% normal    History of Holter monitoring 09/23/2015    48 hour - abnormal holter revealing afib throughout the recording with interspersed pacemaker beats, HR does not appear to be well controlled    History of mammogram     last mammo 7/25/11, Dr. Edna Eastman yearly    Hx of colonoscopy     1/21/2010    Hx of Doppler Venous ultrasound 08/31/2021    No DVT or SVT, No significant reflux in RLE, Significant reflux in Left CFV    Hyperlipidemia     Hypertension     Hypothyroidism     Internal hemorrhoid 2015    Dr. Omega Prieto C scope     Intervertebral disc protrusion 05/2009    wry neck with C4-5      Left shoulder pain 04/2004    (L) shoulder impingement  mild DJD    Long term current use of anticoagulant 07/26/2016    **Coumadin Clinic follows PT/INRs, along w/prescribing pt's Coumadin dosages. **    Menstruation     age 14    Pacemaker 06/21/2012    dual chamber- checked every 3 months    Pulmonary hypertension ([address]     mild per echo 8/24/2010, Pt refused sleep study (June 2012)    Restless legs 08/2003    ferritin    Right shoulder strain 02/2003    no seperation, bony contusion anterior humeral head  MRI 3/03    Sciatica 07/2009    (R) chronic intermittent s/p epidural injections  Dr Chloé Arenas    Sciatica     Shoulder pain, left 03/2011    RTC tendinopathy, type II acrominum, bursitis- referred to Dr. Patrick Mauricio    Shoulder pain, right 03/10/2009    impingement, physical thearpy   ([place])  calcific bursitis  s/p injection    Sinus bradycardia     Sinusitis 12/12/2011    Tinnitus 03/2002    Vision changes 03/19/2013       PAST SURGICAL HISTORY    Past Surgical History:   Procedure Laterality Date    ABLATION OF DYSRHYTHMIC FOCUS      BREAST BIOPSY      BREAST SURGERY  1985    Right breast tumor excised    CARDIOVERSION      1/2008 Dr Isai Leon; 12/2008    CARDIOVERSION  03/10/2014    [facility]    CHOLECYSTECTOMY, LAPAROSCOPIC  02/2001    Dr Rafita Luevano      [phone] (Dr Levy Storey)    Jose Magda (CERVIX STATUS UNKNOWN)  2003    OTHER SURGICAL HISTORY Left 07/12/2022    upgrade to Medtronic Bi vi pacer, with AV node ablation performed by Dr. Guevara Coburn.     PACEMAKER PLACEMENT  06/21/2012    Medtronic Adapta ADDRL1 -not mri compatible    Josefina Granger (CERVIX REMOVED)  04/2003    fibroid      Dr Sanchez Clarity  08/2006    Dr Max Signs ECHOCARDIOGRAM  06/2012    transthoracic cardioversion-Dr. Hansel Alvarenga    UPPER GASTROINTESTINAL ENDOSCOPY  04/2008    mild superficial gastritis  Dr Levy Storey; 2009       FAMILY HISTORY    Family History   Problem Relation Age of Onset    Stroke Mother     Heart Disease Mother     Coronary Art Dis Mother 66        CABG    Colon Cancer Mother [de-identified]        colon     Heart Disease Father     Coronary Art Dis Father 62        CABG    Migraines Sister     Seizures Brother     Breast Cancer Maternal Cousin         under 48       SOCIAL HISTORY    Social History     Tobacco Use    Smoking status: Never    Smokeless tobacco: Never    Tobacco comments:     reviewed 11/4/15 Vaping Use    Vaping Use: Never used   Substance Use Topics    Alcohol use: No    Drug use: No       ALLERGIES    Allergies   Allergen Reactions    Latex Hives    Darvon [Propoxyphene Hcl] Shortness Of Breath    Demerol Rash     Breathing problems    Dilaudid [Hydromorphone Hcl] Anaphylaxis    Valium Rash     Breathing problems    Celecoxib Diarrhea    Norco [Hydrocodone-Acetaminophen] Diarrhea, Nausea Only and Other (See Comments)     A-Fib    Norvasc [Amlodipine] Other (See Comments)     HA, dizziness    Oxycodone Itching    Tape [Adhesive Tape] Other (See Comments)     BLISTER    Vasotec [Enalapril] Other (See Comments)     Nausea, vomiting    Diazepam Rash       MEDICATIONS    Current Outpatient Medications on File Prior to Encounter   Medication Sig Dispense Refill    memantine (NAMENDA) 10 MG tablet Take 1 tablet by mouth 2 times daily NOTE TO PHARMACY: DO NOT FILL UNTIL 5 MG RX IS COMPLETED (Patient not taking: No sig reported) 60 tablet 5    memantine (NAMENDA) 5 MG tablet Take (1) 5 mg tab QD x7 days, then 1 BID x7 days; then 2 tabs Q am-1 tab pm x7 days then change to 10mg RX (Patient taking differently: Take (1) 5 mg tab QD x7 days, then 1 BID x7 days; then 2 tabs Q am-1 tab pm x7 days then change to 10mg RX    Havent picked up from pharmacy) 42 tablet 0    fexofenadine (ALLEGRA) 180 MG tablet Take 180 mg by mouth daily      warfarin (COUMADIN) 3 MG tablet Take 1 tablet by mouth daily Except take 1 and 1/2 tablets (4.5mg) on Thursdays or as directed 100 tablet 3    donepezil (ARICEPT) 10 MG tablet TAKE 1 TABLET BY MOUTH EVERY DAY 90 tablet 1    torsemide (DEMADEX) 10 MG tablet Take 1 tablet by mouth in the morning.  30 tablet 5    atorvastatin (LIPITOR) 40 MG tablet Take 1 tablet by mouth daily TAKE 1 TABLET BY MOUTH EVERY DAY 30 tablet 5    cetirizine (ZYRTEC) 10 MG tablet Take 10 mg by mouth daily (Patient not taking: No sig reported)      aspirin 81 MG chewable tablet Take 1 tablet by mouth daily 30 tablet 3    metoprolol succinate (TOPROL XL) 50 MG extended release tablet Take 1 tablet by mouth in the morning and at bedtime 30 tablet 3    levothyroxine (SYNTHROID) 50 MCG tablet TAKE 1 TABLET BY MOUTH EVERY DAY BEFORE BREAKFAST 90 tablet 3    Vitamin D (CHOLECALCIFEROL) 25 MCG (1000 UT) TABS tablet Take 1,000 Units by mouth daily      dexlansoprazole (DEXILANT) 60 MG CPDR delayed release capsule Take 1 tablet by mouth daily. No current facility-administered medications on file prior to encounter. REVIEW OF SYSTEMS    Pertinent items are noted in HPI. Constitutional: Negative for systemic symptoms including fever, chills and malaise. Objective:      BP 97/62   Temp 96.9 °F (36.1 °C) (Temporal)   Resp 18     PHYSICAL EXAM      General: The patient is in no acute distress. Mental status:  Patient is appropriate, is  oriented to place and plan of care. 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 Performed by: ANTHONY David - LEVI    Consent obtained: Yes    Time out taken:  Yes    Pain Control: N/a      Debridement:Excisional Debridement    Using #15 blade scalpel the wound(s) was/were sharply debrided down through and including the removal of subcutaneous tissue.         Devitalized Tissue Debrided:  fibrin, biofilm, slough, and exudate    Pre Debridement Measurements:  Are located in the Wound Documentation Flow Sheet    All active wounds listed below with today's date are evaluated  Wound(s)    debrided this date include # : 1     Post  Debridement Measurements:  Wound 11/11/22 Pretibial Right;Lateral #1 (Active)   Wound Image   11/11/22 0820   Wound Etiology Venous 12/02/22 0809   Dressing Status New dressing applied 11/18/22 0855   Wound Cleansed Wound cleanser 12/02/22 0809   Offloading for Diabetic Foot Ulcers Offloading not required 12/02/22 0809   Wound Length (cm) 1.3 cm 12/02/22 0809   Wound Width (cm) 0.6 cm 12/02/22 0809   Wound Depth (cm) 0.1 cm 12/02/22 0809   Wound Surface Area (cm^2) 0.78 cm^2 12/02/22 0809   Change in Wound Size % (l*w) 74 12/02/22 0809   Wound Volume (cm^3) 0.078 cm^3 12/02/22 0809   Wound Healing % 74 12/02/22 0809   Post-Procedure Length (cm) 1.3 cm 12/02/22 0820   Post-Procedure Width (cm) 0.6 cm 12/02/22 0820   Post-Procedure Depth (cm) 0.1 cm 12/02/22 0820   Post-Procedure Surface Area (cm^2) 0.78 cm^2 12/02/22 0820   Post-Procedure Volume (cm^3) 0.078 cm^3 12/02/22 0820   Distance Tunneling (cm) 0 cm 12/02/22 0809   Tunneling Position ___ O'Clock 0 12/02/22 0809   Undermining Starts ___ O'Clock 0 12/02/22 0809   Undermining Ends___ O'Clock 0 12/02/22 0809   Undermining Maxium Distance (cm) 0 12/02/22 0809   Wound Assessment Pink/red;Slough 12/02/22 0809   Drainage Amount Moderate 12/02/22 0809   Drainage Description Serosanguinous 12/02/22 0809   Odor None 12/02/22 0809   Verónica-wound Assessment Intact 12/02/22 0809   Margins Attached edges 12/02/22 0809   Wound Thickness Description not for Pressure Injury Full thickness 12/02/22 0809   Number of days: 21       Percent of Wound(s) Debrided: approximately 100%    Total  Area  Debrided:  0.78 sq cm     Bleeding:  Minimal    Hemostasis Achieved:  by pressure    Procedural Pain:  6  / 10     Post Procedural Pain:  0 / 10     Response to treatment:  With complaints of pain. Status of wound progress and description from last visit:   Stable and clean. Will educate patient on changes to dressing changes      Plan:       Discharge Instructions         PHYSICIAN ORDERS AND DISCHARGE INSTRUCTIONS     NOTE: Upon discharge from the [address], you will receive a patient experience survey. We would be grateful if you would take the time to fill this survey out. Wound care order history:                 KATRIN's   Right       Left               Date:              Cultures:                Grafts:                Antibiotics:           Continuing wound care orders and information:                 Residence:                Continue home health care with:               Your wound-care supplies will be provided by: Wound cleansing:               Do not scrub or use excessive force. Wash hands with soap and water before and after dressing changes. Prior to applying a clean dressing, cleanse wound with normal saline, wound cleanser, or mild soap and water. Ask the physician or nurse before getting the wound(s) wet in a shower     Daily Wound management:              Keep weight off wounds and reposition every 2 hours. Avoid standing for long periods of time. Apply wraps/stockings in AM and remove at bedtime. If swelling is present, elevate legs to the level of the heart or above for 30 minutes 4-5 times a day and/or when sitting. When taking antibiotics take entire prescription as ordered by physician do not stop taking until medicine is all gone. Orders for this week: 12/2/22                  Rx:     Right Lateral Ankle - Wash with soap and water, pat dry. Apply Gentamicin and Kandice to wound bed. Cover with ABD. Secure with conform and tape. Wear Tubi F. Change daily. Follow up with Aimee Gutierres CNP in 1 week in the wound care center. Call [phone] for any questions or concerns.   Date__________   Time____________        Treatment Note      Written Patient Dismissal Instructions Given            Electronically signed by ANTHONY Talley CNP on 12/2/2022 at 8:25 AM

## 2022-12-02 NOTE — DISCHARGE INSTRUCTIONS
PHYSICIAN ORDERS AND DISCHARGE INSTRUCTIONS     NOTE: Upon discharge from the [address], you will receive a patient experience survey. We would be grateful if you would take the time to fill this survey out. Wound care order history:                 KATRIN's   Right       Left               Date:              Cultures:                Grafts:                Antibiotics:           Continuing wound care orders and information:                 Residence:                Continue home health care with:               Your wound-care supplies will be provided by: Wound cleansing:               Do not scrub or use excessive force. Wash hands with soap and water before and after dressing changes. Prior to applying a clean dressing, cleanse wound with normal saline, wound cleanser, or mild soap and water. Ask the physician or nurse before getting the wound(s) wet in a shower     Daily Wound management:              Keep weight off wounds and reposition every 2 hours. Avoid standing for long periods of time. Apply wraps/stockings in AM and remove at bedtime. If swelling is present, elevate legs to the level of the heart or above for 30 minutes 4-5 times a day and/or when sitting. When taking antibiotics take entire prescription as ordered by physician do not stop taking until medicine is all gone. Orders for this week: 12/2/22                  Rx:     Right Lateral Ankle - Wash with soap and water, pat dry. Apply Gentamicin and Kandice to wound bed. Cover with ABD. Secure with conform and tape. Wear Tubi F. Change daily. Follow up with Rachelle Milian CNP in 1 week in the wound care center. Call [phone] for any questions or concerns.   Date__________   Time____________

## 2022-12-09 ENCOUNTER — HOSPITAL ENCOUNTER (OUTPATIENT)
Dept: WOUND CARE | Age: 77
Discharge: HOME | End: 2022-12-09
Payer: MEDICARE

## 2022-12-09 VITALS
DIASTOLIC BLOOD PRESSURE: 61 MMHG | RESPIRATION RATE: 18 BRPM | TEMPERATURE: 96.3 F | HEART RATE: 76 BPM | SYSTOLIC BLOOD PRESSURE: 98 MMHG

## 2022-12-09 DIAGNOSIS — E03.9 ACQUIRED HYPOTHYROIDISM: ICD-10-CM

## 2022-12-09 DIAGNOSIS — L97.212 NON-PRESSURE CHRONIC ULCER OF RIGHT CALF WITH FAT LAYER EXPOSED (HCC): Primary | ICD-10-CM

## 2022-12-09 DIAGNOSIS — L97.212 VENOUS STASIS ULCER OF RIGHT CALF WITH FAT LAYER EXPOSED WITH VARICOSE VEINS (HCC): ICD-10-CM

## 2022-12-09 DIAGNOSIS — I83.012 VENOUS STASIS ULCER OF RIGHT CALF WITH FAT LAYER EXPOSED WITH VARICOSE VEINS (HCC): ICD-10-CM

## 2022-12-09 PROCEDURE — 11042 DBRDMT SUBQ TIS 1ST 20SQCM/<: CPT

## 2022-12-09 RX ORDER — CLOBETASOL PROPIONATE 0.5 MG/G
OINTMENT TOPICAL ONCE
OUTPATIENT
Start: 2022-12-09 | End: 2022-12-09

## 2022-12-09 RX ORDER — LIDOCAINE HYDROCHLORIDE 40 MG/ML
SOLUTION TOPICAL ONCE
OUTPATIENT
Start: 2022-12-09 | End: 2022-12-09

## 2022-12-09 RX ORDER — LEVOTHYROXINE SODIUM 0.05 MG/1
TABLET ORAL
Qty: 90 TABLET | Refills: 3 | Status: SHIPPED | OUTPATIENT
Start: 2022-12-09

## 2022-12-09 RX ORDER — LIDOCAINE 50 MG/G
OINTMENT TOPICAL ONCE
OUTPATIENT
Start: 2022-12-09 | End: 2022-12-09

## 2022-12-09 RX ORDER — GENTAMICIN SULFATE 1 MG/G
OINTMENT TOPICAL ONCE
OUTPATIENT
Start: 2022-12-09 | End: 2022-12-09

## 2022-12-09 RX ORDER — LIDOCAINE 40 MG/G
CREAM TOPICAL ONCE
OUTPATIENT
Start: 2022-12-09 | End: 2022-12-09

## 2022-12-09 RX ORDER — BACITRACIN, NEOMYCIN, POLYMYXIN B 400; 3.5; 5 [USP'U]/G; MG/G; [USP'U]/G
OINTMENT TOPICAL ONCE
OUTPATIENT
Start: 2022-12-09 | End: 2022-12-09

## 2022-12-09 RX ORDER — BACITRACIN ZINC AND POLYMYXIN B SULFATE 500; 1000 [USP'U]/G; [USP'U]/G
OINTMENT TOPICAL ONCE
OUTPATIENT
Start: 2022-12-09 | End: 2022-12-09

## 2022-12-09 RX ORDER — GINSENG 100 MG
CAPSULE ORAL ONCE
OUTPATIENT
Start: 2022-12-09 | End: 2022-12-09

## 2022-12-09 RX ORDER — BETAMETHASONE DIPROPIONATE 0.05 %
OINTMENT (GRAM) TOPICAL ONCE
OUTPATIENT
Start: 2022-12-09 | End: 2022-12-09

## 2022-12-09 RX ORDER — LIDOCAINE HYDROCHLORIDE 20 MG/ML
JELLY TOPICAL ONCE
OUTPATIENT
Start: 2022-12-09 | End: 2022-12-09

## 2022-12-09 ASSESSMENT — PAIN SCALES - GENERAL: PAINLEVEL_OUTOF10: 0

## 2022-12-09 NOTE — PROGRESS NOTES
Wound Care Center Progress Note With Procedure    Genesis Stewart  AGE: 65 y.o. GENDER: female  : 1945  EPISODE DATE:  2022     Subjective:     Chief Complaint   Patient presents with    Wound Check     Right leg          HISTORY of PRESENT ILLNESS     Gabrielle Guillaume is a 65 y.o. female who presents to the [address] [place] [address] for an initial visit for evaluation and treatment of Chronic venous and non-healing/non-surgical  ulcer(s) of  R ankle lateral.  The condition is of moderate severity. The ulcer has been present for 6 months. The underlying cause is thought to be nonhealing venous wound from when she was fluid overloaded and got a blister. This never healed although her cardiac issues are now resolved and she is very stable. The patients care to date has included nothing so far. The patient has significant underlying medical conditions as below. Wound Pain Timing/Severity: waxing and waning  Quality of pain: aching, burning  Severity of pain:  2 / 10   Modifying Factors: none  Associated Signs/Symptoms: erythema and pain    Diabetes:  Hemoglobin A1C   Date Value Ref Range Status   2022 5.4 4.2 - 6.3 % Final     Smoking:Never smoker  Anticoagulant/Antiplatelet therapy: Coumadin, ASA  Immunosuppression: No  Obesity: No    Patient educated on the 6 essential components necessary for wound healing: Circulation, Debridements, Proper Dressings and Topical Wound Products, Infection Control, Edema Control and Offloading. Patient educated on those factors that negatively effect or impact wound healing: smoking, obesity, uncontrolled diabetes, anticoagulant and immunosuppressive regimens, inadequate nutrition, untreated arterial and venous disease if applicable and measures to manage edema. Nutritional status: well nourished. Discussed need for increased protein and calories for wound healing and good sources of protein (just over 7 grams for every 20 pounds of body weight).  Animal-based foods high in protein (meat, poultry, fish, eggs, and dairy foods). Plant based foods high in protein (tofu, lentils, beans, chickpeas, nuts, quinoa and faith seeds. Lab Results   Component Value Date     08/02/2022    K 4.3 08/02/2022     08/02/2022    CO2 30 08/02/2022    BUN 15 08/02/2022    CREATININE 0.8 08/02/2022    GLUCOSE 90 08/02/2022    CALCIUM 9.5 08/02/2022    PROT 7.5 08/02/2022    LABALBU 4.5 08/02/2022    BILITOT 1.1 (H) 08/02/2022    ALKPHOS 67 08/02/2022    AST 41 (H) 08/02/2022    ALT 40 08/02/2022    LABGLOM >60 08/02/2022    GFRAA >60 08/02/2022    AGRATIO 1.7 10/19/2021    GLOB 2.7 10/19/2021     Off Loading  Offloading or minimizing or removing weight placed on an area with poor circulation such as diabetic wounds or pressure. This can be achieved with crutches, wheel chair, knee walker etc. Minimizing pressure through partial weight bearing (minimizing the amount of  pressure applied and or the amount of time on the area of pressure) or maintaining a non-weight bearing status can be used to promote and often can be essential for thee wound to heal. Off loading may also need to be achieved for non-weight bearing wounds such as pressure ulcers to the torso. Turning and changing positions frequently, at least every two hours. Use of pressure cushion if sitting up in chair. Skin Care  Keep skin clean and well moisturized , moisturize routinely with ointments for heavier moisturizer needs for extremely dry skin or cracks such as A&D ointment and lotions for a light moisturizer such as CeraVe or Eucerin. If incontinent change incontinence garments as soon as soiled and keeping skin clean and use barrier cream to protect the skin. Reduce Salt and Sodium  Choose low- or reduced- sodium, or no-salt-added versions of foods and condiments when available. Buy fresh, plain frozen, or canned with no-added-salt vegetables.  Use fresh poultry, fish and lean meat, rather than canned, smoked or processed types. Choose ready-to-eat breakfast cereals that are lower in sodium. Limit cured foods (such as starks and ham), foods packed in brine (such as pickled foods) and condiments (such as MSG, mustard, horseradish, and catsup). Limit even lower sodium versions of soy sauce and teriyaki sauce-treat these condiments just like salt). In cooking and at the table, flavor foods with herbs, spices, lemon, lime, vinegar or salt-free seasoning blends. Start by cutting salt in half. Cook rice, pasta and hot cereals without salt. Cut back on instant or flavored rice, pasta and cereal mixes, which usually have added salt. Choose convenience foods that are lower in sodium. Limit frozen dinners, packaged mixes, canned soups and dressings. Rinse canned foods, such as tuna, to remove some sodium. Choose fruits or vegetables instead of salty snack foods. Edema Management   Whenever resting, raise your legs up. Try to keep the swollen area higher than the level of your heart. Take breaks from standing or sitting in one position. Walk around to increase the blood flow in your lower legs. Move your feet and ankles often while you stand, or tighten and relax your leg muscles. Wear support stockings. Put them on in the morning, before swelling gets worse. Eat a balanced diet. Lose weight if you need to. Limit the amount of salt (sodium) in your diet. Salt holds fluid in the body and may increase swelling. Apply compression stocking(s) every morning as soon as you get up. Remove at bedtime unless instructed to wear day and night. Hand wash and line dry to prevent loss of elasticity. Replace every 3-4 months to ensure proper fit. Weight Management   Will need to ultimately change overall eating behaviors to have success with weight loss. Encouraged to weigh daily and work towards a goal of 1-2 pounds of weight loss weekly.  Encouraged to journal all food intake, [facility] is a useful tool to help keep track of food intake and caloric value. Keep calorie level at approximately 8273-9486. Protein intake is to be a minimum of 60 grams per day (unless otherwise directed). Water drinking was encouraged with a goal of 64oz-128oz daily. Beverages to be calorie free except for milk. Every other beverage should be water, avoid soda. Continue to increase level of physical activity. Refer to weight management as indicated and requested by patient. PAST MEDICAL HISTORY        Diagnosis Date    Abnormal echocardiogram     EF 60%, mild aortic indsufficiency, mild pulmonary hypertension    Anemia     Aortic insufficiency     mild per echo 8/24/2010    Atrial fibrillation (HCC)     failed propafenone, Multaq and flecainide - 7/2011 plan for AFib ablation - [facility]- Dr Luque Sports    Atrial flutter [facility])     Bursitis, trochanteric 08/2004    s/p injection    Cerumen impaction 04/24/2012    Diverticulosis 2015    Dr. Omid Regan C scope    Diverticulosis of colon 01/2010    Colonoscopy-Dr Reece ACUÑA (degenerative joint disease), cervical     cervical, generalized disc buldge   MRI 3/02    DVT (deep venous thrombosis) ([facility]     Dyslipidemia 2002    Environmental allergies     Family history of colon cancer     mother    Gastric polyp     8/2006, 11/2009 benign- Dr Omid Regan    Gastritis 04/2008    mild superficial per Dr Omid Regan    GERD (gastroesophageal reflux disease) 08/2006    Dr Omid Regan    H/O cardiovascular stress test 07/13/2004 07/13 EF58%, No scintigraphic evidence of inducible myocardial ischemia 04/13Normal study. No wall motion abnormality seen. EF 65%    H/O Doppler ultrasound 06/14/2022    VL Lower Ext Arteries/Venous Right. No evidence of pseudoaneurysm, AV fistula, or hematoma is present. H/O echocardiogram 07/18/2013 7/13-EF 50-55% Mild AR.      H/O exercise stress test 02/07/2017    EF63% normal    History of Holter monitoring 09/23/2015    48 hour - abnormal holter revealing afib throughout the recording with interspersed pacemaker beats, HR does not appear to be well controlled    History of mammogram     last mammo 7/25/11, Dr. Barrie Barton yearly    Hx of colonoscopy     1/21/2010    Hx of Doppler Venous ultrasound 08/31/2021    No DVT or SVT, No significant reflux in RLE, Significant reflux in Left CFV    Hyperlipidemia     Hypertension     Hypothyroidism     Internal hemorrhoid 2015    Dr. Vahid Proctor C scope     Intervertebral disc protrusion 05/2009    wry neck with C4-5      Left shoulder pain 04/2004    (L) shoulder impingement  mild DJD    Long term current use of anticoagulant 07/26/2016    **Coumadin Clinic follows PT/INRs, along w/prescribing pt's Coumadin dosages. **    Menstruation     age 14    Pacemaker 06/21/2012    dual chamber- checked every 3 months    Pulmonary hypertension (HCC)     mild per echo 8/24/2010, Pt refused sleep study (June 2012)    Restless legs 08/2003    ferritin    Right shoulder strain 02/2003    no seperation, bony contusion anterior humeral head  MRI 3/03    Sciatica 07/2009    (R) chronic intermittent s/p epidural injections  Dr Fabio Carrera    Sciatica     Shoulder pain, left 03/2011    RTC tendinopathy, type II acrominum, bursitis- referred to Dr. Gladis Fernando    Shoulder pain, right 03/10/2009    impingement, physical thearpy   ([place])  calcific bursitis  s/p injection    Sinus bradycardia     Sinusitis 12/12/2011    Tinnitus 03/2002    Vision changes 03/19/2013       PAST SURGICAL HISTORY    Past Surgical History:   Procedure Laterality Date    ABLATION OF DYSRHYTHMIC FOCUS      BREAST BIOPSY      BREAST SURGERY  1985    Right breast tumor excised    CARDIOVERSION      1/2008 Dr Sivakumar Che; 12/2008    CARDIOVERSION  03/10/2014    [address] [place] [address], LAPAROSCOPIC  02/2001    Dr Robles Dus      [id number] (Dr Vahid Proctor)    Jose Man (CERVIX STATUS UNKNOWN)  2003    OTHER SURGICAL HISTORY Left 07/12/2022    upgrade to Divvyshot Bi vi pacer, with AV node ablation performed by Dr. Shaun Bonilla.     PACEMAKER PLACEMENT  06/21/2012    Medtronic Adapta ADDRL1 -not mri compatible    Romeo Lanza (CERVIX REMOVED)  04/2003    fibroid      Dr Shanique Gutierrez  08/2006    Dr Alonso Delgado ECHOCARDIOGRAM  06/2012    transthoracic cardioversion-Dr. Lauren Gomez    UPPER GASTROINTESTINAL ENDOSCOPY  04/2008    mild superficial gastritis  Dr Christianne Leon; 2009       FAMILY HISTORY    Family History   Problem Relation Age of Onset    Stroke Mother     Heart Disease Mother     Coronary Art Dis Mother 66        CABG    Colon Cancer Mother [de-identified]        colon     Heart Disease Father     Coronary Art Dis Father 62        CABG    Migraines Sister     Seizures Brother     Breast Cancer Maternal Cousin         under 48       SOCIAL HISTORY    Social History     Tobacco Use    Smoking status: Never    Smokeless tobacco: Never    Tobacco comments:     reviewed 11/4/15   Vaping Use    Vaping Use: Never used   Substance Use Topics    Alcohol use: No    Drug use: No       ALLERGIES    Allergies   Allergen Reactions    Latex Hives    Darvon [Propoxyphene Hcl] Shortness Of Breath    Demerol Rash     Breathing problems    Dilaudid [Hydromorphone Hcl] Anaphylaxis    Valium Rash     Breathing problems    Celecoxib Diarrhea    Norco [Hydrocodone-Acetaminophen] Diarrhea, Nausea Only and Other (See Comments)     A-Fib    Norvasc [Amlodipine] Other (See Comments)     HA, dizziness    Oxycodone Itching    Tape [Adhesive Tape] Other (See Comments)     BLISTER    Vasotec [Enalapril] Other (See Comments)     Nausea, vomiting    Diazepam Rash       MEDICATIONS    Current Outpatient Medications on File Prior to Encounter   Medication Sig Dispense Refill    memantine (NAMENDA) 10 MG tablet Take 1 tablet by mouth 2 times daily NOTE TO PHARMACY: DO NOT FILL UNTIL 5 MG RX IS COMPLETED (Patient not taking: No sig reported) 60 tablet 5    memantine (NAMENDA) 5 MG tablet Take (1) 5 mg tab QD x7 days, then 1 BID x7 days; then 2 tabs Q am-1 tab pm x7 days then change to 10mg RX (Patient taking differently: Take (1) 5 mg tab QD x7 days, then 1 BID x7 days; then 2 tabs Q am-1 tab pm x7 days then change to 10mg RX    Havent picked up from pharmacy) 42 tablet 0    fexofenadine (ALLEGRA) 180 MG tablet Take 180 mg by mouth daily      warfarin (COUMADIN) 3 MG tablet Take 1 tablet by mouth daily Except take 1 and 1/2 tablets (4.5mg) on Thursdays or as directed 100 tablet 3    donepezil (ARICEPT) 10 MG tablet TAKE 1 TABLET BY MOUTH EVERY DAY 90 tablet 1    torsemide (DEMADEX) 10 MG tablet Take 1 tablet by mouth in the morning. 30 tablet 5    atorvastatin (LIPITOR) 40 MG tablet Take 1 tablet by mouth daily TAKE 1 TABLET BY MOUTH EVERY DAY 30 tablet 5    cetirizine (ZYRTEC) 10 MG tablet Take 10 mg by mouth daily (Patient not taking: No sig reported)      aspirin 81 MG chewable tablet Take 1 tablet by mouth daily 30 tablet 3    metoprolol succinate (TOPROL XL) 50 MG extended release tablet Take 1 tablet by mouth in the morning and at bedtime 30 tablet 3    Vitamin D (CHOLECALCIFEROL) 25 MCG (1000 UT) TABS tablet Take 1,000 Units by mouth daily      dexlansoprazole (DEXILANT) 60 MG CPDR delayed release capsule Take 1 tablet by mouth daily. No current facility-administered medications on file prior to encounter. REVIEW OF SYSTEMS    Pertinent items are noted in HPI. Constitutional: Negative for systemic symptoms including fever, chills and malaise. Objective:      BP 98/61   Pulse 76   Temp (!) 96.3 °F (35.7 °C) (Temporal)   Resp 18     PHYSICAL EXAM      General: The patient is in no acute distress. Mental status:  Patient is appropriate, is  oriented to place and plan of care.   Dermatologic exam: Visual inspection of the periwound reveals the skin to be normal in turgor and texture and dry  Wound exam: see wound description below in procedure note      Assessment:     Problem List Items Addressed This Visit          Circulatory    WD-Venous stasis ulcer of right calf with fat layer exposed with varicose veins (HCC)       Other    WD-Non-pressure chronic ulcer of right calf with fat layer exposed ([address] - Primary    Relevant Orders    Initiate Outpatient Wound Care Protocol     Procedure Note    Indications:  Based on my examination of this patient's wound(s) today, sharp excision into necrotic subcutaneous tissue is required to promote healing and evaluate the extent of previous healing. Performed by: ANTHONY Ga CNP    Consent obtained: Yes    Time out taken:  Yes    Pain Control: N/a      Debridement:Excisional Debridement    Using #15 blade scalpel the wound(s) was/were sharply debrided down through and including the removal of subcutaneous tissue.         Devitalized Tissue Debrided:  fibrin, biofilm, slough, and exudate    Pre Debridement Measurements:  Are located in the Wound Documentation Flow Sheet    All active wounds listed below with today's date are evaluated  Wound(s)    debrided this date include # : 1     Post  Debridement Measurements:  Wound 11/11/22 Pretibial Right;Lateral #1 (Active)   Wound Image   12/09/22 1248   Wound Etiology Venous 12/09/22 1248   Dressing Status New dressing applied 12/09/22 1314   Wound Cleansed Soap and water 12/09/22 1248   Offloading for Diabetic Foot Ulcers Offloading not required 12/09/22 1248   Wound Length (cm) 1.1 cm 12/09/22 1248   Wound Width (cm) 0.9 cm 12/09/22 1248   Wound Depth (cm) 0.1 cm 12/09/22 1248   Wound Surface Area (cm^2) 0.99 cm^2 12/09/22 1248   Change in Wound Size % (l*w) 67 12/09/22 1248   Wound Volume (cm^3) 0.099 cm^3 12/09/22 1248   Wound Healing % 67 12/09/22 1248   Post-Procedure Length (cm) 1.1 cm 12/09/22 1255   Post-Procedure Width (cm) 0.9 cm 12/09/22 1255   Post-Procedure Depth (cm) 0.1 cm 12/09/22 1255   Post-Procedure Surface Area (cm^2) 0.99 cm^2 12/09/22 Cultures:                Grafts:                Antibiotics:           Continuing wound care orders and information:                 Residence:                Continue home health care with:               Your wound-care supplies will be provided by: Wound cleansing:               Do not scrub or use excessive force. Wash hands with soap and water before and after dressing changes. Prior to applying a clean dressing, cleanse wound with normal saline, wound cleanser, or mild soap and water. Ask the physician or nurse before getting the wound(s) wet in a shower     Daily Wound management:              Keep weight off wounds and reposition every 2 hours. Avoid standing for long periods of time. Apply wraps/stockings in AM and remove at bedtime. If swelling is present, elevate legs to the level of the heart or above for 30 minutes 4-5 times a day and/or when sitting. When taking antibiotics take entire prescription as ordered by physician do not stop taking until medicine is all gone. Orders for this week: 12/9/22                  Rx:     Right Lateral Ankle - Wash with soap and water, pat dry. Apply Gentamicin and Kandice to wound bed. Cover with ABD. Secure with conform and tape. Wear Tubi F. Change daily. Follow up with Ida Pate CNP in 1 week in the wound care center. Call [phone] for any questions or concerns.   Date__________   Time____________        Treatment Note Wound 11/11/22 Pretibial Right;Lateral #1-Dressing/Treatment:  Annie Covington, [address] [place] [address], [place] . [employer])    Written Patient Dismissal Instructions Given            Electronically signed by ANTHONY Jacob CNP on 12/9/2022 at 1:24 PM

## 2022-12-15 ENCOUNTER — ANTI-COAG VISIT (OUTPATIENT)
Dept: PHARMACY | Age: 77
End: 2022-12-15
Payer: MEDICARE

## 2022-12-15 ENCOUNTER — INITIAL CONSULT (OUTPATIENT)
Dept: CARDIOLOGY CLINIC | Age: 77
End: 2022-12-15
Payer: MEDICARE

## 2022-12-15 VITALS
SYSTOLIC BLOOD PRESSURE: 110 MMHG | WEIGHT: 136 LBS | HEART RATE: 76 BPM | HEIGHT: 67 IN | BODY MASS INDEX: 21.35 KG/M2 | DIASTOLIC BLOOD PRESSURE: 70 MMHG

## 2022-12-15 DIAGNOSIS — I48.0 PAF (PAROXYSMAL ATRIAL FIBRILLATION) (HCC): Primary | ICD-10-CM

## 2022-12-15 DIAGNOSIS — I21.4 NSTEMI (NON-ST ELEVATED MYOCARDIAL INFARCTION) (HCC): ICD-10-CM

## 2022-12-15 DIAGNOSIS — I48.0 PAF (PAROXYSMAL ATRIAL FIBRILLATION) (HCC): ICD-10-CM

## 2022-12-15 DIAGNOSIS — Z98.890 S/P AV (ATRIOVENTRICULAR) NODAL ABLATION: ICD-10-CM

## 2022-12-15 DIAGNOSIS — Z95.0 PACEMAKER: ICD-10-CM

## 2022-12-15 DIAGNOSIS — I25.10 CORONARY ARTERY DISEASE INVOLVING NATIVE CORONARY ARTERY OF NATIVE HEART WITHOUT ANGINA PECTORIS: ICD-10-CM

## 2022-12-15 DIAGNOSIS — I10 ESSENTIAL HYPERTENSION: ICD-10-CM

## 2022-12-15 DIAGNOSIS — E78.2 MIXED HYPERLIPIDEMIA: ICD-10-CM

## 2022-12-15 DIAGNOSIS — I83.893 VARICOSE VEINS OF BOTH LEGS WITH EDEMA: Primary | ICD-10-CM

## 2022-12-15 DIAGNOSIS — Z79.01 LONG TERM CURRENT USE OF ANTICOAGULANT: ICD-10-CM

## 2022-12-15 LAB
INTERNATIONAL NORMALIZATION RATIO, POC: 2.2
POC INR: 2.2 INDEX
PROTHROMBIN TIME, POC: 26 SECONDS (ref 10–14.3)

## 2022-12-15 PROCEDURE — G8484 FLU IMMUNIZE NO ADMIN: HCPCS | Performed by: INTERNAL MEDICINE

## 2022-12-15 PROCEDURE — G8427 DOCREV CUR MEDS BY ELIG CLIN: HCPCS | Performed by: INTERNAL MEDICINE

## 2022-12-15 PROCEDURE — G8420 CALC BMI NORM PARAMETERS: HCPCS | Performed by: INTERNAL MEDICINE

## 2022-12-15 PROCEDURE — 1090F PRES/ABSN URINE INCON ASSESS: CPT | Performed by: INTERNAL MEDICINE

## 2022-12-15 PROCEDURE — G8399 PT W/DXA RESULTS DOCUMENT: HCPCS | Performed by: INTERNAL MEDICINE

## 2022-12-15 PROCEDURE — 3074F SYST BP LT 130 MM HG: CPT | Performed by: INTERNAL MEDICINE

## 2022-12-15 PROCEDURE — 36416 COLLJ CAPILLARY BLOOD SPEC: CPT

## 2022-12-15 PROCEDURE — 1123F ACP DISCUSS/DSCN MKR DOCD: CPT | Performed by: INTERNAL MEDICINE

## 2022-12-15 PROCEDURE — 99204 OFFICE O/P NEW MOD 45 MIN: CPT | Performed by: INTERNAL MEDICINE

## 2022-12-15 PROCEDURE — 85610 PROTHROMBIN TIME: CPT

## 2022-12-15 PROCEDURE — 1036F TOBACCO NON-USER: CPT | Performed by: INTERNAL MEDICINE

## 2022-12-15 PROCEDURE — 99211 OFF/OP EST MAY X REQ PHY/QHP: CPT

## 2022-12-15 PROCEDURE — 3078F DIAST BP <80 MM HG: CPT | Performed by: INTERNAL MEDICINE

## 2022-12-15 NOTE — PROGRESS NOTES
Vein \"LEG PROBLEM Questionnaire\"  Do you have prominent leg veins? Yes   Do you have any skin discoloration? Yes  Do you have any healed or active sores? Yes  Do you have swelling of the legs? Yes  Do you have a family history of varicose veins? No  Does your profession involve pro-longed        standing or heavy lifting? No  7. Have you been fighting overweight problems? No  8. Do you have restless legs? Yes  9. Do you have any night time cramps? Yes  10. Do you have any of the following in your legs:        Weakness I  11. If Yes - Have they worn compression stockings Yes    Atrial Fibrillation CHADSVASC2 Score Stroke Risk:   65 y.o. > 76 - 2    female Female - 1   CHF HX: No - 0   HTN HX: Yes - 1   Stroke/TIA/Thromboembolism No - 0   Vascular Disease HX: No - 0   Diabetes Mellitus No - 0   CHADSVASC 2 Score 4      Annual Stroke Risk 4.8%- moderate-high

## 2022-12-15 NOTE — PROGRESS NOTES
(2003); Cardioversion; Colonoscopy; transthoracic echocardiogram (06/2012); pacemaker placement (06/21/2012); ablation of dysrhythmic focus; Cardioversion (03/10/2014); Breast biopsy; and other surgical history (Left, 07/12/2022). reports that she has never smoked. She has never used smokeless tobacco. She reports that she does not drink alcohol and does not use drugs. family history includes Breast Cancer in her maternal cousin; Colon Cancer (age of onset: [de-identified]) in her mother; Coronary Art Dis (age of onset: 62) in her father; Coronary Art Dis (age of onset: 66) in her mother; Heart Disease in her father and mother; Migraines in her sister; Seizures in her brother; Stroke in her mother. Review of Systems: As in HPI  Cardiovascular: No chest pain, dyspnea on exertion, palpitations or loss of consciousness  Respiratory: No cough or wheezing    Musculoskeletal:  No gait disturbance, weakness, muscle cramps, aches & pains or joint complaints  Neurological: No TIA or stroke symptoms  Psychiatric: No anxiety or depression  Hematologic/Lymphatic: No bleeding problems, blood clots or swollen lymph nodes    Physical Examination:    /70   Pulse 76   Ht 5' 7\" (1.702 m)   Wt 136 lb (61.7 kg)   BMI 21.30 kg/m²    Wt Readings from Last 3 Encounters:   12/15/22 136 lb (61.7 kg)   11/16/22 138 lb 6.4 oz (62.8 kg)   11/16/22 135 lb (61.2 kg)     Body mass index is 21.3 kg/m². General Appearance:  Non-obese/Well Nourished  1. Skin: It is warm & dry. No rashes noted. 2. Eyes: No conjunctival Pallor seen. No jaundice noted. 3. Neck: is supple there is no elevation of JVD. No thyromegaly  4. Respiratory:  Resp Assessment: No abnormal findings. Resp Auscultation: Vesicular breath sounds without rales or wheezing. 5. Cardiovascular: Auscultation: Normal S1 & S2, No prominent murmurs  Carotid Arteries: No bruits present  Abdominal Aorta: Non-palpable  Pedal Pulses: 2+ and equal   6.  Abdomen:  No masses or tenderness  Liver/Spleen: No Abnormalities Noted, no organomegaly. 7. Musculoskeletal: No joint deformities. No muscle wasting  8. Extremities:   No Cyanosis or Clubbing. There is significant edema with C6 changes. 9. Rectal / genital:   Deferred  10.  Neurological/Psychiatric:  Oriented to time, place, and person  Non-anxious    Lab Results   Component Value Date/Time    CKTOTAL 87 05/08/2022 08:45 PM    CKTOTAL 62 03/04/2013 03:15 PM    CKTOTAL 55 03/04/2013 03:15 PM    CKMB 1.3 10/14/2011 04:00 PM    TROPONINT 0.143 05/10/2022 02:16 AM    TROPONINT 0.187 05/09/2022 10:33 PM     BNP:    Lab Results   Component Value Date/Time     03/04/2013 03:15 PM    PROBNP 1,377 06/15/2022 03:50 PM    PROBNP 1,305 05/09/2022 06:50 PM     PT/INR:  No results found for: PTINR  Lab Results   Component Value Date    LABA1C 5.4 04/30/2022    LABA1C 5.8 09/11/2018     Lab Results   Component Value Date    CHOL 115 04/30/2022    CHOL 154 04/15/2021    TRIG 47 04/30/2022    TRIG 84 04/15/2021    HDL 51 04/30/2022    HDL 54 04/15/2021    LDLCALC 55 04/30/2022    LDLCALC 83 04/15/2021    LDLDIRECT 72 08/01/2020    LDLDIRECT 139 (H) 04/26/2012     Lab Results   Component Value Date    ALT 40 08/02/2022    ALT 27 05/10/2022    AST 41 (H) 08/02/2022    AST 33 05/10/2022     BMP:    Lab Results   Component Value Date/Time     08/02/2022 01:30 PM     07/07/2022 10:55 AM    K 4.3 08/02/2022 01:30 PM    K 4.9 07/07/2022 10:55 AM     08/02/2022 01:30 PM     07/07/2022 10:55 AM    CO2 30 08/02/2022 01:30 PM    CO2 27 07/07/2022 10:55 AM    BUN 15 08/02/2022 01:30 PM    BUN 13 07/07/2022 10:55 AM    CREATININE 0.8 08/02/2022 01:30 PM    CREATININE 0.8 07/07/2022 10:55 AM     CMP:    Lab Results   Component Value Date/Time     08/02/2022 01:30 PM     07/07/2022 10:55 AM    K 4.3 08/02/2022 01:30 PM    K 4.9 07/07/2022 10:55 AM     08/02/2022 01:30 PM     07/07/2022 10:55 AM    CO2 30 08/02/2022 01:30 PM    CO2 27 07/07/2022 10:55 AM    BUN 15 08/02/2022 01:30 PM    BUN 13 07/07/2022 10:55 AM    CREATININE 0.8 08/02/2022 01:30 PM    CREATININE 0.8 07/07/2022 10:55 AM    PROT 7.5 08/02/2022 01:30 PM    PROT 6.2 05/10/2022 08:54 AM    PROT 6.9 03/04/2013 03:15 PM    PROT 7.6 05/21/2011 01:13 PM     TSH:    Lab Results   Component Value Date/Time    TSH 3.76 10/19/2021 02:12 PM    TSH 4.42 04/09/2019 09:17 AM    TSHHS 4.440 05/09/2022 03:02 AM    TSHHS 2.830 04/29/2022 10:40 AM       QUALITY MEASURES REVIEWED:  1.CAD:Patient is taking anti platelet agent:Yes  2. DYSLIPIDEMIA: Patient is on cholesterol lowering medication:Yes  3. Beta-Blocker therapy for CAD, if prior Myocardial Infarction:Yes   4. Counselled regarding smoking cessation. No   Patient does not Smoke. 5.Anticoagulation therapy (for A.Fib) Yes   6. Discussed weight management strategies. Assessment, Recommendations & Tests:     CVI: Patient has C6 venous disease. US 8/8/2022 reveals:   No evidence of DVT or SVT in the bilateral common femoral vein, femoral    vein, popliteal vein, greater saphenous vein or small saphenous vein. Significant reflux noted of the Right CFV, FV mid, and GSV at SFJ (1.9s) and    knee (0.6s). Significant reflux noted in the Left CFV. Recommend ablation of right GSV. I spent about 30 min. of time in review of the available data, chart Prep., interviewing patient, obtaining history, performing physical exam, going through decision making analysis for assessment & plans of management on this patient. Office Visit a month after the procedure.      Lesia Pizano MD, 12/15/2022 4:12 PM

## 2022-12-15 NOTE — LETTER
[facility]  [address] [address]  Phone: [phone]  Fax: [phone]    dArian Clifford MD    December 15, 2022     Dov Coffman MD  [address] [address] [address]  [address] [facility] [address]    Patient: Moe Darby   MR Number: [id number]   YOB: 1945   Date of Visit: 12/15/2022       Dear Dov Coffman:    Thank you for referring Moe Darby to me for evaluation/treatment. Below are the relevant portions of my assessment and plan of care. If you have questions, please do not hesitate to call me. I look forward to following Karie Salas with you.     Sincerely,      Adrian Clifford MD

## 2022-12-15 NOTE — PATIENT INSTRUCTIONS
CVI: Patient has C6 venous disease. US 8/8/2022 reveals:   No evidence of DVT or SVT in the bilateral common femoral vein, femoral    vein, popliteal vein, greater saphenous vein or small saphenous vein. Significant reflux noted of the Right CFV, FV mid, and GSV at SFJ (1.9s) and    knee (0.6s). Significant reflux noted in the Left CFV. Recommend ablation of right GSV. I spent about 30 min. of time in review of the available data, chart Prep., interviewing patient, obtaining history, performing physical exam, going through decision making analysis for assessment & plans of management on this patient. Office Visit a month after the procedure.

## 2022-12-15 NOTE — PROGRESS NOTES
Medication Management Service  [facility]  [phone]    Visit Date: 12/15/2022   Subjective:       Tomi Jules is a 65 y.o. female who presents to clinic today for anticoagulation monitoring and adjustment. Patient seen in clinic for warfarin management due to  Indication:   atrial fibrillation. INR goal: of 2.0-3.0. Duration of therapy: indefinite. Patient reports the following:   Adherent with regimen  Missed or extra doses:  None   Bleeding or thromboembolic side effects:  None  Significant medication changes:  Namenda 10mg BID now  Significant dietary changes: None  Significant alcohol or tobacco changes: None  Significant recent illness, disease state changes, or hospitalization:  None  Upcoming surgeries or procedures:  None  Falls: None           Assessment and Plan     PT/INR done in office per protocol. INR today is 2.2, therapeutic. Plan: Will continue current regimen of warfarin 3mg daily except 4.5mg on Thursdays. Recheck INR in 6 week(s). Patient verbalized understanding of dosing directions and information discussed. Dosing schedule given to patient including phone number, appointment date, and time. Progress note sent to referring office. Patient acknowledges working in consult agreement with pharmacist as referred by his/her physician. Electronically signed by Kulwant Garcias [address] [place] [address] on 12/15/22 at 1:41 PM EST    For Pharmacy Admin Tracking Only    Intervention Detail:   Total # of Interventions Recommended:    Total # of Interventions Accepted:   Time Spent (min): 15

## 2022-12-16 ENCOUNTER — TELEPHONE (OUTPATIENT)
Dept: CARDIOLOGY CLINIC | Age: 77
End: 2022-12-16

## 2022-12-16 ENCOUNTER — HOSPITAL ENCOUNTER (OUTPATIENT)
Dept: WOUND CARE | Age: 77
Discharge: HOME | End: 2022-12-16
Payer: MEDICARE

## 2022-12-16 VITALS
RESPIRATION RATE: 18 BRPM | SYSTOLIC BLOOD PRESSURE: 106 MMHG | HEART RATE: 74 BPM | TEMPERATURE: 96.8 F | DIASTOLIC BLOOD PRESSURE: 70 MMHG

## 2022-12-16 DIAGNOSIS — L97.212 NON-PRESSURE CHRONIC ULCER OF RIGHT CALF WITH FAT LAYER EXPOSED (HCC): Primary | ICD-10-CM

## 2022-12-16 DIAGNOSIS — I83.012 VENOUS STASIS ULCER OF RIGHT CALF WITH FAT LAYER EXPOSED WITH VARICOSE VEINS (HCC): ICD-10-CM

## 2022-12-16 DIAGNOSIS — L97.212 VENOUS STASIS ULCER OF RIGHT CALF WITH FAT LAYER EXPOSED WITH VARICOSE VEINS (HCC): ICD-10-CM

## 2022-12-16 PROCEDURE — 6370000000 HC RX 637 (ALT 250 FOR IP): Performed by: NURSE PRACTITIONER

## 2022-12-16 PROCEDURE — 11042 DBRDMT SUBQ TIS 1ST 20SQCM/<: CPT

## 2022-12-16 RX ORDER — LIDOCAINE 50 MG/G
OINTMENT TOPICAL ONCE
Status: COMPLETED | OUTPATIENT
Start: 2022-12-16 | End: 2022-12-16

## 2022-12-16 RX ORDER — BETAMETHASONE DIPROPIONATE 0.05 %
OINTMENT (GRAM) TOPICAL ONCE
OUTPATIENT
Start: 2022-12-16 | End: 2022-12-16

## 2022-12-16 RX ORDER — LIDOCAINE 50 MG/G
OINTMENT TOPICAL ONCE
OUTPATIENT
Start: 2022-12-16 | End: 2022-12-16

## 2022-12-16 RX ORDER — LIDOCAINE HYDROCHLORIDE 20 MG/ML
JELLY TOPICAL ONCE
OUTPATIENT
Start: 2022-12-16 | End: 2022-12-16

## 2022-12-16 RX ORDER — LIDOCAINE HYDROCHLORIDE 40 MG/ML
SOLUTION TOPICAL ONCE
OUTPATIENT
Start: 2022-12-16 | End: 2022-12-16

## 2022-12-16 RX ORDER — LIDOCAINE 40 MG/G
CREAM TOPICAL ONCE
OUTPATIENT
Start: 2022-12-16 | End: 2022-12-16

## 2022-12-16 RX ORDER — GINSENG 100 MG
CAPSULE ORAL ONCE
OUTPATIENT
Start: 2022-12-16 | End: 2022-12-16

## 2022-12-16 RX ORDER — CLOBETASOL PROPIONATE 0.5 MG/G
OINTMENT TOPICAL ONCE
OUTPATIENT
Start: 2022-12-16 | End: 2022-12-16

## 2022-12-16 RX ORDER — BACITRACIN ZINC AND POLYMYXIN B SULFATE 500; 1000 [USP'U]/G; [USP'U]/G
OINTMENT TOPICAL ONCE
OUTPATIENT
Start: 2022-12-16 | End: 2022-12-16

## 2022-12-16 RX ORDER — BACITRACIN, NEOMYCIN, POLYMYXIN B 400; 3.5; 5 [USP'U]/G; MG/G; [USP'U]/G
OINTMENT TOPICAL ONCE
OUTPATIENT
Start: 2022-12-16 | End: 2022-12-16

## 2022-12-16 RX ORDER — GENTAMICIN SULFATE 1 MG/G
OINTMENT TOPICAL ONCE
OUTPATIENT
Start: 2022-12-16 | End: 2022-12-16

## 2022-12-16 RX ORDER — GENTAMICIN SULFATE 1 MG/G
OINTMENT TOPICAL ONCE
Status: COMPLETED | OUTPATIENT
Start: 2022-12-16 | End: 2022-12-16

## 2022-12-16 RX ADMIN — LIDOCAINE: 50 OINTMENT TOPICAL at 08:22

## 2022-12-16 RX ADMIN — GENTAMICIN SULFATE: 1 OINTMENT TOPICAL at 08:57

## 2022-12-16 ASSESSMENT — PAIN SCALES - GENERAL: PAINLEVEL_OUTOF10: 0

## 2022-12-16 NOTE — DISCHARGE INSTRUCTIONS
PHYSICIAN ORDERS AND DISCHARGE INSTRUCTIONS     NOTE: Upon discharge from the [address], you will receive a patient experience survey. We would be grateful if you would take the time to fill this survey out. Wound care order history:                 KATRIN's   Right       Left               Date:              Cultures:                Grafts:                Antibiotics:           Continuing wound care orders and information:                 Residence:                Continue home health care with:               Your wound-care supplies will be provided by: Wound cleansing:               Do not scrub or use excessive force. Wash hands with soap and water before and after dressing changes. Prior to applying a clean dressing, cleanse wound with normal saline, wound cleanser, or mild soap and water. Ask the physician or nurse before getting the wound(s) wet in a shower     Daily Wound management:              Keep weight off wounds and reposition every 2 hours. Avoid standing for long periods of time. Apply wraps/stockings in AM and remove at bedtime. If swelling is present, elevate legs to the level of the heart or above for 30 minutes 4-5 times a day and/or when sitting. When taking antibiotics take entire prescription as ordered by physician do not stop taking until medicine is all gone. Orders for this week: 12/16/22                  Rx:     Right Lateral Ankle - Wash with soap and water, pat dry. Apply Gentamicin and Kandice to wound bed. Cover with ABD. Secure with conform and tape. Wear Tubi F. Change daily. Follow up with Corona Mendez CNP in 1 week in the wound care center. Call [phone] for any questions or concerns.   Date__________   Time____________

## 2022-12-16 NOTE — PROGRESS NOTES
Wound Care Center Progress Note With Procedure    Genesis Stewart  AGE: 65 y.o. GENDER: female  : 1945  EPISODE DATE:  2022     Subjective:     Chief Complaint   Patient presents with    Wound Check     rle         HISTORY of PRESENT ILLNESS     Corinne Lunsford is a 65 y.o. female who presents to the [address] [place] [address] for a visit for evaluation and treatment of Chronic venous and non-healing/non-surgical  ulcer(s) of  R ankle lateral.  The condition is of moderate severity. The ulcer has been present for 6 months. The underlying cause is thought to be nonhealing venous wound from when she was fluid overloaded and got a blister. This never healed although her cardiac issues are now resolved and she is very stable. The patients care to date has included nothing so far. The patient has significant underlying medical conditions as below. Nearly healed  Planned venous intervention on Monday  Doing well with progress  Complains of pain consistently    Patient educated on the 6 essential components necessary for wound healing: Circulation, Debridements, Proper Dressings and Topical Wound Products, Infection Control, Edema Control and Offloading. Patient educated on those factors that negatively effect or impact wound healing: smoking, obesity, uncontrolled diabetes, anticoagulant and immunosuppressive regimens, inadequate nutrition, untreated arterial and venous disease if applicable and measures to manage edema. Off Loading  Offloading or minimizing or removing weight placed on an area with poor circulation such as diabetic wounds or pressure.  This can be achieved with crutches, wheel chair, knee walker etc. Minimizing pressure through partial weight bearing (minimizing the amount of  pressure applied and or the amount of time on the area of pressure) or maintaining a non-weight bearing status can be used to promote and often can be essential for thee wound to heal. Off loading may also need to be achieved for non-weight bearing wounds such as pressure ulcers to the torso. Turning and changing positions frequently, at least every two hours. Use of pressure cushion if sitting up in chair. Skin Care  Keep skin clean and well moisturized , moisturize routinely with ointments for heavier moisturizer needs for extremely dry skin or cracks such as A&D ointment and lotions for a light moisturizer such as CeraVe or Eucerin. If incontinent change incontinence garments as soon as soiled and keeping skin clean and use barrier cream to protect the skin. Reduce Salt and Sodium  Choose low- or reduced- sodium, or no-salt-added versions of foods and condiments when available. Buy fresh, plain frozen, or canned with no-added-salt vegetables. Use fresh poultry, fish and lean meat, rather than canned, smoked or processed types. Choose ready-to-eat breakfast cereals that are lower in sodium. Limit cured foods (such as starks and ham), foods packed in brine (such as pickled foods) and condiments (such as MSG, mustard, horseradish, and catsup). Limit even lower sodium versions of soy sauce and teriyaki sauce-treat these condiments just like salt). In cooking and at the table, flavor foods with herbs, spices, lemon, lime, vinegar or salt-free seasoning blends. Start by cutting salt in half. Cook rice, pasta and hot cereals without salt. Cut back on instant or flavored rice, pasta and cereal mixes, which usually have added salt. Choose convenience foods that are lower in sodium. Limit frozen dinners, packaged mixes, canned soups and dressings. Rinse canned foods, such as tuna, to remove some sodium. Choose fruits or vegetables instead of salty snack foods. Edema Management if applicable  Whenever resting, raise your legs up. Try to keep the swollen area higher than the level of your heart. Take breaks from standing or sitting in one position. Walk around to increase the blood flow in your lower legs.  Move your feet and ankles often while you stand, or tighten and relax your leg muscles. Wear support stockings. Put them on in the morning, before swelling gets worse. Eat a balanced diet. Lose weight if you need to. Limit the amount of salt (sodium) in your diet. Salt holds fluid in the body and may increase swelling. Apply compression stocking(s) every morning as soon as you get up. Remove at bedtime unless instructed to wear day and night. Hand wash and line dry to prevent loss of elasticity. Replace every 3-4 months to ensure proper fit. Weight Management if Applicable  Will need to ultimately change overall eating behaviors to have success with weight loss. Encouraged to weigh daily and work towards a goal of 1-2 pounds of weight loss weekly. Encouraged to journal all food intake, myfitness pal is a useful tool to help keep track of food intake and caloric value. Keep calorie level at approximately 8937-5930. Protein intake is to be a minimum of 60 grams per day (unless otherwise directed). Water drinking was encouraged with a goal of 64oz-128oz daily. Beverages to be calorie free except for milk. Every other beverage should be water, avoid soda. Continue to increase level of physical activity.  Refer to weight management as indicated and       Wound Pain Timing/Severity: waxing and waning  Quality of pain: aching, burning  Severity of pain:  2 / 10   Modifying Factors: none  Associated Signs/Symptoms: erythema and pain           PAST MEDICAL HISTORY        Diagnosis Date    Abnormal echocardiogram     EF 60%, mild aortic indsufficiency, mild pulmonary hypertension    Anemia     Aortic insufficiency     mild per echo 8/24/2010    Atrial fibrillation ([address]     failed propafenone, Multaq and flecainide - 7/2011 plan for AFib ablation - [facility]- Dr Claudia Rivera    Atrial flutter [facility])     Bursitis, trochanteric 08/2004    s/p injection    Cerumen impaction 04/24/2012    Diverticulosis 2015    Dr. Linda tovar Diverticulosis of colon 01/2010    Colonoscopy-Dr Reece ACUÑA (degenerative joint disease), cervical     cervical, generalized disc buldge   MRI 3/02    DVT (deep venous thrombosis) ([address]     Dyslipidemia 2002    Environmental allergies     Family history of colon cancer     mother    Gastric polyp     8/2006, 11/2009 benign- Dr Omega Prieto    Gastritis 04/2008    mild superficial per Dr Omega Prieto    GERD (gastroesophageal reflux disease) 08/2006    Dr Omega Prieto    H/O cardiovascular stress test 07/13/2004 07/13 EF58%, No scintigraphic evidence of inducible myocardial ischemia 04/13Normal study. No wall motion abnormality seen. EF 65%    H/O Doppler ultrasound 06/14/2022    VL Lower Ext Arteries/Venous Right. No evidence of pseudoaneurysm, AV fistula, or hematoma is present. H/O echocardiogram 07/18/2013 7/13-EF 50-55% Mild AR. H/O exercise stress test 02/07/2017    EF63% normal    History of Holter monitoring 09/23/2015    48 hour - abnormal holter revealing afib throughout the recording with interspersed pacemaker beats, HR does not appear to be well controlled    History of mammogram     last mammo 7/25/11, Dr. Edna Eastman yearly    Hx of colonoscopy     1/21/2010    Hx of Doppler Venous ultrasound 08/31/2021    No DVT or SVT, No significant reflux in RLE, Significant reflux in Left CFV    Hyperlipidemia     Hypertension     Hypothyroidism     Internal hemorrhoid 2015    Dr. Omega Prieto C scope     Intervertebral disc protrusion 05/2009    wry neck with C4-5      Left shoulder pain 04/2004    (L) shoulder impingement  mild DJD    Long term current use of anticoagulant 07/26/2016    **Coumadin Clinic follows PT/INRs, along w/prescribing pt's Coumadin dosages. **    Menstruation     age 14    Pacemaker 06/21/2012    dual chamber- checked every 3 months    Pulmonary hypertension ([address]     mild per echo 8/24/2010, Pt refused sleep study (June 2012)    Restless legs 08/2003    ferritin    Right shoulder strain 02/2003    no seperation, bony contusion anterior humeral head  MRI 3/03    Sciatica 07/2009    (R) chronic intermittent s/p epidural injections  Dr Clemente Schultz    Sciatica     Shoulder pain, left 03/2011    RTC tendinopathy, type II acrominum, bursitis- referred to Dr. Gigi Copeland    Shoulder pain, right 03/10/2009    impingement, physical thearpy   ([place])  calcific bursitis  s/p injection    Sinus bradycardia     Sinusitis 12/12/2011    Tinnitus 03/2002    Vision changes 03/19/2013       PAST SURGICAL HISTORY    Past Surgical History:   Procedure Laterality Date    ABLATION OF DYSRHYTHMIC FOCUS      BREAST BIOPSY      BREAST SURGERY  1985    Right breast tumor excised    CARDIOVERSION      1/2008 Dr Lindsey Win; 12/2008    CARDIOVERSION  03/10/2014    [facility]    CHOLECYSTECTOMY, LAPAROSCOPIC  02/2001    Dr Lora Hansen      [phone] (Dr Johnell Nageotte)    Jose Magda (CERVIX STATUS UNKNOWN)  2003    OTHER SURGICAL HISTORY Left 07/12/2022    upgrade to Medtronic Bi vi pacer, with AV node ablation performed by Dr. Glendy Murrell.     PACEMAKER PLACEMENT  06/21/2012    Medtronic Adapta ADDRL1 -not mri compatible    Adam Martinez (CERVIX REMOVED)  04/2003    fibroid      Dr Luiz Villegas  08/2006    Dr Robina Rodriguez ECHOCARDIOGRAM  06/2012    transthoracic cardioversion-Dr. Holden Ivory    UPPER GASTROINTESTINAL ENDOSCOPY  04/2008    mild superficial gastritis  Dr Johnell Nageotte; 2009       FAMILY HISTORY    Family History   Problem Relation Age of Onset    Stroke Mother     Heart Disease Mother     Coronary Art Dis Mother 66        CABG    Colon Cancer Mother [de-identified]        colon     Heart Disease Father     Coronary Art Dis Father 62        CABG    Migraines Sister     Seizures Brother     Breast Cancer Maternal Cousin         under 48       SOCIAL HISTORY    Social History     Tobacco Use    Smoking status: Never    Smokeless tobacco: Never    Tobacco comments:     reviewed 11/4/15   Vaping Use    Vaping Use: Never used   Substance Use Topics    Alcohol use: No    Drug use: No       ALLERGIES    Allergies   Allergen Reactions    Latex Hives    Darvon [Propoxyphene Hcl] Shortness Of Breath    Demerol Rash     Breathing problems    Dilaudid [Hydromorphone Hcl] Anaphylaxis    Valium Rash     Breathing problems    Celecoxib Diarrhea    Norco [Hydrocodone-Acetaminophen] Diarrhea, Nausea Only and Other (See Comments)     A-Fib    Norvasc [Amlodipine] Other (See Comments)     HA, dizziness    Oxycodone Itching    Tape [Adhesive Tape] Other (See Comments)     BLISTER    Vasotec [Enalapril] Other (See Comments)     Nausea, vomiting    Diazepam Rash       MEDICATIONS    Current Outpatient Medications on File Prior to Encounter   Medication Sig Dispense Refill    levothyroxine (SYNTHROID) 50 MCG tablet TAKE 1 TABLET BY MOUTH EVERY DAY BEFORE BREAKFAST 90 tablet 3    memantine (NAMENDA) 10 MG tablet Take 1 tablet by mouth 2 times daily NOTE TO PHARMACY: DO NOT FILL UNTIL 5 MG RX IS COMPLETED 60 tablet 5    memantine (NAMENDA) 5 MG tablet Take (1) 5 mg tab QD x7 days, then 1 BID x7 days; then 2 tabs Q am-1 tab pm x7 days then change to 10mg RX (Patient not taking: No sig reported) 42 tablet 0    fexofenadine (ALLEGRA) 180 MG tablet Take 180 mg by mouth daily      warfarin (COUMADIN) 3 MG tablet Take 1 tablet by mouth daily Except take 1 and 1/2 tablets (4.5mg) on Thursdays or as directed 100 tablet 3    donepezil (ARICEPT) 10 MG tablet TAKE 1 TABLET BY MOUTH EVERY DAY 90 tablet 1    torsemide (DEMADEX) 10 MG tablet Take 1 tablet by mouth in the morning.  30 tablet 5    atorvastatin (LIPITOR) 40 MG tablet Take 1 tablet by mouth daily TAKE 1 TABLET BY MOUTH EVERY DAY 30 tablet 5    cetirizine (ZYRTEC) 10 MG tablet Take 10 mg by mouth daily (Patient not taking: No sig reported)      aspirin 81 MG chewable tablet Take 1 tablet by mouth daily 30 tablet 3    metoprolol succinate (TOPROL XL) 50 MG extended release tablet Take 1 tablet by mouth in the morning and at bedtime 30 tablet 3    Vitamin D (CHOLECALCIFEROL) 25 MCG (1000 UT) TABS tablet Take 1,000 Units by mouth daily      dexlansoprazole (DEXILANT) 60 MG CPDR delayed release capsule Take 1 tablet by mouth daily. No current facility-administered medications on file prior to encounter. REVIEW OF SYSTEMS    Pertinent items are noted in HPI. Constitutional: Negative for systemic symptoms including fever, chills and malaise. Objective:      /70   Pulse 74   Temp 96.8 °F (36 °C) (Temporal)   Resp 18     PHYSICAL EXAM      General: The patient is in no acute distress. Mental status:  Patient is appropriate, is  oriented to place and plan of care. Dermatologic exam: Visual inspection of the periwound reveals the skin to be normal in turgor and texture, dry, and edematous  Wound exam: see wound description below in procedure note      Assessment:     Problem List Items Addressed This Visit          Circulatory    WD-Venous stasis ulcer of right calf with fat layer exposed with varicose veins (HCC)       Other    WD-Non-pressure chronic ulcer of right calf with fat layer exposed ([address] - Primary    Relevant Medications    gentamicin (GARAMYCIN) 0.1 % ointment (Start on 12/16/2022  9:15 AM)    Other Relevant Orders    Initiate Outpatient Wound Care Protocol     Procedure Note    Indications:  Based on my examination of this patient's wound(s) today, sharp excision into necrotic subcutaneous tissue is required to promote healing and evaluate the extent of previous healing. Performed by: ANTHONY Strong CNP    Consent obtained: Yes    Time out taken:  Yes    Pain Control: N/A      Debridement:Excisional Debridement    Using curette the wound(s) was/were sharply debrided down through and including the removal of subcutaneous tissue.         Devitalized Tissue Debrided:  fibrin, biofilm, slough, and exudate    Pre Debridement Measurements:  Are located in the Wound Documentation Flow Sheet    All active wounds listed below with today's date are evaluated  Wound(s)    debrided this date include # : 1     Post  Debridement Measurements:  Wound 11/11/22 Pretibial Right;Lateral #1 (Active)   Wound Image   12/09/22 1248   Wound Etiology Venous 12/16/22 0818   Dressing Status New dressing applied 12/09/22 1314   Wound Cleansed Wound cleanser 12/16/22 0818   Offloading for Diabetic Foot Ulcers Offloading not required 12/16/22 0818   Wound Length (cm) 1.3 cm 12/16/22 0818   Wound Width (cm) 1 cm 12/16/22 0818   Wound Depth (cm) 0.1 cm 12/16/22 0818   Wound Surface Area (cm^2) 1.3 cm^2 12/16/22 0818   Change in Wound Size % (l*w) 56.67 12/16/22 0818   Wound Volume (cm^3) 0.13 cm^3 12/16/22 0818   Wound Healing % 57 12/16/22 0818   Post-Procedure Length (cm) 1.3 cm 12/16/22 0845   Post-Procedure Width (cm) 1 cm 12/16/22 0845   Post-Procedure Depth (cm) 0.1 cm 12/16/22 0845   Post-Procedure Surface Area (cm^2) 1.3 cm^2 12/16/22 0845   Post-Procedure Volume (cm^3) 0.13 cm^3 12/16/22 0845   Distance Tunneling (cm) 0 cm 12/16/22 0818   Tunneling Position ___ O'Clock 0 12/16/22 0818   Undermining Starts ___ O'Clock 0 12/16/22 0818   Undermining Ends___ O'Clock 0 12/16/22 0818   Undermining Maxium Distance (cm) 0 12/16/22 0818   Wound Assessment Pink/red;Slough 12/16/22 0818   Drainage Amount Small 12/16/22 0818   Drainage Description Serosanguinous 12/16/22 0818   Odor None 12/16/22 0818   Verónica-wound Assessment Maceration 12/16/22 0818   Margins Defined edges 12/16/22 0818   Wound Thickness Description not for Pressure Injury Full thickness 12/16/22 0818   Number of days: 35       Percent of Wound(s) Debrided: approximately 100%    Total  Area  Debrided:  1.3 sq cm     Bleeding:  Minimal    Hemostasis Achieved:  by pressure    Procedural Pain:  5  / 10     Post Procedural Pain:  1 / 10     Response to treatment:  Well tolerated by patient. Status of wound progress and description from last visit:   Improving. Continue plan of care for now  Patient to call if needed for re-wrap      Plan:       Discharge Instructions         PHYSICIAN ORDERS AND DISCHARGE INSTRUCTIONS     NOTE: Upon discharge from the [address], you will receive a patient experience survey. We would be grateful if you would take the time to fill this survey out. Wound care order history:                 KATRIN's   Right       Left               Date:              Cultures:                Grafts:                Antibiotics:           Continuing wound care orders and information:                 Residence:                Continue home health care with:               Your wound-care supplies will be provided by: Wound cleansing:               Do not scrub or use excessive force. Wash hands with soap and water before and after dressing changes. Prior to applying a clean dressing, cleanse wound with normal saline, wound cleanser, or mild soap and water. Ask the physician or nurse before getting the wound(s) wet in a shower     Daily Wound management:              Keep weight off wounds and reposition every 2 hours. Avoid standing for long periods of time. Apply wraps/stockings in AM and remove at bedtime. If swelling is present, elevate legs to the level of the heart or above for 30 minutes 4-5 times a day and/or when sitting. When taking antibiotics take entire prescription as ordered by physician do not stop taking until medicine is all gone. Orders for this week: 12/16/22                  Rx:     Right Lateral Ankle - Wash with soap and water, pat dry. Apply Gentamicin and Kandice to wound bed. Cover with ABD. Secure with conform and tape. Wear Tubi F. Change daily.         Follow up with Patricio Newberry CNP in 1 week in the wound care center. Call [phone] for any questions or concerns.   Date__________   Time____________        Treatment Note      Written Patient Dismissal Instructions Given            Electronically signed by ANTHONY Pedro CNP on 12/16/2022 at 8:49 AM

## 2022-12-16 NOTE — TELEPHONE ENCOUNTER
[facility]     Referral number:    [id number]  For foam only. Allergy to tape and valium.

## 2022-12-19 ENCOUNTER — PROCEDURE VISIT (OUTPATIENT)
Dept: CARDIOLOGY CLINIC | Age: 77
End: 2022-12-19
Payer: MEDICARE

## 2022-12-19 DIAGNOSIS — I10 ESSENTIAL HYPERTENSION: ICD-10-CM

## 2022-12-19 DIAGNOSIS — Z95.0 PACEMAKER: ICD-10-CM

## 2022-12-19 DIAGNOSIS — I83.893 VARICOSE VEINS OF BOTH LEGS WITH EDEMA: ICD-10-CM

## 2022-12-19 DIAGNOSIS — I21.4 NSTEMI (NON-ST ELEVATED MYOCARDIAL INFARCTION) (HCC): ICD-10-CM

## 2022-12-19 DIAGNOSIS — E78.2 MIXED HYPERLIPIDEMIA: ICD-10-CM

## 2022-12-19 DIAGNOSIS — Z98.890 S/P AV (ATRIOVENTRICULAR) NODAL ABLATION: ICD-10-CM

## 2022-12-19 DIAGNOSIS — I48.0 PAF (PAROXYSMAL ATRIAL FIBRILLATION) (HCC): ICD-10-CM

## 2022-12-19 DIAGNOSIS — I25.10 CORONARY ARTERY DISEASE INVOLVING NATIVE CORONARY ARTERY OF NATIVE HEART WITHOUT ANGINA PECTORIS: Primary | ICD-10-CM

## 2022-12-19 PROCEDURE — 36465 NJX NONCMPND SCLRSNT 1 VEIN: CPT | Performed by: INTERNAL MEDICINE

## 2022-12-19 NOTE — PROGRESS NOTES
VENOUS PRE-PROCEDURE H & P  12/19/2022    Subjective:  Louis Calderon is a 65 y.o. female    GENERAL - A-Ox3- In no respiratory distress  Heart - Regular rhythm, normal S1, S2, no gallops, no murmurs, no friction rubs  Chest - CTA & percussion    Vein Exam:     Right ext - varicosities, spider and reticular veins Yes, skin changes Yes, ulcers No, edema Yes    Left ext  - varicosities, spider and reticular veins Yes, skin changes Yes, ulcers No, edema Yes    Reflex Study:       US 8/8/2022 reveals:   No evidence of DVT or SVT in the bilateral common femoral vein, femoral    vein, popliteal vein, greater saphenous vein or small saphenous vein. Significant reflux noted of the Right CFV, FV mid, and GSV at SFJ (1.9s) and    knee (0.6s). Significant reflux noted in the Left CFV. Assessment:   Patient has symptomatic C4 venous disease. Plan:   Ablation of right GSV. Informed consent obtained.     Ezequiel Valderrama MD, [address] [facility]

## 2022-12-19 NOTE — PROGRESS NOTES
Endovenous Ablation with Varithena Operative Report    12/19/2022    Subjective:  Kenzie Martinez is a 65 y.o. female    Informed consent obtained    Material used:  #5 F micropuncture sheath set with 3 way stop cock. 6 cc of freshly prepared material injected under US guidance. Procedure Performed:    Endovenous Ablation of the Great Saphenous Vein with Varithena Closure System of the  Right side. Indication  Duplex ultrasound was used to map out the insufficient saphenous vein, and access was determined and marked on the overlying skin. The depth and diameter of the vein(s) to be treated was documented. The patient was placed supine on the procedure table and the leg was prepped and draped using sterile technique. Ultrasound guidance was again used to localize the access site. 1% lidocaine was injected as a local anesthetic in the subcutaneous tissues at the target location in the GSV in the lower leg. Using ultrasound guidance, access was gained at this location with the 21 gauge thin walled access needle and followed by introduction of a short guidewire, location confirmed with ultrasound. A small, 3 mm incision was made at the access site to allow for introduction and placement of the 5 Fr x7cm introducer/dilator. The dilator and guidewire were removed. The remaining sheath was flushed with sterile saline, with the syringe remaining in place prior to the next steps. Once Perforators were protected, Varithena solution was injected at a rate of 1cc / sec. US guidance was used to confirm the progression of foam train. Proximal GSV: Myrtis Susie was slowly administered at 1.0cc/second with close observation by ultrasound of its course in the GSV. When delivery arrived at approximately 3-5 cm from the SFJ, the GSV was compressed to prevent flow into the common femoral vein.   Further administration of Myrtis Susie was stopped at this point and the treated GSV was observed for spasm with ultrasound over a period of 3-5 minutes. After the administration of Linda Haily the patient was asked to dorsifelx the ankle to limit flow of foam into perforating veins. Once appropriate spasm had been confirmed in the treated veins, the introducer sheath was pulled out from the access site. Hemostasis was achieved with manual compression and an adhesive bandage was applied to the incision. Ultrasound confirmed incomplete coaptation and closure of the treated segments of the GSV, and the absence of any DVT at the saphenofemoral junction. Treatment dose was approximately 6 ml and the vein length treated was 40 cm. Once satisfactory results were obtained, procedure concluded. The drapes were removed and the patient cleaned and prepared for discharge. Post op ultrasound check is scheduled for   48- 72 hours and the patient was given written post-op instructions. Complications: none immediate    Blood Loss: minimal    Conclusion:   Successful Endovenous Ablation of the Great Saphenous Vein with Varithena chemical ablation System of the  Right side.       Quique Menjivar MD, [facility]

## 2022-12-19 NOTE — LETTER
[facility]                            Physician: Dr. Sonya Steven   [address] [facility], [address] [facility] [address]      [facility] [address]      Phone: [phone]  Fax: [phone]  Patient Name: Yecenia Stevenson   : 1945  MRN# [id number]  Date of Procedure:22  Time: 9am     History: HTN  HLD   DM   Cancer  Hepatitis A/B/C  HIV   Ultrasound findings:         Right              Left                 Reflux   DVT  SVT                Reflux   DVT  SVT     Segment of Leg:                                                        Segment of Leg:       GSV     SSV  AASV         GSV     SSV  AASV          Compression stockings- Greater than 3 months  BP: _______ /________ HR: __________  Time Out-   Time: _______: ________   STERILE FIELD MA  correct patient identity (Name and )   ________________  correct site (correct leg and vein)    ULTRASOUND TECH   procedure to be done     ________________  Doctor preforming procedure   Procedure:  Start Time:  _______: _______   End Time:  _______: _______      Right  Left     Saphenous Vein- Great    Small   Anterior Accessory        Above Knee Below Knee    Nitro paste    1% Lidocaine ______cc   Tumescent:______cc      ______CC Georgeann Favre USED  ________ CM Varithena______cc  Complications:          YES            NO    Patent deep system- Immediately Post RFA-YES   NO  Procedure to be staged:       [place] [facility]                            Physician: Dr. Sonya Steven   [address] [facility], [address] [place] [address] [address]      [facility] [address]      Phone: [phone]  Fax: [phone]    [facility]    Patient Name: Yecenia Stevenson   : 1945  MRN# [id number]    Date of Procedure: 22  Time: 9am   Arrival Time: 8:45am    Yecenia CHANDLER, authorize and consent to therapeutic vein care evaluations and treatments that may be performed by Dr. Clinton De La Cruz and/or such assistants and associates as selected by Dr. Raymond Ledesma and aided by the directed physician. These evaluations and treatments may include, but not limited to, initial consultation, history, and physical examination, lower extremity venous ultrasound study, infiltration of chemical ablations (EVLA) Radiofrequency venous ablation, Venaseal Closure System, Varithena polidocanol foam vein closure, endovenous chemical ablation (EVGA) Ultrasound guided sclerotherapy, visual sclerotherapy, phlebectomy and/or Conservative vein therapy. PATIENT ACKNOWLEDGEMENTOF UNDERSTANDING OF RISKS  Patient has been advised and understand that varicose veins are chronic, recurrent conditions there is no permanent cure but rather these conditions are controlled and improved by treatment. Heredity is the major cause of this vein defect and new veins may develop in the future depending on your genetic predisposition and aggravating factors because you had no treatment. Patient understands the consent of the venous ablation of superficial veins, perforating veins, and ultrasound guided sclerotherapy and visual sclerotherapy does not include actual removal of the veins. Only phlebectomy includes removal of the veins. Details of the procedure, including the purpose and nature of the procedure, and treatment options have been explained to the patient, as well as possible, alternative methods of treatment of the advantages and disadvantages of each. Patient has the opportunity to have all aspects of the proposed procedure described to them and fully aware of the risks and benefits. All questions that the patient has about the proposed treatment/procedure have been fully answered. Further, it has been discussed with the patient and the patient is fully aware that the visible results of treatment might not appear for several months and treated area might look worse before it looks better.  Results of these procedures have been excellent; however, it is important to remember that each patients results may vary and therefore no guarantees or assurance have been written or implied from the results of the venous ablation and/or sclerotherapy. No employee or representative of Dr. Katelin Hernandez is qualified to make any promises or other representations regarding this procedure performed. Patient is aware that the practice of medical therapy is an art and is not an exact science and acknowledge that risks, consequences, and complications may be associated with the procedure to be untaken and that no guarantees have been made to me concerning the results of the procedure. The risks of the endovenous radiofrequency ablation, are rare but have been fully explained to me (the patient) and include but are not limited to: thermal injury (burn), arterial injections, allergic reactions to medications, nerve injury (paresthesia), migration of the catheter, retention of the catheter, deep vein thrombosis (blood clot), post thrombotic syndrome (permanent leg swelling skin ulceration), failure of vein closure, pulmonary embolus(lung clot), pigmentation, inflammations if the vein (phlebitis). For most people, needle punctures into the vein do not cause any serious problems, However, they could cause dizziness, bleeding, bruising, discomfort pain, and infection. Local anesthesia will be used to minimize discomfort. In rare circumstances patient could have an allergic reaction to medications or equipment used. The risk of the ultrasound guided sclerotherapy included but are not limited to Deep Vein Thrombosis (blood clot). Pulmonary Embolus (lung clot), skin ulcerations, bruising pain, transient hyper-pigmentation (skin staining), matting, blushing, phlebitis, and hematomas within the vein (lumps within the vein that may require aspiration).  I am aware that the solutions being used may be compounded as the concentrations used are not commercially available. I understand I may request the commercial brand sclerosant at an additional cost.     Patient is aware and understands that in addition to the risks specifically described above, there are many other risks that accompany any treatment or surgical procedure, such as but not limited to intra and post-operative blood loss, infection, acute and/or chronic pain, cardiac arrest and even death. Patient understands that a compressions dressing is applied and aggress to follow all post- operative instructions prescribed by Dr. Cody Wooten including, but not limited to wearing prescription compression hose as directed. I understand that follow up ultrasounds are schedule to monitor my progress and that Dr. Cody Wooten cannot provide the continuity of care that is necessary for m optimal outcome if I do not follow the post- operative instructions provided to me and/or keep my post-operative appointments. Patient further understands that if oral sedation is used it will be for the purpose of relieving stress and anxiety and to diminish pain during the procedure. If the patient elects to take oral medications for the procedure the then the patient may not drive home from the procedure and must make alternate transportations arrangements. Patient may not make any make any significant personal or business decisions and/or sign documents for at least 48 hours after the procedure for which oral sedations was used. Personal Valuables    It is understood and agreed upon that the [facility] shall not be liable for the loss or damage to any money, jewelry, documents, garments, dentures, eye glasses, hearing aids, prosthetics, or other articles of personal value. [facility] is not liable for loss or damage to any other personal property.      Authorization of Insurance Payment  Patient understands that insurance prior authorization of their venous ablation procedure before date of service does not guarantee that the patient's insurance will issue payment for any or all of their procedure costs, and that the patient is responsible for all billed costs not covered by their insurance provider. Patient is also aware that it is his/her responsibility to communicate with their insurance provider to understand if their procedure is covered and how much is covered through their plan. Consent to Photography/videotaping:  [facility] is permitted to take pictures of the medical or surgical progress involving the vein. The patient consents to photography and/or videotaping during medical or surgical procedure that is used for scientific educations or medical research purposes. (Patient identity will NOT be disclosed). The patient consents to routine photo documentations related to patient care. Patients signature below acknowledges that the patient has had the opportunity to review and retain copy of this document and has been given the opportunity to ask questions. Patient has been advised by Dr. Nesha Patel of the potential risks and complications, along with the potential benefits that might result from treatment. Patient has been informed that certain medical conditions exclude the patient from being treated the patient acknowledges that he/she has provided a truthful and accurate medical history to the [facility]. Patient agrees to the terms in the forgoing document and authorizes for evaluation and treatment.        Patients Signature: ___________________________Date: ________ Time: ________      Patient Printed Name: ____________________________________________________      [facility] Witness________________________ Date: ________   Time: _________      Physician/Practitioner _______________________ Date: ________   Time: _________                                           [facility] Physician: Dr. Ezequiel Valderrama   [address] [facility], [address] [facility] [address] [facility],[address]      Phone: [phone]  Fax: [phone]  Patient Name: Ashtyn Smyth   : 1945  MRN# [id number]    Venous Ablations Post Procedure Instructions   Activity:    You are encouraged to walk at least 20 minutes every 2 hours at the least throughout the day. Walking will help the legs recovery process.  AVOID vigorous exercise and do not lift greater than 25lbs for 72 hours following the procedure.  Avoid prolong sitting or standing for 7 days.  Elevate your legs at least 3 times a day while sitting.  No driving restrictions.  You may return to work/School within 48 hours after procedure.  DO NOT bath in a tub, Jacuzzi, swim, or use a sauna for 72 hours following procedure. SHOWER ONLY. Discomfort:    You may experience bruising, soreness and a tightness sensations following the treatment. This should begin to subside within 5-7 days. Symptoms could be more after 2-3 days after the procedure.  Take ibuprofen every 6-8 hours as needed to reduce inflammation and for pain.  A small amount of drainage or oozing is normal for each access site. Bandages:    You may remove the compression dressing 48 hours following the procedure.  After removing the dressing: Shower, pat legs dry, and APPLY COMPRESSION STOCKINGS.  Wear Compression Stockings DAY AND NIGHT for 14 DAYS.  DO NOT remove the small steri-strips they will fall off within a week. When to Call the Doctor:  Willian Regalado If you have questions or concerns.  If you have bleeding at the insertion site.  Prolonged tenderness, redness, or warmth along the incision.  Moderate to severe pain preventing return to normal activities   Shortness of breath    Itching, redness, rash along the leg  Follow up care:    The key part of your treatment and safety.     7 day post venous ultrasound: ________/_______/_________    Patient Signature:_________________________________________________________      Staff Given Post Instructions:_______________________________________________         [facility]                            Physician: Dr. Kael Schumacher   [address] [facility], [facility] [address]      Phone: [phone]  Fax: [phone]    Patient Name: Ginny Givens   : 1945  MRN# [id number]    Date of Procedure: 22      Venous Ablation Location    Right                                                             Left    __GSV __SSV __AASV                               __GSV __SSV __AASV

## 2022-12-27 ENCOUNTER — PROCEDURE VISIT (OUTPATIENT)
Dept: CARDIOLOGY CLINIC | Age: 77
End: 2022-12-27
Payer: MEDICARE

## 2022-12-27 DIAGNOSIS — I87.301 IDIOPATHIC CHRONIC VENOUS HYPERTENSION OF RIGHT LOWER EXTREMITY WITHOUT COMPLICATIONS: Primary | ICD-10-CM

## 2022-12-27 PROCEDURE — 93971 EXTREMITY STUDY: CPT | Performed by: INTERNAL MEDICINE

## 2022-12-27 RX ORDER — DILTIAZEM HYDROCHLORIDE 360 MG/1
CAPSULE, EXTENDED RELEASE ORAL
Qty: 90 CAPSULE | Refills: 1 | OUTPATIENT
Start: 2022-12-27

## 2022-12-28 ENCOUNTER — TELEPHONE (OUTPATIENT)
Dept: CARDIOLOGY CLINIC | Age: 77
End: 2022-12-28

## 2022-12-28 DIAGNOSIS — E78.2 MIXED HYPERLIPIDEMIA: ICD-10-CM

## 2022-12-29 DIAGNOSIS — G31.84 MCI (MILD COGNITIVE IMPAIRMENT): ICD-10-CM

## 2022-12-29 RX ORDER — MEMANTINE HYDROCHLORIDE 10 MG/1
10 TABLET ORAL 2 TIMES DAILY
Qty: 60 TABLET | Refills: 5 | OUTPATIENT
Start: 2022-12-29

## 2022-12-29 RX ORDER — ATORVASTATIN CALCIUM 40 MG/1
40 TABLET, FILM COATED ORAL DAILY
Qty: 90 TABLET | Refills: 3 | Status: SHIPPED | OUTPATIENT
Start: 2022-12-29

## 2022-12-30 ENCOUNTER — HOSPITAL ENCOUNTER (OUTPATIENT)
Dept: WOUND CARE | Age: 77
Discharge: HOME | End: 2022-12-30
Payer: MEDICARE

## 2022-12-30 VITALS
RESPIRATION RATE: 18 BRPM | TEMPERATURE: 96.8 F | SYSTOLIC BLOOD PRESSURE: 112 MMHG | DIASTOLIC BLOOD PRESSURE: 65 MMHG | HEART RATE: 75 BPM

## 2022-12-30 DIAGNOSIS — I83.012 VENOUS STASIS ULCER OF RIGHT CALF WITH FAT LAYER EXPOSED WITH VARICOSE VEINS (HCC): ICD-10-CM

## 2022-12-30 DIAGNOSIS — L97.212 NON-PRESSURE CHRONIC ULCER OF RIGHT CALF WITH FAT LAYER EXPOSED (HCC): Primary | ICD-10-CM

## 2022-12-30 DIAGNOSIS — L97.212 VENOUS STASIS ULCER OF RIGHT CALF WITH FAT LAYER EXPOSED WITH VARICOSE VEINS (HCC): ICD-10-CM

## 2022-12-30 PROCEDURE — 11042 DBRDMT SUBQ TIS 1ST 20SQCM/<: CPT

## 2022-12-30 PROCEDURE — 11042 DBRDMT SUBQ TIS 1ST 20SQCM/<: CPT | Performed by: NURSE PRACTITIONER

## 2022-12-30 PROCEDURE — 6370000000 HC RX 637 (ALT 250 FOR IP): Performed by: NURSE PRACTITIONER

## 2022-12-30 RX ORDER — LIDOCAINE 40 MG/G
CREAM TOPICAL ONCE
OUTPATIENT
Start: 2022-12-30 | End: 2022-12-30

## 2022-12-30 RX ORDER — LIDOCAINE HYDROCHLORIDE 40 MG/ML
SOLUTION TOPICAL ONCE
OUTPATIENT
Start: 2022-12-30 | End: 2022-12-30

## 2022-12-30 RX ORDER — LIDOCAINE 50 MG/G
OINTMENT TOPICAL ONCE
OUTPATIENT
Start: 2022-12-30 | End: 2022-12-30

## 2022-12-30 RX ORDER — BACITRACIN ZINC AND POLYMYXIN B SULFATE 500; 1000 [USP'U]/G; [USP'U]/G
OINTMENT TOPICAL ONCE
OUTPATIENT
Start: 2022-12-30 | End: 2022-12-30

## 2022-12-30 RX ORDER — LIDOCAINE 50 MG/G
OINTMENT TOPICAL ONCE
Status: COMPLETED | OUTPATIENT
Start: 2022-12-30 | End: 2022-12-30

## 2022-12-30 RX ORDER — LIDOCAINE HYDROCHLORIDE 20 MG/ML
JELLY TOPICAL ONCE
OUTPATIENT
Start: 2022-12-30 | End: 2022-12-30

## 2022-12-30 RX ORDER — GINSENG 100 MG
CAPSULE ORAL ONCE
OUTPATIENT
Start: 2022-12-30 | End: 2022-12-30

## 2022-12-30 RX ORDER — CLOBETASOL PROPIONATE 0.5 MG/G
OINTMENT TOPICAL ONCE
OUTPATIENT
Start: 2022-12-30 | End: 2022-12-30

## 2022-12-30 RX ORDER — GENTAMICIN SULFATE 1 MG/G
OINTMENT TOPICAL ONCE
OUTPATIENT
Start: 2022-12-30 | End: 2022-12-30

## 2022-12-30 RX ORDER — BACITRACIN, NEOMYCIN, POLYMYXIN B 400; 3.5; 5 [USP'U]/G; MG/G; [USP'U]/G
OINTMENT TOPICAL ONCE
OUTPATIENT
Start: 2022-12-30 | End: 2022-12-30

## 2022-12-30 RX ORDER — BETAMETHASONE DIPROPIONATE 0.05 %
OINTMENT (GRAM) TOPICAL ONCE
OUTPATIENT
Start: 2022-12-30 | End: 2022-12-30

## 2022-12-30 RX ADMIN — LIDOCAINE: 50 OINTMENT TOPICAL at 08:24

## 2022-12-30 ASSESSMENT — PAIN DESCRIPTION - ORIENTATION: ORIENTATION: RIGHT

## 2022-12-30 ASSESSMENT — PAIN SCALES - GENERAL: PAINLEVEL_OUTOF10: 0

## 2022-12-30 ASSESSMENT — PAIN DESCRIPTION - LOCATION: LOCATION: LEG

## 2022-12-30 NOTE — DISCHARGE INSTRUCTIONS
PHYSICIAN ORDERS AND DISCHARGE INSTRUCTIONS     NOTE: Upon discharge from the [address], you will receive a patient experience survey. We would be grateful if you would take the time to fill this survey out. Wound care order history:                 KATRIN's   Right       Left               Date:              Cultures:                Grafts:                Antibiotics:           Continuing wound care orders and information:                 Residence:                Continue home health care with:               Your wound-care supplies will be provided by: Wound cleansing:               Do not scrub or use excessive force. Wash hands with soap and water before and after dressing changes. Prior to applying a clean dressing, cleanse wound with normal saline, wound cleanser, or mild soap and water. Ask the physician or nurse before getting the wound(s) wet in a shower     Daily Wound management:              Keep weight off wounds and reposition every 2 hours. Avoid standing for long periods of time. Apply wraps/stockings in AM and remove at bedtime. If swelling is present, elevate legs to the level of the heart or above for 30 minutes 4-5 times a day and/or when sitting. When taking antibiotics take entire prescription as ordered by physician do not stop taking until medicine is all gone. Orders for this week: 12/30/22                  Rx:     Right Lateral Ankle - Wash with soap and water, pat dry. Apply Kandice to wound bed. Cover with Silicone border (or large bandaid). Wear Compression Stocking. Change daily. Follow up with Emily Lees CNP in 1 week in the wound care center. Call [phone] for any questions or concerns.   Date__________   Time____________

## 2023-01-06 ENCOUNTER — HOSPITAL ENCOUNTER (OUTPATIENT)
Dept: WOUND CARE | Age: 78
Discharge: HOME | End: 2023-01-06
Payer: MEDICARE

## 2023-01-06 VITALS
SYSTOLIC BLOOD PRESSURE: 106 MMHG | HEART RATE: 70 BPM | TEMPERATURE: 96.8 F | RESPIRATION RATE: 18 BRPM | DIASTOLIC BLOOD PRESSURE: 57 MMHG

## 2023-01-06 DIAGNOSIS — L97.212 NON-PRESSURE CHRONIC ULCER OF RIGHT CALF WITH FAT LAYER EXPOSED (HCC): Primary | ICD-10-CM

## 2023-01-06 DIAGNOSIS — I83.012 VENOUS STASIS ULCER OF RIGHT CALF WITH FAT LAYER EXPOSED WITH VARICOSE VEINS (HCC): ICD-10-CM

## 2023-01-06 DIAGNOSIS — L97.212 VENOUS STASIS ULCER OF RIGHT CALF WITH FAT LAYER EXPOSED WITH VARICOSE VEINS (HCC): ICD-10-CM

## 2023-01-06 PROCEDURE — 6370000000 HC RX 637 (ALT 250 FOR IP): Performed by: NURSE PRACTITIONER

## 2023-01-06 PROCEDURE — 99213 OFFICE O/P EST LOW 20 MIN: CPT

## 2023-01-06 PROCEDURE — 99213 OFFICE O/P EST LOW 20 MIN: CPT | Performed by: NURSE PRACTITIONER

## 2023-01-06 PROCEDURE — 11042 DBRDMT SUBQ TIS 1ST 20SQCM/<: CPT

## 2023-01-06 RX ORDER — LIDOCAINE 40 MG/G
CREAM TOPICAL ONCE
OUTPATIENT
Start: 2023-01-06 | End: 2023-01-06

## 2023-01-06 RX ORDER — GENTAMICIN SULFATE 1 MG/G
OINTMENT TOPICAL ONCE
OUTPATIENT
Start: 2023-01-06 | End: 2023-01-06

## 2023-01-06 RX ORDER — LIDOCAINE HYDROCHLORIDE 20 MG/ML
JELLY TOPICAL ONCE
OUTPATIENT
Start: 2023-01-06 | End: 2023-01-06

## 2023-01-06 RX ORDER — LIDOCAINE HYDROCHLORIDE 40 MG/ML
SOLUTION TOPICAL ONCE
OUTPATIENT
Start: 2023-01-06 | End: 2023-01-06

## 2023-01-06 RX ORDER — LIDOCAINE 50 MG/G
OINTMENT TOPICAL ONCE
Status: COMPLETED | OUTPATIENT
Start: 2023-01-06 | End: 2023-01-06

## 2023-01-06 RX ORDER — BACITRACIN ZINC AND POLYMYXIN B SULFATE 500; 1000 [USP'U]/G; [USP'U]/G
OINTMENT TOPICAL ONCE
OUTPATIENT
Start: 2023-01-06 | End: 2023-01-06

## 2023-01-06 RX ORDER — LIDOCAINE 50 MG/G
OINTMENT TOPICAL ONCE
OUTPATIENT
Start: 2023-01-06 | End: 2023-01-06

## 2023-01-06 RX ORDER — BETAMETHASONE DIPROPIONATE 0.05 %
OINTMENT (GRAM) TOPICAL ONCE
OUTPATIENT
Start: 2023-01-06 | End: 2023-01-06

## 2023-01-06 RX ORDER — CLOBETASOL PROPIONATE 0.5 MG/G
OINTMENT TOPICAL ONCE
OUTPATIENT
Start: 2023-01-06 | End: 2023-01-06

## 2023-01-06 RX ORDER — BACITRACIN, NEOMYCIN, POLYMYXIN B 400; 3.5; 5 [USP'U]/G; MG/G; [USP'U]/G
OINTMENT TOPICAL ONCE
OUTPATIENT
Start: 2023-01-06 | End: 2023-01-06

## 2023-01-06 RX ORDER — GINSENG 100 MG
CAPSULE ORAL ONCE
OUTPATIENT
Start: 2023-01-06 | End: 2023-01-06

## 2023-01-06 RX ADMIN — LIDOCAINE: 50 OINTMENT TOPICAL at 08:13

## 2023-01-06 ASSESSMENT — PAIN SCALES - GENERAL: PAINLEVEL_OUTOF10: 0

## 2023-01-06 NOTE — PLAN OF CARE
Problem: Wound:  Goal: Will show signs of wound healing; wound closure and no evidence of infection  Description: Will show signs of wound healing; wound closure and no evidence of infection  Outcome: Progressing Pt scheduled

## 2023-01-06 NOTE — DISCHARGE INSTRUCTIONS
PHYSICIAN ORDERS AND DISCHARGE INSTRUCTIONS     NOTE: Upon discharge from the [address], you will receive a patient experience survey. We would be grateful if you would take the time to fill this survey out. Wound care order history:                 KATRIN's   Right       Left               Date:              Cultures:                Grafts:                Antibiotics:           Continuing wound care orders and information:                 Residence:                Continue home health care with:               Your wound-care supplies will be provided by: Wound cleansing:               Do not scrub or use excessive force. Wash hands with soap and water before and after dressing changes. Prior to applying a clean dressing, cleanse wound with normal saline, wound cleanser, or mild soap and water. Ask the physician or nurse before getting the wound(s) wet in a shower     Daily Wound management:              Keep weight off wounds and reposition every 2 hours. Avoid standing for long periods of time. Apply wraps/stockings in AM and remove at bedtime. If swelling is present, elevate legs to the level of the heart or above for 30 minutes 4-5 times a day and/or when sitting. When taking antibiotics take entire prescription as ordered by physician do not stop taking until medicine is all gone. Orders for this week: 1/6/23                  Rx:     Right Lateral Ankle - Wash with soap and water, pat dry. Apply Kandice to wound bed. Cover with Silicone border (or large bandaid). Wear Compression Stocking. Change daily. Follow up with Radha Stallworth CNP in 1 week in the wound care center. Call [phone] for any questions or concerns.   Date__________   Time____________

## 2023-01-06 NOTE — PROGRESS NOTES
Wound Care Center Progress Note       Genesis Stewart  AGE: 65 y.o. GENDER: female  : 1945  TODAY'S DATE:  2023        Subjective:     Chief Complaint   Patient presents with    Wound Check     leg         HISTORY of PRESENT ILLNESS    Edis Martin is a 65 y.o. female who presents to the [address] [place] [address] for a visit for evaluation and treatment of Chronic venous and non-healing/non-surgical  ulcer(s) of  R ankle lateral.  The condition is of moderate severity. The ulcer has been present for 6 months. The underlying cause is thought to be nonhealing venous wound from when she was fluid overloaded and got a blister. This never healed although her cardiac issues are now resolved and she is very stable. The patients care to date has included nothing so far. The patient has significant underlying medical conditions as below. Much improved and nearly closed today  Removed gent last week and this helped greatly      Patient educated on the 6 essential components necessary for wound healing: Circulation, Debridements, Proper Dressings and Topical Wound Products, Infection Control, Edema Control and Offloading. Patient educated on those factors that negatively effect or impact wound healing: smoking, obesity, uncontrolled diabetes, anticoagulant and immunosuppressive regimens, inadequate nutrition, untreated arterial and venous disease if applicable and measures to manage edema.           PAST MEDICAL HISTORY        Diagnosis Date    Abnormal echocardiogram     EF 60%, mild aortic indsufficiency, mild pulmonary hypertension    Anemia     Aortic insufficiency     mild per echo 2010    Atrial fibrillation (HCC)     failed propafenone, Multaq and flecainide - 2011 plan for AFib ablation - [facility]- Dr Bevely Cheadle    Atrial flutter [facility])     Bursitis, trochanteric 2004    s/p injection    Cerumen impaction 2012    Diverticulosis     Dr. Glendy BANKS scope    Diverticulosis of colon 01/2010    Colonoscopy-Dr Newell    DJD (degenerative joint disease), cervical     cervical, generalized disc buldge   MRI 3/02    DVT (deep venous thrombosis) ([address]     Dyslipidemia 2002    Environmental allergies     Family history of colon cancer     mother    Gastric polyp     8/2006, 11/2009 benign- Dr Diaz Check    Gastritis 04/2008    mild superficial per Dr Diaz Check    GERD (gastroesophageal reflux disease) 08/2006    Dr Emily Huddleston    H/O cardiovascular stress test 07/13/2004 07/13 EF58%, No scintigraphic evidence of inducible myocardial ischemia 04/13Normal study. No wall motion abnormality seen. EF 65%    H/O Doppler ultrasound 06/14/2022    VL Lower Ext Arteries/Venous Right. No evidence of pseudoaneurysm, AV fistula, or hematoma is present. H/O Doppler ultrasound 12/27/2022    Right CFV is patent with good compressibility and resirophasic signal with good augmentation. Right GSV is non compressible with  no evidence of flow just past the saphenofemoral junction to the knee. H/O echocardiogram 07/18/2013 7/13-EF 50-55% Mild AR. H/O exercise stress test 02/07/2017    EF63% normal    History of Holter monitoring 09/23/2015    48 hour - abnormal holter revealing afib throughout the recording with interspersed pacemaker beats, HR does not appear to be well controlled    History of mammogram     last mammo 7/25/11, Dr. Darien Halsted yearly    Hx of colonoscopy     1/21/2010    Hx of Doppler Venous ultrasound 08/31/2021    No DVT or SVT, No significant reflux in RLE, Significant reflux in Left CFV    Hyperlipidemia     Hypertension     Hypothyroidism     Internal hemorrhoid 2015    Dr. Diaz Check C scope     Intervertebral disc protrusion 05/2009    wry neck with C4-5      Left shoulder pain 04/2004    (L) shoulder impingement  mild DJD    Long term current use of anticoagulant 07/26/2016    **Coumadin Clinic follows PT/INRs, along w/prescribing pt's Coumadin dosages. **    Menstruation     age 14    Pacemaker 06/21/2012 dual chamber- checked every 3 months    Pulmonary hypertension ([address]     mild per echo 8/24/2010, Pt refused sleep study (June 2012)    Restless legs 08/2003    ferritin    Right shoulder strain 02/2003    no seperation, bony contusion anterior humeral head  MRI 3/03    Sciatica 07/2009    (R) chronic intermittent s/p epidural injections  Dr Ivonne Pat    Sciatica     Shoulder pain, left 03/2011    RTC tendinopathy, type II acrominum, bursitis- referred to Dr. Ryley Diehl    Shoulder pain, right 03/10/2009    impingement, physical thearpy   ([place])  calcific bursitis  s/p injection    Sinus bradycardia     Sinusitis 12/12/2011    Tinnitus 03/2002    Vision changes 03/19/2013       PAST SURGICAL HISTORY    Past Surgical History:   Procedure Laterality Date    ABLATION OF DYSRHYTHMIC FOCUS      BREAST BIOPSY      BREAST SURGERY  1985    Right breast tumor excised    CARDIOVERSION      1/2008 Dr Rossy Wilcox; 12/2008    CARDIOVERSION  03/10/2014    [facility], LAPAROSCOPIC  02/2001    Dr Kaye Olivera      [phone] (Dr Kamilah George)    Jose Magda (CERVIX STATUS UNKNOWN)  2003    OTHER SURGICAL HISTORY Left 07/12/2022    upgrade to Medtronic Bi vi pacer, with AV node ablation performed by Dr. Alec Steele.     PACEMAKER PLACEMENT  06/21/2012    Medtronic Adapta ADDRL1 -not mri compatible    Meghan Cue    Dr Dino Chong (CERVIX REMOVED)  04/2003    fibroid      Dr Jamal Sylvester  08/2006    Dr Jacquie Fournier ECHOCARDIOGRAM  06/2012    transthoracic cardioversion-Dr. Jackson Min    UPPER GASTROINTESTINAL ENDOSCOPY  04/2008    mild superficial gastritis  Dr Kamilah George; 2009       FAMILY HISTORY    Family History   Problem Relation Age of Onset    Stroke Mother     Heart Disease Mother     Coronary Art Dis Mother 66        CABG    Colon Cancer Mother [de-identified]        colon     Heart Disease Father     Coronary Art Dis Father 62        CABG Migraines Sister     Seizures Brother     Breast Cancer Maternal Cousin         under 50       SOCIAL HISTORY    Social History     Tobacco Use    Smoking status: Never    Smokeless tobacco: Never    Tobacco comments:     reviewed 11/4/15   Vaping Use    Vaping Use: Never used   Substance Use Topics    Alcohol use: No    Drug use: No       ALLERGIES    Allergies   Allergen Reactions    Latex Hives    Darvon [Propoxyphene Hcl] Shortness Of Breath    Demerol Rash     Breathing problems    Dilaudid [Hydromorphone Hcl] Anaphylaxis    Valium Rash     Breathing problems    Celecoxib Diarrhea    Norco [Hydrocodone-Acetaminophen] Diarrhea, Nausea Only and Other (See Comments)     A-Fib    Norvasc [Amlodipine] Other (See Comments)     HA, dizziness    Oxycodone Itching    Tape [Adhesive Tape] Other (See Comments)     BLISTER    Vasotec [Enalapril] Other (See Comments)     Nausea, vomiting    Diazepam Rash       MEDICATIONS    Current Outpatient Medications on File Prior to Encounter   Medication Sig Dispense Refill    atorvastatin (LIPITOR) 40 MG tablet Take 1 tablet by mouth daily TAKE 1 TABLET BY MOUTH EVERY DAY 90 tablet 3    Elastic Bandages & Supports (MEDICAL COMPRESSION THIGH HIGH) MISC 1 each by Does not apply route daily Wear daily and remove nightly   20 - 30 mmgh 1 each 3    levothyroxine (SYNTHROID) 50 MCG tablet TAKE 1 TABLET BY MOUTH EVERY DAY BEFORE BREAKFAST 90 tablet 3    memantine (NAMENDA) 10 MG tablet Take 1 tablet by mouth 2 times daily NOTE TO PHARMACY: DO NOT FILL UNTIL 5 MG RX IS COMPLETED 60 tablet 5    memantine (NAMENDA) 5 MG tablet Take (1) 5 mg tab QD x7 days, then 1 BID x7 days; then 2 tabs Q am-1 tab pm x7 days then change to 10mg RX (Patient not taking: No sig reported) 42 tablet 0    fexofenadine (ALLEGRA) 180 MG tablet Take 180 mg by mouth daily      warfarin (COUMADIN) 3 MG tablet Take 1 tablet by mouth daily Except take 1 and 1/2 tablets (4.5mg) on Thursdays or as directed 100 tablet 3     donepezil (ARICEPT) 10 MG tablet TAKE 1 TABLET BY MOUTH EVERY DAY 90 tablet 1    torsemide (DEMADEX) 10 MG tablet Take 1 tablet by mouth in the morning. 30 tablet 5    cetirizine (ZYRTEC) 10 MG tablet Take 10 mg by mouth daily (Patient not taking: No sig reported)      aspirin 81 MG chewable tablet Take 1 tablet by mouth daily 30 tablet 3    metoprolol succinate (TOPROL XL) 50 MG extended release tablet Take 1 tablet by mouth in the morning and at bedtime 30 tablet 3    Vitamin D (CHOLECALCIFEROL) 25 MCG (1000 UT) TABS tablet Take 1,000 Units by mouth daily      dexlansoprazole (DEXILANT) 60 MG CPDR delayed release capsule Take 1 tablet by mouth daily. No current facility-administered medications on file prior to encounter. REVIEW OF SYSTEMS    Pertinent items are noted in HPI. Constitutional: Negative for systemic symptoms including fever, chills and malaise. Objective:      BP (!) 106/57   Pulse 70   Temp 96.8 °F (36 °C) (Temporal)   Resp 18     PHYSICAL EXAM      General: The patient is in no acute distress. Mental status:  Patient is appropriate, is  oriented to place and plan of care. Dermatologic exam: Visual inspection of the periwound reveals the skin to be normal in turgor and texture and dry.   Wound exam:  see wound description below     All active wounds listed below with today's date are evaluated      Wound 11/11/22 Pretibial Right;Lateral #1 (Active)   Wound Image   12/30/22 0818   Wound Etiology Venous 01/06/23 0807   Dressing Status New dressing applied 12/30/22 0844   Wound Cleansed Wound cleanser 01/06/23 0807   Offloading for Diabetic Foot Ulcers Offloading not required 01/06/23 0807   Wound Length (cm) 1 cm 01/06/23 0807   Wound Width (cm) 0.5 cm 01/06/23 0807   Wound Depth (cm) 0.1 cm 01/06/23 0807   Wound Surface Area (cm^2) 0.5 cm^2 01/06/23 0807   Change in Wound Size % (l*w) 83.33 01/06/23 0807   Wound Volume (cm^3) 0.05 cm^3 01/06/23 0807   Wound Healing % 83 01/06/23 0807   Post-Procedure Length (cm) 1 cm 01/06/23 9782   Post-Procedure Width (cm) 0.5 cm 01/06/23 3992   Post-Procedure Depth (cm) 0.1 cm 01/06/23 0821   Post-Procedure Surface Area (cm^2) 0.5 cm^2 01/06/23 0821   Post-Procedure Volume (cm^3) 0.05 cm^3 01/06/23 0821   Distance Tunneling (cm) 0 cm 01/06/23 0807   Tunneling Position ___ O'Clock 0 01/06/23 0807   Undermining Starts ___ O'Clock 0 01/06/23 0807   Undermining Ends___ O'Clock 0 01/06/23 0807   Undermining Maxium Distance (cm) 0 01/06/23 0807   Wound Assessment Devitalized tissue;Dry 01/06/23 0807   Drainage Amount None 01/06/23 0807   Drainage Description Serosanguinous 12/30/22 0818   Odor None 01/06/23 0807   Verónica-wound Assessment Intact 01/06/23 0807   Margins Attached edges 01/06/23 0807   Wound Thickness Description not for Pressure Injury Full thickness 12/30/22 0818   Number of days: 56       Assessment:       Problem List Items Addressed This Visit          Circulatory    WD-Venous stasis ulcer of right calf with fat layer exposed with varicose veins (HCC)       Other    WD-Non-pressure chronic ulcer of right calf with fat layer exposed ([address] - Primary    Relevant Orders    Initiate Outpatient Wound Care Protocol       Status of wound progress and description from last visit:   Nearly closed. Continue plan of care x1 more week and hope to discharge at next visit        Plan:     Discharge Instructions         [address]     NOTE: Upon discharge from the [address], you will receive a patient experience survey. We would be grateful if you would take the time to fill this survey out.      Wound care order history:                 KATRIN's   Right       Left               Date:              Cultures:                Grafts:                Antibiotics:           Continuing wound care orders and information:                 Residence:                Continue home health care with:               Your wound-care supplies will be provided by: Wound cleansing:               Do not scrub or use excessive force. Wash hands with soap and water before and after dressing changes. Prior to applying a clean dressing, cleanse wound with normal saline, wound cleanser, or mild soap and water. Ask the physician or nurse before getting the wound(s) wet in a shower     Daily Wound management:              Keep weight off wounds and reposition every 2 hours. Avoid standing for long periods of time. Apply wraps/stockings in AM and remove at bedtime. If swelling is present, elevate legs to the level of the heart or above for 30 minutes 4-5 times a day and/or when sitting. When taking antibiotics take entire prescription as ordered by physician do not stop taking until medicine is all gone. Orders for this week: 1/6/23                  Rx:     Right Lateral Ankle - Wash with soap and water, pat dry. Apply Kandice to wound bed. Cover with Silicone border (or large bandaid). Wear Compression Stocking. Change daily. Follow up with Davian Menjivar CNP in 1 week in the wound care center. Call [phone] for any questions or concerns.   Date__________   Time____________        Treatment Note      Written Patient Dismissal Instructions Given            Electronically signed by ANTHONY Bacon CNP on 1/6/2023 at 8:24 AM

## 2023-01-10 RX ORDER — METOPROLOL SUCCINATE 50 MG/1
50 TABLET, EXTENDED RELEASE ORAL 2 TIMES DAILY
Qty: 90 TABLET | Refills: 3 | Status: SHIPPED | OUTPATIENT
Start: 2023-01-10

## 2023-01-13 ENCOUNTER — HOSPITAL ENCOUNTER (OUTPATIENT)
Dept: WOUND CARE | Age: 78
Discharge: HOME | End: 2023-01-13
Payer: MEDICARE

## 2023-01-13 VITALS
DIASTOLIC BLOOD PRESSURE: 72 MMHG | RESPIRATION RATE: 20 BRPM | SYSTOLIC BLOOD PRESSURE: 110 MMHG | HEART RATE: 79 BPM | TEMPERATURE: 97.6 F

## 2023-01-13 DIAGNOSIS — L97.212 NON-PRESSURE CHRONIC ULCER OF RIGHT CALF WITH FAT LAYER EXPOSED (HCC): Primary | ICD-10-CM

## 2023-01-13 DIAGNOSIS — L97.212 VENOUS STASIS ULCER OF RIGHT CALF WITH FAT LAYER EXPOSED WITH VARICOSE VEINS (HCC): ICD-10-CM

## 2023-01-13 DIAGNOSIS — I83.012 VENOUS STASIS ULCER OF RIGHT CALF WITH FAT LAYER EXPOSED WITH VARICOSE VEINS (HCC): ICD-10-CM

## 2023-01-13 PROCEDURE — 97597 DBRDMT OPN WND 1ST 20 CM/<: CPT

## 2023-01-13 RX ORDER — GINSENG 100 MG
CAPSULE ORAL ONCE
Status: CANCELLED | OUTPATIENT
Start: 2023-01-13 | End: 2023-01-13

## 2023-01-13 RX ORDER — LIDOCAINE HYDROCHLORIDE 20 MG/ML
JELLY TOPICAL ONCE
Status: CANCELLED | OUTPATIENT
Start: 2023-01-13 | End: 2023-01-13

## 2023-01-13 RX ORDER — BETAMETHASONE DIPROPIONATE 0.05 %
OINTMENT (GRAM) TOPICAL ONCE
Status: CANCELLED | OUTPATIENT
Start: 2023-01-13 | End: 2023-01-13

## 2023-01-13 RX ORDER — LIDOCAINE HYDROCHLORIDE 40 MG/ML
SOLUTION TOPICAL ONCE
Status: CANCELLED | OUTPATIENT
Start: 2023-01-13 | End: 2023-01-13

## 2023-01-13 RX ORDER — LIDOCAINE 50 MG/G
OINTMENT TOPICAL ONCE
Status: CANCELLED | OUTPATIENT
Start: 2023-01-13 | End: 2023-01-13

## 2023-01-13 RX ORDER — CLOBETASOL PROPIONATE 0.5 MG/G
OINTMENT TOPICAL ONCE
Status: CANCELLED | OUTPATIENT
Start: 2023-01-13 | End: 2023-01-13

## 2023-01-13 RX ORDER — GENTAMICIN SULFATE 1 MG/G
OINTMENT TOPICAL ONCE
Status: CANCELLED | OUTPATIENT
Start: 2023-01-13 | End: 2023-01-13

## 2023-01-13 RX ORDER — BACITRACIN, NEOMYCIN, POLYMYXIN B 400; 3.5; 5 [USP'U]/G; MG/G; [USP'U]/G
OINTMENT TOPICAL ONCE
Status: CANCELLED | OUTPATIENT
Start: 2023-01-13 | End: 2023-01-13

## 2023-01-13 RX ORDER — BACITRACIN ZINC AND POLYMYXIN B SULFATE 500; 1000 [USP'U]/G; [USP'U]/G
OINTMENT TOPICAL ONCE
Status: CANCELLED | OUTPATIENT
Start: 2023-01-13 | End: 2023-01-13

## 2023-01-13 RX ORDER — LIDOCAINE 40 MG/G
CREAM TOPICAL ONCE
Status: CANCELLED | OUTPATIENT
Start: 2023-01-13 | End: 2023-01-13

## 2023-01-13 NOTE — DISCHARGE INSTRUCTIONS
PHYSICIAN ORDERS AND DISCHARGE INSTRUCTIONS     NOTE: Upon discharge from the [address], you will receive a patient experience survey. We would be grateful if you would take the time to fill this survey out. Wound care order history:                 KATRIN's   Right       Left               Date:              Cultures:                Grafts:                Antibiotics:           Continuing wound care orders and information:                 Residence:                Continue home health care with:               Your wound-care supplies will be provided by: Wound cleansing:               Do not scrub or use excessive force. Wash hands with soap and water before and after dressing changes. Prior to applying a clean dressing, cleanse wound with normal saline, wound cleanser, or mild soap and water. Ask the physician or nurse before getting the wound(s) wet in a shower     Daily Wound management:              Keep weight off wounds and reposition every 2 hours. Avoid standing for long periods of time. Apply wraps/stockings in AM and remove at bedtime. If swelling is present, elevate legs to the level of the heart or above for 30 minutes 4-5 times a day and/or when sitting. When taking antibiotics take entire prescription as ordered by physician do not stop taking until medicine is all gone. Orders for this week: 1/13/23                  Rx:     Right Lateral Ankle - Healed 1/13/23. Apply Kandice to wound bed. Cover with Silicone border (or large bandaid). Wear [address] in place for 2-3 days. Then remove and leave open to air. Keep lower extremities moisturized, continue to wear compression stockings during the day. Discharged from the wound care center 1/13/23  Call [phone] for any questions or concerns.   Date__________ Time____________

## 2023-01-13 NOTE — PROGRESS NOTES
Wound Care Center Progress Note With Procedure    Genesis Stewart  AGE: 65 y.o. GENDER: female  : 1945  EPISODE DATE:  2023     Subjective:     Chief Complaint   Patient presents with    Wound Check     Rt leg         HISTORY of PRESENT ILLNESS     Gian Bingham is a 65 y.o. female who presents to the [address] [place] [address] for a visit for evaluation and treatment of Chronic venous and non-healing/non-surgical  ulcer(s) of  R ankle lateral.  The condition is of moderate severity. The ulcer has been present for 6 months. The underlying cause is thought to be nonhealing venous wound from when she was fluid overloaded and got a blister. This never healed although her cardiac issues are now resolved and she is very stable. The patients care to date has included nothing so far. The patient has significant underlying medical conditions as below.       Healed today     Wound Pain Timing/Severity: waxing and waning  Quality of pain: aching, burning  Severity of pain:  2 / 10   Modifying Factors: none  Associated Signs/Symptoms: erythema and pain        PAST MEDICAL HISTORY        Diagnosis Date    Abnormal echocardiogram     EF 60%, mild aortic indsufficiency, mild pulmonary hypertension    Anemia     Aortic insufficiency     mild per echo 2010    Atrial fibrillation ([address]     failed propafenone, Multaq and flecainide - 2011 plan for AFib ablation - [facility]- Dr Shanelle Quiroz    Atrial flutter [facility])     Bursitis, trochanteric 2004    s/p injection    Cerumen impaction 2012    Diverticulosis     Dr. Lola Yeung C scope    Diverticulosis of colon 2010    Colonoscopy-Dr Reece ACUÑA (degenerative joint disease), cervical     cervical, generalized disc buldge   MRI 3/02    DVT (deep venous thrombosis) ([address]     Dyslipidemia     Environmental allergies     Family history of colon cancer     mother    Gastric polyp     2006, 2009 benign- Dr Lola Yeung    Gastritis 2008    mild superficial per Dr Christianne Leon    GERD (gastroesophageal reflux disease) 08/2006    Dr Christianne Leon    H/O cardiovascular stress test 07/13/2004 07/13 EF58%, No scintigraphic evidence of inducible myocardial ischemia 04/13Normal study. No wall motion abnormality seen. EF 65%    H/O Doppler ultrasound 06/14/2022    VL Lower Ext Arteries/Venous Right. No evidence of pseudoaneurysm, AV fistula, or hematoma is present. H/O Doppler ultrasound 12/27/2022    Right CFV is patent with good compressibility and resirophasic signal with good augmentation. Right GSV is non compressible with  no evidence of flow just past the saphenofemoral junction to the knee. H/O echocardiogram 07/18/2013 7/13-EF 50-55% Mild AR. H/O exercise stress test 02/07/2017    EF63% normal    History of Holter monitoring 09/23/2015    48 hour - abnormal holter revealing afib throughout the recording with interspersed pacemaker beats, HR does not appear to be well controlled    History of mammogram     last mammo 7/25/11, Dr. Gracie Red yearly    Hx of colonoscopy     1/21/2010    Hx of Doppler Venous ultrasound 08/31/2021    No DVT or SVT, No significant reflux in RLE, Significant reflux in Left CFV    Hyperlipidemia     Hypertension     Hypothyroidism     Internal hemorrhoid 2015    Dr. Christianne Leon C scope     Intervertebral disc protrusion 05/2009    wry neck with C4-5      Left shoulder pain 04/2004    (L) shoulder impingement  mild DJD    Long term current use of anticoagulant 07/26/2016    **Coumadin Clinic follows PT/INRs, along w/prescribing pt's Coumadin dosages. **    Menstruation     age 14    Pacemaker 06/21/2012    dual chamber- checked every 3 months    Pulmonary hypertension (HCC)     mild per echo 8/24/2010, Pt refused sleep study (June 2012)    Restless legs 08/2003    ferritin    Right shoulder strain 02/2003    no seperation, bony contusion anterior humeral head  MRI 3/03    Sciatica 07/2009    (R) chronic intermittent s/p epidural injections  Dr Marianne Rizvi Sciatica     Shoulder pain, left 03/2011    RTC tendinopathy, type II acrominum, bursitis- referred to Dr. Andie Morel    Shoulder pain, right 03/10/2009    impingement, physical thearpy   ([place])  calcific bursitis  s/p injection    Sinus bradycardia     Sinusitis 12/12/2011    Tinnitus 03/2002    Vision changes 03/19/2013       PAST SURGICAL HISTORY    Past Surgical History:   Procedure Laterality Date    ABLATION OF DYSRHYTHMIC FOCUS      BREAST BIOPSY      BREAST SURGERY  1985    Right breast tumor excised    CARDIOVERSION      1/2008 Dr Selin Duran; 12/2008    CARDIOVERSION  03/10/2014    [facility]    CHOLECYSTECTOMY, LAPAROSCOPIC  02/2001    Dr Adonica Galeazzi    COLONOSCOPY      [phone] (Dr Priscilla Tello)    [place] (CERVIX STATUS UNKNOWN)  2003    OTHER SURGICAL HISTORY Left 07/12/2022    upgrade to Medtronic Bi vi pacer, with AV node ablation performed by Dr. Tyrell Ndiaye. PACEMAKER PLACEMENT  06/21/2012    Medtronic Adapta ADDRL1 -not mri compatible    [place] [facility]    Dr Rocio Gordon (CERVIX REMOVED)  04/2003    fibroid      Dr Lexus García  08/2006    Dr Aracelis Lewis ECHOCARDIOGRAM  06/2012    transthoracic cardioversion-Dr. Staci Harding    UPPER GASTROINTESTINAL ENDOSCOPY  04/2008    mild superficial gastritis  Dr Priscilla Tello; 2009       FAMILY HISTORY    Family History   Problem Relation Age of Onset    Stroke Mother     Heart Disease Mother     Coronary Art Dis Mother 66        CABG    Colon Cancer Mother [de-identified]        colon     Heart Disease Father     Coronary Art Dis Father 62        CABG    Migraines Sister     Seizures Brother     Breast Cancer Maternal Cousin         under 48       SOCIAL HISTORY    Social History     Tobacco Use    Smoking status: Never    Smokeless tobacco: Never    Tobacco comments:     reviewed 11/4/15   Vaping Use    Vaping Use: Never used   Substance Use Topics    Alcohol use: No    Drug use:  No ALLERGIES    Allergies   Allergen Reactions    Latex Hives    Darvon [Propoxyphene Hcl] Shortness Of Breath    Demerol Rash     Breathing problems    Dilaudid [Hydromorphone Hcl] Anaphylaxis    Valium Rash     Breathing problems    Celecoxib Diarrhea    Norco [Hydrocodone-Acetaminophen] Diarrhea, Nausea Only and Other (See Comments)     A-Fib    Norvasc [Amlodipine] Other (See Comments)     HA, dizziness    Oxycodone Itching    Tape [Adhesive Tape] Other (See Comments)     BLISTER    Vasotec [Enalapril] Other (See Comments)     Nausea, vomiting    Diazepam Rash       MEDICATIONS    Current Outpatient Medications on File Prior to Encounter   Medication Sig Dispense Refill    metoprolol succinate (TOPROL XL) 50 MG extended release tablet Take 1 tablet by mouth in the morning and at bedtime 90 tablet 3    atorvastatin (LIPITOR) 40 MG tablet Take 1 tablet by mouth daily TAKE 1 TABLET BY MOUTH EVERY DAY 90 tablet 3    Elastic Bandages & Supports (MEDICAL COMPRESSION THIGH HIGH) MISC 1 each by Does not apply route daily Wear daily and remove nightly   20 - 30 mmgh 1 each 3    levothyroxine (SYNTHROID) 50 MCG tablet TAKE 1 TABLET BY MOUTH EVERY DAY BEFORE BREAKFAST 90 tablet 3    memantine (NAMENDA) 10 MG tablet Take 1 tablet by mouth 2 times daily NOTE TO PHARMACY: DO NOT FILL UNTIL 5 MG RX IS COMPLETED 60 tablet 5    memantine (NAMENDA) 5 MG tablet Take (1) 5 mg tab QD x7 days, then 1 BID x7 days; then 2 tabs Q am-1 tab pm x7 days then change to 10mg RX (Patient not taking: No sig reported) 42 tablet 0    fexofenadine (ALLEGRA) 180 MG tablet Take 180 mg by mouth daily      warfarin (COUMADIN) 3 MG tablet Take 1 tablet by mouth daily Except take 1 and 1/2 tablets (4.5mg) on Thursdays or as directed 100 tablet 3    donepezil (ARICEPT) 10 MG tablet TAKE 1 TABLET BY MOUTH EVERY DAY 90 tablet 1    torsemide (DEMADEX) 10 MG tablet Take 1 tablet by mouth in the morning.  30 tablet 5    cetirizine (ZYRTEC) 10 MG tablet Take 10 mg by mouth daily (Patient not taking: No sig reported)      aspirin 81 MG chewable tablet Take 1 tablet by mouth daily 30 tablet 3    Vitamin D (CHOLECALCIFEROL) 25 MCG (1000 UT) TABS tablet Take 1,000 Units by mouth daily      dexlansoprazole (DEXILANT) 60 MG CPDR delayed release capsule Take 1 tablet by mouth daily. No current facility-administered medications on file prior to encounter. REVIEW OF SYSTEMS    Pertinent items are noted in HPI. Constitutional: Negative for systemic symptoms including fever, chills and malaise. Objective:      /72   Pulse 79   Temp 97.6 °F (36.4 °C) (Temporal)   Resp 20     PHYSICAL EXAM      General: The patient is in no acute distress. Mental status:  Patient is appropriate, is  oriented to place and plan of care. Dermatologic exam: Visual inspection of the periwound reveals the skin to be normal in turgor and texture and dry  Wound exam: see wound description below in procedure note      Assessment:     Problem List Items Addressed This Visit          Circulatory    WD-Venous stasis ulcer of right calf with fat layer exposed with varicose veins (HCC)       Other    WD-Non-pressure chronic ulcer of right calf with fat layer exposed ([address] - Primary    Relevant Orders    Initiate Outpatient Wound Care Protocol     Procedure Note    Indications:  Based on my examination of this patient's wound(s) today, sharp excision into necrotic  devitalized tissue   is required to promote healing and evaluate the extent of previous healing. Performed by: ANTHONY Lovett - CNP    Consent obtained: Yes    Time out taken:  Yes    Pain Control: N/a      Debridement:Excisional Debridement    Using curette the wound(s) was/were sharply debrided down through and including the removal of  devitalized tissue  .         Devitalized Tissue Debrided:  slough and exudate    Pre Debridement Measurements:  Are located in the Wound Documentation Flow Sheet    All active wounds listed below with today's date are evaluated  Wound(s)    debrided this date include # :  right lower extremity       Post  Debridement Measurements:       Percent of Wound(s) Debrided: approximately 100%    Total  Area  Debrided:  2 sq cm     Bleeding:  None    Hemostasis Achieved:  not needed    Procedural Pain:  0  / 10     Post Procedural Pain:  0 / 10     Response to treatment:  Well tolerated by patient. Status of wound progress and description from last visit:   Healed. Discharge at this time. Patient knows that they may return or call with any questions, comments, or concerns. Discussed strategies for preventing future wounds, including regular compression, daily moisturizing, regular exercise, and performing regular foot checks. Patient verbalized understanding      Patient educated on the 6 essential components necessary for wound healing: Circulation, Debridements, Proper Dressings and Topical Wound Products, Infection Control, Edema Control and Offloading. Patient educated on those factors that negatively effect or impact wound healing: smoking, obesity, uncontrolled diabetes, anticoagulant and immunosuppressive regimens, inadequate nutrition, untreated arterial and venous disease if applicable and measures to manage edema. Plan:       Discharge Instructions         PHYSICIAN ORDERS AND DISCHARGE INSTRUCTIONS     NOTE: Upon discharge from the [address], you will receive a patient experience survey. We would be grateful if you would take the time to fill this survey out. Wound care order history:                 KATRIN's   Right       Left               Date:              Cultures:                Grafts:                Antibiotics:           Continuing wound care orders and information:                 Residence:                Continue home health care with:               Your wound-care supplies will be provided by:       Wound cleansing:               Do not scrub or use excessive force. Wash hands with soap and water before and after dressing changes. Prior to applying a clean dressing, cleanse wound with normal saline, wound cleanser, or mild soap and water. Ask the physician or nurse before getting the wound(s) wet in a shower     Daily Wound management:              Keep weight off wounds and reposition every 2 hours. Avoid standing for long periods of time. Apply wraps/stockings in AM and remove at bedtime. If swelling is present, elevate legs to the level of the heart or above for 30 minutes 4-5 times a day and/or when sitting. When taking antibiotics take entire prescription as ordered by physician do not stop taking until medicine is all gone. Orders for this week: 1/13/23                  Rx:     Right Lateral Ankle - Healed 1/13/23. Apply Kandice to wound bed. Cover with Silicone border (or large bandaid). Wear [address] in place for 2-3 days. Then remove and leave open to air. Keep lower extremities moisturized, continue to wear compression stockings during the day. Discharged from the wound care center 1/13/23  Call [phone] for any questions or concerns.   Date__________   Time____________        Treatment Note      Written Patient Dismissal Instructions Given            Electronically signed by ANTHONY Zavaleta CNP on 1/13/2023 at 8:52 AM

## 2023-01-22 PROCEDURE — 93294 REM INTERROG EVL PM/LDLS PM: CPT | Performed by: INTERNAL MEDICINE

## 2023-01-22 PROCEDURE — 93296 REM INTERROG EVL PM/IDS: CPT | Performed by: INTERNAL MEDICINE

## 2023-01-23 ENCOUNTER — PROCEDURE VISIT (OUTPATIENT)
Dept: CARDIOLOGY CLINIC | Age: 78
End: 2023-01-23
Payer: MEDICARE

## 2023-01-23 DIAGNOSIS — Z95.0 CARDIAC PACEMAKER IN SITU: ICD-10-CM

## 2023-01-23 DIAGNOSIS — I87.301 CHRONIC VENOUS HYPERTENSION INVOLVING RIGHT SIDE: Primary | ICD-10-CM

## 2023-01-23 PROCEDURE — 93971 EXTREMITY STUDY: CPT | Performed by: INTERNAL MEDICINE

## 2023-01-24 ENCOUNTER — TELEPHONE (OUTPATIENT)
Dept: CARDIOLOGY CLINIC | Age: 78
End: 2023-01-24

## 2023-01-24 NOTE — TELEPHONE ENCOUNTER
Venous doppler  Right CFV is patent with good compressibility and respirophasic signal with    good augmentation. The Right GSV is non-compressible with no evidence of flow just past the    saphenofemoral junction to the knee.      Patient verbally understood

## 2023-01-26 ENCOUNTER — ANTI-COAG VISIT (OUTPATIENT)
Dept: PHARMACY | Age: 78
End: 2023-01-26
Payer: MEDICARE

## 2023-01-26 DIAGNOSIS — I48.0 PAF (PAROXYSMAL ATRIAL FIBRILLATION) (HCC): Primary | ICD-10-CM

## 2023-01-26 LAB
INTERNATIONAL NORMALIZATION RATIO, POC: 1.6
POC INR: 1.6 INDEX
PROTHROMBIN TIME, POC: 19.6 SECONDS (ref 10–14.3)

## 2023-01-26 PROCEDURE — 36416 COLLJ CAPILLARY BLOOD SPEC: CPT

## 2023-01-26 PROCEDURE — 85610 PROTHROMBIN TIME: CPT

## 2023-01-26 PROCEDURE — 99212 OFFICE O/P EST SF 10 MIN: CPT

## 2023-01-26 NOTE — PROGRESS NOTES
Medication Management Service  [facility]  [phone]    Visit Date: 1/26/2023   Subjective:       Germaine Bryan is a 65 y.o. female who presents to clinic today for anticoagulation monitoring and adjustment. Patient seen in clinic for warfarin management due to  Indication:   atrial fibrillation. INR goal: of 2.0-3.0. Duration of therapy: indefinite. Patient reports the following:   Adherent with regimen  Missed or extra doses:  None   Bleeding or thromboembolic side effects:  None  Significant medication changes:  None  Significant dietary changes: broccoli same amount, but on Tuesday instead of Wed. This week  Significant alcohol or tobacco changes: None  Significant recent illness, disease state changes, or hospitalization:  None  Upcoming surgeries or procedures:  None  Falls: None           Assessment and Plan     PT/INR done in office per protocol. INR today is 1.6, subtherapeutic. Broccoli Tuesday instead of Wednesday this week        Plan:  Take warfarin 4.5mg daily for 2 days then will continue current regimen of warfarin 3mg daily except 4.5mg on Thursdays. Recheck INR in 2 week(s). Patient verbalized understanding of dosing directions and information discussed. Dosing schedule given to patient including phone number, appointment date, and time. Progress note sent to referring office. Patient acknowledges working in consult agreement with pharmacist as referred by his/her physician.       Electronically signed by Durga Hernandez [facility] on 1/26/23 at 10:50 AM EST    For Pharmacy Admin Tracking Only    Intervention Detail: Dose Adjustment: 1, reason: Therapy Optimization  Total # of Interventions Recommended: 1  Total # of Interventions Accepted: 1  Time Spent (min): 15

## 2023-02-01 ENCOUNTER — OFFICE VISIT (OUTPATIENT)
Dept: CARDIOLOGY CLINIC | Age: 78
End: 2023-02-01
Payer: MEDICARE

## 2023-02-01 VITALS
HEART RATE: 68 BPM | DIASTOLIC BLOOD PRESSURE: 70 MMHG | SYSTOLIC BLOOD PRESSURE: 100 MMHG | BODY MASS INDEX: 22.44 KG/M2 | WEIGHT: 143 LBS | HEIGHT: 67 IN

## 2023-02-01 DIAGNOSIS — I48.0 PAF (PAROXYSMAL ATRIAL FIBRILLATION) (HCC): ICD-10-CM

## 2023-02-01 DIAGNOSIS — Z98.890 S/P AV (ATRIOVENTRICULAR) NODAL ABLATION: ICD-10-CM

## 2023-02-01 DIAGNOSIS — I21.4 NSTEMI (NON-ST ELEVATED MYOCARDIAL INFARCTION) (HCC): ICD-10-CM

## 2023-02-01 DIAGNOSIS — E78.2 MIXED HYPERLIPIDEMIA: ICD-10-CM

## 2023-02-01 DIAGNOSIS — I25.118 ATHEROSCLEROTIC HEART DISEASE OF NATIVE CORONARY ARTERY WITH OTHER FORMS OF ANGINA PECTORIS (HCC): ICD-10-CM

## 2023-02-01 DIAGNOSIS — I25.10 CORONARY ARTERY DISEASE INVOLVING NATIVE CORONARY ARTERY OF NATIVE HEART WITHOUT ANGINA PECTORIS: Primary | ICD-10-CM

## 2023-02-01 DIAGNOSIS — Z95.0 PACEMAKER: ICD-10-CM

## 2023-02-01 DIAGNOSIS — I38 VHD (VALVULAR HEART DISEASE): ICD-10-CM

## 2023-02-01 DIAGNOSIS — I83.893 VARICOSE VEINS OF BOTH LEGS WITH EDEMA: ICD-10-CM

## 2023-02-01 DIAGNOSIS — I10 ESSENTIAL HYPERTENSION: ICD-10-CM

## 2023-02-01 PROCEDURE — 1036F TOBACCO NON-USER: CPT | Performed by: INTERNAL MEDICINE

## 2023-02-01 PROCEDURE — G8420 CALC BMI NORM PARAMETERS: HCPCS | Performed by: INTERNAL MEDICINE

## 2023-02-01 PROCEDURE — G8399 PT W/DXA RESULTS DOCUMENT: HCPCS | Performed by: INTERNAL MEDICINE

## 2023-02-01 PROCEDURE — 99213 OFFICE O/P EST LOW 20 MIN: CPT | Performed by: INTERNAL MEDICINE

## 2023-02-01 PROCEDURE — G8427 DOCREV CUR MEDS BY ELIG CLIN: HCPCS | Performed by: INTERNAL MEDICINE

## 2023-02-01 PROCEDURE — 3078F DIAST BP <80 MM HG: CPT | Performed by: INTERNAL MEDICINE

## 2023-02-01 PROCEDURE — 3074F SYST BP LT 130 MM HG: CPT | Performed by: INTERNAL MEDICINE

## 2023-02-01 PROCEDURE — G8484 FLU IMMUNIZE NO ADMIN: HCPCS | Performed by: INTERNAL MEDICINE

## 2023-02-01 PROCEDURE — 1123F ACP DISCUSS/DSCN MKR DOCD: CPT | Performed by: INTERNAL MEDICINE

## 2023-02-01 PROCEDURE — 1090F PRES/ABSN URINE INCON ASSESS: CPT | Performed by: INTERNAL MEDICINE

## 2023-02-01 NOTE — PROGRESS NOTES
Atrial Fibrillation CHADSVASC2 Score Stroke Risk:   65 y.o. > 76 - 2    female Female - 1   CHF HX: No - 0   HTN HX: Yes - 1   Stroke/TIA/Thromboembolism No - 0   Vascular Disease HX: No - 0   Diabetes Mellitus No - 0   CHADSVASC 2 Score 4      Annual Stroke Risk 4.8%- moderate-high

## 2023-02-01 NOTE — LETTER
Date:        2023                Time:1:15  Patient Name: Maya Santos  : 1945  MRN# [id number]    REASON FOR VISIT: ov mohammcharlie post alise Kaiser pt )  Phone [address]  Patient Active Problem List    Diagnosis Date Noted    Essential hypertension 2014    Long term current use of anticoagulant     Acquired hypothyroidism 2012    PA[facility] ([facility]oxysmal [facility] fibrillation) ([facility] 2011    Varicose veins of both legs with edema 12/15/2022    WD-Venous stasis ulcer of right calf with fat layer exposed with varicose veins ([address] 2022    Alzheimer's disease, unspecified 2022    WD-Non-pressure chronic ulcer of right calf with fat layer exposed ([address] 2022    Persistent atrial fibrillation with RVR ([address]     Coronary artery disease involving native coronary artery of native heart without angina pectoris 2022    Leg pain 2022    Chronic atrial fibrillation ([address] 2022    Dyspnea     NSTEMI (non-ST elevated myocardial infarction) ([address] 2022    Chest pain 2022    Gastroesophageal reflux disease without esophagitis 2015    Anemia 2014    Gout 2013    Pacemaker dual chamber 10/24/2012    Sciatica     Mixed hyperlipidemia 10/24/2011    Allergic rhinitis 10/24/2011    Memory problem 2022    Hearing loss of both ears due to cerumen impaction 2018    Hip bursitis 2016    S/P AV (atrioventricular) kathi ablation 2014    Symptomatic bradycardia 2012    VHD (valvular heart disease) 2012       Allergies: Latex, Darvon [propoxyphene hcl], Demerol, Dilaudid [hydromorphone hcl], Valium, Celecoxib, Norco [hydrocodone-acetaminophen], Norvasc [amlodipine], Oxycodone, Tape [adhesive tape], Vasotec [enalapril], and Diazepam      Current Outpatient Medications   Medication Sig Dispense Refill    metoprolol succinate (TOPROL XL) 50 MG extended release tablet Take 1 tablet by mouth in the morning and at bedtime 90 tablet 3    atorvastatin (LIPITOR) 40 MG tablet Take 1 tablet by mouth daily TAKE 1 TABLET BY MOUTH EVERY DAY 90 tablet 3    Elastic Bandages & Supports (MEDICAL COMPRESSION THIGH HIGH) MISC 1 each by Does not apply route daily Wear daily and remove nightly   20 - 30 mmgh 1 each 3    levothyroxine (SYNTHROID) 50 MCG tablet TAKE 1 TABLET BY MOUTH EVERY DAY BEFORE BREAKFAST 90 tablet 3    memantine (NAMENDA) 10 MG tablet Take 1 tablet by mouth 2 times daily NOTE TO PHARMACY: DO NOT FILL UNTIL 5 MG RX IS COMPLETED 60 tablet 5    memantine (NAMENDA) 5 MG tablet Take (1) 5 mg tab QD x7 days, then 1 BID x7 days; then 2 tabs Q am-1 tab pm x7 days then change to 10mg RX (Patient not taking: No sig reported) 42 tablet 0    fexofenadine (ALLEGRA) 180 MG tablet Take 180 mg by mouth daily      warfarin (COUMADIN) 3 MG tablet Take 1 tablet by mouth daily Except take 1 and 1/2 tablets (4.5mg) on Thursdays or as directed 100 tablet 3    donepezil (ARICEPT) 10 MG tablet TAKE 1 TABLET BY MOUTH EVERY DAY 90 tablet 1    torsemide (DEMADEX) 10 MG tablet Take 1 tablet by mouth in the morning. 30 tablet 5    cetirizine (ZYRTEC) 10 MG tablet Take 10 mg by mouth daily (Patient not taking: No sig reported)      aspirin 81 MG chewable tablet Take 1 tablet by mouth daily 30 tablet 3    Vitamin D (CHOLECALCIFEROL) 25 MCG (1000 UT) TABS tablet Take 1,000 Units by mouth daily      dexlansoprazole (DEXILANT) 60 MG CPDR delayed release capsule Take 1 tablet by mouth daily. No current facility-administered medications for this visit.          Lab Review   Lab Results   Component Value Date/Time    CKTOTAL 87 05/08/2022 08:45 PM    CKTOTAL 62 03/04/2013 03:15 PM    CKTOTAL 55 03/04/2013 03:15 PM    CKMB 1.3 10/14/2011 04:00 PM    TROPONINT 0.143 05/10/2022 02:16 AM    TROPONINT 0.187 05/09/2022 10:33 PM     BNP:    Lab Results   Component Value Date/Time     03/04/2013 03:15 PM    PROBNP 1,377 06/15/2022 03:50 PM    PROBNP 1,305 05/09/2022 06:50 PM     PT/INR:    Lab Results   Component Value Date    INR 1.6 01/26/2023    INR 1.6 01/26/2023     Lab Results   Component Value Date    LABA1C 5.4 04/30/2022    LABA1C 5.8 09/11/2018     Lab Results   Component Value Date    WBC 5.9 08/02/2022    WBC 6.2 07/07/2022    HCT 41.8 08/02/2022    HCT 40.4 07/07/2022    MCV 92.1 08/02/2022    MCV 94.0 07/07/2022     08/02/2022     (L) 07/07/2022     Lab Results   Component Value Date    CHOL 115 04/30/2022    CHOL 154 04/15/2021    TRIG 47 04/30/2022    TRIG 84 04/15/2021    HDL 51 04/30/2022    HDL 54 04/15/2021    LDLCALC 55 04/30/2022    LDLCALC 83 04/15/2021    LDLDIRECT 72 08/01/2020    LDLDIRECT 139 (H) 04/26/2012     Lab Results   Component Value Date    ALT 40 08/02/2022    ALT 27 05/10/2022    AST 41 (H) 08/02/2022    AST 33 05/10/2022     BMP:    Lab Results   Component Value Date/Time     08/02/2022 01:30 PM     07/07/2022 10:55 AM    K 4.3 08/02/2022 01:30 PM    K 4.9 07/07/2022 10:55 AM     08/02/2022 01:30 PM     07/07/2022 10:55 AM    CO2 30 08/02/2022 01:30 PM    CO2 27 07/07/2022 10:55 AM    BUN 15 08/02/2022 01:30 PM    BUN 13 07/07/2022 10:55 AM    CREATININE 0.8 08/02/2022 01:30 PM    CREATININE 0.8 07/07/2022 10:55 AM     CMP:   Lab Results   Component Value Date/Time     08/02/2022 01:30 PM     07/07/2022 10:55 AM    K 4.3 08/02/2022 01:30 PM    K 4.9 07/07/2022 10:55 AM     08/02/2022 01:30 PM     07/07/2022 10:55 AM    CO2 30 08/02/2022 01:30 PM    CO2 27 07/07/2022 10:55 AM    BUN 15 08/02/2022 01:30 PM    BUN 13 07/07/2022 10:55 AM    CREATININE 0.8 08/02/2022 01:30 PM    CREATININE 0.8 07/07/2022 10:55 AM    PROT 7.5 08/02/2022 01:30 PM    PROT 6.2 05/10/2022 08:54 AM    PROT 6.9 03/04/2013 03:15 PM    PROT 7.6 05/21/2011 01:13 PM     TSH:    Lab Results   Component Value Date/Time    TSH 3.76 10/19/2021 02:12 PM    TSH 4.42 2019 09:17 AM    TSH 4.440 2022 03:02 AM    TSH 2.830 2022 10:40 AM       Smoke: What:                           How much:    Alcohol: How Much:     Caffeine: Pop:         Tea:            Coffee:                Chocolate:    Exercise:    Last Visit:  Complaints:  Changes:  Testing: Follow-up:    LAST EK2022    VENOUS DOPPLER: 2023  Right CFV is patent with good compressibility and respirophasic signal with    good augmentation.    The Right GSV is non-compressible with no evidence of flow just past the    saphenofemoral junction to the knee       HOLTER/ EVENT MONITOR:     STRESS TEST:  2022   Normal tracer uptake in all segments of myocardium on stress ans rest    images. Normal Lexiscan nuclear scintigraphic study suggestive of normal    myocardial perfusion. Gated images demonstrate normal left ventricular    systolic function with EF of 60 %. ECHO: 2022   Left ventricular systolic function is low normal.   Ejection fraction is visually estimated at 45-50%. Septal wall is hypokinetic   Grade II diastolic dysfunction. Moderately dilated left atrium. Moderate aortic regurgitation; PHT: 328 msec. Moderate tricuspid regurgitation; RVSP: 39 mmHg. Mild PHTN. No evidence of any pericardial effusion    CAROTID: NONE    MUGA: NONE    LAST PACER CHECK: 2023  Pacer analysis is reviewed is consistent with normal MRI safe Medtronic CRT pacer function with stable leads and appropriate battery status for the age of the device. Remaining average battery life is 8.6 years.  Device is programmed to VVIR mode lower rate of 70 bpm and 100% pacing in both ventricles.  OptiVol fluid index suggest improved CHF status since last interrogation.  3 episodes of nonsustained ventricular tachycardia noted since 2022.     Recommend continued every three month check and follow up office visit as scheduled.     CARDIAC CATH: 2022  Left Heart Cath With Ventriculogram, Right   Coronary Angiography, Left Coronary Angiography      Conclusions      Procedure Summary   INDICATION: NSTEMI      FINDINGS:   Right dominant system. No significant CAD except 60 to 70 % disease of the ostium of a   Smaller Diagonal.      LV: Low normal LV function LVEF about 50 %  Amio Protocol:none    CHADS: [id number] Score for Atrial Fibrillation Stroke Risk   Risk   Factors  Component Value   C CHF No 0   H HTN Yes 1   A2 Age >= 76 Yes,  (73 y.o.) 2   D DM No 0   S2 Prior Stroke/TIA No 0   V Vascular Disease Yes 1   A Age 74-69 No,  (73 y.o.) 0   Sc Sex female 1    [id number]  Score  5   Score last updated 2/1/23 1:55 AM EST    Click here for a link to the UpToDate guideline \"Atrial Fibrillation: Anticoagulation therapy to prevent embolization    Disclaimer: Risk Score calculation is dependent on accuracy of patient problem list and past encounter diagnosis.

## 2023-02-01 NOTE — PATIENT INSTRUCTIONS
CORONARY ARTERY DISEASE:Yes, per . HYPERTENSION:Yes  well controlled on current medical regimen. VALVULAR HEART DISEASE:yes, Moderate AI     DYSLIPIDEMIA: yes,  Patient's profile is at / near Goal.yes,   HDL is low   Tolerating current medical regimen wellyes. Takes lIPITOR    ARRHYTHMIAS:yes,   Patient has H/O Atrial fibrillation  She is rate controlled & on anticoagulation. Takes    CHRONIC VENOUS INSUFFICIENCY:yes, S/P right GSV Ablation   Patient had symptomatic C4 disease   Right CFV is patent with good compressibility and respirophasic signal with    good augmentation. The Right GSV is non-compressible with no evidence of flow just past the    saphenofemoral junction to the knee. Has B/L Deep vein Reflux, need to continue to wear 20 to 30 mm of Hg stockings. TESTS ORDERED: None this visit     PREVIOUSLY ORDERED TESTS REVIEWED & DISCUSSED WITH THE PATIENT:     I personally reviewed & interpreted, all previously ordered tests as copied above. Latest Labs are pulled in to the note with dates. Labs, specially in Reference to Lipid profile, Cardiac testing in the form of Echo ( dated: ), stress tests ( dated: ) & other relevant cardiac testing reviewed with patient & recommendations made based on assessment of the results. Discussed role of Cardiac risk factors & effects + treatment of co morbidities with patient & advised accordingly. MEDICATIONS: List of medications patient is currently taking is reviewed in detail with the patient. Discussed any side effects or problems taking the medication. Recommend Continue present management & medications as listed. AFFIRMATION: I spent at least 20 minutes of time reviewing patient's history, previous & current medical problems & all Labs + testing. This includes chart prep even prior to the vosit. Various goals are discussed and multiple questions answered. Relevant concelling performed.      Office follow up with  per appointment. Device check per protocol.

## 2023-02-01 NOTE — LETTER
[address]  [address] [id number]  Phone: [phone]  Fax: [phone]    Anais Gardner MD    February 1, 2023     Luis Jose MD  [facility]    Patient: Maya Santos   MR Number: [id number]   YOB: 1945   Date of Visit: 2/1/2023       Dear Luis Jose:    Thank you for referring Maya Santos to me for evaluation/treatment. Below are the relevant portions of my assessment and plan of care. If you have questions, please do not hesitate to call me. I look forward to following Wellington Pont along with you.     Sincerely,      Anais Gardner MD

## 2023-02-01 NOTE — PROGRESS NOTES
Rober Styles is a 65 y.o. female who has    CHIEF COMPLAINT AS FOLLOWS:  CHEST PAIN: Patient denies any C/O chest pains at this time. SOB: No C/O SOB at this time. LEG EDEMA: Little pain post ablation. PALPITATIONS: Denies any C/O Palpitations   DIZZINESS: No C/O Dizziness   SYNCOPE: None   OTHER/ ADDITIONAL COMPLAINTS:                                     HPI: Patient is here for F/U on his CAD, CVI, VHD,  Arrhythmia, HTN & Dyslipidemia problems. CAD: Patient has known CAD. VHD: Patient has known aortic valve disease. Arrhythmia: Patient has known PAF S/P PPM after AV-Blossom ablation. HTN: Patient has known essential HTN. Has been treated with guideline recommended medical / physical/ diet therapy as stated below. Dyslipidemia: Patient has known mixed dyslipidemia. Has been treated with guideline recommended medical / physical/ diet therapy as stated below.                 Current Outpatient Medications   Medication Sig Dispense Refill    metoprolol succinate (TOPROL XL) 50 MG extended release tablet Take 1 tablet by mouth in the morning and at bedtime 90 tablet 3    atorvastatin (LIPITOR) 40 MG tablet Take 1 tablet by mouth daily TAKE 1 TABLET BY MOUTH EVERY DAY 90 tablet 3    Elastic Bandages & Supports (MEDICAL COMPRESSION THIGH HIGH) MISC 1 each by Does not apply route daily Wear daily and remove nightly   20 - 30 mmgh 1 each 3    levothyroxine (SYNTHROID) 50 MCG tablet TAKE 1 TABLET BY MOUTH EVERY DAY BEFORE BREAKFAST 90 tablet 3    memantine (NAMENDA) 10 MG tablet Take 1 tablet by mouth 2 times daily NOTE TO PHARMACY: DO NOT FILL UNTIL 5 MG RX IS COMPLETED 60 tablet 5    fexofenadine (ALLEGRA) 180 MG tablet Take 180 mg by mouth daily      warfarin (COUMADIN) 3 MG tablet Take 1 tablet by mouth daily Except take 1 and 1/2 tablets (4.5mg) on Thursdays or as directed 100 tablet 3    donepezil (ARICEPT) 10 MG tablet TAKE 1 TABLET BY MOUTH EVERY DAY 90 tablet 1 torsemide (DEMADEX) 10 MG tablet Take 1 tablet by mouth in the morning. 30 tablet 5    aspirin 81 MG chewable tablet Take 1 tablet by mouth daily 30 tablet 3    Vitamin D (CHOLECALCIFEROL) 25 MCG (1000 UT) TABS tablet Take 1,000 Units by mouth daily      dexlansoprazole (DEXILANT) 60 MG CPDR delayed release capsule Take 1 tablet by mouth daily. memantine (NAMENDA) 5 MG tablet Take (1) 5 mg tab QD x7 days, then 1 BID x7 days; then 2 tabs Q am-1 tab pm x7 days then change to 10mg RX (Patient not taking: No sig reported) 42 tablet 0    cetirizine (ZYRTEC) 10 MG tablet Take 10 mg by mouth daily (Patient not taking: No sig reported)       No current facility-administered medications for this visit. Allergies: Latex, Darvon [propoxyphene hcl], Demerol, Dilaudid [hydromorphone hcl], Valium, Celecoxib, Norco [hydrocodone-acetaminophen], Norvasc [amlodipine], Oxycodone, Tape [adhesive tape], Vasotec [enalapril], and Diazepam  Review of Systems:    Constitutional: Negative for diaphoresis and fatigue  Respiratory: Negative for shortness of breath  Cardiovascular: Negative for chest pain, dyspnea on exertion, claudication, edema, irregular heartbeat, murmur, palpitations or shortness of breath  Musculoskeletal: Negative for muscle pain, muscular weakness, negative for pain in arm and leg or swelling in foot and leg    Objective:  /70   Pulse 68   Ht 5' 7\" (1.702 m)   Wt 143 lb (64.9 kg)   BMI 22.40 kg/m²   Wt Readings from Last 3 Encounters:   02/01/23 143 lb (64.9 kg)   01/10/23 136 lb (61.7 kg)   12/15/22 136 lb (61.7 kg)     Body mass index is 22.4 kg/m². GENERAL - Alert, oriented, pleasant, in no apparent distress. EYES: No jaundice, no conjunctival pallor. Neck - Supple. No jugular venous distention noted. No carotid bruits. Cardiovascular - Normal S1 and S2 without obvious murmur or gallop. Extremities - No cyanosis, clubbing, or significant edema. Pulmonary - No respiratory distress.   No wheezes or rales.       MEDICAL DECISION MAKING & DATA REVIEW:    Lab Review   Lab Results   Component Value Date/Time    TROPONINT 0.143 05/10/2022 02:16 AM    TROPONINT 0.187 05/09/2022 10:33 PM     Lab Results   Component Value Date/Time     03/04/2013 03:15 PM    PROBNP 1,377 06/15/2022 03:50 PM    PROBNP 1,305 05/09/2022 06:50 PM     Lab Results   Component Value Date    INR 1.6 01/26/2023    INR 1.6 01/26/2023     Lab Results   Component Value Date    LABA1C 5.4 04/30/2022    LABA1C 5.8 09/11/2018     Lab Results   Component Value Date    WBC 5.9 08/02/2022    WBC 6.2 07/07/2022    HCT 41.8 08/02/2022    HCT 40.4 07/07/2022    MCV 92.1 08/02/2022    MCV 94.0 07/07/2022     08/02/2022     (L) 07/07/2022     Lab Results   Component Value Date    CHOL 115 04/30/2022    CHOL 154 04/15/2021    TRIG 47 04/30/2022    TRIG 84 04/15/2021    HDL 51 04/30/2022    HDL 54 04/15/2021    LDLCALC 55 04/30/2022    LDLCALC 83 04/15/2021    LDLDIRECT 72 08/01/2020    LDLDIRECT 139 (H) 04/26/2012     Lab Results   Component Value Date    ALT 40 08/02/2022    ALT 27 05/10/2022    AST 41 (H) 08/02/2022    AST 33 05/10/2022     BMP:    Lab Results   Component Value Date/Time     08/02/2022 01:30 PM     07/07/2022 10:55 AM    K 4.3 08/02/2022 01:30 PM    K 4.9 07/07/2022 10:55 AM     08/02/2022 01:30 PM     07/07/2022 10:55 AM    CO2 30 08/02/2022 01:30 PM    CO2 27 07/07/2022 10:55 AM    BUN 15 08/02/2022 01:30 PM    BUN 13 07/07/2022 10:55 AM    CREATININE 0.8 08/02/2022 01:30 PM    CREATININE 0.8 07/07/2022 10:55 AM     CMP:   Lab Results   Component Value Date/Time     08/02/2022 01:30 PM     07/07/2022 10:55 AM    K 4.3 08/02/2022 01:30 PM    K 4.9 07/07/2022 10:55 AM     08/02/2022 01:30 PM     07/07/2022 10:55 AM    CO2 30 08/02/2022 01:30 PM    CO2 27 07/07/2022 10:55 AM    BUN 15 08/02/2022 01:30 PM    BUN 13 07/07/2022 10:55 AM    CREATININE 0.8 08/02/2022 01:30 PM    CREATININE 0.8 07/07/2022 10:55 AM    PROT 7.5 08/02/2022 01:30 PM    PROT 6.2 05/10/2022 08:54 AM    PROT 6.9 03/04/2013 03:15 PM    PROT 7.6 05/21/2011 01:13 PM     Lab Results   Component Value Date/Time    TSH 3.76 10/19/2021 02:12 PM    TSH 4.42 04/09/2019 09:17 AM    TSHHS 4.440 05/09/2022 03:02 AM    TSHHS 2.830 04/29/2022 10:40 AM       QUALITY MEASURES REVIEWED:  1.CAD:Patient is taking anti platelet agent:Yes  2. DYSLIPIDEMIA: Patient is on cholesterol lowering medication:Yes   3. Beta-Blocker therapy for CAD, if prior Myocardial Infarction:Yes   4. Counselled regarding smoking cessation. No   Patient does not Smoke. 5.Anticoagulation therapy (for A.Fib) Yes   Does Not have A.Fib.  6.Discussed weight management strategies. Assessment & Plan:  Primary / Secondary prevention is the goal by aggressive risk modification, healthy and therapeutic life style changes for cardiovascular risk reduction. CORONARY ARTERY DISEASE:Yes, per . HYPERTENSION:Yes  well controlled on current medical regimen. VALVULAR HEART DISEASE:yes, Moderate AI     DYSLIPIDEMIA: yes,  Patient's profile is at / near Goal.yes,   HDL is low   Tolerating current medical regimen wellyes. Takes lIPITOR    ARRHYTHMIAS:yes,   Patient has H/O Atrial fibrillation  She is rate controlled & on anticoagulation. Takes    CHRONIC VENOUS INSUFFICIENCY:yes, S/P right GSV Ablation   Patient had symptomatic C4 disease   Right CFV is patent with good compressibility and respirophasic signal with    good augmentation. The Right GSV is non-compressible with no evidence of flow just past the    saphenofemoral junction to the knee. Has B/L Deep vein Reflux, need to continue to wear 20 to 30 mm of Hg stockings. TESTS ORDERED: None this visit     PREVIOUSLY ORDERED TESTS REVIEWED & DISCUSSED WITH THE PATIENT:     I personally reviewed & interpreted, all previously ordered tests as copied above.  Latest Labs are pulled in to the note with dates. Labs, specially in Reference to Lipid profile, Cardiac testing in the form of Echo ( dated: ), stress tests ( dated: ) & other relevant cardiac testing reviewed with patient & recommendations made based on assessment of the results. Discussed role of Cardiac risk factors & effects + treatment of co morbidities with patient & advised accordingly. MEDICATIONS: List of medications patient is currently taking is reviewed in detail with the patient. Discussed any side effects or problems taking the medication. Recommend Continue present management & medications as listed. AFFIRMATION: I spent at least 20 minutes of time reviewing patient's history, previous & current medical problems & all Labs + testing. This includes chart prep even prior to the vosit. Various goals are discussed and multiple questions answered. Relevant concelling performed. Office follow up with  per appointment. Device check per protocol.

## 2023-02-09 ENCOUNTER — ANTI-COAG VISIT (OUTPATIENT)
Dept: PHARMACY | Age: 78
End: 2023-02-09
Payer: MEDICARE

## 2023-02-09 DIAGNOSIS — I48.0 PAF (PAROXYSMAL ATRIAL FIBRILLATION) (HCC): Primary | ICD-10-CM

## 2023-02-09 LAB
INTERNATIONAL NORMALIZATION RATIO, POC: 1.9
POC INR: 1.9 INDEX
PROTHROMBIN TIME, POC: 22.9 SECONDS (ref 10–14.3)

## 2023-02-09 PROCEDURE — 85610 PROTHROMBIN TIME: CPT

## 2023-02-09 PROCEDURE — 36416 COLLJ CAPILLARY BLOOD SPEC: CPT

## 2023-02-09 PROCEDURE — 99211 OFF/OP EST MAY X REQ PHY/QHP: CPT

## 2023-02-09 NOTE — PROGRESS NOTES
Medication Management Service  [facility]  [phone]    Visit Date: 2/9/2023   Subjective:       Sheridan Connors is a 65 y.o. female who presents to clinic today for anticoagulation monitoring and adjustment. Patient seen in clinic for warfarin management due to  Indication:   atrial fibrillation. INR goal: of 2.0-3.0. Duration of therapy: indefinite. Patient reports the following:   Adherent with regimen  Missed or extra doses:  None   Bleeding or thromboembolic side effects:  None  Significant medication changes:  None  Significant dietary changes: None  Significant alcohol or tobacco changes: None  Significant recent illness, disease state changes, or hospitalization:  None  Upcoming surgeries or procedures:  None  Falls: None           Assessment and Plan     PT/INR done in office per protocol. INR today is 1.9, therapeutic. Will check next on a Monday to see effect of 4.5mg dose on Thursdays. Plan: Will continue current regimen of warfarin 3mg daily except 4.5mg on Thursdays. Recheck INR in 1.5 week(s). Patient verbalized understanding of dosing directions and information discussed. Dosing schedule given to patient including phone number, appointment date, and time. Progress note sent to referring office. Patient acknowledges working in consult agreement with pharmacist as referred by his/her physician. Electronically signed by Ciara Huff [facility] on 2/9/23 at 10:31 AM EST    For Pharmacy Admin Tracking Only    Intervention Detail:   Total # of Interventions Recommended:    Total # of Interventions Accepted:   Time Spent (min): 15

## 2023-02-10 RX ORDER — TORSEMIDE 10 MG/1
10 TABLET ORAL DAILY
Qty: 90 TABLET | Refills: 3 | Status: SHIPPED | OUTPATIENT
Start: 2023-02-10

## 2023-02-17 RX ORDER — METOPROLOL SUCCINATE 50 MG/1
TABLET, EXTENDED RELEASE ORAL
Qty: 90 TABLET | Refills: 3 | OUTPATIENT
Start: 2023-02-17

## 2023-02-20 ENCOUNTER — ANTI-COAG VISIT (OUTPATIENT)
Dept: PHARMACY | Age: 78
End: 2023-02-20
Payer: MEDICARE

## 2023-02-20 DIAGNOSIS — I48.0 PAF (PAROXYSMAL ATRIAL FIBRILLATION) (HCC): Primary | ICD-10-CM

## 2023-02-20 LAB
INTERNATIONAL NORMALIZATION RATIO, POC: 1.9
POC INR: 1.9 INDEX
PROTHROMBIN TIME, POC: 22.7 SECONDS (ref 10–14.3)

## 2023-02-20 PROCEDURE — 99212 OFFICE O/P EST SF 10 MIN: CPT

## 2023-02-20 PROCEDURE — 36416 COLLJ CAPILLARY BLOOD SPEC: CPT

## 2023-02-20 PROCEDURE — 85610 PROTHROMBIN TIME: CPT

## 2023-02-20 RX ORDER — WARFARIN SODIUM 3 MG/1
3 TABLET ORAL DAILY
COMMUNITY

## 2023-02-20 NOTE — PROGRESS NOTES
Medication Management Service  [facility]  [phone]    Visit Date: 2/20/2023   Subjective:       Elizabeth Zavala is a 65 y.o. female who presents to clinic today for anticoagulation monitoring and adjustment. Patient seen in clinic for warfarin management due to  Indication:   atrial fibrillation. INR goal: of 2.0-3.0. Duration of therapy: indefinite. Patient reports the following:   Adherent with regimen  Missed or extra doses:  None   Bleeding or thromboembolic side effects:  None  Significant medication changes:  None  Significant dietary changes: None  Significant alcohol or tobacco changes: None  Significant recent illness, disease state changes, or hospitalization:  None  Upcoming surgeries or procedures:  None  Falls: None           Assessment and Plan     PT/INR done in office per protocol. INR today is 1.9, therapeutic, but still below goal.     Plan: Increase weekly dose ~7% to warfarin 3mg daily except 4.5mg on Mondays and Thursdays     Recheck INR in 2.5 week(s). Patient verbalized understanding of dosing directions and information discussed. Dosing schedule given to patient including phone number, appointment date, and time. Progress note sent to referring office. Patient acknowledges working in consult agreement with pharmacist as referred by his/her physician.       Electronically signed by NICCI Smith [facility] on 2/20/23 at 9:47 AM EST    For Pharmacy Admin Tracking Only    Intervention Detail: Dose Adjustment: 1, reason: Therapy Optimization  Total # of Interventions Recommended: 1  Total # of Interventions Accepted: 1  Time Spent (min): 15

## 2023-03-09 ENCOUNTER — ANTI-COAG VISIT (OUTPATIENT)
Dept: PHARMACY | Age: 78
End: 2023-03-09
Payer: MEDICARE

## 2023-03-09 DIAGNOSIS — I48.0 PAF (PAROXYSMAL ATRIAL FIBRILLATION) (HCC): Primary | ICD-10-CM

## 2023-03-09 LAB
INTERNATIONAL NORMALIZATION RATIO, POC: 2
POC INR: 2 INDEX
PROTHROMBIN TIME, POC: 23.9 SECONDS (ref 10–14.3)

## 2023-03-09 PROCEDURE — 85610 PROTHROMBIN TIME: CPT

## 2023-03-09 PROCEDURE — 99211 OFF/OP EST MAY X REQ PHY/QHP: CPT

## 2023-03-09 PROCEDURE — 36416 COLLJ CAPILLARY BLOOD SPEC: CPT

## 2023-03-09 NOTE — PROGRESS NOTES
Medication Management Service  [facility]  [phone]    Visit Date: 3/9/2023   Subjective:       Della Duncan is a 65 y.o. female who presents to clinic today for anticoagulation monitoring and adjustment. Patient seen in clinic for warfarin management due to  Indication:   atrial fibrillation. INR goal: of 2.0-3.0. Duration of therapy: indefinite. Patient reports the following:   Adherent with regimen  Missed or extra doses:  None   Bleeding or thromboembolic side effects:  None  Significant medication changes:  None  Significant dietary changes: None  Significant alcohol or tobacco changes: None  Significant recent illness, disease state changes, or hospitalization:  None  Upcoming surgeries or procedures:  None  Falls: None           Assessment and Plan     PT/INR done in office per protocol. INR today is 2.0, therapeutic. Plan: Will continue current regimen of warfarin 3mg daily except 4.5mg on Mondays and Thursdays. Recheck INR in 3 week(s). Patient verbalized understanding of dosing directions and information discussed. Dosing schedule given to patient including phone number, appointment date, and time. Progress note sent to referring office. Patient acknowledges working in consult agreement with pharmacist as referred by his/her physician. Electronically signed by Haley Blair [facility] on 3/9/23 at 10:33 AM EST    For Pharmacy Admin Tracking Only    Intervention Detail:   Total # of Interventions Recommended:    Total # of Interventions Accepted:   Time Spent (min): 15

## 2023-03-30 ENCOUNTER — ANTI-COAG VISIT (OUTPATIENT)
Dept: PHARMACY | Age: 78
End: 2023-03-30
Payer: MEDICARE

## 2023-03-30 DIAGNOSIS — I48.0 PAF (PAROXYSMAL ATRIAL FIBRILLATION) (HCC): Primary | ICD-10-CM

## 2023-03-30 LAB
INTERNATIONAL NORMALIZATION RATIO, POC: 2.3
POC INR: 2.3 INDEX
PROTHROMBIN TIME, POC: 27 SECONDS (ref 10–14.3)

## 2023-03-30 PROCEDURE — 85610 PROTHROMBIN TIME: CPT

## 2023-03-30 PROCEDURE — 36416 COLLJ CAPILLARY BLOOD SPEC: CPT

## 2023-03-30 PROCEDURE — 99211 OFF/OP EST MAY X REQ PHY/QHP: CPT

## 2023-03-30 NOTE — PROGRESS NOTES
Medication Management Service  [facility]  [phone]    Visit Date: 3/30/2023   Subjective:       Nevin Mendiola is a 65 y.o. female who presents to clinic today for anticoagulation monitoring and adjustment. Patient seen in clinic for warfarin management due to  Indication:   atrial fibrillation. INR goal: of 2.0-3.0. Duration of therapy: indefinite. Patient reports the following:   Adherent with regimen  Missed or extra doses:  None   Bleeding or thromboembolic side effects:  None  Significant medication changes:  None  Significant dietary changes: None  Significant alcohol or tobacco changes: None  Significant recent illness, disease state changes, or hospitalization:  None  Upcoming surgeries or procedures:  None  Falls: None           Assessment and Plan     PT/INR done in office per protocol. INR today is 2.3, therapeutic. Plan: Will continue current regimen of warfarin 3mg daily except 4.5mg on Mondays and Thursdays. Recheck INR in 4 week(s). Patient verbalized understanding of dosing directions and information discussed. Dosing schedule given to patient including phone number, appointment date, and time. Progress note sent to referring office. Patient acknowledges working in consult agreement with pharmacist as referred by his/her physician. Electronically signed by Farzana Beck [address] [place] [address] on 3/30/23 at 10:48 AM EDT    For Pharmacy Admin Tracking Only    Intervention Detail:   Total # of Interventions Recommended:    Total # of Interventions Accepted:   Time Spent (min): 15

## 2023-04-01 DIAGNOSIS — I48.0 PAROXYSMAL ATRIAL FIBRILLATION (HCC): ICD-10-CM

## 2023-04-01 DIAGNOSIS — Z79.01 LONG TERM (CURRENT) USE OF ANTICOAGULANTS: ICD-10-CM

## 2023-04-02 DIAGNOSIS — G31.84 MCI (MILD COGNITIVE IMPAIRMENT): ICD-10-CM

## 2023-04-03 RX ORDER — WARFARIN SODIUM 3 MG/1
TABLET ORAL
Qty: 90 TABLET | Refills: 3 | Status: SHIPPED | OUTPATIENT
Start: 2023-04-03

## 2023-04-03 RX ORDER — DONEPEZIL HYDROCHLORIDE 10 MG/1
TABLET, FILM COATED ORAL
Qty: 90 TABLET | Refills: 1 | Status: SHIPPED | OUTPATIENT
Start: 2023-04-03

## 2023-04-03 NOTE — TELEPHONE ENCOUNTER
Patient is scheduled to come in on 5/15, will need a refill before then please send.      Requested Prescriptions     Pending Prescriptions Disp Refills    donepezil (ARICEPT) 10 MG tablet [Pharmacy Med Name: DONEPEZIL HCL 10 MG TABLET] 90 tablet 1     Sig: TAKE 1 TABLET BY MOUTH EVERY DAY

## 2023-04-04 DIAGNOSIS — G31.84 MCI (MILD COGNITIVE IMPAIRMENT): ICD-10-CM

## 2023-04-04 RX ORDER — MEMANTINE HYDROCHLORIDE 10 MG/1
10 TABLET ORAL 2 TIMES DAILY
Qty: 180 TABLET | Refills: 1 | OUTPATIENT
Start: 2023-04-04

## 2023-04-25 ENCOUNTER — OFFICE VISIT (OUTPATIENT)
Dept: FAMILY MEDICINE CLINIC | Age: 78
End: 2023-04-25
Payer: MEDICARE

## 2023-04-25 VITALS
HEIGHT: 67 IN | SYSTOLIC BLOOD PRESSURE: 128 MMHG | HEART RATE: 70 BPM | BODY MASS INDEX: 24.01 KG/M2 | DIASTOLIC BLOOD PRESSURE: 72 MMHG | WEIGHT: 153 LBS | OXYGEN SATURATION: 98 %

## 2023-04-25 DIAGNOSIS — G30.9 ALZHEIMER'S DISEASE, UNSPECIFIED (CODE) (HCC): ICD-10-CM

## 2023-04-25 DIAGNOSIS — L28.2 PRURITIC RASH: Primary | ICD-10-CM

## 2023-04-25 PROCEDURE — 1090F PRES/ABSN URINE INCON ASSESS: CPT | Performed by: FAMILY MEDICINE

## 2023-04-25 PROCEDURE — G8420 CALC BMI NORM PARAMETERS: HCPCS | Performed by: FAMILY MEDICINE

## 2023-04-25 PROCEDURE — G8427 DOCREV CUR MEDS BY ELIG CLIN: HCPCS | Performed by: FAMILY MEDICINE

## 2023-04-25 PROCEDURE — G8399 PT W/DXA RESULTS DOCUMENT: HCPCS | Performed by: FAMILY MEDICINE

## 2023-04-25 PROCEDURE — 1123F ACP DISCUSS/DSCN MKR DOCD: CPT | Performed by: FAMILY MEDICINE

## 2023-04-25 PROCEDURE — 99213 OFFICE O/P EST LOW 20 MIN: CPT | Performed by: FAMILY MEDICINE

## 2023-04-25 PROCEDURE — 1036F TOBACCO NON-USER: CPT | Performed by: FAMILY MEDICINE

## 2023-04-25 PROCEDURE — 3078F DIAST BP <80 MM HG: CPT | Performed by: FAMILY MEDICINE

## 2023-04-25 PROCEDURE — 3074F SYST BP LT 130 MM HG: CPT | Performed by: FAMILY MEDICINE

## 2023-04-25 SDOH — ECONOMIC STABILITY: INCOME INSECURITY: HOW HARD IS IT FOR YOU TO PAY FOR THE VERY BASICS LIKE FOOD, HOUSING, MEDICAL CARE, AND HEATING?: NOT HARD AT ALL

## 2023-04-25 SDOH — ECONOMIC STABILITY: FOOD INSECURITY: WITHIN THE PAST 12 MONTHS, THE FOOD YOU BOUGHT JUST DIDN'T LAST AND YOU DIDN'T HAVE MONEY TO GET MORE.: NEVER TRUE

## 2023-04-25 SDOH — ECONOMIC STABILITY: HOUSING INSECURITY
IN THE LAST 12 MONTHS, WAS THERE A TIME WHEN YOU DID NOT HAVE A STEADY PLACE TO SLEEP OR SLEPT IN A SHELTER (INCLUDING NOW)?: NO

## 2023-04-25 SDOH — ECONOMIC STABILITY: FOOD INSECURITY: WITHIN THE PAST 12 MONTHS, YOU WORRIED THAT YOUR FOOD WOULD RUN OUT BEFORE YOU GOT MONEY TO BUY MORE.: NEVER TRUE

## 2023-04-25 ASSESSMENT — PATIENT HEALTH QUESTIONNAIRE - PHQ9
SUM OF ALL RESPONSES TO PHQ QUESTIONS 1-9: 0
SUM OF ALL RESPONSES TO PHQ9 QUESTIONS 1 & 2: 0
SUM OF ALL RESPONSES TO PHQ QUESTIONS 1-9: 0
2. FEELING DOWN, DEPRESSED OR HOPELESS: 0
1. LITTLE INTEREST OR PLEASURE IN DOING THINGS: 0

## 2023-04-25 NOTE — PROGRESS NOTES
Plan:   1. Pruritic rash  2. Alzheimer's disease, unspecified (CODE) ([facility] [address]  Assessment & Plan:   Monitored by specialist- no acute findings meriting change in the plan patient seeing DNCD on namenda and aricept. Now spreading to face with ongoing pruritis. Differential include nummular ezcema vs psoriasis vs med reaction vs     Will have patient start on claritin 10mg 1 po bid for itching for now until seen by derm on 5/8.      F/u with neurology on 5/15/23 as schedule        All patient questions answered. Pt voiced understanding. Return for f/u with derm as schedule, Keep upcoming appointment in this office. -----------------------------------------------------------------------------------------------            Chief Complaint   Patient presents with    Rash     Rash on hands face and arms. 3 weeks   Worsening over the last 1 week with now spread to face and eyebrows. itching and patient not taking any meds for this. Last new meds were over 2 montsh ago with neuro giving trial of namenda to add to aricept. No change in med formulations recently. No change in soaps, lotions, body wash. or no new foods over the last few months. The patient denies cough, chest pain, dyspnea, wheezing or hemoptysis. Also patient reports she is working with neurology on dementia issues with possible vascular and/or Alzheimers.  helpful with meds and patient has no behavioral issues but she is frustrated with the memory loss issues. ROS: Pertinent items are noted in HPI. All other ROS negative     reports that she has never smoked.  She has never used smokeless tobacco.      Physical Exam   Nursing note reviewed  /72 (Site: Left Upper Arm, Position: Sitting)   Pulse 70   Ht 5' 7\" (1.702 m)   Wt 153 lb (69.4 kg)   SpO2 98%   BMI 23.96 kg/m²   BP Readings from Last 3 Encounters:   04/25/23 128/72   02/01/23 100/70   01/13/23 110/72     Wt Readings from Last 3 Encounters:   04/25/23 153 lb

## 2023-04-25 NOTE — ASSESSMENT & PLAN NOTE
Monitored by specialist- no acute findings meriting change in the plan patient seeing DNCD on namenda and aricept.

## 2023-04-26 NOTE — TELEPHONE ENCOUNTER
Patient's  called wanting a refill on Aricept, but we already filled a 90 day supply on 4/3 with one refill. Patient's  stated understanding.

## 2023-04-27 ENCOUNTER — ANTI-COAG VISIT (OUTPATIENT)
Dept: PHARMACY | Age: 78
End: 2023-04-27
Payer: MEDICARE

## 2023-04-27 DIAGNOSIS — I48.0 PAF (PAROXYSMAL ATRIAL FIBRILLATION) (HCC): Primary | ICD-10-CM

## 2023-04-27 PROCEDURE — 85610 PROTHROMBIN TIME: CPT

## 2023-04-27 PROCEDURE — 99211 OFF/OP EST MAY X REQ PHY/QHP: CPT

## 2023-04-27 PROCEDURE — 36416 COLLJ CAPILLARY BLOOD SPEC: CPT

## 2023-04-27 RX ORDER — LORATADINE 10 MG/1
10 TABLET ORAL DAILY
COMMUNITY

## 2023-04-27 NOTE — PROGRESS NOTES
Medication Management Service  [facility]  [phone]    Visit Date: 4/27/2023   Subjective:       Matthew Silva is a 65 y.o. female who presents to clinic today for anticoagulation monitoring and adjustment. Patient seen in clinic for warfarin management due to  Indication:   atrial fibrillation. INR goal: of 2.0-3.0. Duration of therapy: indefinite. Patient reports the following:   Adherent with regimen  Missed or extra doses:  None   Bleeding or thromboembolic side effects:  None  Significant medication changes:  None  Significant dietary changes: None  Significant alcohol or tobacco changes: None  Significant recent illness, disease state changes, or hospitalization:  None  Upcoming surgeries or procedures:  None  Falls: None           Assessment and Plan     PT/INR done in office per protocol. INR today is 1.9, therapeutic, but just below goal.         Plan: Will continue current regimen of warfarin 3mg daily except 4.5mg on Mondays and Thursdays. Recheck INR in 4 week(s). Patient verbalized understanding of dosing directions and information discussed. Dosing schedule given to patient including phone number, appointment date, and time. Progress note sent to referring office. Patient acknowledges working in consult agreement with pharmacist as referred by his/her physician. Electronically signed by Verna Neely [facility] on 4/27/23 at 11:28 AM EDT    For Pharmacy Admin Tracking Only    Intervention Detail:   Total # of Interventions Recommended:    Total # of Interventions Accepted:   Time Spent (min): 15

## 2023-05-03 ENCOUNTER — PROCEDURE VISIT (OUTPATIENT)
Dept: CARDIOLOGY CLINIC | Age: 78
End: 2023-05-03

## 2023-05-03 DIAGNOSIS — Z95.0 CARDIAC PACEMAKER IN SITU: ICD-10-CM

## 2023-05-03 DIAGNOSIS — Z95.0 BIVENTRICULAR CARDIAC PACEMAKER IN SITU: Primary | ICD-10-CM

## 2023-05-12 ENCOUNTER — TELEPHONE (OUTPATIENT)
Dept: CARDIOLOGY CLINIC | Age: 78
End: 2023-05-12

## 2023-05-15 ENCOUNTER — OFFICE VISIT (OUTPATIENT)
Dept: NEUROLOGY | Age: 78
End: 2023-05-15

## 2023-05-15 VITALS
BODY MASS INDEX: 22.96 KG/M2 | HEART RATE: 70 BPM | SYSTOLIC BLOOD PRESSURE: 110 MMHG | DIASTOLIC BLOOD PRESSURE: 80 MMHG | OXYGEN SATURATION: 97 % | WEIGHT: 146.6 LBS

## 2023-05-15 DIAGNOSIS — G31.84 MCI (MILD COGNITIVE IMPAIRMENT): Primary | ICD-10-CM

## 2023-05-15 DIAGNOSIS — R47.02 EXPRESSIVE DYSPHASIA: ICD-10-CM

## 2023-05-15 DIAGNOSIS — R48.2 APRAXIA: ICD-10-CM

## 2023-05-15 RX ORDER — CETIRIZINE HYDROCHLORIDE 10 MG/1
10 TABLET ORAL DAILY
COMMUNITY

## 2023-05-15 NOTE — PATIENT INSTRUCTIONS
Please contact our office around 9/12/2023 to get your follow up appointment made. Our scheduling phone number is [phone]. Thank you!

## 2023-05-15 NOTE — PROGRESS NOTES
9.6 oz (66.5 kg)   SpO2 97%   BMI 22.96 kg/m²     Assessment and Plan     Diagnosis Orders   1. MCI (mild cognitive impairment)        2. Expressive dysphasia        3. Apraxia        Genesis was seen in neurological follow up in regards to MCI. She will remain on Aricept and Namenda to help slow the progression of memory loss. We discussed the use of antidepressant to help with any anxiety, stress, depression over her release of losing her thought process and mid conversation when she is meeting with her friends. I do not want Genesis to isolate herself. Encouraged her to go out and work in the garden and take walks which she loves to do. We discussed nonpharmacologic interventions including staying active cognitively, socially, and physically to help slow down the progression of memory loss. Return in about 6 months (around 11/15/2023).     Jonathan Le, APRN - CNP

## 2023-05-16 ENCOUNTER — OFFICE VISIT (OUTPATIENT)
Dept: CARDIOLOGY CLINIC | Age: 78
End: 2023-05-16
Payer: MEDICARE

## 2023-05-16 VITALS
HEART RATE: 72 BPM | HEIGHT: 67 IN | WEIGHT: 150.4 LBS | DIASTOLIC BLOOD PRESSURE: 62 MMHG | SYSTOLIC BLOOD PRESSURE: 118 MMHG | BODY MASS INDEX: 23.61 KG/M2

## 2023-05-16 DIAGNOSIS — I48.0 PAF (PAROXYSMAL ATRIAL FIBRILLATION) (HCC): Primary | ICD-10-CM

## 2023-05-16 PROCEDURE — G8399 PT W/DXA RESULTS DOCUMENT: HCPCS | Performed by: INTERNAL MEDICINE

## 2023-05-16 PROCEDURE — 1123F ACP DISCUSS/DSCN MKR DOCD: CPT | Performed by: INTERNAL MEDICINE

## 2023-05-16 PROCEDURE — 3074F SYST BP LT 130 MM HG: CPT | Performed by: INTERNAL MEDICINE

## 2023-05-16 PROCEDURE — G8427 DOCREV CUR MEDS BY ELIG CLIN: HCPCS | Performed by: INTERNAL MEDICINE

## 2023-05-16 PROCEDURE — 3078F DIAST BP <80 MM HG: CPT | Performed by: INTERNAL MEDICINE

## 2023-05-16 PROCEDURE — G8420 CALC BMI NORM PARAMETERS: HCPCS | Performed by: INTERNAL MEDICINE

## 2023-05-16 PROCEDURE — 1090F PRES/ABSN URINE INCON ASSESS: CPT | Performed by: INTERNAL MEDICINE

## 2023-05-16 PROCEDURE — 99214 OFFICE O/P EST MOD 30 MIN: CPT | Performed by: INTERNAL MEDICINE

## 2023-05-16 PROCEDURE — 1036F TOBACCO NON-USER: CPT | Performed by: INTERNAL MEDICINE

## 2023-05-16 NOTE — PROGRESS NOTES
CARDIOLOGY NOTE      5/16/2023    RE: Verner Haws  (1945)                               TO:  Dr. Tessa Escalante MD            Lina Mendiola is a 65 y.o. female who was seen today for management of  A fib                                    HPI:   Patient is here for    - Atrial fibrillation, pt is  compliant with meds. Patient does not have symptoms from atrial fibrillation  - Hypertension,is  well controlled, pt is  compliant with medicines  - Hyperlipidimea, lipids are in acceptable range.  Pt  is  compliant with medicines  - ppm  Compliant with carelink                The patient does not have cardiac complaints  And ablation done for chronic venous insufficiency been  Verner Haws has the following history recorded in care path:  Patient Active Problem List    Diagnosis Date Noted    Essential hypertension 12/18/2014    Long term current use of anticoagulant     Acquired hypothyroidism 04/24/2012    PAF (paroxysmal atrial fibrillation) ([address] 01/27/2011    Varicose veins of both legs with edema 12/15/2022    WD-Venous stasis ulcer of right calf with fat layer exposed with varicose veins ([address] 12/09/2022    Alzheimer's disease, unspecified 11/16/2022    WD-Non-pressure chronic ulcer of right calf with fat layer exposed ([address] 11/11/2022    Persistent atrial fibrillation with RVR ([address]     Coronary artery disease involving native coronary artery of native heart without angina pectoris 06/07/2022    Leg pain 06/07/2022    Chronic atrial fibrillation ([address] 05/19/2022    Dyspnea     NSTEMI (non-ST elevated myocardial infarction) ([address] 05/09/2022    Chest pain 04/29/2022    Gastroesophageal reflux disease without esophagitis 09/08/2015    Anemia 12/22/2014    Gout 09/23/2013    Pacemaker dual chamber 10/24/2012    Sciatica     Mixed hyperlipidemia 10/24/2011    Hip bursitis 04/14/2016    Allergic rhinitis 10/24/2011    Memory problem 01/25/2022    Hearing loss of both ears

## 2023-05-25 ENCOUNTER — ANTI-COAG VISIT (OUTPATIENT)
Dept: PHARMACY | Age: 78
End: 2023-05-25
Payer: MEDICARE

## 2023-05-25 DIAGNOSIS — I48.0 PAF (PAROXYSMAL ATRIAL FIBRILLATION) (HCC): Primary | ICD-10-CM

## 2023-05-25 PROCEDURE — 36416 COLLJ CAPILLARY BLOOD SPEC: CPT

## 2023-05-25 PROCEDURE — 99211 OFF/OP EST MAY X REQ PHY/QHP: CPT

## 2023-05-25 PROCEDURE — 85610 PROTHROMBIN TIME: CPT

## 2023-05-25 RX ORDER — METOPROLOL SUCCINATE 50 MG/1
50 TABLET, EXTENDED RELEASE ORAL DAILY
Qty: 180 TABLET | Refills: 1 | Status: SHIPPED | OUTPATIENT
Start: 2023-05-25

## 2023-05-25 NOTE — PROGRESS NOTES
Medication Management Service  [facility]  [phone]    Visit Date: 5/25/2023   Subjective:       Behzad Garcia is a 65 y.o. female who presents to clinic today for anticoagulation monitoring and adjustment. Patient seen in clinic for warfarin management due to  Indication:   atrial fibrillation. INR goal: of 2.0-3.0. Duration of therapy: indefinite. Patient reports the following:   Adherent with regimen  Missed or extra doses:  None   Bleeding or thromboembolic side effects:  None  Significant medication changes:  None  Significant dietary changes: None  Significant alcohol or tobacco changes: None  Significant recent illness, disease state changes, or hospitalization:  None  Upcoming surgeries or procedures:  None  Falls: None           Assessment and Plan     PT/INR done in office per protocol. INR today is 1.9, therapeutic, just below goal.       Had broccoli last night at [place]. Plan: Will continue current regimen of warfarin 3mg daily except 4.5mg on Mondays and Thursdays. Recheck INR in 4 week(s). Patient verbalized understanding of dosing directions and information discussed. Dosing schedule given to patient including phone number, appointment date, and time. Progress note sent to referring office. Patient acknowledges working in consult agreement with pharmacist as referred by his/her physician. Electronically signed by Richard Snellen, [facility] on 5/25/23 at 11:24 AM EDT    For Pharmacy Admin Tracking Only    Intervention Detail:   Total # of Interventions Recommended:    Total # of Interventions Accepted:   Time Spent (min): 15

## 2023-05-31 ENCOUNTER — OFFICE VISIT (OUTPATIENT)
Dept: FAMILY MEDICINE CLINIC | Age: 78
End: 2023-05-31
Payer: MEDICARE

## 2023-05-31 VITALS
HEART RATE: 71 BPM | DIASTOLIC BLOOD PRESSURE: 70 MMHG | OXYGEN SATURATION: 98 % | SYSTOLIC BLOOD PRESSURE: 110 MMHG | WEIGHT: 147 LBS | BODY MASS INDEX: 23.02 KG/M2

## 2023-05-31 DIAGNOSIS — E78.2 MIXED HYPERLIPIDEMIA: ICD-10-CM

## 2023-05-31 DIAGNOSIS — Z23 NEED FOR ZOSTER VACCINATION: ICD-10-CM

## 2023-05-31 DIAGNOSIS — E03.9 ACQUIRED HYPOTHYROIDISM: ICD-10-CM

## 2023-05-31 DIAGNOSIS — I10 ESSENTIAL HYPERTENSION: Primary | ICD-10-CM

## 2023-05-31 PROCEDURE — G8420 CALC BMI NORM PARAMETERS: HCPCS | Performed by: NURSE PRACTITIONER

## 2023-05-31 PROCEDURE — 99214 OFFICE O/P EST MOD 30 MIN: CPT | Performed by: NURSE PRACTITIONER

## 2023-05-31 PROCEDURE — 3078F DIAST BP <80 MM HG: CPT | Performed by: NURSE PRACTITIONER

## 2023-05-31 PROCEDURE — 1036F TOBACCO NON-USER: CPT | Performed by: NURSE PRACTITIONER

## 2023-05-31 PROCEDURE — 1090F PRES/ABSN URINE INCON ASSESS: CPT | Performed by: NURSE PRACTITIONER

## 2023-05-31 PROCEDURE — 3074F SYST BP LT 130 MM HG: CPT | Performed by: NURSE PRACTITIONER

## 2023-05-31 PROCEDURE — G8427 DOCREV CUR MEDS BY ELIG CLIN: HCPCS | Performed by: NURSE PRACTITIONER

## 2023-05-31 PROCEDURE — G8399 PT W/DXA RESULTS DOCUMENT: HCPCS | Performed by: NURSE PRACTITIONER

## 2023-05-31 PROCEDURE — 1123F ACP DISCUSS/DSCN MKR DOCD: CPT | Performed by: NURSE PRACTITIONER

## 2023-05-31 ASSESSMENT — ENCOUNTER SYMPTOMS
RHINORRHEA: 1
GASTROINTESTINAL NEGATIVE: 1
RESPIRATORY NEGATIVE: 1
EYES NEGATIVE: 1

## 2023-05-31 NOTE — PROGRESS NOTES
Lipid, Fasting     Standing Status:   Future     Standing Expiration Date:   5/31/2024    Comprehensive Metabolic Panel, Fasting     Standing Status:   Future     Standing Expiration Date:   5/31/2024    CBC     Standing Status:   Future     Standing Expiration Date:   5/31/2024    TSH     Standing Status:   Future     Standing Expiration Date:   5/31/2024     Current Outpatient Medications   Medication Sig Dispense Refill    zoster recombinant adjuvanted vaccine (SHINGRIX) 50 MCG/0.5ML SUSR injection 50 MCG IM then repeat 2-6 months.  0.5 mL 1    metoprolol succinate (TOPROL XL) 50 MG extended release tablet Take 1 tablet by mouth daily 180 tablet 1    cetirizine (ZYRTEC) 10 MG tablet Take 1 tablet by mouth daily      loratadine (CLARITIN) 10 MG tablet Take 1 tablet by mouth daily      warfarin (COUMADIN) 3 MG tablet TAKE 1 TABLET BY MOUTH DAILY EXCEPT TAKE 1/2 TABLET (1.5MG) ON THURSDAYS (Patient taking differently: 1.5 mg on Mondays and Thursdays) 90 tablet 3    donepezil (ARICEPT) 10 MG tablet TAKE 1 TABLET BY MOUTH EVERY DAY 90 tablet 1    torsemide (DEMADEX) 10 MG tablet Take 1 tablet by mouth daily (Patient taking differently: Take 0.5 tablets by mouth daily) 90 tablet 3    atorvastatin (LIPITOR) 40 MG tablet Take 1 tablet by mouth daily TAKE 1 TABLET BY MOUTH EVERY DAY 90 tablet 3    Elastic Bandages & Supports (MEDICAL COMPRESSION THIGH HIGH) MISC 1 each by Does not apply route daily Wear daily and remove nightly   20 - 30 mmgh 1 each 3    levothyroxine (SYNTHROID) 50 MCG tablet TAKE 1 TABLET BY MOUTH EVERY DAY BEFORE BREAKFAST 90 tablet 3    memantine (NAMENDA) 10 MG tablet Take 1 tablet by mouth 2 times daily NOTE TO PHARMACY: DO NOT FILL UNTIL 5 MG RX IS COMPLETED 60 tablet 5    memantine (NAMENDA) 5 MG tablet Take (1) 5 mg tab QD x7 days, then 1 BID x7 days; then 2 tabs Q am-1 tab pm x7 days then change to 10mg RX 42 tablet 0    fexofenadine (ALLEGRA) 180 MG tablet Take 1 tablet by mouth daily

## 2023-05-31 NOTE — PATIENT INSTRUCTIONS
Follow up with your cardiologist as scheduled. Please return to lab for your fasting lab work, and we will notify you by phone of the results. Try over the counter coricidin for your allergies.

## 2023-06-02 DIAGNOSIS — G31.84 MCI (MILD COGNITIVE IMPAIRMENT): ICD-10-CM

## 2023-06-02 RX ORDER — MEMANTINE HYDROCHLORIDE 5 MG/1
TABLET ORAL
Qty: 42 TABLET | Refills: 0 | OUTPATIENT
Start: 2023-06-02

## 2023-06-02 RX ORDER — MEMANTINE HYDROCHLORIDE 10 MG/1
10 TABLET ORAL 2 TIMES DAILY
Qty: 180 TABLET | Refills: 1 | OUTPATIENT
Start: 2023-06-02

## 2023-06-05 ENCOUNTER — TELEPHONE (OUTPATIENT)
Dept: NEUROLOGY | Age: 78
End: 2023-06-05

## 2023-06-05 DIAGNOSIS — G31.84 MCI (MILD COGNITIVE IMPAIRMENT): ICD-10-CM

## 2023-06-05 NOTE — TELEPHONE ENCOUNTER
Patient's  called and states she is completely out of her Namenda, please send in a refill.      Requested Prescriptions     Pending Prescriptions Disp Refills    memantine (NAMENDA) 10 MG tablet 60 tablet 5     Sig: Take 1 tablet by mouth 2 times daily NOTE TO PHARMACY: DO NOT FILL UNTIL 5 MG RX IS COMPLETED

## 2023-06-06 DIAGNOSIS — G31.84 MCI (MILD COGNITIVE IMPAIRMENT): ICD-10-CM

## 2023-06-07 RX ORDER — MEMANTINE HYDROCHLORIDE 10 MG/1
10 TABLET ORAL 2 TIMES DAILY
Qty: 180 TABLET | Refills: 1 | OUTPATIENT
Start: 2023-06-07

## 2023-06-07 RX ORDER — MEMANTINE HYDROCHLORIDE 10 MG/1
10 TABLET ORAL 2 TIMES DAILY
Qty: 60 TABLET | Refills: 5 | Status: SHIPPED | OUTPATIENT
Start: 2023-06-07

## 2023-06-08 NOTE — TELEPHONE ENCOUNTER
Pt  stopped in office stating he has been contacting the office since last Friday for Namenda refill and the pharmacy still does not have rx. Verified pt  Louis Garcia with photo ID and that on HIPAA and provided printed confirmation of rx and receipt from pharmacy that [phone] was sent in 6/7/23.

## 2023-06-19 ENCOUNTER — PROCEDURE VISIT (OUTPATIENT)
Dept: CARDIOLOGY CLINIC | Age: 78
End: 2023-06-19
Payer: MEDICARE

## 2023-06-19 DIAGNOSIS — I87.301 CHRONIC VENOUS HYPERTENSION INVOLVING RIGHT SIDE: Primary | ICD-10-CM

## 2023-06-19 PROCEDURE — 93971 EXTREMITY STUDY: CPT | Performed by: INTERNAL MEDICINE

## 2023-06-22 ENCOUNTER — ANTI-COAG VISIT (OUTPATIENT)
Dept: PHARMACY | Age: 78
End: 2023-06-22
Payer: MEDICARE

## 2023-06-22 ENCOUNTER — TELEPHONE (OUTPATIENT)
Dept: CARDIOLOGY CLINIC | Age: 78
End: 2023-06-22

## 2023-06-22 ENCOUNTER — HOSPITAL ENCOUNTER (OUTPATIENT)
Age: 78
Discharge: HOME | End: 2023-06-22
Payer: MEDICARE

## 2023-06-22 DIAGNOSIS — E78.2 MIXED HYPERLIPIDEMIA: ICD-10-CM

## 2023-06-22 DIAGNOSIS — E03.9 ACQUIRED HYPOTHYROIDISM: ICD-10-CM

## 2023-06-22 DIAGNOSIS — I10 ESSENTIAL HYPERTENSION: ICD-10-CM

## 2023-06-22 DIAGNOSIS — I48.0 PAF (PAROXYSMAL ATRIAL FIBRILLATION) (HCC): Primary | ICD-10-CM

## 2023-06-22 LAB
ALBUMIN SERPL-MCNC: 4.1 GM/DL (ref 3.4–5)
ALP BLD-CCNC: 67 IU/L (ref 40–128)
ALT SERPL-CCNC: 39 U/L (ref 10–40)
ANION GAP SERPL CALCULATED.3IONS-SCNC: 10 MMOL/L (ref 4–16)
AST SERPL-CCNC: 39 IU/L (ref 15–37)
BILIRUB SERPL-MCNC: 1.5 MG/DL (ref 0–1)
BUN SERPL-MCNC: 15 MG/DL (ref 6–23)
CALCIUM SERPL-MCNC: 8.9 MG/DL (ref 8.3–10.6)
CHLORIDE BLD-SCNC: 102 MMOL/L (ref 99–110)
CHOLESTEROL, FASTING: 124 MG/DL
CO2: 27 MMOL/L (ref 21–32)
CREAT SERPL-MCNC: 0.9 MG/DL (ref 0.6–1.1)
GFR SERPL CREATININE-BSD FRML MDRD: >60 ML/MIN/1.73M2
GLUCOSE P FAST SERPL-MCNC: 97 MG/DL (ref 70–99)
HCT VFR BLD CALC: 44.4 % (ref 37–47)
HDLC SERPL-MCNC: 61 MG/DL
HEMOGLOBIN: 13.7 GM/DL (ref 12.5–16)
INTERNATIONAL NORMALIZATION RATIO, POC: 1.8
LDLC SERPL CALC-MCNC: 51 MG/DL
MCH RBC QN AUTO: 27.6 PG (ref 27–31)
MCHC RBC AUTO-ENTMCNC: 30.9 % (ref 32–36)
MCV RBC AUTO: 89.3 FL (ref 78–100)
PDW BLD-RTO: 15.5 % (ref 11.7–14.9)
PLATELET # BLD: 144 K/CU MM (ref 140–440)
PMV BLD AUTO: 11 FL (ref 7.5–11.1)
POC INR: 1.8 INDEX
POTASSIUM SERPL-SCNC: 4.6 MMOL/L (ref 3.5–5.1)
PROTHROMBIN TIME, POC: 21.1 SECONDS (ref 10–14.3)
RBC # BLD: 4.97 M/CU MM (ref 4.2–5.4)
SODIUM BLD-SCNC: 139 MMOL/L (ref 135–145)
TOTAL PROTEIN: 6.9 GM/DL (ref 6.4–8.2)
TRIGLYCERIDE, FASTING: 59 MG/DL
TSH SERPL DL<=0.005 MIU/L-ACNC: 5.07 UIU/ML (ref 0.27–4.2)
WBC # BLD: 6.6 K/CU MM (ref 4–10.5)

## 2023-06-22 PROCEDURE — 36416 COLLJ CAPILLARY BLOOD SPEC: CPT

## 2023-06-22 PROCEDURE — 99212 OFFICE O/P EST SF 10 MIN: CPT

## 2023-06-22 PROCEDURE — 84443 ASSAY THYROID STIM HORMONE: CPT

## 2023-06-22 PROCEDURE — 36415 COLL VENOUS BLD VENIPUNCTURE: CPT

## 2023-06-22 PROCEDURE — 80053 COMPREHEN METABOLIC PANEL: CPT

## 2023-06-22 PROCEDURE — 80061 LIPID PANEL: CPT

## 2023-06-22 PROCEDURE — 85610 PROTHROMBIN TIME: CPT

## 2023-06-22 PROCEDURE — 85027 COMPLETE CBC AUTOMATED: CPT

## 2023-06-22 NOTE — PROGRESS NOTES
Medication Management Service  [facility]  [phone]    Visit Date: 6/22/2023   Subjective:       Sharif Rodriguez is a 65 y.o. female who presents to clinic today for anticoagulation monitoring and adjustment. Patient seen in clinic for warfarin management due to  Indication:   atrial fibrillation. INR goal: of 2.0-3.0. Duration of therapy: indefinite. Patient reports the following:   Adherent with regimen  Missed or extra doses:  None   Bleeding or thromboembolic side effects:  None  Significant medication changes:  None  Significant dietary changes: has had more salads, likely will continue   Significant alcohol or tobacco changes: None  Significant recent illness, disease state changes, or hospitalization:  None  Upcoming surgeries or procedures:  None  Falls: None           Assessment and Plan     PT/INR done in office per protocol. INR today is 1.8, subtherapeutic. Plan: Increase weekly dose ~6% to warfarin 3mg daily except 4.5mg on MWF. Recheck INR in 2 week(s). Patient verbalized understanding of dosing directions and information discussed. Dosing schedule given to patient including phone number, appointment date, and time. Progress note sent to referring office. Patient acknowledges working in consult agreement with pharmacist as referred by his/her physician.       Electronically signed by Jorgito Vazquez, [facility] on 6/22/23 at 11:14 AM EDT    For [place] Only    Intervention Detail: Dose Adjustment: 1, reason: Therapy Optimization  Total # of Interventions Recommended: 1  Total # of Interventions Accepted: 1  Time Spent (min): 15
General

## 2023-06-22 NOTE — TELEPHONE ENCOUNTER
Venous doppler:6/19/2023  Summary      Right CFV is patent with good compressibility and pulsatile signal with good   augmentation. The Right GSV is non-compressible with no evidence of flow just past the   saphenofemoral junction to the knee.     Patient verbally understood

## 2023-07-06 ENCOUNTER — ANTI-COAG VISIT (OUTPATIENT)
Dept: PHARMACY | Age: 78
End: 2023-07-06
Payer: MEDICARE

## 2023-07-06 DIAGNOSIS — I48.0 PAF (PAROXYSMAL ATRIAL FIBRILLATION) (HCC): Primary | ICD-10-CM

## 2023-07-06 LAB
INTERNATIONAL NORMALIZATION RATIO, POC: 1.9
POC INR: 1.9 INDEX
PROTHROMBIN TIME, POC: 22.6 SECONDS (ref 10–14.3)

## 2023-07-06 PROCEDURE — 99211 OFF/OP EST MAY X REQ PHY/QHP: CPT

## 2023-07-06 PROCEDURE — 85610 PROTHROMBIN TIME: CPT

## 2023-07-06 PROCEDURE — 36416 COLLJ CAPILLARY BLOOD SPEC: CPT

## 2023-07-06 NOTE — PROGRESS NOTES
Medication Management Service  [facility]  [phone]    Visit Date: 7/6/2023   Subjective:       Ozzy Wright is a 65 y.o. female who presents to clinic today for anticoagulation monitoring and adjustment. Patient seen in clinic for warfarin management due to  Indication:   atrial fibrillation. INR goal: of 2.0-3.0. Duration of therapy: indefinite. Patient reports the following:   Adherent with regimen  Missed or extra doses:  None   Bleeding or thromboembolic side effects:  None  Significant medication changes:  None  Significant dietary changes: None  Significant alcohol or tobacco changes: None  Significant recent illness, disease state changes, or hospitalization:  TSH elevated on recent labs- has not had any med changes yet  Upcoming surgeries or procedures:  None  Falls: None           Assessment and Plan     PT/INR done in office per protocol. INR today is 1.9, subtherapeutic, despite 6% weekly dose increase. Up from 1.8 at visit 2 weeks ago. Will continue same dose and monitor. Plan: Will continue current regimen of warfarin 3mg daily except 4.5mg on MWF. Recheck INR in 2 week(s). Patient verbalized understanding of dosing directions and information discussed. Dosing schedule given to patient including phone number, appointment date, and time. Progress note sent to referring office. Patient acknowledges working in consult agreement with pharmacist as referred by his/her physician. Electronically signed by Bryanna Saavedra [facility] on 7/6/23 at 11:22 AM [id number]    Intervention Detail:   Total # of Interventions Recommended:    Total # of Interventions Accepted:   Time Spent (min): 15

## 2023-07-10 DIAGNOSIS — Z79.01 LONG TERM CURRENT USE OF ANTICOAGULANT: Primary | ICD-10-CM

## 2023-07-10 DIAGNOSIS — I48.0 PAF (PAROXYSMAL ATRIAL FIBRILLATION) (HCC): ICD-10-CM

## 2023-07-10 NOTE — PROGRESS NOTES
Annual referral renewal. Referring provider is Dr. Tess Arredondo. New referral pended in this encounter. Message sent to provider requesting to sign.     For Pharmacy Admin Tracking Only    Time Spent (min): 5

## 2023-07-14 NOTE — PROGRESS NOTES
Referral order to renew co-management with pharmacy coagulation team signed.  Appreciate ongoing collaboration for patient's care

## 2023-07-20 ENCOUNTER — ANTI-COAG VISIT (OUTPATIENT)
Dept: PHARMACY | Age: 78
End: 2023-07-20
Payer: MEDICARE

## 2023-07-20 DIAGNOSIS — I48.0 PAF (PAROXYSMAL ATRIAL FIBRILLATION) (HCC): Primary | ICD-10-CM

## 2023-07-20 LAB
INTERNATIONAL NORMALIZATION RATIO, POC: 2.4
POC INR: 2.4 INDEX
PROTHROMBIN TIME, POC: 29.4 SECONDS (ref 10–14.3)

## 2023-07-20 PROCEDURE — 99211 OFF/OP EST MAY X REQ PHY/QHP: CPT

## 2023-07-20 PROCEDURE — 85610 PROTHROMBIN TIME: CPT

## 2023-07-20 PROCEDURE — 36416 COLLJ CAPILLARY BLOOD SPEC: CPT

## 2023-07-20 NOTE — PROGRESS NOTES
Medication Management Service  [facility]  [phone]    Visit Date: 7/20/2023   Subjective:       Ness Farrell is a 77 y.o. female who presents to clinic today for anticoagulation monitoring and adjustment. Patient seen in clinic for warfarin management due to  Indication:   atrial fibrillation. INR goal: of 2.0-3.0. Duration of therapy: indefinite. Patient reports the following:   Adherent with regimen  Missed or extra doses:  None   Bleeding or thromboembolic side effects:  None  Significant medication changes:  None  Significant dietary changes: None  Significant alcohol or tobacco changes: None  Significant recent illness, disease state changes, or hospitalization:  None  Upcoming surgeries or procedures:  None  Falls: None           Assessment and Plan     PT/INR done in office per protocol. INR today is 2.4, therapeutic. Plan: Will continue current regimen of warfarin 3mg daily except 4.5mg on MWF. Recheck INR in 3 week(s). Patient verbalized understanding of dosing directions and information discussed. Dosing schedule given to patient including phone number, appointment date, and time. Progress note sent to referring office. Patient acknowledges working in consult agreement with pharmacist as referred by his/her physician. Electronically signed by Anais Curiel, [address] [place] [address] on 7/20/23     For Pharmacy Admin Tracking Only    Intervention Detail:   Total # of Interventions Recommended:    Total # of Interventions Accepted:   Time Spent (min): 15

## 2023-08-02 ENCOUNTER — HOSPITAL ENCOUNTER (OUTPATIENT)
Dept: CT IMAGING | Age: 78
Discharge: HOME | End: 2023-08-02
Attending: INTERNAL MEDICINE
Payer: MEDICARE

## 2023-08-02 DIAGNOSIS — J84.9 ILD (INTERSTITIAL LUNG DISEASE) (HCC): ICD-10-CM

## 2023-08-02 PROCEDURE — 71250 CT THORAX DX C-: CPT

## 2023-08-05 PROCEDURE — 93294 REM INTERROG EVL PM/LDLS PM: CPT | Performed by: INTERNAL MEDICINE

## 2023-08-05 PROCEDURE — 93296 REM INTERROG EVL PM/IDS: CPT | Performed by: INTERNAL MEDICINE

## 2023-08-08 ENCOUNTER — PROCEDURE VISIT (OUTPATIENT)
Dept: CARDIOLOGY CLINIC | Age: 78
End: 2023-08-08
Payer: MEDICARE

## 2023-08-08 DIAGNOSIS — Z95.0 CARDIAC PACEMAKER IN SITU: Primary | ICD-10-CM

## 2023-08-10 ENCOUNTER — ANTI-COAG VISIT (OUTPATIENT)
Dept: PHARMACY | Age: 78
End: 2023-08-10
Payer: MEDICARE

## 2023-08-10 DIAGNOSIS — I48.0 PAF (PAROXYSMAL ATRIAL FIBRILLATION) (HCC): Primary | ICD-10-CM

## 2023-08-10 LAB
INTERNATIONAL NORMALIZATION RATIO, POC: 2.7
POC INR: 2.7 INDEX
PROTHROMBIN TIME, POC: 32.5 SECONDS (ref 10–14.3)

## 2023-08-10 PROCEDURE — 36416 COLLJ CAPILLARY BLOOD SPEC: CPT

## 2023-08-10 PROCEDURE — 85610 PROTHROMBIN TIME: CPT

## 2023-08-10 PROCEDURE — 99211 OFF/OP EST MAY X REQ PHY/QHP: CPT

## 2023-08-10 RX ORDER — IPRATROPIUM BROMIDE 21 UG/1
2 SPRAY, METERED NASAL 2 TIMES DAILY
COMMUNITY

## 2023-08-10 NOTE — PROGRESS NOTES
Medication Management Service  [facility]  [phone]    Visit Date: 8/10/2023   Subjective:       Leslie Phan is a 77 y.o. female who presents to clinic today for anticoagulation monitoring and adjustment. Patient seen in clinic for warfarin management due to  Indication:   atrial fibrillation. INR goal: of 2.0-3.0. Duration of therapy: indefinite. Patient reports the following:   Adherent with regimen  Missed or extra doses:  None   Bleeding or thromboembolic side effects:  None  Significant medication changes:  None  Significant dietary changes: None  Significant alcohol or tobacco changes: None  Significant recent illness, disease state changes, or hospitalization:  None  Upcoming surgeries or procedures:  None  Falls: None           Assessment and Plan     PT/INR done in office per protocol. INR today is 2.7, therapeutic. Plan: Will continue current regimen of warfarin 3mg daily except 4.5mg on MWF. Recheck INR in 5 weeks, due to Labor Day and other appointments     Patient verbalized understanding of dosing directions and information discussed. Dosing schedule given to patient including phone number, appointment date, and time. Progress note sent to referring office. Electronically signed by Deangelo Sharma [address] [place] [address] on 8/10/23     For Pharmacy Admin Tracking Only    Intervention Detail:   Total # of Interventions Recommended:    Total # of Interventions Accepted:   Time Spent (min): 15

## 2023-08-25 ENCOUNTER — TELEPHONE (OUTPATIENT)
Dept: FAMILY MEDICINE CLINIC | Age: 78
End: 2023-08-25

## 2023-08-25 DIAGNOSIS — E03.9 ACQUIRED HYPOTHYROIDISM: ICD-10-CM

## 2023-08-25 DIAGNOSIS — E55.9 VITAMIN D DEFICIENCY: ICD-10-CM

## 2023-08-25 DIAGNOSIS — I48.0 PAF (PAROXYSMAL ATRIAL FIBRILLATION) (HCC): Primary | ICD-10-CM

## 2023-08-25 NOTE — TELEPHONE ENCOUNTER
Patients  called and stated that they received a letter attached to patients prescription that Metoprolol and Doneperzil could cause tiredness, dizziness, lightheadedness and confusion. She has been taking several naps a day, but has not experienced any of the other symptoms.  wanted to know if this is something they should be concerned with. Please advise.   Thank you,

## 2023-08-28 NOTE — TELEPHONE ENCOUNTER
Please have  call her neurology team to see if they have recommendations about the memantine/Namenda and donezepil/Aricept causing this sleepiness. I ordered urine and blood work studies for patient but not to get until after seen by [facility] this week in case she orders additional testing after the evaluation in person    Patient to also keep appt with [facility] this Wednesday.

## 2023-08-29 ENCOUNTER — TELEPHONE (OUTPATIENT)
Dept: NEUROLOGY | Age: 78
End: 2023-08-29

## 2023-08-29 NOTE — TELEPHONE ENCOUNTER
Received call from pt  stating he received a notification from the pharmacy that taking metoprolol and donepezil could cause dizziness, excessive tiredness, and confusion and is inquiring if pt should stay on both meds. Please advise.

## 2023-08-30 ENCOUNTER — HOSPITAL ENCOUNTER (OUTPATIENT)
Age: 78
Discharge: HOME | End: 2023-08-30
Payer: MEDICARE

## 2023-08-30 ENCOUNTER — OFFICE VISIT (OUTPATIENT)
Dept: FAMILY MEDICINE CLINIC | Age: 78
End: 2023-08-30
Payer: MEDICARE

## 2023-08-30 VITALS
DIASTOLIC BLOOD PRESSURE: 70 MMHG | HEIGHT: 67 IN | SYSTOLIC BLOOD PRESSURE: 124 MMHG | WEIGHT: 139.6 LBS | OXYGEN SATURATION: 96 % | HEART RATE: 72 BPM | BODY MASS INDEX: 21.91 KG/M2

## 2023-08-30 DIAGNOSIS — I87.2 VENOUS REFLUX: ICD-10-CM

## 2023-08-30 DIAGNOSIS — E55.9 VITAMIN D DEFICIENCY: ICD-10-CM

## 2023-08-30 DIAGNOSIS — I48.0 PAF (PAROXYSMAL ATRIAL FIBRILLATION) (HCC): ICD-10-CM

## 2023-08-30 DIAGNOSIS — E03.9 ACQUIRED HYPOTHYROIDISM: ICD-10-CM

## 2023-08-30 DIAGNOSIS — R25.2 MUSCLE CRAMPING: Primary | ICD-10-CM

## 2023-08-30 DIAGNOSIS — G30.9 ALZHEIMER'S DISEASE, UNSPECIFIED (CODE) (HCC): ICD-10-CM

## 2023-08-30 DIAGNOSIS — I48.19 PERSISTENT ATRIAL FIBRILLATION WITH RVR (HCC): ICD-10-CM

## 2023-08-30 DIAGNOSIS — R41.3 MEMORY PROBLEM: ICD-10-CM

## 2023-08-30 DIAGNOSIS — G31.84 MILD COGNITIVE IMPAIRMENT WITH MEMORY LOSS: ICD-10-CM

## 2023-08-30 DIAGNOSIS — R53.83 OTHER FATIGUE: ICD-10-CM

## 2023-08-30 DIAGNOSIS — R25.2 MUSCLE CRAMPING: ICD-10-CM

## 2023-08-30 DIAGNOSIS — M79.605 PAIN OF LEFT LOWER EXTREMITY: ICD-10-CM

## 2023-08-30 LAB
25(OH)D3 SERPL-MCNC: 32.13 NG/ML
ALBUMIN SERPL-MCNC: 4.3 GM/DL (ref 3.4–5)
ALP BLD-CCNC: 52 IU/L (ref 40–129)
ALT SERPL-CCNC: 37 U/L (ref 10–40)
ANION GAP SERPL CALCULATED.3IONS-SCNC: 10 MMOL/L (ref 4–16)
AST SERPL-CCNC: 34 IU/L (ref 15–37)
BACTERIA: ABNORMAL /HPF
BILIRUB SERPL-MCNC: 2.6 MG/DL (ref 0–1)
BILIRUBIN URINE: NEGATIVE MG/DL
BLOOD, URINE: NEGATIVE
BUN SERPL-MCNC: 23 MG/DL (ref 6–23)
CALCIUM SERPL-MCNC: 9.5 MG/DL (ref 8.3–10.6)
CHLORIDE BLD-SCNC: 105 MMOL/L (ref 99–110)
CLARITY: CLEAR
CO2: 29 MMOL/L (ref 21–32)
COLOR: YELLOW
CREAT SERPL-MCNC: 0.9 MG/DL (ref 0.6–1.1)
FOLATE SERPL-MCNC: 16.6 NG/ML (ref 3.1–17.5)
GFR SERPL CREATININE-BSD FRML MDRD: >60 ML/MIN/1.73M2
GLUCOSE SERPL-MCNC: 103 MG/DL (ref 70–99)
GLUCOSE, URINE: NEGATIVE MG/DL
HCT VFR BLD CALC: 47.7 % (ref 37–47)
HEMOGLOBIN: 15.2 GM/DL (ref 12.5–16)
KETONES, URINE: NEGATIVE MG/DL
LEUKOCYTE ESTERASE, URINE: ABNORMAL
MAGNESIUM: 2.2 MG/DL (ref 1.8–2.4)
MCH RBC QN AUTO: 29.3 PG (ref 27–31)
MCHC RBC AUTO-ENTMCNC: 31.9 % (ref 32–36)
MCV RBC AUTO: 92.1 FL (ref 78–100)
MUCUS: ABNORMAL HPF
NITRITE URINE, QUANTITATIVE: NEGATIVE
PDW BLD-RTO: 16.1 % (ref 11.7–14.9)
PH, URINE: 5.5 (ref 5–8)
PLATELET # BLD: 128 K/CU MM (ref 140–440)
PMV BLD AUTO: 11.2 FL (ref 7.5–11.1)
POTASSIUM SERPL-SCNC: 4.1 MMOL/L (ref 3.5–5.1)
PROTEIN UA: NEGATIVE MG/DL
RBC # BLD: 5.18 M/CU MM (ref 4.2–5.4)
RBC URINE: 4 /HPF (ref 0–6)
SODIUM BLD-SCNC: 144 MMOL/L (ref 135–145)
SPECIFIC GRAVITY UA: 1.02 (ref 1–1.03)
SQUAMOUS EPITHELIAL: 3 /HPF
TOTAL PROTEIN: 6.7 GM/DL (ref 6.4–8.2)
TRICHOMONAS: ABNORMAL /HPF
TSH SERPL DL<=0.005 MIU/L-ACNC: 4.58 UIU/ML (ref 0.27–4.2)
UROBILINOGEN, URINE: 1 MG/DL (ref 0.2–1)
VITAMIN B-12: 634.5 PG/ML (ref 211–911)
WBC # BLD: 5.8 K/CU MM (ref 4–10.5)
WBC UA: 18 /HPF (ref 0–5)

## 2023-08-30 PROCEDURE — 1123F ACP DISCUSS/DSCN MKR DOCD: CPT | Performed by: NURSE PRACTITIONER

## 2023-08-30 PROCEDURE — 83735 ASSAY OF MAGNESIUM: CPT

## 2023-08-30 PROCEDURE — 82306 VITAMIN D 25 HYDROXY: CPT

## 2023-08-30 PROCEDURE — 1090F PRES/ABSN URINE INCON ASSESS: CPT | Performed by: NURSE PRACTITIONER

## 2023-08-30 PROCEDURE — G8399 PT W/DXA RESULTS DOCUMENT: HCPCS | Performed by: NURSE PRACTITIONER

## 2023-08-30 PROCEDURE — 85027 COMPLETE CBC AUTOMATED: CPT

## 2023-08-30 PROCEDURE — 82607 VITAMIN B-12: CPT

## 2023-08-30 PROCEDURE — 99214 OFFICE O/P EST MOD 30 MIN: CPT | Performed by: NURSE PRACTITIONER

## 2023-08-30 PROCEDURE — 81001 URINALYSIS AUTO W/SCOPE: CPT

## 2023-08-30 PROCEDURE — 3074F SYST BP LT 130 MM HG: CPT | Performed by: NURSE PRACTITIONER

## 2023-08-30 PROCEDURE — 1036F TOBACCO NON-USER: CPT | Performed by: NURSE PRACTITIONER

## 2023-08-30 PROCEDURE — 3078F DIAST BP <80 MM HG: CPT | Performed by: NURSE PRACTITIONER

## 2023-08-30 PROCEDURE — 82746 ASSAY OF FOLIC ACID SERUM: CPT

## 2023-08-30 PROCEDURE — 80053 COMPREHEN METABOLIC PANEL: CPT

## 2023-08-30 PROCEDURE — 84443 ASSAY THYROID STIM HORMONE: CPT

## 2023-08-30 PROCEDURE — 84439 ASSAY OF FREE THYROXINE: CPT

## 2023-08-30 PROCEDURE — G8420 CALC BMI NORM PARAMETERS: HCPCS | Performed by: NURSE PRACTITIONER

## 2023-08-30 PROCEDURE — 36415 COLL VENOUS BLD VENIPUNCTURE: CPT

## 2023-08-30 PROCEDURE — G8427 DOCREV CUR MEDS BY ELIG CLIN: HCPCS | Performed by: NURSE PRACTITIONER

## 2023-08-30 NOTE — PROGRESS NOTES
2023     Crescencio Lund (:  1945) is a 77 y.o. female, here for evaluation of the following medical concerns:    Presents with spouse whom is helping with HPI as pt has alzhiemers dementia and is a poor historian. Pt not able to elaborate well on the problem and give details. Left leg and foot pain for two months. Has happened once per month. Two epoisodes. Last episode    Has tried \"drinking plenty of liquids\"   Drinking three 8oz gasses of water per day     Getting up and standing and walking makes it go away. Happens in the night, wakes her up out of her sleep. No swelling, no lower back pain. No numbness tingling. No calf pain, wound, change in color or temp      States she is tired and could just lay down right here and take a nap   Persistent one year. She is on toprol and has AFIB  She is also on demedex. Last labs 2023     Venous US BLE aug 2022 - significant reflux left CFV   Had right leg ablation 2023      Review of Systems    Prior to Visit Medications    Medication Sig Taking?  Authorizing Provider   ipratropium (ATROVENT) 0.03 % nasal spray 2 sprays by Each Nostril route 2 times daily Yes Historical Provider, MD   memantine (NAMENDA) 10 MG tablet Take 1 tablet by mouth 2 times daily NOTE TO PHARMACY: DO NOT FILL UNTIL 5 MG RX IS COMPLETED Yes ANTHONY Nuñez CNP   metoprolol succinate (TOPROL XL) 50 MG extended release tablet Take 1 tablet by mouth daily Yes Odette Brumfield MD   cetirizine (ZYRTEC) 10 MG tablet Take 1 tablet by mouth daily Yes Historical Provider, MD   warfarin (COUMADIN) 3 MG tablet TAKE 1 TABLET BY MOUTH DAILY EXCEPT TAKE 1/2 TABLET (1.5MG) ON   Patient taking differently: 1.5 mg on  and  Yes Ottoniel Reyna MD   donepezil (ARICEPT) 10 MG tablet TAKE 1 TABLET BY MOUTH EVERY DAY Yes ANTHONY Nuñez CNP   torsemide (DEMADEX) 10 MG tablet Take 1 tablet by mouth daily  Patient

## 2023-08-31 LAB — T4 FREE SERPL-MCNC: 1.51 NG/DL (ref 0.9–1.8)

## 2023-08-31 NOTE — TELEPHONE ENCOUNTER
Pt  stated he's concerned with pt stopping Aricept because she went off of medication before and did not do well. He scheduled appt for 9/27/23. Advised to discuss excessive fatigue with PCP and he stated PCP recommended pt call neurology. He will reach out to cardiology as well since fatigue occurred after her last pacemaker change.

## 2023-09-18 ENCOUNTER — ANTI-COAG VISIT (OUTPATIENT)
Dept: PHARMACY | Age: 78
End: 2023-09-18
Payer: MEDICARE

## 2023-09-18 DIAGNOSIS — I48.0 PAF (PAROXYSMAL ATRIAL FIBRILLATION) (HCC): Primary | ICD-10-CM

## 2023-09-18 PROCEDURE — 85610 PROTHROMBIN TIME: CPT

## 2023-09-18 PROCEDURE — 99211 OFF/OP EST MAY X REQ PHY/QHP: CPT

## 2023-09-18 PROCEDURE — 36416 COLLJ CAPILLARY BLOOD SPEC: CPT

## 2023-09-18 RX ORDER — MAGNESIUM 200 MG
200 TABLET ORAL DAILY
COMMUNITY

## 2023-09-18 NOTE — PROGRESS NOTES
Medication Management Service  [facility]  [phone]    Visit Date: 9/18/2023   Subjective:       Sandrita Spain is a 77 y.o. female who presents to clinic today for anticoagulation monitoring and adjustment. Patient seen in clinic for warfarin management due to  Indication:   atrial fibrillation. INR goal: of 2.0-3.0. Duration of therapy: indefinite. Patient reports the following:   Adherent with regimen  Missed or extra doses:  None   Bleeding or thromboembolic side effects:  None  Significant medication changes: started Magnesium 200mg   AREDS2 chew    Significant dietary changes: broccoli fazolis  Significant alcohol or tobacco changes: None  Significant recent illness, disease state changes, or hospitalization:  None  Upcoming surgeries or procedures:  None  Falls: None           Assessment and Plan     PT/INR done in office per protocol. INR today is 2.6, therapeutic. Plan: Will continue current regimen of warfarin 3mg daily except 4.5mg on MWF. Recheck INR in 4 week(s). Patient verbalized understanding of dosing directions and information discussed. Dosing schedule given to patient including phone number, appointment date, and time. Progress note sent to referring office. Electronically signed by Juanjo Matute, [address] [place] [address] on 9/18/23     For Pharmacy Admin Tracking Only    Intervention Detail:   Total # of Interventions Recommended:    Total # of Interventions Accepted:   Time Spent (min): 15

## 2023-09-20 ENCOUNTER — TELEPHONE (OUTPATIENT)
Dept: FAMILY MEDICINE CLINIC | Age: 78
End: 2023-09-20

## 2023-09-20 DIAGNOSIS — R70.1 RETICULOCYTOSIS: ICD-10-CM

## 2023-09-20 DIAGNOSIS — I48.0 PAF (PAROXYSMAL ATRIAL FIBRILLATION) (HCC): ICD-10-CM

## 2023-09-20 DIAGNOSIS — D69.6 THROMBOCYTOPENIA (HCC): ICD-10-CM

## 2023-09-20 DIAGNOSIS — E80.6 HYPERBILIRUBINEMIA: Primary | ICD-10-CM

## 2023-09-20 DIAGNOSIS — R79.89 ABNORMAL TSH: ICD-10-CM

## 2023-09-20 NOTE — TELEPHONE ENCOUNTER
----- Message from Donea Congress, APRN - NP sent at 9/18/2023  3:22 PM EDT -----  TSH better. Pretty close to normal. T4 thyroid hormone normal. Will leave synthroid the same at 50      Elevated billirubin - higher than prior. Liver enzymes normal   Dr Buddy Hatch, thoughts???    Vit d borderline low. Recommend 2000 units daily OTC. Could contribute to \"sleepiness\". Is she already taking 1k units?

## 2023-09-20 NOTE — TELEPHONE ENCOUNTER
Please call patient/ that I ordered additional lab work today to check repeat blood work to include thyroid and liver enzymes.  Okay to do non fasting in 2 weeks

## 2023-09-27 ENCOUNTER — OFFICE VISIT (OUTPATIENT)
Dept: NEUROLOGY | Age: 78
End: 2023-09-27
Payer: MEDICARE

## 2023-09-27 VITALS
WEIGHT: 134 LBS | HEART RATE: 71 BPM | RESPIRATION RATE: 16 BRPM | OXYGEN SATURATION: 97 % | SYSTOLIC BLOOD PRESSURE: 110 MMHG | DIASTOLIC BLOOD PRESSURE: 70 MMHG | BODY MASS INDEX: 21.03 KG/M2 | HEIGHT: 67 IN

## 2023-09-27 DIAGNOSIS — G30.0 MILD EARLY ONSET ALZHEIMER'S DEMENTIA WITHOUT BEHAVIORAL DISTURBANCE, PSYCHOTIC DISTURBANCE, MOOD DISTURBANCE, OR ANXIETY (HCC): Primary | ICD-10-CM

## 2023-09-27 DIAGNOSIS — F02.A0 MILD EARLY ONSET ALZHEIMER'S DEMENTIA WITHOUT BEHAVIORAL DISTURBANCE, PSYCHOTIC DISTURBANCE, MOOD DISTURBANCE, OR ANXIETY (HCC): Primary | ICD-10-CM

## 2023-09-27 PROCEDURE — 1036F TOBACCO NON-USER: CPT | Performed by: NURSE PRACTITIONER

## 2023-09-27 PROCEDURE — 1090F PRES/ABSN URINE INCON ASSESS: CPT | Performed by: NURSE PRACTITIONER

## 2023-09-27 PROCEDURE — 1123F ACP DISCUSS/DSCN MKR DOCD: CPT | Performed by: NURSE PRACTITIONER

## 2023-09-27 PROCEDURE — 99214 OFFICE O/P EST MOD 30 MIN: CPT | Performed by: NURSE PRACTITIONER

## 2023-09-27 PROCEDURE — G8399 PT W/DXA RESULTS DOCUMENT: HCPCS | Performed by: NURSE PRACTITIONER

## 2023-09-27 PROCEDURE — 3078F DIAST BP <80 MM HG: CPT | Performed by: NURSE PRACTITIONER

## 2023-09-27 PROCEDURE — G8420 CALC BMI NORM PARAMETERS: HCPCS | Performed by: NURSE PRACTITIONER

## 2023-09-27 PROCEDURE — 3074F SYST BP LT 130 MM HG: CPT | Performed by: NURSE PRACTITIONER

## 2023-09-27 PROCEDURE — G8427 DOCREV CUR MEDS BY ELIG CLIN: HCPCS | Performed by: NURSE PRACTITIONER

## 2023-09-27 RX ORDER — AMMONIUM LACTATE 12 G/100G
CREAM TOPICAL
COMMUNITY
Start: 2023-09-15

## 2023-09-27 RX ORDER — DONEPEZIL HYDROCHLORIDE 10 MG/1
10 TABLET, FILM COATED ORAL DAILY
Qty: 90 TABLET | Refills: 1 | Status: SHIPPED | OUTPATIENT
Start: 2023-09-27

## 2023-09-27 RX ORDER — MEMANTINE HYDROCHLORIDE 10 MG/1
10 TABLET ORAL 2 TIMES DAILY
Qty: 60 TABLET | Refills: 5 | Status: SHIPPED | OUTPATIENT
Start: 2023-09-27

## 2023-09-27 RX ORDER — MEMANTINE HYDROCHLORIDE 10 MG/1
10 TABLET ORAL 2 TIMES DAILY
Qty: 60 TABLET | Refills: 5 | Status: SHIPPED | OUTPATIENT
Start: 2023-09-27 | End: 2023-09-27

## 2023-09-27 NOTE — PROGRESS NOTES
9/27/23    Bay Nestor  1945    Chief Complaint   Patient presents with    Follow-up     [facility]    Medication Refill     Aricept and Namenda       History of Present Illness  Marco Antonio Hernandez is a 77 y.o. female presenting today for follow-up of: Dementia. She remains on Aricept. MMSE was 24 out of 30 on 4/7/2022, MMSE on 11/16/2022 was 20/27, MMSE on 5/15/2023 was 21/27. At today's visit, she is accompanied by her spouse. Marco Antonio Hernandez tells me her short term memory loss frustrates her. She will start telling a story and can't remember the point she was trying to make. She does not drive, manage her medications or cook any longer. She does dress/bathe herself. She enjoys taking a walk outside with her sister to stay active. She has a good appetite. She has been evaluated for sleep apnea in the past and was told she did not have it. Today, she has remained stable. She is accompanied by her . No changes in her memory, she just feels tired due to her A-fib. She starts a task and does not finish it. She was trying to organize her closet into clothes that fit and she was going to donate the clothes that do not fit anymore but she mixed up the piles and gave up on the project. She states that she feels overwhelmed. Her memory frustrates her. It was recommended to speak with her PCP about any underlying depression/anxiety that may be contributing to her symptoms.       Current Outpatient Medications   Medication Sig Dispense Refill    ammonium lactate (AMLACTIN) 12 % cream APPLY TO BODY DAILY AFTER SHOWERING      donepezil (ARICEPT) 10 MG tablet Take 1 tablet by mouth daily 90 tablet 1    memantine (NAMENDA) 10 MG tablet Take 1 tablet by mouth 2 times daily 60 tablet 5    magnesium 200 MG TABS tablet Take 1 tablet by mouth daily      ipratropium (ATROVENT) 0.03 % nasal spray 2 sprays by Each Nostril route 3 times daily      metoprolol succinate (TOPROL XL) 50 MG extended release tablet Take 1 tablet by mouth

## 2023-10-04 ENCOUNTER — HOSPITAL ENCOUNTER (OUTPATIENT)
Age: 78
Discharge: HOME | End: 2023-10-04
Payer: MEDICARE

## 2023-10-04 DIAGNOSIS — E80.6 HYPERBILIRUBINEMIA: ICD-10-CM

## 2023-10-04 DIAGNOSIS — I48.0 PAF (PAROXYSMAL ATRIAL FIBRILLATION) (HCC): ICD-10-CM

## 2023-10-04 DIAGNOSIS — D69.6 THROMBOCYTOPENIA (HCC): ICD-10-CM

## 2023-10-04 LAB
ALP BLD-CCNC: 49 IU/L (ref 40–129)
ALT SERPL-CCNC: 50 U/L (ref 10–40)
AST SERPL-CCNC: 39 IU/L (ref 15–37)
BANDED NEUTROPHILS ABSOLUTE COUNT: 0.11 K/CU MM
BANDED NEUTROPHILS RELATIVE PERCENT: 2 % (ref 5–11)
BILIRUB SERPL-MCNC: 2.1 MG/DL (ref 0–1)
BILIRUBIN DIRECT: 0.4 MG/DL (ref 0–0.3)
BILIRUBIN, INDIRECT: 1.7 MG/DL (ref 0–0.7)
DIFFERENTIAL TYPE: ABNORMAL
EOSINOPHILS ABSOLUTE: 0.1 K/CU MM
EOSINOPHILS RELATIVE PERCENT: 1 % (ref 0–3)
HCT VFR BLD CALC: 45.5 % (ref 37–47)
HEMOGLOBIN: 14.4 GM/DL (ref 12.5–16)
LYMPHOCYTES ABSOLUTE: 1.8 K/CU MM
LYMPHOCYTES RELATIVE PERCENT: 32 % (ref 24–44)
MCH RBC QN AUTO: 30.1 PG (ref 27–31)
MCHC RBC AUTO-ENTMCNC: 31.6 % (ref 32–36)
MCV RBC AUTO: 95 FL (ref 78–100)
MONOCYTES ABSOLUTE: 0.7 K/CU MM
MONOCYTES RELATIVE PERCENT: 12 % (ref 0–4)
PDW BLD-RTO: 15.5 % (ref 11.7–14.9)
PLATELET # BLD: 130 K/CU MM (ref 140–440)
PMV BLD AUTO: 11.2 FL (ref 7.5–11.1)
RBC # BLD: 4.79 M/CU MM (ref 4.2–5.4)
RETICULOCYTE COUNT PCT: 1.2 % (ref 0.2–2.2)
SEGMENTED NEUTROPHILS ABSOLUTE COUNT: 3 K/CU MM
SEGMENTED NEUTROPHILS RELATIVE PERCENT: 53 % (ref 36–66)
T4 FREE SERPL-MCNC: 1.39 NG/DL (ref 0.9–1.8)
TSH SERPL DL<=0.005 MIU/L-ACNC: 4.69 UIU/ML (ref 0.27–4.2)
WBC # BLD: 5.7 K/CU MM (ref 4–10.5)

## 2023-10-04 PROCEDURE — 82247 BILIRUBIN TOTAL: CPT

## 2023-10-04 PROCEDURE — 84450 TRANSFERASE (AST) (SGOT): CPT

## 2023-10-04 PROCEDURE — 84439 ASSAY OF FREE THYROXINE: CPT

## 2023-10-04 PROCEDURE — 36415 COLL VENOUS BLD VENIPUNCTURE: CPT

## 2023-10-04 PROCEDURE — 84443 ASSAY THYROID STIM HORMONE: CPT

## 2023-10-04 PROCEDURE — 84460 ALANINE AMINO (ALT) (SGPT): CPT

## 2023-10-04 PROCEDURE — 82248 BILIRUBIN DIRECT: CPT

## 2023-10-04 PROCEDURE — 85045 AUTOMATED RETICULOCYTE COUNT: CPT

## 2023-10-04 PROCEDURE — 84075 ASSAY ALKALINE PHOSPHATASE: CPT

## 2023-10-04 PROCEDURE — 85007 BL SMEAR W/DIFF WBC COUNT: CPT

## 2023-10-04 PROCEDURE — 85027 COMPLETE CBC AUTOMATED: CPT

## 2023-10-05 LAB — SMEAR REVIEW: NORMAL

## 2023-10-16 ENCOUNTER — ANTI-COAG VISIT (OUTPATIENT)
Dept: PHARMACY | Age: 78
End: 2023-10-16
Payer: MEDICARE

## 2023-10-16 DIAGNOSIS — I48.0 PAF (PAROXYSMAL ATRIAL FIBRILLATION) (HCC): Primary | ICD-10-CM

## 2023-10-16 LAB
INTERNATIONAL NORMALIZATION RATIO, POC: 3.5
POC INR: 3.5 INDEX
PROTHROMBIN TIME, POC: 42.5 SECONDS (ref 10–14.3)

## 2023-10-16 PROCEDURE — 85610 PROTHROMBIN TIME: CPT

## 2023-10-16 PROCEDURE — 36416 COLLJ CAPILLARY BLOOD SPEC: CPT

## 2023-10-16 PROCEDURE — 99212 OFFICE O/P EST SF 10 MIN: CPT

## 2023-10-16 NOTE — PROGRESS NOTES
Medication Management Service  [facility]  [phone]    Visit Date: 10/16/2023   Subjective:       Barney Proctor is a 77 y.o. female who presents to clinic today for anticoagulation monitoring and adjustment. Patient seen in clinic for warfarin management due to  Indication:   atrial fibrillation. INR goal: of 2.0-3.0. Duration of therapy: indefinite. Patient reports the following:   Adherent with regimen  Missed or extra doses:  None   Bleeding or thromboembolic side effects:  None  Significant medication changes:  None  Significant dietary changes: decreased appeitite  Significant alcohol or tobacco changes: None  Significant recent illness, disease state changes, or hospitalization:  None  Upcoming surgeries or procedures:  None  Falls: None           Assessment and Plan     PT/INR done in office per protocol. INR today is 3.5, supratherapeutic. Plan: Take warfarin 1.5mg today then decrease weekly dose ~6% to warfarin 3mg daily except 4.5mg on Tuesdays and Fridays. Recheck INR in 1.5 week(s). Patient verbalized understanding of dosing directions and information discussed. Dosing schedule given to patient including phone number, appointment date, and time. Progress note sent to referring office.     Electronically signed by Arlet Pierre [facility] on 10/16/23     For Pharmacy Admin Tracking Only    Intervention Detail: Dose Adjustment: 1, reason: Therapy De-escalation  Total # of Interventions Recommended: 1  Total # of Interventions Accepted: 1  Time Spent (min): 15

## 2023-10-26 ENCOUNTER — APPOINTMENT (OUTPATIENT)
Dept: PHARMACY | Age: 78
End: 2023-10-26
Payer: MEDICARE

## 2023-10-26 DIAGNOSIS — I48.0 PAF (PAROXYSMAL ATRIAL FIBRILLATION) (HCC): Primary | ICD-10-CM

## 2023-10-26 LAB
INTERNATIONAL NORMALIZATION RATIO, POC: 2.7
POC INR: 2.7 INDEX
PROTHROMBIN TIME, POC: 32.8 SECONDS (ref 10–14.3)

## 2023-10-26 PROCEDURE — 36416 COLLJ CAPILLARY BLOOD SPEC: CPT

## 2023-10-26 PROCEDURE — 99211 OFF/OP EST MAY X REQ PHY/QHP: CPT

## 2023-10-26 PROCEDURE — 85610 PROTHROMBIN TIME: CPT

## 2023-10-26 NOTE — PROGRESS NOTES
Medication Management Service  [facility]  [phone]    Visit Date: 10/26/2023   Subjective:       Blake Giraldo is a 77 y.o. female who presents to clinic today for anticoagulation monitoring and adjustment. Patient seen in clinic for warfarin management due to  Indication:   atrial fibrillation. INR goal: of 2.0-3.0. Duration of therapy: indefinite. Patient reports the following:   Adherent with regimen  Missed or extra doses:  None   Bleeding or thromboembolic side effects:  None  Significant medication changes:  None  Significant dietary changes: None  Significant alcohol or tobacco changes: None  Significant recent illness, disease state changes, or hospitalization:  None  Upcoming surgeries or procedures:  None  Falls: None           Assessment and Plan     PT/INR done in office per protocol. INR today is 2.7, therapeutic. Plan: Will continue current regimen of warfarin 3mg daily except 4.5mg on Tuesdays and Fridays. Recheck INR in 2 week(s). Patient verbalized understanding of dosing directions and information discussed. Dosing schedule given to patient including phone number, appointment date, and time. Progress note sent to referring office. Electronically signed by Jorge Dacosta [facility] on 10/26/23     For Pharmacy Admin Tracking Only    Intervention Detail:   Total # of Interventions Recommended:    Total # of Interventions Accepted:   Time Spent (min): 15

## 2023-11-09 ENCOUNTER — ANTI-COAG VISIT (OUTPATIENT)
Dept: PHARMACY | Age: 78
End: 2023-11-09
Payer: MEDICARE

## 2023-11-09 DIAGNOSIS — I48.0 PAF (PAROXYSMAL ATRIAL FIBRILLATION) (HCC): Primary | ICD-10-CM

## 2023-11-09 LAB
INR BLD: 2.1
POC INR: 2.1 INDEX
PROTHROMBIN TIME, POC: 25.1 SECONDS (ref 10–14.3)
PROTIME: 25.1 SECONDS

## 2023-11-09 PROCEDURE — 99211 OFF/OP EST MAY X REQ PHY/QHP: CPT

## 2023-11-09 PROCEDURE — 85610 PROTHROMBIN TIME: CPT

## 2023-11-09 PROCEDURE — 36416 COLLJ CAPILLARY BLOOD SPEC: CPT

## 2023-11-09 NOTE — PROGRESS NOTES
Medication Management Service  [facility]  [phone]    Visit Date: 11/9/2023   Subjective:       Lauraine Denver is a 77 y.o. female who presents to clinic today for anticoagulation monitoring and adjustment. Patient seen in clinic for warfarin management due to  Indication:   atrial fibrillation. INR goal: of 2.0-3.0. Duration of therapy: indefinite. Patient reports the following:   Adherent with regimen  Missed or extra doses:  None   Bleeding or thromboembolic side effects:  None  Significant medication changes:  None  Significant dietary changes: None  Significant alcohol or tobacco changes: None  Significant recent illness, disease state changes, or hospitalization:  None  Upcoming surgeries or procedures:  None  Falls: None           Assessment and Plan     PT/INR done in office per protocol. INR today is 2.1, therapeutic. Plan: Will continue current regimen of warfarin 3mg daily except 4.5mg Tuesdays and Fridays. Recheck INR in 4 week(s). Patient verbalized understanding of dosing directions and information discussed. Dosing schedule given to patient including phone number, appointment date, and time. Progress note sent to referring office.     Electronically signed by NICCI Mares [facility] on 11/9/23     For Pharmacy Admin Tracking Only    Total # of Interventions Recommended: 0  Total # of Interventions Accepted: 0  Time Spent (min): 15

## 2023-11-11 SDOH — HEALTH STABILITY: PHYSICAL HEALTH: ON AVERAGE, HOW MANY DAYS PER WEEK DO YOU ENGAGE IN MODERATE TO STRENUOUS EXERCISE (LIKE A BRISK WALK)?: 2 DAYS

## 2023-11-11 SDOH — HEALTH STABILITY: PHYSICAL HEALTH: ON AVERAGE, HOW MANY MINUTES DO YOU ENGAGE IN EXERCISE AT THIS LEVEL?: 30 MIN

## 2023-11-11 ASSESSMENT — PATIENT HEALTH QUESTIONNAIRE - PHQ9
SUM OF ALL RESPONSES TO PHQ9 QUESTIONS 1 & 2: 1
SUM OF ALL RESPONSES TO PHQ QUESTIONS 1-9: 1
1. LITTLE INTEREST OR PLEASURE IN DOING THINGS: 1
SUM OF ALL RESPONSES TO PHQ QUESTIONS 1-9: 1
SUM OF ALL RESPONSES TO PHQ QUESTIONS 1-9: 1
2. FEELING DOWN, DEPRESSED OR HOPELESS: 0
SUM OF ALL RESPONSES TO PHQ QUESTIONS 1-9: 1

## 2023-11-11 ASSESSMENT — LIFESTYLE VARIABLES
HOW MANY STANDARD DRINKS CONTAINING ALCOHOL DO YOU HAVE ON A TYPICAL DAY: 0
HOW OFTEN DO YOU HAVE SIX OR MORE DRINKS ON ONE OCCASION: 1
HOW OFTEN DO YOU HAVE A DRINK CONTAINING ALCOHOL: NEVER
HOW OFTEN DO YOU HAVE A DRINK CONTAINING ALCOHOL: 1
HOW MANY STANDARD DRINKS CONTAINING ALCOHOL DO YOU HAVE ON A TYPICAL DAY: PATIENT DOES NOT DRINK

## 2023-11-12 PROCEDURE — 93294 REM INTERROG EVL PM/LDLS PM: CPT | Performed by: INTERNAL MEDICINE

## 2023-11-12 PROCEDURE — 93296 REM INTERROG EVL PM/IDS: CPT | Performed by: INTERNAL MEDICINE

## 2023-11-12 PROCEDURE — 93297 REM INTERROG DEV EVAL ICPMS: CPT | Performed by: INTERNAL MEDICINE

## 2023-11-14 ENCOUNTER — OFFICE VISIT (OUTPATIENT)
Dept: FAMILY MEDICINE CLINIC | Age: 78
End: 2023-11-14
Payer: MEDICARE

## 2023-11-14 VITALS
DIASTOLIC BLOOD PRESSURE: 70 MMHG | BODY MASS INDEX: 21.35 KG/M2 | OXYGEN SATURATION: 98 % | WEIGHT: 136 LBS | SYSTOLIC BLOOD PRESSURE: 122 MMHG | HEART RATE: 76 BPM | HEIGHT: 67 IN

## 2023-11-14 DIAGNOSIS — I10 ESSENTIAL HYPERTENSION: ICD-10-CM

## 2023-11-14 DIAGNOSIS — Z00.00 MEDICARE ANNUAL WELLNESS VISIT, SUBSEQUENT: Primary | ICD-10-CM

## 2023-11-14 DIAGNOSIS — G30.9 MILD ALZHEIMER'S DEMENTIA WITH MOOD DISTURBANCE, UNSPECIFIED TIMING OF DEMENTIA ONSET (HCC): ICD-10-CM

## 2023-11-14 DIAGNOSIS — R53.82 CHRONIC FATIGUE: ICD-10-CM

## 2023-11-14 DIAGNOSIS — F02.A3 MILD ALZHEIMER'S DEMENTIA WITH MOOD DISTURBANCE, UNSPECIFIED TIMING OF DEMENTIA ONSET (HCC): ICD-10-CM

## 2023-11-14 PROCEDURE — 3074F SYST BP LT 130 MM HG: CPT | Performed by: FAMILY MEDICINE

## 2023-11-14 PROCEDURE — 3078F DIAST BP <80 MM HG: CPT | Performed by: FAMILY MEDICINE

## 2023-11-14 PROCEDURE — G0439 PPPS, SUBSEQ VISIT: HCPCS | Performed by: FAMILY MEDICINE

## 2023-11-14 PROCEDURE — 99214 OFFICE O/P EST MOD 30 MIN: CPT | Performed by: FAMILY MEDICINE

## 2023-11-14 PROCEDURE — 1123F ACP DISCUSS/DSCN MKR DOCD: CPT | Performed by: FAMILY MEDICINE

## 2023-11-15 ENCOUNTER — TELEPHONE (OUTPATIENT)
Dept: CARDIOLOGY CLINIC | Age: 78
End: 2023-11-15

## 2023-11-15 ENCOUNTER — PROCEDURE VISIT (OUTPATIENT)
Dept: CARDIOLOGY CLINIC | Age: 78
End: 2023-11-15
Payer: MEDICARE

## 2023-11-15 ENCOUNTER — HOSPITAL ENCOUNTER (OUTPATIENT)
Age: 78
Discharge: HOME | End: 2023-11-15
Payer: MEDICARE

## 2023-11-15 DIAGNOSIS — I10 ESSENTIAL HYPERTENSION: ICD-10-CM

## 2023-11-15 DIAGNOSIS — Z95.0 BIVENTRICULAR CARDIAC PACEMAKER IN SITU: Primary | ICD-10-CM

## 2023-11-15 DIAGNOSIS — Z95.0 CARDIAC PACEMAKER IN SITU: ICD-10-CM

## 2023-11-15 LAB
ALBUMIN SERPL-MCNC: 4.3 GM/DL (ref 3.4–5)
ALP BLD-CCNC: 51 IU/L (ref 40–129)
ALT SERPL-CCNC: 37 U/L (ref 10–40)
ANION GAP SERPL CALCULATED.3IONS-SCNC: 10 MMOL/L (ref 4–16)
AST SERPL-CCNC: 39 IU/L (ref 15–37)
BACTERIA: ABNORMAL /HPF
BILIRUB SERPL-MCNC: 2.6 MG/DL (ref 0–1)
BILIRUBIN URINE: NEGATIVE MG/DL
BLOOD, URINE: NEGATIVE
BUN SERPL-MCNC: 22 MG/DL (ref 6–23)
CALCIUM SERPL-MCNC: 9.5 MG/DL (ref 8.3–10.6)
CHLORIDE BLD-SCNC: 103 MMOL/L (ref 99–110)
CLARITY: CLEAR
CO2: 28 MMOL/L (ref 21–32)
COLOR: YELLOW
CREAT SERPL-MCNC: 0.8 MG/DL (ref 0.6–1.1)
GFR SERPL CREATININE-BSD FRML MDRD: >60 ML/MIN/1.73M2
GLUCOSE P FAST SERPL-MCNC: 97 MG/DL (ref 70–99)
GLUCOSE, URINE: NEGATIVE MG/DL
HCT VFR BLD CALC: 45.8 % (ref 37–47)
HEMOGLOBIN: 14.6 GM/DL (ref 12.5–16)
KETONES, URINE: ABNORMAL MG/DL
LEUKOCYTE ESTERASE, URINE: ABNORMAL
MCH RBC QN AUTO: 30.6 PG (ref 27–31)
MCHC RBC AUTO-ENTMCNC: 31.9 % (ref 32–36)
MCV RBC AUTO: 96 FL (ref 78–100)
MUCUS: ABNORMAL HPF
NITRITE URINE, QUANTITATIVE: NEGATIVE
PDW BLD-RTO: 14.6 % (ref 11.7–14.9)
PH, URINE: 6.5 (ref 5–8)
PLATELET # BLD: 127 K/CU MM (ref 140–440)
PMV BLD AUTO: 11.1 FL (ref 7.5–11.1)
POTASSIUM SERPL-SCNC: 4.3 MMOL/L (ref 3.5–5.1)
PROTEIN UA: NEGATIVE MG/DL
RBC # BLD: 4.77 M/CU MM (ref 4.2–5.4)
RBC URINE: 1 /HPF (ref 0–6)
SODIUM BLD-SCNC: 141 MMOL/L (ref 135–145)
SPECIFIC GRAVITY UA: 1.01 (ref 1–1.03)
SQUAMOUS EPITHELIAL: 1 /HPF
TOTAL PROTEIN: 6.9 GM/DL (ref 6.4–8.2)
TRANSITIONAL EPITHELIAL: <1 /HPF
TRICHOMONAS: ABNORMAL /HPF
TSH SERPL DL<=0.005 MIU/L-ACNC: 6.48 UIU/ML (ref 0.27–4.2)
UROBILINOGEN, URINE: 1 MG/DL (ref 0.2–1)
WBC # BLD: 5.1 K/CU MM (ref 4–10.5)
WBC UA: 5 /HPF (ref 0–5)

## 2023-11-15 PROCEDURE — 84443 ASSAY THYROID STIM HORMONE: CPT

## 2023-11-15 PROCEDURE — 85027 COMPLETE CBC AUTOMATED: CPT

## 2023-11-15 PROCEDURE — 81001 URINALYSIS AUTO W/SCOPE: CPT

## 2023-11-15 PROCEDURE — 80053 COMPREHEN METABOLIC PANEL: CPT

## 2023-11-15 PROCEDURE — 36415 COLL VENOUS BLD VENIPUNCTURE: CPT

## 2023-11-15 NOTE — PROGRESS NOTES
Plan:   1. Medicare annual wellness visit, subsequent  2. Chronic fatigue  -     TSH; Future  -     Comprehensive Metabolic Panel, Fasting; Future  -     CBC; Future  -     Urinalysis; Future  3. Mild Alzheimer's dementia with mood disturbance, unspecified timing of dementia onset ([address])  4. Essential hypertension  -     TSH; Future  -     Comprehensive Metabolic Panel, Fasting; Future  -     CBC; Future  -     Urinalysis; Future    Chronic fatigue - myriad of potential etiologies     Labs  Cardiology follow up  Neurology follow up Dec-Ok   Consider zoloft   Melatonin given in middle of night but suggested near bedtime to try and get uninterrupted sleep and also bladder training      Patient expresses her ongoing changes in her mood to include anxiety and depressive symptoms because of her memory loss. She gets frustrated easily and irritable. She also feels sad that she cannot do what she used to do and complete projects. Would consider Zoloft. Encouraged ongoing structural components of her day as well as potentially trying to use documentation as a way to help finish projects and remember activities. All patient questions answered. Pt voiced understanding. Return in about 6 months (around 5/14/2024) for Med refills for chronic conditions.  -----------------------------------------------------------------------------------------------            Chief Complaint   Patient presents with    Dementia    Fatigue    Anxiety   Patient is here for her annual medical wellness as well as wanting to discuss her ongoing anxiety with her dementia progression. She is very frustrated that she is having a hard time finishing tasks. This includes some activities of daily living as well as projects such as clothing sorting in her house. Her  has to help her also with reminders on medications. She does get frustrated and not remembering tasks from the day before. No wandering has been reported.   She follows
Jairon Dodson MD   cetirizine (ZYRTEC) 10 MG tablet Take 1 tablet by mouth daily Yes ProviderMelania MD   warfarin (COUMADIN) 3 MG tablet TAKE 1 TABLET BY MOUTH DAILY EXCEPT TAKE 1/2 TABLET (1.5MG) ON THURSDAYS  Patient taking differently: 1.5 mg on Mondays and Thursdays Yes Maddi Pruitt MD   torsemide (DEMADEX) 10 MG tablet Take 1 tablet by mouth daily  Patient taking differently: Take 0.5 tablets by mouth daily Yes Star Meehan MD   atorvastatin (LIPITOR) 40 MG tablet Take 1 tablet by mouth daily TAKE 1 TABLET BY MOUTH EVERY DAY Yes ANTHONY Hansen - CNP   levothyroxine (SYNTHROID) 50 MCG tablet TAKE 1 TABLET BY MOUTH EVERY DAY BEFORE BREAKFAST Yes Maddi Pruitt MD   fexofenadine (ALLEGRA) 180 MG tablet Take 1 tablet by mouth daily Yes ProviderMelania MD   aspirin 81 MG chewable tablet Take 1 tablet by mouth daily Yes Natalie Hartman MD   Vitamin D (CHOLECALCIFEROL) 25 MCG (1000 UT) TABS tablet Take 1 tablet by mouth daily Yes Provider, Historical, MD       CareTeam (Including outside providers/suppliers regularly involved in providing care):   Patient Care Team:  Maddi Pruitt MD as PCP - James Zee MD as PCP - Empaneled Provider  Star Meehan MD as Consulting Physician (Cardiology)     Reviewed and updated this visit:  Tobacco  Allergies  Meds  Med Hx  Surg Hx  Soc Hx  Fam Hx

## 2023-11-20 ENCOUNTER — OFFICE VISIT (OUTPATIENT)
Dept: CARDIOLOGY CLINIC | Age: 78
End: 2023-11-20
Payer: MEDICARE

## 2023-11-20 VITALS
DIASTOLIC BLOOD PRESSURE: 72 MMHG | HEART RATE: 72 BPM | SYSTOLIC BLOOD PRESSURE: 118 MMHG | WEIGHT: 135.8 LBS | BODY MASS INDEX: 21.31 KG/M2 | HEIGHT: 67 IN | OXYGEN SATURATION: 98 %

## 2023-11-20 DIAGNOSIS — I48.0 PAF (PAROXYSMAL ATRIAL FIBRILLATION) (HCC): Primary | ICD-10-CM

## 2023-11-20 DIAGNOSIS — Z95.0 BIVENTRICULAR CARDIAC PACEMAKER IN SITU: ICD-10-CM

## 2023-11-20 PROCEDURE — 1123F ACP DISCUSS/DSCN MKR DOCD: CPT | Performed by: INTERNAL MEDICINE

## 2023-11-20 PROCEDURE — 99214 OFFICE O/P EST MOD 30 MIN: CPT | Performed by: INTERNAL MEDICINE

## 2023-11-20 PROCEDURE — 3074F SYST BP LT 130 MM HG: CPT | Performed by: INTERNAL MEDICINE

## 2023-11-20 PROCEDURE — 3078F DIAST BP <80 MM HG: CPT | Performed by: INTERNAL MEDICINE

## 2023-11-20 PROCEDURE — 93000 ELECTROCARDIOGRAM COMPLETE: CPT | Performed by: INTERNAL MEDICINE

## 2023-11-20 RX ORDER — METOPROLOL SUCCINATE 50 MG/1
50 TABLET, EXTENDED RELEASE ORAL DAILY
Qty: 180 TABLET | Refills: 1 | Status: SHIPPED | OUTPATIENT
Start: 2023-11-20

## 2023-11-20 NOTE — PROGRESS NOTES
Diazepam  Past Medical History:   Diagnosis Date    Abnormal echocardiogram     EF 60%, mild aortic indsufficiency, mild pulmonary hypertension    Anemia     Aortic insufficiency     mild per echo 8/24/2010    Atrial fibrillation (HCC)     failed propafenone, Multaq and flecainide - 7/2011 plan for AFib ablation - [facility]- Dr Tanja Holstein    Atrial flutter [facility])     Bursitis, trochanteric 08/2004    s/p injection    Cerumen impaction 04/24/2012    Diverticulosis 2015    Dr. Aneudy Patel C scope    Diverticulosis of colon 01/2010    Colonoscopy-Dr Reece ACUÑA (degenerative joint disease), cervical     cervical, generalized disc buldge   MRI 3/02    DVT (deep venous thrombosis) ([address])     Dyslipidemia 2002    Environmental allergies     Family history of colon cancer     mother    Gastric polyp     8/2006, 11/2009 benign- Dr Aneudy Patel    Gastritis 04/2008    mild superficial per Dr Aneudy Patel    GERD (gastroesophageal reflux disease) 08/2006    Dr Aneudy Patel    H/O cardiovascular stress test 07/13/2004 07/13 EF58%, No scintigraphic evidence of inducible myocardial ischemia 04/13Normal study. No wall motion abnormality seen. EF 65%    H/O Doppler ultrasound 06/14/2022    VL Lower Ext Arteries/Venous Right. No evidence of pseudoaneurysm, AV fistula, or hematoma is present. H/O Doppler ultrasound 12/27/2022    Right CFV is patent with good compressibility and resirophasic signal with good augmentation. Right GSV is non compressible with  no evidence of flow just past the saphenofemoral junction to the knee. H/O echocardiogram 07/18/2013 7/13-EF 50-55% Mild AR.      H/O exercise stress test 02/07/2017    EF63% normal    History of Holter monitoring 09/23/2015    48 hour - abnormal holter revealing afib throughout the recording with interspersed pacemaker beats, HR does not appear to be well controlled    History of mammogram     last mammo 7/25/11, Dr. Em Calixto yearly    Hx of colonoscopy     1/21/2010    Hx of Doppler Venous

## 2023-11-30 ENCOUNTER — TELEPHONE (OUTPATIENT)
Dept: NEUROLOGY | Age: 78
End: 2023-11-30

## 2023-11-30 DIAGNOSIS — G30.0 MILD EARLY ONSET ALZHEIMER'S DEMENTIA WITHOUT BEHAVIORAL DISTURBANCE, PSYCHOTIC DISTURBANCE, MOOD DISTURBANCE, OR ANXIETY (HCC): Primary | ICD-10-CM

## 2023-11-30 DIAGNOSIS — F02.A0 MILD EARLY ONSET ALZHEIMER'S DEMENTIA WITHOUT BEHAVIORAL DISTURBANCE, PSYCHOTIC DISTURBANCE, MOOD DISTURBANCE, OR ANXIETY (HCC): Primary | ICD-10-CM

## 2023-11-30 RX ORDER — MEMANTINE HYDROCHLORIDE 10 MG/1
10 TABLET ORAL 2 TIMES DAILY
Qty: 60 TABLET | Refills: 5 | Status: SHIPPED | OUTPATIENT
Start: 2023-11-30

## 2023-11-30 NOTE — TELEPHONE ENCOUNTER
Patient needs a refill on memantine (NAMENDA) 10 MG tablet   . They need a 90 day supply.        Pharmacy: [facility] [id number], [address] [place] [address] [phone]

## 2023-12-07 ENCOUNTER — ANTI-COAG VISIT (OUTPATIENT)
Dept: PHARMACY | Age: 78
End: 2023-12-07
Payer: MEDICARE

## 2023-12-07 DIAGNOSIS — I48.0 PAF (PAROXYSMAL ATRIAL FIBRILLATION) (HCC): Primary | ICD-10-CM

## 2023-12-07 LAB
INTERNATIONAL NORMALIZATION RATIO, POC: 2.3
POC INR: 2.3 INDEX
PROTHROMBIN TIME, POC: 27.3 SECONDS (ref 10–14.3)

## 2023-12-07 PROCEDURE — 85610 PROTHROMBIN TIME: CPT

## 2023-12-07 PROCEDURE — 36416 COLLJ CAPILLARY BLOOD SPEC: CPT

## 2023-12-07 PROCEDURE — 99211 OFF/OP EST MAY X REQ PHY/QHP: CPT

## 2023-12-07 NOTE — PROGRESS NOTES
Medication Management Service  [facility]  [phone]    Visit Date: 12/7/2023   Subjective:       Munir Santo is a 77 y.o. female who presents to clinic today for anticoagulation monitoring and adjustment. Patient seen in clinic for warfarin management due to  Indication:   atrial fibrillation. INR goal: of 2.0-3.0. Duration of therapy: indefinite. Patient reports the following:   Adherent with regimen  Missed or extra doses:  None   Bleeding or thromboembolic side effects:  Nosebleeding every morning for a week  Significant medication changes:  None  Significant dietary changes: None  Significant alcohol or tobacco changes: None  Significant recent illness, disease state changes, or hospitalization:  None  Upcoming surgeries or procedures:  None  Falls: None           Assessment and Plan     PT/INR done in office per protocol. INR today is 2.3, therapeutic. Reviewed steps to take to stop nosebleed. Advised Atrovent nose spray may be contributing and recommended she call her ENT to discuss. Recommended saline nasal spray as needed to moisten nasal passages. Plan: Will continue current regimen of warfarin 3mg daily except 4.5mg on Tuesdays and Fridays. Recheck INR in 5 week(s). Patient verbalized understanding of dosing directions and information discussed. Dosing schedule given to patient including phone number, appointment date, and time. Progress note sent to referring office. Electronically signed by Keira Simpson, [address] [place] [address] on 12/7/23     For Pharmacy Admin Tracking Only    Intervention Detail:   Total # of Interventions Recommended:    Total # of Interventions Accepted:   Time Spent (min): 15

## 2023-12-18 DIAGNOSIS — E03.9 ACQUIRED HYPOTHYROIDISM: ICD-10-CM

## 2023-12-18 NOTE — TELEPHONE ENCOUNTER
Last appointment: 11/14/2023   Next Appointment: Visit date not found     Allergies:  Allergies   Allergen Reactions    Latex Hives    Darvon [Propoxyphene Hcl] Shortness Of Breath    Demerol Rash     Breathing problems    Dilaudid [Hydromorphone Hcl] Anaphylaxis    Valium Rash     Breathing problems    Celecoxib Diarrhea    Norco [Hydrocodone-Acetaminophen] Diarrhea, Nausea Only and Other (See Comments)     A-Fib    Norvasc [Amlodipine] Other (See Comments)     HA, dizziness    Oxycodone Itching    Tape [Adhesive Tape] Other (See Comments)     BLISTER    Vasotec [Enalapril] Other (See Comments)     Nausea, vomiting    Diazepam Rash         Recent Vitals:  Wt Readings from Last 3 Encounters:   11/20/23 61.6 kg (135 lb 12.8 oz)   11/14/23 61.7 kg (136 lb)   09/27/23 60.8 kg (134 lb)     Ht Readings from Last 3 Encounters:   11/20/23 1.702 m (5' 7\")   11/14/23 1.702 m (5' 7\")   09/27/23 1.702 m (5' 7\")     BP Readings from Last 3 Encounters:   11/20/23 118/72   11/14/23 122/70   09/27/23 110/70     BMI Readings from Last 3 Encounters:   11/20/23 21.27 kg/m²   11/14/23 21.30 kg/m²   09/27/23 20.99 kg/m²       Recent Labs__________________________________________________________________________    Renal:   Creatinine   Date Value Ref Range Status   11/15/2023 0.8 0.6 - 1.1 MG/DL Final     BUN   Date Value Ref Range Status   11/15/2023 22 6 - 23 MG/DL Final     Sodium   Date Value Ref Range Status   11/15/2023 141 135 - 145 MMOL/L Final     Potassium   Date Value Ref Range Status   11/15/2023 4.3 3.5 - 5.1 MMOL/L Final     Chloride   Date Value Ref Range Status   11/15/2023 103 99 - 110 mMol/L Final     CO2   Date Value Ref Range Status   11/15/2023 28 21 - 32 MMOL/L Final       BMP:    Sodium   Date Value Ref Range Status   11/15/2023 141 135 - 145 MMOL/L Final     Potassium   Date Value Ref Range Status   11/15/2023 4.3 3.5 - 5.1 MMOL/L Final     Chloride   Date Value Ref Range Status   11/15/2023 103 99 - 110 mMol/L  Alert-The patient is alert, awake and responds to voice. The patient is oriented to time, place, and person. The triage nurse is able to obtain subjective information.

## 2023-12-20 RX ORDER — LEVOTHYROXINE SODIUM 0.07 MG/1
75 TABLET ORAL
Qty: 90 TABLET | Refills: 0 | Status: SHIPPED | OUTPATIENT
Start: 2023-12-20

## 2023-12-20 NOTE — TELEPHONE ENCOUNTER
Please let patient and  know that I would like to increase her thyroid dosing slightly to 75mcg daily which may help with her fatigue. New Rx sent to pharmacy. Recheck TSH in 4 weeks.

## 2023-12-25 DIAGNOSIS — E78.2 MIXED HYPERLIPIDEMIA: ICD-10-CM

## 2023-12-26 RX ORDER — ATORVASTATIN CALCIUM 40 MG/1
40 TABLET, FILM COATED ORAL DAILY
Qty: 90 TABLET | Refills: 3 | Status: SHIPPED | OUTPATIENT
Start: 2023-12-26

## 2024-01-08 ENCOUNTER — ANTI-COAG VISIT (OUTPATIENT)
Dept: PHARMACY | Age: 79
End: 2024-01-08
Payer: MEDICARE

## 2024-01-08 DIAGNOSIS — I48.0 PAF (PAROXYSMAL ATRIAL FIBRILLATION) (HCC): Primary | ICD-10-CM

## 2024-01-08 LAB
INTERNATIONAL NORMALIZATION RATIO, POC: 3.1
POC INR: 3.1 INDEX
PROTHROMBIN TIME, POC: 36.9 SECONDS (ref 10–14.3)

## 2024-01-08 PROCEDURE — 85610 PROTHROMBIN TIME: CPT

## 2024-01-08 PROCEDURE — 36416 COLLJ CAPILLARY BLOOD SPEC: CPT

## 2024-01-08 PROCEDURE — 99211 OFF/OP EST MAY X REQ PHY/QHP: CPT

## 2024-01-08 NOTE — PROGRESS NOTES
Medication Management Service  [facility]  [phone]    Visit Date: 1/8/2024   Subjective:       Genesis Stewart is a 78 y.o. female who presents to clinic today for anticoagulation monitoring and adjustment.    Patient seen in clinic for warfarin management due to  Indication:   atrial fibrillation.   INR goal: of 2.0-3.0.  Duration of therapy: indefinite.    Patient reports the following:   Adherent with regimen  Missed or extra doses:  None   Bleeding or thromboembolic side effects:  None  Significant medication changes:  levothyroxine increased 12/20  Significant dietary changes: None  Significant alcohol or tobacco changes: None  Significant recent illness, disease state changes, or hospitalization:  None  Upcoming surgeries or procedures:  None  Falls: None           Assessment and Plan     PT/INR done in office per protocol.   INR today is 3.1, supratherapeutic.        Will monitor closely   Plan:  Will continue current regimen of warfarin 3mg daily except 4.5mg on Tuesdays and Fridays.     Recheck INR in 3 week(s).     Patient verbalized understanding of dosing directions and information discussed. Dosing schedule given to patient including phone number, appointment date, and time. Progress note sent to referring office.    Electronically signed by Taylor Lew RPH on 1/8/24     For Pharmacy Admin Tracking Only    Intervention Detail:   Total # of Interventions Recommended:   Total # of Interventions Accepted:   Time Spent (min): 15

## 2024-01-29 ENCOUNTER — ANTI-COAG VISIT (OUTPATIENT)
Dept: PHARMACY | Age: 79
End: 2024-01-29
Payer: MEDICARE

## 2024-01-29 DIAGNOSIS — I48.0 PAF (PAROXYSMAL ATRIAL FIBRILLATION) (HCC): Primary | ICD-10-CM

## 2024-01-29 LAB
INTERNATIONAL NORMALIZATION RATIO, POC: 2.8
POC INR: 2.8 INDEX
PROTHROMBIN TIME, POC: 33.6 SECONDS (ref 10–14.3)

## 2024-01-29 PROCEDURE — 85610 PROTHROMBIN TIME: CPT

## 2024-01-29 PROCEDURE — 36416 COLLJ CAPILLARY BLOOD SPEC: CPT

## 2024-01-29 PROCEDURE — 99211 OFF/OP EST MAY X REQ PHY/QHP: CPT

## 2024-01-29 NOTE — PROGRESS NOTES
Medication Management Service  [facility]  [phone]    Visit Date: 1/29/2024   Subjective:       Genesis Stewart is a 78 y.o. female who presents to clinic today for anticoagulation monitoring and adjustment.    Patient seen in clinic for warfarin management due to  Indication:   atrial fibrillation.   INR goal: of 2.0-3.0.  Duration of therapy: indefinite.    Patient reports the following:   Adherent with regimen  Missed or extra doses:  None   Bleeding or thromboembolic side effects:  None  Significant medication changes:  None  Significant dietary changes: None  Significant alcohol or tobacco changes: None  Significant recent illness, disease state changes, or hospitalization:  None  Upcoming surgeries or procedures:  None  Falls: None           Assessment and Plan     PT/INR done in office per protocol.   INR today is 2.8, therapeutic.          Plan:  Will continue current regimen of warfarin 3mg daily except 4.5mg on Tuesdays and Fridays  Recheck INR in 4 week(s).     Patient verbalized understanding of dosing directions and information discussed. Dosing schedule given to patient including phone number, appointment date, and time. Progress note sent to referring office.    Electronically signed by Taylor Lew RPH on 1/29/24     For Pharmacy Admin Tracking Only    Intervention Detail:   Total # of Interventions Recommended:   Total # of Interventions Accepted:   Time Spent (min): 15

## 2024-02-14 ENCOUNTER — OFFICE VISIT (OUTPATIENT)
Dept: NEUROLOGY | Age: 79
End: 2024-02-14
Payer: MEDICARE

## 2024-02-14 VITALS
BODY MASS INDEX: 21.41 KG/M2 | HEIGHT: 67 IN | DIASTOLIC BLOOD PRESSURE: 86 MMHG | SYSTOLIC BLOOD PRESSURE: 128 MMHG | OXYGEN SATURATION: 98 % | WEIGHT: 136.4 LBS | HEART RATE: 76 BPM

## 2024-02-14 DIAGNOSIS — R47.02 EXPRESSIVE DYSPHASIA: Primary | ICD-10-CM

## 2024-02-14 DIAGNOSIS — G30.1 MILD LATE ONSET ALZHEIMER'S DEMENTIA WITH ANXIETY (HCC): ICD-10-CM

## 2024-02-14 DIAGNOSIS — F02.A4 MILD LATE ONSET ALZHEIMER'S DEMENTIA WITH ANXIETY (HCC): ICD-10-CM

## 2024-02-14 PROCEDURE — G8399 PT W/DXA RESULTS DOCUMENT: HCPCS | Performed by: NURSE PRACTITIONER

## 2024-02-14 PROCEDURE — 99214 OFFICE O/P EST MOD 30 MIN: CPT | Performed by: NURSE PRACTITIONER

## 2024-02-14 PROCEDURE — G8484 FLU IMMUNIZE NO ADMIN: HCPCS | Performed by: NURSE PRACTITIONER

## 2024-02-14 PROCEDURE — 3074F SYST BP LT 130 MM HG: CPT | Performed by: NURSE PRACTITIONER

## 2024-02-14 PROCEDURE — G8427 DOCREV CUR MEDS BY ELIG CLIN: HCPCS | Performed by: NURSE PRACTITIONER

## 2024-02-14 PROCEDURE — 1036F TOBACCO NON-USER: CPT | Performed by: NURSE PRACTITIONER

## 2024-02-14 PROCEDURE — 1123F ACP DISCUSS/DSCN MKR DOCD: CPT | Performed by: NURSE PRACTITIONER

## 2024-02-14 PROCEDURE — 1090F PRES/ABSN URINE INCON ASSESS: CPT | Performed by: NURSE PRACTITIONER

## 2024-02-14 PROCEDURE — G8420 CALC BMI NORM PARAMETERS: HCPCS | Performed by: NURSE PRACTITIONER

## 2024-02-14 PROCEDURE — 3079F DIAST BP 80-89 MM HG: CPT | Performed by: NURSE PRACTITIONER

## 2024-02-14 NOTE — PROGRESS NOTES
and insight into her problem.  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  At her initial visit with Dr. Molina, he was suspicious for a right parietal lobe stroke however she cannot have an MRI completed due to incompatible pacemaker.  A CT scan was ordered alternatively which was without evidence of stroke.  She is on Namenda and Aricept to help slow the progression of memory loss.  I did have a long discussion with Genesis today, even if she did have a stroke in the past, she is optimized on secondary stroke prevention with Coumadin and statin.  I had a lengthy discussion with her that cognitive therapy would be helpful as well as balance therapy for fall prevention.  I think starting medication for anxiety/depression would help as her expressive speech and apraxia is very upsetting to her.  I do not want her isolating herself due to her fear of being unable to converse with people because staying active cognitively, socially, and physically will help slow down the progression of memory loss.    I spent >30 minutes total time regarding this patient's encounter.  This included obtaining history, performing a neurological medical exam, developing an assessment / plan, and documenting via EMR.    Return in about 6 months (around 8/14/2024).    Gregorio Beltran, APRN - CNP

## 2024-02-17 PROCEDURE — 93297 REM INTERROG DEV EVAL ICPMS: CPT | Performed by: INTERNAL MEDICINE

## 2024-02-17 PROCEDURE — 93296 REM INTERROG EVL PM/IDS: CPT | Performed by: INTERNAL MEDICINE

## 2024-02-17 PROCEDURE — 93294 REM INTERROG EVL PM/LDLS PM: CPT | Performed by: INTERNAL MEDICINE

## 2024-02-20 ENCOUNTER — PROCEDURE VISIT (OUTPATIENT)
Dept: CARDIOLOGY CLINIC | Age: 79
End: 2024-02-20
Payer: MEDICARE

## 2024-02-20 DIAGNOSIS — Z95.0 CARDIAC PACEMAKER IN SITU: ICD-10-CM

## 2024-02-20 DIAGNOSIS — Z95.0 BIVENTRICULAR CARDIAC PACEMAKER IN SITU: Primary | ICD-10-CM

## 2024-02-23 ENCOUNTER — TELEPHONE (OUTPATIENT)
Dept: CARDIOLOGY CLINIC | Age: 79
End: 2024-02-23

## 2024-02-23 NOTE — TELEPHONE ENCOUNTER
called asking for her pace maker to re billed to   [employer] it was sent to [facility] which changed 1/24

## 2024-02-23 NOTE — PROGRESS NOTES
Controlled on xanax 2 mg bid; cpm   Wound Care Center Progress Note With Procedure    Genesis Stewart  AGE: 65 y.o. GENDER: female  : 1945  EPISODE DATE:  2022     Subjective:     Chief Complaint   Patient presents with    Wound Check     Right angle         HISTORY of PRESENT ILLNESS     Ana Lilia Tran is a 65 y.o. female who presents to the [address] [place] [address] for a visit for evaluation and treatment of Chronic venous and non-healing/non-surgical  ulcer(s) of  R ankle lateral.  The condition is of moderate severity. The ulcer has been present for 6 months. The underlying cause is thought to be nonhealing venous wound from when she was fluid overloaded and got a blister. This never healed although her cardiac issues are now resolved and she is very stable. The patients care to date has included nothing so far. The patient has significant underlying medical conditions as below. Patient had venous procedure to right lower extremity   Now wearing compressions bilat  Size is down about 60% from last visit  Patient concerned about time to heal  We can likely modify orders this week     Patient educated on the 6 essential components necessary for wound healing: Circulation, Debridements, Proper Dressings and Topical Wound Products, Infection Control, Edema Control and Offloading. Patient educated on those factors that negatively effect or impact wound healing: smoking, obesity, uncontrolled diabetes, anticoagulant and immunosuppressive regimens, inadequate nutrition, untreated arterial and venous disease if applicable and measures to manage edema.              PAST MEDICAL HISTORY        Diagnosis Date    Abnormal echocardiogram     EF 60%, mild aortic indsufficiency, mild pulmonary hypertension    Anemia     Aortic insufficiency     mild per echo 2010    Atrial fibrillation (HCC)     failed propafenone, Multaq and flecainide - 2011 plan for AFib ablation - [facility]- Dr Wiseman Knife    Atrial flutter [facility])     Bursitis, trochanteric 08/2004    s/p injection    Cerumen impaction 04/24/2012    Diverticulosis 2015    Dr. Sudeep BANKS scope    Diverticulosis of colon 01/2010    Colonoscopy-Dr Reece BOWERSD (degenerative joint disease), cervical     cervical, generalized disc buldge   MRI 3/02    DVT (deep venous thrombosis) ([facility] [address]     Dyslipidemia 2002    Environmental allergies     Family history of colon cancer     mother    Gastric polyp     8/2006, 11/2009 benign- Dr Sudeep Drake    Gastritis 04/2008    mild superficial per Dr Sudeep Drake    GERD (gastroesophageal reflux disease) 08/2006    Dr Sudeep Drake    H/O cardiovascular stress test 07/13/2004 07/13 EF58%, No scintigraphic evidence of inducible myocardial ischemia 04/13Normal study. No wall motion abnormality seen. EF 65%    H/O Doppler ultrasound 06/14/2022    VL Lower Ext Arteries/Venous Right. No evidence of pseudoaneurysm, AV fistula, or hematoma is present. H/O Doppler ultrasound 12/27/2022    Right CFV is patent with good compressibility and resirophasic signal with good augmentation. Right GSV is non compressible with  no evidence of flow just past the saphenofemoral junction to the knee. H/O echocardiogram 07/18/2013 7/13-EF 50-55% Mild AR.      H/O exercise stress test 02/07/2017    EF63% normal    History of Holter monitoring 09/23/2015    48 hour - abnormal holter revealing afib throughout the recording with interspersed pacemaker beats, HR does not appear to be well controlled    History of mammogram     last mammo 7/25/11, Dr. Gisel Martel yearly    Hx of colonoscopy     1/21/2010    Hx of Doppler Venous ultrasound 08/31/2021    No DVT or SVT, No significant reflux in RLE, Significant reflux in Left CFV    Hyperlipidemia     Hypertension     Hypothyroidism     Internal hemorrhoid 2015    Dr. Sudeep BANKS scope     Intervertebral disc protrusion 05/2009    wry neck with C4-5      Left shoulder pain 04/2004    (L) shoulder impingement  mild DJD    Long term current use of anticoagulant 07/26/2016    **Coumadin Clinic follows PT/INRs, along w/prescribing pt's Coumadin dosages. **    Menstruation     age 14    Pacemaker 06/21/2012    dual chamber- checked every 3 months    Pulmonary hypertension (HCC)     mild per echo 8/24/2010, Pt refused sleep study (June 2012)    Restless legs 08/2003    ferritin    Right shoulder strain 02/2003    no seperation, bony contusion anterior humeral head  MRI 3/03    Sciatica 07/2009    (R) chronic intermittent s/p epidural injections  Dr Marianne Rizvi    Sciatica     Shoulder pain, left 03/2011    RTC tendinopathy, type II acrominum, bursitis- referred to Dr. Kris Miles    Shoulder pain, right 03/10/2009    impingement, physical thearpy   ([facility])  calcific bursitis  s/p injection    Sinus bradycardia     Sinusitis 12/12/2011    Tinnitus 03/2002    Vision changes 03/19/2013       PAST SURGICAL HISTORY    Past Surgical History:   Procedure Laterality Date    ABLATION OF DYSRHYTHMIC FOCUS      BREAST BIOPSY      BREAST SURGERY  1985    Right breast tumor excised    CARDIOVERSION      1/2008 Dr Javan Alvares; 12/2008    CARDIOVERSION  03/10/2014    [facility], LAPAROSCOPIC  02/2001    Dr Sevilla [place]      [id number] (Dr Christianne Leon)    [place] (CERVIX STATUS UNKNOWN)  2003    OTHER SURGICAL HISTORY Left 07/12/2022    upgrade to Medtronic Bi vi pacer, with AV node ablation performed by Dr. Shaun Bonilla.     PACEMAKER PLACEMENT  06/21/2012    Medtronic Adapta ADDRL1 -not mri compatible    Romeo Lanza (CERVIX REMOVED)  04/2003    fibroid      Dr Shanique Gutierrez  08/2006    Dr Alonso Delgado ECHOCARDIOGRAM  06/2012    transthoracic cardioversion-Dr. Lauren Gomez    UPPER GASTROINTESTINAL ENDOSCOPY  04/2008    mild superficial gastritis  Dr Christianne Leon; 2009       FAMILY HISTORY    Family History   Problem Relation Age of Onset    Stroke Mother     Heart Disease Mother Coronary Art Dis Mother 66        CABG    Colon Cancer Mother [de-identified]        colon     Heart Disease Father     Coronary Art Dis Father 62        CABG    Migraines Sister     Seizures Brother     Breast Cancer Maternal Cousin         under 48       SOCIAL HISTORY    Social History     Tobacco Use    Smoking status: Never    Smokeless tobacco: Never    Tobacco comments:     reviewed 11/4/15   Vaping Use    Vaping Use: Never used   Substance Use Topics    Alcohol use: No    Drug use: No       ALLERGIES    Allergies   Allergen Reactions    Latex Hives    Darvon [Propoxyphene Hcl] Shortness Of Breath    Demerol Rash     Breathing problems    Dilaudid [Hydromorphone Hcl] Anaphylaxis    Valium Rash     Breathing problems    Celecoxib Diarrhea    Norco [Hydrocodone-Acetaminophen] Diarrhea, Nausea Only and Other (See Comments)     A-Fib    Norvasc [Amlodipine] Other (See Comments)     HA, dizziness    Oxycodone Itching    Tape [Adhesive Tape] Other (See Comments)     BLISTER    Vasotec [Enalapril] Other (See Comments)     Nausea, vomiting    Diazepam Rash       MEDICATIONS    Current Outpatient Medications on File Prior to Encounter   Medication Sig Dispense Refill    atorvastatin (LIPITOR) 40 MG tablet Take 1 tablet by mouth daily TAKE 1 TABLET BY MOUTH EVERY DAY 90 tablet 3    Elastic Bandages & Supports (MEDICAL COMPRESSION THIGH HIGH) MISC 1 each by Does not apply route daily Wear daily and remove nightly   20 - 30 mmgh 1 each 3    levothyroxine (SYNTHROID) 50 MCG tablet TAKE 1 TABLET BY MOUTH EVERY DAY BEFORE BREAKFAST 90 tablet 3    memantine (NAMENDA) 10 MG tablet Take 1 tablet by mouth 2 times daily NOTE TO PHARMACY: DO NOT FILL UNTIL 5 MG RX IS COMPLETED 60 tablet 5    memantine (NAMENDA) 5 MG tablet Take (1) 5 mg tab QD x7 days, then 1 BID x7 days; then 2 tabs Q am-1 tab pm x7 days then change to 10mg RX (Patient not taking: No sig reported) 42 tablet 0    fexofenadine (ALLEGRA) 180 MG tablet Take 180 mg by mouth daily warfarin (COUMADIN) 3 MG tablet Take 1 tablet by mouth daily Except take 1 and 1/2 tablets (4.5mg) on Thursdays or as directed 100 tablet 3    donepezil (ARICEPT) 10 MG tablet TAKE 1 TABLET BY MOUTH EVERY DAY 90 tablet 1    torsemide (DEMADEX) 10 MG tablet Take 1 tablet by mouth in the morning. 30 tablet 5    cetirizine (ZYRTEC) 10 MG tablet Take 10 mg by mouth daily (Patient not taking: No sig reported)      aspirin 81 MG chewable tablet Take 1 tablet by mouth daily 30 tablet 3    metoprolol succinate (TOPROL XL) 50 MG extended release tablet Take 1 tablet by mouth in the morning and at bedtime 30 tablet 3    Vitamin D (CHOLECALCIFEROL) 25 MCG (1000 UT) TABS tablet Take 1,000 Units by mouth daily      dexlansoprazole (DEXILANT) 60 MG CPDR delayed release capsule Take 1 tablet by mouth daily. No current facility-administered medications on file prior to encounter. REVIEW OF SYSTEMS    Pertinent items are noted in HPI. Constitutional: Negative for systemic symptoms including fever, chills and malaise. Objective:      /65   Pulse 75   Temp 96.8 °F (36 °C) (Temporal)   Resp 18     PHYSICAL EXAM      General: The patient is in no acute distress. Mental status:  Patient is appropriate, is  oriented to place and plan of care.   Dermatologic exam: Visual inspection of the periwound reveals the skin to be normal in turgor and texture, hot, and edematous  Wound exam: see wound description below in procedure note      Assessment:     Problem List Items Addressed This Visit          Circulatory    WD-Venous stasis ulcer of right calf with fat layer exposed with varicose veins (HCC)       Other    WD-Non-pressure chronic ulcer of right calf with fat layer exposed ([facility] [address] - Primary    Relevant Orders    Initiate Outpatient Wound Care Protocol     Procedure Note    Indications:  Based on my examination of this patient's wound(s) today, sharp excision into necrotic subcutaneous tissue is required to promote healing and evaluate the extent of previous healing. Performed by: ANTHONY Lovett CNP    Consent obtained: Yes    Time out taken:  Yes    Pain Control: 4% Lidocaine Liquid Topical        Debridement:Excisional Debridement    Using curette the wound(s) was/were sharply debrided down through and including the removal of subcutaneous tissue.         Devitalized Tissue Debrided:  fibrin, biofilm, slough, and exudate    Pre Debridement Measurements:  Are located in the Wound Documentation Flow Sheet    All active wounds listed below with today's date are evaluated  Wound(s)    debrided this date include # : 1     Post  Debridement Measurements:  Wound 11/11/22 Pretibial Right;Lateral #1 (Active)   Wound Image   12/30/22 0818   Wound Etiology Venous 12/30/22 0818   Dressing Status New dressing applied 12/16/22 0856   Wound Cleansed Soap and water 12/30/22 0818   Offloading for Diabetic Foot Ulcers Offloading not required 12/30/22 0818   Wound Length (cm) 1.1 cm 12/30/22 0818   Wound Width (cm) 0.3 cm 12/30/22 0818   Wound Depth (cm) 0.1 cm 12/30/22 0818   Wound Surface Area (cm^2) 0.33 cm^2 12/30/22 0818   Change in Wound Size % (l*w) 89 12/30/22 0818   Wound Volume (cm^3) 0.033 cm^3 12/30/22 0818   Wound Healing % 89 12/30/22 0818   Post-Procedure Length (cm) 1.1 cm 12/30/22 0836   Post-Procedure Width (cm) 0.3 cm 12/30/22 0836   Post-Procedure Depth (cm) 0.1 cm 12/30/22 0836   Post-Procedure Surface Area (cm^2) 0.33 cm^2 12/30/22 0836   Post-Procedure Volume (cm^3) 0.033 cm^3 12/30/22 0836   Distance Tunneling (cm) 0 cm 12/30/22 0818   Tunneling Position ___ O'Clock 0 12/30/22 0818   Undermining Starts ___ O'Clock 0 12/30/22 0818   Undermining Ends___ O'Clock 0 12/30/22 0818   Undermining Maxium Distance (cm) 0 12/30/22 0818   Wound Assessment Pink/red;Slough 12/30/22 0818   Drainage Amount Small 12/30/22 0818   Drainage Description Serosanguinous 12/30/22 0818   Odor None 12/30/22 0818 Verónica-wound Assessment Maceration 12/30/22 0818   Margins Defined edges 12/30/22 0818   Wound Thickness Description not for Pressure Injury Full thickness 12/30/22 0818   Number of days: 49       Percent of Wound(s) Debrided: approximately 100%    Total  Area  Debrided:  0.33 sq cm     Bleeding:  Minimal    Hemostasis Achieved:  by pressure    Procedural Pain:  6  / 10     Post Procedural Pain:  2 / 10     Response to treatment:  With complaints of pain. Status of wound progress and description from last visit:   Down in size significantly  Will take off gent ointment to keep wound dry  Follow up 1 week and continue to monitor . Plan:       Discharge Instructions         PHYSICIAN ORDERS AND DISCHARGE INSTRUCTIONS     NOTE: Upon discharge from the [address], you will receive a patient experience survey. We would be grateful if you would take the time to fill this survey out. Wound care order history:                 KATRIN's   Right       Left               Date:              Cultures:                Grafts:                Antibiotics:           Continuing wound care orders and information:                 Residence:                Continue home health care with:               Your wound-care supplies will be provided by: Wound cleansing:               Do not scrub or use excessive force. Wash hands with soap and water before and after dressing changes. Prior to applying a clean dressing, cleanse wound with normal saline, wound cleanser, or mild soap and water. Ask the physician or nurse before getting the wound(s) wet in a shower     Daily Wound management:              Keep weight off wounds and reposition every 2 hours. Avoid standing for long periods of time. Apply wraps/stockings in AM and remove at bedtime.               If swelling is present, elevate legs to the level of the heart or above for 30 minutes 4-5 times a day and/or when sitting. When taking antibiotics take entire prescription as ordered by physician do not stop taking until medicine is all gone. Orders for this week: 12/30/22                  Rx:     Right Lateral Ankle - Wash with soap and water, pat dry. Apply Kandice to wound bed. Cover with Silicone border (or large bandaid). Wear Compression Stocking. Change daily. Follow up with Michelle Ash CNP in 1 week in the wound care center. Call [phone] for any questions or concerns.   Date__________   Time____________        Treatment Note      Written Patient Dismissal Instructions Given            Electronically signed by ANTHONY Gallagher CNP on 12/30/2022 at 8:38 AM

## 2024-02-26 ENCOUNTER — ANTI-COAG VISIT (OUTPATIENT)
Dept: PHARMACY | Age: 79
End: 2024-02-26
Payer: MEDICARE

## 2024-02-26 DIAGNOSIS — I48.0 PAF (PAROXYSMAL ATRIAL FIBRILLATION) (HCC): Primary | ICD-10-CM

## 2024-02-26 LAB
INTERNATIONAL NORMALIZATION RATIO, POC: 3
POC INR: 3 INDEX
PROTHROMBIN TIME, POC: 36.6 SECONDS (ref 10–14.3)

## 2024-02-26 PROCEDURE — 85610 PROTHROMBIN TIME: CPT

## 2024-02-26 PROCEDURE — 99211 OFF/OP EST MAY X REQ PHY/QHP: CPT

## 2024-02-26 PROCEDURE — 36416 COLLJ CAPILLARY BLOOD SPEC: CPT

## 2024-02-26 NOTE — PROGRESS NOTES
Medication Management Service  [facility]  [phone]    Visit Date: 2/26/2024   Subjective:       Genesis Stewart is a 78 y.o. female who presents to clinic today for anticoagulation monitoring and adjustment.    Patient seen in clinic for warfarin management due to  Indication:   atrial fibrillation.   INR goal: of 2.0-3.0.  Duration of therapy: indefinite.    Patient reports the following:   Adherent with regimen  Missed or extra doses:  None   Bleeding or thromboembolic side effects:  None  Significant medication changes:  None  Significant dietary changes: None  Significant alcohol or tobacco changes: None  Significant recent illness, disease state changes, or hospitalization:  None  Upcoming surgeries or procedures:  None  Falls: None           Assessment and Plan     PT/INR done in office per protocol.   INR today is 3.0, therapeutic.          Plan:  Will continue current regimen of warfarin 3mg daily except 4.5mg on Tuesdays and Fridays.     Recheck INR in 4 week(s).     Patient verbalized understanding of dosing directions and information discussed. Dosing schedule given to patient including phone number, appointment date, and time. Progress note sent to referring office.    Electronically signed by Taylor Lew RPH on 2/26/24     For Pharmacy Admin Tracking Only    Intervention Detail:   Total # of Interventions Recommended:   Total # of Interventions Accepted:   Time Spent (min): 15

## 2024-03-04 DIAGNOSIS — Z79.01 LONG TERM (CURRENT) USE OF ANTICOAGULANTS: ICD-10-CM

## 2024-03-04 DIAGNOSIS — I48.0 PAROXYSMAL ATRIAL FIBRILLATION (HCC): ICD-10-CM

## 2024-03-04 DIAGNOSIS — E03.9 ACQUIRED HYPOTHYROIDISM: ICD-10-CM

## 2024-03-06 RX ORDER — WARFARIN SODIUM 3 MG/1
TABLET ORAL
Qty: 100 TABLET | Refills: 3 | Status: SHIPPED | OUTPATIENT
Start: 2024-03-06

## 2024-03-06 RX ORDER — LEVOTHYROXINE SODIUM 0.07 MG/1
75 TABLET ORAL
Qty: 90 TABLET | Refills: 3 | Status: SHIPPED | OUTPATIENT
Start: 2024-03-06

## 2024-03-25 ENCOUNTER — ANTI-COAG VISIT (OUTPATIENT)
Dept: PHARMACY | Age: 79
End: 2024-03-25
Payer: MEDICARE

## 2024-03-25 DIAGNOSIS — G31.84 MILD COGNITIVE IMPAIRMENT OF UNCERTAIN OR UNKNOWN ETIOLOGY: ICD-10-CM

## 2024-03-25 DIAGNOSIS — I48.0 PAF (PAROXYSMAL ATRIAL FIBRILLATION) (HCC): Primary | ICD-10-CM

## 2024-03-25 LAB
INTERNATIONAL NORMALIZATION RATIO, POC: 2.8
POC INR: 2.8 INDEX
PROTHROMBIN TIME, POC: 33 SECONDS (ref 10–14.3)

## 2024-03-25 PROCEDURE — 99211 OFF/OP EST MAY X REQ PHY/QHP: CPT

## 2024-03-25 PROCEDURE — 36416 COLLJ CAPILLARY BLOOD SPEC: CPT

## 2024-03-25 PROCEDURE — 85610 PROTHROMBIN TIME: CPT

## 2024-03-25 RX ORDER — TORSEMIDE 10 MG/1
10 TABLET ORAL DAILY
Qty: 90 TABLET | Refills: 3 | Status: SHIPPED | OUTPATIENT
Start: 2024-03-25

## 2024-03-25 NOTE — PROGRESS NOTES
Medication Management Service  [facility]  [phone]    Visit Date: 3/25/2024   Subjective:       Genesis Stewart is a 78 y.o. female who presents to clinic today for anticoagulation monitoring and adjustment.    Patient seen in clinic for warfarin management due to  Indication:   atrial fibrillation.   INR goal: of 2.0-3.0.  Duration of therapy: indefinite.    Patient reports the following:   Adherent with regimen  Missed or extra doses:  None   Bleeding or thromboembolic side effects:  None  Significant medication changes:  None  Significant dietary changes: None  Significant alcohol or tobacco changes: None  Significant recent illness, disease state changes, or hospitalization:  None  Upcoming surgeries or procedures:  None  Falls: None           Assessment and Plan     PT/INR done in office per protocol.   INR today is 2.8, therapeutic.          Plan:  Will continue current regimen of warfarin 3mg daily except 4.5mg on Tuesdays and Fridays.     Recheck INR in 4 week(s).     Patient verbalized understanding of dosing directions and information discussed. Dosing schedule given to patient including phone number, appointment date, and time. Progress note sent to referring office.    Electronically signed by Taylor Lew RPH on 3/25/24     For Pharmacy Admin Tracking Only    Intervention Detail:   Total # of Interventions Recommended:   Total # of Interventions Accepted:   Time Spent (min): 15

## 2024-03-26 RX ORDER — DONEPEZIL HYDROCHLORIDE 10 MG/1
10 TABLET, FILM COATED ORAL DAILY
Qty: 90 TABLET | Refills: 1 | Status: SHIPPED | OUTPATIENT
Start: 2024-03-26

## 2024-04-22 ENCOUNTER — ANTI-COAG VISIT (OUTPATIENT)
Dept: PHARMACY | Age: 79
End: 2024-04-22
Payer: MEDICARE

## 2024-04-22 DIAGNOSIS — I48.0 PAF (PAROXYSMAL ATRIAL FIBRILLATION) (HCC): Primary | ICD-10-CM

## 2024-04-22 PROCEDURE — 85610 PROTHROMBIN TIME: CPT

## 2024-04-22 PROCEDURE — 36416 COLLJ CAPILLARY BLOOD SPEC: CPT

## 2024-04-22 PROCEDURE — 99211 OFF/OP EST MAY X REQ PHY/QHP: CPT

## 2024-04-22 NOTE — PROGRESS NOTES
Medication Management Service  [facility]  [phone]    Visit Date: 4/22/2024   Subjective:       Genesis Stewart is a 78 y.o. female who presents to clinic today for anticoagulation monitoring and adjustment.    Patient seen in clinic for warfarin management due to  Indication:   atrial fibrillation.   INR goal: of 2.0-3.0.  Duration of therapy: indefinite.    Patient reports the following:   Adherent with regimen  Missed or extra doses:  None   Bleeding or thromboembolic side effects:  None  Significant medication changes:  None  Significant dietary changes: None  Significant alcohol or tobacco changes: None  Significant recent illness, disease state changes, or hospitalization:  None  Upcoming surgeries or procedures:  None  Falls: None           Assessment and Plan     PT/INR done in office per protocol.   INR today is 2.8, therapeutic.          Plan:  Will continue current regimen of warfarin 3mg daily except 4.5mg on Tuesdays and Fridays.     Recheck INR in 4 week(s).     Patient verbalized understanding of dosing directions and information discussed. Dosing schedule given to patient including phone number, appointment date, and time. Progress note sent to referring office.    Electronically signed by Taylor Lew RPH on 4/22/24     For Pharmacy Admin Tracking Only    Intervention Detail:   Total # of Interventions Recommended:   Total # of Interventions Accepted:   Time Spent (min): 15

## 2024-05-20 ENCOUNTER — ANTI-COAG VISIT (OUTPATIENT)
Dept: PHARMACY | Age: 79
End: 2024-05-20
Payer: MEDICARE

## 2024-05-20 DIAGNOSIS — I48.0 PAF (PAROXYSMAL ATRIAL FIBRILLATION) (HCC): Primary | ICD-10-CM

## 2024-05-20 LAB
INTERNATIONAL NORMALIZATION RATIO, POC: 3.2
POC INR: 3.2 INDEX
PROTHROMBIN TIME, POC: 38.6 SECONDS (ref 10–14.3)

## 2024-05-20 PROCEDURE — 85610 PROTHROMBIN TIME: CPT

## 2024-05-20 PROCEDURE — 36416 COLLJ CAPILLARY BLOOD SPEC: CPT

## 2024-05-20 PROCEDURE — 99212 OFFICE O/P EST SF 10 MIN: CPT

## 2024-05-20 NOTE — PROGRESS NOTES
Medication Management Service  [facility]  [phone]    Visit Date: 5/20/2024   Subjective:       Genesis Stewart is a 78 y.o. female who presents to clinic today for anticoagulation monitoring and adjustment.    Patient seen in clinic for warfarin management due to  Indication:   atrial fibrillation.   INR goal: of 2.0-3.0.  Duration of therapy: indefinite.    Patient reports the following:   Adherent with regimen  Missed or extra doses:  None   Bleeding or thromboembolic side effects:  None  Significant medication changes:  None  Significant dietary changes: low appetite  Significant alcohol or tobacco changes: None  Significant recent illness, disease state changes, or hospitalization:  None  Upcoming surgeries or procedures:  None  Falls: None           Assessment and Plan     PT/INR done in office per protocol.   INR today is 3.2, supratherapeutic.          Plan: Take warfarin 3mg daily for 2 days then  will continue current regimen of warfarin 3mg daily except 4.5mg on Tuesdays and Fridays.     Recheck INR in 2 week(s).     Patient verbalized understanding of dosing directions and information discussed. Dosing schedule given to patient including phone number, appointment date, and time. Progress note sent to referring office.    Electronically signed by Taylor Lew RPH on 5/20/24     For Pharmacy Admin Tracking Only    Intervention Detail: Dose Adjustment: 1, reason: Therapy De-escalation  Total # of Interventions Recommended: 1  Total # of Interventions Accepted: 1  Time Spent (min): 15

## 2024-05-21 ENCOUNTER — OFFICE VISIT (OUTPATIENT)
Dept: CARDIOLOGY CLINIC | Age: 79
End: 2024-05-21
Payer: MEDICARE

## 2024-05-21 VITALS
BODY MASS INDEX: 20.75 KG/M2 | SYSTOLIC BLOOD PRESSURE: 92 MMHG | HEIGHT: 67 IN | OXYGEN SATURATION: 97 % | HEART RATE: 71 BPM | WEIGHT: 132.2 LBS | DIASTOLIC BLOOD PRESSURE: 62 MMHG

## 2024-05-21 DIAGNOSIS — I48.20 CHRONIC ATRIAL FIBRILLATION (HCC): Primary | ICD-10-CM

## 2024-05-21 DIAGNOSIS — D68.69 SECONDARY HYPERCOAGULABLE STATE (HCC): ICD-10-CM

## 2024-05-21 PROCEDURE — 3074F SYST BP LT 130 MM HG: CPT | Performed by: NURSE PRACTITIONER

## 2024-05-21 PROCEDURE — 3078F DIAST BP <80 MM HG: CPT | Performed by: NURSE PRACTITIONER

## 2024-05-21 PROCEDURE — 99214 OFFICE O/P EST MOD 30 MIN: CPT | Performed by: NURSE PRACTITIONER

## 2024-05-21 PROCEDURE — G8399 PT W/DXA RESULTS DOCUMENT: HCPCS | Performed by: NURSE PRACTITIONER

## 2024-05-21 PROCEDURE — 1123F ACP DISCUSS/DSCN MKR DOCD: CPT | Performed by: NURSE PRACTITIONER

## 2024-05-21 PROCEDURE — G8427 DOCREV CUR MEDS BY ELIG CLIN: HCPCS | Performed by: NURSE PRACTITIONER

## 2024-05-21 PROCEDURE — G8420 CALC BMI NORM PARAMETERS: HCPCS | Performed by: NURSE PRACTITIONER

## 2024-05-21 PROCEDURE — 1036F TOBACCO NON-USER: CPT | Performed by: NURSE PRACTITIONER

## 2024-05-21 PROCEDURE — 1090F PRES/ABSN URINE INCON ASSESS: CPT | Performed by: NURSE PRACTITIONER

## 2024-05-21 ASSESSMENT — ENCOUNTER SYMPTOMS
SHORTNESS OF BREATH: 0
ORTHOPNEA: 0

## 2024-05-21 NOTE — PATIENT INSTRUCTIONS
Please be informed that if you contact our office outside of normal business hours the physician on call cannot help with any scheduling or rescheduling issues, procedure instruction questions or any type of medication issue.    We advise you for any urgent/emergency that you go to the nearest emergency room!    PLEASE CALL OUR OFFICE DURING NORMAL BUSINESS HOURS    Monday - Friday   8 am to 5 pm    [place]: [phone]    [place]: [phone]    [place]:  [phone]    **It is YOUR responsibilty to bring medication bottles and/or updated medication list to EACH APPOINTMENT. This will allow us to better serve you and all your healthcare needs**    Thank you for allowing us to care for you today!   We want to ensure we can follow your treatment plan and we strive to give you the best outcomes and experience possible.   If you ever have a life threatening emergency and call 911 - for an ambulance (EMS)   Our providers can only care for you at:   [facility] or [facility].   Even if you have someone take you or you drive yourself we can only care for you in a [facility] facility. Our providers are not setup at the other healthcare locations!

## 2024-05-21 NOTE — PROGRESS NOTES
5/21/2024  Primary cardiologist: Dr. Perdomo    CC:   Genesis  is an established 78 y.o.  female here for a follow up on atrial fib      SUBJECTIVE/OBJECTIVE:  HPI  Genesis is a 78 y.o. female with a history of atrial fibrillation, ablation of atrial fibrillation, AV node ablation, hypertension, hyperlipidemia, BIV pacemaker, CVI and hypothyroid    Genesis is here today with her . She reports shortness of breath with exertion and occasional palpitations.       ROS obtained with help of   Review of Systems   Constitutional: Positive for decreased appetite. Negative for diaphoresis and malaise/fatigue.   Cardiovascular:  Positive for dyspnea on exertion and palpitations. Negative for chest pain, claudication, irregular heartbeat, near-syncope, orthopnea and paroxysmal nocturnal dyspnea.   Respiratory:  Negative for shortness of breath.    Neurological:  Negative for dizziness and light-headedness.       Vitals:    05/21/24 1110   BP: 92/62   Site: Right Upper Arm   Position: Sitting   Cuff Size: Medium Adult   Pulse: 71   SpO2: 97%   Weight: 60 kg (132 lb 3.2 oz)   Height: 1.702 m (5' 7.01\")     Wt Readings from Last 3 Encounters:   05/21/24 60 kg (132 lb 3.2 oz)   03/04/24 61.7 kg (136 lb)   02/14/24 61.9 kg (136 lb 6.4 oz)      Body mass index is 20.7 kg/m².     Physical Exam  Vitals reviewed.   Eyes:      Pupils: Pupils are equal, round, and reactive to light.   Neck:      Vascular: No carotid bruit.   Cardiovascular:      Rate and Rhythm: Normal rate and regular rhythm.      Pulses: Normal pulses.   Pulmonary:      Effort: Pulmonary effort is normal.      Breath sounds: Normal breath sounds. No rales.   Chest:      Chest wall: No tenderness.   Musculoskeletal:      Cervical back: No tenderness.      Right lower leg: No edema.      Left lower leg: No edema.   Skin:     General: Skin is warm and dry.      Capillary Refill: Capillary refill takes less than 2 seconds.   Neurological:      Mental Status:

## 2024-05-30 ENCOUNTER — PROCEDURE VISIT (OUTPATIENT)
Dept: CARDIOLOGY CLINIC | Age: 79
End: 2024-05-30

## 2024-05-30 DIAGNOSIS — Z95.0 CARDIAC PACEMAKER IN SITU: Primary | ICD-10-CM

## 2024-06-03 ENCOUNTER — ANTI-COAG VISIT (OUTPATIENT)
Dept: PHARMACY | Age: 79
End: 2024-06-03
Payer: MEDICARE

## 2024-06-03 DIAGNOSIS — I48.0 PAF (PAROXYSMAL ATRIAL FIBRILLATION) (HCC): Primary | ICD-10-CM

## 2024-06-03 LAB
POC INR: 2.7 INDEX
PROTHROMBIN TIME, POC: 32.4 SECONDS (ref 10–14.3)

## 2024-06-03 PROCEDURE — 99211 OFF/OP EST MAY X REQ PHY/QHP: CPT

## 2024-06-03 PROCEDURE — 85610 PROTHROMBIN TIME: CPT

## 2024-06-03 PROCEDURE — 36416 COLLJ CAPILLARY BLOOD SPEC: CPT

## 2024-06-03 NOTE — PROGRESS NOTES
Medication Management Service  [facility]  [phone]    Visit Date: 6/3/2024   Subjective:       Genesis Stewart is a 78 y.o. female who presents to clinic today for anticoagulation monitoring and adjustment.    Patient seen in clinic for warfarin management due to  Indication:   atrial fibrillation.   INR goal: of 2.0-3.0.  Duration of therapy: indefinite.    Patient reports the following:   Adherent with regimen  Missed or extra doses:  None   Bleeding or thromboembolic side effects:  None  Significant medication changes:  None  Significant dietary changes: None  Significant alcohol or tobacco changes: None  Significant recent illness, disease state changes, or hospitalization:  None  Upcoming surgeries or procedures:  None  Falls: None           Assessment and Plan     PT/INR done in office per protocol.   INR today is 2.7, therapeutic.          Plan:  Will continue current regimen of warfarin 3mg daily except 4.5mg on Tuesdays and Fridays.     Recheck INR in 4 week(s).     Patient verbalized understanding of dosing directions and information discussed. Dosing schedule given to patient including phone number, appointment date, and time. Progress note sent to referring office.    Electronically signed by Taylor Lew RPH on 6/3/24     For Pharmacy Admin Tracking Only    Intervention Detail:   Total # of Interventions Recommended:   Total # of Interventions Accepted:   Time Spent (min): 15

## 2024-06-15 DIAGNOSIS — I48.0 PAF (PAROXYSMAL ATRIAL FIBRILLATION) (HCC): ICD-10-CM

## 2024-06-15 DIAGNOSIS — Z79.01 LONG TERM CURRENT USE OF ANTICOAGULANT: Primary | ICD-10-CM

## 2024-06-15 NOTE — PROGRESS NOTES
Annual referral renewal. Referring provider is Dr. Russo.    New referral pended in this encounter. Message sent to provider requesting to sign.    For Pharmacy Admin Tracking Only    Time Spent (min): 5

## 2024-07-01 ENCOUNTER — ANTI-COAG VISIT (OUTPATIENT)
Dept: PHARMACY | Age: 79
End: 2024-07-01
Payer: MEDICARE

## 2024-07-01 DIAGNOSIS — I48.0 PAF (PAROXYSMAL ATRIAL FIBRILLATION) (HCC): Primary | ICD-10-CM

## 2024-07-01 LAB
INTERNATIONAL NORMALIZATION RATIO, POC: 2.4
POC INR: 2.4 INDEX
PROTHROMBIN TIME, POC: 28.9 SECONDS (ref 10–14.3)
PROTHROMBIN TIME, POC: NORMAL

## 2024-07-01 PROCEDURE — 85610 PROTHROMBIN TIME: CPT

## 2024-07-01 PROCEDURE — 99211 OFF/OP EST MAY X REQ PHY/QHP: CPT

## 2024-07-01 PROCEDURE — 36416 COLLJ CAPILLARY BLOOD SPEC: CPT

## 2024-07-01 NOTE — PROGRESS NOTES
Medication Management Service  [facility]  [phone]    Visit Date: 7/1/2024   Subjective:       Genesis Stewart is a 78 y.o. female who presents to clinic today for anticoagulation monitoring and adjustment.    Patient seen in clinic for warfarin management due to  Indication:   atrial fibrillation.   INR goal: of 2.0-3.0.  Duration of therapy: indefinite.    Patient reports the following:   Adherent with regimen  Missed or extra doses:  None   Bleeding or thromboembolic side effects:  None  Significant medication changes: saw dermatologist today- hasn't picked up yet   Significant dietary changes: None  Significant alcohol or tobacco changes: None  Significant recent illness, disease state changes, or hospitalization:  None  Upcoming surgeries or procedures:  None  Falls: None           Assessment and Plan     PT/INR done in office per protocol.   INR today is 2.4, therapeutic.          Plan:  Will continue current regimen of warfarin 3mg daily except 4.5mg on Tuesdays and Fridays.     Recheck INR in 4 week(s).     Patient verbalized understanding of dosing directions and information discussed. Dosing schedule given to patient including phone number, appointment date, and time. Progress note sent to referring office.    Electronically signed by Taylor Lew RPH on 7/1/24     For Pharmacy Admin Tracking Only    Intervention Detail:   Total # of Interventions Recommended:   Total # of Interventions Accepted:   Time Spent (min): 15

## 2024-07-02 ENCOUNTER — HOSPITAL ENCOUNTER (OUTPATIENT)
Age: 79
Discharge: HOME | End: 2024-07-02
Payer: MEDICARE

## 2024-07-02 ENCOUNTER — OFFICE VISIT (OUTPATIENT)
Dept: FAMILY MEDICINE CLINIC | Age: 79
End: 2024-07-02
Payer: MEDICARE

## 2024-07-02 ENCOUNTER — HOSPITAL ENCOUNTER (OUTPATIENT)
Dept: GENERAL RADIOLOGY | Age: 79
Discharge: HOME | End: 2024-07-02
Payer: MEDICARE

## 2024-07-02 VITALS
WEIGHT: 132 LBS | OXYGEN SATURATION: 97 % | BODY MASS INDEX: 20.72 KG/M2 | DIASTOLIC BLOOD PRESSURE: 62 MMHG | HEART RATE: 81 BPM | HEIGHT: 67 IN | SYSTOLIC BLOOD PRESSURE: 104 MMHG

## 2024-07-02 DIAGNOSIS — R07.9 INTERMITTENT CHEST PAIN: ICD-10-CM

## 2024-07-02 DIAGNOSIS — I10 ESSENTIAL HYPERTENSION: ICD-10-CM

## 2024-07-02 DIAGNOSIS — I25.118 ATHEROSCLEROTIC HEART DISEASE OF NATIVE CORONARY ARTERY WITH OTHER FORMS OF ANGINA PECTORIS (HCC): ICD-10-CM

## 2024-07-02 DIAGNOSIS — Z00.00 MEDICARE ANNUAL WELLNESS VISIT, SUBSEQUENT: Primary | ICD-10-CM

## 2024-07-02 PROBLEM — L97.212 VENOUS STASIS ULCER OF RIGHT CALF WITH FAT LAYER EXPOSED WITH VARICOSE VEINS (HCC): Status: RESOLVED | Noted: 2022-12-09 | Resolved: 2024-07-02

## 2024-07-02 PROBLEM — I83.012 VENOUS STASIS ULCER OF RIGHT CALF WITH FAT LAYER EXPOSED WITH VARICOSE VEINS (HCC): Status: RESOLVED | Noted: 2022-12-09 | Resolved: 2024-07-02

## 2024-07-02 PROBLEM — L97.212 NON-PRESSURE CHRONIC ULCER OF RIGHT CALF WITH FAT LAYER EXPOSED (HCC): Status: RESOLVED | Noted: 2022-11-11 | Resolved: 2024-07-02

## 2024-07-02 LAB
ALBUMIN SERPL-MCNC: 3.9 GM/DL (ref 3.4–5)
ALP BLD-CCNC: 55 IU/L (ref 40–128)
ALT SERPL-CCNC: 46 U/L (ref 10–40)
ANION GAP SERPL CALCULATED.3IONS-SCNC: 10 MMOL/L (ref 7–16)
AST SERPL-CCNC: 50 IU/L (ref 15–37)
BILIRUB SERPL-MCNC: 1.4 MG/DL (ref 0–1)
BUN SERPL-MCNC: 18 MG/DL (ref 6–23)
CALCIUM SERPL-MCNC: 9.1 MG/DL (ref 8.3–10.6)
CHLORIDE BLD-SCNC: 109 MMOL/L (ref 99–110)
CO2: 26 MMOL/L (ref 21–32)
CREAT SERPL-MCNC: 0.7 MG/DL (ref 0.6–1.1)
GFR, ESTIMATED: 88 ML/MIN/1.73M2
GLUCOSE SERPL-MCNC: 84 MG/DL (ref 70–99)
POTASSIUM SERPL-SCNC: 4.6 MMOL/L (ref 3.5–5.1)
SODIUM BLD-SCNC: 145 MMOL/L (ref 135–145)
TOTAL PROTEIN: 7 GM/DL (ref 6.4–8.2)
TSH SERPL DL<=0.005 MIU/L-ACNC: 0.8 UIU/ML (ref 0.27–4.2)

## 2024-07-02 PROCEDURE — G8427 DOCREV CUR MEDS BY ELIG CLIN: HCPCS | Performed by: FAMILY MEDICINE

## 2024-07-02 PROCEDURE — 1090F PRES/ABSN URINE INCON ASSESS: CPT | Performed by: FAMILY MEDICINE

## 2024-07-02 PROCEDURE — 1036F TOBACCO NON-USER: CPT | Performed by: FAMILY MEDICINE

## 2024-07-02 PROCEDURE — 84443 ASSAY THYROID STIM HORMONE: CPT

## 2024-07-02 PROCEDURE — 1123F ACP DISCUSS/DSCN MKR DOCD: CPT | Performed by: FAMILY MEDICINE

## 2024-07-02 PROCEDURE — 80053 COMPREHEN METABOLIC PANEL: CPT

## 2024-07-02 PROCEDURE — G8399 PT W/DXA RESULTS DOCUMENT: HCPCS | Performed by: FAMILY MEDICINE

## 2024-07-02 PROCEDURE — 85027 COMPLETE CBC AUTOMATED: CPT

## 2024-07-02 PROCEDURE — G8420 CALC BMI NORM PARAMETERS: HCPCS | Performed by: FAMILY MEDICINE

## 2024-07-02 PROCEDURE — 3078F DIAST BP <80 MM HG: CPT | Performed by: FAMILY MEDICINE

## 2024-07-02 PROCEDURE — 3074F SYST BP LT 130 MM HG: CPT | Performed by: FAMILY MEDICINE

## 2024-07-02 PROCEDURE — 71046 X-RAY EXAM CHEST 2 VIEWS: CPT

## 2024-07-02 PROCEDURE — G0439 PPPS, SUBSEQ VISIT: HCPCS | Performed by: FAMILY MEDICINE

## 2024-07-02 PROCEDURE — 99214 OFFICE O/P EST MOD 30 MIN: CPT | Performed by: FAMILY MEDICINE

## 2024-07-02 PROCEDURE — 36415 COLL VENOUS BLD VENIPUNCTURE: CPT

## 2024-07-02 SDOH — ECONOMIC STABILITY: FOOD INSECURITY: WITHIN THE PAST 12 MONTHS, THE FOOD YOU BOUGHT JUST DIDN'T LAST AND YOU DIDN'T HAVE MONEY TO GET MORE.: NEVER TRUE

## 2024-07-02 SDOH — ECONOMIC STABILITY: FOOD INSECURITY: WITHIN THE PAST 12 MONTHS, YOU WORRIED THAT YOUR FOOD WOULD RUN OUT BEFORE YOU GOT MONEY TO BUY MORE.: NEVER TRUE

## 2024-07-02 SDOH — ECONOMIC STABILITY: INCOME INSECURITY: HOW HARD IS IT FOR YOU TO PAY FOR THE VERY BASICS LIKE FOOD, HOUSING, MEDICAL CARE, AND HEATING?: NOT HARD AT ALL

## 2024-07-02 ASSESSMENT — PATIENT HEALTH QUESTIONNAIRE - PHQ9
2. FEELING DOWN, DEPRESSED OR HOPELESS: SEVERAL DAYS
SUM OF ALL RESPONSES TO PHQ QUESTIONS 1-9: 7
1. LITTLE INTEREST OR PLEASURE IN DOING THINGS: MORE THAN HALF THE DAYS
SUM OF ALL RESPONSES TO PHQ QUESTIONS 1-9: 7
8. MOVING OR SPEAKING SO SLOWLY THAT OTHER PEOPLE COULD HAVE NOTICED. OR THE OPPOSITE, BEING SO FIGETY OR RESTLESS THAT YOU HAVE BEEN MOVING AROUND A LOT MORE THAN USUAL: NOT AT ALL
10. IF YOU CHECKED OFF ANY PROBLEMS, HOW DIFFICULT HAVE THESE PROBLEMS MADE IT FOR YOU TO DO YOUR WORK, TAKE CARE OF THINGS AT HOME, OR GET ALONG WITH OTHER PEOPLE: SOMEWHAT DIFFICULT
7. TROUBLE CONCENTRATING ON THINGS, SUCH AS READING THE NEWSPAPER OR WATCHING TELEVISION: NOT AT ALL
SUM OF ALL RESPONSES TO PHQ QUESTIONS 1-9: 7
5. POOR APPETITE OR OVEREATING: SEVERAL DAYS
9. THOUGHTS THAT YOU WOULD BE BETTER OFF DEAD, OR OF HURTING YOURSELF: NOT AT ALL
SUM OF ALL RESPONSES TO PHQ QUESTIONS 1-9: 7
SUM OF ALL RESPONSES TO PHQ9 QUESTIONS 1 & 2: 3
6. FEELING BAD ABOUT YOURSELF - OR THAT YOU ARE A FAILURE OR HAVE LET YOURSELF OR YOUR FAMILY DOWN: SEVERAL DAYS
4. FEELING TIRED OR HAVING LITTLE ENERGY: SEVERAL DAYS
3. TROUBLE FALLING OR STAYING ASLEEP: SEVERAL DAYS

## 2024-07-02 NOTE — PATIENT INSTRUCTIONS
Lab work and chest xray   See cardiology for appointment      A Healthy Heart: Care Instructions  Overview     Coronary artery disease, also called heart disease, occurs when a substance called plaque builds up in the vessels that supply oxygen-rich blood to your heart muscle. This can narrow the blood vessels and reduce blood flow. A heart attack happens when blood flow is completely blocked. A high-fat diet, smoking, and other factors increase the risk of heart disease.  Your doctor has found that you have a chance of having heart disease. A heart-healthy lifestyle can help keep your heart healthy and prevent heart disease. This lifestyle includes eating healthy, being active, staying at a weight that's healthy for you, and not smoking or using tobacco. It also includes taking medicines as directed, managing other health conditions, and trying to get a healthy amount of sleep.  Follow-up care is a key part of your treatment and safety. Be sure to make and go to all appointments, and call your doctor if you are having problems. It's also a good idea to know your test results and keep a list of the medicines you take.  How can you care for yourself at home?  Diet    Use less salt when you cook and eat. This helps lower your blood pressure. Taste food before salting. Add only a little salt when you think you need it. With time, your taste buds will adjust to less salt.     Eat fewer snack items, fast foods, canned soups, and other high-salt, high-fat, processed foods.     Read food labels and try to avoid saturated and trans fats. They increase your risk of heart disease by raising cholesterol levels.     Limit the amount of solid fat--butter, margarine, and shortening--you eat. Use olive, peanut, or canola oil when you cook. Bake, broil, and steam foods instead of frying them.     Eat a variety of fruit and vegetables every day. Dark green, deep orange, red, or yellow fruits and vegetables are especially good for

## 2024-07-02 NOTE — PROGRESS NOTES
Medicare Annual Wellness Visit    Geensis Stewart is here for Medicare AWV and Pain (Reports a pain in right chest area started a couple days ago )    Assessment & Plan   Medicare annual wellness visit, subsequent  Intermittent chest pain  -     XR CHEST (2 VW); Future  -     Comprehensive Metabolic Panel; Future  -     CBC; Future  Atherosclerotic heart disease of native coronary artery with other forms of angina pectoris (HCC)  Essential hypertension  -     TSH; Future  Recommendations for Preventive Services Due: see orders and patient instructions/AVS.  Recommended screening schedule for the next 5-10 years is provided to the patient in written form: see Patient Instructions/AVS.     Return in about 1 year (around 7/2/2025) for Dual: Medicare AWV and Chronic conditions.     Subjective   The following acute and/or chronic problems were also addressed today:  Chief Complaint   Patient presents with    Medicare AWV    Pain     Reports a pain in right chest area started a couple days ago      See other portion of encounter for separate note evaluating chest pain report.     Patient's complete Health Risk Assessment and screening values have been reviewed and are found in Flowsheets. The following problems were reviewed today and where indicated follow up appointments were made and/or referrals ordered.    No Positive Risk Factors identified today.        Fall Risk:  Do you feel unsteady or are you worried about falling? : (!) yes  2 or more falls in past year?: no  Fall with injury in past year?: no       Cognitive:   Clock Drawing Test (CDT): (!) Abnormal  Words recalled: 0 Words Recalled  Total Score: (!) 0  Total Score Interpretation: Abnormal Mini-Cog    Depression:  PHQ-2 Score: 3  PHQ-9 Total Score: 7    Interpretation:  5-9 mild   10-14 moderate   15-19 moderately severe   20-27 severe           General HRA Questions:  Select all that apply: (!) Stress    Hearing Screen:  Do you or your family notice any 
for this visit on 07/02/24.    Blood pressure-  Heart rate-    Respiratory rate-    Temperature-  Pulse oximetry-          No data to display                 Nursing note reviewed  /62 (Site: Left Upper Arm, Position: Sitting, Cuff Size: Medium Adult)   Pulse 81   Ht 1.702 m (5' 7.01\")   Wt 59.9 kg (132 lb)   SpO2 97%   BMI 20.67 kg/m²   BP Readings from Last 3 Encounters:   07/02/24 104/62   05/21/24 92/62   02/14/24 128/86     Wt Readings from Last 3 Encounters:   07/02/24 59.9 kg (132 lb)   05/21/24 60 kg (132 lb 3.2 oz)   03/04/24 61.7 kg (136 lb)       Constitutional: [x] Appears well-developed and well-nourished [x] No apparent distress  Here with .    Patient admits to having memory issues and  attempts to answer questions during interview to support patient's description of chest pain episodes.     [] Abnormal-   Mental status  [x] Alert and awake  [x] Oriented to person/place/time [x]Able to follow commands      Eyes:  EOM    [x]  Normal  [] Abnormal-  Sclera  [x]  Normal  [] Abnormal -         Discharge []  None visible  [] Abnormal -    HENT:   [x] Normocephalic, atraumatic.  [] Abnormal   [] Mouth/Throat: Mucous membranes are moist.     External Ears [] Normal  [] Abnormal-     Neck: [] No visualized mass     Pulmonary/Chest:   RRR today. Normal S1 and S2.     [x] Respiratory effort normal.  [x] No visualized signs of difficulty breathing or respiratory distress        [] Abnormal-      Musculoskeletal:   [] Normal gait with no signs of ataxia         [] Normal range of motion of neck        [] Abnormal-       Neurological:        [x] No Facial Asymmetry (Cranial nerve 7 motor function) (limited exam to video visit)          [x] No gaze palsy        [] Abnormal-         Skin:        [] No significant exanthematous lesions or discoloration noted on facial skin         [] Abnormal-            Psychiatric:       [x] Normal Affect [x] No Hallucinations        [] Abnormal-

## 2024-07-03 LAB
HCT VFR BLD CALC: 44.3 % (ref 37–47)
HEMOGLOBIN: 13.9 GM/DL (ref 12.5–16)
MCH RBC QN AUTO: 29.6 PG (ref 27–31)
MCHC RBC AUTO-ENTMCNC: 31.4 % (ref 32–36)
MCV RBC AUTO: 94.3 FL (ref 78–100)
PDW BLD-RTO: 14.6 % (ref 11.7–14.9)
PLATELET # BLD: 118 K/CU MM (ref 140–440)
PMV BLD AUTO: 11.1 FL (ref 7.5–11.1)
RBC # BLD: 4.7 M/CU MM (ref 4.2–5.4)
WBC # BLD: 5.9 K/CU MM (ref 4–10.5)

## 2024-07-05 ENCOUNTER — TELEPHONE (OUTPATIENT)
Dept: PHARMACY | Age: 79
End: 2024-07-05

## 2024-07-05 NOTE — TELEPHONE ENCOUNTER
Dragan left message patient is taking doxycycline hyclate 50mg, 1-2 daily.     Dispense records reviewed, #60 filled on 7/1/24.     Called Dragan, scheduled for 7/18/24.     For Pharmacy Admin Tracking Only    Intervention Detail:   Total # of Interventions Recommended:   Total # of Interventions Accepted:   Time Spent (min): 5

## 2024-07-08 ENCOUNTER — OFFICE VISIT (OUTPATIENT)
Dept: CARDIOLOGY CLINIC | Age: 79
End: 2024-07-08
Payer: MEDICARE

## 2024-07-08 ENCOUNTER — TELEPHONE (OUTPATIENT)
Dept: CARDIOLOGY CLINIC | Age: 79
End: 2024-07-08

## 2024-07-08 VITALS
HEIGHT: 67 IN | DIASTOLIC BLOOD PRESSURE: 76 MMHG | HEART RATE: 71 BPM | BODY MASS INDEX: 20.31 KG/M2 | SYSTOLIC BLOOD PRESSURE: 118 MMHG | WEIGHT: 129.4 LBS

## 2024-07-08 DIAGNOSIS — Z95.0 CARDIAC PACEMAKER IN SITU: Primary | ICD-10-CM

## 2024-07-08 DIAGNOSIS — R94.31 ABNORMAL ELECTROCARDIOGRAPHY: ICD-10-CM

## 2024-07-08 DIAGNOSIS — R06.02 SHORTNESS OF BREATH: ICD-10-CM

## 2024-07-08 PROCEDURE — 93000 ELECTROCARDIOGRAM COMPLETE: CPT | Performed by: INTERNAL MEDICINE

## 2024-07-08 PROCEDURE — G8428 CUR MEDS NOT DOCUMENT: HCPCS | Performed by: INTERNAL MEDICINE

## 2024-07-08 PROCEDURE — 1090F PRES/ABSN URINE INCON ASSESS: CPT | Performed by: INTERNAL MEDICINE

## 2024-07-08 PROCEDURE — G8420 CALC BMI NORM PARAMETERS: HCPCS | Performed by: INTERNAL MEDICINE

## 2024-07-08 PROCEDURE — 1036F TOBACCO NON-USER: CPT | Performed by: INTERNAL MEDICINE

## 2024-07-08 PROCEDURE — 99214 OFFICE O/P EST MOD 30 MIN: CPT | Performed by: INTERNAL MEDICINE

## 2024-07-08 PROCEDURE — 1123F ACP DISCUSS/DSCN MKR DOCD: CPT | Performed by: INTERNAL MEDICINE

## 2024-07-08 PROCEDURE — G8399 PT W/DXA RESULTS DOCUMENT: HCPCS | Performed by: INTERNAL MEDICINE

## 2024-07-08 PROCEDURE — 3074F SYST BP LT 130 MM HG: CPT | Performed by: INTERNAL MEDICINE

## 2024-07-08 PROCEDURE — 3078F DIAST BP <80 MM HG: CPT | Performed by: INTERNAL MEDICINE

## 2024-07-08 RX ORDER — TORSEMIDE 10 MG/1
10 TABLET ORAL DAILY
COMMUNITY

## 2024-07-08 NOTE — PROGRESS NOTES
lower rate of 70 bpm and 100% BiV pacing.     Recommend continued every three month check and follow up office visit as scheduled.        VIDA TOBIN MA  FACC

## 2024-07-08 NOTE — TELEPHONE ENCOUNTER
Lm for pt to get scheduled for echo and lexiscan (nuc med). Please advise pt to hold metoprolol 24 hrs and no caff 24 hrs. Please advise

## 2024-07-18 ENCOUNTER — ANTI-COAG VISIT (OUTPATIENT)
Dept: PHARMACY | Age: 79
End: 2024-07-18
Payer: MEDICARE

## 2024-07-18 DIAGNOSIS — I48.0 PAF (PAROXYSMAL ATRIAL FIBRILLATION) (HCC): Primary | ICD-10-CM

## 2024-07-18 LAB
INTERNATIONAL NORMALIZATION RATIO, POC: 3
POC INR: 3 INDEX
PROTHROMBIN TIME, POC: 36.2 SECONDS (ref 10–14.3)
PROTHROMBIN TIME, POC: NORMAL

## 2024-07-18 PROCEDURE — 85610 PROTHROMBIN TIME: CPT

## 2024-07-18 PROCEDURE — 99211 OFF/OP EST MAY X REQ PHY/QHP: CPT

## 2024-07-18 NOTE — PROGRESS NOTES
Medication Management Service  [facility]  [phone]    Visit Date: 7/18/2024   Subjective:       Genesis Stewart is a 79 y.o. female who presents to clinic today for anticoagulation monitoring and adjustment.    Patient seen in clinic for warfarin management due to  Indication:   atrial fibrillation.   INR goal: of 2.0-3.0.  Duration of therapy: indefinite.    Patient reports the following:   Adherent with regimen  Missed or extra doses:  None   Bleeding or thromboembolic side effects:  None  Significant medication changes:  doxycycline  Significant dietary changes: did not have weekly broccoli yesterday- had small amout of slaw instead   Significant alcohol or tobacco changes: None  Significant recent illness, disease state changes, or hospitalization:  None  Upcoming surgeries or procedures:  None  Falls: None           Assessment and Plan     PT/INR done in office per protocol.   INR today is 3.0, therapeutic.          Plan:  Will continue current regimen of warfarin 3mg daily excpt 4.5mg on Tuesdays and Fridays.     Recheck INR in 4 week(s).     Patient verbalized understanding of dosing directions and information discussed. Dosing schedule given to patient including phone number, appointment date, and time. Progress note sent to referring office.    Electronically signed by Taylor Lew RPH on 7/18/24     For Pharmacy Admin Tracking Only    Intervention Detail:   Total # of Interventions Recommended:   Total # of Interventions Accepted:   Time Spent (min): 15

## 2024-07-29 ENCOUNTER — TELEPHONE (OUTPATIENT)
Dept: PHARMACY | Age: 79
End: 2024-07-29

## 2024-07-29 ENCOUNTER — TELEPHONE (OUTPATIENT)
Dept: CARDIOLOGY CLINIC | Age: 79
End: 2024-07-29

## 2024-07-29 NOTE — TELEPHONE ENCOUNTER
Verified next appointment time.   For Pharmacy Admin Tracking Only    Intervention Detail:   Total # of Interventions Recommended:   Total # of Interventions Accepted:   Time Spent (min): 5

## 2024-07-29 NOTE — TELEPHONE ENCOUNTER
Left Ventricle: Normal left ventricular systolic function with a visually estimated EF of 55 - 60%. Left ventricle size is normal. Normal wall thickness. Normal wall motion. Indeterminate diastolic function.    Tricuspid Valve: Moderate regurgitation. Mildly elevated RVSP, consistent with mild pulmonary hypertension. The estimated RVSP is 38 mmHg.    Aortic Valve: Mildly thickened left, right and noncoronary cusps. Moderate regurgitation. AV PHT is 369.0 ms.    Mitral Valve: Mildly thickened, at the anterior leaflet. Mild to moderate regurgitation.    Right Atrium: Lead present in the right atrium. Right atrium is mildly dilated.    Right Ventricle: Right ventricle size is normal. Lead present in the right ventricle.    Left Atrium: Left atrium is mildly dilated.    Pericardium: No pericardial effusion.    Image quality is fair.    John in one year        Image quality is good.    Stress Test: A pharmacological stress test was performed using regadenoson (Lexiscan). 50 mg of aminophylline given as a reversal agent at minute 2 of recovery. Hemodynamics are adequate for diagnosis. Blood pressure demonstrated a normal response and heart rate demonstrated a normal response to stress. The patient's heart rate recovery was normal.    Patient has a paced rhythm    Cardiolite study demonstrates normal tracer uptake noted in the anterior septal lateral and the inferior wall this is noted both on the stress and resting images ejection fraction by gated study is 60% with normal global and regional left ventricular systolic function    Unremarkable Cardiolite stress test follow-up as scheduled    PT NOTIFIED OF RESULTS

## 2024-08-07 ENCOUNTER — OFFICE VISIT (OUTPATIENT)
Dept: CARDIOLOGY CLINIC | Age: 79
End: 2024-08-07
Payer: MEDICARE

## 2024-08-07 VITALS
OXYGEN SATURATION: 97 % | DIASTOLIC BLOOD PRESSURE: 62 MMHG | SYSTOLIC BLOOD PRESSURE: 106 MMHG | HEART RATE: 74 BPM | HEIGHT: 67 IN | WEIGHT: 127 LBS | BODY MASS INDEX: 19.93 KG/M2

## 2024-08-07 DIAGNOSIS — Z95.0 CARDIAC PACEMAKER IN SITU: Primary | ICD-10-CM

## 2024-08-07 PROCEDURE — 1123F ACP DISCUSS/DSCN MKR DOCD: CPT | Performed by: INTERNAL MEDICINE

## 2024-08-07 PROCEDURE — 99214 OFFICE O/P EST MOD 30 MIN: CPT | Performed by: INTERNAL MEDICINE

## 2024-08-07 PROCEDURE — 1036F TOBACCO NON-USER: CPT | Performed by: INTERNAL MEDICINE

## 2024-08-07 PROCEDURE — 3078F DIAST BP <80 MM HG: CPT | Performed by: INTERNAL MEDICINE

## 2024-08-07 PROCEDURE — 3074F SYST BP LT 130 MM HG: CPT | Performed by: INTERNAL MEDICINE

## 2024-08-07 PROCEDURE — G8427 DOCREV CUR MEDS BY ELIG CLIN: HCPCS | Performed by: INTERNAL MEDICINE

## 2024-08-07 PROCEDURE — G8420 CALC BMI NORM PARAMETERS: HCPCS | Performed by: INTERNAL MEDICINE

## 2024-08-07 PROCEDURE — G8399 PT W/DXA RESULTS DOCUMENT: HCPCS | Performed by: INTERNAL MEDICINE

## 2024-08-07 PROCEDURE — 1090F PRES/ABSN URINE INCON ASSESS: CPT | Performed by: INTERNAL MEDICINE

## 2024-08-07 NOTE — PATIENT INSTRUCTIONS
**It is YOUR responsibilty to bring medication bottles and/or updated medication list to EACH APPOINTMENT. This will allow us to better serve you and all your healthcare needs**  Thank you for allowing us to care for you today!   We want to ensure we can follow your treatment plan and we strive to give you the best outcomes and experience possible.   If you ever have a life threatening emergency and call 911 - for an ambulance (EMS)   Our providers can only care for you at:   [facility] or [facility].   Even if you have someone take you or you drive yourself we can only care for you in a [facility]. Our providers are not setup at the other healthcare locations!   Please be informed that if you contact our office outside of normal business hours the physician on call cannot help with any scheduling or rescheduling issues, procedure instruction questions or any type of medication issue.    We advise you for any urgent/emergency that you go to the nearest emergency room!    PLEASE CALL OUR OFFICE DURING NORMAL BUSINESS HOURS    Monday - Friday   8 am to 5 pm    [place]: [phone]    [place]: [phone]    [place]:  [phone]  We are committed to providing you the best care possible.    If you receive a survey after visiting one of our offices, please take time to share your experience concerning your physician office visit.  These surveys are confidential and no health information about you is shared.    We are eager to improve for you and we are counting on your feedback to help make that happen.

## 2024-08-07 NOTE — PROGRESS NOTES
CARDIOLOGY NOTE      8/7/2024    RE: Genesis Stewart  (1945)                               TO:  Dr. Russo, Nancy Perales MD            CHIEF COMPLAINT   Genesis is a 79 y.o. female who was seen today for management of  A fib                                    HPI:   Patient is here for    - Atrial fibrillation, pt is  compliant with meds. Patient does not have symptoms from atrial fibrillation  - Hypertension,is  well controlled, pt is  compliant with medicines  - Hyperlipidimea, lipids are in acceptable range. Pt  is  compliant with medicines  - ppm  Compliant with carelink    Here for follow-up on testing                Genesis Stewart has the following history recorded in care path:  Patient Active Problem List    Diagnosis Date Noted    Essential hypertension 12/18/2014    Long term current use of anticoagulant     Acquired hypothyroidism 04/24/2012    PAF (paroxysmal atrial fibrillation) ([facility]) 01/27/2011    Varicose veins of both legs with edema 12/15/2022    Alzheimer's disease, unspecified 11/16/2022    Persistent atrial fibrillation with RVR ([facility])     Atherosclerotic heart disease of native coronary artery with other forms of angina pectoris ([facility]) 06/07/2022    Leg pain 06/07/2022    Chronic atrial fibrillation ([facility]) 05/19/2022    Dyspnea     NSTEMI (non-ST elevated myocardial infarction) ([facility]) 05/09/2022    Chest pain 04/29/2022    Gastroesophageal reflux disease without esophagitis 09/08/2015    Anemia 12/22/2014    Gout 09/23/2013    Pacemaker dual chamber 10/24/2012    Sciatica     Mixed hyperlipidemia 10/24/2011    Hip bursitis 04/14/2016    Allergic rhinitis 10/24/2011    Secondary hypercoagulable state ([facility]) 05/21/2024    Memory problem 01/25/2022    Hearing loss of both ears due to cerumen impaction 03/27/2018    S/P AV (atrioventricular) kathi ablation 06/05/2014    Symptomatic bradycardia 06/21/2012    VHD (valvular heart disease) 06/06/2012     Current Outpatient Medications

## 2024-08-14 ENCOUNTER — TELEPHONE (OUTPATIENT)
Dept: FAMILY MEDICINE CLINIC | Age: 79
End: 2024-08-14

## 2024-08-14 DIAGNOSIS — Z90.49 HISTORY OF CHOLECYSTECTOMY: ICD-10-CM

## 2024-08-14 DIAGNOSIS — R79.89 ABNORMAL LFTS: Primary | ICD-10-CM

## 2024-08-14 DIAGNOSIS — R79.89 ABNORMAL BILIRUBIN TEST: ICD-10-CM

## 2024-08-14 NOTE — TELEPHONE ENCOUNTER
Please let patient and  know I would like for her to get an ultrasound (ordered in EPIC) of the abdomen/specifically the liver (since her gall bladder was removed years ago) as her liver enzymes are showing some inflammation.  Team to help schedule if needed

## 2024-08-15 ENCOUNTER — ANTI-COAG VISIT (OUTPATIENT)
Dept: PHARMACY | Age: 79
End: 2024-08-15
Payer: MEDICARE

## 2024-08-15 DIAGNOSIS — I48.0 PAF (PAROXYSMAL ATRIAL FIBRILLATION) (HCC): Primary | ICD-10-CM

## 2024-08-15 LAB
INTERNATIONAL NORMALIZATION RATIO, POC: 2.6
POC INR: 2.6 INDEX
PROTHROMBIN TIME, POC: 31.2
PROTHROMBIN TIME, POC: 31.2 SECONDS (ref 10–14.3)

## 2024-08-15 PROCEDURE — 99211 OFF/OP EST MAY X REQ PHY/QHP: CPT

## 2024-08-15 PROCEDURE — 85610 PROTHROMBIN TIME: CPT

## 2024-08-15 NOTE — PROGRESS NOTES
Medication Management Service  [facility]  [phone]    Visit Date: 8/15/2024   Subjective:       Genesis Stewart is a 79 y.o. female who presents to clinic today for anticoagulation monitoring and adjustment.    Patient seen in clinic for warfarin management due to  Indication:   atrial fibrillation.   INR goal: of 2.0-3.0.  Duration of therapy: indefinite.    Patient reports the following:   Adherent with regimen  Missed or extra doses:  None   Bleeding or thromboembolic side effects:  None  Significant medication changes:  None  Significant dietary changes: None  Significant alcohol or tobacco changes: None  Significant recent illness, disease state changes, or hospitalization:  None  Upcoming surgeries or procedures:  None  Falls: None           Assessment and Plan     PT/INR done in office per protocol.   INR today is 2.6, therapeutic.          Plan:  Will continue current regimen of warfarin 3mg daily except 4.5mg on Tuesdays and Fridays.     Recheck INR in 4 week(s).     Patient verbalized understanding of dosing directions and information discussed. Dosing schedule given to patient including phone number, appointment date, and time. Progress note sent to referring office.    Electronically signed by Taylor Lew RPH on 8/15/24     For Pharmacy Admin Tracking Only    Intervention Detail:   Total # of Interventions Recommended:   Total # of Interventions Accepted:   Time Spent (min): 15

## 2024-08-16 ENCOUNTER — HOSPITAL ENCOUNTER (OUTPATIENT)
Dept: ULTRASOUND IMAGING | Age: 79
Discharge: HOME | End: 2024-08-16
Attending: FAMILY MEDICINE
Payer: MEDICARE

## 2024-08-16 DIAGNOSIS — R79.89 ABNORMAL LFTS: ICD-10-CM

## 2024-08-16 DIAGNOSIS — R79.89 ABNORMAL BILIRUBIN TEST: ICD-10-CM

## 2024-08-16 DIAGNOSIS — Z90.49 HISTORY OF CHOLECYSTECTOMY: ICD-10-CM

## 2024-08-16 PROCEDURE — 76705 ECHO EXAM OF ABDOMEN: CPT

## 2024-08-19 ENCOUNTER — OFFICE VISIT (OUTPATIENT)
Dept: NEUROLOGY | Age: 79
End: 2024-08-19
Payer: MEDICARE

## 2024-08-19 VITALS
BODY MASS INDEX: 19.3 KG/M2 | HEART RATE: 70 BPM | WEIGHT: 123.2 LBS | OXYGEN SATURATION: 97 % | DIASTOLIC BLOOD PRESSURE: 70 MMHG | SYSTOLIC BLOOD PRESSURE: 116 MMHG

## 2024-08-19 DIAGNOSIS — F02.A0 MILD EARLY ONSET ALZHEIMER'S DEMENTIA WITHOUT BEHAVIORAL DISTURBANCE, PSYCHOTIC DISTURBANCE, MOOD DISTURBANCE, OR ANXIETY (HCC): ICD-10-CM

## 2024-08-19 DIAGNOSIS — G30.0 MILD EARLY ONSET ALZHEIMER'S DEMENTIA WITHOUT BEHAVIORAL DISTURBANCE, PSYCHOTIC DISTURBANCE, MOOD DISTURBANCE, OR ANXIETY (HCC): ICD-10-CM

## 2024-08-19 DIAGNOSIS — R47.02 EXPRESSIVE DYSPHASIA: Primary | ICD-10-CM

## 2024-08-19 PROCEDURE — 99213 OFFICE O/P EST LOW 20 MIN: CPT | Performed by: NURSE PRACTITIONER

## 2024-08-19 PROCEDURE — 1036F TOBACCO NON-USER: CPT | Performed by: NURSE PRACTITIONER

## 2024-08-19 PROCEDURE — G8399 PT W/DXA RESULTS DOCUMENT: HCPCS | Performed by: NURSE PRACTITIONER

## 2024-08-19 PROCEDURE — 1090F PRES/ABSN URINE INCON ASSESS: CPT | Performed by: NURSE PRACTITIONER

## 2024-08-19 PROCEDURE — 1123F ACP DISCUSS/DSCN MKR DOCD: CPT | Performed by: NURSE PRACTITIONER

## 2024-08-19 PROCEDURE — G8427 DOCREV CUR MEDS BY ELIG CLIN: HCPCS | Performed by: NURSE PRACTITIONER

## 2024-08-19 PROCEDURE — 3074F SYST BP LT 130 MM HG: CPT | Performed by: NURSE PRACTITIONER

## 2024-08-19 PROCEDURE — G8420 CALC BMI NORM PARAMETERS: HCPCS | Performed by: NURSE PRACTITIONER

## 2024-08-19 PROCEDURE — 3078F DIAST BP <80 MM HG: CPT | Performed by: NURSE PRACTITIONER

## 2024-08-19 RX ORDER — DONEPEZIL HYDROCHLORIDE 10 MG/1
10 TABLET, FILM COATED ORAL DAILY
Qty: 90 TABLET | Refills: 1 | Status: SHIPPED | OUTPATIENT
Start: 2024-08-19

## 2024-08-19 RX ORDER — MEMANTINE HYDROCHLORIDE 10 MG/1
10 TABLET ORAL 2 TIMES DAILY
Qty: 180 TABLET | Refills: 1 | Status: SHIPPED | OUTPATIENT
Start: 2024-08-19

## 2024-08-19 NOTE — PROGRESS NOTES
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 female presenting today for follow-up of: dementia. She remains on Aricept 10 mg daily and Namenda 10 mg twice daily. Her last MMSE was 18/27 on 2/14/2024.There were concerns of a right parietal lobe stroke due to her positive Stock's reflex in the left hand and as astereognosis.  CT head did not show infarct, she is not able to have MRI brain due to pacemaker.  She is already taking Coumadin and statin for stroke prevention, she has a history of A. fib.     She continues to live at home with her .  She no longer drives, cooks, manages her medications or finances.  She does help out with cleaning around the house.  She denies any falls.  She denies any hallucinations.  No safety concerns.  She is not incontinent.  Her appetite has been decreased.    Once per month she goes out to eat with her 3 sisters, walks 2 times per week with her sister, goes to [employer] weekly and goes out to eat daily. Plays games on her computer.    Current Outpatient Medications   Medication Sig Dispense Refill    donepezil (ARICEPT) 10 MG tablet Take 1 tablet by mouth daily 90 tablet 1    memantine (NAMENDA) 10 MG tablet Take 1 tablet by mouth 2 times daily 180 tablet 1    warfarin (COUMADIN) 3 MG tablet 1.5 (4.5mg dose) tablets on Tuesdays and Fridays,  1 tablet (3mg dose) all other days 100 tablet 3    levothyroxine (SYNTHROID) 75 MCG tablet Take 1 tablet by mouth every morning (before breakfast) Dose increase as of 12/20/23 90 tablet 3    atorvastatin (LIPITOR) 40 MG tablet Take 1 tablet by mouth daily 90 tablet 3    aspirin 81 MG chewable tablet Take 1 tablet by mouth daily 30 tablet 3    Vitamin D

## 2024-08-26 ENCOUNTER — TELEPHONE (OUTPATIENT)
Dept: FAMILY MEDICINE CLINIC | Age: 79
End: 2024-08-26

## 2024-08-26 DIAGNOSIS — R79.89 ABNORMAL LFTS: Primary | ICD-10-CM

## 2024-08-26 NOTE — TELEPHONE ENCOUNTER
Please let patient and  know the ultrasound showed some fatty deposits into the liver, which may explain the abnormal lab results. I would like to repeat the lab work in 8 weeks (non fasting, hepatic, cbc, and bilirubin blood test) and if still elevated, will refer to GI specialist

## 2024-08-27 ENCOUNTER — APPOINTMENT (OUTPATIENT)
Dept: GENERAL RADIOLOGY | Age: 79
End: 2024-08-27
Payer: MEDICARE

## 2024-08-27 ENCOUNTER — APPOINTMENT (OUTPATIENT)
Dept: CT IMAGING | Age: 79
End: 2024-08-27
Payer: MEDICARE

## 2024-08-27 ENCOUNTER — HOSPITAL ENCOUNTER (EMERGENCY)
Age: 79
Discharge: HOME | End: 2024-08-27
Attending: EMERGENCY MEDICINE
Payer: MEDICARE

## 2024-08-27 VITALS
BODY MASS INDEX: 21.66 KG/M2 | TEMPERATURE: 99 F | WEIGHT: 138 LBS | OXYGEN SATURATION: 94 % | DIASTOLIC BLOOD PRESSURE: 69 MMHG | HEIGHT: 67 IN | RESPIRATION RATE: 21 BRPM | HEART RATE: 70 BPM | SYSTOLIC BLOOD PRESSURE: 111 MMHG

## 2024-08-27 DIAGNOSIS — J06.9 ACUTE UPPER RESPIRATORY INFECTION: Primary | ICD-10-CM

## 2024-08-27 LAB
ALBUMIN SERPL-MCNC: 3.5 GM/DL (ref 3.4–5)
ALP BLD-CCNC: 47 IU/L (ref 40–128)
ALT SERPL-CCNC: 28 U/L (ref 10–40)
ANION GAP SERPL CALCULATED.3IONS-SCNC: 9 MMOL/L (ref 7–16)
AST SERPL-CCNC: 36 IU/L (ref 15–37)
BACTERIA: NEGATIVE /HPF
BASOPHILS ABSOLUTE: 0 K/CU MM
BASOPHILS RELATIVE PERCENT: 0.1 % (ref 0–1)
BILIRUB SERPL-MCNC: 1.3 MG/DL (ref 0–1)
BILIRUBIN, URINE: NEGATIVE MG/DL
BLOOD, URINE: NEGATIVE
BUN SERPL-MCNC: 17 MG/DL (ref 6–23)
CALCIUM SERPL-MCNC: 8.5 MG/DL (ref 8.3–10.6)
CHLORIDE BLD-SCNC: 99 MMOL/L (ref 99–110)
CLARITY, UA: CLEAR
CO2: 26 MMOL/L (ref 21–32)
COLOR, UA: YELLOW
CREAT SERPL-MCNC: 0.7 MG/DL (ref 0.6–1.1)
DIFFERENTIAL TYPE: ABNORMAL
EOSINOPHILS ABSOLUTE: 0 K/CU MM
EOSINOPHILS RELATIVE PERCENT: 0 % (ref 0–3)
GFR, ESTIMATED: 87 ML/MIN/1.73M2
GLUCOSE SERPL-MCNC: 104 MG/DL (ref 70–99)
GLUCOSE URINE: NEGATIVE MG/DL
HCT VFR BLD CALC: 44 % (ref 37–47)
HEMOGLOBIN: 14.2 GM/DL (ref 12.5–16)
IMMATURE NEUTROPHIL %: 0.4 % (ref 0–0.43)
KETONES, URINE: 40 MG/DL
LACTIC ACID, SEPSIS: 1.2 MMOL/L (ref 0.4–2)
LEUKOCYTE ESTERASE, URINE: ABNORMAL
LYMPHOCYTES ABSOLUTE: 1.9 K/CU MM
LYMPHOCYTES RELATIVE PERCENT: 22 % (ref 24–44)
MCH RBC QN AUTO: 29.3 PG (ref 27–31)
MCHC RBC AUTO-ENTMCNC: 32.3 % (ref 32–36)
MCV RBC AUTO: 90.9 FL (ref 78–100)
MONOCYTES ABSOLUTE: 1.3 K/CU MM
MONOCYTES RELATIVE PERCENT: 15.1 % (ref 0–4)
MUCUS: ABNORMAL HPF
NEUTROPHILS ABSOLUTE: 5.3 K/CU MM
NEUTROPHILS RELATIVE PERCENT: 62.4 % (ref 36–66)
NITRITE URINE, QUANTITATIVE: NEGATIVE
NUCLEATED RBC %: 0 %
PDW BLD-RTO: 14.3 % (ref 11.7–14.9)
PH, URINE: 6 (ref 5–8)
PLATELET # BLD: 81 K/CU MM (ref 140–440)
PMV BLD AUTO: 11.3 FL (ref 7.5–11.1)
POTASSIUM SERPL-SCNC: 3.9 MMOL/L (ref 3.5–5.1)
PROTEIN UA: NEGATIVE MG/DL
RBC # BLD: 4.84 M/CU MM (ref 4.2–5.4)
RBC URINE: 0 /HPF (ref 0–6)
REASON FOR REJECTION: NORMAL
REASON FOR REJECTION: NORMAL
REJECTED TEST: NORMAL
REJECTED TEST: NORMAL
SODIUM BLD-SCNC: 134 MMOL/L (ref 135–145)
SPECIFIC GRAVITY UA: >1.03 (ref 1–1.03)
SQUAMOUS EPITHELIAL: <1 /HPF
TOTAL IMMATURE NEUTOROPHIL: 0.03 K/CU MM
TOTAL NUCLEATED RBC: 0 K/CU MM
TOTAL PROTEIN: 6.7 GM/DL (ref 6.4–8.2)
TRICHOMONAS: ABNORMAL /HPF
UROBILINOGEN, URINE: 0.2 MG/DL (ref 0.2–1)
WBC # BLD: 8.5 K/CU MM (ref 4–10.5)
WBC UA: 5 /HPF (ref 0–5)

## 2024-08-27 PROCEDURE — 80053 COMPREHEN METABOLIC PANEL: CPT

## 2024-08-27 PROCEDURE — 81001 URINALYSIS AUTO W/SCOPE: CPT

## 2024-08-27 PROCEDURE — 71045 X-RAY EXAM CHEST 1 VIEW: CPT

## 2024-08-27 PROCEDURE — 83605 ASSAY OF LACTIC ACID: CPT

## 2024-08-27 PROCEDURE — 85025 COMPLETE CBC W/AUTO DIFF WBC: CPT

## 2024-08-27 PROCEDURE — 70450 CT HEAD/BRAIN W/O DYE: CPT

## 2024-08-27 PROCEDURE — 99284 EMERGENCY DEPT VISIT MOD MDM: CPT

## 2024-08-27 ASSESSMENT — LIFESTYLE VARIABLES
HOW OFTEN DO YOU HAVE A DRINK CONTAINING ALCOHOL: NEVER
HOW MANY STANDARD DRINKS CONTAINING ALCOHOL DO YOU HAVE ON A TYPICAL DAY: PATIENT DOES NOT DRINK

## 2024-08-27 ASSESSMENT — PAIN - FUNCTIONAL ASSESSMENT: PAIN_FUNCTIONAL_ASSESSMENT: NONE - DENIES PAIN

## 2024-08-27 NOTE — ED PROVIDER NOTES
Dr. Bartlett    Shoulder pain, right 03/10/2009    impingement, physical thearpy   (Main)  calcific bursitis  s/p injection    Sinus bradycardia     Sinusitis 12/12/2011    Tinnitus 03/2002    Vision changes 03/19/2013     Past Surgical History:   Procedure Laterality Date    ABLATION OF DYSRHYTHMIC FOCUS      BREAST BIOPSY      BREAST SURGERY  1985    Right breast tumor excised    CARDIAC SURGERY      CARDIOVERSION      1/2008 Dr Perdomo; 12/2008    CARDIOVERSION  03/10/2014    [facility]    CHOLECYSTECTOMY, LAPAROSCOPIC  02/2001    Dr Sal Shea    COLONOSCOPY      2002,2010 (Dr Newell)    DILATION AND CURETTAGE OF UTERUS  1988    EYE SURGERY      HYSTERECTOMY (CERVIX STATUS UNKNOWN)  2003    HYSTERECTOMY, VAGINAL      OTHER SURGICAL HISTORY Left 07/12/2022    upgrade to Medtronic Bi vi pacer, with AV node ablation performed by Dr. Long.    PACEMAKER PLACEMENT  06/21/2012    Medtronic Adapta ADDRL1 -not mri compatible    SINUS SURGERY  1979    Dr Samra CRUZ AND BSO (CERVIX REMOVED)  04/2003    fibroid      Dr Lopez    TONSILLECTOMY  08/2006    Dr Felix    TRANSTHORACIC ECHOCARDIOGRAM  06/2012    transthoracic cardioversion-Dr. Cash    UPPER GASTROINTESTINAL ENDOSCOPY  04/2008    mild superficial gastritis  Dr Newell; 2009     Family History   Problem Relation Age of Onset    Stroke Mother     Heart Disease Mother     Coronary Art Dis Mother 78        CABG    Colon Cancer Mother 80        colon     Heart Disease Father     Coronary Art Dis Father 57        CABG    Migraines Sister     Seizures Brother     Breast Cancer Maternal Cousin         under 50     Social History     Socioeconomic History    Marital status:      Spouse name: Not on file    Number of children: Not on file    Years of education: Not on file    Highest education level: Not on file   Occupational History    Not on file   Tobacco Use    Smoking status: Never    Smokeless tobacco: Never   Vaping Use    Vaping status: Never  tablet Take 1 tablet by mouth daily 90 tablet 1    memantine (NAMENDA) 10 MG tablet Take 1 tablet by mouth 2 times daily 180 tablet 1    torsemide (DEMADEX) 10 MG tablet Take 1 tablet by mouth daily (Patient not taking: Reported on 8/19/2024)      atorvastatin (LIPITOR) 40 MG tablet Take 1 tablet by mouth daily 90 tablet 3    ammonium lactate (AMLACTIN) 12 % cream APPLY TO BODY DAILY AFTER SHOWERING      magnesium 200 MG TABS tablet Take 1 tablet by mouth daily (Patient not taking: Reported on 8/7/2024)      Multiple Vitamins-Minerals (ICAPS AREDS 2 PO) Take by mouth (Patient not taking: Reported on 8/7/2024)      ipratropium (ATROVENT) 0.03 % nasal spray 2 sprays by Each Nostril route 3 times daily      cetirizine (ZYRTEC) 10 MG tablet Take 1 tablet by mouth daily (Patient not taking: Reported on 8/7/2024)      Vitamin D (CHOLECALCIFEROL) 25 MCG (1000 UT) TABS tablet Take 1 tablet by mouth daily       Allergies   Allergen Reactions    Latex Hives    Darvon [Propoxyphene Hcl] Shortness Of Breath    Demerol Rash     Breathing problems    Dilaudid [Hydromorphone Hcl] Anaphylaxis    Valium Rash     Breathing problems    Celecoxib Diarrhea    Norco [Hydrocodone-Acetaminophen] Diarrhea, Nausea Only and Other (See Comments)     A-Fib    Norvasc [Amlodipine] Other (See Comments)     HA, dizziness    Oxycodone Itching    Tape [Adhesive Tape] Other (See Comments)     BLISTER    Vasotec [Enalapril] Other (See Comments)     Nausea, vomiting    Diazepam Rash       Nursing Notes Reviewed    Physical Exam:  ED Triage Vitals   Encounter Vitals Group      BP 08/27/24 0154 126/73      Systolic BP Percentile --       Diastolic BP Percentile --       Pulse 08/27/24 0154 70      Respirations 08/27/24 0154 24      Temp 08/27/24 0154 99 °F (37.2 °C)      Temp Source 08/27/24 0154 Oral      SpO2 08/27/24 0154 95 %      Weight - Scale 08/27/24 0155 62.6 kg (138 lb)      Height 08/27/24 0155 1.702 m (5' 7\")      Head Circumference --

## 2024-08-27 NOTE — ED TRIAGE NOTES
Pt and  present to ED stating he thinks she is having a stroke. Saturday she started to sneeze, and nose would not stop running. Slurred speech started around Sunday 10:00am. Slurred speech has progressively gotten worse over past day. Would have to have wife repeat multiple times.  claims she has a history of TIA. Pt does have pacemaker.

## 2024-08-29 ENCOUNTER — CARE COORDINATION (OUTPATIENT)
Dept: CARE COORDINATION | Age: 79
End: 2024-08-29

## 2024-08-29 DIAGNOSIS — J98.8 RESPIRATORY TRACT INFECTION DUE TO COVID-19 VIRUS: Primary | ICD-10-CM

## 2024-08-29 DIAGNOSIS — I48.0 PAF (PAROXYSMAL ATRIAL FIBRILLATION) (HCC): ICD-10-CM

## 2024-08-29 DIAGNOSIS — U07.1 RESPIRATORY TRACT INFECTION DUE TO COVID-19 VIRUS: Primary | ICD-10-CM

## 2024-08-29 NOTE — CARE COORDINATION
Ambulatory Care Coordination  ED Follow up Call    Reason for ED visit:  Patient had been coughing & blowing her nose since Sunday then started having slurred speech on Tuesday, 8/27/24 - Spouse thought she was having a TIA. Took 9 vials of blood. Did CT scan of head & CXR. ED staff didn't think she had TIA. Can't have MRI d/t pacemaker. Spouse took patient to [facility] and was given a RX for cough & nose spray.  Suggested taking a home covid test, which was positive on 8/28/24.    Status:     improved - cough improved, but still confused more than usual. Having issues with recognition- pills and going to bathroom. Patient is not incontinent.  still thinks she had TIA. Speech has improved.     Did you call your PCP prior to going to the ED?  No      Did you receive a discharge instructions from the Emergency Room? Yes  Review of Instructions:     Understands what to report/when to return?:  Yes   Understands discharge instructions?:  Yes   Following discharge instructions?:  Yes   If not why? N/A    Are there any new complaints of pain? No  New Pain Meds? No    Constipation prophylaxis needed?  No    If you have a wound is the dressing clean, dry, and intact? N/A  Understands wound care regimen? N/A    Are there any other complaints/concerns that you wish to tell your provider?   No    FU appts/Provider:    Future Appointments   Date Time Provider Department Center   9/4/2024 12:45 PM Nestor Alfred MD [facility]   9/12/2024 11:30 AM [facility] ANTICOAGULATION CLINIC [facility]   2/7/2025  1:40 PM Jessie Marina, ANTHONY - CNP [facility] Heart [facility]   8/5/2025  2:30 PM Nancy Russo MD [facility] DEP     New Medications?:   No      Medication Reconciliation by phone - No  Understands Medications?  Yes  Taking Medications? Yes  Can you swallow your pills?  Yes    Any further needs in the home i.e. Equipment?  No    Link to services in community?:

## 2024-08-29 NOTE — CARE COORDINATION
Please let  know I sent Paxlovid for patient who had + Covid test on 8/28/24.  Rx sent to [facility].     Okay to schedule with me on 9/17 at 4:15pm for f/u ED.

## 2024-08-29 NOTE — CARE COORDINATION
Ambulatory Care Coordination Note     8/29/2024 9:02 AM     Patient outreach attempt by this ACM today to offer care management services. ACM was unable to reach the patient by telephone today; left voice message requesting a return phone call to this ACM.     ACM: Anabella Escoto RN     Care Summary Note: ACM attempted to contact patient today for ED follow-up/enrollment outreach. ACM will contact patient at a later date.     PCP/Specialist follow up:   Future Appointments         Provider Specialty Dept Phone    9/4/2024 12:45 PM Nestor Alfred MD Pulmonology [phone]    9/12/2024 11:30 AM [facility] ANTICOAGULATION CLINIC Pharmacy [phone]    2/7/2025 1:40 PM Jessie Marina APRN - CNP Cardiology [phone]    8/5/2025 2:30 PM Nancy Russo MD Family Medicine [phone]            Follow Up:   Plan for next ACM outreach in approximately 1 week to complete:  - outreach attempt to offer care management services.

## 2024-08-30 DIAGNOSIS — U07.1 RESPIRATORY TRACT INFECTION DUE TO COVID-19 VIRUS: ICD-10-CM

## 2024-08-30 DIAGNOSIS — J98.8 RESPIRATORY TRACT INFECTION DUE TO COVID-19 VIRUS: ICD-10-CM

## 2024-08-30 DIAGNOSIS — I48.0 PAF (PAROXYSMAL ATRIAL FIBRILLATION) (HCC): ICD-10-CM

## 2024-08-30 NOTE — TELEPHONE ENCOUNTER
Please let patient and  know paxlovid resent to [facility] pharmacy    nirmatrelvir/ritonavir 300/100 (PAXLOVID) 20 x 150 MG & 10 x 100MG TBPK  Date: 8/30/2024 Department: [facility] Ordering/Authorizing: Nancy Russo MD     Outpatient Medication Detail     Disp Refills Start End    nirmatrelvir/ritonavir 300/100 (PAXLOVID) 20 x 150 MG & 10 x 100MG TBPK 30 tablet 0 8/30/2024 --    Sig: Take 3 tablets (two 150 mg nirmatrelvir and one 100 mg ritonavir tablets) by mouth every 12 hours for 5 days.    Sent to pharmacy as: Nirmatrelvir&Ritonavir 300/100 20 x 150 MG & 10 x 100MG Oral Tablet Therapy Pack (PAXLOVID)    E-Prescribing Status: Receipt confirmed by pharmacy (8/30/2024  1:53 PM EDT)

## 2024-08-30 NOTE — PROGRESS NOTES
Please let  and patient know paxlovid resent to [facility] pharmacy    nirmatrelvir/ritonavir 300/100 (PAXLOVID) 20 x 150 MG & 10 x 100MG TBPK  Date: 8/30/2024 Department: [facility] Ordering/Authorizing: Nancy Russo MD     Outpatient Medication Detail     Disp Refills Start End    nirmatrelvir/ritonavir 300/100 (PAXLOVID) 20 x 150 MG & 10 x 100MG TBPK 30 tablet 0 8/30/2024 --    Sig: Take 3 tablets (two 150 mg nirmatrelvir and one 100 mg ritonavir tablets) by mouth every 12 hours for 5 days.    Sent to pharmacy as: Nirmatrelvir&Ritonavir 300/100 20 x 150 MG & 10 x 100MG Oral Tablet Therapy Pack (PAXLOVID)    E-Prescribing Status: Receipt confirmed by pharmacy (8/30/2024  1:53 PM EDT)

## 2024-09-06 ENCOUNTER — PROCEDURE VISIT (OUTPATIENT)
Dept: CARDIOLOGY CLINIC | Age: 79
End: 2024-09-06

## 2024-09-06 DIAGNOSIS — Z95.0 CARDIAC PACEMAKER IN SITU: Primary | ICD-10-CM

## 2024-09-12 ENCOUNTER — ANTI-COAG VISIT (OUTPATIENT)
Dept: PHARMACY | Age: 79
End: 2024-09-12
Payer: MEDICARE

## 2024-09-12 DIAGNOSIS — I48.0 PAF (PAROXYSMAL ATRIAL FIBRILLATION) (HCC): Primary | ICD-10-CM

## 2024-09-12 LAB
INTERNATIONAL NORMALIZATION RATIO, POC: 1.6
POC INR: 1.6 (ref 0.9–1.2)
PROTHROMBIN TIME, POC: 18.8
PROTHROMBIN TIME, POC: 18.8 SEC (ref 10–14.3)

## 2024-09-12 PROCEDURE — 85610 PROTHROMBIN TIME: CPT

## 2024-09-12 PROCEDURE — 99212 OFFICE O/P EST SF 10 MIN: CPT

## 2024-09-17 ENCOUNTER — OFFICE VISIT (OUTPATIENT)
Dept: FAMILY MEDICINE CLINIC | Age: 79
End: 2024-09-17
Payer: MEDICARE

## 2024-09-17 VITALS
BODY MASS INDEX: 19.15 KG/M2 | DIASTOLIC BLOOD PRESSURE: 74 MMHG | HEIGHT: 67 IN | WEIGHT: 122 LBS | OXYGEN SATURATION: 96 % | SYSTOLIC BLOOD PRESSURE: 118 MMHG | HEART RATE: 73 BPM

## 2024-09-17 DIAGNOSIS — Z86.16 HISTORY OF COVID-19: ICD-10-CM

## 2024-09-17 DIAGNOSIS — R09.81 SINUS CONGESTION: Primary | ICD-10-CM

## 2024-09-17 DIAGNOSIS — R79.89 ABNORMAL LFTS: ICD-10-CM

## 2024-09-17 PROCEDURE — 1036F TOBACCO NON-USER: CPT | Performed by: FAMILY MEDICINE

## 2024-09-17 PROCEDURE — G8427 DOCREV CUR MEDS BY ELIG CLIN: HCPCS | Performed by: FAMILY MEDICINE

## 2024-09-17 PROCEDURE — G8399 PT W/DXA RESULTS DOCUMENT: HCPCS | Performed by: FAMILY MEDICINE

## 2024-09-17 PROCEDURE — 3078F DIAST BP <80 MM HG: CPT | Performed by: FAMILY MEDICINE

## 2024-09-17 PROCEDURE — G8420 CALC BMI NORM PARAMETERS: HCPCS | Performed by: FAMILY MEDICINE

## 2024-09-17 PROCEDURE — 99213 OFFICE O/P EST LOW 20 MIN: CPT | Performed by: FAMILY MEDICINE

## 2024-09-17 PROCEDURE — 1090F PRES/ABSN URINE INCON ASSESS: CPT | Performed by: FAMILY MEDICINE

## 2024-09-17 PROCEDURE — 3074F SYST BP LT 130 MM HG: CPT | Performed by: FAMILY MEDICINE

## 2024-09-17 PROCEDURE — 1123F ACP DISCUSS/DSCN MKR DOCD: CPT | Performed by: FAMILY MEDICINE

## 2024-09-17 RX ORDER — LORATADINE 10 MG/1
10 TABLET, ORALLY DISINTEGRATING ORAL NIGHTLY
COMMUNITY
Start: 2024-09-17 | End: 2025-09-17

## 2024-09-26 ENCOUNTER — ANTI-COAG VISIT (OUTPATIENT)
Dept: PHARMACY | Age: 79
End: 2024-09-26
Payer: MEDICARE

## 2024-09-26 DIAGNOSIS — I48.0 PAF (PAROXYSMAL ATRIAL FIBRILLATION) (HCC): Primary | ICD-10-CM

## 2024-09-26 LAB
INTERNATIONAL NORMALIZATION RATIO, POC: 2.1
POC INR: 2.1 (ref 0.9–1.2)
PROTHROMBIN TIME, POC: 24.8
PROTHROMBIN TIME, POC: 24.8 SEC (ref 10–14.3)

## 2024-09-26 PROCEDURE — 85610 PROTHROMBIN TIME: CPT

## 2024-09-26 PROCEDURE — 99211 OFF/OP EST MAY X REQ PHY/QHP: CPT

## 2024-10-24 ENCOUNTER — ANTI-COAG VISIT (OUTPATIENT)
Dept: PHARMACY | Age: 79
End: 2024-10-24
Payer: MEDICARE

## 2024-10-24 DIAGNOSIS — I48.0 PAF (PAROXYSMAL ATRIAL FIBRILLATION) (HCC): Primary | ICD-10-CM

## 2024-10-24 LAB
INTERNATIONAL NORMALIZATION RATIO, POC: 2.2
POC INR: 2.2 (ref 0.9–1.2)
PROTHROMBIN TIME, POC: 26.6
PROTHROMBIN TIME, POC: 26.6 SEC (ref 10–14.3)

## 2024-10-24 PROCEDURE — 85610 PROTHROMBIN TIME: CPT

## 2024-10-24 PROCEDURE — 99211 OFF/OP EST MAY X REQ PHY/QHP: CPT

## 2024-10-24 NOTE — PROGRESS NOTES
Medication Management Service  [facility]  [phone]    Visit Date: 10/24/2024   Subjective:       Genesis Stewart is a 79 y.o. female who presents to clinic today for anticoagulation monitoring and adjustment.    Patient seen in clinic for warfarin management due to  Indication:   atrial fibrillation.   INR goal: of 2.0-3.0.  Duration of therapy: indefinite.    Patient reports the following:   Adherent with regimen  Missed or extra doses:  None   Bleeding or thromboembolic side effects:  None  Significant medication changes:  None  Significant dietary changes: None  Significant alcohol or tobacco changes: None  Significant recent illness, disease state changes, or hospitalization:  None  Upcoming surgeries or procedures:  None  Falls: fall into bed            Assessment and Plan     PT/INR done in office per protocol.   INR today is 2.2, therapeutic.          Plan:  Will continue current regimen of warfarin 3mg daily except 4.5mg on Tuesdays and Fridays.     Recheck INR in 4 week(s).     Patient verbalized understanding of dosing directions and information discussed. Dosing schedule given to patient including phone number, appointment date, and time. Progress note sent to referring office.    Electronically signed by Taylor Lew RPH on 10/24/24     For Pharmacy Admin Tracking Only    Intervention Detail:   Total # of Interventions Recommended:   Total # of Interventions Accepted:   Time Spent (min): 15

## 2024-11-13 ENCOUNTER — HOSPITAL ENCOUNTER (OUTPATIENT)
Age: 79
Discharge: HOME | End: 2024-11-13
Payer: MEDICARE

## 2024-11-13 DIAGNOSIS — R79.89 ABNORMAL LFTS: ICD-10-CM

## 2024-11-13 LAB
ALBUMIN SERPL-MCNC: 4 G/DL (ref 3.4–5)
ALBUMIN/GLOB SERPL: 1.5 {RATIO} (ref 1.1–2.2)
ALP SERPL-CCNC: 56 U/L (ref 40–129)
ALT SERPL-CCNC: 40 U/L (ref 10–40)
AST SERPL-CCNC: 36 U/L (ref 15–37)
BILIRUB DIRECT SERPL-MCNC: 0.5 MG/DL (ref 0–0.3)
BILIRUB INDIRECT SERPL-MCNC: 0.8 MG/DL (ref 0–0.7)
BILIRUB SERPL-MCNC: 1.4 MG/DL (ref 0–1)
PROT SERPL-MCNC: 6.7 G/DL (ref 6.4–8.2)
RETICS # AUTO: 0.05 M/UL
RETICS/RBC NFR AUTO: 1 % (ref 0.2–2)

## 2024-11-13 PROCEDURE — 85045 AUTOMATED RETICULOCYTE COUNT: CPT

## 2024-11-13 PROCEDURE — 80076 HEPATIC FUNCTION PANEL: CPT

## 2024-11-13 PROCEDURE — 36415 COLL VENOUS BLD VENIPUNCTURE: CPT

## 2024-11-18 ENCOUNTER — OFFICE VISIT (OUTPATIENT)
Dept: CARDIOLOGY CLINIC | Age: 79
End: 2024-11-18

## 2024-11-18 ENCOUNTER — TELEPHONE (OUTPATIENT)
Dept: PHARMACY | Age: 79
End: 2024-11-18

## 2024-11-18 ENCOUNTER — TELEPHONE (OUTPATIENT)
Dept: CARDIOLOGY CLINIC | Age: 79
End: 2024-11-18

## 2024-11-18 VITALS
HEIGHT: 67 IN | HEART RATE: 70 BPM | BODY MASS INDEX: 19.46 KG/M2 | DIASTOLIC BLOOD PRESSURE: 64 MMHG | WEIGHT: 124 LBS | SYSTOLIC BLOOD PRESSURE: 108 MMHG

## 2024-11-18 DIAGNOSIS — I38 VHD (VALVULAR HEART DISEASE): ICD-10-CM

## 2024-11-18 DIAGNOSIS — I48.20 CHRONIC ATRIAL FIBRILLATION (HCC): ICD-10-CM

## 2024-11-18 DIAGNOSIS — Z95.0 PACEMAKER: Primary | ICD-10-CM

## 2024-11-18 ASSESSMENT — ENCOUNTER SYMPTOMS
SHORTNESS OF BREATH: 1
ORTHOPNEA: 0

## 2024-11-18 NOTE — TELEPHONE ENCOUNTER
Confirmed next visit.     For Pharmacy Admin Tracking Only    Intervention Detail:   Total # of Interventions Recommended:   Total # of Interventions Accepted:   Time Spent (min): 5

## 2024-11-18 NOTE — TELEPHONE ENCOUNTER
Pt started last Wednesday with soreness and discomfort in the middle of her chest and shortness of breath. Pt feels like her heart is not beating right. She does have a pacemaker.

## 2024-11-18 NOTE — PROGRESS NOTES
11/18/2024  Primary cardiologist: Dr. Perdomo    CC:   Genesis  is an established 79 y.o.  female here for a follow up on at [facility]      SUBJECTIVE/OBJECTIVE:  Genesis is a 79 y.o. female with a history of atrial fibrillation, ablation of atrial fibrillation, AV node ablation, VHD,- AR, CAD,  hypertension, hyperlipidemia, BIV pacemaker, CVI and hypothyroid     HPI:  Genesis is here today with her . She has noticed a pain to the left breast. Sharp pain that is reproducible to touch. Has noticed fatigue and shortness of breath. Notes it with activity such as doing laundry     Review of Systems   Constitutional: Positive for malaise/fatigue and weight loss. Negative for diaphoresis.   Cardiovascular:  Positive for chest pain and dyspnea on exertion. Negative for claudication, irregular heartbeat, leg swelling, near-syncope, orthopnea, palpitations and paroxysmal nocturnal dyspnea.   Respiratory:  Positive for shortness of breath.    Neurological:  Negative for dizziness and light-headedness.   Psychiatric/Behavioral:  Positive for memory loss.        Vitals:    11/18/24 1415   BP: 108/64   Site: Left Upper Arm   Position: Sitting   Cuff Size: Medium Adult   Pulse: 70   Weight: 56.2 kg (124 lb)   Height: 1.702 m (5' 7\")     Wt Readings from Last 3 Encounters:   11/18/24 56.2 kg (124 lb)   09/17/24 55.3 kg (122 lb)   08/27/24 62.6 kg (138 lb)      Body mass index is 19.42 kg/m².     Physical Exam  Vitals reviewed.   Eyes:      Pupils: Pupils are equal, round, and reactive to light.   Neck:      Vascular: No carotid bruit.   Cardiovascular:      Rate and Rhythm: Normal rate and regular rhythm.      Pulses: Normal pulses.      Heart sounds: Murmur heard.   Pulmonary:      Effort: Pulmonary effort is normal.      Breath sounds: Normal breath sounds. No rales.   Chest:      Chest wall: No tenderness.   Musculoskeletal:      Cervical back: No tenderness.      Right lower leg: No edema.      Left lower leg: No edema.

## 2024-11-18 NOTE — TELEPHONE ENCOUNTER
states patient having chest discomfort for 5 days and SOB. Also feels like heart if fluttering. OV scheduled

## 2024-11-19 RX ORDER — METOPROLOL SUCCINATE 50 MG/1
50 TABLET, EXTENDED RELEASE ORAL DAILY
Qty: 90 TABLET | Refills: 3 | Status: SHIPPED | OUTPATIENT
Start: 2024-11-19

## 2024-11-21 ENCOUNTER — ANTI-COAG VISIT (OUTPATIENT)
Dept: PHARMACY | Age: 79
End: 2024-11-21
Payer: MEDICARE

## 2024-11-21 DIAGNOSIS — I48.0 PAF (PAROXYSMAL ATRIAL FIBRILLATION) (HCC): Primary | ICD-10-CM

## 2024-11-21 LAB
INTERNATIONAL NORMALIZATION RATIO, POC: 1.9
POC INR: 1.9 (ref 0.9–1.2)
PROTHROMBIN TIME, POC: 22.7
PROTHROMBIN TIME, POC: 22.7 SEC (ref 10–14.3)

## 2024-11-21 PROCEDURE — 99211 OFF/OP EST MAY X REQ PHY/QHP: CPT

## 2024-11-21 PROCEDURE — 85610 PROTHROMBIN TIME: CPT

## 2024-11-21 NOTE — PROGRESS NOTES
Medication Management Service  [facility]  [phone]    Visit Date: 11/21/2024   Subjective:       Genesis Stewart is a 79 y.o. female who presents to clinic today for anticoagulation monitoring and adjustment.    Patient seen in clinic for warfarin management due to  Indication:   atrial fibrillation.   INR goal: of 2.0-3.0.  Duration of therapy: indefinite.    Patient reports the following:   Adherent with regimen  Missed or extra doses:  None   Bleeding or thromboembolic side effects:  None  Significant medication changes:  None  Significant dietary changes: green instead of broccoli last night  Significant alcohol or tobacco changes: None  Significant recent illness, disease state changes, or hospitalization:  None  Upcoming surgeries or procedures:  None  Falls: None           Assessment and Plan     PT/INR done in office per protocol.   INR today is 1.9, subtherapeutic, just below goal range    Plan:  Will continue current regimen of warfarin 3mg daily except 4.5mg on Tuesdays and Fridays.     Recheck INR in 4 week(s).     Patient verbalized understanding of dosing directions and information discussed. Dosing schedule given to patient including phone number, appointment date, and time. Progress note sent to referring office.    Electronically signed by Taylor Lew RPH on 11/21/24     For Pharmacy Admin Tracking Only    Intervention Detail:   Total # of Interventions Recommended:   Total # of Interventions Accepted:   Time Spent (min): 15

## 2024-12-09 ENCOUNTER — TELEPHONE (OUTPATIENT)
Dept: CARDIOLOGY CLINIC | Age: 79
End: 2024-12-09

## 2024-12-19 ENCOUNTER — ANTI-COAG VISIT (OUTPATIENT)
Dept: PHARMACY | Age: 79
End: 2024-12-19
Payer: MEDICARE

## 2024-12-19 DIAGNOSIS — I48.0 PAF (PAROXYSMAL ATRIAL FIBRILLATION) (HCC): Primary | ICD-10-CM

## 2024-12-19 LAB
INTERNATIONAL NORMALIZATION RATIO, POC: 1.9
POC INR: 1.9 (ref 0.9–1.2)
PROTHROMBIN TIME, POC: 22.3
PROTHROMBIN TIME, POC: 22.3 SEC (ref 10–14.3)

## 2024-12-19 PROCEDURE — 85610 PROTHROMBIN TIME: CPT

## 2024-12-19 PROCEDURE — 99211 OFF/OP EST MAY X REQ PHY/QHP: CPT

## 2024-12-19 NOTE — PROGRESS NOTES
Medication Management Service  [facility]  [phone]    Visit Date: 12/19/2024   Subjective:       Genesis Stewart is a 79 y.o. female who presents to clinic today for anticoagulation monitoring and adjustment.    Patient seen in clinic for warfarin management due to  Indication:   atrial fibrillation.   INR goal: of 2.0-3.0.  Duration of therapy: indefinite.    Patient reports the following:   Adherent with regimen  Missed or extra doses:  None   Bleeding or thromboembolic side effects:  None  Significant medication changes:  None  Significant dietary changes: None  Significant alcohol or tobacco changes: None  Significant recent illness, disease state changes, or hospitalization:  None  Upcoming surgeries or procedures:  None  Falls: None           Assessment and Plan     PT/INR done in office per protocol.   INR today is 1.9, subtherapeutic, just below goal range. She has broccoli yesterday.     Plan:  Will continue current regimen of warfarin 3mg daily except 4.5mg on Tuesdays and Fridays      Recheck INR in 4 week(s).     Patient verbalized understanding of dosing directions and information discussed. Dosing schedule given to patient including phone number, appointment date, and time. Progress note sent to referring office.    Electronically signed by Taylor Lew RPH on 12/19/24     For Pharmacy Admin Tracking Only    Intervention Detail:   Total # of Interventions Recommended:   Total # of Interventions Accepted:   Time Spent (min): 15

## 2024-12-21 DIAGNOSIS — E78.2 MIXED HYPERLIPIDEMIA: ICD-10-CM

## 2024-12-23 DIAGNOSIS — E78.2 MIXED HYPERLIPIDEMIA: ICD-10-CM

## 2024-12-23 RX ORDER — ATORVASTATIN CALCIUM 40 MG/1
40 TABLET, FILM COATED ORAL DAILY
Qty: 90 TABLET | Refills: 3 | Status: SHIPPED | OUTPATIENT
Start: 2024-12-23

## 2024-12-24 RX ORDER — ATORVASTATIN CALCIUM 40 MG/1
40 TABLET, FILM COATED ORAL DAILY
Qty: 90 TABLET | Refills: 3 | OUTPATIENT
Start: 2024-12-24

## 2025-01-16 ENCOUNTER — ANTI-COAG VISIT (OUTPATIENT)
Dept: PHARMACY | Age: 80
End: 2025-01-16
Payer: MEDICARE

## 2025-01-16 DIAGNOSIS — I48.0 PAF (PAROXYSMAL ATRIAL FIBRILLATION) (HCC): Primary | ICD-10-CM

## 2025-01-16 LAB
INTERNATIONAL NORMALIZATION RATIO, POC: 2
POC INR: 2 (ref 0.9–1.2)
PROTHROMBIN TIME, POC: 24
PROTHROMBIN TIME, POC: 24 SEC (ref 10–14.3)

## 2025-01-16 PROCEDURE — 99211 OFF/OP EST MAY X REQ PHY/QHP: CPT

## 2025-01-16 PROCEDURE — 85610 PROTHROMBIN TIME: CPT

## 2025-01-16 NOTE — PROGRESS NOTES
Medication Management Service  [facility]  [phone]    Visit Date: 1/16/2025   Subjective:       Genesis Stewart is a 79 y.o. female who presents to clinic today for anticoagulation monitoring and adjustment.    Patient seen in clinic for warfarin management due to  Indication:   atrial fibrillation.   INR goal: of 2.0-3.0.  Duration of therapy: indefinite.    Patient reports the following:   Adherent with regimen  Missed or extra doses:  None   Bleeding or thromboembolic side effects:  None  Significant medication changes:  None  Significant dietary changes: None  Significant alcohol or tobacco changes: None  Significant recent illness, disease state changes, or hospitalization:  None  Upcoming surgeries or procedures:  None  Falls: None           Assessment and Plan     PT/INR done in office per protocol.   INR today is 2.0, therapeutic.          Plan:  Will continue current regimen of warfarin 3mg daily except 4.5mg on Tuesdays and Fridays.     Recheck INR in 4 week(s).     Patient verbalized understanding of dosing directions and information discussed. Dosing schedule given to patient including phone number, appointment date, and time. Progress note sent to referring office.    Electronically signed by Taylor Lew RPH on 1/16/25     For Pharmacy Admin Tracking Only    Intervention Detail:   Total # of Interventions Recommended:   Total # of Interventions Accepted:   Time Spent (min): 15

## 2025-02-12 DIAGNOSIS — Z79.01 LONG TERM (CURRENT) USE OF ANTICOAGULANTS: ICD-10-CM

## 2025-02-12 DIAGNOSIS — I48.0 PAROXYSMAL ATRIAL FIBRILLATION (HCC): ICD-10-CM

## 2025-02-12 RX ORDER — WARFARIN SODIUM 3 MG/1
TABLET ORAL
Qty: 100 TABLET | Refills: 3 | Status: SHIPPED | OUTPATIENT
Start: 2025-02-12

## 2025-02-13 ENCOUNTER — ANTI-COAG VISIT (OUTPATIENT)
Dept: PHARMACY | Age: 80
End: 2025-02-13
Payer: MEDICARE

## 2025-02-13 ENCOUNTER — OFFICE VISIT (OUTPATIENT)
Age: 80
End: 2025-02-13
Payer: MEDICARE

## 2025-02-13 VITALS
WEIGHT: 121.6 LBS | DIASTOLIC BLOOD PRESSURE: 66 MMHG | SYSTOLIC BLOOD PRESSURE: 106 MMHG | BODY MASS INDEX: 19.05 KG/M2 | HEART RATE: 71 BPM | OXYGEN SATURATION: 100 %

## 2025-02-13 DIAGNOSIS — F02.A4 MILD LATE ONSET ALZHEIMER'S DEMENTIA WITH ANXIETY (HCC): Primary | ICD-10-CM

## 2025-02-13 DIAGNOSIS — I48.0 PAF (PAROXYSMAL ATRIAL FIBRILLATION) (HCC): Primary | ICD-10-CM

## 2025-02-13 DIAGNOSIS — G30.1 MILD LATE ONSET ALZHEIMER'S DEMENTIA WITH ANXIETY (HCC): Primary | ICD-10-CM

## 2025-02-13 LAB
INTERNATIONAL NORMALIZATION RATIO, POC: 2.9
POC INR: 2.9 (ref 0.9–1.2)
PROTHROMBIN TIME, POC: 34.2
PROTHROMBIN TIME, POC: 34.2 SEC (ref 10–14.3)

## 2025-02-13 PROCEDURE — 1036F TOBACCO NON-USER: CPT | Performed by: NURSE PRACTITIONER

## 2025-02-13 PROCEDURE — 1159F MED LIST DOCD IN RCRD: CPT | Performed by: NURSE PRACTITIONER

## 2025-02-13 PROCEDURE — 99214 OFFICE O/P EST MOD 30 MIN: CPT | Performed by: NURSE PRACTITIONER

## 2025-02-13 PROCEDURE — 85610 PROTHROMBIN TIME: CPT

## 2025-02-13 PROCEDURE — G8399 PT W/DXA RESULTS DOCUMENT: HCPCS | Performed by: NURSE PRACTITIONER

## 2025-02-13 PROCEDURE — G8420 CALC BMI NORM PARAMETERS: HCPCS | Performed by: NURSE PRACTITIONER

## 2025-02-13 PROCEDURE — 3074F SYST BP LT 130 MM HG: CPT | Performed by: NURSE PRACTITIONER

## 2025-02-13 PROCEDURE — 1090F PRES/ABSN URINE INCON ASSESS: CPT | Performed by: NURSE PRACTITIONER

## 2025-02-13 PROCEDURE — G8427 DOCREV CUR MEDS BY ELIG CLIN: HCPCS | Performed by: NURSE PRACTITIONER

## 2025-02-13 PROCEDURE — 1123F ACP DISCUSS/DSCN MKR DOCD: CPT | Performed by: NURSE PRACTITIONER

## 2025-02-13 PROCEDURE — 99211 OFF/OP EST MAY X REQ PHY/QHP: CPT

## 2025-02-13 PROCEDURE — 3078F DIAST BP <80 MM HG: CPT | Performed by: NURSE PRACTITIONER

## 2025-02-13 RX ORDER — DIVALPROEX SODIUM 125 MG/1
125 TABLET, DELAYED RELEASE ORAL NIGHTLY
Qty: 30 TABLET | Refills: 5 | Status: SHIPPED | OUTPATIENT
Start: 2025-02-13

## 2025-02-13 RX ORDER — MEMANTINE HYDROCHLORIDE 10 MG/1
10 TABLET ORAL 2 TIMES DAILY
Qty: 180 TABLET | Refills: 3 | Status: SHIPPED | OUTPATIENT
Start: 2025-02-13

## 2025-02-13 RX ORDER — DONEPEZIL HYDROCHLORIDE 10 MG/1
10 TABLET, FILM COATED ORAL DAILY
Qty: 90 TABLET | Refills: 3 | Status: SHIPPED | OUTPATIENT
Start: 2025-02-13

## 2025-02-13 NOTE — PROGRESS NOTES
dose) all other days 100 tablet 3    atorvastatin (LIPITOR) 40 MG tablet Take 1 tablet by mouth daily 90 tablet 3    metoprolol succinate (TOPROL XL) 50 MG extended release tablet Take 1 tablet by mouth daily 90 tablet 3    levothyroxine (SYNTHROID) 75 MCG tablet Take 1 tablet by mouth every morning (before breakfast) Dose increase as of 12/20/23 90 tablet 3    ammonium lactate (AMLACTIN) 12 % cream APPLY TO BODY DAILY AFTER SHOWERING      Multiple Vitamins-Minerals (ICAPS AREDS 2 PO) Take by mouth      ipratropium (ATROVENT) 0.03 % nasal spray 2 sprays by Each Nostril route 3 times daily      aspirin 81 MG chewable tablet Take 1 tablet by mouth daily 30 tablet 3    Vitamin D (CHOLECALCIFEROL) 25 MCG (1000 UT) TABS tablet Take 1 tablet by mouth daily      Apoaequorin (PREVAGEN PO) Take 1 tablet by mouth daily Chewable tablet, 10mg (Patient not taking: Reported on 2/13/2025)      loratadine (CLARITIN REDITABS) 10 MG dissolvable tablet Take 1 tablet by mouth at bedtime (Patient not taking: Reported on 2/13/2025)      magnesium 200 MG TABS tablet Take 1 tablet by mouth daily (Patient not taking: Reported on 2/13/2025)       No current facility-administered medications for this visit.     /66   Pulse 71   Wt 55.2 kg (121 lb 9.6 oz)   SpO2 100%   BMI 19.05 kg/m²   Physical Exam:  Mental Status              Orientation: oriented to person, oriented to place, oriented to problem and oriented to time              Mood/Affect: appropriate mood and appropriate affect              Memory/Other: remote memory intact, fund of knowledge intact, attention span normal, concentration normal and recent memory impaired   Language              Language: Repetition and naming intact with the exception of a \"pen cap\", she does have some proposition of speech issues during exam and will stop in the middle of a sentence not being able to get through her thoughts, (normal) language, no dysarthria and (normal) articulation  Cranial

## 2025-02-13 NOTE — PROGRESS NOTES
Medication Management Service  [facility]  [phone]    Visit Date: 2/13/2025   Subjective:       Genesis Stewart is a 79 y.o. female who presents to clinic today for anticoagulation monitoring and adjustment.    Patient seen in clinic for warfarin management due to  Indication:   atrial fibrillation.   INR goal: of 2.0-3.0.  Duration of therapy: indefinite.    Patient reports the following:   Adherent with regimen  Missed or extra doses:  None   Bleeding or thromboembolic side effects:  None  Significant medication changes:  None  Significant dietary changes: None  Significant alcohol or tobacco changes: None  Significant recent illness, disease state changes, or hospitalization:  None  Upcoming surgeries or procedures:  None  Falls: None           Assessment and Plan     PT/INR done in office per protocol.   INR today is 2.9, therapeutic.          Plan:  Will continue current regimen of warfarin 3mg daily except 4.5mg on Tuesdays and Fridays.     Recheck INR in 4 week(s).     Patient verbalized understanding of dosing directions and information discussed. Dosing schedule given to patient including phone number, appointment date, and time. Progress note sent to referring office.    Electronically signed by José Miguel Wisdom RPH on 2/13/25     For Pharmacy Admin Tracking Only    Total # of Interventions Recommended: 0  Total # of Interventions Accepted: 0  Time Spent (min): 15

## 2025-02-17 ENCOUNTER — OFFICE VISIT (OUTPATIENT)
Dept: CARDIOLOGY CLINIC | Age: 80
End: 2025-02-17
Payer: MEDICARE

## 2025-02-17 VITALS
HEIGHT: 67 IN | WEIGHT: 123.6 LBS | DIASTOLIC BLOOD PRESSURE: 50 MMHG | HEART RATE: 70 BPM | SYSTOLIC BLOOD PRESSURE: 88 MMHG | BODY MASS INDEX: 19.4 KG/M2

## 2025-02-17 DIAGNOSIS — I38 VHD (VALVULAR HEART DISEASE): ICD-10-CM

## 2025-02-17 DIAGNOSIS — R07.9 CHEST PAIN, UNSPECIFIED TYPE: Primary | ICD-10-CM

## 2025-02-17 DIAGNOSIS — I25.118 ATHEROSCLEROTIC HEART DISEASE OF NATIVE CORONARY ARTERY WITH OTHER FORMS OF ANGINA PECTORIS: ICD-10-CM

## 2025-02-17 DIAGNOSIS — I48.20 CHRONIC ATRIAL FIBRILLATION (HCC): ICD-10-CM

## 2025-02-17 PROBLEM — I21.4 NSTEMI (NON-ST ELEVATED MYOCARDIAL INFARCTION) (HCC): Status: RESOLVED | Noted: 2022-05-09 | Resolved: 2025-02-17

## 2025-02-17 PROCEDURE — 1159F MED LIST DOCD IN RCRD: CPT | Performed by: NURSE PRACTITIONER

## 2025-02-17 PROCEDURE — 1123F ACP DISCUSS/DSCN MKR DOCD: CPT | Performed by: NURSE PRACTITIONER

## 2025-02-17 PROCEDURE — G8427 DOCREV CUR MEDS BY ELIG CLIN: HCPCS | Performed by: NURSE PRACTITIONER

## 2025-02-17 PROCEDURE — 1036F TOBACCO NON-USER: CPT | Performed by: NURSE PRACTITIONER

## 2025-02-17 PROCEDURE — G8420 CALC BMI NORM PARAMETERS: HCPCS | Performed by: NURSE PRACTITIONER

## 2025-02-17 PROCEDURE — 1160F RVW MEDS BY RX/DR IN RCRD: CPT | Performed by: NURSE PRACTITIONER

## 2025-02-17 PROCEDURE — 3074F SYST BP LT 130 MM HG: CPT | Performed by: NURSE PRACTITIONER

## 2025-02-17 PROCEDURE — 93000 ELECTROCARDIOGRAM COMPLETE: CPT | Performed by: NURSE PRACTITIONER

## 2025-02-17 PROCEDURE — 1090F PRES/ABSN URINE INCON ASSESS: CPT | Performed by: NURSE PRACTITIONER

## 2025-02-17 PROCEDURE — 3078F DIAST BP <80 MM HG: CPT | Performed by: NURSE PRACTITIONER

## 2025-02-17 PROCEDURE — 99214 OFFICE O/P EST MOD 30 MIN: CPT | Performed by: NURSE PRACTITIONER

## 2025-02-17 PROCEDURE — G8399 PT W/DXA RESULTS DOCUMENT: HCPCS | Performed by: NURSE PRACTITIONER

## 2025-02-17 RX ORDER — METOPROLOL SUCCINATE 25 MG/1
25 TABLET, EXTENDED RELEASE ORAL DAILY
Qty: 90 TABLET | Refills: 3 | Status: SHIPPED | OUTPATIENT
Start: 2025-02-17

## 2025-02-17 ASSESSMENT — ENCOUNTER SYMPTOMS
ORTHOPNEA: 0
SHORTNESS OF BREATH: 0

## 2025-02-17 NOTE — PROGRESS NOTES
CLINICAL STAFF DOCUMENTATION    Jessie Salas, LEVI     Genesis Stewart  1945  [id number]    Have you had any Chest Pain recently? - Yes  If Yes DO EKG   What type of pain is it? -  Pt unable to describe pain    Tender to palpate (touch)? Yes  When did the pain begin? -  Pt cannot tell me when it first started    How long does the pain last? -  Pt unable to tell me how long it lasts    How Severe is the pain? - 3  Is there anything that aggravates or triggers the pain? Exertion  Did you take any medication?  No    Is there anything that relieves it? Rest  Do you have any other symptoms at the same time? SOB    DO EKG IF: Patient has a Heart Rate above 100 or below 40 (Ex:A-fib, Aflutter, PAF, SVT, VT, Bradycardia)      CAD (Coronary Artery Disease) patient should have one on file every 6 months     New Consult or New Patient     If patient is following up from a current Stent/PCI     If patient is following up from a current Cardioversion        Have you had any Shortness of Breath - Yes  When did it begin? -  Pt unsure     If Yes - When  Pt states she has SOB when she has chest pain but is unsure if she has it at other times.   How many flights of stairs can you go up without having SOB? -  Pt states she doesn't go up and down steps   Are you on Oxygen during the day or at night? - No  How many pillows do you sleep with? - 3    Have you had any dizziness - No    Have you had any palpitations recently? - Yes  If Yes DO EKG - Do you feel your heart Pt unable to describe    When did they begin? -  Pt unsure     How long do they last - . Pt's  states a few minutes.   Is there anything that aggravates or triggers them? No  Is there anything that relieves them? Rest  Any thyroid issues? - Yes  How much caffeine? . None          Do you have any edema - swelling in No        When did you have your last labs drawn Pt unsure      If we do not have these labs, you are retrieve these labs for the provider!    Do you

## 2025-02-17 NOTE — PROGRESS NOTES
2/17/2025  Primary cardiologist: Dr. Perdomo    CC:   Genesis  is an established 79 y.o.  female here for a follow up on atrial fibrillation      SUBJECTIVE/OBJECTIVE:  Genesis is a 79 y.o. female with a history of atrial fibrillation, ablation of atrial fibrillation, AV node ablation, VHD,- AR, CAD,  hypertension, hyperlipidemia, BIV pacemaker, CVI, hypothyroid and Alzheimer's    HPI:  Genesis is here today with her .  She notes intermittent episodes of chest discomfort across the anterior chest wall.  Last for couple of seconds.  Has been states her appetite has decreased.    Review of Systems   Constitutional: Positive for decreased appetite. Negative for diaphoresis and malaise/fatigue.   Cardiovascular:  Positive for chest pain. Negative for claudication, dyspnea on exertion, irregular heartbeat, leg swelling, near-syncope, orthopnea, palpitations and paroxysmal nocturnal dyspnea.   Respiratory:  Negative for shortness of breath.    Neurological:  Negative for dizziness and light-headedness.   Psychiatric/Behavioral:  Positive for memory loss.        Vitals:    02/17/25 0916 02/17/25 0926   BP: (!) 82/50 (!) 88/50   Site: Left Upper Arm Left Upper Arm   Position: Sitting Sitting   Cuff Size: Medium Adult Medium Adult   Pulse: 70    Weight: 56.1 kg (123 lb 9.6 oz)    Height: 1.702 m (5' 7\")      Wt Readings from Last 3 Encounters:   02/17/25 56.1 kg (123 lb 9.6 oz)   02/13/25 55.2 kg (121 lb 9.6 oz)   11/18/24 56.2 kg (124 lb)      Body mass index is 19.36 kg/m².     Physical Exam  Vitals reviewed.   Eyes:      Pupils: Pupils are equal, round, and reactive to light.   Cardiovascular:      Rate and Rhythm: Normal rate and regular rhythm.      Pulses: Normal pulses.   Pulmonary:      Effort: Pulmonary effort is normal.      Breath sounds: No rales.   Chest:      Comments: Left-sided device pocket intact  Musculoskeletal:      Right lower leg: No edema.      Left lower leg: No edema.   Skin:     General:

## 2025-03-03 DIAGNOSIS — E03.9 ACQUIRED HYPOTHYROIDISM: ICD-10-CM

## 2025-03-03 RX ORDER — LEVOTHYROXINE SODIUM 75 UG/1
75 TABLET ORAL
Qty: 90 TABLET | Refills: 3 | Status: SHIPPED | OUTPATIENT
Start: 2025-03-03

## 2025-03-13 ENCOUNTER — ANTI-COAG VISIT (OUTPATIENT)
Dept: PHARMACY | Age: 80
End: 2025-03-13
Payer: MEDICARE

## 2025-03-13 LAB
INTERNATIONAL NORMALIZATION RATIO, POC: 2.4
POC INR: 2.4 (ref 0.9–1.2)
PROTHROMBIN TIME, POC: 28.4
PROTHROMBIN TIME, POC: 28.4 SEC (ref 10–14.3)

## 2025-03-13 PROCEDURE — 85610 PROTHROMBIN TIME: CPT

## 2025-03-13 PROCEDURE — 99211 OFF/OP EST MAY X REQ PHY/QHP: CPT

## 2025-03-13 NOTE — PROGRESS NOTES
Medication Management Service  [facility]  [phone]    Visit Date: 3/13/2025   Subjective:       Genesis Stewart is a 79 y.o. female who presents to clinic today for anticoagulation monitoring and adjustment.    Patient seen in clinic for warfarin management due to  Indication:   atrial fibrillation.   INR goal: of 2.0-3.0.  Duration of therapy: indefinite.    Patient reports the following:   Adherent with regimen  Missed or extra doses:  None   Bleeding or thromboembolic side effects:  None  Significant medication changes:  None  Significant dietary changes: None  Significant alcohol or tobacco changes: None  Significant recent illness, disease state changes, or hospitalization:  None  Upcoming surgeries or procedures:  None  Falls: None           Assessment and Plan     PT/INR done in office per protocol.   INR today is 2.4, therapeutic.          Plan:  Will continue current regimen of warfarin 3mg daily except 4.5mg on Tuesdays and Fridays.     Recheck INR in 4 week(s).     Patient verbalized understanding of dosing directions and information discussed. Dosing schedule given to patient including phone number, appointment date, and time. Progress note sent to referring office.    Electronically signed by Taylor Lew RPH on 3/13/25     For Pharmacy Admin Tracking Only    Intervention Detail:   Total # of Interventions Recommended:   Total # of Interventions Accepted:   Time Spent (min): 15

## 2025-03-18 PROBLEM — E80.6 HYPERBILIRUBINEMIA: Status: ACTIVE | Noted: 2024-08-14

## 2025-03-18 PROCEDURE — 93296 REM INTERROG EVL PM/IDS: CPT | Performed by: INTERNAL MEDICINE

## 2025-03-18 PROCEDURE — 93294 REM INTERROG EVL PM/LDLS PM: CPT | Performed by: INTERNAL MEDICINE

## 2025-04-06 DIAGNOSIS — E03.9 ACQUIRED HYPOTHYROIDISM: ICD-10-CM

## 2025-04-07 NOTE — TELEPHONE ENCOUNTER
Pts  called. He stated he found another bottle of pts Levothyroxine. Pt does not need another refill he had previously requested.

## 2025-04-08 RX ORDER — LEVOTHYROXINE SODIUM 75 UG/1
75 TABLET ORAL
Qty: 90 TABLET | Refills: 3 | Status: SHIPPED | OUTPATIENT
Start: 2025-04-08

## 2025-04-10 ENCOUNTER — ANTI-COAG VISIT (OUTPATIENT)
Dept: PHARMACY | Age: 80
End: 2025-04-10
Payer: MEDICARE

## 2025-04-10 LAB
INTERNATIONAL NORMALIZATION RATIO, POC: 2.2
POC INR: 2.2 (ref 0.9–1.2)
PROTHROMBIN TIME, POC: 26.8
PROTHROMBIN TIME, POC: 26.8 SEC (ref 10–14.3)

## 2025-04-10 PROCEDURE — 85610 PROTHROMBIN TIME: CPT

## 2025-04-10 PROCEDURE — 99211 OFF/OP EST MAY X REQ PHY/QHP: CPT

## 2025-04-10 NOTE — PROGRESS NOTES
Medication Management Service  [facility]  [phone]    Visit Date: 4/10/2025   Subjective:       Genesis Stewart is a 79 y.o. female who presents to clinic today for anticoagulation monitoring and adjustment.    Patient seen in clinic for warfarin management due to  Indication:   atrial fibrillation.   INR goal: of 2.0-3.0.  Duration of therapy: indefinite.    Patient reports the following:   Adherent with regimen  Missed or extra doses:  None   Bleeding or thromboembolic side effects:  None  Significant medication changes:  None  Significant dietary changes: None  Significant alcohol or tobacco changes: None  Significant recent illness, disease state changes, or hospitalization:  None  Upcoming surgeries or procedures:  None  Falls: None           Assessment and Plan     PT/INR done in office per protocol.   INR today is 2.4, therapeutic.          Plan:  Will continue current regimen of warfarin 3mg daily except 4.5mg on Tuesdays and Fridays.     Recheck INR in 4 week(s).     Patient verbalized understanding of dosing directions and information discussed. Dosing schedule given to patient including phone number, appointment date, and time. Progress note sent to referring office.    Electronically signed by José Miguel Wisdom RPH on 4/10/25     For Pharmacy Admin Tracking Only    Total # of Interventions Recommended: 0  Total # of Interventions Accepted: 0  Time Spent (min): 15

## 2025-05-08 ENCOUNTER — ANTI-COAG VISIT (OUTPATIENT)
Dept: PHARMACY | Age: 80
End: 2025-05-08
Payer: MEDICARE

## 2025-05-08 LAB
INTERNATIONAL NORMALIZATION RATIO, POC: 2
POC INR: 2 (ref 0.9–1.2)
PROTHROMBIN TIME, POC: 24.3
PROTHROMBIN TIME, POC: 24.3 SEC (ref 10–14.3)

## 2025-05-08 PROCEDURE — 85610 PROTHROMBIN TIME: CPT

## 2025-05-08 PROCEDURE — 99211 OFF/OP EST MAY X REQ PHY/QHP: CPT

## 2025-05-08 NOTE — PROGRESS NOTES
Medication Management Service  [facility]  [phone]    Visit Date: 5/8/2025   Subjective:       Genesis Stewart is a 79 y.o. female who presents to clinic today for anticoagulation monitoring and adjustment.    Patient seen in clinic for warfarin management due to  Indication:   atrial fibrillation.   INR goal: of 2.0-3.0.  Duration of therapy: indefinite.    Patient reports the following:   Adherent with regimen  Missed or extra doses:  None   Bleeding or thromboembolic side effects:  None  Significant medication changes:  None  Significant dietary changes: None  Significant alcohol or tobacco changes: None  Significant recent illness, disease state changes, or hospitalization:  None  Upcoming surgeries or procedures:  None  Falls: None           Assessment and Plan     PT/INR done in office per protocol.   INR today is 2.0, therapeutic.          Plan:  Will continue current regimen of warfarin 3mg daily except 4.5mg on Tuesdays and Fridays.     Recheck INR in 4 week(s).     Patient verbalized understanding of dosing directions and information discussed. Dosing schedule given to patient including phone number, appointment date, and time. Progress note sent to referring office.    Electronically signed by José Miguel Wisdom RPH on 5/8/25     For Pharmacy Admin Tracking Only    Total # of Interventions Recommended: 0  Total # of Interventions Accepted: 0  Time Spent (min): 15

## 2025-05-13 ENCOUNTER — HOSPITAL ENCOUNTER (INPATIENT)
Age: 80
LOS: 4 days | Discharge: HOME HEALTH | End: 2025-05-17
Attending: EMERGENCY MEDICINE | Admitting: STUDENT IN AN ORGANIZED HEALTH CARE EDUCATION/TRAINING PROGRAM
Payer: MEDICARE

## 2025-05-13 ENCOUNTER — APPOINTMENT (OUTPATIENT)
Dept: GENERAL RADIOLOGY | Age: 80
End: 2025-05-13
Payer: MEDICARE

## 2025-05-13 DIAGNOSIS — J18.9 PNEUMONIA DUE TO INFECTIOUS ORGANISM, UNSPECIFIED LATERALITY, UNSPECIFIED PART OF LUNG: Primary | ICD-10-CM

## 2025-05-13 DIAGNOSIS — Z79.01 ON WARFARIN AT HOME: ICD-10-CM

## 2025-05-13 PROBLEM — R41.82 ALTERED MENTAL STATUS: Status: ACTIVE | Noted: 2025-05-13

## 2025-05-13 LAB
ALBUMIN SERPL-MCNC: 3.5 G/DL (ref 3.4–5)
ALBUMIN/GLOB SERPL: 1.4 {RATIO} (ref 1.1–2.2)
ALP SERPL-CCNC: 49 U/L (ref 40–129)
ALT SERPL-CCNC: 40 U/L (ref 10–40)
ANION GAP SERPL CALCULATED.3IONS-SCNC: 13 MMOL/L (ref 9–17)
ARTERIAL PATENCY WRIST A: ABNORMAL
AST SERPL-CCNC: 35 U/L (ref 15–37)
B PARAP IS1001 DNA NPH QL NAA+NON-PROBE: NOT DETECTED
B PERT DNA SPEC QL NAA+PROBE: NOT DETECTED
BACTERIA URNS QL MICRO: ABNORMAL
BASOPHILS # BLD: 0.03 K/UL
BASOPHILS NFR BLD: 0 % (ref 0–1)
BILIRUB DIRECT SERPL-MCNC: 0.6 MG/DL (ref 0–0.3)
BILIRUB INDIRECT SERPL-MCNC: 1.1 MG/DL (ref 0–0.7)
BILIRUB SERPL-MCNC: 1.8 MG/DL (ref 0–1)
BILIRUB SERPL-MCNC: 2.1 MG/DL (ref 0–1)
BILIRUB UR QL STRIP: NEGATIVE
BNP SERPL-MCNC: 2297 PG/ML (ref 0–450)
BODY TEMPERATURE: 37
BUN SERPL-MCNC: 21 MG/DL (ref 7–20)
C PNEUM DNA NPH QL NAA+NON-PROBE: NOT DETECTED
CALCIUM SERPL-MCNC: 9.2 MG/DL (ref 8.3–10.6)
CHLORIDE SERPL-SCNC: 103 MMOL/L (ref 99–110)
CK SERPL-CCNC: 202 U/L (ref 26–192)
CLARITY UR: CLEAR
CO2 SERPL-SCNC: 23 MMOL/L (ref 21–32)
COHGB MFR BLD: 0.3 % (ref 0.5–1.5)
COLOR UR: YELLOW
CREAT SERPL-MCNC: 0.9 MG/DL (ref 0.6–1.2)
EKG ATRIAL RATE: 220 BPM
EKG DIAGNOSIS: NORMAL
EKG Q-T INTERVAL: 446 MS
EKG QRS DURATION: 150 MS
EKG QTC CALCULATION (BAZETT): 481 MS
EKG R AXIS: -48 DEGREES
EKG T AXIS: 79 DEGREES
EKG VENTRICULAR RATE: 70 BPM
EOSINOPHIL # BLD: 0 K/UL
EOSINOPHILS RELATIVE PERCENT: 0 % (ref 0–3)
EPI CELLS #/AREA URNS HPF: 4 /HPF
ERYTHROCYTE [DISTWIDTH] IN BLOOD BY AUTOMATED COUNT: 13.9 % (ref 11.7–14.9)
FLUAV RNA NPH QL NAA+NON-PROBE: NOT DETECTED
FLUBV RNA NPH QL NAA+NON-PROBE: NOT DETECTED
GFR, ESTIMATED: 59 ML/MIN/1.73M2
GLUCOSE SERPL-MCNC: 127 MG/DL (ref 74–99)
GLUCOSE UR STRIP-MCNC: NEGATIVE MG/DL
HADV DNA NPH QL NAA+NON-PROBE: NOT DETECTED
HCO3 VENOUS: 25.5 MMOL/L (ref 22–29)
HCOV 229E RNA NPH QL NAA+NON-PROBE: NOT DETECTED
HCOV HKU1 RNA NPH QL NAA+NON-PROBE: NOT DETECTED
HCOV NL63 RNA NPH QL NAA+NON-PROBE: NOT DETECTED
HCOV OC43 RNA NPH QL NAA+NON-PROBE: NOT DETECTED
HCT VFR BLD AUTO: 42.4 % (ref 37–47)
HGB BLD-MCNC: 14 G/DL (ref 12.5–16)
HGB UR QL STRIP.AUTO: NEGATIVE
HMPV RNA NPH QL NAA+NON-PROBE: NOT DETECTED
HPIV1 RNA NPH QL NAA+NON-PROBE: NOT DETECTED
HPIV2 RNA NPH QL NAA+NON-PROBE: NOT DETECTED
HPIV3 RNA NPH QL NAA+NON-PROBE: NOT DETECTED
HPIV4 RNA NPH QL NAA+NON-PROBE: NOT DETECTED
IMM GRANULOCYTES # BLD AUTO: 0.04 K/UL
IMM GRANULOCYTES NFR BLD: 0 %
INR PPP: 2
KETONES UR STRIP-MCNC: 40 MG/DL
LACTATE BLDV-SCNC: 1.7 MMOL/L (ref 0.4–2)
LEUKOCYTE ESTERASE UR QL STRIP: NEGATIVE
LYMPHOCYTES NFR BLD: 1.06 K/UL
LYMPHOCYTES RELATIVE PERCENT: 11 % (ref 24–44)
M PNEUMO DNA NPH QL NAA+NON-PROBE: NOT DETECTED
MCH RBC QN AUTO: 30.6 PG (ref 27–31)
MCHC RBC AUTO-ENTMCNC: 33 G/DL (ref 32–36)
MCV RBC AUTO: 92.6 FL (ref 78–100)
METHEMOGLOBIN: 0.4 % (ref 0.5–1.5)
MONOCYTES NFR BLD: 1.27 K/UL
MONOCYTES NFR BLD: 13 % (ref 0–5)
MUCOUS THREADS URNS QL MICRO: ABNORMAL
NEUTROPHILS NFR BLD: 76 % (ref 36–66)
NEUTS SEG NFR BLD: 7.38 K/UL
NITRITE UR QL STRIP: NEGATIVE
OXYHGB MFR BLD: 86.5 %
PCO2 VENOUS: 35.4 MM HG (ref 38–54)
PH UR STRIP: 7 [PH] (ref 5–8)
PH VENOUS: 7.47 (ref 7.32–7.43)
PLATELET CONFIRMATION: NORMAL
PLATELET, FLUORESCENCE: 104 K/UL (ref 140–440)
PMV BLD AUTO: 11.6 FL (ref 7.5–11.1)
PO2 VENOUS: 52.8 MM HG (ref 23–48)
POSITIVE BASE EXCESS, VEN: 2.2 MMOL/L (ref 0–3)
POTASSIUM SERPL-SCNC: 4.3 MMOL/L (ref 3.5–5.1)
PROCALCITONIN SERPL-MCNC: 0.36 NG/ML
PROT SERPL-MCNC: 6.1 G/DL (ref 6.4–8.2)
PROT UR STRIP-MCNC: 30 MG/DL
PROTHROMBIN TIME: 23.3 SEC (ref 11.7–14.5)
RBC # BLD AUTO: 4.58 M/UL (ref 4.2–5.4)
RBC #/AREA URNS HPF: 8 /HPF (ref 0–2)
RSV RNA NPH QL NAA+NON-PROBE: NOT DETECTED
RV+EV RNA NPH QL NAA+NON-PROBE: DETECTED
SARS-COV-2 RNA NPH QL NAA+NON-PROBE: NOT DETECTED
SODIUM SERPL-SCNC: 139 MMOL/L (ref 136–145)
SP GR UR STRIP: 1.02 (ref 1–1.03)
SPECIMEN DESCRIPTION: ABNORMAL
TROPONIN I SERPL HS-MCNC: 10 NG/L (ref 0–14)
TROPONIN I SERPL HS-MCNC: 8 NG/L (ref 0–14)
UROBILINOGEN UR STRIP-ACNC: 2 EU/DL (ref 0–1)
WBC #/AREA URNS HPF: 1 /HPF (ref 0–5)
WBC OTHER # BLD: 9.8 K/UL (ref 4–10.5)

## 2025-05-13 PROCEDURE — 2700000000 HC OXYGEN THERAPY PER DAY

## 2025-05-13 PROCEDURE — 0202U NFCT DS 22 TRGT SARS-COV-2: CPT

## 2025-05-13 PROCEDURE — 84484 ASSAY OF TROPONIN QUANT: CPT

## 2025-05-13 PROCEDURE — 87040 BLOOD CULTURE FOR BACTERIA: CPT

## 2025-05-13 PROCEDURE — 82248 BILIRUBIN DIRECT: CPT

## 2025-05-13 PROCEDURE — 82550 ASSAY OF CK (CPK): CPT

## 2025-05-13 PROCEDURE — 80053 COMPREHEN METABOLIC PANEL: CPT

## 2025-05-13 PROCEDURE — 96374 THER/PROPH/DIAG INJ IV PUSH: CPT

## 2025-05-13 PROCEDURE — 85610 PROTHROMBIN TIME: CPT

## 2025-05-13 PROCEDURE — 2580000003 HC RX 258: Performed by: STUDENT IN AN ORGANIZED HEALTH CARE EDUCATION/TRAINING PROGRAM

## 2025-05-13 PROCEDURE — 6370000000 HC RX 637 (ALT 250 FOR IP): Performed by: STUDENT IN AN ORGANIZED HEALTH CARE EDUCATION/TRAINING PROGRAM

## 2025-05-13 PROCEDURE — 1200000000 HC SEMI PRIVATE

## 2025-05-13 PROCEDURE — 85025 COMPLETE CBC W/AUTO DIFF WBC: CPT

## 2025-05-13 PROCEDURE — 96375 TX/PRO/DX INJ NEW DRUG ADDON: CPT

## 2025-05-13 PROCEDURE — 71045 X-RAY EXAM CHEST 1 VIEW: CPT

## 2025-05-13 PROCEDURE — 2580000003 HC RX 258: Performed by: EMERGENCY MEDICINE

## 2025-05-13 PROCEDURE — 87086 URINE CULTURE/COLONY COUNT: CPT

## 2025-05-13 PROCEDURE — 99285 EMERGENCY DEPT VISIT HI MDM: CPT

## 2025-05-13 PROCEDURE — 81001 URINALYSIS AUTO W/SCOPE: CPT

## 2025-05-13 PROCEDURE — 84145 PROCALCITONIN (PCT): CPT

## 2025-05-13 PROCEDURE — 82805 BLOOD GASES W/O2 SATURATION: CPT

## 2025-05-13 PROCEDURE — 83605 ASSAY OF LACTIC ACID: CPT

## 2025-05-13 PROCEDURE — 2500000003 HC RX 250 WO HCPCS: Performed by: EMERGENCY MEDICINE

## 2025-05-13 PROCEDURE — 36415 COLL VENOUS BLD VENIPUNCTURE: CPT

## 2025-05-13 PROCEDURE — 83880 ASSAY OF NATRIURETIC PEPTIDE: CPT

## 2025-05-13 PROCEDURE — 93010 ELECTROCARDIOGRAM REPORT: CPT | Performed by: INTERNAL MEDICINE

## 2025-05-13 PROCEDURE — 93005 ELECTROCARDIOGRAM TRACING: CPT | Performed by: EMERGENCY MEDICINE

## 2025-05-13 PROCEDURE — 94761 N-INVAS EAR/PLS OXIMETRY MLT: CPT

## 2025-05-13 PROCEDURE — 6360000002 HC RX W HCPCS: Performed by: EMERGENCY MEDICINE

## 2025-05-13 PROCEDURE — 6370000000 HC RX 637 (ALT 250 FOR IP): Performed by: EMERGENCY MEDICINE

## 2025-05-13 RX ORDER — 0.9 % SODIUM CHLORIDE 0.9 %
1000 INTRAVENOUS SOLUTION INTRAVENOUS ONCE
Status: DISCONTINUED | OUTPATIENT
Start: 2025-05-13 | End: 2025-05-13

## 2025-05-13 RX ORDER — ACETAMINOPHEN 650 MG/1
650 SUPPOSITORY RECTAL EVERY 6 HOURS PRN
Status: DISCONTINUED | OUTPATIENT
Start: 2025-05-13 | End: 2025-05-17 | Stop reason: HOSPADM

## 2025-05-13 RX ORDER — ENOXAPARIN SODIUM 100 MG/ML
40 INJECTION SUBCUTANEOUS DAILY
Status: DISCONTINUED | OUTPATIENT
Start: 2025-05-13 | End: 2025-05-13

## 2025-05-13 RX ORDER — GUAIFENESIN 600 MG/1
600 TABLET, EXTENDED RELEASE ORAL 2 TIMES DAILY
Status: DISCONTINUED | OUTPATIENT
Start: 2025-05-13 | End: 2025-05-17 | Stop reason: HOSPADM

## 2025-05-13 RX ORDER — DONEPEZIL HYDROCHLORIDE 10 MG/1
10 TABLET, FILM COATED ORAL DAILY
Status: DISCONTINUED | OUTPATIENT
Start: 2025-05-13 | End: 2025-05-17 | Stop reason: HOSPADM

## 2025-05-13 RX ORDER — ASPIRIN 81 MG/1
81 TABLET, CHEWABLE ORAL DAILY
Status: DISCONTINUED | OUTPATIENT
Start: 2025-05-13 | End: 2025-05-17 | Stop reason: HOSPADM

## 2025-05-13 RX ORDER — WARFARIN SODIUM 3 MG/1
3 TABLET ORAL
Status: DISCONTINUED | OUTPATIENT
Start: 2025-05-14 | End: 2025-05-13

## 2025-05-13 RX ORDER — 0.9 % SODIUM CHLORIDE 0.9 %
30 INTRAVENOUS SOLUTION INTRAVENOUS ONCE
Status: COMPLETED | OUTPATIENT
Start: 2025-05-13 | End: 2025-05-13

## 2025-05-13 RX ORDER — LEVOTHYROXINE SODIUM 50 UG/1
50 TABLET ORAL
Status: DISCONTINUED | OUTPATIENT
Start: 2025-05-13 | End: 2025-05-17 | Stop reason: HOSPADM

## 2025-05-13 RX ORDER — ATORVASTATIN CALCIUM 40 MG/1
40 TABLET, FILM COATED ORAL DAILY
Status: DISCONTINUED | OUTPATIENT
Start: 2025-05-13 | End: 2025-05-17 | Stop reason: HOSPADM

## 2025-05-13 RX ORDER — IPRATROPIUM BROMIDE 42 UG/1
1 SPRAY, METERED NASAL 3 TIMES DAILY
Status: DISCONTINUED | OUTPATIENT
Start: 2025-05-13 | End: 2025-05-17 | Stop reason: HOSPADM

## 2025-05-13 RX ORDER — DEXTROSE, SODIUM CHLORIDE, SODIUM LACTATE, POTASSIUM CHLORIDE, AND CALCIUM CHLORIDE 5; .6; .31; .03; .02 G/100ML; G/100ML; G/100ML; G/100ML; G/100ML
INJECTION, SOLUTION INTRAVENOUS CONTINUOUS
Status: DISPENSED | OUTPATIENT
Start: 2025-05-13 | End: 2025-05-14

## 2025-05-13 RX ORDER — METOPROLOL SUCCINATE 25 MG/1
25 TABLET, EXTENDED RELEASE ORAL DAILY
Status: DISCONTINUED | OUTPATIENT
Start: 2025-05-13 | End: 2025-05-17 | Stop reason: HOSPADM

## 2025-05-13 RX ORDER — WARFARIN SODIUM 3 MG/1
3 TABLET ORAL
Status: DISCONTINUED | OUTPATIENT
Start: 2025-05-14 | End: 2025-05-15

## 2025-05-13 RX ORDER — MEMANTINE HYDROCHLORIDE 10 MG/1
10 TABLET ORAL 2 TIMES DAILY
Status: DISCONTINUED | OUTPATIENT
Start: 2025-05-13 | End: 2025-05-17 | Stop reason: HOSPADM

## 2025-05-13 RX ORDER — ACETAMINOPHEN 325 MG/1
650 TABLET ORAL EVERY 6 HOURS PRN
Status: DISCONTINUED | OUTPATIENT
Start: 2025-05-13 | End: 2025-05-17 | Stop reason: HOSPADM

## 2025-05-13 RX ORDER — ALBUTEROL SULFATE 90 UG/1
2 INHALANT RESPIRATORY (INHALATION) EVERY 6 HOURS PRN
Status: DISCONTINUED | OUTPATIENT
Start: 2025-05-13 | End: 2025-05-17 | Stop reason: HOSPADM

## 2025-05-13 RX ORDER — ACETAMINOPHEN 650 MG/1
650 SUPPOSITORY RECTAL ONCE
Status: COMPLETED | OUTPATIENT
Start: 2025-05-13 | End: 2025-05-13

## 2025-05-13 RX ORDER — DIVALPROEX SODIUM 125 MG/1
125 CAPSULE, COATED PELLETS ORAL NIGHTLY
Status: DISCONTINUED | OUTPATIENT
Start: 2025-05-13 | End: 2025-05-16

## 2025-05-13 RX ORDER — DIVALPROEX SODIUM 125 MG/1
125 TABLET, DELAYED RELEASE ORAL NIGHTLY
Status: DISCONTINUED | OUTPATIENT
Start: 2025-05-13 | End: 2025-05-13

## 2025-05-13 RX ORDER — ONDANSETRON 2 MG/ML
4 INJECTION INTRAMUSCULAR; INTRAVENOUS EVERY 6 HOURS PRN
Status: DISCONTINUED | OUTPATIENT
Start: 2025-05-13 | End: 2025-05-17 | Stop reason: HOSPADM

## 2025-05-13 RX ADMIN — DEXTROSE, SODIUM CHLORIDE, SODIUM LACTATE, POTASSIUM CHLORIDE, AND CALCIUM CHLORIDE: 5; .6; .31; .03; .02 INJECTION, SOLUTION INTRAVENOUS at 14:11

## 2025-05-13 RX ADMIN — DIVALPROEX SODIUM 125 MG: 125 CAPSULE, COATED PELLETS ORAL at 23:08

## 2025-05-13 RX ADMIN — SODIUM CHLORIDE 1000 ML: 0.9 INJECTION, SOLUTION INTRAVENOUS at 04:57

## 2025-05-13 RX ADMIN — ASPIRIN 81 MG: 81 TABLET, CHEWABLE ORAL at 09:39

## 2025-05-13 RX ADMIN — MEMANTINE 10 MG: 10 TABLET ORAL at 21:43

## 2025-05-13 RX ADMIN — DONEPEZIL HYDROCHLORIDE 10 MG: 10 TABLET ORAL at 09:38

## 2025-05-13 RX ADMIN — LEVOTHYROXINE SODIUM 50 MCG: 0.05 TABLET ORAL at 09:38

## 2025-05-13 RX ADMIN — MEMANTINE 10 MG: 10 TABLET ORAL at 09:38

## 2025-05-13 RX ADMIN — IPRATROPIUM BROMIDE 1 SPRAY: 42 SPRAY, METERED NASAL at 14:13

## 2025-05-13 RX ADMIN — ACETAMINOPHEN 650 MG: 650 SUPPOSITORY RECTAL at 04:45

## 2025-05-13 RX ADMIN — IPRATROPIUM BROMIDE 1 SPRAY: 42 SPRAY, METERED NASAL at 21:47

## 2025-05-13 RX ADMIN — WATER 1000 MG: 1 INJECTION INTRAMUSCULAR; INTRAVENOUS; SUBCUTANEOUS at 04:45

## 2025-05-13 RX ADMIN — GUAIFENESIN 600 MG: 600 TABLET, MULTILAYER, EXTENDED RELEASE ORAL at 21:43

## 2025-05-13 RX ADMIN — ATORVASTATIN CALCIUM 40 MG: 40 TABLET, FILM COATED ORAL at 09:39

## 2025-05-13 RX ADMIN — AZITHROMYCIN MONOHYDRATE 500 MG: 500 INJECTION, POWDER, LYOPHILIZED, FOR SOLUTION INTRAVENOUS at 04:59

## 2025-05-13 RX ADMIN — WARFARIN SODIUM 4.5 MG: 2.5 TABLET ORAL at 18:24

## 2025-05-13 RX ADMIN — GUAIFENESIN 600 MG: 600 TABLET, MULTILAYER, EXTENDED RELEASE ORAL at 09:38

## 2025-05-13 ASSESSMENT — PAIN - FUNCTIONAL ASSESSMENT: PAIN_FUNCTIONAL_ASSESSMENT: NONE - DENIES PAIN

## 2025-05-13 NOTE — ED PROVIDER NOTES
Triage Chief Complaint:   Altered Mental Status    [place]:  Genesis Stewart is a 79 y.o. female that presents with altered mental status and cough.  Patient was apparently in baseline state of health until approximate 1 week ago when the cough started.  Overnight tonight patient became increasingly altered from her baseline and was unable to successfully get up to go to the bathroom.  EMS was called.  EMS reports they found patient hypoxemic into the 80s on room air with no home oxygen requirement.  Patient is altered for EMS.    On arrival to the emergency department patient is able to tell me her name and that she is in the hospital but is otherwise with very limited verbal limiting history greatly.    Per chart, patient is with some underlying dementia although EMS reports  states that patient is normally able to get up and walk around and care for self    ROS:  Limited as above.    Past Medical History:   Diagnosis Date    Abnormal echocardiogram     EF 60%, mild aortic indsufficiency, mild pulmonary hypertension    Anemia     Anticoagulant long-term use     Aortic insufficiency     mild per echo 8/24/2010    Atrial fibrillation (HCC)     failed propafenone, Multaq and flecainide - 7/2011 plan for AFib ablation - [facility]- Dr Laura Cash    Atrial flutter (HCC)     Bursitis, trochanteric 08/2004    s/p injection    Cerumen impaction 04/24/2012    Congenital heart disease     COPD (chronic obstructive pulmonary disease) (HCC)     Diverticulosis 2015    Dr. Newell C scope    Diverticulosis of colon 01/2010    Colonoscopy-Dr Newell    DJD (degenerative joint disease), cervical     cervical, generalized disc buldge   MRI 3/02    DVT (deep venous thrombosis) (HCC)     Dyslipidemia 2002    Environmental allergies     Family history of colon cancer     mother    Gastric polyp     8/2006, 11/2009 benign- Dr Newell    Gastritis 04/2008    mild superficial per Dr Newell    GERD (gastroesophageal reflux disease)

## 2025-05-13 NOTE — ED NOTES
ED TO INPATIENT SBAR HANDOFF    Patient Name: Genesis Stewart   :  1945  79 y.o.   Preferred Name    Family/Caregiver Present yes   Restraints no   C-SSRS:    Sitter no   Sepsis Risk Score Sepsis V2 Risk Score: 21.1      Situation  Chief Complaint   Patient presents with    Altered Mental Status     Brief Description of Patient's Condition:   Brought in by ems with CO decreased responsiveness and altered mental status since yesterday. Productive cough for several days.     Mental Status: oriented and alert  Arrived from: home    Imaging:   XR CHEST PORTABLE   Final Result        Abnormal labs:   Abnormal Labs Reviewed   CBC WITH AUTO DIFFERENTIAL - Abnormal; Notable for the following components:       Result Value    MPV 11.6 (*)     Platelet, Fluorescence 104 (*)     Neutrophils % 76 (*)     Lymphocytes % 11 (*)     Monocytes % 13 (*)     All other components within normal limits   COMPREHENSIVE METABOLIC PANEL - Abnormal; Notable for the following components:    Glucose 127 (*)     BUN 21 (*)     Est, Glom Filt Rate 59 (*)     Total Protein 6.1 (*)     Total Bilirubin 2.1 (*)     All other components within normal limits   URINALYSIS - Abnormal; Notable for the following components:    Ketones, Urine 40 (*)     Protein, UA 30 (*)     Urobilinogen, Urine 2.0 (*)     All other components within normal limits   BRAIN NATRIURETIC PEPTIDE - Abnormal; Notable for the following components:    NT Pro-BNP 2,297 (*)     All other components within normal limits   PROTIME-INR - Abnormal; Notable for the following components:    Protime 23.3 (*)     All other components within normal limits   BLOOD GAS, VENOUS - Abnormal; Notable for the following components:    pH, Gonzalo 7.475 (*)     pCO2, Gonzalo 35.4 (*)     PO2, Gonzalo 52.8 (*)     Carboxyhemoglobin 0.3 (*)     Methemoglobin 0.4 (*)     All other components within normal limits   MICROSCOPIC URINALYSIS - Abnormal; Notable for the following components:    RBC, UA 8 (*)

## 2025-05-13 NOTE — H&P
History and Physical      Name:  Genesis Stewart /Age/Sex: 1945  (79 y.o. female)   MRN & CSN:  [id number] & [id number] Encounter Date/Time: 2025 6:02 AM EDT   Location:  ED30/ED-30 PCP: Nancy Russo MD       Assessment and Plan:   Genesis Stewart is a 79 y.o. female with past medical history of Alzheimer's dementia with behavioral disturbance, hyperlipidemia, hypothyroidism, chronic atrial fibrillation, s/p biventricular pacemaker presented from home via EMS due to altered mental status and depressed mentation    Transient disorientation, resolved  Mild Hypoxia and dehydration probably related to viral URTI  Chest x-ray negative for acute infiltrate or consolidation  Monitor on telemetry, neurovascular checks  Mild ketonuria and dehydration IV D5 LR infusion  Albuterol, Mucinex symptomatic management of viral URTI, check respiratory viral panel. Supplemental oxygen to maintain saturation above 92%    Chronic atrial fibrillation  Long-term use of warfarin  INR 2.0, pharmacy to dose INR for monitoring  Continue home Toprol-XL for rate control    Hyperbilirubinemia, 2.1  Check bilirubin profile likely secondary to viral infection    Hypothyroidism  Continue home Synthroid    Hyperlipidemia  Continue home Lipitor    Alzheimer's dementia with behavioral disturbance  Alert oriented x 2 at baseline  Continue home Namenda and Aricept  Depakote for mood stabilization    Inpatient MedSurg telemetry  Full code discussed with spouse at bedside    Disposition:     Current Living situation: Home  Expected Disposition: Home versus rehab  Estimated D/C: 1 to 2 days    Diet ADULT DIET; Regular; No Added Salt (3-4 gm)   DVT Prophylaxis [] Lovenox, []  Heparin, [] SCDs, [] Ambulation,  [] Eliquis, [] Xarelto, [x] Coumadin   Code Status Full Code   Surrogate Decision Maker/ NAYAN Gimenez     Personally reviewed Lab Studies and Imaging   Discussed management of the case with ER provider who recommended  admission due to altered mental status increased assistance with ADLs  EKG interpreted personally and results paced rhythm    History from:     Patient and spouse    History of Present Illness:     Chief Complaint   Patient presents with    Altered Mental Status     Genesis Stewart is a 79 y.o. female with past medical history of Alzheimer's dementia with behavioral disturbance, hyperlipidemia, hypothyroidism, chronic atrial fibrillation, s/p biventricular pacemaker presented from home via EMS due to altered mental status and depressed mentation.  Yesterday morning started having nasal congestion associated with nonproductive cough  thought that this was related to allergies and gave her some Allegra without any significant alleviation in symptoms has had decreased oral intake since then and has been requiring increased assistance with ADLs.  During my evaluation patient was alert oriented x 2 hemodynamically stable low normal blood pressure trend denies any chest pain LOC dizziness nausea vomiting diarrhea fever night sweats or chills.     Review of Systems:      Pertinent positives and negatives discussed in HPI     Objective:   No intake or output data in the 24 hours ending 05/13/25 0639   Vitals:   Vitals:    05/13/25 0356 05/13/25 0535 05/13/25 0545 05/13/25 0606   BP: 108/65 (!) 87/63 104/64 90/60   Pulse: 98 70 70 70   Resp: 19 19 17 20   Temp: 97.8 °F (36.6 °C)      TempSrc: Oral      SpO2: 97% 99% 98% 100%       Personally Reviewed Medications Prior to Admission     Prior to Admission medications    Medication Sig Start Date End Date Taking? Authorizing Provider   levothyroxine (SYNTHROID) 75 MCG tablet Take 1 tablet by mouth every morning (before breakfast) Dose increase as of 12/20/23 4/8/25   Nancy Russo MD   metoprolol succinate (TOPROL XL) 25 MG extended release tablet Take 1 tablet by mouth daily 2/17/25   Jessie Marina APRN - CNP   donepezil (ARICEPT) 10 MG tablet Take 1  surgical history (Left, 07/12/2022); eye surgery; Cardiac surgery; and Hysterectomy, vaginal.  Allergies:   Allergies   Allergen Reactions    Latex Hives    Darvon [Propoxyphene Hcl] Shortness Of Breath    Demerol Rash     Breathing problems    Dilaudid [Hydromorphone Hcl] Anaphylaxis    Valium Rash     Breathing problems    Celecoxib Diarrhea    Norco [Hydrocodone-Acetaminophen] Diarrhea, Nausea Only and Other (See Comments)     A-Fib    Norvasc [Amlodipine] Other (See Comments)     HA, dizziness    Oxycodone Itching    Tape [Adhesive Tape] Other (See Comments)     BLISTER    Vasotec [Enalapril] Other (See Comments)     Nausea, vomiting    Diazepam Rash     Fam HX: family history includes Breast Cancer in her maternal cousin; Colon Cancer (age of onset: 80) in her mother; Coronary Art Dis (age of onset: 57) in her father; Coronary Art Dis (age of onset: 78) in her mother; Heart Disease in her father and mother; Migraines in her sister; Seizures in her brother; Stroke in her mother.  Soc HX:   Social History     Socioeconomic History    Marital status:    Tobacco Use    Smoking status: Never    Smokeless tobacco: Never   Vaping Use    Vaping status: Never Used   Substance and Sexual Activity    Alcohol use: No    Drug use: No    Sexual activity: Not Currently     Partners: Male     Comment:      Social Drivers of Health     Financial Resource Strain: Low Risk  (7/2/2024)    Overall Financial Resource Strain (CARDIA)     Difficulty of Paying Living Expenses: Not hard at all   Food Insecurity: No Food Insecurity (7/2/2024)    Hunger Vital Sign     Worried About Running Out of Food in the Last Year: Never true     Ran Out of Food in the Last Year: Never true   Transportation Needs: Unknown (7/2/2024)    PRAPARE - Transportation     Lack of Transportation (Non-Medical): No   Physical Activity: Insufficiently Active (7/2/2024)    Exercise Vital Sign     Days of Exercise per Week: 2 days     Minutes of

## 2025-05-13 NOTE — PROGRESS NOTES
4 Eyes Skin Assessment     NAME:  Genesis Stewart  YOB: 1945  MEDICAL RECORD NUMBER:  [id number]    The patient is being assessed for  Admission    I agree that at least one RN has performed a thorough Head to Toe Skin Assessment on the patient. ALL assessment sites listed below have been assessed.      Areas assessed by both nurses:    Head, Face, Ears, Shoulders, Back, Chest, Arms, Elbows, Hands, Sacrum. Buttock, Coccyx, Ischium, Legs. Feet and Heels, and Under Medical Devices         Does the Patient have a Wound? No noted wound(s)       Tito Prevention initiated by RN: No  Wound Care Orders initiated by RN: No    Pressure Injury (Stage 3,4, Unstageable, DTI, NWPT, and Complex wounds) if present, place Wound referral order by RN under : No    New Ostomies, if present place, Ostomy referral order under : No     Nurse 1 eSignature: Electronically signed by KRISTEN GARLAND RN on 5/13/25 at 9:16 AM EDT    **SHARE this note so that the co-signing nurse can place an eSignature**    Nurse 2 eSignature: {Esignature:[id number]}

## 2025-05-13 NOTE — ED TRIAGE NOTES
Brought in by ems with CO decreased responsiveness and altered mental status since yesterday. Productive cough for several days.

## 2025-05-13 NOTE — CONSULTS
PHARMACY ANTICOAGULATION MONITORING SERVICE    Genesis Stewart is a 79 y.o. female on warfarin therapy. Pharmacy consulted by Dr. Kourtney Gleason  for monitoring and adjustment of treatment.    Indication for anticoagulation: Chronic afib  INR goal: 2-3  Warfarin dose prior to admission: 3mg daily except 4.5 mg on Tuesday and Friday    Pertinent Laboratory Values   Recent Labs     05/13/25  0410   INR 2.0   HGB 14.0   HCT 42.4     Recent Warfarin doses given this admission...  Date INR Result Warfarin Dose Given   5/13 2.0 4.5 mg (ordered)                    Assessment/Plan:  Drug Interactions:   Home meds:  aspirin, divalproex , levothyroxine  No new significant drug interactions noted.  INR just at the bottom end of therapeutic @2.0 this am,  HGB normal.  Continue normal home regimen of warfarin 3mg po daily, 4.5mg on Tuesday and Friday, then follow INR trends tomorrow.   Pharmacy will continue to monitor and adjust warfarin therapy as indicated    Thank you for the consult.  Joshua Fermin RPH  5/13/2025 1:19 PM

## 2025-05-13 NOTE — CARE COORDINATION
05/13/25 1639   Service Assessment   Patient Orientation Unable to Assess   Cognition Dementia / Early Alzheimer's   History Provided By Spouse   Primary Caregiver Spouse   Support Systems Spouse/Significant Other   Patient's Healthcare Decision Maker is: Legal Next of Kin   PCP Verified by CM Yes   Prior Functional Level Independent in ADLs/IADLs   Current Functional Level Independent in ADLs/IADLs   Can patient return to prior living arrangement Unknown at present   Ability to make needs known: Poor   Family able to assist with home care needs: Yes   Would you like for me to discuss the discharge plan with any other family members/significant others, and if so, who? Yes  ()   Financial Resources Medicare   Community Resources None     CM in to see Pt to initiate discharge planning.  Pt  present.      Pt is from home with her .  is primary caregiver, he denies any needs at this time.      Discharge plan is home.  CM following

## 2025-05-13 NOTE — PROGRESS NOTES
Came in for AMS since Saturday. Had congestion at the time. Had not been eating or drinking much because just didn't feel well. Not having shortness of breath. Has chronic nasal drainage for 1 year. Has dementia for 1 year. Has been slower to respond as per daughter the last couple days.     No gross cranial nerve deficits.  Oriented to first name, and birth date and birth month and hospital.     Will attempt to hydrate with IV fluids and see if mental status improves.

## 2025-05-14 LAB
ALBUMIN SERPL-MCNC: 3 G/DL (ref 3.4–5)
ALBUMIN/GLOB SERPL: 1.1 {RATIO} (ref 1.1–2.2)
ALP SERPL-CCNC: 49 U/L (ref 40–129)
ALT SERPL-CCNC: 37 U/L (ref 10–40)
AMMONIA PLAS-SCNC: 40 UMOL/L (ref 11–51)
AMPHET UR QL SCN: NEGATIVE
ANION GAP SERPL CALCULATED.3IONS-SCNC: 10 MMOL/L (ref 9–17)
ARTERIAL PATENCY WRIST A: ABNORMAL
AST SERPL-CCNC: 40 U/L (ref 15–37)
BARBITURATES UR QL SCN: NEGATIVE
BASOPHILS # BLD: 0.02 K/UL
BASOPHILS NFR BLD: 0 % (ref 0–1)
BENZODIAZ UR QL: NEGATIVE
BILIRUB SERPL-MCNC: 1.3 MG/DL (ref 0–1)
BILIRUB UR QL STRIP: NEGATIVE
BODY TEMPERATURE: 37
BUN SERPL-MCNC: 15 MG/DL (ref 7–20)
CALCIUM SERPL-MCNC: 8.4 MG/DL (ref 8.3–10.6)
CANNABINOIDS UR QL SCN: NEGATIVE
CHLORIDE SERPL-SCNC: 107 MMOL/L (ref 99–110)
CLARITY UR: CLEAR
CO2 SERPL-SCNC: 23 MMOL/L (ref 21–32)
COCAINE UR QL SCN: NEGATIVE
COHGB MFR BLD: 0.5 % (ref 0.5–1.5)
COLOR UR: YELLOW
COMMENT: NORMAL
CREAT SERPL-MCNC: 0.7 MG/DL (ref 0.6–1.2)
CRP SERPL HS-MCNC: 99.7 MG/L (ref 0–5)
EOSINOPHIL # BLD: 0.02 K/UL
EOSINOPHILS RELATIVE PERCENT: 0 % (ref 0–3)
ERYTHROCYTE [DISTWIDTH] IN BLOOD BY AUTOMATED COUNT: 14.2 % (ref 11.7–14.9)
FENTANYL UR QL: NEGATIVE
FOLATE SERPL-MCNC: 12.9 NG/ML (ref 4.8–24.2)
GFR, ESTIMATED: 77 ML/MIN/1.73M2
GLUCOSE SERPL-MCNC: 101 MG/DL (ref 74–99)
GLUCOSE UR STRIP-MCNC: NEGATIVE MG/DL
HCO3 VENOUS: 25.2 MMOL/L (ref 22–29)
HCT VFR BLD AUTO: 37.9 % (ref 37–47)
HGB BLD-MCNC: 12.3 G/DL (ref 12.5–16)
HGB UR QL STRIP.AUTO: NEGATIVE
IMM GRANULOCYTES # BLD AUTO: 0.02 K/UL
IMM GRANULOCYTES NFR BLD: 0 %
INR PPP: 2
KETONES UR STRIP-MCNC: NEGATIVE MG/DL
LEUKOCYTE ESTERASE UR QL STRIP: NEGATIVE
LYMPHOCYTES NFR BLD: 1.66 K/UL
LYMPHOCYTES RELATIVE PERCENT: 22 % (ref 24–44)
MCH RBC QN AUTO: 30.1 PG (ref 27–31)
MCHC RBC AUTO-ENTMCNC: 32.5 G/DL (ref 32–36)
MCV RBC AUTO: 92.7 FL (ref 78–100)
METHEMOGLOBIN: 0.4 % (ref 0.5–1.5)
MICROORGANISM SPEC CULT: NORMAL
MONOCYTES NFR BLD: 0.73 K/UL
MONOCYTES NFR BLD: 10 % (ref 0–5)
NEUTROPHILS NFR BLD: 68 % (ref 36–66)
NEUTS SEG NFR BLD: 5.17 K/UL
NITRITE UR QL STRIP: NEGATIVE
OPIATES UR QL SCN: NEGATIVE
OXYCODONE UR QL SCN: NEGATIVE
OXYHGB MFR BLD: 84.1 %
PCO2 VENOUS: 35.4 MM HG (ref 38–54)
PH UR STRIP: 6 [PH] (ref 5–8)
PH VENOUS: 7.47 (ref 7.32–7.43)
PLATELET # BLD AUTO: 106 K/UL (ref 140–440)
PLATELET ESTIMATE: ABNORMAL
PMV BLD AUTO: 11.2 FL (ref 7.5–11.1)
PO2 VENOUS: 47.5 MM HG (ref 23–48)
POSITIVE BASE EXCESS, VEN: 1.9 MMOL/L (ref 0–3)
POTASSIUM SERPL-SCNC: 4.4 MMOL/L (ref 3.5–5.1)
PROCALCITONIN SERPL-MCNC: 1.14 NG/ML
PROT SERPL-MCNC: 5.7 G/DL (ref 6.4–8.2)
PROT UR STRIP-MCNC: NEGATIVE MG/DL
PROTHROMBIN TIME: 22.3 SEC (ref 11.7–14.5)
RBC # BLD AUTO: 4.09 M/UL (ref 4.2–5.4)
RBC # BLD: ABNORMAL 10*6/UL
SERVICE CMNT-IMP: NORMAL
SODIUM SERPL-SCNC: 139 MMOL/L (ref 136–145)
SP GR UR STRIP: 1.01 (ref 1–1.03)
SPECIMEN DESCRIPTION: NORMAL
TEST INFORMATION: NORMAL
TSH SERPL DL<=0.05 MIU/L-ACNC: 3.69 UIU/ML (ref 0.27–4.2)
UROBILINOGEN UR STRIP-ACNC: 0.2 EU/DL (ref 0–1)
VIT B12 SERPL-MCNC: 679 PG/ML (ref 211–911)
WBC # BLD: ABNORMAL 10*3/UL
WBC OTHER # BLD: 7.6 K/UL (ref 4–10.5)

## 2025-05-14 PROCEDURE — 82746 ASSAY OF FOLIC ACID SERUM: CPT

## 2025-05-14 PROCEDURE — 80053 COMPREHEN METABOLIC PANEL: CPT

## 2025-05-14 PROCEDURE — 6370000000 HC RX 637 (ALT 250 FOR IP): Performed by: STUDENT IN AN ORGANIZED HEALTH CARE EDUCATION/TRAINING PROGRAM

## 2025-05-14 PROCEDURE — 36415 COLL VENOUS BLD VENIPUNCTURE: CPT

## 2025-05-14 PROCEDURE — 84443 ASSAY THYROID STIM HORMONE: CPT

## 2025-05-14 PROCEDURE — 81003 URINALYSIS AUTO W/O SCOPE: CPT

## 2025-05-14 PROCEDURE — 6370000000 HC RX 637 (ALT 250 FOR IP): Performed by: HOSPITALIST

## 2025-05-14 PROCEDURE — 1200000000 HC SEMI PRIVATE

## 2025-05-14 PROCEDURE — 85610 PROTHROMBIN TIME: CPT

## 2025-05-14 PROCEDURE — 86140 C-REACTIVE PROTEIN: CPT

## 2025-05-14 PROCEDURE — 82607 VITAMIN B-12: CPT

## 2025-05-14 PROCEDURE — 82140 ASSAY OF AMMONIA: CPT

## 2025-05-14 PROCEDURE — 80307 DRUG TEST PRSMV CHEM ANLYZR: CPT

## 2025-05-14 PROCEDURE — 85025 COMPLETE CBC W/AUTO DIFF WBC: CPT

## 2025-05-14 PROCEDURE — 82805 BLOOD GASES W/O2 SATURATION: CPT

## 2025-05-14 PROCEDURE — 84145 PROCALCITONIN (PCT): CPT

## 2025-05-14 PROCEDURE — 94761 N-INVAS EAR/PLS OXIMETRY MLT: CPT

## 2025-05-14 RX ADMIN — DONEPEZIL HYDROCHLORIDE 10 MG: 10 TABLET ORAL at 09:53

## 2025-05-14 RX ADMIN — LEVOTHYROXINE SODIUM 50 MCG: 0.05 TABLET ORAL at 06:59

## 2025-05-14 RX ADMIN — MEMANTINE 10 MG: 10 TABLET ORAL at 09:53

## 2025-05-14 RX ADMIN — IPRATROPIUM BROMIDE 1 SPRAY: 42 SPRAY, METERED NASAL at 09:54

## 2025-05-14 RX ADMIN — METOPROLOL SUCCINATE 25 MG: 25 TABLET, EXTENDED RELEASE ORAL at 09:56

## 2025-05-14 RX ADMIN — GUAIFENESIN 600 MG: 600 TABLET, MULTILAYER, EXTENDED RELEASE ORAL at 22:08

## 2025-05-14 RX ADMIN — ASPIRIN 81 MG: 81 TABLET, CHEWABLE ORAL at 09:53

## 2025-05-14 RX ADMIN — GUAIFENESIN 600 MG: 600 TABLET, MULTILAYER, EXTENDED RELEASE ORAL at 09:53

## 2025-05-14 RX ADMIN — IPRATROPIUM BROMIDE 1 SPRAY: 42 SPRAY, METERED NASAL at 14:27

## 2025-05-14 RX ADMIN — MEMANTINE 10 MG: 10 TABLET ORAL at 22:08

## 2025-05-14 RX ADMIN — DIVALPROEX SODIUM 125 MG: 125 CAPSULE, COATED PELLETS ORAL at 22:50

## 2025-05-14 RX ADMIN — ATORVASTATIN CALCIUM 40 MG: 40 TABLET, FILM COATED ORAL at 09:53

## 2025-05-14 RX ADMIN — WARFARIN SODIUM 3 MG: 3 TABLET ORAL at 18:22

## 2025-05-14 RX ADMIN — IPRATROPIUM BROMIDE 1 SPRAY: 42 SPRAY, METERED NASAL at 22:08

## 2025-05-14 NOTE — PROGRESS NOTES
Comprehensive Nutrition Assessment    Type and Reason for Visit:  Initial, Positive nutrition screen (reported weight loss 14-23#, reduced intake/appetite)    Nutrition Recommendations/Plan:   Continue no added salt diet as needed  Continue to offer oral nutrition supplement, encourage intake   Please document all PO intakes in I/O   Assist/supervise meals as needed  Will continue to follow up during stay      Malnutrition Assessment:  Malnutrition Status:  Insufficient data (05/14/25 1425)    Context:  Acute Illness       Nutrition Assessment:    Admit with altered mental status, cough, rhinovirus.  Currently on no added salt diet with standard oral nutrition supplements. Limited po data with new admit. Patient receiving care on visit. Mild weight loss noted in past year but current BMI low for age over 65. Will follow at high nutrition risk at this time with concern for adequate intake.    Nutrition Related Findings:    hx dementia, Afib on coumadin Wound Type: None       Current Nutrition Intake & Therapies:    Average Meal Intake: Unable to assess  Average Supplements Intake: Unable to assess  ADULT DIET; Regular; No Added Salt (3-4 gm)  ADULT ORAL NUTRITION SUPPLEMENT; Breakfast, Lunch, Dinner; Standard High Calorie/High Protein Oral Supplement    Anthropometric Measures:  Height: 170.2 cm (5' 7\")  Ideal Body Weight (IBW): 135 lbs (61 kg)    Admission Body Weight: 56.8 kg (125 lb 3.5 oz)  Current Body Weight: 56.8 kg (125 lb 3.5 oz), 92.8 % IBW. Weight Source: Bed scale  Current BMI (kg/m2): 19.6  Usual Body Weight: 60 kg (132 lb 4.4 oz) (5/21/24, 56.1 kg 2/17/25)     % Weight Change (Calculated): -5.3  Weight Adjustment For: No Adjustment                 BMI Categories: Underweight (BMI less than 22) age over 65    Estimated Daily Nutrient Needs:  Energy Requirements Based On: Kcal/kg  Weight Used for Energy Requirements: Current  Energy (kcal/day): 0845-9875 (25-30 brandon/kg)  Weight Used for Protein

## 2025-05-14 NOTE — CONSULTS
PHARMACY ANTICOAGULATION MONITORING SERVICE    Genesis Stewart is a 79 y.o. female on warfarin therapy. Pharmacy consulted by Dr. Kourtney Gleason  for monitoring and adjustment of treatment.    Indication for anticoagulation: Chronic afib  INR goal: 2-3  Warfarin dose prior to admission: 3mg daily except 4.5 mg on Tuesday and Friday    Pertinent Laboratory Values   Recent Labs     05/14/25  0413   INR 2.0   HGB 12.3*   HCT 37.9   *     Recent Warfarin doses given this admission...  Date INR Result Warfarin Dose Given   5/13 2.0 4.5 mg   5/14 2.0 3 mg (ordered)               Assessment/Plan:  Drug Interactions:   Home meds:  aspirin, divalproex , levothyroxine  No new significant drug interactions noted.  INR remains just at the bottom end of therapeutic @2.0 again this am.  Normal warfarin 4.5mg dose given last night  HGB normal.  Continue normal home regimen of warfarin 3mg po daily, 4.5mg on Tuesday and Friday, then follow INR trends tomorrow.   Pharmacy will continue to monitor and adjust warfarin therapy as indicated    Thank you for the consult.  Joshua Fermin [facility]  5/14/2025 1:00 PM

## 2025-05-14 NOTE — PROGRESS NOTES
V2.0+  [facility] [place] Hospitalist Progress Note      Name:  Genesis Stewart /Age/Sex: 1945  (79 y.o. female)   MRN & CSN:  [id number] & [id number] Encounter Date/Time: 2025 12:46 PM EDT    Location:  [address] [place] [address]-A PCP: Nancy Russo MD       Hospital Day: 2    Assessment and Plan:   Genesis Stewart is a 79 y.o. female who presents with encephalopathy    Acute metabolic encephalopathy likely due to URI and dehydration  -Ketones on UA.  Patient given fluids.  Mental status appears improved.    Mild Hypoxia related to viral URI  Chest x-ray negative for acute infiltrate or consolidation  Albuterol, Mucinex symptomatic management of viral URI, check respiratory viral panel.   -Respiratory viral panel: Rhino/enterovirus positive.  Off nasal cannula and on room air.    Debility  -  states patient was weak at home.  Nurse notes that patient requires assistance with ambulation.  PT/OT    Elevated procalcitonin  - Procalcitonin 1.14.  CRP 99.7.  No focal source to suggest bacterial infection.  Repeat CXR.     Chronic atrial fibrillation  Long-term use of warfarin  pharmacy to dose INR for monitoring  Continue home Toprol-XL for rate control     Hyperbilirubinemia  Check bilirubin profile likely secondary to viral infection  - Improved.     Hypothyroidism  Continue home Synthroid     Hyperlipidemia  Continue home Lipitor     Alzheimer's dementia with behavioral disturbance  Continue home Namenda and Aricept  Depakote for mood stabilization    Comment: Please note this report has been produced using speech recognition software and may contain errors related to that system including errors in grammar, punctuation, and spelling, as well as words and phrases that may be inappropriate. If there are any questions or concerns please feel free to contact the dictating provider for clarification.      Diet ADULT DIET; Regular; No Added Salt (3-4 gm)  ADULT ORAL NUTRITION SUPPLEMENT; Breakfast, Lunch, Dinner;  Standard High Calorie/High Protein Oral Supplement    DVT Prophylaxis [x] Warfarin, [] Heparin, [] Eliquis, [] Xarelto  [] SCDs     Code Status Full Code       Disposition   Expected Disposition: TBD  Estimated Date of Discharge: 5/15  Patient requires continued admission due to weakness       Personally reviewed Lab Studies and Imaging reports      Subjective/Interval history:   Chief Complaint: Altered Mental Status     Patient appears improved. She still has some confusion. Also did not sleep well as per .  states at home patient has dementia and will have moments of fluctuation of mental status.     Review of Systems:    Negative unless mentioned above    Objective:   No intake or output data in the 24 hours ending 05/14/25 1246     Vitals:   /67   Pulse 79   Temp 97.6 °F (36.4 °C) (Oral)   Resp 23   Ht 1.702 m (5' 7\")   Wt 56.8 kg (125 lb 3.5 oz)   SpO2 93%   BMI 19.61 kg/m²     Physical Exam:   General: NAD  Eyes: no discharge  HENT: NCAT  Cardiovascular: RRR  Respiratory: Clear to auscultation   Gastrointestinal: Soft, nondistended, non tender,   Genitourinary: no suprapubic tenderness  Musculoskeletal: No edema, nontender  Skin: warm, dry  Neuro: Alert. No gross deficits, oriented to person, has occasional confusion when answering questions  Psych: Mood appropriate.     Medications:   Medications:    aspirin  81 mg Oral Daily    atorvastatin  40 mg Oral Daily    donepezil  10 mg Oral Daily    ipratropium  1 spray Each Nostril TID    levothyroxine  50 mcg Oral QAM AC    memantine  10 mg Oral BID    metoprolol succinate  25 mg Oral Daily    guaiFENesin  600 mg Oral BID    divalproex  125 mg Oral Nightly    warfarin  3 mg Oral Once per day on Sunday Monday Wednesday Thursday Saturday    warfarin  4.5 mg Oral Once per day on Tuesday Friday      Infusions:   PRN Meds: acetaminophen, 650 mg, Q6H PRN   Or  acetaminophen, 650 mg, Q6H PRN  ondansetron, 4 mg, Q6H PRN  albuterol sulfate HFA,

## 2025-05-15 ENCOUNTER — APPOINTMENT (OUTPATIENT)
Dept: GENERAL RADIOLOGY | Age: 80
End: 2025-05-15
Payer: MEDICARE

## 2025-05-15 LAB
ALBUMIN SERPL-MCNC: 3.3 G/DL (ref 3.4–5)
ALBUMIN/GLOB SERPL: 1.1 {RATIO} (ref 1.1–2.2)
ALP SERPL-CCNC: 65 U/L (ref 40–129)
ALT SERPL-CCNC: 69 U/L (ref 10–40)
AST SERPL-CCNC: 75 U/L (ref 15–37)
BILIRUB DIRECT SERPL-MCNC: 0.6 MG/DL (ref 0–0.3)
BILIRUB INDIRECT SERPL-MCNC: 0.7 MG/DL (ref 0–0.7)
BILIRUB SERPL-MCNC: 1.3 MG/DL (ref 0–1)
CRP SERPL HS-MCNC: 51.8 MG/L (ref 0–5)
INR PPP: 1.6
PROCALCITONIN SERPL-MCNC: 0.64 NG/ML
PROT SERPL-MCNC: 6.1 G/DL (ref 6.4–8.2)
PROTHROMBIN TIME: 19.3 SEC (ref 11.7–14.5)

## 2025-05-15 PROCEDURE — 6360000002 HC RX W HCPCS: Performed by: STUDENT IN AN ORGANIZED HEALTH CARE EDUCATION/TRAINING PROGRAM

## 2025-05-15 PROCEDURE — 87899 AGENT NOS ASSAY W/OPTIC: CPT

## 2025-05-15 PROCEDURE — 80076 HEPATIC FUNCTION PANEL: CPT

## 2025-05-15 PROCEDURE — 97166 OT EVAL MOD COMPLEX 45 MIN: CPT

## 2025-05-15 PROCEDURE — 6360000002 HC RX W HCPCS: Performed by: HOSPITALIST

## 2025-05-15 PROCEDURE — 2580000003 HC RX 258: Performed by: HOSPITALIST

## 2025-05-15 PROCEDURE — 71046 X-RAY EXAM CHEST 2 VIEWS: CPT

## 2025-05-15 PROCEDURE — 6370000000 HC RX 637 (ALT 250 FOR IP): Performed by: HOSPITALIST

## 2025-05-15 PROCEDURE — 97162 PT EVAL MOD COMPLEX 30 MIN: CPT

## 2025-05-15 PROCEDURE — 6370000000 HC RX 637 (ALT 250 FOR IP): Performed by: STUDENT IN AN ORGANIZED HEALTH CARE EDUCATION/TRAINING PROGRAM

## 2025-05-15 PROCEDURE — 85610 PROTHROMBIN TIME: CPT

## 2025-05-15 PROCEDURE — 2500000003 HC RX 250 WO HCPCS: Performed by: HOSPITALIST

## 2025-05-15 PROCEDURE — 36415 COLL VENOUS BLD VENIPUNCTURE: CPT

## 2025-05-15 PROCEDURE — 97116 GAIT TRAINING THERAPY: CPT

## 2025-05-15 PROCEDURE — 86140 C-REACTIVE PROTEIN: CPT

## 2025-05-15 PROCEDURE — 94761 N-INVAS EAR/PLS OXIMETRY MLT: CPT

## 2025-05-15 PROCEDURE — 1200000000 HC SEMI PRIVATE

## 2025-05-15 PROCEDURE — 84145 PROCALCITONIN (PCT): CPT

## 2025-05-15 PROCEDURE — 87449 NOS EACH ORGANISM AG IA: CPT

## 2025-05-15 PROCEDURE — 97530 THERAPEUTIC ACTIVITIES: CPT

## 2025-05-15 RX ORDER — WARFARIN SODIUM 4 MG/1
4 TABLET ORAL
Status: COMPLETED | OUTPATIENT
Start: 2025-05-15 | End: 2025-05-15

## 2025-05-15 RX ORDER — WARFARIN SODIUM 3 MG/1
3 TABLET ORAL
Status: DISCONTINUED | OUTPATIENT
Start: 2025-05-17 | End: 2025-05-16

## 2025-05-15 RX ORDER — HALOPERIDOL 5 MG/ML
2 INJECTION INTRAMUSCULAR EVERY 6 HOURS PRN
Status: COMPLETED | OUTPATIENT
Start: 2025-05-15 | End: 2025-05-15

## 2025-05-15 RX ORDER — AZITHROMYCIN 250 MG/1
500 TABLET, FILM COATED ORAL DAILY
Status: DISCONTINUED | OUTPATIENT
Start: 2025-05-16 | End: 2025-05-16

## 2025-05-15 RX ORDER — AZITHROMYCIN 250 MG/1
250 TABLET, FILM COATED ORAL DAILY
Status: DISCONTINUED | OUTPATIENT
Start: 2025-05-17 | End: 2025-05-16

## 2025-05-15 RX ADMIN — WARFARIN SODIUM 4 MG: 4 TABLET ORAL at 17:46

## 2025-05-15 RX ADMIN — DONEPEZIL HYDROCHLORIDE 10 MG: 10 TABLET ORAL at 11:15

## 2025-05-15 RX ADMIN — IPRATROPIUM BROMIDE 1 SPRAY: 42 SPRAY, METERED NASAL at 14:40

## 2025-05-15 RX ADMIN — METOPROLOL SUCCINATE 25 MG: 25 TABLET, EXTENDED RELEASE ORAL at 11:16

## 2025-05-15 RX ADMIN — ASPIRIN 81 MG: 81 TABLET, CHEWABLE ORAL at 11:16

## 2025-05-15 RX ADMIN — ATORVASTATIN CALCIUM 40 MG: 40 TABLET, FILM COATED ORAL at 11:15

## 2025-05-15 RX ADMIN — WATER 1000 MG: 1 INJECTION INTRAMUSCULAR; INTRAVENOUS; SUBCUTANEOUS at 12:27

## 2025-05-15 RX ADMIN — HALOPERIDOL LACTATE 2 MG: 5 INJECTION, SOLUTION INTRAMUSCULAR at 21:34

## 2025-05-15 RX ADMIN — GUAIFENESIN 600 MG: 600 TABLET, MULTILAYER, EXTENDED RELEASE ORAL at 11:15

## 2025-05-15 RX ADMIN — LEVOTHYROXINE SODIUM 50 MCG: 0.05 TABLET ORAL at 06:51

## 2025-05-15 RX ADMIN — AZITHROMYCIN MONOHYDRATE 500 MG: 500 INJECTION, POWDER, LYOPHILIZED, FOR SOLUTION INTRAVENOUS at 12:39

## 2025-05-15 RX ADMIN — IPRATROPIUM BROMIDE 1 SPRAY: 42 SPRAY, METERED NASAL at 11:17

## 2025-05-15 RX ADMIN — MEMANTINE 10 MG: 10 TABLET ORAL at 11:16

## 2025-05-15 NOTE — PROGRESS NOTES
V2.0+  [facility] [place] Hospitalist Progress Note      Name:  Genesis Stewart /Age/Sex: 1945  (79 y.o. female)   MRN & CSN:  [id number] & [id number] Encounter Date/Time: 5/15/2025 12:46 PM EDT    Location:  [address] [place] [address]-A PCP: Nancy Russo MD       Hospital Day: 3    Assessment and Plan:   Genesis Stewart is a 79 y.o. female who presents with encephalopathy    Acute metabolic encephalopathy likely due to URI and dehydration  -Ketones on UA.  Patient given fluids.  Mental status appears improved.    Mild Hypoxia related to viral URI  Chest x-ray negative for acute infiltrate or consolidation  Albuterol, Mucinex symptomatic management of viral URI, check respiratory viral panel.   -Respiratory viral panel: Rhino/enterovirus positive.  Off nasal cannula and on room air.    Debility  -  states patient was weak at home.  Nurse notes that patient requires assistance with ambulation.  PT/OT    Elevated procalcitonin  - Procalcitonin 1.14.  CRP 99.7.  No focal source to suggest bacterial infection.    -CXR 5/15: Mild patchy right lower lobe pulmonary infiltrate.  Start Rocephin and azithromycin.     Chronic atrial fibrillation  Long-term use of warfarin  pharmacy to dose INR for monitoring  Continue home Toprol-XL for rate control     Hyperbilirubinemia  Check bilirubin profile likely secondary to viral infection  - Improved.     Hypothyroidism  Continue home Synthroid     Hyperlipidemia  Continue home Lipitor     Alzheimer's dementia with behavioral disturbance  Continue home Namenda and Aricept  Depakote for mood stabilization    Comment: Please note this report has been produced using speech recognition software and may contain errors related to that system including errors in grammar, punctuation, and spelling, as well as words and phrases that may be inappropriate. If there are any questions or concerns please feel free to contact the dictating provider for clarification.      Diet ADULT DIET;

## 2025-05-15 NOTE — PROGRESS NOTES
Spiritual Health History and Assessment/Progress Note  [facility]    Spiritual/Emotional Needs,  ,  ,      Name: Genesis Stewart MRN: [id number]    Age: 79 y.o.     Sex: female   Language: English   Worship: Patient Refused   Altered mental status     Date: 5/15/2025            Total Time Calculated: 23 min              Spiritual Assessment began in [facility]        Referral/Consult From: Rounding   Encounter Overview/Reason: Spiritual/Emotional Needs  Service Provided For: Patient, Patient and family together    Callie, Belief, Meaning:   Patient identifies as spiritual, is connected with a callie tradition or spiritual practice, and has beliefs or practices that help with coping during difficult times  Family/Friends identify as spiritual, are connected with a callie tradition or spiritual practice, and have beliefs or practices that help with coping during difficult times      Importance and Influence:  Patient has spiritual/personal beliefs that influence decisions regarding their health  Family/Friends have spiritual/personal beliefs that influence decisions regarding the patient's health    Community:  Patient is connected with a spiritual community and feels well-supported. Support system includes: Spouse/Partner, Children, Callie Community, and Extended family  Family/Friends are connected with a spiritual community: and feel well-supported. Support system includes: Spouse/Partner, Parent/s, Children, Callie Community, Friends, and Extended family    Assessment and Plan of Care:     Patient Interventions include: Facilitated expression of thoughts and feelings  Family/Friends Interventions include: Facilitated expression of thoughts and feelings    Patient Plan of Care: Spiritual Care available upon further referral  Family/Friends Plan of Care: Spiritual Care available upon further referral    Electronically signed by Chaplain Raisa on 5/15/2025 at 10:13 AM

## 2025-05-15 NOTE — PLAN OF CARE
Problem: Safety - Adult  Goal: Free from fall injury  5/15/2025 1159 by Laurie Powell RN  Outcome: Progressing  5/14/2025 2320 by Nikita Fernández RN  Outcome: Progressing     Problem: Discharge Planning  Goal: Discharge to home or other facility with appropriate resources  5/15/2025 1159 by Laurie Powell RN  Outcome: Progressing  5/14/2025 2320 by Nikita Fernández RN  Outcome: Progressing     Problem: Nutrition Deficit:  Goal: Optimize nutritional status  5/15/2025 1159 by Laurie Powell RN  Outcome: Progressing  5/14/2025 2320 by Nikita Fernández RN  Outcome: Progressing     Problem: Skin/Tissue Integrity  Goal: Skin integrity remains intact  Description: 1.  Monitor for areas of redness and/or skin breakdown2.  Assess vascular access sites hourly3.  Every 4-6 hours minimum:  Change oxygen saturation probe site4.  Every 4-6 hours:  If on nasal continuous positive airway pressure, respiratory therapy assess nares and determine need for appliance change or resting period  5/15/2025 1159 by Laurie Powell RN  Outcome: Progressing  5/14/2025 2320 by Nikita Fernández RN  Outcome: Progressing

## 2025-05-15 NOTE — PLAN OF CARE
Problem: Safety - Adult  Goal: Free from fall injury  Outcome: Progressing     Problem: Skin/Tissue Integrity  Goal: Skin integrity remains intact  Description: 1.  Monitor for areas of redness and/or skin breakdown2.  Assess vascular access sites hourly3.  Every 4-6 hours minimum:  Change oxygen saturation probe site4.  Every 4-6 hours:  If on nasal continuous positive airway pressure, respiratory therapy assess nares and determine need for appliance change or resting period  Outcome: Progressing

## 2025-05-15 NOTE — CONSULTS
PHARMACY ANTICOAGULATION MONITORING SERVICE    Genesis Stewart is a 79 y.o. female on warfarin therapy. Pharmacy consulted by Dr. Kourtney Gleason  for monitoring and adjustment of treatment.    Indication for anticoagulation: Chronic afib  INR goal: 2-3  Warfarin dose prior to admission: 3mg daily except 4.5 mg on Tuesday and Friday    Pertinent Laboratory Values   Recent Labs     05/14/25  0413 05/15/25  0317   INR 2.0 1.6   HGB 12.3*  --    HCT 37.9  --    *  --      Assessment/Plan:  Drug Interactions:   No new significant drug interactions noted.  INR trending down   Boosted dose of warfarin 4mg x 1 tonight then resume home regimen   Pharmacy will continue to monitor and adjust warfarin therapy as indicated    Thank you for the consult.  Davey Rosa RPH  5/15/2025 12:35 PM

## 2025-05-15 NOTE — PROGRESS NOTES
Occupational Therapy  [facility] ACUTE CARE OCCUPATIONAL THERAPY EVALUATION  Genesis Stewart, 1945, 3029/3029-A, 5/15/2025    Discharge Recommendation: Facility for moderate post-acute rehabilitation, anticipate 1-2 hours per day and 5 days per week.    History  [place]:  The encounter diagnosis was Pneumonia due to infectious organism, unspecified laterality, unspecified part of lung.  Patient  has a past medical history of Abnormal echocardiogram, Anemia, Anticoagulant long-term use, Aortic insufficiency, Atrial fibrillation (HCC), Atrial flutter (HCC), Bursitis, trochanteric, Cerumen impaction, Congenital heart disease, COPD (chronic obstructive pulmonary disease) (HCC), Diverticulosis, Diverticulosis of colon, DJD (degenerative joint disease), cervical, DVT (deep venous thrombosis) ([facility]), Dyslipidemia, Environmental allergies, Family history of colon cancer, Gastric polyp, Gastritis, GERD (gastroesophageal reflux disease), H/O cardiovascular stress test, H/O Doppler ultrasound, H/O Doppler ultrasound, H/O echocardiogram, H/O exercise stress test, Hearing loss, History of Holter monitoring, History of mammogram, Hx of colonoscopy, Hx of Doppler Venous ultrasound, Hyperlipidemia, Hypertension, Hypothyroidism, Internal hemorrhoid, Intervertebral disc protrusion, Left shoulder pain, Long term current use of anticoagulant, Menstruation, Pacemaker, Peripheral vascular disease, Pulmonary hypertension (HCC), Restless legs, Right shoulder strain, Sciatica, Sciatica, Shoulder pain, left, Shoulder pain, right, Sinus bradycardia, Sinusitis, Tinnitus, and Vision changes.  Patient  has a past surgical history that includes Tonsillectomy (08/2006); Upper gastrointestinal endoscopy (04/2008); Cholecystectomy, laparoscopic (02/2001); sinus surgery (1979); Dilation and curettage of uterus (1988); Total abdominal hysterectomy w/ bilateral salpingoophorectomy (04/2003); Breast surgery (1985); Hysterectomy (2003);

## 2025-05-15 NOTE — CARE COORDINATION
CM in to see Pt to follow up on discharge planning.  Pt daughter Paz present.    CM spoke to Pt  Pernell on the phone with Paz, plan at this time is to be determined.     Pt family wishes to discuss therapy recommendations.  Plan possible SNF vs home with home care.     CM following

## 2025-05-16 LAB
ALBUMIN SERPL-MCNC: 3.2 G/DL (ref 3.4–5)
ALBUMIN/GLOB SERPL: 1.1 {RATIO} (ref 1.1–2.2)
ALP SERPL-CCNC: 58 U/L (ref 40–129)
ALT SERPL-CCNC: 65 U/L (ref 10–40)
ANION GAP SERPL CALCULATED.3IONS-SCNC: 11 MMOL/L (ref 9–17)
AST SERPL-CCNC: 59 U/L (ref 15–37)
BILIRUB SERPL-MCNC: 1.5 MG/DL (ref 0–1)
BUN SERPL-MCNC: 14 MG/DL (ref 7–20)
CALCIUM SERPL-MCNC: 8.7 MG/DL (ref 8.3–10.6)
CHLORIDE SERPL-SCNC: 102 MMOL/L (ref 99–110)
CO2 SERPL-SCNC: 23 MMOL/L (ref 21–32)
CREAT SERPL-MCNC: 0.7 MG/DL (ref 0.6–1.2)
ERYTHROCYTE [DISTWIDTH] IN BLOOD BY AUTOMATED COUNT: 13.6 % (ref 11.7–14.9)
GFR, ESTIMATED: 85 ML/MIN/1.73M2
GLUCOSE SERPL-MCNC: 90 MG/DL (ref 74–99)
HCT VFR BLD AUTO: 41.1 % (ref 37–47)
HGB BLD-MCNC: 13.9 G/DL (ref 12.5–16)
INR PPP: 2
L PNEUMO1 AG UR QL IA.RAPID: NEGATIVE
MCH RBC QN AUTO: 30.9 PG (ref 27–31)
MCHC RBC AUTO-ENTMCNC: 33.8 G/DL (ref 32–36)
MCV RBC AUTO: 91.3 FL (ref 78–100)
PLATELET, FLUORESCENCE: 121 K/UL (ref 140–440)
PMV BLD AUTO: 9.7 FL (ref 7.5–11.1)
POTASSIUM SERPL-SCNC: 4.2 MMOL/L (ref 3.5–5.1)
PROT SERPL-MCNC: 6.2 G/DL (ref 6.4–8.2)
PROTHROMBIN TIME: 22.6 SEC (ref 11.7–14.5)
RBC # BLD AUTO: 4.5 M/UL (ref 4.2–5.4)
S PNEUM AG SPEC QL: NEGATIVE
SODIUM SERPL-SCNC: 136 MMOL/L (ref 136–145)
SPECIMEN SOURCE: NORMAL
WBC OTHER # BLD: 8.5 K/UL (ref 4–10.5)

## 2025-05-16 PROCEDURE — 85610 PROTHROMBIN TIME: CPT

## 2025-05-16 PROCEDURE — 2580000003 HC RX 258: Performed by: HOSPITALIST

## 2025-05-16 PROCEDURE — 6360000002 HC RX W HCPCS: Performed by: HOSPITALIST

## 2025-05-16 PROCEDURE — 2500000003 HC RX 250 WO HCPCS: Performed by: HOSPITALIST

## 2025-05-16 PROCEDURE — 1200000000 HC SEMI PRIVATE

## 2025-05-16 PROCEDURE — 99223 1ST HOSP IP/OBS HIGH 75: CPT | Performed by: STUDENT IN AN ORGANIZED HEALTH CARE EDUCATION/TRAINING PROGRAM

## 2025-05-16 PROCEDURE — 94761 N-INVAS EAR/PLS OXIMETRY MLT: CPT

## 2025-05-16 PROCEDURE — 80053 COMPREHEN METABOLIC PANEL: CPT

## 2025-05-16 PROCEDURE — 36415 COLL VENOUS BLD VENIPUNCTURE: CPT

## 2025-05-16 PROCEDURE — 85027 COMPLETE CBC AUTOMATED: CPT

## 2025-05-16 PROCEDURE — 6370000000 HC RX 637 (ALT 250 FOR IP): Performed by: STUDENT IN AN ORGANIZED HEALTH CARE EDUCATION/TRAINING PROGRAM

## 2025-05-16 PROCEDURE — 6370000000 HC RX 637 (ALT 250 FOR IP): Performed by: CLINICAL NURSE SPECIALIST

## 2025-05-16 RX ORDER — WARFARIN SODIUM 4 MG/1
4 TABLET ORAL
Status: DISCONTINUED | OUTPATIENT
Start: 2025-05-16 | End: 2025-05-16

## 2025-05-16 RX ORDER — ZIPRASIDONE MESYLATE 20 MG/ML
10 INJECTION, POWDER, LYOPHILIZED, FOR SOLUTION INTRAMUSCULAR EVERY 6 HOURS PRN
Status: DISCONTINUED | OUTPATIENT
Start: 2025-05-16 | End: 2025-05-17 | Stop reason: HOSPADM

## 2025-05-16 RX ORDER — DIVALPROEX SODIUM 125 MG/1
125 CAPSULE, COATED PELLETS ORAL EVERY 8 HOURS SCHEDULED
Status: DISCONTINUED | OUTPATIENT
Start: 2025-05-16 | End: 2025-05-17 | Stop reason: HOSPADM

## 2025-05-16 RX ADMIN — WATER 1000 MG: 1 INJECTION INTRAMUSCULAR; INTRAVENOUS; SUBCUTANEOUS at 12:32

## 2025-05-16 RX ADMIN — DIVALPROEX SODIUM 125 MG: 125 CAPSULE, COATED PELLETS ORAL at 20:49

## 2025-05-16 RX ADMIN — AZITHROMYCIN MONOHYDRATE 500 MG: 500 INJECTION, POWDER, LYOPHILIZED, FOR SOLUTION INTRAVENOUS at 23:55

## 2025-05-16 RX ADMIN — IPRATROPIUM BROMIDE 1 SPRAY: 42 SPRAY, METERED NASAL at 20:50

## 2025-05-16 RX ADMIN — MEMANTINE 10 MG: 10 TABLET ORAL at 20:49

## 2025-05-16 NOTE — PLAN OF CARE
Problem: Safety - Adult  Goal: Free from fall injury  5/15/2025 2108 by Nubia Rizo RN  Outcome: Progressing  5/15/2025 1159 by Laurie Powell RN  Outcome: Progressing     Problem: Discharge Planning  Goal: Discharge to home or other facility with appropriate resources  5/15/2025 2108 by Nubia Rizo RN  Outcome: Progressing  5/15/2025 1159 by Laurie Powell RN  Outcome: Progressing     Problem: Nutrition Deficit:  Goal: Optimize nutritional status  5/15/2025 2108 by Nubia Rizo RN  Outcome: Progressing  5/15/2025 1159 by Laurie Powell RN  Outcome: Progressing     Problem: Skin/Tissue Integrity  Goal: Skin integrity remains intact  Description: 1.  Monitor for areas of redness and/or skin breakdown2.  Assess vascular access sites hourly3.  Every 4-6 hours minimum:  Change oxygen saturation probe site4.  Every 4-6 hours:  If on nasal continuous positive airway pressure, respiratory therapy assess nares and determine need for appliance change or resting period  5/15/2025 2108 by Nubia Rizo RN  Outcome: Progressing  5/15/2025 1159 by Laurie Powell RN  Outcome: Progressing

## 2025-05-16 NOTE — PROGRESS NOTES
Comprehensive Nutrition Assessment    Type and Reason for Visit:  Reassess    Nutrition Recommendations/Plan:   Continue current diet and oral nutrition supplement-between meals  Assist with feeding as needed  Please record all po intake  Nutrition focused physical exam for malnutrition at reassess     Malnutrition Assessment:  Malnutrition Status:  Insufficient data (05/14/25 8015)    Context:  Acute Illness       Nutrition Assessment:    Pt continues with poor po intake. Sleeping/lethargic today after haldol last night noted. Plan for SNF noted. Will continue current oral supplement to optimize intake and follow as high nutrition risk.    Nutrition Related Findings:    hx dementia, Afib on coumadin   Wound Type: None       Current Nutrition Intake & Therapies:    Average Meal Intake: 26-50%  Average Supplements Intake: Unable to assess  ADULT DIET; Regular; No Added Salt (3-4 gm)  ADULT ORAL NUTRITION SUPPLEMENT; Breakfast, Lunch, Dinner; Standard High Calorie/High Protein Oral Supplement    Anthropometric Measures:  Height: 170.2 cm (5' 7\")  Ideal Body Weight (IBW): 135 lbs (61 kg)    Admission Body Weight: 56.8 kg (125 lb 3.5 oz)  Current Body Weight: 51.3 kg (113 lb 1.5 oz), 92.8 % IBW. Weight Source: Bed scale  Current BMI (kg/m2): 17.7  Usual Body Weight: 60 kg (132 lb 4.4 oz) (5/21/24, 56.1 kg 2/17/25)     % Weight Change (Calculated): -5.3  Weight Adjustment For: No Adjustment                 BMI Categories: Underweight (BMI less than 18.5)    Estimated Daily Nutrient Needs:  Energy Requirements Based On: Kcal/kg  Weight Used for Energy Requirements: Current  Energy (kcal/day): 1263-8475 (25-30 brandon/kg)  Weight Used for Protein Requirements: Current  Protein (g/day): 68 (1.2 g/kg)  Method Used for Fluid Requirements: 1 ml/kcal  Fluid (ml/day): 1700    Nutrition Diagnosis:   Predicted inadequate energy intake related to cognitive or neurological impairment, decreased appetite as evidenced by poor intake prior

## 2025-05-16 NOTE — CONSULTS
PHARMACY ANTICOAGULATION MONITORING SERVICE    Genesis Stewart is a 79 y.o. female on warfarin therapy. Pharmacy consulted by Dr. Kourtney Gleason  for monitoring and adjustment of treatment.    Indication for anticoagulation: Chronic afib  INR goal: 2-3  Warfarin dose prior to admission: 3mg daily except 4.5 mg on Tuesday and Friday    Pertinent Laboratory Values   Recent Labs     05/14/25  0413 05/15/25  0317 05/16/25  0250 05/16/25  0951   INR 2.0   < > 2.0  --    HGB 12.3*  --   --  13.9   HCT 37.9  --   --  41.1   *  --   --   --     < > = values in this interval not displayed.     Recent Warfarin doses given this admission...  Date INR Result Warfarin Dose Given   5/13 2.0 4.5 mg   5/14 2.0 3 mg   5/15 1.6 4 mg   5/16 2.0 3.5 mg (ordered)     Assessment/Plan:  Drug Interactions:   Home meds:  aspirin, divalproex , levothyroxine  Pt now started on azithromycin for 5 days, which may increase warfarin effects.  INR jumped up to 2 today, just in therapeutic range.  Warfarin 4mg dose given last night.  HGB normal.  Give warfarin 3.5 mg dose po tonight and follow INR trends tomorrow.  Once INR is back into therapeutic range, will look to resume normal home dosing regimen.  Pharmacy will continue to monitor and adjust warfarin therapy as indicated    Thank you for the consult.  Joshua Fermin [facility]  5/16/2025 3:09 PM

## 2025-05-16 NOTE — CARE COORDINATION
CM in to see Pt to follow up on discharge planning.  Pt  Pernell and daughter Paz present.     Discharge plan is SNF, [facility], second [facility].    CM call to theo/Maureen with referral.     2:33 PM   CM contacted by [facility], they are unable to accept Pt at this time.      CM call to Iveth/KAROL with referral.     3:55 PM   CM met with Pernell and Paz.  Discharge plan is now home with home care.  Choice of [facility].    PS to Dr. Leach to update, home care order requested.     PS to Ammy/[facility] with referral.    Pt family is also requesting a front wheeled walker.      Genesis Stewart was evaluated today and a DME order was entered for a front wheeled walker because she requires this to successfully complete daily living tasks of personal cares and ambulating.  A front wheeled walker is necessary due to the patient's unsteady gait, upper body weakness, and inability to  an ambulation device; and she can ambulate only by pushing a walker instead of a lesser assistive device such as a cane, crutch, or standard walker.  The need for this equipment was discussed with the patient and she understands and is in agreement.

## 2025-05-16 NOTE — CONSULTS
Neurology Service Consult Note  [facility]   Patient Name: Genesis Stewart  : 1945        Subjective:   Reason for consult: AMS  79 y.o. - female with history of dementia, atrial fibrillation, and HTN presenting to [facility] for weakness and AMS.  states he woke her up Tuesday morning around 2:30 am to go to the bathroom. He states he typically does this. He states she was generally weak and would not move. He states she was lethargic. He decided to call EMS later because there as no improvement in her condition. She was noted to be mildly hypoxic upon their arrival.  also reported patient has had a cough for about a week. Last evening patient was noted to have a change in mentation. The bedside nurse reported patient was doing well all day and was communicating although remained confused. On shift change patient was noted to not be responding verbally but was withdrawing to pain. She then became acutely agitated and was combative. Daughter was apparently attempting to hold her down. She received haldol 2 mg at 2130.  states this morning patient is still agitated and bit his hand this morning. He denies any behavioral issues at baseline.     Patient currently follows in our office for her underlying dementia. She was supposed to have follow up appointment this past Tuesday. She is currently on Depakote at night for mood and this was started in February to help with sleep and anxiety.    Patient seen and examined.  states prior to me coming into room patient told him she wanted to go home. He states he told her she could not go home yet and then stopped communicating. She pushes me away when I attempt to get her to squeeze my hand. She closed her eyes when asked for her to look at my.       Past Medical History:   Diagnosis Date    Abnormal echocardiogram     EF 60%, mild aortic indsufficiency, mild pulmonary hypertension    Anemia      generalized weakness and AMS. Last evening patient became more altered with agitation and combativeness. She received Haldol overnight. This morning patient is alert but not communicating with me or cooperating with exam. She bit her  this morning.     Behavioral disturbances likely due acute delirium from hospitalization superimposed on underlying dementia.   Will increase Depakote to 125 mg TID for her behavior. We can decrease back to just nightly after discharge if needed  Delirium precautions: lights on, blinds open, toileting schedule, out of bed as tolerated during day; dark quiet environment at night  Plan is for patient to go to SNF at discharge.   does endorse concerns regarding patient having persistent delirium at a skilled nursing facility given it is an unfamiliar place with unfamiliar faces.  He does wonder whether patient would be better served at home with outpatient therapy.  Do feel this is reasonable to consider.  She will need to follow up as planned in office.          Thank you for allowing us to participate in the care of your patient.  If there are any questions regarding evaluation please feel free to contact us.     Chantal Singletary, ANTHONY - CNS, 5/16/2025     Attending Addendum:    I have discussed this patient with the mid-level provider.  I have personally seen and examined the patient and reviewed the diagnostic testing and any changes in the history or physical exam are documented above.  I agree with the plan of care as documented.  I have evaluated/treated an acute/chronic illness/injury that poses threat to life or bodily function and/or Chronic illness with severe exacerbation, or treatment of side effect in this encounter. I have performed a substantive portion of this shared visit, with more than 50% of the time spent in face-to-face and in direct patient care, including obtaining critical elements of the history, performing a physical exam, reviewing laboratory and

## 2025-05-16 NOTE — PROGRESS NOTES
Occupational Therapy  Attempted to see pt on this date for OT session. Pt lethargic and unable to maintain wakefulness for therapy session. Will attempt as able and appropriate.    [place] HERNANDEZ/L

## 2025-05-16 NOTE — PROGRESS NOTES
V2.0+  [facility] [place] Hospitalist Progress Note      Name:  Genesis Stewart /Age/Sex: 1945  (79 y.o. female)   MRN & CSN:  [id number] & [id number] Encounter Date/Time: 2025 12:46 PM EDT    Location:  [address] [place] [address]-A PCP: Nancy Russo MD       Hospital Day: 4    Assessment and Plan:   Genesis Stewart is a 79 y.o. female who presents with encephalopathy    Acute metabolic encephalopathy likely due to URI and dehydration  -Ketones on UA.  Patient given fluids.  Mental status appears improved.  -Had another episode of altered mental status and verbally nonresponsiveness.  Patient then became combative.  Patient required Haldol.  Neurology feels likely delirium superimposed on dementia.  Depakote increased to 3 times daily while inpatient.    Mild Hypoxia related to viral URI  Chest x-ray negative for acute infiltrate or consolidation  Albuterol, Mucinex symptomatic management of viral URI, check respiratory viral panel.   -Respiratory viral panel: Rhino/enterovirus positive.  Off nasal cannula and on room air.    Debility  -  states patient was weak at home.  Nurse notes that patient requires assistance with ambulation.  PT/OT    Elevated procalcitonin  - Procalcitonin 1.14.  CRP 99.7.  No focal source to suggest bacterial infection.    -CXR 5/15: Mild patchy right lower lobe pulmonary infiltrate.  Start Rocephin and azithromycin.  -Improved procalcitonin 0.64.  Improved CRP 51.    Elevated LFTs  - Monitor.     Chronic atrial fibrillation  Long-term use of warfarin  pharmacy to dose INR for monitoring  Continue home Toprol-XL for rate control     Hyperbilirubinemia  Check bilirubin profile likely secondary to viral infection  -Appears stable.     Hypothyroidism  Continue home Synthroid     Hyperlipidemia  Continue home Lipitor     Alzheimer's dementia with behavioral disturbance  Continue home Namenda and Aricept  Depakote for mood stabilization  -Depakote increased to 3 times a day.    Comment:  sleepy, not responsive    Medications:   Medications:    divalproex  125 mg Oral 3 times per day    cefTRIAXone (ROCEPHIN) IV  1,000 mg IntraVENous Q24H    [START ON 5/17/2025] warfarin  3 mg Oral Once per day on Sunday Monday Wednesday Thursday Saturday    azithromycin  500 mg Oral Daily    Followed by    [START ON 5/17/2025] azithromycin  250 mg Oral Daily    OLANZapine (ZyPREXA) 2.5 mg in sterile water 0.5 mL injection  2.5 mg IntraMUSCular Once    aspirin  81 mg Oral Daily    atorvastatin  40 mg Oral Daily    donepezil  10 mg Oral Daily    ipratropium  1 spray Each Nostril TID    levothyroxine  50 mcg Oral QAM AC    memantine  10 mg Oral BID    metoprolol succinate  25 mg Oral Daily    guaiFENesin  600 mg Oral BID    warfarin  4.5 mg Oral Once per day on Tuesday Friday      Infusions:   PRN Meds: melatonin, 3 mg, Nightly PRN  acetaminophen, 650 mg, Q6H PRN   Or  acetaminophen, 650 mg, Q6H PRN  ondansetron, 4 mg, Q6H PRN  albuterol sulfate HFA, 2 puff, Q6H PRN        Labs      Recent Labs     05/14/25  0413 05/16/25  0951   WBC 7.6 8.5   HGB 12.3* 13.9   HCT 37.9 41.1   *  --       Recent Labs     05/14/25  0413 05/16/25  0951    136   K 4.4 4.2    102   CO2 23 23   BUN 15 14   CREATININE 0.7 0.7     Recent Labs     05/14/25  0413 05/15/25  1043 05/16/25  0951   AST 40* 75* 59*   ALT 37 69* 65*   BILIDIR  --  0.6*  --    BILITOT 1.3* 1.3* 1.5*   ALKPHOS 49 65 58     Recent Labs     05/14/25  0413 05/15/25  0317 05/16/25  0250   INR 2.0 1.6 2.0     No results for input(s): \"CKTOTAL\", \"CKMB\", \"CKMBINDEX\", \"TROPONINT\" in the last 72 hours.    Lab Results   Component Value Date/Time    LABA1C 5.4 04/30/2022 07:09 AM     CALCIUM:  8.7/23 (05/16 0951)  Lab Results   Component Value Date/Time    MG 2.2 08/30/2023 09:56 AM           Electronically signed by Juan Leach MD on 5/16/2025 at 1:19 PM

## 2025-05-17 VITALS
WEIGHT: 119.05 LBS | OXYGEN SATURATION: 93 % | SYSTOLIC BLOOD PRESSURE: 101 MMHG | BODY MASS INDEX: 18.69 KG/M2 | DIASTOLIC BLOOD PRESSURE: 67 MMHG | RESPIRATION RATE: 19 BRPM | HEIGHT: 67 IN | HEART RATE: 70 BPM | TEMPERATURE: 98.4 F

## 2025-05-17 LAB
ALBUMIN SERPL-MCNC: 3 G/DL (ref 3.4–5)
ALBUMIN/GLOB SERPL: 1.1 {RATIO} (ref 1.1–2.2)
ALP SERPL-CCNC: 55 U/L (ref 40–129)
ALT SERPL-CCNC: 59 U/L (ref 10–40)
ANION GAP SERPL CALCULATED.3IONS-SCNC: 10 MMOL/L (ref 9–17)
AST SERPL-CCNC: 45 U/L (ref 15–37)
BILIRUB SERPL-MCNC: 1.3 MG/DL (ref 0–1)
BUN SERPL-MCNC: 15 MG/DL (ref 7–20)
CALCIUM SERPL-MCNC: 8.9 MG/DL (ref 8.3–10.6)
CHLORIDE SERPL-SCNC: 104 MMOL/L (ref 99–110)
CO2 SERPL-SCNC: 25 MMOL/L (ref 21–32)
CREAT SERPL-MCNC: 0.7 MG/DL (ref 0.6–1.2)
ERYTHROCYTE [DISTWIDTH] IN BLOOD BY AUTOMATED COUNT: 13.3 % (ref 11.7–14.9)
GFR, ESTIMATED: 81 ML/MIN/1.73M2
GLUCOSE SERPL-MCNC: 86 MG/DL (ref 74–99)
HCT VFR BLD AUTO: 40.3 % (ref 37–47)
HGB BLD-MCNC: 13.5 G/DL (ref 12.5–16)
INR PPP: 1.8
MCH RBC QN AUTO: 30.8 PG (ref 27–31)
MCHC RBC AUTO-ENTMCNC: 33.5 G/DL (ref 32–36)
MCV RBC AUTO: 91.8 FL (ref 78–100)
PLATELET, FLUORESCENCE: 131 K/UL (ref 140–440)
PMV BLD AUTO: 10.3 FL (ref 7.5–11.1)
POTASSIUM SERPL-SCNC: 4.2 MMOL/L (ref 3.5–5.1)
PROT SERPL-MCNC: 5.8 G/DL (ref 6.4–8.2)
PROTHROMBIN TIME: 20.5 SEC (ref 11.7–14.5)
RBC # BLD AUTO: 4.39 M/UL (ref 4.2–5.4)
SODIUM SERPL-SCNC: 139 MMOL/L (ref 136–145)
WBC OTHER # BLD: 6.4 K/UL (ref 4–10.5)

## 2025-05-17 PROCEDURE — 6370000000 HC RX 637 (ALT 250 FOR IP): Performed by: STUDENT IN AN ORGANIZED HEALTH CARE EDUCATION/TRAINING PROGRAM

## 2025-05-17 PROCEDURE — 80053 COMPREHEN METABOLIC PANEL: CPT

## 2025-05-17 PROCEDURE — 6360000002 HC RX W HCPCS: Performed by: HOSPITALIST

## 2025-05-17 PROCEDURE — 99232 SBSQ HOSP IP/OBS MODERATE 35: CPT | Performed by: CLINICAL NURSE SPECIALIST

## 2025-05-17 PROCEDURE — 85027 COMPLETE CBC AUTOMATED: CPT

## 2025-05-17 PROCEDURE — 6370000000 HC RX 637 (ALT 250 FOR IP): Performed by: CLINICAL NURSE SPECIALIST

## 2025-05-17 PROCEDURE — 36415 COLL VENOUS BLD VENIPUNCTURE: CPT

## 2025-05-17 PROCEDURE — 6370000000 HC RX 637 (ALT 250 FOR IP): Performed by: HOSPITALIST

## 2025-05-17 PROCEDURE — 85610 PROTHROMBIN TIME: CPT

## 2025-05-17 PROCEDURE — 2500000003 HC RX 250 WO HCPCS: Performed by: HOSPITALIST

## 2025-05-17 RX ORDER — LEVOTHYROXINE SODIUM 50 UG/1
50 TABLET ORAL
Qty: 30 TABLET | Refills: 0 | Status: SHIPPED | OUTPATIENT
Start: 2025-05-18

## 2025-05-17 RX ORDER — CEFDINIR 300 MG/1
300 CAPSULE ORAL 2 TIMES DAILY
Qty: 10 CAPSULE | Refills: 0 | Status: SHIPPED | OUTPATIENT
Start: 2025-05-17 | End: 2025-05-22

## 2025-05-17 RX ORDER — ALBUTEROL SULFATE 90 UG/1
2 INHALANT RESPIRATORY (INHALATION) EVERY 6 HOURS PRN
Qty: 18 G | Refills: 0 | Status: SHIPPED | OUTPATIENT
Start: 2025-05-17

## 2025-05-17 RX ORDER — GUAIFENESIN 600 MG/1
600 TABLET, EXTENDED RELEASE ORAL 2 TIMES DAILY
Qty: 20 TABLET | Refills: 0 | Status: SHIPPED | OUTPATIENT
Start: 2025-05-17 | End: 2025-05-27

## 2025-05-17 RX ADMIN — DONEPEZIL HYDROCHLORIDE 10 MG: 10 TABLET ORAL at 11:21

## 2025-05-17 RX ADMIN — ASPIRIN 81 MG: 81 TABLET, CHEWABLE ORAL at 11:21

## 2025-05-17 RX ADMIN — DIVALPROEX SODIUM 125 MG: 125 CAPSULE, COATED PELLETS ORAL at 06:03

## 2025-05-17 RX ADMIN — ATORVASTATIN CALCIUM 40 MG: 40 TABLET, FILM COATED ORAL at 11:21

## 2025-05-17 RX ADMIN — WATER 1000 MG: 1 INJECTION INTRAMUSCULAR; INTRAVENOUS; SUBCUTANEOUS at 11:23

## 2025-05-17 RX ADMIN — LEVOTHYROXINE SODIUM 50 MCG: 0.05 TABLET ORAL at 06:02

## 2025-05-17 RX ADMIN — METOPROLOL SUCCINATE 25 MG: 25 TABLET, EXTENDED RELEASE ORAL at 11:21

## 2025-05-17 RX ADMIN — MEMANTINE 10 MG: 10 TABLET ORAL at 11:21

## 2025-05-17 RX ADMIN — GUAIFENESIN 600 MG: 600 TABLET, MULTILAYER, EXTENDED RELEASE ORAL at 11:21

## 2025-05-17 NOTE — PROGRESS NOTES
Neurology Progress Note  [facility]  Patient Name: Genesis Stewart     : 1945      Subjective:   C C: AMS  The patient was seen and examined. Chart reviewed in detail. Patient resting upon entering room.  is at bedside. He reports improvement in her behavior. He states she slept well last night. She awakens for me and able to follow commands.     Objective:   Scheduled Meds:   warfarin  4.5 mg Oral Once    warfarin placeholder: dosing by pharmacy   Oral RX Placeholder    divalproex  125 mg Oral 3 times per day    cefTRIAXone (ROCEPHIN) IV  1,000 mg IntraVENous Q24H    OLANZapine (ZyPREXA) 2.5 mg in sterile water 0.5 mL injection  2.5 mg IntraMUSCular Once    aspirin  81 mg Oral Daily    atorvastatin  40 mg Oral Daily    donepezil  10 mg Oral Daily    ipratropium  1 spray Each Nostril TID    levothyroxine  50 mcg Oral QAM AC    memantine  10 mg Oral BID    metoprolol succinate  25 mg Oral Daily    guaiFENesin  600 mg Oral BID     Continuous Infusions:  PRN Meds:.ziprasidone, melatonin, acetaminophen **OR** acetaminophen, ondansetron, albuterol sulfate HFA    Vital Signs:  Vitals:    25 2044 25 2046 25 0246 25 0600   BP: 106/63  109/71    Pulse: 70 70 70    Resp: 19  20    Temp: 97.8 °F (36.6 °C)  98.5 °F (36.9 °C)    TempSrc: Axillary  Oral    SpO2: 92%  93%    Weight:    54 kg (119 lb 0.8 oz)   Height:             General: A&O x 1, NAD, cooperative  HEENT: NC/AT, EOMI, PERRL, mmm, neck supple  Extremities: no edema, no calf tenderness b/l    Neurological Exam:         Mental Status:  A&O to self. Recognizes . NAD, no verbalization for me follows commands appropriately     Cranial Nerves:  CN II-XII intact     Sensation: intact LT/temp UE/LE's b/l     Motor: spontaneous movement to extremities. Squeezes hands bilaterally     Reflexes: 1/4 biceps, triceps, brachioradialis, patellar, and achilles bilaterally; flexor plantar responses bilaterally

## 2025-05-17 NOTE — DISCHARGE SUMMARY
Please use this note as a progress note for the day if the patient does not discharge today      Discharge Summary    Name:  Genesis Stewart /Age/Sex: 1945  (79 y.o. female)   MRN & CSN:  [id number] & [id number] Admission Date/Time: 2025  3:55 AM   Attending:  Deshawn Rich MD Discharging Physician: Deshawn Rich MD       Admission Diagnosis:   Transient disorientation   Mild Hypoxia   Dehydration   Viral URTI   Chronic A Fib   Hyperbilirubinemia   Hypothyroidism   HLD   Alzheimer's dementia with behavioral Disturbance     Discharge Diagnosis, hospital course, assessment and plan:  Genesis Stewart is a 79 y.o.  female  who presents with Altered mental status    Patient admitted on 2025  3:55 AM    Per H+P:    Genesis Stewart is a 79 y.o. female with past medical history of Alzheimer's dementia with behavioral disturbance, hyperlipidemia, hypothyroidism, chronic atrial fibrillation, s/p biventricular pacemaker presented from home via EMS due to altered mental status and depressed mentation.  Yesterday morning started having nasal congestion associated with nonproductive cough  thought that this was related to allergies and gave her some Allegra without any significant alleviation in symptoms has had decreased oral intake since then and has been requiring increased assistance with ADLs.  During my evaluation patient was alert oriented x 2 hemodynamically stable low normal blood pressure trend denies any chest pain LOC dizziness nausea vomiting diarrhea fever night sweats or chills.       Acute metabolic encephalopathy  Rhinovirus URI   Dehydration  -Ketones on UA.  Patient given fluids.  Mental status appears improved.  -Had another episode of altered mental status and verbally nonresponsiveness.  Patient then became combative.  Patient required Haldol.  Neurology feels likely delirium superimposed on dementia.  Depakote increased to 3 times daily while inpatient.     Mild Hypoxia

## 2025-05-17 NOTE — PROGRESS NOTES
Genesis Stewart was evaluated today and a DME order was entered for a front wheeled walker because she requires this to successfully complete daily living tasks of personal cares and ambulating.  A front wheeled walker is necessary due to the patient's unsteady gait, upper body weakness, and inability to  an ambulation device; and she can ambulate only by pushing a walker instead of a lesser assistive device such as a cane, crutch, or standard walker.  The need for this equipment was discussed with the patient and she understands and is in agreement.     Electronically signed by Deshawn Rich MD on 5/17/2025 at 8:56 AM

## 2025-05-17 NOTE — PROGRESS NOTES
PHARMACY ANTICOAGULATION MONITORING SERVICE    Genesis Stewart is a 79 y.o. female on warfarin therapy. Pharmacy consulted by Dr. Kourtney Gleason  for monitoring and adjustment of treatment.    Indication for anticoagulation: Chronic afib  INR goal: 2-3  Warfarin dose prior to admission: 3mg daily except 4.5 mg on Tuesday and Friday    Pertinent Laboratory Values   Recent Labs     05/17/25  0550   INR 1.8   HGB 13.5   HCT 40.3     INR MONITORING  Recent Labs     05/15/25  0317 05/16/25  0250 05/17/25  0550   INR 1.6 2.0 1.8     Recent Warfarin doses given this admission...  Date INR Result Warfarin Dose Given   5/13 2.0 4.5 mg   5/14 2.0 3 mg   5/15 1.6 4 mg   5/16 2.0 3.5 mg - not given per MAR   5/17 1.8      Assessment/Plan:  Drug Interactions:   Home meds:  aspirin, divalproex , levothyroxine  Pt received azithromycin for 3 days- now off  INR jumped 1.8 today, HGB normal.  Give warfarin 4.5 mg dose po tonight and follow INR trends tomorrow.  Once INR is back into therapeutic range, will look to resume normal home dosing regimen.  Pharmacy will continue to monitor and adjust warfarin therapy as indicated    Thank you for the consult.  Trudi Randhawa RPH  5/17/2025 7:33 AM

## 2025-05-17 NOTE — DISCHARGE INSTRUCTIONS
Internal Medicine Discharge Instruction    Discharge to:  Home  Diet: Regular diet   Activity: As tolerated       Be compliant with medications  Please take medications as prescribed   Please have your INR checked - home health care should be able to draw it         Electronically signed by Deshawn Rich MD on 5/17/2025 at 8:50 AM

## 2025-05-17 NOTE — PLAN OF CARE
Problem: Safety - Adult  Goal: Free from fall injury  Outcome: Progressing     Problem: Discharge Planning  Goal: Discharge to home or other facility with appropriate resources  Outcome: Progressing     Problem: Nutrition Deficit:  Goal: Optimize nutritional status  Outcome: Progressing     Problem: Skin/Tissue Integrity  Goal: Skin integrity remains intact  Description: 1.  Monitor for areas of redness and/or skin breakdown2.  Assess vascular access sites hourly3.  Every 4-6 hours minimum:  Change oxygen saturation probe site4.  Every 4-6 hours:  If on nasal continuous positive airway pressure, respiratory therapy assess nares and determine need for appliance change or resting period  Outcome: Progressing

## 2025-05-18 LAB
MICROORGANISM SPEC CULT: NORMAL
MICROORGANISM SPEC CULT: NORMAL
SERVICE CMNT-IMP: NORMAL
SERVICE CMNT-IMP: NORMAL
SPECIMEN DESCRIPTION: NORMAL
SPECIMEN DESCRIPTION: NORMAL

## 2025-05-19 ENCOUNTER — TELEPHONE (OUTPATIENT)
Dept: FAMILY MEDICINE CLINIC | Age: 80
End: 2025-05-19

## 2025-05-19 ENCOUNTER — RESULTS FOLLOW-UP (OUTPATIENT)
Dept: EMERGENCY DEPT | Age: 80
End: 2025-05-19

## 2025-05-19 ENCOUNTER — CARE COORDINATION (OUTPATIENT)
Dept: CASE MANAGEMENT | Age: 80
End: 2025-05-19

## 2025-05-19 DIAGNOSIS — R06.00 DYSPNEA, UNSPECIFIED TYPE: Primary | ICD-10-CM

## 2025-05-19 PROCEDURE — 1111F DSCHRG MED/CURRENT MED MERGE: CPT | Performed by: FAMILY MEDICINE

## 2025-05-19 NOTE — CARE COORDINATION
Care Transitions Note    Initial Call - Call within 2 business days of discharge: Yes    Patient Current Location:  Home: [address] [place], [place] [id number]    Care Transition Nurse contacted  Pernell, ,  by telephone to perform post hospital discharge assessment, verified patient name and  as identifiers.  Provided introduction to self, and explanation of the Care Transition Nurse role.    Patient: Genesis Stewart    Patient : 1945   MRN: [id number]    Reason for Admission: Transient Disorientation, Dehydration, Viral Upper Resp Tract Infection, Mild Hypoxia   Discharge Date: 25  RURS: Readmission Risk Score: 11.9  Facility: [facility] 25-25    Last Discharge Facility       Date Complaint Diagnosis Description Type Department Provider    25 Altered Mental Status Pneumonia due to infectious organism, unspecified laterality, unspecified part of lung ... ED to Hosp-Admission (Discharged) (ADMITTED) [facility] Deshawn Rich MD; JANET Condon.     Was this an external facility discharge? No    Additional needs identified to be addressed with provider   Patient: Genesis Stewart    Patient : 1945   MRN: [id number]    Reason for Admission: Transient Disorientation, Dehydration, Viral Upper Resp Tract Infection, Mild Hypoxia   Facility: [facility] 25-25    Please contact for scheduling of 7 day hospital follow up/ TCM appt due by 25         Method of communication with provider: chart routing pcp clinical pool.    Patients top risk factors for readmission: caregiver stress, functional physical ability, and medical condition-as above, htn, a fib, copd, Alzheimers Dementia    Interventions to address risk factors:   Review of patient management of conditions/medications: AVX  Home Health: [facility] [phone]   Communication with providers: as above    Care Summary Note: Reports patient doing fairly well however still w/ fatigue, generalized weakness. Reports non-productive

## 2025-05-19 NOTE — CARE COORDINATION
Referral from 5/16, MD was off per staff. Called today 5/19 and awaiting return call with verbal for c. Once verbal received will process hhc referral.

## 2025-05-19 NOTE — TELEPHONE ENCOUNTER
Care Transitions Initial Follow Up Call    Outreach made within 2 business days of discharge: Yes    Patient: Genesis Stewart Patient : 1945   MRN: [id number]  Reason for Admission: Altered Mental Status  Discharge Date: 25       Spoke with: patient    Discharge department/facility: [facility]    TCM Interactive Patient Contact:  Was patient able to fill all prescriptions: Yes  Was patient instructed to bring all medications to the follow-up visit: Yes  Is patient taking all medications as directed in the discharge summary? Yes  Does patient understand their discharge instructions: Yes  Does patient have questions or concerns that need addressed prior to 7-14 day follow up office visit: no    Additional needs identified to be addressed with provider  No needs identified             Scheduled appointment with PCP within 7-14 days    Follow Up  Future Appointments   Date Time Provider Department Center   2025 11:00 AM Ruba Henderson APRN - NP [facility] DEP   2025  2:45 PM Gregorio Beltran APRN - CNP [facility] NEURO Neurology -   2025  1:15 PM [facility] [place]   2025  2:30 PM [facility]   2025 11:30 AM [facility] ECHO [facility]   2025  2:00 PM Barbra Perdomo MD [facility] Heart [facility]       Kiera Patel MA

## 2025-05-20 NOTE — CARE COORDINATION
Pt has to have hosp. f/u visit with pcp office prior to hhc being initiated. Referral cancelled. Notified justyn pts daughter and secondary contact that pt must be seen first them office will call with verbal so hhc can be initiated. Justyn verbalized understanding and kept liaison number.   Daughter called & said she wants hhc to call her dad Pernell 1st at [phone] then her at [phone]. [facility] notified

## 2025-05-21 ENCOUNTER — CARE COORDINATION (OUTPATIENT)
Dept: CASE MANAGEMENT | Age: 80
End: 2025-05-21

## 2025-05-21 ENCOUNTER — OFFICE VISIT (OUTPATIENT)
Dept: FAMILY MEDICINE CLINIC | Age: 80
End: 2025-05-21

## 2025-05-21 ENCOUNTER — TELEPHONE (OUTPATIENT)
Dept: FAMILY MEDICINE CLINIC | Age: 80
End: 2025-05-21

## 2025-05-21 VITALS
DIASTOLIC BLOOD PRESSURE: 80 MMHG | BODY MASS INDEX: 18.58 KG/M2 | WEIGHT: 118.6 LBS | HEART RATE: 71 BPM | OXYGEN SATURATION: 98 % | SYSTOLIC BLOOD PRESSURE: 112 MMHG

## 2025-05-21 DIAGNOSIS — G30.9 MILD ALZHEIMER'S DEMENTIA WITH MOOD DISTURBANCE, UNSPECIFIED TIMING OF DEMENTIA ONSET (HCC): ICD-10-CM

## 2025-05-21 DIAGNOSIS — I48.0 PAROXYSMAL ATRIAL FIBRILLATION (HCC): ICD-10-CM

## 2025-05-21 DIAGNOSIS — E03.9 ACQUIRED HYPOTHYROIDISM: ICD-10-CM

## 2025-05-21 DIAGNOSIS — Z79.01 LONG TERM (CURRENT) USE OF ANTICOAGULANTS: ICD-10-CM

## 2025-05-21 DIAGNOSIS — R53.82 CHRONIC FATIGUE: ICD-10-CM

## 2025-05-21 DIAGNOSIS — R09.81 SINUS CONGESTION: ICD-10-CM

## 2025-05-21 DIAGNOSIS — F02.A3 MILD ALZHEIMER'S DEMENTIA WITH MOOD DISTURBANCE, UNSPECIFIED TIMING OF DEMENTIA ONSET (HCC): ICD-10-CM

## 2025-05-21 DIAGNOSIS — R79.89 ELEVATED LFTS: ICD-10-CM

## 2025-05-21 DIAGNOSIS — J84.9 ILD (INTERSTITIAL LUNG DISEASE) (HCC): ICD-10-CM

## 2025-05-21 DIAGNOSIS — Z09 HOSPITAL DISCHARGE FOLLOW-UP: Primary | ICD-10-CM

## 2025-05-21 RX ORDER — FLUTICASONE PROPIONATE 50 MCG
1 SPRAY, SUSPENSION (ML) NASAL DAILY
Qty: 32 G | Refills: 1 | Status: SHIPPED | OUTPATIENT
Start: 2025-05-21

## 2025-05-21 ASSESSMENT — PATIENT HEALTH QUESTIONNAIRE - PHQ9
1. LITTLE INTEREST OR PLEASURE IN DOING THINGS: NOT AT ALL
SUM OF ALL RESPONSES TO PHQ QUESTIONS 1-9: 0
SUM OF ALL RESPONSES TO PHQ QUESTIONS 1-9: 0
2. FEELING DOWN, DEPRESSED OR HOPELESS: NOT AT ALL
SUM OF ALL RESPONSES TO PHQ QUESTIONS 1-9: 0
SUM OF ALL RESPONSES TO PHQ QUESTIONS 1-9: 0

## 2025-05-21 NOTE — PROGRESS NOTES
Post-Discharge Transitional Care  Follow Up      Genesis Stewart   YOB: 1945    Date of Office Visit:  5/21/2025  Date of Hospital Admission: 5/13/25  Date of Hospital Discharge: 5/17/25  Risk of hospital readmission (high >=14%. Medium >=10%) :Readmission Risk Score: 11.9      Care management risk score Rising risk (score 2-5) and Complex Care (Scores >=6): No Risk Score On File     Non face to face  following discharge, date last encounter closed (first attempt may have been earlier): 05/19/2025    Call initiated 2 business days of discharge: Yes    ASSESSMENT/PLAN:   Hospital discharge follow-up  -     MD DISCHARGE MEDS RECONCILED W/ CURRENT OUTPATIENT MED LIST  Elevated LFTs  -     Comprehensive Metabolic Panel; Future  -     [facility]  ILD (interstitial lung disease) ([facility])  -     [facility]  Acquired hypothyroidism  -     [facility]  Paroxysmal atrial fibrillation ([facility])  -     [facility]  Long term (current) use of anticoagulants  -     [facility]  Chronic fatigue  -     [facility]  Mild Alzheimer's dementia with mood disturbance, unspecified timing of dementia onset ([facility])  -     [facility]  Sinus congestion      Medical Decision Making: moderate complexity  No follow-ups on file.           Subjective:   HPI:  Follow up of Hospital problems/diagnosis(es):   Admitted for pneumonia  See note      Inpatient course: Discharge summary reviewed- see chart.    Interval history/Current status: stable     Patient Active Problem List   Diagnosis    Mixed hyperlipidemia    Allergic rhinitis    Acquired hypothyroidism    Long term current use of anticoagulant    Pacemaker dual chamber    Sciatica    Gout    Essential hypertension    Anemia    Gastroesophageal reflux disease without esophagitis    Hip bursitis    Hearing loss of both ears due to cerumen impaction    VHD (valvular heart disease)    S/P AV

## 2025-05-21 NOTE — TELEPHONE ENCOUNTER
Orders signed for home health care PT and home health care aide today at office visit-Hospital follow-up  Please let home health care know that I ordered a CMP to be done by the home health care nurse in 4 weeks

## 2025-05-21 NOTE — CARE COORDINATION
Care Transitions Note    Follow Up Call     Patient: Genesis Stewart                                Patient : 1945   MRN: [id number]                             Reason for Admission: Transient Disorientation, Dehydration, Viral Upper Resp Tract Infection, Mild Hypoxia   Discharge Date: 25       RURS: Readmission Risk Score: 11.9  Facility: [facility] 25-25    Attempted to reach patient for transitions of care follow up.  Unable to reach patient.      Outreach Attempts:   HIPAA compliant voicemail left for patient, .     Care Summary Note: [facility] referral pending todays pcp appt/rx.     Follow Up Appointment:   Future Appointments         Provider Specialty Dept Phone    2025 2:45 PM Gregorio Beltran, APRN - [facility] Neurology [phone]    2025 1:15 PM [facility] ANTICOAGULATION CLINIC Pharmacy [phone]    2025 2:30 PM Nancy Russo MD Family Medicine [phone]    2025 11:30 AM HEART [facility] ECHO Cardiology [phone]    2025 2:00 PM Barbra Perdomo MD Cardiology [phone]            Plan for follow-up on next business day.  based on severity of symptoms and risk factors. Plan for next call: confirm pcp appt--note to wrap up section, symptoms?, [facility] rx--intake visit scheduled?, start copd ed      Rhianna Mcdowell RN

## 2025-05-21 NOTE — TELEPHONE ENCOUNTER
Severiano from [facility] called. She stated to give her a call if PCP feels pt needs home health and they can get her set up. P# [phone]. Please advise

## 2025-05-22 ENCOUNTER — CARE COORDINATION (OUTPATIENT)
Dept: CASE MANAGEMENT | Age: 80
End: 2025-05-22

## 2025-05-22 NOTE — CARE COORDINATION
10:10 Dorinda SALAZAR gave verbal for CMP to be drawn in 4 wks per Ruba Henderson NP.  Notified [facility] of order per email

## 2025-05-22 NOTE — CARE COORDINATION
Monitoring (RPM) program for in-home monitoring: Yes, but did not enroll at this time: limited patient ability to navigate RPM/equipment.    Assessments:  Care Transitions Subsequent and Final Call    Subsequent and Final Calls  Do you have any ongoing symptoms?: No  Have your medications changed?: No  Do you have any questions related to your medications?: No  Do you currently have any active services?: Yes  Are you currently active with any services?: Home Health  Do you have any needs or concerns that I can assist you with?: No  Care Transitions Interventions  No Identified Needs   Home Care Waiver: Completed  Other Services: Declined (Comment: RPM)      Transportation Support: Declined    Meals on Wheels: Declined  DME Assistance: Declined     Senior Services: Declined    Disease Association: Completed      Other Interventions:              Follow Up Appointment:   Reviewed upcoming appointment(s). and ALICE appointment attended as scheduled   Future Appointments         Provider Specialty Dept Phone    5/27/2025 2:45 PM Gregorio Beltran APRN - [facility] Neurology [phone]    6/5/2025 1:15 PM [facility] ANTICOAGULATION CLINIC Pharmacy [phone]    8/5/2025 2:30 PM Nancy Russo MD Family Medicine [phone]    8/11/2025 11:30 AM HEART [facility] ECHO Cardiology [phone]    8/18/2025 2:00 PM Barbra Perdomo MD Cardiology [phone]            N Care Coordinator provided contact information.  Plan for follow-up call in 6-10 days based on severity of symptoms and risk factors.  Plan for next call: symptom management-.  self management-.      Gina Wilson LPN

## 2025-05-26 NOTE — PROGRESS NOTES
Physician Progress Note      PATIENT:               BRUNO MONTEZ  CSN #:                  [id number]  :                       1945  ADMIT DATE:       2025 3:55 AM  DISCH DATE:        2025 12:13 PM  RESPONDING  PROVIDER #:        Deshawn Rich MD          QUERY TEXT:    Please clarify the patient?s nutritional status:    The clinical indicators include:  - Ht.= 170.2cm, Wt. = 52.4 kg, BMI=18.09  -Dietician notes \"Mild weight loss noted in past year but current BMI low   for age over 65. Will follow at high nutrition risk at this time with concern   for adequate intake.\"  -Dietician recommends: 1. Continue no added salt diet as needed  2. Continue to offer oral nutrition supplement, encourage intake  3. Please document all PO intakes in I/O  4. Assist/supervise meals as needed  5. Will continue to follow up during stay  Options provided:  -- Underweight with BMI 18.09  -- Other - I will add my own diagnosis  -- Disagree - Not applicable / Not valid  -- Disagree - Clinically unable to determine / Unknown  -- Refer to Clinical Documentation Reviewer    PROVIDER RESPONSE TEXT:    This patient is underweight with a BMI of 18.09    Query created by: Cynthia Perrin on 5/15/2025 1:41 PM      Electronically signed by:  Deshawn Rich MD 2025 8:34 AM

## 2025-05-27 ENCOUNTER — OFFICE VISIT (OUTPATIENT)
Age: 80
End: 2025-05-27
Payer: MEDICARE

## 2025-05-27 VITALS
BODY MASS INDEX: 18.86 KG/M2 | OXYGEN SATURATION: 96 % | SYSTOLIC BLOOD PRESSURE: 124 MMHG | HEART RATE: 71 BPM | WEIGHT: 120.4 LBS | DIASTOLIC BLOOD PRESSURE: 72 MMHG

## 2025-05-27 DIAGNOSIS — G30.1 MODERATE LATE ONSET ALZHEIMER'S DEMENTIA WITH ANXIETY (HCC): Primary | ICD-10-CM

## 2025-05-27 DIAGNOSIS — F02.B4 MODERATE LATE ONSET ALZHEIMER'S DEMENTIA WITH ANXIETY (HCC): Primary | ICD-10-CM

## 2025-05-27 DIAGNOSIS — Z95.0 PACEMAKER: ICD-10-CM

## 2025-05-27 PROCEDURE — G8427 DOCREV CUR MEDS BY ELIG CLIN: HCPCS | Performed by: NURSE PRACTITIONER

## 2025-05-27 PROCEDURE — 1159F MED LIST DOCD IN RCRD: CPT | Performed by: NURSE PRACTITIONER

## 2025-05-27 PROCEDURE — G8420 CALC BMI NORM PARAMETERS: HCPCS | Performed by: NURSE PRACTITIONER

## 2025-05-27 PROCEDURE — 99214 OFFICE O/P EST MOD 30 MIN: CPT | Performed by: NURSE PRACTITIONER

## 2025-05-27 PROCEDURE — 1090F PRES/ABSN URINE INCON ASSESS: CPT | Performed by: NURSE PRACTITIONER

## 2025-05-27 PROCEDURE — 1036F TOBACCO NON-USER: CPT | Performed by: NURSE PRACTITIONER

## 2025-05-27 PROCEDURE — 3078F DIAST BP <80 MM HG: CPT | Performed by: NURSE PRACTITIONER

## 2025-05-27 PROCEDURE — G8399 PT W/DXA RESULTS DOCUMENT: HCPCS | Performed by: NURSE PRACTITIONER

## 2025-05-27 PROCEDURE — 1123F ACP DISCUSS/DSCN MKR DOCD: CPT | Performed by: NURSE PRACTITIONER

## 2025-05-27 PROCEDURE — 1111F DSCHRG MED/CURRENT MED MERGE: CPT | Performed by: NURSE PRACTITIONER

## 2025-05-27 PROCEDURE — 3074F SYST BP LT 130 MM HG: CPT | Performed by: NURSE PRACTITIONER

## 2025-05-27 NOTE — PROGRESS NOTES
5/27/25    Genesis Stewart  1945    Chief Complaint   Patient presents with    Follow-up     Patient here for 3 month f/u on Mild late onset Alzheimer's dementia with anxiety (HCC), was seen recently at hospital, consulted by Dr Stewart. Per , everything the same since discharge        History of Present Illness  Genesis is a 79 y.o. female presenting today for hospital follow-up evaluated by Dr. Stewart on 5/16/2025 for worsening confusion and generalized weakness found to have rhinovirus.  She has a history of dementia, follows with our office.  Acute encephalopathy superimposed upon baseline dementia was suspected.  She  did become combative overnight which was felt to be secondary to hospital-acquired delirium.  Her Depakote was adjusted to 125 mg 3 times daily to help with behavior changes.  Her Aricept 10 mg and Namenda 10 mg twice daily was continued.  Last MMSE was 18/27 on 2/14/2024    Today, she is accompanied by her spouse.  Her hallucinations have resolved.  She continues to have generalized weakness, is about to start PT at home.  She still has a cough and being treated for pneumonia.  She is back on Depakote 125 nightly and continues both Namenda and Aricept.  Spouse has no safety concerns, she does not try to wander outside the home.  He does manage her medications, finances, does the cooking and most of the cleaning.  Her appetite has decreased.      Current Outpatient Medications   Medication Sig Dispense Refill    fluticasone (FLONASE) 50 MCG/ACT nasal spray 1 spray by Each Nostril route daily 32 g 1    guaiFENesin (MUCINEX) 600 MG extended release tablet Take 1 tablet by mouth 2 times daily for 10 days 20 tablet 0    levothyroxine (SYNTHROID) 50 MCG tablet Take 1 tablet by mouth every morning (before breakfast) 30 tablet 0    metoprolol succinate (TOPROL XL) 25 MG extended release tablet Take 1 tablet by mouth daily 90 tablet 3    donepezil (ARICEPT) 10 MG tablet Take 1 tablet by

## 2025-05-29 DIAGNOSIS — Z79.01 LONG TERM CURRENT USE OF ANTICOAGULANT: Primary | ICD-10-CM

## 2025-05-29 DIAGNOSIS — I48.20 CHRONIC ATRIAL FIBRILLATION (HCC): ICD-10-CM

## 2025-05-30 ENCOUNTER — TELEPHONE (OUTPATIENT)
Dept: FAMILY MEDICINE CLINIC | Age: 80
End: 2025-05-30

## 2025-05-30 ENCOUNTER — CARE COORDINATION (OUTPATIENT)
Dept: CASE MANAGEMENT | Age: 80
End: 2025-05-30

## 2025-05-30 DIAGNOSIS — R05.9 COUGH, UNSPECIFIED TYPE: ICD-10-CM

## 2025-05-30 DIAGNOSIS — R06.00 DYSPNEA, UNSPECIFIED TYPE: ICD-10-CM

## 2025-05-30 DIAGNOSIS — J18.9 PNEUMONIA DUE TO INFECTIOUS ORGANISM, UNSPECIFIED LATERALITY, UNSPECIFIED PART OF LUNG: Primary | ICD-10-CM

## 2025-05-30 NOTE — TELEPHONE ENCOUNTER
Received a call from Anabella from [facility] wanted to inform the provider is still having issues with cough, stated that there is Scattered rhonchi, along with a wet cough, and painful.  Stated that the patient does not have anything to help with this, would like to know if the provider could order her something to help.  Or what would be advised for Home Care to do at this time.  Please advise.     Phone # [phone]

## 2025-05-30 NOTE — CARE COORDINATION
Care Transitions Note    Follow Up Call     Reason for Admission: Transient Disorientation, Dehydration, Viral Upper Resp Tract Infection, Mild Hypoxia     Patient Current Location:  Home: [address] [place] [address] Care Coordinator contacted the patient by telephone. Verified name and  as identifiers.    Additional needs identified to be addressed with provider   No needs identified                 Method of communication with provider: none.    Care Summary Note: Spoke with  Pernell who reported that patient is doing good.  stated that patient just completed a session with [facility] PT and the SN is suppose to visit later this afternoon so she is currently resting.  stated that patient still has a cough but it is improving.  denied any c/o cp, sob, dizziness, headache, n/v, diarrhea, abdominal pains, fever, or chills from patient.  report that patient's appetite and fluid intake is good and denied any problems with bowel or bladder.  reported that patient is taking all medications as ordered.  denied any other needs at this time.  instructed to continue to monitor s/s, reporting any that may present to MD immediately for early intervention.  is agreeable to f/u calls.     Plan of care updates since last contact:         Advance Care Planning   The patient has the following advanced directives on file:  Advance Directives       Power of  Living Will ACP-Advance Directive ACP-Power of     Not on File Not on File Not on File Not on File            The patient has appointed the following active healthcare agents:    Primary Decision Maker: Pernell Stewart Primary Healthcare Decision Maker - Spouse - [phone]    Secondary Decision Maker: Paz Johnson Secondary Healthcare Decison Maker - Child - [phone]    Medication Review:  No changes since last call.     Remote Patient Monitoring:  Offered patient enrollment in the

## 2025-06-02 ENCOUNTER — TELEPHONE (OUTPATIENT)
Dept: FAMILY MEDICINE CLINIC | Age: 80
End: 2025-06-02

## 2025-06-02 RX ORDER — BENZONATATE 100 MG/1
CAPSULE ORAL
Qty: 30 CAPSULE | Refills: 0 | Status: SHIPPED | OUTPATIENT
Start: 2025-06-02

## 2025-06-02 RX ORDER — DOXYCYCLINE HYCLATE 100 MG
100 TABLET ORAL 2 TIMES DAILY
Qty: 20 TABLET | Refills: 0 | Status: SHIPPED | OUTPATIENT
Start: 2025-06-02 | End: 2025-06-12

## 2025-06-02 NOTE — TELEPHONE ENCOUNTER
Mariela Called and spoke with pt and .  mentioned that pt has still been pretty congested and the Home Health nurse suggested she may need another chest xray and possible antibiotics.

## 2025-06-02 NOTE — TELEPHONE ENCOUNTER
Called and spoke with pt and .  mentioned that pt has still been pretty congested and the Home Health nurse suggested she may need another chest xray and possible antibiotics.

## 2025-06-02 NOTE — TELEPHONE ENCOUNTER
Called patient (who has moderate dementia) and spoke with  that patient finished antibiotics last Tuesday but still has cough that wears her out.    Patient finished 10 day course last Tuesday        aware I ordered CXR, CMP, CBC, and pro-BNP and to pickup doxycycline this evening and to keep appt with Ruba this Thursday to be evaluated in person.

## 2025-06-03 ENCOUNTER — HOSPITAL ENCOUNTER (OUTPATIENT)
Dept: GENERAL RADIOLOGY | Age: 80
Discharge: HOME | End: 2025-06-03
Payer: MEDICARE

## 2025-06-03 ENCOUNTER — HOSPITAL ENCOUNTER (OUTPATIENT)
Age: 80
Discharge: HOME | End: 2025-06-03
Payer: MEDICARE

## 2025-06-03 DIAGNOSIS — J18.9 PNEUMONIA DUE TO INFECTIOUS ORGANISM, UNSPECIFIED LATERALITY, UNSPECIFIED PART OF LUNG: ICD-10-CM

## 2025-06-03 DIAGNOSIS — R05.9 COUGH, UNSPECIFIED TYPE: ICD-10-CM

## 2025-06-03 DIAGNOSIS — R06.00 DYSPNEA, UNSPECIFIED TYPE: ICD-10-CM

## 2025-06-03 LAB
ALBUMIN SERPL-MCNC: 3.5 G/DL (ref 3.4–5)
ALBUMIN/GLOB SERPL: 1.1 {RATIO} (ref 1.1–2.2)
ALP SERPL-CCNC: 65 U/L (ref 40–129)
ALT SERPL-CCNC: 31 U/L (ref 10–40)
ANION GAP SERPL CALCULATED.3IONS-SCNC: 10 MMOL/L (ref 9–17)
AST SERPL-CCNC: 33 U/L (ref 15–37)
BILIRUB SERPL-MCNC: 1.1 MG/DL (ref 0–1)
BNP SERPL-MCNC: 887 PG/ML (ref 0–450)
BUN SERPL-MCNC: 13 MG/DL (ref 7–20)
CALCIUM SERPL-MCNC: 9.7 MG/DL (ref 8.3–10.6)
CHLORIDE SERPL-SCNC: 101 MMOL/L (ref 99–110)
CO2 SERPL-SCNC: 28 MMOL/L (ref 21–32)
CREAT SERPL-MCNC: 0.9 MG/DL (ref 0.6–1.2)
ERYTHROCYTE [DISTWIDTH] IN BLOOD BY AUTOMATED COUNT: 13.9 % (ref 11.7–14.9)
GFR, ESTIMATED: 62 ML/MIN/1.73M2
GLUCOSE SERPL-MCNC: 97 MG/DL (ref 74–99)
HCT VFR BLD AUTO: 44.6 % (ref 37–47)
HGB BLD-MCNC: 14.1 G/DL (ref 12.5–16)
MCH RBC QN AUTO: 30.1 PG (ref 27–31)
MCHC RBC AUTO-ENTMCNC: 31.6 G/DL (ref 32–36)
MCV RBC AUTO: 95.3 FL (ref 78–100)
PLATELET # BLD AUTO: 171 K/UL (ref 140–440)
PMV BLD AUTO: 10.7 FL (ref 7.5–11.1)
POTASSIUM SERPL-SCNC: 4.4 MMOL/L (ref 3.5–5.1)
PROT SERPL-MCNC: 6.8 G/DL (ref 6.4–8.2)
RBC # BLD AUTO: 4.68 M/UL (ref 4.2–5.4)
SODIUM SERPL-SCNC: 139 MMOL/L (ref 136–145)
WBC OTHER # BLD: 5.2 K/UL (ref 4–10.5)

## 2025-06-03 PROCEDURE — 71046 X-RAY EXAM CHEST 2 VIEWS: CPT

## 2025-06-03 PROCEDURE — 80053 COMPREHEN METABOLIC PANEL: CPT

## 2025-06-03 PROCEDURE — 85027 COMPLETE CBC AUTOMATED: CPT

## 2025-06-03 PROCEDURE — 83880 ASSAY OF NATRIURETIC PEPTIDE: CPT

## 2025-06-04 ENCOUNTER — OFFICE VISIT (OUTPATIENT)
Dept: FAMILY MEDICINE CLINIC | Age: 80
End: 2025-06-04
Payer: MEDICARE

## 2025-06-04 VITALS
BODY MASS INDEX: 18.48 KG/M2 | DIASTOLIC BLOOD PRESSURE: 80 MMHG | OXYGEN SATURATION: 97 % | WEIGHT: 118 LBS | HEART RATE: 69 BPM | SYSTOLIC BLOOD PRESSURE: 120 MMHG

## 2025-06-04 DIAGNOSIS — J18.9 PNEUMONIA DUE TO INFECTIOUS ORGANISM, UNSPECIFIED LATERALITY, UNSPECIFIED PART OF LUNG: Primary | ICD-10-CM

## 2025-06-04 PROCEDURE — 1090F PRES/ABSN URINE INCON ASSESS: CPT | Performed by: NURSE PRACTITIONER

## 2025-06-04 PROCEDURE — 99213 OFFICE O/P EST LOW 20 MIN: CPT | Performed by: NURSE PRACTITIONER

## 2025-06-04 PROCEDURE — 3079F DIAST BP 80-89 MM HG: CPT | Performed by: NURSE PRACTITIONER

## 2025-06-04 PROCEDURE — 1123F ACP DISCUSS/DSCN MKR DOCD: CPT | Performed by: NURSE PRACTITIONER

## 2025-06-04 PROCEDURE — G8399 PT W/DXA RESULTS DOCUMENT: HCPCS | Performed by: NURSE PRACTITIONER

## 2025-06-04 PROCEDURE — G8427 DOCREV CUR MEDS BY ELIG CLIN: HCPCS | Performed by: NURSE PRACTITIONER

## 2025-06-04 PROCEDURE — G8419 CALC BMI OUT NRM PARAM NOF/U: HCPCS | Performed by: NURSE PRACTITIONER

## 2025-06-04 PROCEDURE — 1036F TOBACCO NON-USER: CPT | Performed by: NURSE PRACTITIONER

## 2025-06-04 PROCEDURE — 3074F SYST BP LT 130 MM HG: CPT | Performed by: NURSE PRACTITIONER

## 2025-06-04 PROCEDURE — 1111F DSCHRG MED/CURRENT MED MERGE: CPT | Performed by: NURSE PRACTITIONER

## 2025-06-04 PROCEDURE — 1159F MED LIST DOCD IN RCRD: CPT | Performed by: NURSE PRACTITIONER

## 2025-06-04 PROCEDURE — 1160F RVW MEDS BY RX/DR IN RCRD: CPT | Performed by: NURSE PRACTITIONER

## 2025-06-04 RX ORDER — AZELASTINE HYDROCHLORIDE 137 UG/1
SPRAY, METERED NASAL
COMMUNITY

## 2025-06-04 NOTE — PROGRESS NOTES
2025     Genesis Stewart (:  1945) is a 79 y.o. female, here for evaluation of the following medical concerns:    History of Present Illness     Pneumonia follow up     CXR yesterday -No radiographic evidence of acute intrathoracic abnormality.     CXR two weeks ago - 5/15/25   Mild patchy right lower lobe pulmonary infiltrate consistent with pneumonia.   Small dependent bilateral pleural effusions.      Ladi shweta and doxy ordered by dr russo yesterday for cough congestion       Cefdinir completed 25 -      Doing better   Ladi shweta helping w cough   Denies shortness of breath fever sweating chills         Prior to Visit Medications    Medication Sig Taking? Authorizing Provider   doxycycline hyclate (VIBRA-TABS) 100 MG tablet Take 1 tablet by mouth 2 times daily for 10 days Yes Nancy Russo MD   benzonatate (TESSALON PERLES) 100 MG capsule 1-2 every 8 hours as needed for cough Yes Nancy Russo MD   fluticasone (FLONASE) 50 MCG/ACT nasal spray 1 spray by Each Nostril route daily Yes Ruba Henderson, APRN - NP   albuterol sulfate HFA (PROVENTIL;VENTOLIN;PROAIR) 108 (90 Base) MCG/ACT inhaler Inhale 2 puffs into the lungs every 6 hours as needed for Wheezing Yes Deshawn Rich MD   levothyroxine (SYNTHROID) 50 MCG tablet Take 1 tablet by mouth every morning (before breakfast) Yes Deshawn Rich MD   metoprolol succinate (TOPROL XL) 25 MG extended release tablet Take 1 tablet by mouth daily Yes Jessie Marina APRN - CNP   donepezil (ARICEPT) 10 MG tablet Take 1 tablet by mouth daily Yes Gregorio Beltran APRN - CNP   memantine (NAMENDA) 10 MG tablet Take 1 tablet by mouth 2 times daily Yes Gregorio Beltran APRN - CNP   divalproex (DEPAKOTE) 125 MG DR tablet Take 1 tablet by mouth nightly Yes Gregorio Beltran APRN - CNP   warfarin (COUMADIN) 3 MG tablet 1.5 (4.5mg dose) tablets on  and ,  1 tablet (3mg dose) all

## 2025-06-05 ENCOUNTER — ANTI-COAG VISIT (OUTPATIENT)
Dept: PHARMACY | Age: 80
End: 2025-06-05
Payer: MEDICARE

## 2025-06-05 LAB
INTERNATIONAL NORMALIZATION RATIO, POC: 2.1
POC INR: 2.1 (ref 0.9–1.2)
PROTHROMBIN TIME, POC: 0

## 2025-06-05 PROCEDURE — 85610 PROTHROMBIN TIME: CPT

## 2025-06-05 PROCEDURE — 99211 OFF/OP EST MAY X REQ PHY/QHP: CPT

## 2025-06-05 NOTE — PROGRESS NOTES
Medication Management Service  [facility]  [phone]    Visit Date: 6/5/2025   Subjective:       Genesis Stewart is a 79 y.o. female who presents to clinic today for anticoagulation monitoring and adjustment.    Patient seen in clinic for warfarin management due to  Indication:   atrial fibrillation.   INR goal: of 2.0-3.0.  Duration of therapy: indefinite.    Patient reports the following:   Adherent with regimen  Missed or extra doses:  None   Bleeding or thromboembolic side effects:  None  Significant medication changes: saw PCP yesterday, antibiotic stoppe. On Depakote now- can increase INR  Significant dietary changes: None  Significant alcohol or tobacco changes: None  Significant recent illness, disease state changes, or hospitalization:  [facility] 5/13-5/17 pneumonia  Upcoming surgeries or procedures:  None  Falls: None           Assessment and Plan     PT/INR done in office per protocol.   INR today is 2.1, therapeutic.          Plan:  Will continue current regimen of warfarin 3mg daily except 4.5mg on Tuesdays and Fridays  Recheck INR in 4 week(s).     Patient verbalized understanding of dosing directions and information discussed. Dosing schedule given to patient including phone number, appointment date, and time. Progress note sent to referring office.    Electronically signed by Taylor Lew RPH on 6/5/25     For Pharmacy Admin Tracking Only    Intervention Detail:   Total # of Interventions Recommended:   Total # of Interventions Accepted:   Time Spent (min): 15

## 2025-06-06 ENCOUNTER — CARE COORDINATION (OUTPATIENT)
Dept: CASE MANAGEMENT | Age: 80
End: 2025-06-06

## 2025-06-06 NOTE — CARE COORDINATION
Care Transitions Note    Follow Up Call     Patient: Genesis Stewart                                Patient : 1945   MRN: [id number]                             Reason for Admission: Transient Disorientation, Dehydration, Viral Upper Resp Tract Infection, Mild Hypoxia   Discharge Date: 25       RURS: Readmission Risk Score: 11.9  Facility: [facility] 25-25    Patient Current Location:  Home: [address] [place] [address]    Care Transition Nurse contacted Pernell, , by telephone. Verified name and  as identifiers.    Additional needs identified to be addressed with provider   No needs identified       Method of communication with provider: none.    Care Summary Note: Reports patient was started on Doxycycline, Tessalon 25 per PCP for ongoing cough as well as labs, repeat cxr. 6/3/25 CXR= non-acute. Patient was seen by MARTIN Henderson NP 25  who advised stopping atb as xray clear, no pna. Reports patient still has non-productive cough however less frequent, less intense. Was taking Tessalon prn however patient has decided to stop this rx as causing hot lashes. Denies need for alternate rx. Reports chronic sob on over exertion at baseline. No home oxygen and has not needed prn Albuterol inhaler. Denies wheezing, ac resp distress, fever. Reports patient remains fatigued. Advised rest, pacing activity, gentle daily ambulation. Stressed importance of proper hydration and nutrition. Advised to contact pcp asap should symptoms worsen or not resolve. Reports appetite, fluid intake good w/ b&b wnl.  Reports patient active w/ [facility] for SN, PT, OT. Denies adidtional resource needs.     Plan of care updates since last contact:  Review of patient management of conditions/medications: as above     Medication Review:  Medications changed since last call, reviewed today.     Remote Patient Monitoring:  Offered patient enrollment in the Remote Patient Monitoring (RPM) program for in-home monitoring:

## 2025-06-11 ENCOUNTER — TELEPHONE (OUTPATIENT)
Dept: FAMILY MEDICINE CLINIC | Age: 80
End: 2025-06-11

## 2025-06-11 NOTE — TELEPHONE ENCOUNTER
Iveth from [facility] called and wanted to let the provider know that patient blood pressure was 100/62 on 6/10, 89/61 6/11.  Stated that patient is not systematic at this time.  Stated that spouse is going to hold the  metoprolol. Iveth would like to know what perimeters provider would advise at this time.    Phone   Please advise.

## 2025-06-12 ENCOUNTER — TELEPHONE (OUTPATIENT)
Dept: CARDIOLOGY CLINIC | Age: 80
End: 2025-06-12

## 2025-06-12 ENCOUNTER — CARE COORDINATION (OUTPATIENT)
Dept: CASE MANAGEMENT | Age: 80
End: 2025-06-12

## 2025-06-12 NOTE — TELEPHONE ENCOUNTER
Called patient's .  No answer and answering machine did not indicate the owner.       Called [facility]- spoke with Iveth,, RN  Patient should cut her metoprolol in 1/2 (not stop it cold turkey) and she'll try to get a hold of  and is also aware [facility] cardiology is managing pt's bp meds.

## 2025-06-12 NOTE — TELEPHONE ENCOUNTER
Called to schedule pt per Dr. Perdomo due to low BP. Scheduled for 6/13/2025 at 1500. Pt's  (primary caregiver) voiced understanding.

## 2025-06-12 NOTE — CARE COORDINATION
Care Transitions Note    Follow Up Call     Patient: Genesis Stewart                                Patient : 1945   MRN: [id number]                             Reason for Admission: Transient Disorientation, Dehydration, Viral Upper Resp Tract Infection, Mild Hypoxia   Discharge Date: 25       RURS: Readmission Risk Score: 11.9  Facility: [facility] 25-25    Patient Current Location:  Home: [address] [place] [address]    Care Transition Nurse contacted patient by telephone. Verified name and  as identifiers.    Additional needs identified to be addressed with provider   Patient: Genesis Stewart                                Patient : 1945   MRN: [id number]                             Reason for Admission: Transient Disorientation, Dehydration, Viral Upper Resp Tract Infection, Mild Hypoxia   Facility: [facility] 25-25    FERNIE Iqbal NP, [facility] Clinical Pool:  Patient with hx of htn however bp has been running low. 6/10/25 /62, 25 BP 89/61, 25 /67. [facility] contacted pcp who advised only taking 1/2 dose Metoprolol.     Please contact patient's  (poa) asap for further advisement and possible asap appt.     Thank You,  Rhianna Mcdowell RN, [phone]       Method of communication with provider: chart routing as above high priority.    Care Summary Note: Patient with hx of htn however bp has been running low. 6/10/25 /62, 25 BP 89/61, 25 /67. Denies patient reports cp, palpitations, dizziness, worsening fatigue, gen weakness or acute distress. [facility] contacted pcp who advised only taking 1/2 dose Metoprolol; patient has reduced dosage as directed. Agreeable for CTN to notify cardiology--see above. Reviewed hypotension and safety interventions including:  Tighten/release fists and calf muscles prior to standing  Rise slowly  Avoid sudden head movements  Use cane or walker prn, have assistance w/ position

## 2025-06-13 ENCOUNTER — OFFICE VISIT (OUTPATIENT)
Dept: CARDIOLOGY CLINIC | Age: 80
End: 2025-06-13
Payer: MEDICARE

## 2025-06-13 VITALS
WEIGHT: 118.8 LBS | BODY MASS INDEX: 18.65 KG/M2 | DIASTOLIC BLOOD PRESSURE: 60 MMHG | SYSTOLIC BLOOD PRESSURE: 90 MMHG | HEART RATE: 68 BPM | HEIGHT: 67 IN

## 2025-06-13 DIAGNOSIS — I95.2 HYPOTENSION DUE TO DRUGS: Primary | ICD-10-CM

## 2025-06-13 PROCEDURE — 3074F SYST BP LT 130 MM HG: CPT | Performed by: INTERNAL MEDICINE

## 2025-06-13 PROCEDURE — G8399 PT W/DXA RESULTS DOCUMENT: HCPCS | Performed by: INTERNAL MEDICINE

## 2025-06-13 PROCEDURE — 1036F TOBACCO NON-USER: CPT | Performed by: INTERNAL MEDICINE

## 2025-06-13 PROCEDURE — 1123F ACP DISCUSS/DSCN MKR DOCD: CPT | Performed by: INTERNAL MEDICINE

## 2025-06-13 PROCEDURE — 1090F PRES/ABSN URINE INCON ASSESS: CPT | Performed by: INTERNAL MEDICINE

## 2025-06-13 PROCEDURE — G8420 CALC BMI NORM PARAMETERS: HCPCS | Performed by: INTERNAL MEDICINE

## 2025-06-13 PROCEDURE — G8427 DOCREV CUR MEDS BY ELIG CLIN: HCPCS | Performed by: INTERNAL MEDICINE

## 2025-06-13 PROCEDURE — 99214 OFFICE O/P EST MOD 30 MIN: CPT | Performed by: INTERNAL MEDICINE

## 2025-06-13 PROCEDURE — 1111F DSCHRG MED/CURRENT MED MERGE: CPT | Performed by: INTERNAL MEDICINE

## 2025-06-13 PROCEDURE — 3078F DIAST BP <80 MM HG: CPT | Performed by: INTERNAL MEDICINE

## 2025-06-13 PROCEDURE — 1159F MED LIST DOCD IN RCRD: CPT | Performed by: INTERNAL MEDICINE

## 2025-06-13 NOTE — PROGRESS NOTES
CLINICAL STAFF DOCUMENTATION    Dr. Barbra Stewart  1945  [id number]    Have you had any Chest Pain recently? - No    Have you had any Shortness of Breath - Yes  When did it begin? - Years   If Yes - When on exertion  How many flights of stairs can you go up without having SOB? - >1  Are you on Oxygen during the day or at night? - No  How many pillows do you sleep with under your head? - 1    Have you had any dizziness - No  Have you had any palpitations recently? - No  Do you have any edema - swelling in No      When did you have your last labs drawn 6/25  What doctor ordered Rafaela  Do we have the labs in their chart Yes  If we do not have the labs, ask where they were drawn     Do you need any prescriptions refilled? - No    Do you have a surgery or procedure scheduled in the near future - No    Do use tobacco products? - No  Do you drink alcohol? - No  Do you use any illicit drugs? - No  Caffeine? - No  How much caffeine?     Check medication list thoroughly!!! AND RECONCILE OUTSIDE MEDICATIONS  If dose has changed change the entire order not just the MG  BE SURE TO ASK PATIENT IF THEY NEED MEDICATION REFILLS  Verify Pharmacy and update if incorrect

## 2025-06-13 NOTE — PROGRESS NOTES
CARDIOLOGY NOTE      6/13/2025    RE: Genesis Stewart  (1945)                               TO:  Dr. Russo, Nancy Perales MD            CHIEF COMPLAINT   Genesis is a 79 y.o. female who was seen today for management of  A fib                                    HPI:   Patient is here for    - Atrial fibrillation, pt is  compliant with meds. Patient does not have symptoms from atrial fibrillation  - Hypertension,is  well controlled, pt is  compliant with medicines  - Hyperlipidimea, lipids are in acceptable range. Pt  is  compliant with medicines  - ppm  Compliant with carelink    Here for Low BP                Genesis Stewart has the following history recorded in care path:  Patient Active Problem List    Diagnosis Date Noted    Essential hypertension 12/18/2014    Long term current use of anticoagulant     Acquired hypothyroidism 04/24/2012    Varicose veins of both legs with edema 12/15/2022    Alzheimer's disease, unspecified 11/16/2022    Atherosclerotic heart disease of native coronary artery with other forms of angina pectoris 06/07/2022    Leg pain 06/07/2022    Chronic atrial fibrillation (HCC) 05/19/2022    Dyspnea     Chest pain 04/29/2022    Gastroesophageal reflux disease without esophagitis 09/08/2015    Anemia 12/22/2014    Gout 09/23/2013    Pacemaker dual chamber 10/24/2012    Sciatica     Mixed hyperlipidemia 10/24/2011    Hip bursitis 04/14/2016    Allergic rhinitis 10/24/2011    ILD (interstitial lung disease) (HCC) 05/21/2025    Altered mental status 05/13/2025    Hyperbilirubinemia- improved 2024, needs repeat 2025 and GI if still elevated, 08/14/2024    Secondary hypercoagulable state 05/21/2024    Memory problem 01/25/2022    Hearing loss of both ears due to cerumen impaction 03/27/2018    S/P AV (atrioventricular) kathi ablation 06/05/2014    VHD (valvular heart disease) 06/06/2012     Current Outpatient Medications   Medication Sig Dispense Refill    azelastine

## 2025-06-17 ENCOUNTER — HOSPITAL ENCOUNTER (OUTPATIENT)
Age: 80
Setting detail: SPECIMEN
Discharge: HOME | End: 2025-06-17
Payer: MEDICARE

## 2025-06-17 ENCOUNTER — CARE COORDINATION (OUTPATIENT)
Dept: CASE MANAGEMENT | Age: 80
End: 2025-06-17

## 2025-06-17 LAB
ALBUMIN SERPL-MCNC: 3.6 G/DL (ref 3.4–5)
ALBUMIN/GLOB SERPL: 1.5 {RATIO} (ref 1.1–2.2)
ALP SERPL-CCNC: 54 U/L (ref 40–129)
ALT SERPL-CCNC: 32 U/L (ref 10–40)
ANION GAP SERPL CALCULATED.3IONS-SCNC: 9 MMOL/L (ref 9–17)
AST SERPL-CCNC: 33 U/L (ref 15–37)
BILIRUB SERPL-MCNC: 1.2 MG/DL (ref 0–1)
BUN SERPL-MCNC: 14 MG/DL (ref 7–20)
CALCIUM SERPL-MCNC: 9.1 MG/DL (ref 8.3–10.6)
CHLORIDE SERPL-SCNC: 101 MMOL/L (ref 99–110)
CO2 SERPL-SCNC: 28 MMOL/L (ref 21–32)
CREAT SERPL-MCNC: 0.8 MG/DL (ref 0.6–1.2)
GFR, ESTIMATED: 65 ML/MIN/1.73M2
GLUCOSE SERPL-MCNC: 92 MG/DL (ref 74–99)
POTASSIUM SERPL-SCNC: 3.8 MMOL/L (ref 3.5–5.1)
PROT SERPL-MCNC: 6 G/DL (ref 6.4–8.2)
SODIUM SERPL-SCNC: 137 MMOL/L (ref 136–145)

## 2025-06-17 PROCEDURE — 80053 COMPREHEN METABOLIC PANEL: CPT

## 2025-06-17 NOTE — CARE COORDINATION
Care Transitions Note    Final Call     Patient: Genesis Stewart                                Patient : 1945   MRN: [id number]                             Reason for Admission: Transient Disorientation, Dehydration, Viral Upper Resp Tract Infection, Mild Hypoxia   Discharge Date: 25       RURS: Readmission Risk Score: 11.9  Facility: [facility] 25-25    Patient Current Location:  Home: [address] [place] [address]    Care Transition Nurse contacted patient,  by telephone. Verified name and  as identifiers.    Patient graduated from the Care Transitions program on 25.  Patient/family verbalizes confidence in the ability to self-manage at this time..      Advance Care Planning:   Does patient have an Advance Directive: patient declined education.    Handoff:   Patient was not referred to the ACM team due to patient declined services.      Care Summary Note:  reports patient doing well. BP still running low;  reports was 92/60 today. Reviewed Dr Perdomo's recommendations on 25:  BB (Metoprolol) dose decreassed to 12.5 BID  Increase fluid intake  Hold if SBP  <90   verbalizes understanding of Dr Espinoza instructions and patient adherence. Denies patient reported dizziness, light headedness, chest pain, ac distress. Reviewed hypotension and fall safety interventions including:  Tighten/release fists and calf muscles prior to standing  Rise slowly  Avoid sudden head movements  Use cane or walker prn, have assistance w/ position changes/ambulation prn  Compression stockings  Use caution with meds  Proper hydration, nutrition  Ensure clear path/walk way when ambulating; secure cords, throw rugs, pets, avoid clutter  Reports patient taking all rx as directed, no refill needs. Reports appetite, fluid intake good w/ b&b wnl. Active w/ [facility]; was seen today by RN. Reminded of [facility] / RN phone coverage to discuss patient healthcare needs, concerns. Denies additional

## 2025-06-18 ENCOUNTER — RESULTS FOLLOW-UP (OUTPATIENT)
Dept: FAMILY MEDICINE CLINIC | Age: 80
End: 2025-06-18

## 2025-06-20 LAB
ARTERIAL PATENCY WRIST A: NORMAL
BDY SITE: NORMAL
BODY TEMPERATURE: NORMAL
BREATHS.MECHANICAL ON VENT: NORMAL
COHGB MFR BLD: NORMAL % (ref 0–5)
FIO2 ON VENT: NORMAL %
GAS FLOW.O2 O2 DELIVERY SYS: NORMAL L/MIN
GAS FLOW.O2 SETTING OXYMISER: NORMAL L/MIN
HCO3 VENOUS: NORMAL MMOL/L (ref 24–30)
HGB BLDMV-MCNC: NORMAL G/DL (ref 12–16)
METHEMOGLOBIN: NORMAL % (ref 0–1.5)
NEGATIVE BASE EXCESS, VEN: NORMAL MMOL/L (ref 0–2)
NOTIFICATION TIME: NORMAL
NOTIFICATION: NORMAL
O2 SAT, VEN: NORMAL % (ref 60–85)
OXYHGB MFR BLD: NORMAL % (ref 95–98)
PCO2 VENOUS: NORMAL MM HG (ref 39–55)
PCO2, VEN, TEMP ADJ: NORMAL MMHG (ref 39–55)
PEEP RESPIRATORY: NORMAL CM[H2O]
PH VENOUS: NORMAL (ref 7.32–7.42)
PH, VEN, TEMP ADJ: NORMAL (ref 7.32–7.42)
PO2 VENOUS: NORMAL MM HG (ref 30–50)
PO2, VEN, TEMP ADJ: NORMAL MMHG (ref 30–50)
POSITIVE BASE EXCESS, VEN: NORMAL MMOL/L (ref 0–2)
PT. POSITION: NORMAL
RESPIRATORY RATE: NORMAL
TEXT FOR RESPIRATORY: NORMAL
TOTAL RATE: NORMAL
VENTILATION MODE VENT: NORMAL
VT: NORMAL

## 2025-06-25 PROCEDURE — 93296 REM INTERROG EVL PM/IDS: CPT | Performed by: INTERNAL MEDICINE

## 2025-06-25 PROCEDURE — 93294 REM INTERROG EVL PM/LDLS PM: CPT | Performed by: INTERNAL MEDICINE

## 2025-06-27 ENCOUNTER — HOSPITAL ENCOUNTER (EMERGENCY)
Age: 80
Discharge: HOME | End: 2025-06-27
Attending: STUDENT IN AN ORGANIZED HEALTH CARE EDUCATION/TRAINING PROGRAM
Payer: MEDICARE

## 2025-06-27 ENCOUNTER — APPOINTMENT (OUTPATIENT)
Dept: CT IMAGING | Age: 80
End: 2025-06-27
Payer: MEDICARE

## 2025-06-27 VITALS
BODY MASS INDEX: 18.52 KG/M2 | HEART RATE: 105 BPM | WEIGHT: 118 LBS | RESPIRATION RATE: 16 BRPM | DIASTOLIC BLOOD PRESSURE: 91 MMHG | SYSTOLIC BLOOD PRESSURE: 148 MMHG | HEIGHT: 67 IN | TEMPERATURE: 97.6 F | OXYGEN SATURATION: 99 %

## 2025-06-27 DIAGNOSIS — N39.0 URINARY TRACT INFECTION WITHOUT HEMATURIA, SITE UNSPECIFIED: ICD-10-CM

## 2025-06-27 DIAGNOSIS — K59.00 CONSTIPATION, UNSPECIFIED CONSTIPATION TYPE: Primary | ICD-10-CM

## 2025-06-27 LAB
BACTERIA URNS QL MICRO: ABNORMAL
BILIRUB UR QL STRIP: NEGATIVE
CLARITY UR: CLEAR
COLOR UR: YELLOW
CRYSTALS URNS MICRO: ABNORMAL /HPF
EPI CELLS #/AREA URNS HPF: 2 /HPF
GLUCOSE UR STRIP-MCNC: NEGATIVE MG/DL
HGB UR QL STRIP.AUTO: NEGATIVE
KETONES UR STRIP-MCNC: NEGATIVE MG/DL
LEUKOCYTE ESTERASE UR QL STRIP: ABNORMAL
MUCOUS THREADS URNS QL MICRO: ABNORMAL
NITRITE UR QL STRIP: NEGATIVE
PH UR STRIP: 8 [PH] (ref 5–8)
PROT UR STRIP-MCNC: NEGATIVE MG/DL
RBC #/AREA URNS HPF: 0 /HPF (ref 0–2)
SP GR UR STRIP: 1.01 (ref 1–1.03)
UROBILINOGEN UR STRIP-ACNC: 1 EU/DL (ref 0–1)
WBC #/AREA URNS HPF: 5 /HPF (ref 0–5)
YEAST URNS QL MICRO: ABNORMAL

## 2025-06-27 PROCEDURE — 99284 EMERGENCY DEPT VISIT MOD MDM: CPT

## 2025-06-27 PROCEDURE — 87086 URINE CULTURE/COLONY COUNT: CPT

## 2025-06-27 PROCEDURE — 6370000000 HC RX 637 (ALT 250 FOR IP): Performed by: STUDENT IN AN ORGANIZED HEALTH CARE EDUCATION/TRAINING PROGRAM

## 2025-06-27 PROCEDURE — 81001 URINALYSIS AUTO W/SCOPE: CPT

## 2025-06-27 PROCEDURE — 74176 CT ABD & PELVIS W/O CONTRAST: CPT

## 2025-06-27 RX ORDER — POLYETHYLENE GLYCOL 3350 17 G/17G
17 POWDER, FOR SOLUTION ORAL DAILY
Qty: 510 G | Refills: 0 | Status: SHIPPED | OUTPATIENT
Start: 2025-06-27 | End: 2025-07-27

## 2025-06-27 RX ORDER — CEPHALEXIN 500 MG/1
500 CAPSULE ORAL 2 TIMES DAILY
Qty: 14 CAPSULE | Refills: 0 | Status: SHIPPED | OUTPATIENT
Start: 2025-06-27 | End: 2025-07-04

## 2025-06-27 RX ADMIN — CEPHALEXIN 500 MG: 250 CAPSULE ORAL at 10:39

## 2025-06-27 ASSESSMENT — PAIN SCALES - WONG BAKER: WONGBAKER_NUMERICALRESPONSE: HURTS LITTLE MORE

## 2025-06-27 NOTE — ED PROVIDER NOTES
Emergency Department Encounter      Patient: Genesis Stewart  MRN: [id number]  : 1945  Date of Evaluation: 2025  PCP: Nancy Russo MD  ED Provider:  Mio Kimble DO    Triage Chief Complaint:    No chief complaint on file.    HPI:   Genesis Stewart is a 79 y.o. female that presents to the emergency department with pain with bowel movements for quite some time.  Patient is somewhat of a poor historian, patient with known history of memory disorder and previous stroke.  She is present with her .  She is unsure of how long she has been having issues but states it is painful both to have a bowel movement as well as to urinate.  She is unsure of the last time that she had a normal bowel movement.  She declines taking any bowel regimen.  She states it is only painful to urinate but is still able to.  Declines any associated back pain.  No lower extremity weakness numbness or tingling.  She states whenever she is trying to have a bowel movement she can have some associated abdominal pain.  She declines any chest pain or shortness of breath.    Cardiology note reviewed from 2025, patient with known history of A-fib, compliant with medications.  Hypertension hyperlipidemia all well-controlled.  She was mildly hypotensive so her beta-blocker was decreased to 12.5.        History from : Patient and Family     Limitations to history : Suspect a component of dementia    MDM/ED Course:       In brief,     Pleasant 79-year-old female presenting to the emergency department as above.  She arrives hemodynamically stable and in no acute distress.  Emergent conditions considered.    Patient overall appears well, CT imaging obtained without any evidence of fecal impaction but there does appear to be a decent amount of stool present.  Otherwise no acute surgical findings.  Urine sample with moderate leuks as well as occasional bacteria.  Is having some dysuria.  No evidence of pyelo

## 2025-06-28 LAB
MICROORGANISM SPEC CULT: NORMAL
SERVICE CMNT-IMP: NORMAL
SPECIMEN DESCRIPTION: NORMAL

## 2025-06-29 ENCOUNTER — RESULTS FOLLOW-UP (OUTPATIENT)
Dept: EMERGENCY DEPT | Age: 80
End: 2025-06-29

## 2025-07-01 ENCOUNTER — OFFICE VISIT (OUTPATIENT)
Dept: FAMILY MEDICINE CLINIC | Age: 80
End: 2025-07-01
Payer: MEDICARE

## 2025-07-01 VITALS
SYSTOLIC BLOOD PRESSURE: 116 MMHG | DIASTOLIC BLOOD PRESSURE: 78 MMHG | BODY MASS INDEX: 18.64 KG/M2 | WEIGHT: 119 LBS | OXYGEN SATURATION: 96 % | HEART RATE: 70 BPM

## 2025-07-01 DIAGNOSIS — I10 ESSENTIAL HYPERTENSION: ICD-10-CM

## 2025-07-01 DIAGNOSIS — I48.20 CHRONIC ATRIAL FIBRILLATION (HCC): ICD-10-CM

## 2025-07-01 DIAGNOSIS — E80.6 DIRECT HYPERBILIRUBINEMIA: ICD-10-CM

## 2025-07-01 DIAGNOSIS — G30.9 ALZHEIMER'S DISEASE, UNSPECIFIED (CODE) (HCC): Primary | ICD-10-CM

## 2025-07-01 DIAGNOSIS — Z79.01 LONG TERM CURRENT USE OF ANTICOAGULANT: ICD-10-CM

## 2025-07-01 DIAGNOSIS — N39.0 RECURRENT UTI: ICD-10-CM

## 2025-07-01 DIAGNOSIS — K59.00 CONSTIPATION, UNSPECIFIED CONSTIPATION TYPE: ICD-10-CM

## 2025-07-01 PROCEDURE — 1090F PRES/ABSN URINE INCON ASSESS: CPT | Performed by: FAMILY MEDICINE

## 2025-07-01 PROCEDURE — G8427 DOCREV CUR MEDS BY ELIG CLIN: HCPCS | Performed by: FAMILY MEDICINE

## 2025-07-01 PROCEDURE — 1123F ACP DISCUSS/DSCN MKR DOCD: CPT | Performed by: FAMILY MEDICINE

## 2025-07-01 PROCEDURE — 3074F SYST BP LT 130 MM HG: CPT | Performed by: FAMILY MEDICINE

## 2025-07-01 PROCEDURE — 1159F MED LIST DOCD IN RCRD: CPT | Performed by: FAMILY MEDICINE

## 2025-07-01 PROCEDURE — 1036F TOBACCO NON-USER: CPT | Performed by: FAMILY MEDICINE

## 2025-07-01 PROCEDURE — 99214 OFFICE O/P EST MOD 30 MIN: CPT | Performed by: FAMILY MEDICINE

## 2025-07-01 PROCEDURE — G8399 PT W/DXA RESULTS DOCUMENT: HCPCS | Performed by: FAMILY MEDICINE

## 2025-07-01 PROCEDURE — 3078F DIAST BP <80 MM HG: CPT | Performed by: FAMILY MEDICINE

## 2025-07-01 PROCEDURE — G8420 CALC BMI NORM PARAMETERS: HCPCS | Performed by: FAMILY MEDICINE

## 2025-07-01 RX ORDER — METOPROLOL SUCCINATE 25 MG/1
12.5 TABLET, EXTENDED RELEASE ORAL DAILY
Qty: 90 TABLET | Refills: 3
Start: 2025-07-01

## 2025-07-01 NOTE — PROGRESS NOTES
Plan:   1. Alzheimer's disease, unspecified  2. Direct hyperbilirubinemia- normal LFTs otherwise, h/o cholecystectomy  3. Recurrent UTI  4. Constipation, unspecified constipation type  5. Essential hypertension  -     metoprolol succinate (TOPROL XL) 25 MG extended release tablet; Take 0.5 tablets by mouth daily Dose decreased due to hypotension, Disp-90 tablet, R-3Adjust Sig  6. Chronic atrial fibrillation (HCC)  7. Long term current use of anticoagulant      Continue home care as scheduled with RN visits and OT/PT have relased her. She will continue to follow with neurology for her dementia,on aricept and namenda and depakote.  it trying to support ongoing nutrition for patient with her decreased appetite.      Continue antibiotics given by ER for presumed UTI. Ct showing non obstructing left renal stone at 2mm.     Continue Glycolax every day for the week and then transition to three times per week .    Direct/Conj bilirubin is still elevated without any other liver enzyme abnormalities.  Ct abd pelvis last week unremarkable for liver area. Has been stable over the last few years.  Will recheck in 3-6 months as she is currently asymptomatic.     Appreciate ongoing support from her specialty teams as well as the anticoagulation pharmacy teams.     Discussed use, benefit, and side effects of prescribed medications.   Patient instructed to notify if develop side effects from medications. Barriers to medication compliance addressed.   All patient questions answered.  Pt voiced understanding.     Return for Keep upcoming appointment in this office.  -----------------------------------------------------------------------------------------------            Chief Complaint   Patient presents with    Discuss Labs     Pts  states pt was in the ER last weds. Would like to follow up from that as well.   Has ongoing constipation and was seen ER found to have a presumed UTI.  Patient is a poor historian.

## 2025-07-03 ENCOUNTER — ANTI-COAG VISIT (OUTPATIENT)
Dept: PHARMACY | Age: 80
End: 2025-07-03
Payer: MEDICARE

## 2025-07-03 LAB
INTERNATIONAL NORMALIZATION RATIO, POC: 1.8
POC INR: 1.8 (ref 0.9–1.2)
PROTHROMBIN TIME, POC: 0

## 2025-07-03 PROCEDURE — 85610 PROTHROMBIN TIME: CPT

## 2025-07-03 PROCEDURE — 99211 OFF/OP EST MAY X REQ PHY/QHP: CPT

## 2025-07-03 NOTE — PROGRESS NOTES
Medication Management Service  [facility]  [phone]    Visit Date: 7/3/2025   Subjective:       Genesis Stewart is a 79 y.o. female who presents to clinic today for anticoagulation monitoring and adjustment.    Patient seen in clinic for warfarin management due to  Indication:   atrial fibrillation.   INR goal: of 2.0-3.0.  Duration of therapy: indefinite.    Patient reports the following:   Adherent with regimen  Missed or extra doses:  None   Bleeding or thromboembolic side effects:  None  Significant medication changes:  None  Significant dietary changes: some more Ensure at lunch time to help get more fluids (constipation recently) - unsure if extra Ensure will continue  Significant alcohol or tobacco changes: None  Significant recent illness, disease state changes, or hospitalization:  ER 6/27- keflex started then stopped, miralax started   Upcoming surgeries or procedures:  None  Falls: None           Assessment and Plan     PT/INR done in office per protocol.   INR today is 1.8, subtherapeutic.        Likely due to extra vitamin K in diet with increased Ensure recently- will monitor  Plan:  Will continue current regimen of warfarin 3mg daily except 4.5mg on Tuesdays and Fridays  Recheck INR in 2 week(s).     Patient verbalized understanding of dosing directions and information discussed. Dosing schedule given to patient including phone number, appointment date, and time. Progress note sent to referring office.    Electronically signed by Taylor Lew RPH on 7/3/25     For Pharmacy Admin Tracking Only    Intervention Detail:   Total # of Interventions Recommended:   Total # of Interventions Accepted:   Time Spent (min): 15

## 2025-07-17 ENCOUNTER — ANTI-COAG VISIT (OUTPATIENT)
Dept: PHARMACY | Age: 80
End: 2025-07-17
Payer: MEDICARE

## 2025-07-17 LAB
INTERNATIONAL NORMALIZATION RATIO, POC: 2.4
POC INR: 2.4 (ref 0.9–1.2)
PROTHROMBIN TIME, POC: 0

## 2025-07-17 PROCEDURE — 85610 PROTHROMBIN TIME: CPT

## 2025-07-17 PROCEDURE — 99211 OFF/OP EST MAY X REQ PHY/QHP: CPT

## 2025-07-17 NOTE — PROGRESS NOTES
Medication Management Service  [facility]  [phone]    Visit Date: 7/17/2025   Subjective:       Genesis Stewart is a 80 y.o. female who presents to clinic today for anticoagulation monitoring and adjustment.    Patient seen in clinic for warfarin management due to  Indication:   atrial fibrillation.   INR goal: of 2.0-3.0.  Duration of therapy: indefinite.    Patient reports the following:   Adherent with regimen  Missed or extra doses:  None   Bleeding or thromboembolic side effects:  None  Significant medication changes:  None  Significant dietary changes: None  Significant alcohol or tobacco changes: None  Significant recent illness, disease state changes, or hospitalization:  None  Upcoming surgeries or procedures:  None  Falls: fall mon or Tuesday- hematoma on left leg           Assessment and Plan     PT/INR done in office per protocol.   INR today is 2.4, therapeutic.        Fall- tangled up in chair leg. Hematoma on left ankle  Plan:  Will continue current regimen of warfarin 3mg daily except 4.5mg on Tuesdays and Fridays.     Recheck INR in 3 week(s).     Patient verbalized understanding of dosing directions and information discussed. Dosing schedule given to patient including phone number, appointment date, and time. Progress note sent to referring office.    Electronically signed by Taylor Lew RPH on 7/17/25     For Pharmacy Admin Tracking Only    Intervention Detail:   Total # of Interventions Recommended:   Total # of Interventions Accepted:   Time Spent (min): 15

## 2025-07-30 DIAGNOSIS — F02.A4 MILD LATE ONSET ALZHEIMER'S DEMENTIA WITH ANXIETY (HCC): ICD-10-CM

## 2025-07-30 DIAGNOSIS — G30.1 MILD LATE ONSET ALZHEIMER'S DEMENTIA WITH ANXIETY (HCC): ICD-10-CM

## 2025-07-30 NOTE — TELEPHONE ENCOUNTER
Patient's spouse called to request refills on Depakote, last seen 05/23/2025 and is scheduled to follow up on 11/21/2025.

## 2025-07-31 RX ORDER — DIVALPROEX SODIUM 125 MG/1
125 TABLET, DELAYED RELEASE ORAL NIGHTLY
Qty: 90 TABLET | Refills: 1 | Status: SHIPPED | OUTPATIENT
Start: 2025-07-31

## 2025-08-04 SDOH — HEALTH STABILITY: PHYSICAL HEALTH: ON AVERAGE, HOW MANY DAYS PER WEEK DO YOU ENGAGE IN MODERATE TO STRENUOUS EXERCISE (LIKE A BRISK WALK)?: 0 DAYS

## 2025-08-04 SDOH — HEALTH STABILITY: PHYSICAL HEALTH: ON AVERAGE, HOW MANY MINUTES DO YOU ENGAGE IN EXERCISE AT THIS LEVEL?: 0 MIN

## 2025-08-04 ASSESSMENT — PATIENT HEALTH QUESTIONNAIRE - PHQ9
SUM OF ALL RESPONSES TO PHQ QUESTIONS 1-9: 2
SUM OF ALL RESPONSES TO PHQ QUESTIONS 1-9: 2
2. FEELING DOWN, DEPRESSED OR HOPELESS: SEVERAL DAYS
SUM OF ALL RESPONSES TO PHQ QUESTIONS 1-9: 2
SUM OF ALL RESPONSES TO PHQ QUESTIONS 1-9: 2
1. LITTLE INTEREST OR PLEASURE IN DOING THINGS: SEVERAL DAYS

## 2025-08-04 ASSESSMENT — LIFESTYLE VARIABLES
HOW MANY STANDARD DRINKS CONTAINING ALCOHOL DO YOU HAVE ON A TYPICAL DAY: 0
HOW OFTEN DO YOU HAVE SIX OR MORE DRINKS ON ONE OCCASION: 1
HOW MANY STANDARD DRINKS CONTAINING ALCOHOL DO YOU HAVE ON A TYPICAL DAY: PATIENT DOES NOT DRINK
HOW OFTEN DO YOU HAVE A DRINK CONTAINING ALCOHOL: NEVER
HOW OFTEN DO YOU HAVE A DRINK CONTAINING ALCOHOL: 1

## 2025-08-05 ENCOUNTER — OFFICE VISIT (OUTPATIENT)
Dept: FAMILY MEDICINE CLINIC | Age: 80
End: 2025-08-05
Payer: MEDICARE

## 2025-08-05 VITALS
HEART RATE: 69 BPM | WEIGHT: 117.8 LBS | BODY MASS INDEX: 18.45 KG/M2 | SYSTOLIC BLOOD PRESSURE: 116 MMHG | DIASTOLIC BLOOD PRESSURE: 78 MMHG | OXYGEN SATURATION: 98 %

## 2025-08-05 DIAGNOSIS — Z00.00 MEDICARE ANNUAL WELLNESS VISIT, SUBSEQUENT: Primary | ICD-10-CM

## 2025-08-05 PROCEDURE — 1123F ACP DISCUSS/DSCN MKR DOCD: CPT | Performed by: FAMILY MEDICINE

## 2025-08-05 PROCEDURE — 3074F SYST BP LT 130 MM HG: CPT | Performed by: FAMILY MEDICINE

## 2025-08-05 PROCEDURE — 1160F RVW MEDS BY RX/DR IN RCRD: CPT | Performed by: FAMILY MEDICINE

## 2025-08-05 PROCEDURE — 1159F MED LIST DOCD IN RCRD: CPT | Performed by: FAMILY MEDICINE

## 2025-08-05 PROCEDURE — 3078F DIAST BP <80 MM HG: CPT | Performed by: FAMILY MEDICINE

## 2025-08-05 PROCEDURE — G0439 PPPS, SUBSEQ VISIT: HCPCS | Performed by: FAMILY MEDICINE

## 2025-08-07 ENCOUNTER — ANTI-COAG VISIT (OUTPATIENT)
Dept: PHARMACY | Age: 80
End: 2025-08-07
Payer: MEDICARE

## 2025-08-07 LAB
INTERNATIONAL NORMALIZATION RATIO, POC: 2.5
POC INR: 2.5 (ref 0.9–1.2)
PROTHROMBIN TIME, POC: 0

## 2025-08-07 PROCEDURE — 99211 OFF/OP EST MAY X REQ PHY/QHP: CPT

## 2025-08-07 PROCEDURE — 85610 PROTHROMBIN TIME: CPT
